# Patient Record
Sex: FEMALE | Race: WHITE | NOT HISPANIC OR LATINO
[De-identification: names, ages, dates, MRNs, and addresses within clinical notes are randomized per-mention and may not be internally consistent; named-entity substitution may affect disease eponyms.]

---

## 2016-12-31 NOTE — ED ADULT NURSE NOTE - OBJECTIVE STATEMENT
pt received in room 1 Alert and active , pt coming from home , pt is a full code , pt is being currently treated for Pneumonia with Z-pack , pt c/o SOB since this afternoon , crackles noted , pt tachypneic , pt on Cm sinus tachycardia noted , pt is currently moaning from respiratory distress unable to verbalize any physical complaints , 2 non blanchable deep tissue wounds noted on sacral area , IV was accessed by EMS will continue to monitor

## 2016-12-31 NOTE — ED PROVIDER NOTE - MEDICAL DECISION MAKING DETAILS
Pt with likely dementia (pt's son reports worsening difficulty swallowing, deterioration over past year,) p/w worsening SOB and wet cough w/diffuse rales. CXR w/right sided infiltrate? but hypoxic (although satting well on NRB) and febrile c/w PNA (w/o resolution despite z-pack recently.) CT head performed for son's c/o worsening dysphagia neg for acute pathology. d/w Dr Villarreal, HD stable, getting more antipyretics, admitting to medicine for further care.

## 2016-12-31 NOTE — H&P ADULT. - ASSESSMENT
89 yo F with PMH IBS, celiac disease, chronic LE edema (on lasix once weekly), bilateral rotator cuff tear p/w cough and SOB.  Found to have severe sepsis.

## 2016-12-31 NOTE — H&P ADULT. - PROBLEM SELECTOR PLAN 1
Severe sepsis 2/2 pneumonia  - Febrile to 102.9, Tachycardic to 120s, RR 30, WBC 11.2, lactate 1.8  - Continue ceftriaxone and doxycycline for CAP  - Tachypnea improved on non-rebreather, O2 sat 100%  - Continue tylenol for fever  - f/u blood cultures  - IVF hydration

## 2016-12-31 NOTE — ED PROVIDER NOTE - RESPIRATORY, MLM
Diffuse rhonchi, ? crackles, difficult to auscultate breath sounds as patient is moaning loudly throughout.

## 2016-12-31 NOTE — ED PROVIDER NOTE - OBJECTIVE STATEMENT
89F with PMH of CHF, Celiac disease, IBS, and DJD BIB EMS with HHA c/o worsening SOB that started about 3 hours ago. Denies chest pain or recent injuries/falls. Pt was seen here on 12/11/2016 for cough, EKG no acute changes, CT head WNL, CXR no significant infiltrates and pt was discharged home with z-pack. Per HHA, pt is baseline able to communicate and walk. Pt is full code. Son's info: Esvin Goff  [phone]. 89F with PMH of Celiac disease, IBS, and DJD BIB EMS with HHA c/o worsening SOB that started about 3 hours ago. Denies chest pain or recent injuries/falls. Pt was seen here on 12/11/2016 for cough, EKG no acute changes, CT head WNL, CXR no significant infiltrates and pt was discharged home with z-pack. Per HHA, pt is baseline able to communicate and walk. Pt is full code. Son's info: Esvin Goff  [phone]. Pt can give no history as she is moaning on arrival and will not answer questions. Per son, no h/o COPD, CAD or CHF, takes prn furosemide for chronic longstanding LLE edema

## 2016-12-31 NOTE — H&P ADULT. - PROBLEM SELECTOR PLAN 2
HSQ Pt is A&Ox3 at baseline according to son.  Now A&Ox0.  - CT negative for acute infarct or hemorrhage.  - altered mental status likely 2/2 sepsis  - continue to monitor

## 2016-12-31 NOTE — ED PROVIDER NOTE - CARDIAC, MLM
+Tachycardia. Regular rhythm, intact distal pulses. Loud holosystolic blowing murmur throughout. +Tachycardia. Regular rhythm, intact distal pulses. Loud holosystolic blowing murmur throughout? (although heart sounds obscured by lung sounds and moaning)

## 2016-12-31 NOTE — ED PROVIDER NOTE - MUSCULOSKELETAL, MLM
Neck supple, normal range of motion, no extremity deformities or edema Neck supple, normal range of motion. LLE circumference > RLE

## 2016-12-31 NOTE — ED PROVIDER NOTE - NEUROLOGICAL, MLM
Alert and oriented but not interacting much although does report "no" when asked if chest or abdominal pain

## 2016-12-31 NOTE — ED ADULT TRIAGE NOTE - CHIEF COMPLAINT QUOTE
BIBA from home with low SaO2 ,SOB increasingly worse since 5pm ,on z pack x 2 days for pneumonia,feels hot

## 2016-12-31 NOTE — ED PROVIDER NOTE - NS ED MD SCRIBE ATTENDING SCRIBE SECTIONS
HIV/HISTORY OF PRESENT ILLNESS/PHYSICAL EXAM/VITAL SIGNS( Pullset)/REVIEW OF SYSTEMS/PAST MEDICAL/SURGICAL/SOCIAL HISTORY HIV/RESULTS/HISTORY OF PRESENT ILLNESS/PAST MEDICAL/SURGICAL/SOCIAL HISTORY/REVIEW OF SYSTEMS/VITAL SIGNS( Pullset)/PHYSICAL EXAM

## 2016-12-31 NOTE — H&P ADULT. - HISTORY OF PRESENT ILLNESS
89 yo F with PMH IBS, celiac disease, chronic LLE edema, bilateral rotator cuff tear p/w cough and SOB. 89 yo F with PMH IBS, celiac disease, chronic LLE edema (on lasix once weekly), bilateral rotator cuff tear p/w cough and SOB that began suddenly this evening.  Patient was at [facility] ED on december 11th for similar symptoms and was given zithromax for CAP which she completed.  History is obtained for HHA at bedside as patient is agitated and not answering questions.  She states she first noticed patient having difficulty breathing and coughing at 5:00 PM this evening as well as AMS.  Denies chills, diaphoresis, CP, N/V/D.  VS In ED T 102.9  /54 RR 32 O2 sat   HCP is patient's son Esvin Goff [phone]. 89 yo F with PMH IBS, celiac disease, chronic LE edema (on lasix once weekly), bilateral rotator cuff tear p/w cough and SOB that began suddenly this evening.  Patient was at [facility] ED on december 11th for similar symptoms and was given zithromax for CAP which she completed.  History is obtained for HHA at bedside as patient is agitated and not answering questions.  She states she first noticed patient having difficulty breathing and coughing at 5:00 PM this evening as well as AMS.  States she's been having difficulty swallowing recently.  Denies chills, diaphoresis, CP, N/V/D.    VS In ED T 102.9  /54 RR 32 O2 sat   HCP is patient's son Esvin Goff [phone].    Addendum: When patient arrived on floor she was becoming increasingly somnolent and SBP was 70s.  She was given 1.5 L of NS but BP did not respond to fluids so she was started on levophed and transferred to MICU. 89 yo F with PMH IBS, celiac disease, chronic LE edema (on lasix once weekly), bilateral rotator cuff tear p/w cough and SOB that began suddenly this evening.  Patient was at [facility] ED on december 11th for similar symptoms and was given zithromax for CAP which she completed.  History is obtained for HHA at bedside as patient is agitated and not answering questions.  She states she first noticed patient having difficulty breathing and coughing at 5:00 PM this evening as well as AMS.  States she's been having difficulty swallowing recently.  Denies chills, diaphoresis, CP, N/V/D.    VS In ED T 102.9  /54 RR 32 O2 sat 90% on RA.  HCP is patient's son Esvin Goff [phone].    Addendum: When patient arrived on floor she was becoming increasingly somnolent and SBP was 70s.  She was given 1.5 L of NS but BP did not respond to fluids so she was started on levophed and transferred to MICU.  ABG revealed respiratory acidosis.  Son was informed and wants her to remain FULL CODE with all interventions. 89 yo F with PMH IBS, celiac disease, chronic LE edema (on lasix once weekly), bilateral rotator cuff tear p/w cough and SOB that began suddenly this evening.  Patient was at [facility] ED on december 11th for similar symptoms and was given zithromax for CAP which she completed.  History is obtained for HHA at bedside as patient is agitated and not answering questions.  She states she first noticed patient having difficulty breathing and coughing at 5:00 PM this evening as well as AMS.  States she's been having difficulty swallowing recently.  Denies chills, diaphoresis, CP, N/V/D.    VS In ED T 102.9  /54 RR 32 O2 sat 90% on RA.  HCP is patient's son Esvin Goff [phone].    Addendum: When patient arrived on floor she was becoming increasingly somnolent and hypotensive with SBP was 70s.  She was given 1.5 L of NS but BP did not respond to fluids so she was started on levophed and transferred to MICU.  ABG revealed respiratory acidosis.  Son was informed and wants her to remain FULL CODE with all interventions.

## 2017-01-01 NOTE — PROCEDURE NOTE - NSPROCDETAILS_GEN_ALL_CORE
connected to ventilator/patient pre-oxygenated, tube inserted, placement confirmed
guidewire recovered/sterile technique, catheter placed/lumen(s) aspirated and flushed/ultrasound guidance/sterile dressing applied

## 2017-01-01 NOTE — PROGRESS NOTE ADULT - ASSESSMENT
89yoF w/ b/l LE DVT  -anticoagulation unless contraindicated  -No surgical intervention at this time  -reconsult if bleed or contraindication  -d/w chief resident on call

## 2017-01-01 NOTE — PROGRESS NOTE ADULT - PROBLEM SELECTOR PLAN 2
Intubated for respiratory distress and decreasing sats. ABG prior to intubation consistent with respiratory acidosis.   - chlorhexidine mouthwash  - Protonix 40mg IVP daily while intubated for GI protection Intubated for respiratory distress and decreasing sat. ABG prior to intubation consistent with respiratory acidosis.   - chlorhexidine mouthwash BID  - Protonix 40mg IVP daily while intubated for GI protection

## 2017-01-01 NOTE — PROGRESS NOTE ADULT - SUBJECTIVE AND OBJECTIVE BOX
·	Subjective / Hospital Course: 89 yo F with PMH IBS, celiac disease, chronic LE edema (on Lasix once weekly), bilateral rotator cuff tear p/w cough and SOB that began suddenly this evening, admitted for severe sepsis 2/2 pneumonia. When patient arrived on floor she was becoming increasingly somnolent and SBP was 70s.  She was given 1.5 L of NS but blood pressure did not respond to fluids so she was started on levophed and transferred to MICU.  She was intubated immediately after arriving to MICU for tachypnea and hypoxia, with an ABG consistent with respiratory acidosis.    89 yo F with PMH IBS, celiac disease, chronic LE edema (on lasix once weekly), bilateral rotator cuff tear p/w cough and SOB that began suddenly this evening.  Patient was at [facility] ED on december 11th for similar symptoms and was given zithromax for CAP which she completed.  History is obtained for HHA at bedside as patient is agitated and not answering questions.  She states she first noticed patient having difficulty breathing and coughing at 5:00 PM this evening as well as AMS.  States she's been having difficulty swallowing recently.  Denies chills, diaphoresis, CP, N/V/D.    VS In ED T 102.9  /54 RR 32 O2 sat   HCP is patient's son Esvin Goff [phone].    Addendum: When patient arrived on floor she was becoming increasingly somnolent and SBP was 70s.  She was given 1.5 L of NS but BP did not respond to fluids so she was started on levophed and transferred to MICU.      ROS:  Unable to obtain as patient intubated     OBJECTIVE:    VITAL SIGNS:  ICU Vital Signs Last 24 Hrs  T(C): 37.4, Max: 39.2 (12-31 @ 20:50)  T(F): 99.4, Max: 102.5 (12-31 @ 20:50)  HR: 109 (109 - 119)  BP: 107/63 (106/54 - 115/76)  BP(mean): --  ABP: --  ABP(mean): --  RR: 26 (24 - 32)  SpO2: 100% (98% - 100%)        CAPILLARY BLOOD GLUCOSE      PHYSICAL EXAM:    General: NAD, comfortable  HEENT: NCAT, PERRL, clear conjunctiva, no scleral icterus  Neck: supple, no JVD  Respiratory: CTA b/l, no wheezing, rhonchi, rales  Cardiovascular: RRR, normal S1S2, no M/R/G  Abdomen: soft, NT/ND, bowel sounds in all four quadrants, no palpable masses  Extremities: WWP, no clubbing, cyanosis, or edema  Neuro:     MEDICATIONS:  MEDICATIONS  (STANDING):  doxycycline IVPB 100milliGRAM(s) IV Intermittent once  cefTRIAXone   IVPB 1Gram(s) IV Intermittent every 24 hours  sodium chloride 0.9%. 1000milliLiter(s) IV Continuous <Continuous>  heparin  Injectable 5000Unit(s) SubCutaneous every 12 hours  norepinephrine Infusion 0.01MICROgram(s)/kG/Min IV Continuous <Continuous>    MEDICATIONS  (PRN):  acetaminophen  Suppository 650milliGRAM(s) Rectal every 6 hours PRN For Temp greater than 38 C (100.4 F)      ALLERGIES:  Allergies    amoxicillin (Unknown)  Cipro (Unknown)  clindamycin (Unknown)  lactose (Unknown)  Wheat (Unknown)    Intolerances        LABS:                        10.8   11.4  )-----------( 309      ( 31 Dec 2016 20:38 )             34.2     31 Dec 2016 20:38    140    |  102    |  38     ----------------------------<  142    4.2     |  30     |  1.07     Ca    8.2        31 Dec 2016 20:38    TPro  7.1    /  Alb  2.2    /  TBili  0.4    /  DBili  x      /  AST  39     /  ALT  22     /  AlkPhos  103    31 Dec 2016 20:38    PT/INR - ( 31 Dec 2016 20:38 )   PT: 14.2 sec;   INR: 1.28          PTT - ( 31 Dec 2016 20:38 )  PTT:24.3 sec  Urinalysis Basic - ( 01 Jan 2017 01:10 )    Color: Yellow / Appearance: Clear / SG: >=1.030 / pH: x  Gluc: x / Ketone: NEGATIVE  / Bili: NEGATIVE / Urobili: 0.2 E.U./dL   Blood: x / Protein: 30 mg/dL / Nitrite: NEGATIVE   Leuk Esterase: NEGATIVE / RBC: < 5 /HPF / WBC 5-10 /HPF   Sq Epi: x / Non Sq Epi: Rare /HPF / Bacteria: Many /HPF        RADIOLOGY & ADDITIONAL TESTS: Reviewed. Subjective / Hospital Course: 89 yo F with PMH IBS, celiac disease, chronic LE edema (on Lasix once weekly), bilateral rotator cuff tear p/w cough and SOB that began suddenly this evening, admitted for severe sepsis 2/2 pneumonia. When patient arrived on floor she was becoming increasingly somnolent and SBP was 70s.  She was given 1.5 L of NS but blood pressure did not respond to fluids so she was started on levophed and transferred to MICU.  She was intubated immediately after arriving to MICU for tachypnea and hypoxia with an ABG consistent with respiratory acidosis.   89 yo F with PMH IBS, celiac disease, chronic LE edema (on lasix once weekly), bilateral rotator cuff tear p/w cough and SOB that began suddenly this evening.  Patient was at [facility] ED on december 11th for similar symptoms and was given zithromax for CAP which she completed.  History is obtained for HHA at bedside as patient is agitated and not answering questions.  She states she first noticed patient having difficulty breathing and coughing at 5:00 PM this evening as well as AMS.  States she's been having difficulty swallowing recently.  Denies chills, diaphoresis, CP, N/V/D.    VS In ED T 102.9  /54 RR 32 O2 sat   HCP is patient's son Esvin Goff [phone].    Addendum: When patient arrived on floor she was becoming increasingly somnolent and SBP was 70s.  She was given 1.5 L of NS but BP did not respond to fluids so she was started on levophed and transferred to MICU.      ROS:  Unable to obtain as patient intubated     OBJECTIVE:    VITAL SIGNS:  ICU Vital Signs Last 24 Hrs  T(C): 37.4, Max: 39.2 (12-31 @ 20:50)  T(F): 99.4, Max: 102.5 (12-31 @ 20:50)  HR: 109 (109 - 119)  BP: 107/63 (106/54 - 115/76)  BP(mean): --  ABP: --  ABP(mean): --  RR: 26 (24 - 32)  SpO2: 100% (98% - 100%)        CAPILLARY BLOOD GLUCOSE      PHYSICAL EXAM:    General: NAD, comfortable  HEENT: NCAT, pupils small but reactive, clear conjunctiva, no scleral icterus, dry mucous membranes  Neck: supple, no JVD  Respiratory: Breath sounds equal bilaterally, rales bilateral bases, no wheezing  Cardiovascular: tachycardic, regular rhythm, grade 3/6 systolic murmur  Abdomen: soft, NT/ND, bowel sounds in all four quadrants, no palpable masses  Extremities: WWP, no clubbing, cyanosis, or edema  Neuro:     MEDICATIONS:  MEDICATIONS  (STANDING):  doxycycline IVPB 100milliGRAM(s) IV Intermittent once  cefTRIAXone   IVPB 1Gram(s) IV Intermittent every 24 hours  sodium chloride 0.9%. 1000milliLiter(s) IV Continuous <Continuous>  heparin  Injectable 5000Unit(s) SubCutaneous every 12 hours  norepinephrine Infusion 0.01MICROgram(s)/kG/Min IV Continuous <Continuous>    MEDICATIONS  (PRN):  acetaminophen  Suppository 650milliGRAM(s) Rectal every 6 hours PRN For Temp greater than 38 C (100.4 F)      ALLERGIES:  Allergies    amoxicillin (Unknown)  Cipro (Unknown)  clindamycin (Unknown)  lactose (Unknown)  Wheat (Unknown)    Intolerances        LABS:                        10.8   11.4  )-----------( 309      ( 31 Dec 2016 20:38 )             34.2     31 Dec 2016 20:38    140    |  102    |  38     ----------------------------<  142    4.2     |  30     |  1.07     Ca    8.2        31 Dec 2016 20:38    TPro  7.1    /  Alb  2.2    /  TBili  0.4    /  DBili  x      /  AST  39     /  ALT  22     /  AlkPhos  103    31 Dec 2016 20:38    PT/INR - ( 31 Dec 2016 20:38 )   PT: 14.2 sec;   INR: 1.28          PTT - ( 31 Dec 2016 20:38 )  PTT:24.3 sec  Urinalysis Basic - ( 01 Jan 2017 01:10 )    Color: Yellow / Appearance: Clear / SG: >=1.030 / pH: x  Gluc: x / Ketone: NEGATIVE  / Bili: NEGATIVE / Urobili: 0.2 E.U./dL   Blood: x / Protein: 30 mg/dL / Nitrite: NEGATIVE   Leuk Esterase: NEGATIVE / RBC: < 5 /HPF / WBC 5-10 /HPF   Sq Epi: x / Non Sq Epi: Rare /HPF / Bacteria: Many /HPF        RADIOLOGY & ADDITIONAL TESTS: Reviewed. Subjective / Hospital Course: 89 yo F with PMH IBS, celiac disease, chronic LE edema (on Lasix once weekly), bilateral rotator cuff tear p/w cough and SOB that began suddenly this evening, admitted for severe sepsis 2/2 pneumonia. When patient arrived on floor she was becoming increasingly somnolent and SBP was 70s.  She was given 1.5 L of NS but blood pressure did not respond to fluids so she was started on levophed and transferred to MICU.  She was intubated immediately after arriving to MICU for tachypnea and hypoxia with an ABG consistent with respiratory acidosis.   89 yo F with PMH IBS, celiac disease, chronic LE edema (on lasix once weekly), bilateral rotator cuff tear p/w cough and SOB that began suddenly this evening.  Patient was at [facility] ED on december 11th for similar symptoms and was given zithromax for CAP which she completed.  History is obtained for HHA at bedside as patient is agitated and not answering questions.  She states she first noticed patient having difficulty breathing and coughing at 5:00 PM this evening as well as AMS.  States she's been having difficulty swallowing recently.  Denies chills, diaphoresis, CP, N/V/D.    VS In ED T 102.9  /54 RR 32 O2 sat   HCP is patient's son Esvin Goff [phone].    Addendum: When patient arrived on floor she was becoming increasingly somnolent and SBP was 70s.  She was given 1.5 L of NS but BP did not respond to fluids so she was started on levophed and transferred to MICU.      ROS:  Unable to obtain as patient intubated     OBJECTIVE:    VITAL SIGNS:  ICU Vital Signs Last 24 Hrs  T(C): 37.4, Max: 39.2 (12-31 @ 20:50)  T(F): 99.4, Max: 102.5 (12-31 @ 20:50)  HR: 109 (109 - 119)  BP: 107/63 (106/54 - 115/76)  BP(mean): --  ABP: --  ABP(mean): --  RR: 26 (24 - 32)  SpO2: 100% (98% - 100%)        CAPILLARY BLOOD GLUCOSE      PHYSICAL EXAM:    General: NAD, comfortable  HEENT: NCAT, pupils small but reactive, clear conjunctiva, no scleral icterus, dry mucous membranes  Neck: supple, no JVD  Respiratory: Breath sounds equal bilaterally, rales bilateral bases, no wheezing  Cardiovascular: tachycardic, regular rhythm, grade 3/6 systolic murmur  Abdomen: soft, NT/ND, bowel sounds in all four quadrants, no palpable masses  Extremities: WWP, no clubbing, cyanosis, or edema  Neuro:     MEDICATIONS:  MEDICATIONS  (STANDING):  doxycycline IVPB 100milliGRAM(s) IV Intermittent once  cefTRIAXone   IVPB 1Gram(s) IV Intermittent every 24 hours  sodium chloride 0.9%. 1000milliLiter(s) IV Continuous <Continuous>  heparin  Injectable 5000Unit(s) SubCutaneous every 12 hours  norepinephrine Infusion 0.01MICROgram(s)/kG/Min IV Continuous <Continuous>    MEDICATIONS  (PRN):  acetaminophen  Suppository 650milliGRAM(s) Rectal every 6 hours PRN For Temp greater than 38 C (100.4 F)      ALLERGIES:  Allergies    amoxicillin (Unknown)  Cipro (Unknown)  clindamycin (Unknown)  lactose (Unknown)  Wheat (Unknown)    Intolerances        LABS:                        10.8   11.4  )-----------( 309      ( 31 Dec 2016 20:38 )             34.2     31 Dec 2016 20:38    140    |  102    |  38     ----------------------------<  142    4.2     |  30     |  1.07     Ca    8.2        31 Dec 2016 20:38    TPro  7.1    /  Alb  2.2    /  TBili  0.4    /  DBili  x      /  AST  39     /  ALT  22     /  AlkPhos  103    31 Dec 2016 20:38    PT/INR - ( 31 Dec 2016 20:38 )   PT: 14.2 sec;   INR: 1.28          PTT - ( 31 Dec 2016 20:38 )  PTT:24.3 sec  Urinalysis Basic - ( 01 Jan 2017 01:10 )    Color: Yellow / Appearance: Clear / SG: >=1.030 / pH: x  Gluc: x / Ketone: NEGATIVE  / Bili: NEGATIVE / Urobili: 0.2 E.U./dL   Blood: x / Protein: 30 mg/dL / Nitrite: NEGATIVE   Leuk Esterase: NEGATIVE / RBC: < 5 /HPF / WBC 5-10 /HPF   Sq Epi: x / Non Sq Epi: Rare /HPF / Bacteria: Many /HPF        RADIOLOGY & ADDITIONAL TESTS: Reviewed. Subjective / Hospital Course: 89 yo F with PMH IBS, celiac disease, chronic LE edema (on Lasix once weekly), bilateral rotator cuff tear p/w cough and SOB that began suddenly this evening, admitted for severe sepsis 2/2 pneumonia. When patient arrived on floor she was becoming increasingly somnolent and SBP was 70s.  She was given 1.5 L of NS but blood pressure did not respond to fluids so she was started on levophed and transferred to MICU.  She was intubated immediately after arriving to MICU for tachypnea and hypoxia with an ABG consistent with respiratory acidosis.   89 yo F with PMH IBS, celiac disease, chronic LE edema (on lasix once weekly), bilateral rotator cuff tear p/w cough and SOB that began suddenly this evening.  Patient was at [facility] ED on december 11th for similar symptoms and was given zithromax for CAP which she completed.  History is obtained for HHA at bedside as patient is agitated and not answering questions.  She states she first noticed patient having difficulty breathing and coughing at 5:00 PM this evening as well as AMS.  States she's been having difficulty swallowing recently.  Denies chills, diaphoresis, CP, N/V/D.    VS In ED T 102.9  /54 RR 32 O2 sat   HCP is patient's son Esvin Goff [phone].    Addendum: When patient arrived on floor she was becoming increasingly somnolent and SBP was 70s.  She was given 1.5 L of NS but BP did not respond to fluids so she was started on levophed and transferred to MICU.      ROS:  Unable to obtain as patient intubated     OBJECTIVE:    VITAL SIGNS:  ICU Vital Signs Last 24 Hrs  T(C): 37.4, Max: 39.2 (12-31 @ 20:50)  T(F): 99.4, Max: 102.5 (12-31 @ 20:50)  HR: 109 (109 - 119)  BP: 107/63 (106/54 - 115/76)  BP(mean): --  ABP: --  ABP(mean): --  RR: 26 (24 - 32)  SpO2: 100% (98% - 100%)        CAPILLARY BLOOD GLUCOSE      PHYSICAL EXAM:    General: NAD, comfortable  HEENT: NCAT, pupils small but reactive, clear conjunctiva, no scleral icterus, dry mucous membranes  Neck: supple, no JVD  Respiratory: Breath sounds equal bilaterally, rales bilateral bases, no wheezing  Cardiovascular: tachycardic, regular rhythm, grade 3/6 systolic murmur  Abdomen: soft, NT/ND, bowel sounds in all four quadrants  Extremities: WWP, LLE 2+ pitting edema to knee, trace RLE edema, skin breakdown on R heel  Skin: sacral ulcer  Neuro: intubated and sedated    MEDICATIONS:  MEDICATIONS  (STANDING):  doxycycline IVPB 100milliGRAM(s) IV Intermittent once  cefTRIAXone   IVPB 1Gram(s) IV Intermittent every 24 hours  sodium chloride 0.9%. 1000milliLiter(s) IV Continuous <Continuous>  heparin  Injectable 5000Unit(s) SubCutaneous every 12 hours  norepinephrine Infusion 0.01MICROgram(s)/kG/Min IV Continuous <Continuous>    MEDICATIONS  (PRN):  acetaminophen  Suppository 650milliGRAM(s) Rectal every 6 hours PRN For Temp greater than 38 C (100.4 F)      ALLERGIES:  Allergies    amoxicillin (Unknown)  Cipro (Unknown)  clindamycin (Unknown)  lactose (Unknown)  Wheat (Unknown)    Intolerances        LABS:                        10.8   11.4  )-----------( 309      ( 31 Dec 2016 20:38 )             34.2     31 Dec 2016 20:38    140    |  102    |  38     ----------------------------<  142    4.2     |  30     |  1.07     Ca    8.2        31 Dec 2016 20:38    TPro  7.1    /  Alb  2.2    /  TBili  0.4    /  DBili  x      /  AST  39     /  ALT  22     /  AlkPhos  103    31 Dec 2016 20:38    PT/INR - ( 31 Dec 2016 20:38 )   PT: 14.2 sec;   INR: 1.28          PTT - ( 31 Dec 2016 20:38 )  PTT:24.3 sec  Urinalysis Basic - ( 01 Jan 2017 01:10 )    Color: Yellow / Appearance: Clear / SG: >=1.030 / pH: x  Gluc: x / Ketone: NEGATIVE  / Bili: NEGATIVE / Urobili: 0.2 E.U./dL   Blood: x / Protein: 30 mg/dL / Nitrite: NEGATIVE   Leuk Esterase: NEGATIVE / RBC: < 5 /HPF / WBC 5-10 /HPF   Sq Epi: x / Non Sq Epi: Rare /HPF / Bacteria: Many /HPF        RADIOLOGY & ADDITIONAL TESTS: Reviewed. Subjective / Hospital Course: 89 yo F with PMH IBS, celiac disease, chronic LE edema (on Lasix once weekly), bilateral rotator cuff tear p/w cough and SOB that began suddenly this evening, admitted for severe sepsis 2/2 pneumonia. When patient arrived on floor she was becoming increasingly somnolent and SBP was 70s.  She was given 1.5 L of NS but blood pressure did not respond to fluids so she was started on levophed and transferred to MICU.  She was intubated immediately after arriving to MICU for hypoxic respiratory failure  89 yo F with PMH IBS, celiac disease, chronic LE edema (on lasix once weekly), bilateral rotator cuff tear p/w cough and SOB that began suddenly this evening.  Patient was at [facility] ED on december 11th for similar symptoms and was given zithromax for CAP which she completed.  History is obtained for HHA at bedside as patient is agitated and not answering questions.  She states she first noticed patient having difficulty breathing and coughing at 5:00 PM this evening as well as AMS.  States she's been having difficulty swallowing recently.  Denies chills, diaphoresis, CP, N/V/D.    VS In ED T 102.9  /54 RR 32 O2 sat   HCP is patient's son Esvin Goff [phone].    Addendum: When patient arrived on floor she was becoming increasingly somnolent and SBP was 70s.  She was given 1.5 L of NS but BP did not respond to fluids so she was started on levophed and transferred to MICU.      ROS:  Unable to obtain as patient intubated     OBJECTIVE:    VITAL SIGNS:  ICU Vital Signs Last 24 Hrs  T(C): 37.4, Max: 39.2 (12-31 @ 20:50)  T(F): 99.4, Max: 102.5 (12-31 @ 20:50)  HR: 109 (109 - 119)  BP: 107/63 (106/54 - 115/76)  BP(mean): --  ABP: --  ABP(mean): --  RR: 26 (24 - 32)  SpO2: 100% (98% - 100%)        CAPILLARY BLOOD GLUCOSE      PHYSICAL EXAM:    General: NAD, comfortable  HEENT: NCAT, pupils small but reactive, clear conjunctiva, no scleral icterus, dry mucous membranes  Neck: supple, no JVD  Respiratory: Breath sounds equal bilaterally, rales bilateral bases, no wheezing  Cardiovascular: tachycardic, regular rhythm, grade 3/6 systolic murmur  Abdomen: soft, NT/ND, bowel sounds in all four quadrants  Extremities: WWP, LLE 2+ pitting edema to knee, trace RLE edema, skin breakdown on R heel  Skin: sacral ulcer  Neuro: intubated and sedated    MEDICATIONS:  MEDICATIONS  (STANDING):  doxycycline IVPB 100milliGRAM(s) IV Intermittent once  cefTRIAXone   IVPB 1Gram(s) IV Intermittent every 24 hours  sodium chloride 0.9%. 1000milliLiter(s) IV Continuous <Continuous>  heparin  Injectable 5000Unit(s) SubCutaneous every 12 hours  norepinephrine Infusion 0.01MICROgram(s)/kG/Min IV Continuous <Continuous>    MEDICATIONS  (PRN):  acetaminophen  Suppository 650milliGRAM(s) Rectal every 6 hours PRN For Temp greater than 38 C (100.4 F)      ALLERGIES:  Allergies    amoxicillin (Unknown)  Cipro (Unknown)  clindamycin (Unknown)  lactose (Unknown)  Wheat (Unknown)    Intolerances        LABS:                        10.8   11.4  )-----------( 309      ( 31 Dec 2016 20:38 )             34.2     31 Dec 2016 20:38    140    |  102    |  38     ----------------------------<  142    4.2     |  30     |  1.07     Ca    8.2        31 Dec 2016 20:38    TPro  7.1    /  Alb  2.2    /  TBili  0.4    /  DBili  x      /  AST  39     /  ALT  22     /  AlkPhos  103    31 Dec 2016 20:38    PT/INR - ( 31 Dec 2016 20:38 )   PT: 14.2 sec;   INR: 1.28          PTT - ( 31 Dec 2016 20:38 )  PTT:24.3 sec  Urinalysis Basic - ( 01 Jan 2017 01:10 )    Color: Yellow / Appearance: Clear / SG: >=1.030 / pH: x  Gluc: x / Ketone: NEGATIVE  / Bili: NEGATIVE / Urobili: 0.2 E.U./dL   Blood: x / Protein: 30 mg/dL / Nitrite: NEGATIVE   Leuk Esterase: NEGATIVE / RBC: < 5 /HPF / WBC 5-10 /HPF   Sq Epi: x / Non Sq Epi: Rare /HPF / Bacteria: Many /HPF        RADIOLOGY & ADDITIONAL TESTS: Reviewed.

## 2017-01-01 NOTE — PROGRESS NOTE ADULT - ASSESSMENT
Pt with temp hypotension and respiratory failure now on vent with IV antibiotics and pressors  To cont. all vigorous support Pts son is fully aware of the situation

## 2017-01-01 NOTE — PROVIDER CONTACT NOTE (CHANGE IN STATUS NOTIFICATION) - ASSESSMENT
non verbal on non rebreather mask no acute distress. No monitoring BP 77/51, retake 81/50, tachy 109-111. Sat 100% on 10L/hr.

## 2017-01-01 NOTE — PROGRESS NOTE ADULT - SUBJECTIVE AND OBJECTIVE BOX
Vascular Attending:  Marcos      HPI:  89 yo F with PMH IBS, celiac disease, chronic LE edema (on lasix once weekly), bilateral rotator cuff tear p/w cough and SOB that began suddenly this evening.  Patient was at [facility] ED on december 11th for similar symptoms and was given zithromax for CAP which she completed.  History is obtained for HHA at bedside as patient is agitated and not answering questions.  She states she first noticed patient having difficulty breathing and coughing at 5:00 PM this evening as well as AMS.  States she's been having difficulty swallowing recently.  Denies chills, diaphoresis, CP, N/V/D.    VS In ED T 102.9  /54 RR 32 O2 sat 90% on RA.  HCP is patient's son Esvin Goff [phone].    Addendum: When patient arrived on floor she was becoming increasingly somnolent and hypotensive with SBP was 70s.  She was given 1.5 L of NS but BP did not respond to fluids so she was started on levophed and transferred to MICU.  ABG revealed respiratory acidosis.  Son was informed and wants her to remain FULL CODE with all interventions. (31 Dec 2016 23:08)    Consulted vascular sugery for IVC filter due to doppler showing Acute DVT involving the right femoral vein and the bilateral popliteal veins.  PAST MEDICAL & SURGICAL HISTORY:  DJD (degenerative joint disease)  Edema  IBS (irritable bowel syndrome)  Celiac disease  H/O inguinal hernia repair  History of total left hip replacement      MEDICATIONS  (STANDING):  sodium chloride 0.9%. 1000milliLiter(s) IV Continuous <Continuous>  norepinephrine Infusion 0.01MICROgram(s)/kG/Min IV Continuous <Continuous>  pantoprazole  Injectable 40milliGRAM(s) IV Push daily  chlorhexidine 0.12% Liquid 15milliLiter(s) Swish and Spit two times a day  vancomycin  IVPB 750milliGRAM(s) IV Intermittent every 24 hours  propofol Infusion 30MICROgram(s)/kG/Min IV Continuous <Continuous>  fentaNYL   Infusion 0.5MICROgram(s)/kG/Hr IV Continuous <Continuous>  heparin  Infusion. Unit(s)/Hr IV Continuous <Continuous>    MEDICATIONS  (PRN):  acetaminophen  Suppository 650milliGRAM(s) Rectal every 6 hours PRN For Temp greater than 38 C (100.4 F)      Allergies    amoxicillin (Unknown)  Cipro (Unknown)  clindamycin (Unknown)  lactose (Unknown)  Wheat (Unknown)    Intolerances        SOCIAL HISTORY:    FAMILY HISTORY:  No pertinent family history in first degree relatives      Vital Signs Last 24 Hrs  T(C): 37.2, Max: 39.2 (12-31 @ 20:50)  T(F): 98.9, Max: 102.5 (12-31 @ 20:50)  HR: 98 (79 - 119)  BP: 91/52 (67/39 - 146/85)  BP(mean): 70 (67 - 85)  RR: 12 (12 - 32)  SpO2: 97% (93% - 100%)    PHYSICAL EXAM:      Constitutional: sedated    Respiratory: intubated    Cardiovascular: s1s2 rrr    Gastrointestinal: soft nt/nd    Extremities: LLE- +edema up to knee RLE- pressure heel ulcer stage 2    Vascular: 2+ pal pulses throughout      LABS:                        10.8   11.4  )-----------( 309      ( 31 Dec 2016 20:38 )             34.2     31 Dec 2016 20:38    140    |  102    |  38     ----------------------------<  142    4.2     |  30     |  1.07     Ca    8.2        31 Dec 2016 20:38    TPro  7.1    /  Alb  2.2    /  TBili  0.4    /  DBili  x      /  AST  39     /  ALT  22     /  AlkPhos  103    31 Dec 2016 20:38    PT/INR - ( 31 Dec 2016 20:38 )   PT: 14.2 sec;   INR: 1.28          PTT - ( 01 Jan 2017 15:29 )  PTT:27.1 sec  Urinalysis Basic - ( 01 Jan 2017 01:10 )    Color: Yellow / Appearance: Clear / SG: >=1.030 / pH: x  Gluc: x / Ketone: NEGATIVE  / Bili: NEGATIVE / Urobili: 0.2 E.U./dL   Blood: x / Protein: 30 mg/dL / Nitrite: NEGATIVE   Leuk Esterase: NEGATIVE / RBC: < 5 /HPF / WBC 5-10 /HPF   Sq Epi: x / Non Sq Epi: Rare /HPF / Bacteria: Many /HPF        RADIOLOGY & ADDITIONAL STUDIES

## 2017-01-01 NOTE — PROCEDURE NOTE - NSTRACHPOSTINTU_RESP_A_CORE
Appropriate capnography/Positive end tidal Co2 noted/Breath sounds equal/Chest excursion noted/Breath sounds bilateral

## 2017-01-01 NOTE — CONSULT NOTE ADULT - SUBJECTIVE AND OBJECTIVE BOX
ICU CONSULT    89 year old Female with PMH of IBS, celiac disease, chronic LE edema (on Lasix), bilateral rotator cuff tear presented to [facility] with complaints of cough/dyspnea. These symptoms were sudden in onset, persistent, progressive following which patient presented to [facility] ED. In the ED, patient met sepsis criteria given tachycardia/tachypnea/febrile 2/2 PNA. She was subsequently admitted to [facility] for management of CAP. On the [facility], patient found to be increasingly lethargic, tachypneic, as well as hypotensive with BP 70/40. Pt received 1.5L NS without adequate response, after which patient was started on peripheral levophed and transferred to the MICU for further management. Patient was subsequently emergently intubated in the MICU for hypoxemic respiratory failure.     PMHx - IBS, Celiac disease, Chronic LE edema, Bilateral rotator cuff tear  PSx - Inguinal hernia repair, Left Total hip replacement  Meds - Azithromycin/Lasix  Allergies - Amoxicillin, Clindamycin, Lactose, Ciprofloxacin, Wheat  FHx - Non-contributory      PHYSICAL EXAM   Vital Signs Last 24 Hrs  T(C): 37.4, Max: 39.2 (12-31 @ 20:50)  T(F): 99.4, Max: 102.5 (12-31 @ 20:50)  HR: 101 (96 - 119)  BP: 101/57 (67/39 - 115/76)  BP(mean): 81 (81 - 81)  RR: 12 (12 - 32)  SpO2: 99% (93% - 100%)      General - Pt currently intubated/sedated  HEENT - MMM, PERRLA, EOMI  CV - RRR, S1/S2 no m/r/g  Resp - Appreciable bilateral bibasillar crepitations  Abdomen - Soft, non-tender, non-distended, BS+ in all 4 regions  Extremities - WWP, 2+ LE edema, L>R  Skin -       LABS                        10.8   11.4  )-----------( 309      ( 31 Dec 2016 20:38 )             34.2     31 Dec 2016 20:38    140    |  102    |  38     ----------------------------<  142    4.2     |  30     |  1.07     Ca    8.2        31 Dec 2016 20:38    TPro  7.1    /  Alb  2.2    /  TBili  0.4    /  DBili  x      /  AST  39     /  ALT  22     /  AlkPhos  103    31 Dec 2016 20:38    PT/INR - ( 31 Dec 2016 20:38 )   PT: 14.2 sec;   INR: 1.28          PTT - ( 31 Dec 2016 20:38 )  PTT:24.3 sec    Urinalysis Basic - ( 01 Jan 2017 01:10 )    Color: Yellow / Appearance: Clear / SG: >=1.030 / pH: x  Gluc: x / Ketone: NEGATIVE  / Bili: NEGATIVE / Urobili: 0.2 E.U./dL   Blood: x / Protein: 30 mg/dL / Nitrite: NEGATIVE   Leuk Esterase: NEGATIVE / RBC: < 5 /HPF / WBC 5-10 /HPF   Sq Epi: x / Non Sq Epi: Rare /HPF / Bacteria: Many /HPF            IMAGING   CXR -   EKG - ICU CONSULT    89 year old Female with PMH of IBS, celiac disease, chronic LE edema (on Lasix), bilateral rotator cuff tear presented to [facility] with complaints of cough/dyspnea. These symptoms were sudden in onset, persistent, progressive following which patient presented to [facility] ED. In the ED, patient met sepsis criteria given tachycardia/tachypnea/febrile 2/2 PNA. She was subsequently admitted to [facility] for management of CAP. On the [facility], patient found to be increasingly lethargic, tachypneic, as well as hypotensive with BP 70/40. Pt received 1.5L NS without adequate response, after which patient was started on peripheral levophed and transferred to the MICU for further management. Patient was subsequently emergently intubated in the MICU for hypoxemic respiratory failure.     PMHx - IBS, Celiac disease, Chronic LE edema, Bilateral rotator cuff tear  PSx - Inguinal hernia repair, Left Total hip replacement  Meds - Azithromycin/Lasix  Allergies - Amoxicillin, Clindamycin, Lactose, Ciprofloxacin, Wheat  FHx - Non-contributory      PHYSICAL EXAM   Vital Signs Last 24 Hrs  T(C): 37.4, Max: 39.2 (12-31 @ 20:50)  T(F): 99.4, Max: 102.5 (12-31 @ 20:50)  HR: 101 (96 - 119)  BP: 101/57 (67/39 - 115/76)  BP(mean): 81 (81 - 81)  RR: 12 (12 - 32)  SpO2: 99% (93% - 100%)      General - Pt currently intubated/sedated  HEENT - MMM, PERRLA, EOMI  CV - RRR, S1/S2 no m/r/g  Resp - Appreciable bilateral bibasillar crepitations  Abdomen - Soft, non-tender, non-distended, BS+ in all 4 regions  Extremities - WWP, 2+ LE edema, L>R  Skin - Stage 2 decubitus ulcer of right heel, Stage 3 sacral decubitus ulcer      LABS                        10.8   11.4  )-----------( 309      ( 31 Dec 2016 20:38 )             34.2     31 Dec 2016 20:38    140    |  102    |  38     ----------------------------<  142    4.2     |  30     |  1.07     Ca    8.2        31 Dec 2016 20:38    TPro  7.1    /  Alb  2.2    /  TBili  0.4    /  DBili  x      /  AST  39     /  ALT  22     /  AlkPhos  103    31 Dec 2016 20:38    PT/INR - ( 31 Dec 2016 20:38 )   PT: 14.2 sec;   INR: 1.28          PTT - ( 31 Dec 2016 20:38 )  PTT:24.3 sec    Urinalysis Basic - ( 01 Jan 2017 01:10 )    Color: Yellow / Appearance: Clear / SG: >=1.030 / pH: x  Gluc: x / Ketone: NEGATIVE  / Bili: NEGATIVE / Urobili: 0.2 E.U./dL   Blood: x / Protein: 30 mg/dL / Nitrite: NEGATIVE   Leuk Esterase: NEGATIVE / RBC: < 5 /HPF / WBC 5-10 /HPF   Sq Epi: x / Non Sq Epi: Rare /HPF / Bacteria: Many /HPF            IMAGING   CXR - Clear lung fields  EKG -

## 2017-01-01 NOTE — PROGRESS NOTE ADULT - ASSESSMENT
89 yo F with PMH IBS, celiac disease, chronic LE edema (L > R, on Lasix once weekly), bilateral rotator cuff tear admitted for severe sepsis 2/2 pneumonia, intubated 2/2 respiratory distress

## 2017-01-01 NOTE — PROGRESS NOTE ADULT - PROBLEM SELECTOR PLAN 3
LLE 2+ pitting edema, trace edema on RLE. Son stated that edema is chronic since prior hip surgery  - check LLE duplex

## 2017-01-01 NOTE — CONSULT NOTE ADULT - ASSESSMENT
89 year old Female with PMH of IBS, celiac disease, chronic LE edema (on Lasix), bilateral rotator cuff tear presented to [facility] with complaints of cough/dyspnea went into hypoxemic respiratory failure

## 2017-01-01 NOTE — PROGRESS NOTE ADULT - SUBJECTIVE AND OBJECTIVE BOX
89 yo admitted last night with lethargy weakness inability to eat and slurred speech noted by her son  Patient was treated recently for cough with zithromax with good clinical response.  Overnight the pts BP dropped with hypoxia requiring intubation and pressors despite receiving IV fluids and antibiotics in the ER  Is currenyly on ventilator with adequate BP and is afebrile  In RR  Clear chest ant.  Abd is soft  LE edema is less than her norm

## 2017-01-01 NOTE — PROGRESS NOTE ADULT - PROBLEM SELECTOR PLAN 1
Patient presented with sepsis, on RMF BP dropped, now requiring Levo. Likely source is PNA  - Ceftriaxone and azithromycin for CAP coverage  - Patient presented with sepsis, on RMF BP dropped, now requiring Levo. Likely source is PNA  - Ceftriaxone and azithromycin for CAP coverage  - central line for CVP monitoring and leeanna access as patient on Levo  - continue IVNS at 80cc/hr, will give additional fluid boluses as needed  - may need arterial line if hypotension persists  - f/u blood cultures, urine legionella, RVP, sputum cultures Patient presented with sepsis, on RMF BP dropped, now requiring Levo. Likely source is PNA. UA negative for UTI  - vancomycin, aztreonam, and azithromycin for pneumonia coverage in setting of severe sepsis  - central line for CVP monitoring and leeanna access as patient on Levo  - continue IVNS at 80cc/hr, will give additional fluid boluses as needed  - may need arterial line if hypotension persists  - f/u blood cultures, urine legionella, RVP, sputum cultures

## 2017-01-02 NOTE — PROGRESS NOTE ADULT - SUBJECTIVE AND OBJECTIVE BOX
INTERVAL HPI/OVERNIGHT EVENTS:    Pt was seen and examined at the bedside. He was observed to be lying down comfortably.   Pt c/o  VITAL SIGNS:  T(F): 99.1  HR: 78  BP: 106/54  RR: 12  SpO2: 96%  Wt(kg): --  I&O's Summary    I & Os for current day (as of 02 Jan 2017 07:44)  =============================================  IN: 3658 ml / OUT: 950 ml / NET: 2708 ml    PHYSICAL EXAM:      Constitutional: NAD, well-groomed, well-developed  HEENT: PERRLA, EOMI, Normal Hearing, MMM  Neck: No LAD, No JVD  Back: Normal spine flexure, No CVA tenderness  Respiratory: CTABCardiovascular: S1 and S2, RRR, no M/G/R  Gastrointestinal: BS+, soft, NT/ND  Extremities: No peripheral edema  Vascular: 2+ peripheral pulses  Neurological: A/O x 3, no focal deficits  Psychiatric: Normal mood, normal affect  Musculoskeletal: 5/5 strength b/l upper and lower extremities  Skin: No rashes        MEDICATIONS  (STANDING):  sodium chloride 0.9%. 1000milliLiter(s) IV Continuous <Continuous>  norepinephrine Infusion 0.01MICROgram(s)/kG/Min IV Continuous <Continuous>  pantoprazole  Injectable 40milliGRAM(s) IV Push daily  chlorhexidine 0.12% Liquid 15milliLiter(s) Swish and Spit two times a day  vancomycin  IVPB 750milliGRAM(s) IV Intermittent every 24 hours  propofol Infusion 30MICROgram(s)/kG/Min IV Continuous <Continuous>  fentaNYL   Infusion 0.5MICROgram(s)/kG/Hr IV Continuous <Continuous>  cefepime  IVPB 2000milliGRAM(s) IV Intermittent every 24 hours  heparin  Infusion 700Unit(s)/Hr IV Continuous <Continuous>    MEDICATIONS  (PRN):  acetaminophen  Suppository 650milliGRAM(s) Rectal every 6 hours PRN For Temp greater than 38 C (100.4 F)      Allergies    amoxicillin (Unknown)  Cipro (Unknown)  clindamycin (Unknown)  lactose (Unknown)  Wheat (Unknown)    Intolerances        LABS:                        9.3    7.6   )-----------( 274      ( 02 Jan 2017 04:05 )             30.0     02 Jan 2017 04:05    145    |  111    |  20     ----------------------------<  109    3.9     |  24     |  1.03     Ca    6.9        02 Jan 2017 04:05  Phos  3.3       02 Jan 2017 04:05  Mg     2.0       02 Jan 2017 04:05    TPro  7.1    /  Alb  2.2    /  TBili  0.4    /  DBili  x      /  AST  39     /  ALT  22     /  AlkPhos  103    31 Dec 2016 20:38    PT/INR - ( 31 Dec 2016 20:38 )   PT: 14.2 sec;   INR: 1.28          PTT - ( 02 Jan 2017 04:05 )  PTT:96.6 sec  Urinalysis Basic - ( 01 Jan 2017 01:10 )    Color: Yellow / Appearance: Clear / SG: >=1.030 / pH: x  Gluc: x / Ketone: NEGATIVE  / Bili: NEGATIVE / Urobili: 0.2 E.U./dL   Blood: x / Protein: 30 mg/dL / Nitrite: NEGATIVE   Leuk Esterase: NEGATIVE / RBC: < 5 /HPF / WBC 5-10 /HPF   Sq Epi: x / Non Sq Epi: Rare /HPF / Bacteria: Many /HPF        RADIOLOGY & ADDITIONAL TESTS: INTERVAL HPI/OVERNIGHT EVENTS:  Spiked temperature ON.  Pt was seen and examined at the bedside. He was observed to be lying down comfortably.   Pt c/o  VITAL SIGNS:  T(F): 99.1  HR: 78  BP: 106/54  RR: 12  SpO2: 96%  Wt(kg): --  I&O's Summary    I & Os for current day (as of 02 Jan 2017 07:44)  =============================================  IN: 3658 ml / OUT: 950 ml / NET: 2708 ml    PHYSICAL EXAM:      Constitutional: NAD, well-groomed, well-developed  HEENT: PERRLA, EOMI, Normal Hearing, MMM  Neck: No LAD, No JVD  Back: Normal spine flexure, No CVA tenderness  Respiratory: CTABCardiovascular: S1 and S2, RRR, no M/G/R  Gastrointestinal: BS+, soft, NT/ND  Extremities: No peripheral edema  Vascular: 2+ peripheral pulses  Neurological: A/O x 3, no focal deficits  Psychiatric: Normal mood, normal affect  Musculoskeletal: 5/5 strength b/l upper and lower extremities  Skin: No rashes        MEDICATIONS  (STANDING):  sodium chloride 0.9%. 1000milliLiter(s) IV Continuous <Continuous>  norepinephrine Infusion 0.01MICROgram(s)/kG/Min IV Continuous <Continuous>  pantoprazole  Injectable 40milliGRAM(s) IV Push daily  chlorhexidine 0.12% Liquid 15milliLiter(s) Swish and Spit two times a day  vancomycin  IVPB 750milliGRAM(s) IV Intermittent every 24 hours  propofol Infusion 30MICROgram(s)/kG/Min IV Continuous <Continuous>  fentaNYL   Infusion 0.5MICROgram(s)/kG/Hr IV Continuous <Continuous>  cefepime  IVPB 2000milliGRAM(s) IV Intermittent every 24 hours  heparin  Infusion 700Unit(s)/Hr IV Continuous <Continuous>    MEDICATIONS  (PRN):  acetaminophen  Suppository 650milliGRAM(s) Rectal every 6 hours PRN For Temp greater than 38 C (100.4 F)      Allergies    amoxicillin (Unknown)  Cipro (Unknown)  clindamycin (Unknown)  lactose (Unknown)  Wheat (Unknown)    Intolerances        LABS:                        9.3    7.6   )-----------( 274      ( 02 Jan 2017 04:05 )             30.0     02 Jan 2017 04:05    145    |  111    |  20     ----------------------------<  109    3.9     |  24     |  1.03     Ca    6.9        02 Jan 2017 04:05  Phos  3.3       02 Jan 2017 04:05  Mg     2.0       02 Jan 2017 04:05    TPro  7.1    /  Alb  2.2    /  TBili  0.4    /  DBili  x      /  AST  39     /  ALT  22     /  AlkPhos  103    31 Dec 2016 20:38    PT/INR - ( 31 Dec 2016 20:38 )   PT: 14.2 sec;   INR: 1.28          PTT - ( 02 Jan 2017 04:05 )  PTT:96.6 sec  Urinalysis Basic - ( 01 Jan 2017 01:10 )    Color: Yellow / Appearance: Clear / SG: >=1.030 / pH: x  Gluc: x / Ketone: NEGATIVE  / Bili: NEGATIVE / Urobili: 0.2 E.U./dL   Blood: x / Protein: 30 mg/dL / Nitrite: NEGATIVE   Leuk Esterase: NEGATIVE / RBC: < 5 /HPF / WBC 5-10 /HPF   Sq Epi: x / Non Sq Epi: Rare /HPF / Bacteria: Many /HPF        RADIOLOGY & ADDITIONAL TESTS: INTERVAL HPI/OVERNIGHT EVENTS:  Spiked temperature ON. ETT was found to be positional with leak noted upon moving to the R.  Pt was seen and examined at the bedside. He was observed to be lying down comfortably.   Pt is intubated and sedated.  VITAL SIGNS:  T(F): 99.1  HR: 78  BP: 106/54  RR: 12  SpO2: 96%  Wt(kg): --  I&O's Summary    I & Os for current day (as of 02 Jan 2017 07:44)  =============================================  IN: 3658 ml / OUT: 950 ml / NET: 2708 ml    PHYSICAL EXAM:    General: NAD, comfortable  HEENT: NCAT, pupils small but reactive, clear conjunctiva, no scleral icterus, dry mucous membranes  Neck: supple, no JVD  Respiratory: Breath sounds equal bilaterally, rales bilateral bases, no wheezing  Cardiovascular: RRR, grade 3/6 systolic murmur over LLSB  Abdomen: soft, NT/ND, bowel sounds in all four quadrants  Extremities: WWP, LLE 2+ pitting edema to knee, trace RLE edema, skin breakdown on R heel with compress  Skin: sacral ulcer  Neuro: intubated and sedated        MEDICATIONS  (STANDING):  sodium chloride 0.9%. 1000milliLiter(s) IV Continuous <Continuous>  norepinephrine Infusion 0.01MICROgram(s)/kG/Min IV Continuous <Continuous>  pantoprazole  Injectable 40milliGRAM(s) IV Push daily  chlorhexidine 0.12% Liquid 15milliLiter(s) Swish and Spit two times a day  vancomycin  IVPB 750milliGRAM(s) IV Intermittent every 24 hours  propofol Infusion 30MICROgram(s)/kG/Min IV Continuous <Continuous>  fentaNYL   Infusion 0.5MICROgram(s)/kG/Hr IV Continuous <Continuous>  cefepime  IVPB 2000milliGRAM(s) IV Intermittent every 24 hours  heparin  Infusion 700Unit(s)/Hr IV Continuous <Continuous>    MEDICATIONS  (PRN):  acetaminophen  Suppository 650milliGRAM(s) Rectal every 6 hours PRN For Temp greater than 38 C (100.4 F)      Allergies    amoxicillin (Unknown)  Cipro (Unknown)  clindamycin (Unknown)  lactose (Unknown)  Wheat (Unknown)    Intolerances        LABS:                        9.3    7.6   )-----------( 274      ( 02 Jan 2017 04:05 )             30.0     02 Jan 2017 04:05    145    |  111    |  20     ----------------------------<  109    3.9     |  24     |  1.03     Ca    6.9        02 Jan 2017 04:05  Phos  3.3       02 Jan 2017 04:05  Mg     2.0       02 Jan 2017 04:05    TPro  7.1    /  Alb  2.2    /  TBili  0.4    /  DBili  x      /  AST  39     /  ALT  22     /  AlkPhos  103    31 Dec 2016 20:38    PT/INR - ( 31 Dec 2016 20:38 )   PT: 14.2 sec;   INR: 1.28          PTT - ( 02 Jan 2017 04:05 )  PTT:96.6 sec  Urinalysis Basic - ( 01 Jan 2017 01:10 )    Color: Yellow / Appearance: Clear / SG: >=1.030 / pH: x  Gluc: x / Ketone: NEGATIVE  / Bili: NEGATIVE / Urobili: 0.2 E.U./dL   Blood: x / Protein: 30 mg/dL / Nitrite: NEGATIVE   Leuk Esterase: NEGATIVE / RBC: < 5 /HPF / WBC 5-10 /HPF   Sq Epi: x / Non Sq Epi: Rare /HPF / Bacteria: Many /HPF      Assessment/Plan:		  89 yo F with PMH IBS, celiac disease, chronic LE edema (L > R, on Lasix once weekly), bilateral rotator cuff tear admitted for severe sepsis 2/2 pneumonia, intubated 2/2 respiratory distress    ID   Septic shock. - Patient presented with sepsis, on RMF BP dropped, now requiring Levo decreased requirements to 8cc/min. Likely source is PNA, however CXR not showing clear inflitrate, however of note poor penetration and severely rotated films due to patient's kyphosis. UA negative for UTI, BCx growing G+cocci in clusters. Pt has significant purulent secretions.  - cont Vanco and cefepime (since 12/31)  - repeat BCx  -fu final BCx   - fu sputum Cx  - chlorhexidine mouthwash BID  - Protonix 40mg IVP daily while intubated for GI protection.  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, new onset as per pt's son had Doppler 2 yrs ago which was negative, bedbound for only an few weeks  - on heparin gtt  -vascular consulted, no IVC filter for now - will reconsult as needed  -f/u PTT goal is 60-80  - monitor for signs of respiratory distress    RENAL  CAMRYN, unknown baseline Cr 1.03, improved from 1.08 yesterday - CrCl 24.7, pt was on maintenance IVF, but now with Cl 111  -dc NS  -start tube feeds, no maintenance fluids required    SKIN  Sacral ulcer - sacral ulcer on exam, as well as skin breakdown on right heal  - wound care following    PPX   On heparin gtt  chlorhexidine and Protonix as above.     FEN   NGT in place, start feeds today  monitor and replete serum electrolytes as needed      Dispo: MICU level of care  Access: Rt IJ and peripherals  Tubbs: Yes  Full code.

## 2017-01-02 NOTE — PROGRESS NOTE ADULT - SUBJECTIVE AND OBJECTIVE BOX
Remains on resp. with pressure support  Is afeb  In RR  Cleatr chest  Abd is soft  LE edema unchanged  On IV heparin for DVT  Antibiotics cont pending cultures  Labs stable

## 2017-01-02 NOTE — PROGRESS NOTE ADULT - ASSESSMENT
No significant change  To Continue all care  Disc. with pts son who is aware of the entire situation and wants to cont with aggressive care

## 2017-01-02 NOTE — DIETITIAN INITIAL EVALUATION ADULT. - ORAL INTAKE PTA
poor/as per HHA took only juice and 2 ensure a day at home.Reportedly has lost a lot of weight but could not state UBW.

## 2017-01-02 NOTE — DIETITIAN INITIAL EVALUATION ADULT. - MD RECOMMEND
initiate Enteral feeds of Jevity 1.2 @30/hr to goal of 37cc/hr wit 1 prostate(881cc/1157kcal//64gmprotein.With propofol total calories:1263kcal/other

## 2017-01-02 NOTE — DIETITIAN INITIAL EVALUATION ADULT. - NS AS NUTRI INTERV ENTERAL NUTRITION
Rate/Composition/Insert enteral feeding tube/Volume/Jevity 1.2 @30cc/hr increase by 10cc to goal of 37cc/hr.As per RN plans for NGT today/Concentration

## 2017-01-03 NOTE — PROGRESS NOTE ADULT - SUBJECTIVE AND OBJECTIVE BOX
Febrile again  Cultures obtained  Remains on resp with pressor support  In RR  Clear chest  Abd is soft with bowel sounds  Edema unchanged  No increased WBC count  Hct and lytes stable  Initial blood cultures pending

## 2017-01-03 NOTE — PROGRESS NOTE ADULT - SUBJECTIVE AND OBJECTIVE BOX
INTERVAL HPI/OVERNIGHT EVENTS:    Pt was seen and examined at the bedside. He was observed to be lying down comfortably.   Pt c/o  VITAL SIGNS:  T(F): 101.5  HR: 80  BP: 89/48  RR: 12  SpO2: 97%  Wt(kg): --  I&O's Summary  I & Os for 24h ending 03 Jan 2017 07:00  =============================================  IN: 2379.8 ml / OUT: 1480 ml / NET: 899.8 ml    I & Os for current day (as of 03 Jan 2017 08:36)  =============================================  IN: 93 ml / OUT: 40 ml / NET: 53 ml    PHYSICAL EXAM:      Constitutional: NAD, well-groomed, well-developed  HEENT: PERRLA, EOMI, Normal Hearing, MMM  Neck: No LAD, No JVD  Back: Normal spine flexure, No CVA tenderness  Respiratory: CTABCardiovascular: S1 and S2, RRR, no M/G/R  Gastrointestinal: BS+, soft, NT/ND  Extremities: No peripheral edema  Vascular: 2+ peripheral pulses  Neurological: A/O x 3, no focal deficits  Psychiatric: Normal mood, normal affect  Musculoskeletal: 5/5 strength b/l upper and lower extremities  Skin: No rashes        MEDICATIONS  (STANDING):  norepinephrine Infusion 0.01MICROgram(s)/kG/Min IV Continuous <Continuous>  pantoprazole  Injectable 40milliGRAM(s) IV Push daily  vancomycin  IVPB 750milliGRAM(s) IV Intermittent every 24 hours  propofol Infusion 30MICROgram(s)/kG/Min IV Continuous <Continuous>  fentaNYL   Infusion 0.5MICROgram(s)/kG/Hr IV Continuous <Continuous>  cefepime  IVPB 2000milliGRAM(s) IV Intermittent every 24 hours  chlorhexidine 0.12% Liquid 15milliLiter(s) Swish and Spit two times a day  heparin  Infusion 600Unit(s)/Hr IV Continuous <Continuous>    MEDICATIONS  (PRN):  acetaminophen  Suppository 650milliGRAM(s) Rectal every 6 hours PRN For Temp greater than 38 C (100.4 F)      Allergies    amoxicillin (Unknown)  Cipro (Unknown)  clindamycin (Unknown)  lactose (Unknown)  Wheat (Unknown)    Intolerances        LABS:                        8.2    4.0   )-----------( 263      ( 03 Jan 2017 06:15 )             26.6     03 Jan 2017 06:15    144    |  110    |  28     ----------------------------<  119    3.8     |  25     |  1.29     Ca    7.4        03 Jan 2017 06:15  Phos  3.0       03 Jan 2017 06:15  Mg     2.3       03 Jan 2017 06:15      PTT - ( 03 Jan 2017 06:15 )  PTT:81.9 sec      RADIOLOGY & ADDITIONAL TESTS: INTERVAL HPI/OVERNIGHT EVENTS:  Spiked temperature ON, given Tylenol, cultured already in the afternoon.  Pt was seen and examined at the bedside. She was observed to be lying down comfortably, intubated, sedated, responsive to verbal stimulai but not follwoing commands.    VITAL SIGNS:  T(F): 101.5  HR: 80  BP: 89/48  RR: 12  SpO2: 97%  Wt(kg): --  I&O's Summary  I & Os for 24h ending 03 Jan 2017 07:00  =============================================  IN: 2379.8 ml / OUT: 1480 ml / NET: 899.8 ml    I & Os for current day (as of 03 Jan 2017 08:36)  =============================================  IN: 93 ml / OUT: 40 ml / NET: 53 ml    PHYSICAL EXAM:  General: NAD, comfortable  HEENT: NCAT, pupils small but reactive, clear conjunctiva, no scleral icterus, dry mucous membranes  Neck: supple, no JVD  Respiratory: Breath sounds equal bilaterally, rales bilateral bases, no wheezing  Cardiovascular: RRR, grade 3/6 systolic murmur over LLSB  Abdomen: soft, NT/ND, bowel sounds in all four quadrants  Extremities: WWP, LLE 2+ pitting edema to knee, trace RLE edema, skin breakdown on R heel with compress  Skin: sacral ulcer  Neuro: intubated and sedated        MEDICATIONS  (STANDING):  norepinephrine Infusion 0.01MICROgram(s)/kG/Min IV Continuous <Continuous>  pantoprazole  Injectable 40milliGRAM(s) IV Push daily  vancomycin  IVPB 750milliGRAM(s) IV Intermittent every 24 hours  propofol Infusion 30MICROgram(s)/kG/Min IV Continuous <Continuous>  fentaNYL   Infusion 0.5MICROgram(s)/kG/Hr IV Continuous <Continuous>  cefepime  IVPB 2000milliGRAM(s) IV Intermittent every 24 hours  chlorhexidine 0.12% Liquid 15milliLiter(s) Swish and Spit two times a day  heparin  Infusion 600Unit(s)/Hr IV Continuous <Continuous>    MEDICATIONS  (PRN):  acetaminophen  Suppository 650milliGRAM(s) Rectal every 6 hours PRN For Temp greater than 38 C (100.4 F)      Allergies    amoxicillin (Unknown)  Cipro (Unknown)  clindamycin (Unknown)  lactose (Unknown)  Wheat (Unknown)    Intolerances        LABS:                        8.2    4.0   )-----------( 263      ( 03 Jan 2017 06:15 )             26.6     03 Jan 2017 06:15    144    |  110    |  28     ----------------------------<  119    3.8     |  25     |  1.29     Ca    7.4        03 Jan 2017 06:15  Phos  3.0       03 Jan 2017 06:15  Mg     2.3       03 Jan 2017 06:15      PTT - ( 03 Jan 2017 06:15 )  PTT:81.9 sec      Assessment/Plan:		  89 yo F with PMH IBS, celiac disease, chronic LE edema (L > R, on Lasix once weekly), bilateral rotator cuff tear admitted for severe sepsis 2/2 pneumonia, intubated 2/2 respiratory distress    ID   Septic shock. - Patient presented with sepsis, on RMF BP dropped, on Levophed with decreasing requirments. Likely source is PNA, however CXR not showing clear infiltrate however of note poor penetration and severely rotated films due to patient's kyphosis. UA negative for UTI, BCx growing G+cocci in clusters, Coag.negative Staph - since 2 bottles positive unlikely to be contaminant, repeat cultures are pending. Sputum cultures negative.  - cont Vanco and cefepime (since 12/31)  -vanc level pending  -fu BCx   - chlorhexidine mouthwash BID  - Protonix 40mg IVP daily while intubated for GI protection.  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, new onset as per pt's son had Doppler 2 yrs ago which was negative, bedbound for only an few weeks  - on heparin gtt  -vascular consulted, no IVC filter for now - will reconsult as needed  -f/u PTT goal is 60-80  - monitor for signs of respiratory distress    RENAL  CAMRYN, unknown baseline Cr 1.03, improved from 1.08 yesterday - CrCl 24.7, pt was on maintenance IVF, but now with Cl 111  -dc NS  -start tube feeds, no maintenance fluids required    SKIN  Sacral ulcer - sacral ulcer on exam, as well as skin breakdown on right heal  - wound care following    PPX   On heparin gtt  chlorhexidine and Protonix as above.     FEN   NGT in place, start feeds today  monitor and replete serum electrolytes as needed      Dispo: MICU level of care  Access: Rt IJ and peripherals  Tubbs: Yes  Full code. INTERVAL HPI/OVERNIGHT EVENTS:  Spiked temperature ON, given Tylenol, cultured already in the afternoon.  Pt was seen and examined at the bedside. She was observed to be lying down comfortably, intubated, sedated, responsive to verbal stimulai but not follwoing commands.    VITAL SIGNS:  T(F): 101.5  HR: 80  BP: 89/48  RR: 12  SpO2: 97%  Wt(kg): --  I&O's Summary  I & Os for 24h ending 03 Jan 2017 07:00  =============================================  IN: 2379.8 ml / OUT: 1480 ml / NET: 899.8 ml    I & Os for current day (as of 03 Jan 2017 08:36)  =============================================  IN: 93 ml / OUT: 40 ml / NET: 53 ml    PHYSICAL EXAM:  General: NAD, comfortable  HEENT: NCAT, pupils small but reactive, clear conjunctiva, no scleral icterus, dry mucous membranes  Neck: supple, no JVD  Respiratory: Breath sounds equal bilaterally, rales bilateral bases, no wheezing  Cardiovascular: RRR, grade 3/6 systolic murmur over LLSB  Abdomen: soft, NT/ND, bowel sounds in all four quadrants  Extremities: WWP, LLE 2+ pitting edema to knee, trace RLE edema, skin breakdown on R heel with compress  Skin: sacral ulcer  Neuro: intubated and sedated        MEDICATIONS  (STANDING):  norepinephrine Infusion 0.01MICROgram(s)/kG/Min IV Continuous <Continuous>  pantoprazole  Injectable 40milliGRAM(s) IV Push daily  vancomycin  IVPB 750milliGRAM(s) IV Intermittent every 24 hours  propofol Infusion 30MICROgram(s)/kG/Min IV Continuous <Continuous>  fentaNYL   Infusion 0.5MICROgram(s)/kG/Hr IV Continuous <Continuous>  cefepime  IVPB 2000milliGRAM(s) IV Intermittent every 24 hours  chlorhexidine 0.12% Liquid 15milliLiter(s) Swish and Spit two times a day  heparin  Infusion 600Unit(s)/Hr IV Continuous <Continuous>    MEDICATIONS  (PRN):  acetaminophen  Suppository 650milliGRAM(s) Rectal every 6 hours PRN For Temp greater than 38 C (100.4 F)      Allergies    amoxicillin (Unknown)  Cipro (Unknown)  clindamycin (Unknown)  lactose (Unknown)  Wheat (Unknown)    Intolerances        LABS:                        8.2    4.0   )-----------( 263      ( 03 Jan 2017 06:15 )             26.6     03 Jan 2017 06:15    144    |  110    |  28     ----------------------------<  119    3.8     |  25     |  1.29     Ca    7.4        03 Jan 2017 06:15  Phos  3.0       03 Jan 2017 06:15  Mg     2.3       03 Jan 2017 06:15      PTT - ( 03 Jan 2017 06:15 )  PTT:81.9 sec      Assessment/Plan:		  89 yo F with PMH IBS, celiac disease, chronic LE edema (L > R, on Lasix once weekly), bilateral rotator cuff tear admitted for severe sepsis 2/2 pneumonia, intubated 2/2 respiratory distress    ID   Septic shock. - Patient presented with sepsis, on RMF BP dropped, on Levophed with decreasing requirments. Likely source is PNA, however CXR not showing clear infiltrate however of note poor penetration and severely rotated films due to patient's kyphosis. UA negative for UTI, BCx growing G+cocci in clusters, Coag.negative Staph - since 2 bottles positive unlikely to be contaminant, repeat cultures are pending. Sputum cultures negative.  - cont Vanco and cefepime (since 12/31)  -vanc level pending  -fu BCx   - chlorhexidine mouthwash BID  - Protonix 40mg IVP daily while intubated for GI protection.  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     FEN   NGT in place, on tube feeds  monitor and replete serum electrolytes as needed      Dispo: MICU level of care  Access: Rt IJ and peripherals  Tubbs: Yes  Full code.

## 2017-01-04 NOTE — PROGRESS NOTE ADULT - SUBJECTIVE AND OBJECTIVE BOX
INTERVAL HPI/OVERNIGHT EVENTS:    Pt was seen and examined at the bedside. He was observed to be lying down comfortably.   Pt c/o  VITAL SIGNS:  T(F): 100  HR: 92  BP: 95/62  RR: 12  SpO2: 97%  Wt(kg): --  I&O's Summary    I & Os for current day (as of 04 Jan 2017 07:46)  =============================================  IN: 1069 ml / OUT: 1185 ml / NET: -116 ml    PHYSICAL EXAM:      Constitutional: NAD, well-groomed, well-developed  HEENT: PERRLA, EOMI, Normal Hearing, MMM  Neck: No LAD, No JVD  Back: Normal spine flexure, No CVA tenderness  Respiratory: CTABCardiovascular: S1 and S2, RRR, no M/G/R  Gastrointestinal: BS+, soft, NT/ND  Extremities: No peripheral edema  Vascular: 2+ peripheral pulses  Neurological: A/O x 3, no focal deficits  Psychiatric: Normal mood, normal affect  Musculoskeletal: 5/5 strength b/l upper and lower extremities  Skin: No rashes        MEDICATIONS  (STANDING):  norepinephrine Infusion 0.01MICROgram(s)/kG/Min IV Continuous <Continuous>  pantoprazole  Injectable 40milliGRAM(s) IV Push daily  propofol Infusion 30MICROgram(s)/kG/Min IV Continuous <Continuous>  fentaNYL   Infusion 0.5MICROgram(s)/kG/Hr IV Continuous <Continuous>  cefepime  IVPB 2000milliGRAM(s) IV Intermittent every 24 hours  chlorhexidine 0.12% Liquid 15milliLiter(s) Swish and Spit two times a day  heparin  Infusion 500Unit(s)/Hr IV Continuous <Continuous>  sodium chloride 0.9% Bolus 500milliLiter(s) IV Bolus once    MEDICATIONS  (PRN):  acetaminophen  Suppository 650milliGRAM(s) Rectal every 6 hours PRN For Temp greater than 38 C (100.4 F)      Allergies    amoxicillin (Unknown)  Cipro (Unknown)  clindamycin (Unknown)  lactose (Unknown)  Wheat (Unknown)    Intolerances        LABS:                        8.8    3.8   )-----------( 270      ( 04 Jan 2017 05:38 )             27.8     04 Jan 2017 05:36    146    |  115    |  31     ----------------------------<  114    4.1     |  22     |  1.58     Ca    7.6        04 Jan 2017 05:36  Phos  2.4       04 Jan 2017 05:36  Mg     2.1       04 Jan 2017 05:36      PT/INR - ( 03 Jan 2017 16:16 )   PT: 13.1 sec;   INR: 1.18          PTT - ( 04 Jan 2017 07:17 )  PTT:72.2 sec      RADIOLOGY & ADDITIONAL TESTS: INTERVAL HPI/OVERNIGHT EVENTS:    Pt was seen and examined at the bedside. He was observed to be lying down comfortably.   Pt c/o  VITAL SIGNS:  T(F): 100  HR: 92  BP: 95/62  RR: 12  SpO2: 97%  Wt(kg): --  I&O's Summary    I & Os for current day (as of 04 Jan 2017 07:46)  =============================================  IN: 1069 ml / OUT: 1185 ml / NET: -116 ml    PHYSICAL EXAM:  General: NAD, comfortable, intubated, sedated, opens eyes when called by name  HEENT: NCAT, RAMONA, clear conjunctiva, no scleral icterus, dry mucous membranes  Neck: supple, no JVD  Respiratory: Breath sounds equal bilaterally, bibasilar crackles  Cardiovascular: RRR, grade 3/6 systolic murmur over LLSB  Abdomen: soft, NT/ND, bowel sounds in all four quadrants  Extremities: WWP, LLE 2+ pitting edema to knee, trace RLE edema, ulcer on R heel with compress  Skin: sacral ulcer  Neuro: intubated and sedated      MEDICATIONS  (STANDING):  norepinephrine Infusion 0.01MICROgram(s)/kG/Min IV Continuous <Continuous>  pantoprazole  Injectable 40milliGRAM(s) IV Push daily  propofol Infusion 30MICROgram(s)/kG/Min IV Continuous <Continuous>  fentaNYL   Infusion 0.5MICROgram(s)/kG/Hr IV Continuous <Continuous>  cefepime  IVPB 2000milliGRAM(s) IV Intermittent every 24 hours  chlorhexidine 0.12% Liquid 15milliLiter(s) Swish and Spit two times a day  heparin  Infusion 500Unit(s)/Hr IV Continuous <Continuous>  sodium chloride 0.9% Bolus 500milliLiter(s) IV Bolus once    MEDICATIONS  (PRN):  acetaminophen  Suppository 650milliGRAM(s) Rectal every 6 hours PRN For Temp greater than 38 C (100.4 F)      Allergies    amoxicillin (Unknown)  Cipro (Unknown)  clindamycin (Unknown)  lactose (Unknown)  Wheat (Unknown)    Intolerances        LABS:                        8.8    3.8   )-----------( 270      ( 04 Jan 2017 05:38 )             27.8     04 Jan 2017 05:36    146    |  115    |  31     ----------------------------<  114    4.1     |  22     |  1.58     Ca    7.6        04 Jan 2017 05:36  Phos  2.4       04 Jan 2017 05:36  Mg     2.1       04 Jan 2017 05:36      PT/INR - ( 03 Jan 2017 16:16 )   PT: 13.1 sec;   INR: 1.18          PTT - ( 04 Jan 2017 07:17 )  PTT:72.2 sec        Assessment/Plan:		  89 yo F with PMH IBS, celiac disease, chronic LE edema (L > R, on Lasix once weekly), bilateral rotator cuff tear admitted for severe sepsis 2/2 pneumonia, intubated 2/2 respiratory distress    ID   Septic shock. - Patient presented with sepsis, on RMF BP dropped, on Levophed with decreasing requirments. Likely source is PNA, however CXR not showing clear infiltrate however of note poor penetration and severely rotated films due to patient's kyphosis. UA negative for UTI, BCx growing G+cocci in clusters, Coag.negative Staph - since 2 bottles positive unlikely to be contaminant, repeat cultures are pending. Sputum cultures negative.  - cont Vanco by level and cefepime (since 12/31)  - vanc level pending  -fu BCx   - chlorhexidine mouthwash BID  - Protonix 40mg IVP daily while intubated for GI protection.  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     FEN   NGT in place, on tube feeds  monitor and replete serum electrolytes as needed      Dispo: MICU level of care  Access: Rt IJ and peripherals  Tubbs: Yes  Full code. INTERVAL HPI/OVERNIGHT EVENTS:  ALYCIA  Pt was seen and examined at the bedside. She was observed to be lying down comfortably.   Pt is intubated and sedated.    VITAL SIGNS:  T(F): 100  HR: 92  BP: 95/62  RR: 12  SpO2: 97%  Wt(kg): --  I&O's Summary    I & Os for current day (as of 04 Jan 2017 07:46)  =============================================  IN: 1069 ml / OUT: 1185 ml / NET: -116 ml    PHYSICAL EXAM:  General: NAD, comfortable, intubated, sedated, opens eyes when called by name  HEENT: NCAT, RAMONA, clear conjunctiva, no scleral icterus, dry mucous membranes  Neck: supple, no JVD  Respiratory: Breath sounds equal bilaterally, bibasilar crackles  Cardiovascular: RRR, grade 3/6 systolic murmur over LLSB  Abdomen: soft, NT/ND, bowel sounds in all four quadrants  Extremities: WWP, LLE 2+ pitting edema to knee, trace RLE edema, ulcer on R heel with compress  Skin: sacral ulcer  Neuro: intubated and sedated      MEDICATIONS  (STANDING):  norepinephrine Infusion 0.01MICROgram(s)/kG/Min IV Continuous <Continuous>  pantoprazole  Injectable 40milliGRAM(s) IV Push daily  propofol Infusion 30MICROgram(s)/kG/Min IV Continuous <Continuous>  fentaNYL   Infusion 0.5MICROgram(s)/kG/Hr IV Continuous <Continuous>  cefepime  IVPB 2000milliGRAM(s) IV Intermittent every 24 hours  chlorhexidine 0.12% Liquid 15milliLiter(s) Swish and Spit two times a day  heparin  Infusion 500Unit(s)/Hr IV Continuous <Continuous>  sodium chloride 0.9% Bolus 500milliLiter(s) IV Bolus once    MEDICATIONS  (PRN):  acetaminophen  Suppository 650milliGRAM(s) Rectal every 6 hours PRN For Temp greater than 38 C (100.4 F)      Allergies    amoxicillin (Unknown)  Cipro (Unknown)  clindamycin (Unknown)  lactose (Unknown)  Wheat (Unknown)    Intolerances        LABS:                        8.8    3.8   )-----------( 270      ( 04 Jan 2017 05:38 )             27.8     04 Jan 2017 05:36    146    |  115    |  31     ----------------------------<  114    4.1     |  22     |  1.58     Ca    7.6        04 Jan 2017 05:36  Phos  2.4       04 Jan 2017 05:36  Mg     2.1       04 Jan 2017 05:36      PT/INR - ( 03 Jan 2017 16:16 )   PT: 13.1 sec;   INR: 1.18          PTT - ( 04 Jan 2017 07:17 )  PTT:72.2 sec        Assessment/Plan:		  89 yo F with PMH IBS, celiac disease, chronic LE edema (L > R, on Lasix once weekly), bilateral rotator cuff tear admitted for severe sepsis 2/2 pneumonia, intubated 2/2 respiratory distress    ID   Septic shock. - Patient presented with sepsis, on RMF BP dropped, on Levophed with decreasing requirments. Likely source is PNA, however CXR not showing clear infiltrate however of note poor penetration and severely rotated films due to patient's kyphosis. UA negative for UTI, BCx growing G+cocci in clusters, Coag.negative Staph - since 2 bottles positive unlikely to be contaminant, repeat cultures are pending. Sputum cultures negative.  - cont Vanco by level and cefepime (since 12/31)  - vanc level pending  -fu BCx   - chlorhexidine mouthwash BID  - Protonix 40mg IVP daily while intubated for GI protection.  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     FEN   NGT in place, on tube feeds  monitor and replete serum electrolytes as needed      Dispo: MICU level of care  Access: Rt IJ and peripherals  Tubbs: Yes  Full code. INTERVAL HPI/OVERNIGHT EVENTS:  ALYCIA  Pt was seen and examined at the bedside. She was observed to be lying down comfortably.   Pt is intubated and sedated.    VITAL SIGNS:  T(F): 100  HR: 92  BP: 95/62  RR: 12  SpO2: 97%  Wt(kg): --  I&O's Summary    I & Os for current day (as of 04 Jan 2017 07:46)  =============================================  IN: 1069 ml / OUT: 1185 ml / NET: -116 ml    PHYSICAL EXAM:  General: NAD, comfortable, intubated, sedated, opens eyes when called by name  HEENT: NCAT, RAMONA, clear conjunctiva, no scleral icterus, dry mucous membranes  Neck: supple, no JVD  Respiratory: Breath sounds equal bilaterally, bibasilar crackles  Cardiovascular: RRR, grade 3/6 systolic murmur over LLSB  Abdomen: soft, NT/ND, bowel sounds in all four quadrants  Extremities: WWP, LLE 2+ pitting edema to knee, trace RLE edema, ulcer on R heel with compress  Skin: sacral ulcer  Neuro: intubated and sedated      MEDICATIONS  (STANDING):  norepinephrine Infusion 0.01MICROgram(s)/kG/Min IV Continuous <Continuous>  pantoprazole  Injectable 40milliGRAM(s) IV Push daily  propofol Infusion 30MICROgram(s)/kG/Min IV Continuous <Continuous>  fentaNYL   Infusion 0.5MICROgram(s)/kG/Hr IV Continuous <Continuous>  cefepime  IVPB 2000milliGRAM(s) IV Intermittent every 24 hours  chlorhexidine 0.12% Liquid 15milliLiter(s) Swish and Spit two times a day  heparin  Infusion 500Unit(s)/Hr IV Continuous <Continuous>  sodium chloride 0.9% Bolus 500milliLiter(s) IV Bolus once    MEDICATIONS  (PRN):  acetaminophen  Suppository 650milliGRAM(s) Rectal every 6 hours PRN For Temp greater than 38 C (100.4 F)      Allergies    amoxicillin (Unknown)  Cipro (Unknown)  clindamycin (Unknown)  lactose (Unknown)  Wheat (Unknown)    Intolerances        LABS:                        8.8    3.8   )-----------( 270      ( 04 Jan 2017 05:38 )             27.8     04 Jan 2017 05:36    146    |  115    |  31     ----------------------------<  114    4.1     |  22     |  1.58     Ca    7.6        04 Jan 2017 05:36  Phos  2.4       04 Jan 2017 05:36  Mg     2.1       04 Jan 2017 05:36      PT/INR - ( 03 Jan 2017 16:16 )   PT: 13.1 sec;   INR: 1.18          PTT - ( 04 Jan 2017 07:17 )  PTT:72.2 sec        Assessment/Plan:		  89 yo F with PMH IBS, celiac disease, chronic LE edema (L > R, on Lasix once weekly), bilateral rotator cuff tear admitted for severe sepsis 2/2 pneumonia, intubated 2/2 respiratory distress    ID   Septic shock. - Patient presented with sepsis, on RMF BP dropped, on Levophed with decreasing requirments. Likely source is PNA, however CXR not showing clear infiltrate however of note poor penetration and severely rotated films due to patient's kyphosis. UA negative for UTI, BCx growing G+cocci in clusters, Coag.negative Staph - since 2 bottles positive unlikely to be contaminant, repeat cultures are pending. Sputum cultures negative.  - cont Vanco by level and cefepime (since 12/31)  - dosed vanco for today  -fu BCx   - chlorhexidine mouthwash BID  - Protonix 40mg IVP daily while intubated for GI protection.  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, new onset as per pt's son had Doppler 2 yrs ago which was negative, bedbound for only an few weeks  - on heparin gtt  -vascular consulted, no IVC filter for now - will reconsult as needed  -f/u PTT goal is 60-80  - monitor for signs of respiratory distress    RENAL  CAMRYN, with worsening renal function - CrCl 24.7, pt was on maintenance IVF, FeNa 1.5% therefore intrinsic renal etiology, could be 2/2 vancomycin  -avoid nephrotoxic agents, howere since pt ID status still not optimized will continue vanco by level    SKIN  Sacral ulcer - sacral ulcer on exam, as well as skin breakdown on right heal  - wound care following  -will consider wound swab as possible source of infection      PPX  On heparin gtt  chlorhexidine and Protonix as above.     FEN   NGT in place, on tube feeds  monitor and replete serum electrolytes as needed      Dispo: MICU level of care  Access: Rt IJ and peripherals  Tubbs: Yes  Full code.

## 2017-01-04 NOTE — ADVANCED PRACTICE NURSE CONSULT - RECOMMEDATIONS
Moisture barrier ointment to sites on bilat buttocks, Cavilon barrier wipes over right heel, Z-annabella boots for offloading.

## 2017-01-04 NOTE — ADVANCED PRACTICE NURSE CONSULT - ASSESSMENT
89 yo F with PMH IBS, celiac disease, chronic LE edema (L > R, on Lasix once weekly), bilateral rotator cuff tear admitted for severe sepsis 2/2 pneumonia, intubated 2/2 respiratory distress. Pt admitted with 2 DTIs on right buttock, 1 DTI to left buttock, and DTI to right heel measuring approx 4x4 cm. All sites are intact at present. Pt has Z-annabella pillow for repositioning. Spoke with pt's c/g about using equipment after discharge.

## 2017-01-04 NOTE — PROGRESS NOTE ADULT - SUBJECTIVE AND OBJECTIVE BOX
Awake on resp  Afeb  BP low but stable  In RR Clear chest Abd is soft  LE edema unchanged  Na and creatinine inc.

## 2017-01-05 NOTE — SWALLOW BEDSIDE ASSESSMENT ADULT - SWALLOW EVAL: DIAGNOSIS
Pt p/w soft signs of oropharyngeal dysphagia characterized by reported decrease in PO intake with difficulty chewing x5 weeks, congested/wet cough at baseline, increase WOB and O2 desat with PO intake, putting patient at increased risk of aspiration. PO intake is therefore premature. This svc will f/u within 24 hours to reassess.

## 2017-01-05 NOTE — SWALLOW BEDSIDE ASSESSMENT ADULT - COMMENTS
Pt received awake but sleepy, on face-mask sats at 97% at baseline, drops to 87% when face mask is removed for PO trials.  Per Pt's son, patient has had decreased PO intake with difficulty chewing for the last 5 weeks PTA. He has also noticed a decrease in speech intelligibility and was concerned for possible small strokes/TIA.

## 2017-01-05 NOTE — SWALLOW BEDSIDE ASSESSMENT ADULT - SLP GENERAL OBSERVATIONS
Wet, congested respiratory sounds at baseline, harsh vocal quality on minimal verbalizations. Speech intelligibility is moderately-severely reduced.

## 2017-01-05 NOTE — SWALLOW BEDSIDE ASSESSMENT ADULT - NS SPL SWALLOW CLINIC TRIAL FT
Although Pt has no overt signs of aspiration and swallow trigger feels brisk & timely to palpation, she appears to have increased WOB after PO trials & has O2 desat after several seconds of removal of face-mask for presentation of bolus.

## 2017-01-05 NOTE — PROGRESS NOTE ADULT - SUBJECTIVE AND OBJECTIVE BOX
INTERVAL HPI/OVERNIGHT EVENTS:    Pt was seen and examined at the bedside. He was observed to be lying down comfortably.   Pt c/o  VITAL SIGNS:  T(F): 197.6  HR: 90  BP: 101/56  RR: 22  SpO2: 96%  Wt(kg): --  I&O's Summary    I & Os for current day (as of 05 Jan 2017 07:10)  =============================================  IN: 828 ml / OUT: 1300 ml / NET: -472 ml    PHYSICAL EXAM:      Constitutional: NAD, well-groomed, well-developed  HEENT: PERRLA, EOMI, Normal Hearing, MMM  Neck: No LAD, No JVD  Back: Normal spine flexure, No CVA tenderness  Respiratory: CTABCardiovascular: S1 and S2, RRR, no M/G/R  Gastrointestinal: BS+, soft, NT/ND  Extremities: No peripheral edema  Vascular: 2+ peripheral pulses  Neurological: A/O x 3, no focal deficits  Psychiatric: Normal mood, normal affect  Musculoskeletal: 5/5 strength b/l upper and lower extremities  Skin: No rashes        MEDICATIONS  (STANDING):  norepinephrine Infusion 0.01MICROgram(s)/kG/Min IV Continuous <Continuous>  pantoprazole  Injectable 40milliGRAM(s) IV Push daily  chlorhexidine 0.12% Liquid 15milliLiter(s) Swish and Spit two times a day  ceFAZolin   IVPB 500milliGRAM(s) IV Intermittent every 12 hours  heparin  Infusion 400Unit(s)/Hr IV Continuous <Continuous>    MEDICATIONS  (PRN):  acetaminophen  Suppository 650milliGRAM(s) Rectal every 6 hours PRN For Temp greater than 38 C (100.4 F)      Allergies    amoxicillin (Unknown)  Cipro (Unknown)  clindamycin (Unknown)  lactose (Unknown)  Wheat (Unknown)    Intolerances        LABS:                        9.3    4.4   )-----------( 300      ( 05 Jan 2017 04:46 )             30.2     05 Jan 2017 04:46    147    |  112    |  35     ----------------------------<  95     4.4     |  29     |  1.85     Ca    8.2        05 Jan 2017 04:46  Phos  3.6       05 Jan 2017 04:46  Mg     2.2       05 Jan 2017 04:46      PT/INR - ( 03 Jan 2017 16:16 )   PT: 13.1 sec;   INR: 1.18          PTT - ( 05 Jan 2017 04:46 )  PTT:99.4 sec      RADIOLOGY & ADDITIONAL TESTS: INTERVAL HPI/OVERNIGHT EVENTS:  ALYCIA  Pt was seen and examined at the bedside. She was observed to be lying down comfortably, awake and alert responding to questions appropriately. Denies any CP, SOB, HA, NVD.  VITAL SIGNS:  T(F): 197.6  HR: 90  BP: 101/56  RR: 22  SpO2: 96%  Wt(kg): --  I&O's Summary    I & Os for current day (as of 05 Jan 2017 07:10)  =============================================  IN: 828 ml / OUT: 1300 ml / NET: -472 ml    PHYSICAL EXAM:  General: NAD, comfortable, AOx3  HEENT: NCAT, RAMONA, clear conjunctiva, no scleral icterus  Neck: supple, no JVD  Respiratory: Breath sounds equal bilaterally, bibasilar crackles  Cardiovascular: RRR, grade 3/6 systolic murmur over LLSB  Abdomen: soft, NT/ND, bowel sounds in all four quadrants  Extremities: WWP, LLE 2+ pitting edema to knee, trace RLE edema, ulcer on R heel with compress  Skin: sacral ulcer            MEDICATIONS  (STANDING):  norepinephrine Infusion 0.01MICROgram(s)/kG/Min IV Continuous <Continuous>  pantoprazole  Injectable 40milliGRAM(s) IV Push daily  chlorhexidine 0.12% Liquid 15milliLiter(s) Swish and Spit two times a day  ceFAZolin   IVPB 500milliGRAM(s) IV Intermittent every 12 hours  heparin  Infusion 400Unit(s)/Hr IV Continuous <Continuous>    MEDICATIONS  (PRN):  acetaminophen  Suppository 650milliGRAM(s) Rectal every 6 hours PRN For Temp greater than 38 C (100.4 F)      Allergies    amoxicillin (Unknown)  Cipro (Unknown)  clindamycin (Unknown)  lactose (Unknown)  Wheat (Unknown)    Intolerances        LABS:                        9.3    4.4   )-----------( 300      ( 05 Jan 2017 04:46 )             30.2     05 Jan 2017 04:46    147    |  112    |  35     ----------------------------<  95     4.4     |  29     |  1.85     Ca    8.2        05 Jan 2017 04:46  Phos  3.6       05 Jan 2017 04:46  Mg     2.2       05 Jan 2017 04:46      PT/INR - ( 03 Jan 2017 16:16 )   PT: 13.1 sec;   INR: 1.18          PTT - ( 05 Jan 2017 04:46 )  PTT:99.4 sec        Assessment/Plan:		  89 yo F with PMH IBS, celiac disease, chronic LE edema (L > R, on Lasix once weekly), bilateral rotator cuff tear admitted for severe sepsis 2/2 pneumonia, intubated 2/2 respiratory distress    ID   Septic shock. - Patient presented with sepsis, on RMF BP dropped, on Levophed with decreasing requirments. Likely source is PNA, however CXR not showing clear infiltrate however of note poor penetration and severely rotated films due to patient's kyphosis. UA negative for UTI, BCx growing G+cocci in clusters, Coag.negative Staph - since 2 bottles positive unlikely to be contaminant, repeat cultures are pending. Sputum cultures negative.  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, new onset as per pt's son had Doppler 2 yrs ago which was negative, bedbound for only an few weeks  - on heparin gtt  -vascular consulted, no IVC filter for now - will reconsult as needed  -f/u PTT goal is 60-80  - monitor for signs of respiratory distress    RENAL  CAMRYN, with worsening renal function - CrCl 24.7, pt was on maintenance IVF, FeNa 1.5% therefore intrinsic renal etiology, could be 2/2 vancomycin  -avoid nephrotoxic agents  -start IVF    SKIN  Sacral ulcer - sacral ulcer on exam, as well as skin breakdown on right heal  - wound care following  -will consider wound swab as possible source of infection if fever recurrs      PPX  On heparin gtt      FEN   NGT in place, on tube feeds  S/S eval  monitor and replete serum electrolytes as needed      Dispo: MICU level of care  Access: Rt IJ and peripherals  Tubbs: Yes INTERVAL HPI/OVERNIGHT EVENTS:  ALYCIA  Pt was seen and examined at the bedside. She was observed to be lying down comfortably, awake and alert responding to questions appropriately. Denies any CP, SOB, HA, NVD.  VITAL SIGNS:  T(F): 197.6  HR: 90  BP: 101/56  RR: 22  SpO2: 96%  Wt(kg): --  I&O's Summary    I & Os for current day (as of 05 Jan 2017 07:10)  =============================================  IN: 828 ml / OUT: 1300 ml / NET: -472 ml    PHYSICAL EXAM:  General: NAD, comfortable, AOx3  HEENT: NCAT, RAMONA, clear conjunctiva, no scleral icterus  Neck: supple, no JVD  Respiratory: Breath sounds equal bilaterally, bibasilar crackles  Cardiovascular: RRR, grade 3/6 systolic murmur over LLSB  Abdomen: soft, NT/ND, bowel sounds in all four quadrants  Extremities: WWP, LLE 2+ pitting edema to knee, trace RLE edema, ulcer on R heel with compress  Skin: sacral ulcer            MEDICATIONS  (STANDING):  norepinephrine Infusion 0.01MICROgram(s)/kG/Min IV Continuous <Continuous>  pantoprazole  Injectable 40milliGRAM(s) IV Push daily  chlorhexidine 0.12% Liquid 15milliLiter(s) Swish and Spit two times a day  ceFAZolin   IVPB 500milliGRAM(s) IV Intermittent every 12 hours  heparin  Infusion 400Unit(s)/Hr IV Continuous <Continuous>    MEDICATIONS  (PRN):  acetaminophen  Suppository 650milliGRAM(s) Rectal every 6 hours PRN For Temp greater than 38 C (100.4 F)      Allergies    amoxicillin (Unknown)  Cipro (Unknown)  clindamycin (Unknown)  lactose (Unknown)  Wheat (Unknown)    Intolerances        LABS:                        9.3    4.4   )-----------( 300      ( 05 Jan 2017 04:46 )             30.2     05 Jan 2017 04:46    147    |  112    |  35     ----------------------------<  95     4.4     |  29     |  1.85     Ca    8.2        05 Jan 2017 04:46  Phos  3.6       05 Jan 2017 04:46  Mg     2.2       05 Jan 2017 04:46      PT/INR - ( 03 Jan 2017 16:16 )   PT: 13.1 sec;   INR: 1.18          PTT - ( 05 Jan 2017 04:46 )  PTT:99.4 sec        Assessment/Plan:		  89 yo F with PMH IBS, celiac disease, chronic LE edema (L > R, on Lasix once weekly), bilateral rotator cuff tear admitted for severe sepsis 2/2 pneumonia, intubated 2/2 respiratory distress    ID   Septic shock. - Patient presented with sepsis, on RMF BP dropped, on Levophed with decreasing requirments. Likely source is PNA, however CXR not showing clear infiltrate however of note poor penetration and severely rotated films due to patient's kyphosis. UA negative for UTI, BCx growing G+cocci in clusters, Coag.negative Staph - since 2 bottles positive unlikely to be contaminant, repeat cultures are pending. Sputum cultures negative.  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, new onset as per pt's son had Doppler 2 yrs ago which was negative, bedbound for only an few weeks  - on heparin gtt  -vascular consulted, no IVC filter for now - will reconsult as needed  -f/u PTT goal is 60-80  - monitor for signs of respiratory distress  -still on levophed, titrate off as tolerated to MAP>65    RENAL  CAMRYN, with worsening renal function - CrCl 24.7, pt was on maintenance IVF, FeNa 1.5% therefore intrinsic renal etiology, could be 2/2 vancomycin  -avoid nephrotoxic agents  -start IVF    SKIN  Sacral ulcer - sacral ulcer on exam, as well as skin breakdown on right heal  - wound care following  -will consider wound swab as possible source of infection if fever recurrs      PPX  On heparin gtt      FEN   NGT in place, on tube feeds  S/S eval  monitor and replete serum electrolytes as needed      Dispo: MICU level of care  Access: Rt IJ and peripherals  Tubbs: Yes

## 2017-01-06 NOTE — PHYSICAL THERAPY INITIAL EVALUATION ADULT - PREDICTED DURATION OF THERAPY (DAYS/WKS), PT EVAL
Pt. will benefit from further PT follow up to improve endurance and functional mobility, prevent further deconditioning.

## 2017-01-06 NOTE — PROVIDER CONTACT NOTE (CHANGE IN STATUS NOTIFICATION) - ACTION/TREATMENT ORDERED:
Blood gases, Lactate, Urine culture/UA, troponin, Bolus 500ML. Tubbs catheter.
Hold tube feeds and place back on Bipap.

## 2017-01-06 NOTE — PROGRESS NOTE ADULT - SUBJECTIVE AND OBJECTIVE BOX
INTERVAL HPI/OVERNIGHT EVENTS:    Pt was seen and examined at the bedside. He was observed to be lying down comfortably.   Pt c/o  VITAL SIGNS:  T(F): 97.2  HR: 72  BP: 79/49  RR: 22  SpO2: 96%  Wt(kg): --  I&O's Summary    I & Os for current day (as of 06 Jan 2017 07:39)  =============================================  IN: 646 ml / OUT: 230 ml / NET: 416 ml    PHYSICAL EXAM:      Constitutional: NAD, well-groomed, well-developed  HEENT: PERRLA, EOMI, Normal Hearing, MMM  Neck: No LAD, No JVD  Back: Normal spine flexure, No CVA tenderness  Respiratory: CTABCardiovascular: S1 and S2, RRR, no M/G/R  Gastrointestinal: BS+, soft, NT/ND  Extremities: No peripheral edema  Vascular: 2+ peripheral pulses  Neurological: A/O x 3, no focal deficits  Psychiatric: Normal mood, normal affect  Musculoskeletal: 5/5 strength b/l upper and lower extremities  Skin: No rashes        MEDICATIONS  (STANDING):  norepinephrine Infusion 0.01MICROgram(s)/kG/Min IV Continuous <Continuous>  pantoprazole  Injectable 40milliGRAM(s) IV Push daily  chlorhexidine 0.12% Liquid 15milliLiter(s) Swish and Spit two times a day  ceFAZolin   IVPB 500milliGRAM(s) IV Intermittent every 12 hours  heparin  Infusion 500Unit(s)/Hr IV Continuous <Continuous>  lactated ringers. 1000milliLiter(s) IV Continuous <Continuous>    MEDICATIONS  (PRN):  acetaminophen  Suppository 650milliGRAM(s) Rectal every 6 hours PRN For Temp greater than 38 C (100.4 F)      Allergies    amoxicillin (Unknown)  Cipro (Unknown)  clindamycin (Unknown)  lactose (Unknown)  Wheat (Unknown)    Intolerances        LABS:                        8.4    3.4   )-----------( 261      ( 06 Jan 2017 05:37 )             26.9     06 Jan 2017 05:37    151    |  114    |  38     ----------------------------<  84     3.9     |  27     |  2.01     Ca    8.1        06 Jan 2017 05:37  Phos  3.4       06 Jan 2017 05:37  Mg     2.2       06 Jan 2017 05:37      PTT - ( 06 Jan 2017 05:37 )  PTT:55.9 sec      RADIOLOGY & ADDITIONAL TESTS: INTERVAL HPI/OVERNIGHT EVENTS:  Started on LR, got 500cc LR bolus for hypotension while on small dose levophed.  Pt was seen and examined at the bedside. She was observed to be lying down comfortably.   Pt offers no complaints. She however does not respond to any of my questions seems confused.  VITAL SIGNS:  T(F): 97.2  HR: 72  BP: 79/49  RR: 22  SpO2: 96%  Wt(kg): --  I&O's Summary    I & Os for current day (as of 06 Jan 2017 07:39)  =============================================  IN: 646 ml / OUT: 230 ml / NET: 416 ml    PHYSICAL EXAM:  General: NAD, comfortable, AOx1, confused  HEENT: NCAT, RAMONA, clear conjunctiva, no scleral icterus  Neck: supple, no JVD  Respiratory: Breath sounds equal bilaterally, bibasilar crackles  Cardiovascular: RRR, grade 3/6 systolic murmur over LLSB  Abdomen: soft, NT/ND, bowel sounds in all four quadrants  Extremities: WWP, LLE 2+ pitting edema to knee, trace RLE edema, ulcer on R heel with compress  Skin: sacral ulcer        MEDICATIONS  (STANDING):  norepinephrine Infusion 0.01MICROgram(s)/kG/Min IV Continuous <Continuous>  pantoprazole  Injectable 40milliGRAM(s) IV Push daily  chlorhexidine 0.12% Liquid 15milliLiter(s) Swish and Spit two times a day  ceFAZolin   IVPB 500milliGRAM(s) IV Intermittent every 12 hours  heparin  Infusion 500Unit(s)/Hr IV Continuous <Continuous>  lactated ringers. 1000milliLiter(s) IV Continuous <Continuous>    MEDICATIONS  (PRN):  acetaminophen  Suppository 650milliGRAM(s) Rectal every 6 hours PRN For Temp greater than 38 C (100.4 F)      Allergies    amoxicillin (Unknown)  Cipro (Unknown)  clindamycin (Unknown)  lactose (Unknown)  Wheat (Unknown)    Intolerances        LABS:                        8.4    3.4   )-----------( 261      ( 06 Jan 2017 05:37 )             26.9     06 Jan 2017 05:37    151    |  114    |  38     ----------------------------<  84     3.9     |  27     |  2.01     Ca    8.1        06 Jan 2017 05:37  Phos  3.4       06 Jan 2017 05:37  Mg     2.2       06 Jan 2017 05:37      PTT - ( 06 Jan 2017 05:37 )  PTT:55.9 sec      Assessment/Plan:		  89 yo F with PMH IBS, celiac disease, chronic LE edema (L > R, on Lasix once weekly), bilateral rotator cuff tear admitted for severe sepsis 2/2 pneumonia, intubated 2/2 respiratory distress    ID   Septic shock. - Patient presented with sepsis, on RMF BP dropped, on Levophed with decreasing requirments. Likely source is PNA, however CXR not showing clear infiltrate however of note poor penetration and severely rotated films due to patient's kyphosis. UA negative for UTI, BCx growing G+cocci in clusters, Coag.negative Staph - since 2 bottles positive unlikely to be contaminant, repeat cultures are pending. Sputum cultures negative.  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, new onset as per pt's son had Doppler 2 yrs ago which was negative, bedbound for only an few weeks  - on heparin gtt  -vascular consulted, no IVC filter for now - will reconsult as needed  -f/u PTT goal is 60-80  - monitor for signs of respiratory distress  -still on levophed, titrate off as tolerated to MAP>65  -now on midodrine 2.5mg Q8hrs po    RENAL  CAMRYN, with worsening renal function - CrCl 24.7, pt was on maintenance IVF, FeNa 1.5% therefore intrinsic renal etiology, could be 2/2 vancomycin  -avoid nephrotoxic agents    Hypernatremia - free water deficit is 1.26L, unclear cause for now, but most likely renal water loss  -get repeat UA and urine lytes with osmolality  -free water 250cc q6hrs  - monitor UOP      PSYHCH  Disoritentation - more disoriented today, could be hospitalizations yndrome vs caused by hyperNa  -correct HyperNa  -reorientation    SKIN  Sacral ulcer - sacral ulcer on exam, as well as skin breakdown on right heal  - wound care following  -will consider wound swab as possible source of infection if fever recurrs      PPX  On heparin gtt      FEN   NGT in place, on tube feeds  S/S eval  monitor and replete serum electrolytes as needed      Dispo: MICU level of care  Access: Rt IJ and peripherals  Tubbs: Yes

## 2017-01-06 NOTE — PHYSICAL THERAPY INITIAL EVALUATION ADULT - RANGE OF MOTION EXAMINATION, REHAB EVAL
limited assessment distal RUE secondary recent distal radial fracture; limited assessment bilat. LEs secondary  OA bilat. R>L with + discomfort./Left UE ROM was WFL (within functional limits)

## 2017-01-07 NOTE — PROGRESS NOTE ADULT - SUBJECTIVE AND OBJECTIVE BOX
INTERVAL HPI/OVERNIGHT EVENTS:    Pt was seen and examined at the bedside. He was observed to be lying down comfortably.   Pt c/o  VITAL SIGNS:  T(F): 97.1  HR: 75  BP: 103/64  RR: 28  SpO2: 97%  Wt(kg): --  I&O's Summary  I & Os for 24h ending 2017 07:00  =============================================  IN: 1571 ml / OUT: 1085 ml / NET: 486 ml    I & Os for current day (as of 2017 09:14)  =============================================  IN: 94 ml / OUT: 70 ml / NET: 24 ml    PHYSICAL EXAM:      Constitutional: NAD, well-groomed, well-developed  HEENT: PERRLA, EOMI, Normal Hearing, MMM  Neck: No LAD, No JVD  Back: Normal spine flexure, No CVA tenderness  Respiratory: CTABCardiovascular: S1 and S2, RRR, no M/G/R  Gastrointestinal: BS+, soft, NT/ND  Extremities: No peripheral edema  Vascular: 2+ peripheral pulses  Neurological: A/O x 3, no focal deficits  Psychiatric: Normal mood, normal affect  Musculoskeletal: 5/5 strength b/l upper and lower extremities  Skin: No rashes        MEDICATIONS  (STANDING):  norepinephrine Infusion 0.01MICROgram(s)/kG/Min IV Continuous <Continuous>  ceFAZolin   IVPB 500milliGRAM(s) IV Intermittent every 12 hours  heparin  Infusion 500Unit(s)/Hr IV Continuous <Continuous>  midodrine 2.5milliGRAM(s) Oral every 8 hours    MEDICATIONS  (PRN):  acetaminophen    Suspension. 650milliGRAM(s) Oral every 6 hours PRN Severe Pain (7 - 10)      Allergies    amoxicillin (Unknown)  Cipro (Unknown)  clindamycin (Unknown)  lactose (Unknown)  Wheat (Unknown)    Intolerances        LABS:                        8.0    2.8   )-----------( 272      ( 2017 05:59 )             25.9     2017 05:58    148    |  112    |  39     ----------------------------<  85     3.6     |  28     |  2.00     Ca    8.1        2017 05:58  Phos  3.2       2017 05:58  Mg     2.3       2017 05:58      PTT - ( 2017 06:00 )  PTT:61.5 sec  Urinalysis Basic - ( 2017 10:27 )    Color: Yellow / Appearance: SL CLOUDY / S.020 / pH: x  Gluc: x / Ketone: Trace mg/dL  / Bili: NEGATIVE / Urobili: 0.2 E.U./dL   Blood: x / Protein: NEGATIVE mg/dL / Nitrite: NEGATIVE   Leuk Esterase: NEGATIVE / RBC: < 5 /HPF / WBC < 5 /HPF   Sq Epi: x / Non Sq Epi: Rare /HPF / Bacteria: Present /HPF    Assessment/Plan:		  89 yo F with PMH IBS, celiac disease, chronic LE edema (L > R, on Lasix once weekly), bilateral rotator cuff tear admitted for severe sepsis 2/2 pneumonia, intubated 2/2 respiratory distress    ID   Septic shock. - Patient presented with sepsis, on RMF BP dropped, on Levophed with decreasing requirments. Likely source is PNA, however CXR not showing clear infiltrate however of note poor penetration and severely rotated films due to patient's kyphosis. UA negative for UTI, BCx growing G+cocci in clusters, Coag.negative Staph - since 2 bottles positive unlikely to be contaminant, repeat cultures are pending. Sputum cultures negative.  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, new onset as per pt's son had Doppler 2 yrs ago which was negative, bedbound for only an few weeks  - on heparin gtt  -vascular consulted, no IVC filter for now - will reconsult as needed  -f/u PTT goal is 60-80  - monitor for signs of respiratory distress  -still on levophed, titrate off as tolerated to MAP>65  -now on midodrine 2.5mg Q8hrs po    RENAL  CAMRYN, with worsening renal function - CrCl 24.7, pt was on maintenance IVF, FeNa 1.5% therefore intrinsic renal etiology, could be 2/2 vancomycin  -avoid nephrotoxic agents    Hypernatremia - free water deficit is 1.26L, unclear cause for now, but most likely renal water loss  -get repeat UA and urine lytes with osmolality  -free water 250cc q6hrs  - monitor UOP      PSYHCH  Disoritentation - more disoriented today, could be hospitalizations yndrome vs caused by hyperNa  -correct HyperNa  -reorientation    SKIN  Sacral ulcer - sacral ulcer on exam, as well as skin breakdown on right heal  - wound care following  -will consider wound swab as possible source of infection if fever recurrs      PPX  On heparin gtt      FEN   NGT in place, on tube feeds  S/S eval  monitor and replete serum electrolytes as needed      Dispo: MICU level of care  Access: Rt IJ and peripherals  Tubbs: Yes INTERVAL HPI/OVERNIGHT EVENTS:  Gotten off levophed. Increased midodrine dose to 5Q8hrs.  Pt was seen and examined at the bedside. She was observed to be lying down comfortably.   She is alert, however does not respond to my questions. States own name when asked, but still seems disoriented.  VITAL SIGNS:  T(F): 97.1  HR: 75  BP: 103/64  RR: 28  SpO2: 97%  Wt(kg): --  I&O's Summary  I & Os for 24h ending 2017 07:00  =============================================  IN: 1571 ml / OUT: 1085 ml / NET: 486 ml    I & Os for current day (as of 2017 09:14)  =============================================  IN: 94 ml / OUT: 70 ml / NET: 24 ml    PHYSICAL EXAM:  General: NAD, comfortable, AOx1, confused  HEENT: NCAT, RAMONA, clear conjunctiva, no scleral icterus  Neck: supple, no JVD  Respiratory: Breath sounds equal bilaterally, bibasilar crackles, some wheezes noted left anteriorly  Cardiovascular: RRR, grade 3/6 systolic murmur over LLSB  Abdomen: soft, NT/ND, bowel sounds in all four quadrants  Extremities: WWP, LLE 2+ pitting edema to knee, trace RLE edema, ulcer on R heel with compress  Skin: sacral ulcer      MEDICATIONS  (STANDING):  norepinephrine Infusion 0.01MICROgram(s)/kG/Min IV Continuous <Continuous>  ceFAZolin   IVPB 500milliGRAM(s) IV Intermittent every 12 hours  heparin  Infusion 500Unit(s)/Hr IV Continuous <Continuous>  midodrine 2.5milliGRAM(s) Oral every 8 hours    MEDICATIONS  (PRN):  acetaminophen    Suspension. 650milliGRAM(s) Oral every 6 hours PRN Severe Pain (7 - 10)      Allergies    amoxicillin (Unknown)  Cipro (Unknown)  clindamycin (Unknown)  lactose (Unknown)  Wheat (Unknown)    Intolerances        LABS:                        8.0    2.8   )-----------( 272      ( 2017 05:59 )             25.9     2017 05:58    148    |  112    |  39     ----------------------------<  85     3.6     |  28     |  2.00     Ca    8.1        2017 05:58  Phos  3.2       2017 05:58  Mg     2.3       2017 05:58      PTT - ( 2017 06:00 )  PTT:61.5 sec  Urinalysis Basic - ( 2017 10:27 )    Color: Yellow / Appearance: SL CLOUDY / S.020 / pH: x  Gluc: x / Ketone: Trace mg/dL  / Bili: NEGATIVE / Urobili: 0.2 E.U./dL   Blood: x / Protein: NEGATIVE mg/dL / Nitrite: NEGATIVE   Leuk Esterase: NEGATIVE / RBC: < 5 /HPF / WBC < 5 /HPF   Sq Epi: x / Non Sq Epi: Rare /HPF / Bacteria: Present /HPF    Assessment/Plan:		  89 yo F with PMH IBS, celiac disease, chronic LE edema (L > R, on Lasix once weekly), bilateral rotator cuff tear admitted for severe sepsis 2/2 pneumonia, intubated 2/2 respiratory distress    ID   Septic shock. - Patient presented with sepsis, on RMF BP dropped, on Levophed with decreasing requirments. Likely source is PNA, however CXR not showing clear infiltrate however of note poor penetration and severely rotated films due to patient's kyphosis. UA negative for UTI, BCx growing G+cocci in clusters, Coag.negative Staph - since 2 bottles positive unlikely to be contaminant, repeat cultures are pending. Sputum cultures negative.  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, new onset as per pt's son had Doppler 2 yrs ago which was negative, bedbound for only an few weeks  - on heparin gtt  -vascular consulted, no IVC filter for now - will reconsult as needed  -f/u PTT goal is 60-80  - monitor for signs of respiratory distress      Hypotension on admission, likely 2/2 septic shock, now off levophed  -now on midodrine 2.5mg Q8hrs po    RENAL  CAMRYN, with worsening renal function - CrCl 24.7, pt was on maintenance IVF, FeNa 1.5% therefore intrinsic renal etiology, with granular cast etiology ATN, could be 2/2 vancomycin  -avoid nephrotoxic agents  -monitor UOP    Hypernatremia - free water deficit is 1.26L, unclear cause for now, but most likely renal water loss  -get repeat UA and urine lytes with osmolality  -free water 250cc q6hrs  -monitor UOP      PSYHCH  Disoritentation - could be hospitalization syndrome vs caused by hyperNa  -correct HyperNa  -reorientation    SKIN  Sacral ulcer - sacral ulcer on exam, as well as skin breakdown on right heal  - wound care following  -will consider wound swab as possible source of infection if fever recurrs      PPX  On heparin gtt      FEN   NGT in place, on tube feeds  S/S eval  monitor and replete serum electrolytes as needed  bowel regimen  PT/OT      Dispo: MICU level of care  Access: Rt IJ and peripherals  Tubbs: Yes INTERVAL HPI/OVERNIGHT EVENTS:  Gotten off levophed. Increased midodrine dose to 5Q8hrs.  Pt was seen and examined at the bedside. She was observed to be lying down comfortably.   She is alert, however does not respond to my questions. States own name when asked, but still seems disoriented.  ROS not obtainable  VITAL SIGNS:  T(F): 97.1  HR: 75  BP: 103/64  RR: 28  SpO2: 97%  Wt(kg): --  I&O's Summary  I & Os for 24h ending 2017 07:00  =============================================  IN: 1571 ml / OUT: 1085 ml / NET: 486 ml    I & Os for current day (as of 2017 09:14)  =============================================  IN: 94 ml / OUT: 70 ml / NET: 24 ml    PHYSICAL EXAM:  General: NAD, comfortable,   HEENT: NCAT, RAMONA, clear conjunctiva, no scleral icterus. Throat clear, MMM  Neck: supple, no JVD  Respiratory: Breath sounds equal bilaterally, bibasilar crackles, some wheezes noted left anteriorly  Cardiovascular: RRR, grade 3/6 systolic murmur over LLSB  Abdomen: soft, NT/ND, bowel sounds in all four quadrants  Extremities: WWP, LLE 2+ pitting edema to knee, trace RLE edema, ulcer on R heel with compress  Skin: sacral ulcer  Psych: A and O X 1      MEDICATIONS  (STANDING):  norepinephrine Infusion 0.01MICROgram(s)/kG/Min IV Continuous <Continuous>  ceFAZolin   IVPB 500milliGRAM(s) IV Intermittent every 12 hours  heparin  Infusion 500Unit(s)/Hr IV Continuous <Continuous>  midodrine 2.5milliGRAM(s) Oral every 8 hours    MEDICATIONS  (PRN):  acetaminophen    Suspension. 650milliGRAM(s) Oral every 6 hours PRN Severe Pain (7 - 10)      Allergies    amoxicillin (Unknown)  Cipro (Unknown)  clindamycin (Unknown)  lactose (Unknown)  Wheat (Unknown)    Intolerances        LABS:                        8.0    2.8   )-----------( 272      ( 2017 05:59 )             25.9     2017 05:58    148    |  112    |  39     ----------------------------<  85     3.6     |  28     |  2.00     Ca    8.1        2017 05:58  Phos  3.2       2017 05:58  Mg     2.3       2017 05:58      PTT - ( 2017 06:00 )  PTT:61.5 sec  Urinalysis Basic - ( 2017 10:27 )    Color: Yellow / Appearance: SL CLOUDY / S.020 / pH: x  Gluc: x / Ketone: Trace mg/dL  / Bili: NEGATIVE / Urobili: 0.2 E.U./dL   Blood: x / Protein: NEGATIVE mg/dL / Nitrite: NEGATIVE   Leuk Esterase: NEGATIVE / RBC: < 5 /HPF / WBC < 5 /HPF   Sq Epi: x / Non Sq Epi: Rare /HPF / Bacteria: Present /HPF    Assessment/Plan:		  89 yo F with PMH IBS, celiac disease, chronic LE edema (L > R, on Lasix once weekly), bilateral rotator cuff tear admitted for septic shock 2/2 pneumonia with acute respiratory failure, ATN, bilatera DVT    ID   Septic shock. - Patient presented with sepsis, on RMF BP dropped, on Levophed with decreasing requirments. Likely source is PNA, however CXR not showing clear infiltrate however of note poor penetration and severely rotated films due to patient's kyphosis. UA negative for UTI, BCx growing G+cocci in clusters, Coag.negative Staph - since 2 bottles positive may not be contaminant though has no risk factors for SE bacteremia, repeat cultures are pending. Sputum cultures negative.  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, new onset as per pt's son had Doppler 2 yrs ago which was negative, bedbound for only an few weeks  - on heparin gtt  -vascular consulted, no IVC filter for now - will reconsult as needed  -f/u PTT goal is 60-80  - monitor for signs of respiratory distress      Hypotension on admission, likely 2/2 septic shock, now off levophed  -now on midodrine 5mg Q8hrs po    RENAL  CAMRYN, with worsening renal function - CrCl 24.7, pt was on maintenance IVF, FeNa 1.5% therefore intrinsic renal etiology, with granular cast etiology ATN, could be 2/2 vancomycin  -avoid nephrotoxic agents  -monitor UOP    Hypernatremia - free water deficit is 1.26L, unclear cause for now, but most likely renal water loss  -get repeat UA and urine lytes with osmolality  -free water 250cc q6hrs  -monitor UOP      PSYCH  Disoritentation - could be hospitalization syndrome vs caused by hyperNa  -correct HyperNa  -reorientation    SKIN  Sacral ulcer - sacral ulcer on exam, as well as skin breakdown on right heal  - wound care following  -will consider wound swab as possible source of infection if fever recurrs      PPX  On heparin gtt      FEN   NGT in place, on tube feeds  S/S eval  monitor and replete serum electrolytes as needed  bowel regimen  PT/OT      Dispo: MICU level of care  Access: Rt IJ and peripherals  Tubbs: Yes

## 2017-01-07 NOTE — PROGRESS NOTE ADULT - ASSESSMENT
Improving slowly  Cont all Rx and vigorous pulm PT  Cont free water and follow creatinine and lytes  Hct slowly decreasing To follow serially

## 2017-01-08 NOTE — PROGRESS NOTE ADULT - ASSESSMENT
89 yo F with PMH IBS, celiac disease, chronic LE edema (L > R, on Lasix once weekly), bilateral rotator cuff tear admitted for septic shock 2/2 pneumonia with acute respiratory failure, ATN, bilateral DVT

## 2017-01-08 NOTE — PROGRESS NOTE ADULT - SUBJECTIVE AND OBJECTIVE BOX
TRANSFER ACCEPT NOTE:    Hospital Course: 89 year old Female with PMH of IBS, celiac disease, chronic LE edema (on Lasix), bilateral rotator cuff tear presented to ED with complaints of cough/dyspnea. In the ED, patient met sepsis criteria (tachycardia/tachypnea/febrile) 2/2 PNA. She was subsequently admitted to [facility] for management of CAP. On the F, patient found to be increasingly lethargic, tachypneic, as well as hypotensive with BP 70/40. Pt received 1.5L NS without adequate response, after which patient was started on peripheral levophed and transferred to the MICU for further management. Patient was subsequently emergently intubated in the MICU for hypoxemic respiratory failure. Patient was started on Vanc/aztreonam/azithromycin (given cefepime + doxy in ER) that was eventually deescalated to Cefazolin. Hospital course also c/b  dopplers, showing bl DVTs with a free floating clot in Rt femoral. Heparin gtt was started and vascular was called however they recommended no intervention (no IVC filter). BCx on january 1st growing coag negative staph that was sensitive to cefazolin which the patient was started on. On 1/4/17, patient was extubated to [facility] and has tolerated it well. Patient at times becomes tachypneic overnight at which she improves after being placed on BIPAP. Patient is currently stable enough to be transferred to [facility] for continued care.         INTERVAL HPI/OVERNIGHT EVENTS:    VITAL SIGNS:  T(F): 97  HR: 83  BP: 110/60  RR: 26  SpO2: 98%  Wt(kg): --    PHYSICAL EXAM:    Constitutional: comfortable, NAD  HEENT: PERRLA, EOMI, MMM, no oral lesions  Respiratory: CTA b/l, no wheezes, rhonchi, or rales  Cardiovascular: RRR, normal S1,S2, no R/M/G  Gastrointestinal: non-distended, nontender, normoactive BS, no hepatosplenomegaly  :   Neurological: AAO x 3, follows all commands  Vascular: pulses 2+ b/l (radial, femoral, PT, DP)  Musculoskeletal: no joint swelling, no erythema  Extremities: WWP, no cyanosis, no clubbing    MEDICATIONS  (STANDING):  ceFAZolin   IVPB 500milliGRAM(s) IV Intermittent every 12 hours  midodrine 2.5milliGRAM(s) Oral every 8 hours  polyethylene glycol 3350 17Gram(s) Oral daily  senna 1Tablet(s) Oral at bedtime  heparin  Infusion 700Unit(s)/Hr IV Continuous <Continuous>    MEDICATIONS  (PRN):  acetaminophen    Suspension. 650milliGRAM(s) Oral every 6 hours PRN Severe Pain (7 - 10)      Allergies    amoxicillin (Unknown)  Cipro (Unknown)  clindamycin (Unknown)  lactose (Unknown)  Wheat (Unknown)    Intolerances        LABS:                        8.8    4.6   )-----------( 263      ( 08 Jan 2017 04:11 )             28.7     08 Jan 2017 04:11    147    |  111    |  39     ----------------------------<  101    4.5     |  29     |  1.93     Ca    8.4        08 Jan 2017 04:11  Phos  3.0       08 Jan 2017 04:11  Mg     2.1       08 Jan 2017 04:11      PTT - ( 08 Jan 2017 18:57 )  PTT:35.3 sec      RADIOLOGY & ADDITIONAL TESTS: TRANSFER ACCEPT NOTE:    Hospital Course: 89 year old Female with PMH of IBS, celiac disease, chronic LE edema (on Lasix), bilateral rotator cuff tear presented to ED with complaints of cough/dyspnea. In the ED, patient met sepsis criteria (tachycardia/tachypnea/febrile) 2/2 PNA. She was subsequently admitted to [facility] for management of CAP. On the F, patient found to be increasingly lethargic, tachypneic, as well as hypotensive with BP 70/40. Pt received 1.5L NS without adequate response, after which patient was started on peripheral levophed and transferred to the MICU for further management. Patient was subsequently emergently intubated in the MICU for hypoxemic respiratory failure. Patient was started on Vanc/aztreonam/azithromycin (given cefepime + doxy in ER) that was eventually deescalated to Cefazolin. Hospital course was complicated by  b/l DVTs with a free floating clot in Rt femoral. Heparin gtt was started and vascular consulted, and recommended no intervention (no IVC filter). BCx on january 1st growing coag negative staph that was sensitive to cefazolin which the patient was started on. On 1/4/17, patient was extubated to [facility] and has tolerated it well. Patient at times becomes tachypneic overnight at which she improves after being placed on BIPAP. Patient is currently stable enough to be transferred to [facility] for continued care.         INTERVAL HPI/OVERNIGHT EVENTS:    VITAL SIGNS:  T(F): 97  HR: 83  BP: 110/60  RR: 26  SpO2: 98%  Wt(kg): --    PHYSICAL EXAM:    Constitutional: comfortable, NAD  HEENT: PERRLA, EOMI, MMM, no oral lesions  Respiratory: CTA b/l, no wheezes, rhonchi, or rales  Cardiovascular: RRR, normal S1,S2, no R/M/G  Gastrointestinal: non-distended, nontender, normoactive BS, no hepatosplenomegaly  :   Neurological: AAO x 3, follows all commands  Vascular: pulses 2+ b/l (radial, femoral, PT, DP)  Musculoskeletal: no joint swelling, no erythema  Extremities: WWP, no cyanosis, no clubbing    MEDICATIONS  (STANDING):  ceFAZolin   IVPB 500milliGRAM(s) IV Intermittent every 12 hours  midodrine 2.5milliGRAM(s) Oral every 8 hours  polyethylene glycol 3350 17Gram(s) Oral daily  senna 1Tablet(s) Oral at bedtime  heparin  Infusion 700Unit(s)/Hr IV Continuous <Continuous>    MEDICATIONS  (PRN):  acetaminophen    Suspension. 650milliGRAM(s) Oral every 6 hours PRN Severe Pain (7 - 10)      Allergies    amoxicillin (Unknown)  Cipro (Unknown)  clindamycin (Unknown)  lactose (Unknown)  Wheat (Unknown)    Intolerances        LABS:                        8.8    4.6   )-----------( 263      ( 08 Jan 2017 04:11 )             28.7     08 Jan 2017 04:11    147    |  111    |  39     ----------------------------<  101    4.5     |  29     |  1.93     Ca    8.4        08 Jan 2017 04:11  Phos  3.0       08 Jan 2017 04:11  Mg     2.1       08 Jan 2017 04:11      PTT - ( 08 Jan 2017 18:57 )  PTT:35.3 sec      RADIOLOGY & ADDITIONAL TESTS: TRANSFER ACCEPT NOTE:    Hospital Course: 89 year old Female with PMH of IBS, celiac disease, chronic LE edema (on Lasix), bilateral rotator cuff tear presented to ED with complaints of cough/dyspnea. In the ED, patient met sepsis criteria (tachycardia/tachypnea/febrile) 2/2 PNA. She was subsequently admitted to [facility] for management of CAP. On the [facility], patient found to be increasingly lethargic, tachypneic, as well as hypotensive with BP 70/40. Pt received 1.5L NS without adequate response, after which patient was started on peripheral levophed and transferred to the MICU for further management. Patient was subsequently emergently intubated in the MICU for hypoxemic respiratory failure. Patient was started on Vanc/aztreonam/azithromycin (given cefepime + doxy in ER) that was eventually deescalated to Cefazolin. Hospital course was complicated by  b/l DVTs with a free floating clot in Rt femoral. Heparin gtt was started and vascular consulted, and recommended no intervention (no IVC filter). BCx on january 1st growing coag negative staph that was sensitive to cefazolin which the patient was started on. On 1/4/17, patient was extubated to [facility] and has tolerated it well. Patient at times becomes tachypneic overnight at which she improves after being placed on BIPAP. Patient is currently stable enough to be transferred to [facility] for continued care.       VITAL SIGNS:  T(F): 97  HR: 83  BP: 110/60  RR: 26  SpO2: 98%  Wt(kg): --    PHYSICAL EXAM:    General: NAD, comfortable on BiPaP, somnolent but arousable and following commands when prompted  HEENT: NCAT, PERRLA  Neck: supple, no JVD  Respiratory: coarse crackles at bases b/l, no wheezes  Cardiovascular: RRR, grade 3/6 systolic murmur over LLSB  Abdomen: soft, NT/ND, normoactive bowel sounds  Extremities: WWP, LLE 2+ pitting edema to knee, trace RLE edema, ulcer on R heel with compress  Skin: sacral ulcer  Psych: A and O X 1 yesterday ; could not assess as patient somnolent       Constitutional: comfortable, NAD  HEENT: PERRLA, EOMI, MMM, no oral lesions  Respiratory: CTA b/l, no wheezes, rhonchi, or rales  Cardiovascular: RRR, normal S1,S2, no R/M/G  Gastrointestinal: non-distended, nontender, normoactive BS, no hepatosplenomegaly  :   Neurological: AAO x 3, follows all commands  Vascular: pulses 2+ b/l (radial, femoral, PT, DP)  Musculoskeletal: no joint swelling, no erythema  Extremities: WWP, no cyanosis, no clubbing    MEDICATIONS  (STANDING):  ceFAZolin   IVPB 500milliGRAM(s) IV Intermittent every 12 hours  midodrine 2.5milliGRAM(s) Oral every 8 hours  polyethylene glycol 3350 17Gram(s) Oral daily  senna 1Tablet(s) Oral at bedtime  heparin  Infusion 700Unit(s)/Hr IV Continuous <Continuous>    MEDICATIONS  (PRN):  acetaminophen    Suspension. 650milliGRAM(s) Oral every 6 hours PRN Severe Pain (7 - 10)      Allergies    amoxicillin (Unknown)  Cipro (Unknown)  clindamycin (Unknown)  lactose (Unknown)  Wheat (Unknown)    Intolerances        LABS:                        8.8    4.6   )-----------( 263      ( 08 Jan 2017 04:11 )             28.7     08 Jan 2017 04:11    147    |  111    |  39     ----------------------------<  101    4.5     |  29     |  1.93     Ca    8.4        08 Jan 2017 04:11  Phos  3.0       08 Jan 2017 04:11  Mg     2.1       08 Jan 2017 04:11      PTT - ( 08 Jan 2017 18:57 )  PTT:35.3 sec      RADIOLOGY & ADDITIONAL TESTS: TRANSFER ACCEPT NOTE:    Hospital Course: 89 year old Female with PMH of IBS, celiac disease, chronic LE edema (on Lasix), bilateral rotator cuff tear presented to ED with complaints of cough/dyspnea. In the ED, patient met sepsis criteria (tachycardia/tachypnea/febrile) 2/2 PNA. She was subsequently admitted to [facility] for management of CAP. On the [facility], patient found to be increasingly lethargic, tachypneic, as well as hypotensive with BP 70/40. Pt received 1.5L NS without adequate response, after which patient was started on peripheral levophed and transferred to the MICU for further management. Patient was subsequently emergently intubated in the MICU for hypoxemic respiratory failure. Patient was started on Vanc/aztreonam/azithromycin (given cefepime + doxy in ER) that was eventually deescalated to Cefazolin. Hospital course was complicated by  b/l DVTs with a free floating clot in Rt femoral. Heparin gtt was started and vascular consulted, and recommended no intervention (no IVC filter). BCx on january 1st growing coag negative staph that was sensitive to cefazolin which the patient was started on. On 1/4/17, patient was extubated to [facility] and has tolerated it well. Patient at times becomes tachypneic overnight at which she improves after being placed on BIPAP. Patient is currently stable enough to be transferred to [facility] for continued care.       VITAL SIGNS:  T(F): 97  HR: 83  BP: 110/60  RR: 26  SpO2: 98%  Wt(kg): --    PHYSICAL EXAM:    General: NAD, comfortable on BiPaP  HEENT: NCAT, PERRLA  Neck: supple, no JVD  Respiratory: coarse crackles at bases b/l, no wheezes  Cardiovascular: RRR, grade 2/6 systolic murmur over LLSB  Abdomen: soft, NT/ND, normoactive bowel sounds  Extremities: WWP, LLE 2+ pitting edema to knee, trace RLE edema, ulcer on R heel with compress  Skin: sacral ulcer  Neuro: AAOx1, pt was somnolent but arousable and follows commands when prompted    MEDICATIONS  (STANDING):  ceFAZolin   IVPB 500milliGRAM(s) IV Intermittent every 12 hours  midodrine 2.5milliGRAM(s) Oral every 8 hours  polyethylene glycol 3350 17Gram(s) Oral daily  senna 1Tablet(s) Oral at bedtime  heparin  Infusion 700Unit(s)/Hr IV Continuous <Continuous>    MEDICATIONS  (PRN):  acetaminophen    Suspension. 650milliGRAM(s) Oral every 6 hours PRN Severe Pain (7 - 10)      Allergies    amoxicillin (Unknown)  Cipro (Unknown)  clindamycin (Unknown)  lactose (Unknown)  Wheat (Unknown)    Intolerances        LABS:                        8.8    4.6   )-----------( 263      ( 08 Jan 2017 04:11 )             28.7     08 Jan 2017 04:11    147    |  111    |  39     ----------------------------<  101    4.5     |  29     |  1.93     Ca    8.4        08 Jan 2017 04:11  Phos  3.0       08 Jan 2017 04:11  Mg     2.1       08 Jan 2017 04:11      PTT - ( 08 Jan 2017 18:57 )  PTT:35.3 sec      RADIOLOGY & ADDITIONAL TESTS:

## 2017-01-08 NOTE — PROGRESS NOTE ADULT - PROBLEM SELECTOR PLAN 1
Patient presented with sepsis, with hypotension that required peripheral Levophed. Likely source is PNA, however CXR not showing clear infiltrate although CXR had poor penetration and were severely rotated due to patient's kyphosis. UA negative for UTI, BCx growing Gram positive cocci in clusters, coagulase negative Staph - since 2 bottles positive may not be contaminant though has no risk factors for SE bacteremia, repeat BCx NGTD, sputum cultures negative.  - c/w cefazolin for coag.negative Staph (since 12/31) for total 14 days (last day is 1/13) and appears to be responding to therapy

## 2017-01-08 NOTE — PROGRESS NOTE ADULT - ASSESSMENT
89 yo F with PMH IBS, celiac disease, chronic LE edema (L > R, on Lasix once weekly), bilateral rotator cuff tear admitted for septic shock 2/2 pneumonia with acute respiratory failure, ATN, bilateral DVT    ID   Septic shock. - Patient presented with septic shock, resolving. Likely source is PNA, however CXR not showing clear infiltrate however of note poor penetration and severely rotated films due to patient's kyphosis. UA negative for UTI, BCx growing G+cocci in clusters, Coag.negative Staph - since 2 bottles positive may not be contaminant though has no risk factors for SE bacteremia, repeat cultures negative. Sputum cultures negative.  - cont cefazolin fro coag.negative Staph (since 12/31) for total 14 days (last day is 1/13) and appears to be responding to therapy  CV  Bilateral DVTs with free floating Rt femoral thrombus, new onset as per pt's son had Doppler 2 yrs ago which was negative, bedbound for only an few weeks  - on heparin gtt  -vascular consulted, no IVC filter for now - will reconsult as needed  -f/u PTT goal is 60-80  - monitor for signs of respiratory distress      Hypotension on admission, likely 2/2 septic shock, now off levophed  -now on midodrine 5mg Q8hrs po    RENAL  AN improving-avoid nephrotoxic agents  -monitor UOP    Hypernatremia - free water deficit is 1.26L, unclear cause for now, but most likely renal water loss  -get repeat UA and urine lytes with osmolality  -free water 250cc q6hrs  -monitor UOP      PSYCH  Disoritentation - could be hospitalization syndrome vs caused by hyperNa  -correct HyperNa  -reorientation    SKIN  Sacral ulcer - sacral ulcer on exam, as well as skin breakdown on right heal  - wound care following      PPX  On heparin gtt      FEN   NGT in place, on tube feeds  S/S eval  monitor and replete serum electrolytes as needed  bowel regimen  PT/OT  Access: Rt IJ and peripherals  Tubbs: Yes, will DC

## 2017-01-08 NOTE — PROGRESS NOTE ADULT - PROBLEM SELECTOR PLAN 3
Bilateral DVTs with free floating Rt femoral thrombus, new onset as per pt's son had Doppler 2 yrs ago which was negative, bedbound for only an few weeks.   - Vascular consulted, no IVC filter for now, will reconsult as needed  - c/w heparin gtt, goal PTT 60-80

## 2017-01-08 NOTE — PROGRESS NOTE ADULT - PROBLEM SELECTOR PLAN 7
Sacral ulcer on exam, as well as skin breakdown on right heel. Wound care following.  - Moisture barrier ointment to sites on bilat buttocks  - Cavilon barrier wipes over right heel, Z-annabella boots for offloading.

## 2017-01-08 NOTE — PROGRESS NOTE ADULT - PROBLEM SELECTOR PLAN 5
Free water deficit is 1L, unclear cause for now, but most likely renal water loss  - free water 250cc q6hrs  - monitor UOP

## 2017-01-08 NOTE — PROGRESS NOTE ADULT - PROBLEM SELECTOR PLAN 4
FeNa 1.5% therefore intrinsic renal etiology, with granular cast etiology ATN, could be 2/2 vancomycin however most likely secondary to septic shock; improving today  - avoid nephrotoxic agents  - monitor UOP

## 2017-01-08 NOTE — PROGRESS NOTE ADULT - PROBLEM SELECTOR PLAN 8
- NGT in place, on tube feeds: Jevity 1.2 latisha   - Per S/S: Pt p/w soft signs of oropharyngeal dysphagia characterized by reported decrease in PO intake with difficulty chewing x5 weeks, congested/wet cough at baseline, increase WOB and O2 desat with PO intake, putting patient at increased risk of aspiration. PO intake is therefore premature. S&S to re-evaluate  - Replete lytes as needed (Mg~2, K~4)  - Bowel regimen

## 2017-01-09 NOTE — PROGRESS NOTE ADULT - PROBLEM SELECTOR PLAN 4
Slightly improved. FeNa 1.5% therefore intrinsic renal etiology, with granular casts suggesting etiology as ATN 2/2 septic shock vs. vancomycin.  - avoid nephrotoxic agents  - monitor UOP

## 2017-01-09 NOTE — PROGRESS NOTE ADULT - PROBLEM SELECTOR PLAN 6
Pt takes Lasix once a week for chronic LLE  - Hold Lasix in setting of hypotension b/l pitting edema and crackles at bases, appears fluid overloaded  - give Lasix 20mg IV once and monitor UOP

## 2017-01-09 NOTE — PROGRESS NOTE ADULT - ASSESSMENT
89 yo F with PMH IBS, celiac disease, chronic LE edema (L > R, on Lasix once weekly), bilateral rotator cuff tear admitted for septic shock 2/2 pneumonia with acute respiratory failure, ATN, bilateral DVT.

## 2017-01-09 NOTE — PROGRESS NOTE ADULT - PROBLEM SELECTOR PLAN 5
Free water deficit is 1L, unclear cause for now, but most likely renal water loss  - f/u repeat labs, not currently on any fluids  - monitor UOP

## 2017-01-09 NOTE — PROGRESS NOTE ADULT - PROBLEM SELECTOR PLAN 3
New b/l DVTs with free floating Rt femoral thrombus. Vascular consulted, no IVC filter for now, will reconsult as needed  - c/w heparin gtt, goal PTT 60-80, consider bridging to coumadin New b/l popliteal, right femoral DVTs.  - c/w heparin gtt, goal PTT 60-80, consider bridging to coumadin New b/l popliteal, right femoral DVTs. Free floating rt femoral clot per video recording (not uploaded).  - c/w heparin gtt, goal PTT 60-80, consider bridging to coumadin

## 2017-01-09 NOTE — PROGRESS NOTE ADULT - PROBLEM SELECTOR PLAN 8
- NGT in place, on tube feeds: Jevity 1.2 latisha- to be re-evaluated for S/S  - Replete lytes as needed (Mg~2, K~4)  - Bowel regimen - NGT in place, on tube feeds: Jevity 1.2 latisha- to be re-evaluated for S/S, no FEES today  - Replete lytes as needed (Mg~2, K~4)  - Bowel regimen - NGT in place, on tube feeds: Jevity 1.2 latisha- to be re-evaluated for S/S, no FEES today  - free water 250cc q6hrs via NG (free water deficit of 1.1L)  - Replete lytes as needed (Mg~2, K~4)  - Bowel regimen

## 2017-01-09 NOTE — PROGRESS NOTE ADULT - SUBJECTIVE AND OBJECTIVE BOX
INTERVAL HPI/OVERNIGHT EVENTS: No acute events overnight. Patient seen and examined at bedside this morning. Minimally verbal, but awake and alert. Denies dizziness/lightheadedness, SOB/CP, cough, abdominal pain.    VITAL SIGNS:  T(F): 97.1  HR: 82  BP: 121/69  RR: 26  SpO2: 96%  Wt(kg): --    PHYSICAL EXAM:    Constitutional: elderly, well-appearing, thin  Eyes: PERRL  ENMT: MMM, no oral lesions  Neck: supple, no LAD  Respiratory: b/l basilar crackles, + rhonchi R>L  Cardiovascular: RRR, normal S1/S2, 2/6 early systolic murmur 4th intercostal space along the left sternal border  Gastrointestinal: soft, NTND, BS+  Extremities: no edema or cyanosis, + L calf pain  Vascular: DP pulses 2+ b/l  Neurological: AOx0 (does not say her own name correctly but responds), responds to come commands  Musculoskeletal: spontaneously moves extremities    MEDICATIONS  (STANDING):  ceFAZolin   IVPB 500milliGRAM(s) IV Intermittent every 12 hours  midodrine 2.5milliGRAM(s) Oral every 8 hours  polyethylene glycol 3350 17Gram(s) Oral daily  senna 1Tablet(s) Oral at bedtime  heparin  Infusion 700Unit(s)/Hr IV Continuous <Continuous>    MEDICATIONS  (PRN):  acetaminophen    Suspension. 650milliGRAM(s) Oral every 6 hours PRN Severe Pain (7 - 10)      Allergies    amoxicillin (Unknown)  Cipro (Unknown)  clindamycin (Unknown)  lactose (Unknown)  Wheat (Unknown)    Intolerances        LABS:                        8.8    4.6   )-----------( 263      ( 08 Jan 2017 04:11 )             28.7     08 Jan 2017 04:11    147    |  111    |  39     ----------------------------<  101    4.5     |  29     |  1.93     Ca    8.4        08 Jan 2017 04:11  Phos  3.0       08 Jan 2017 04:11  Mg     2.1       08 Jan 2017 04:11      PTT - ( 09 Jan 2017 02:48 )  PTT:56.2 sec      RADIOLOGY & ADDITIONAL TESTS: INTERVAL HPI/OVERNIGHT EVENTS: No acute events overnight. Patient seen and examined at bedside this morning. Minimally verbal, but awake and alert. Denies dizziness/lightheadedness, SOB/CP, cough, abdominal pain.    VITAL SIGNS:  T(F): 97.1  HR: 82  BP: 121/69  RR: 26  SpO2: 96%  Wt(kg): --    PHYSICAL EXAM:    Constitutional: elderly, well-appearing, thin  Eyes: PERRL  ENMT: MMM, no oral lesions  Neck: supple, no LAD  Respiratory: b/l basilar crackles, + rhonchi R>L  Cardiovascular: RRR, normal S1/S2, 2/6 early systolic murmur 4th intercostal space along the left sternal border  Gastrointestinal: soft, NTND, BS+  Extremities: dependent 2+ pitting edema to b/l thighs, + L calf pain, no cyanosis  Vascular: DP pulses 2+ b/l  Neurological: AOx0 (does not say her own name correctly but responds), responds to come commands  Musculoskeletal: spontaneously moves extremities    MEDICATIONS  (STANDING):  ceFAZolin   IVPB 500milliGRAM(s) IV Intermittent every 12 hours  midodrine 2.5milliGRAM(s) Oral every 8 hours  polyethylene glycol 3350 17Gram(s) Oral daily  senna 1Tablet(s) Oral at bedtime  heparin  Infusion 700Unit(s)/Hr IV Continuous <Continuous>    MEDICATIONS  (PRN):  acetaminophen    Suspension. 650milliGRAM(s) Oral every 6 hours PRN Severe Pain (7 - 10)      Allergies    amoxicillin (Unknown)  Cipro (Unknown)  clindamycin (Unknown)  lactose (Unknown)  Wheat (Unknown)    Intolerances        LABS:                        8.8    4.6   )-----------( 263      ( 08 Jan 2017 04:11 )             28.7     08 Jan 2017 04:11    147    |  111    |  39     ----------------------------<  101    4.5     |  29     |  1.93     Ca    8.4        08 Jan 2017 04:11  Phos  3.0       08 Jan 2017 04:11  Mg     2.1       08 Jan 2017 04:11      PTT - ( 09 Jan 2017 02:48 )  PTT:56.2 sec      RADIOLOGY & ADDITIONAL TESTS:

## 2017-01-10 NOTE — SWALLOW FEES ASSESSMENT ADULT - ORAL PHASE COMMENTS
Anterior loss with thin liquids by cup sip most likely r/t poor positioning & interference from HFNC vs labial weakness.

## 2017-01-10 NOTE — PROGRESS NOTE ADULT - PROBLEM SELECTOR PLAN 5
Resolved. Not currently on any fluids 2/2 overload  - monitor UOP Further improved today. FeNa 1.5% therefore intrinsic renal etiology, with granular casts suggesting etiology as ATN 2/2 septic shock vs. vancomycin.  - avoid nephrotoxic agents  - monitor UOP

## 2017-01-10 NOTE — PROGRESS NOTE ADULT - PROBLEM SELECTOR PLAN 6
b/l pitting edema and crackles at bases, appears fluid overloaded. s/p lasix 20mg IV once w/ good UOP (incontinent) Resolved. Not currently on any fluids 2/2 overload  - monitor UOP

## 2017-01-10 NOTE — CONSULT NOTE ADULT - SUBJECTIVE AND OBJECTIVE BOX
Stroke Consult Note    HPI: 89 year-old female presented with pneumonia but decompensated and required intubation.  Now extubated but her son feels that she's not at her baseline.    PAST MEDICAL & SURGICAL HISTORY:  DJD (degenerative joint disease)  Edema  IBS (irritable bowel syndrome)  Celiac disease  H/O inguinal hernia repair  History of total left hip replacement      MEDICATIONS  (STANDING):  ceFAZolin   IVPB 500milliGRAM(s) IV Intermittent every 12 hours  midodrine 2.5milliGRAM(s) Oral every 8 hours  polyethylene glycol 3350 17Gram(s) Oral daily  senna 1Tablet(s) Oral at bedtime  heparin  Infusion 700Unit(s)/Hr IV Continuous <Continuous>    MEDICATIONS  (PRN):  acetaminophen    Suspension. 650milliGRAM(s) Oral every 6 hours PRN Severe Pain (7 - 10)      Allergies    amoxicillin (Unknown)  Cipro (Unknown)  clindamycin (Unknown)  lactose (Unknown)  Wheat (Unknown)    FAMILY HISTORY:  No pertinent family history in first degree relatives    Social History:    Review of Systems:  Constitutional: No fever, weight loss or fatigue  Eyes: No eye pain, visual disturbances, or discharge  ENMT:  No difficulty hearing, tinnitus, vertigo; No sinus or throat pain  Neck: No pain or stiffness  Respiratory: No cough, wheezing, chills or hemoptysis  Cardiovascular: No chest pain, palpitations, shortness of breath, dizziness or leg swelling  Gastrointestinal: No abdominal pain. No nausea, vomiting or hematemesis; No diarrhea or constipation. Nohematochezia.  Genitourinary: No dysuria, frequency, hematuria or incontinence  Neurological: As per HPI  Skin: No itching, burning, rashes or lesions   Endocrine: No heat or cold intolerance; No hair loss  Musculoskeletal: No joint pain or swelling; No muscle, back or extremity pain  Psychiatric: No depression, anxiety, mood swings or difficulty sleeping  Heme/Lymph: No easy bruising or bleeding gums    REVIEW OF SYSTEMS:  As per HPI, otherwise negative for Constitutional, Eyes, Ears/Nose/Mouth/Throat, Neck, Cardiovascular, Respiratory, Gastrointestinal, Genitourinary, Skin, Endocrine, Musculoskeletal, Psychiatric, and Hematologic/Lymphatic.    Vital Signs Last 24 Hrs  T(C): 36.4, Max: 36.8 (01-10 @ 08:58)  T(F): 97.6, Max: 98.2 (01-10 @ 08:58)  HR: 80 (80 - 88)  BP: 89/50 (89/50 - 111/58)  BP(mean): 80 (72 - 80)  RR: 16 (14 - 21)  SpO2: 100% (95% - 100%)  CAPILLARY BLOOD GLUCOSE    Physical exam:  Gen: No acute distress, well-nourished  CV: carotid arteries- no bruits, reg rate and rhythm, no murmurs  Extremities:     Neurological examination:  Mental status: Awake, alert and oriented x3.  Recent and remote memory intact.  Naming, repetition and comprehension intact.  Attention/concentration intact.  No dysarthria, no aphasia.  Fund of knowledge appropriate.    Cranial nerves: Fundoscopic exam demonstrated no abnormalities, pupils equally round and reactive to light, visual fields full, no nystagmus, extraocular muscles intact, V1 through V3 intact bilaterally and symmetric, face symmetric, hearing intact to finger rub, palate elevation symmetric, tongue was midline, sternocleidomastoid/shoulder shrug strength bilaterally 5/5.    Motor:  Normal bulk and tone, strength 5/5 in bilateral upper and lower extremities.   strength 5/5.  Rapid alternating movements intact and symmetric.   Sensation: Intact to light touch, proprioception, vibration, temperature, pinprick.  No neglect.   Coordination: No dysmetria on finger-to-nose and heel-to-shin.  No clumsiness.  Reflexes: 2+ in upper and lower extremities, absent Babinski bilaterally  Gait: Narrow and steady. No ataxia.  Romberg negative    NIHSS:    Labs:                        9.5    7.1   )-----------( 389      ( 10 Juan 2017 06:31 )             30.2     10 Juan 2017 06:31    144    |  104    |  36     ----------------------------<  94     4.2     |  34     |  1.66     Ca    8.2        10 Juan 2017 06:31  Phos  3.2       09 Jan 2017 10:56  Mg     2.0       10 Juan 2017 06:31      PTT - ( 10 Juan 2017 12:25 )  PTT:55.6 sec    Radiology and Additional Studies:  CT Head without contrast (12/31/2016): No acute intracranial hemorrhage or recent territorial infarction. No substantial change since December 11, 2016.    Assessment:    Plan: Stroke Consult Note    HPI: 89 year-old female presented with pneumonia but decompensated and required intubation.  Now extubated but still needs high flow oxygen.  She just passed swallow for puree with thin liquids.  Her son feels that she's not at her baseline.  The Aide at the baseline concurs that at baseline, she speaks, walks with walker and would know the President.      Her son is concerned that there's an underlying deterioration.  He reports the following history - She was generally healthy until one year ago.  She played bridge twice a week and attended continuing education classes.  She walked with a walker and only needed part time help.      Her health worsened in the summer when he found her "frozen" and stiff.  She was diagnosed with UTI and dehydration.  She was discharged to [facility] subacute rehab.  She fell twice from her bed and had some confusion and hallucinations so she had full time help when she went home.    She also fractured her wrist and wears a brace on her right wrist.    She was admitted in the beginning of December with pneumonia and hasn't eaten well since then.  She has some trouble swallowing solids and weaker verbalization since one week before present admission.  She walked shorter distances and had trouble sitting on her own.  Her son was concerned on the day of admission since her voice sounded "mumbling."      Note: no CAD, no carotid disease - carotid disease one year ago was reportedly negative, one day of atrial fibrillation 3 years ago but Digoxin was stopped due to concerns regarding daily medications.    PAST MEDICAL HISTORY:  DJD (degenerative joint disease)  Edema  IBS (irritable bowel syndrome)  Celiac disease    PSH:   H/O inguinal hernia repair  History of total left hip replacement  cataract surgery bilaterally (some altered mental status after the surgery)    Home meds: only on Lasix once a week    MEDICATIONS  (STANDING):  ceFAZolin   IVPB 500milliGRAM(s) IV Intermittent every 12 hours  midodrine 2.5milliGRAM(s) Oral every 8 hours  polyethylene glycol 3350 17Gram(s) Oral daily  senna 1Tablet(s) Oral at bedtime  heparin  Infusion 700Unit(s)/Hr IV Continuous <Continuous>    MEDICATIONS  (PRN):  acetaminophen    Suspension. 650milliGRAM(s) Oral every 6 hours PRN Severe Pain (7 - 10)    Allergies    amoxicillin (Unknown)  Cipro (Unknown)  clindamycin (Unknown)  lactose (Unknown)  Wheat (Unknown)    FAMILY HISTORY:  No pertinent family history in first degree relatives    Social History:  Smoking - never    REVIEW OF SYSTEMS:  As per HPI, otherwise negative for Constitutional, Eyes, Ears/Nose/Mouth/Throat, Neck, Cardiovascular, Respiratory, Gastrointestinal, Genitourinary, Skin, Endocrine, Musculoskeletal, Psychiatric, and Hematologic/Lymphatic.    Vital Signs Last 24 Hrs  T(C): 36.4, Max: 36.8 (01-10 @ 08:58)  T(F): 97.6, Max: 98.2 (01-10 @ 08:58)  HR: 80 (80 - 88)  BP: 89/50 (89/50 - 111/58)  BP(mean): 80 (72 - 80)  RR: 16 (14 - 21)  SpO2: 100% (95% - 100%)    Physical exam:  Gen: sitting in chair, NAD  CV: RRR  Chest: on high flow nasal canula  Extremities: splint on right wrist, 2+ radial and dorsalis pedis pulses bilaterally    Neurological examination:  Mental status: awake, alert, only verbal output was very dysarthria/thick "pen" otherwise not naming, no repetition.  only followed one command - stick out tongue otherwise she just looked at me but didn't respond - not to Aide either.    Cranial nerves: EOMI, PERRL+, blink to threat bilaterally, no obvious facial droop, tongue midline  Motor: moves all extremities spontaneously, hand  at least 3+/5  Sensory: responds briskly to pain to extremities on both sides  Cerebellar: uncooperative  Reflexes: hard to elicit probably due to age and poor positioning  Gait: deferred    Labs:                        9.5    7.1   )-----------( 389      ( 10 Juan 2017 06:31 )             30.2     10 Juan 2017 06:31    144    |  104    |  36     ----------------------------<  94     4.2     |  34     |  1.66     Ca    8.2        10 Juan 2017 06:31  Phos  3.2       09 Jan 2017 10:56  Mg     2.0       10 Juan 2017 06:31    PTT - ( 10 Juan 2017 12:25 )  PTT:55.6 sec    Radiology and Additional Studies:  CT Head without contrast (12/31/2016): No acute intracranial hemorrhage or recent territorial infarction. No substantial change since December 11, 2016.    Assessment: 89 year-old female with recent decline in health over the past year including her mental status presents with pneumonia that was complicated with intubated and DVT.  She's now extubated but not responding properly.  Her neurological exam is nonfocal, without signs related to stroke or specific etiology.    This may be evolution of her yearly declining mental status exacerbated by recent toxic metabolic encephalopathy, though she already has the history of altered mental status after procedure in the past.  She may not want to speak due to irritation to her throat and vocal cords secondary to intubation.  Can also consider background of Lewy body dementia given history of altered mental status and hallucinations.    Plan:  1) Repeat CT Head without contrast to rule out anoxic brain injury or focal injury including hemorrhage since on heparin drip.  No CT Head since before she was intubated.  2) Avoid sedating medications.  3) Continue care as per [facility] team.  4) Discussed with patient's son, Esvin, [phone].  5) Discussed with patient's PMH, Dr. Villarreal.    Discussed with [facility] team.

## 2017-01-10 NOTE — PROGRESS NOTE ADULT - PROBLEM SELECTOR PLAN 8
- NGT in place, on tube feeds: Jevity 1.2 latisha- to be re-evaluated for S/S, no FEES today  - free water 250cc q6hrs via NG for maintenance  - Replete lytes as needed (Mg~2, K~4)  - Bowel regimen Sacral ulcer on exam, as well as skin breakdown on right heel. Wound care following.  - Moisture barrier ointment to sites on bilat buttocks  - Cavilon barrier wipes over right heel, Z-annabella boots for offloading.

## 2017-01-10 NOTE — SWALLOW FEES ASSESSMENT ADULT - DIAGNOSTIC IMPRESSIONS
Pt p/w an essentially functional oropharyngeal swallow with no aspiration observed during this study. Suspect mildly reduced strength of pharyngeal squeeze resulting in trace penetration of yogurt on 2/4 trials & in trace residue in the valleculae & along the lateral channels after the swallow. Pt clears residue with secondary swallows & liquid wash. Prognosis for improvement is dependent on overall improvement in medical condition.

## 2017-01-10 NOTE — PROGRESS NOTE ADULT - ASSESSMENT
89 yo F with PMH IBS, celiac disease, chronic LE edema (L > R, on Lasix once weekly), bilateral rotator cuff tear admitted for septic shock 2/2 pneumonia with acute respiratory failure, ATN, bilateral DVT.

## 2017-01-10 NOTE — SWALLOW FEES ASSESSMENT ADULT - SLP PERTINENT HISTORY OF CURRENT PROBLEM
Known to me from ICU. Extub on 1/4/17, stepped down on 1/7 but remains tachypneic on face-mask & HFNC.

## 2017-01-10 NOTE — PROGRESS NOTE ADULT - PROBLEM SELECTOR PLAN 7
Sacral ulcer on exam, as well as skin breakdown on right heel. Wound care following.  - Moisture barrier ointment to sites on bilat buttocks  - Cavilon barrier wipes over right heel, Z-annabella boots for offloading. b/l pitting edema and crackles at bases, appears fluid overloaded. s/p lasix 20mg IV once w/ good UOP (incontinent)

## 2017-01-10 NOTE — PROGRESS NOTE ADULT - PROBLEM SELECTOR PLAN 2
Stable. Likely 2/2 septic shock, now off levophed, current -110  - c/w midodrine 2.5mg Q8hrs Resolving. Requiring pressors in the MICU. Likely 2/2 CAP vs. bacteremia. BCx (1/1) positive for Gram positive cocci in clusters, coagulase negative Staph x2 sets. Repeat BCx (1/2) NGTD.  - c/w cefazolin for coag. negative Staph (since 12/31) for total 14 days (last day is 1/13) and appears to be responding to therapy- DAY 11  - c/w midodrine 2.5mg q8hrs

## 2017-01-10 NOTE — PROGRESS NOTE ADULT - PROBLEM SELECTOR PLAN 1
Resolving. Requiring pressors in the MICU. Likely 2/2 CAP vs. bacteremia. BCx (1/1) positive for Gram positive cocci in clusters, coagulase negative Staph x2 sets. Repeat BCx (1/2) NGTD.  - c/w cefazolin for coag. negative Staph (since 12/31) for total 14 days (last day is 1/13) and appears to be responding to therapy  - c/w midodrine 2.5mg q8hrs 2/2 CAP. Patient still w/ w/ persistent tachypnea on BiPAP/HFNC. CXR appears unchanged from previous. Not due to feeds.  - STAT ABG  - repeat blood cx x2  - c/w HFNC

## 2017-01-10 NOTE — SWALLOW FEES ASSESSMENT ADULT - PHARYNGEAL PHASE COMMENTS
Swallow trigger is brisk & timely. Suspect mildly reduced strength of pharyngeal squeeze as evidenced by trace residue in the valleculae, along the lateral channels after the swallow, and one episode of trace penetration with puree.

## 2017-01-10 NOTE — PROGRESS NOTE ADULT - PROBLEM SELECTOR PLAN 4
Further improved today. FeNa 1.5% therefore intrinsic renal etiology, with granular casts suggesting etiology as ATN 2/2 septic shock vs. vancomycin.  - avoid nephrotoxic agents  - monitor UOP New b/l popliteal, right femoral DVTs.  - c/w heparin gtt, goal PTT 60-80, consider bridging to coumadin or noac

## 2017-01-10 NOTE — PROGRESS NOTE ADULT - PROBLEM SELECTOR PLAN 3
New b/l popliteal, right femoral DVTs. Free floating rt femoral clot per video recording (not uploaded).  - c/w heparin gtt, goal PTT 60-80, consider bridging to coumadin Stable. Likely 2/2 septic shock, now off levophed, current -110  - c/w midodrine 2.5mg Q8hrs

## 2017-01-10 NOTE — PROGRESS NOTE ADULT - SUBJECTIVE AND OBJECTIVE BOX
INTERVAL HPI/OVERNIGHT EVENTS:    VITAL SIGNS:  T(F): 96  HR: 85  BP: 106/66  RR: 16  SpO2: 98%  Wt(kg): --    PHYSICAL EXAM:    Constitutional:  Eyes:  ENMT:  Neck:  Respiratory:  Cardiovascular:  Gastrointestinal:  Extremities:  Vascular:  Neurological:  Musculoskeletal:    MEDICATIONS  (STANDING):  ceFAZolin   IVPB 500milliGRAM(s) IV Intermittent every 12 hours  midodrine 2.5milliGRAM(s) Oral every 8 hours  polyethylene glycol 3350 17Gram(s) Oral daily  senna 1Tablet(s) Oral at bedtime  heparin  Infusion 700Unit(s)/Hr IV Continuous <Continuous>    MEDICATIONS  (PRN):  acetaminophen    Suspension. 650milliGRAM(s) Oral every 6 hours PRN Severe Pain (7 - 10)      Allergies    amoxicillin (Unknown)  Cipro (Unknown)  clindamycin (Unknown)  lactose (Unknown)  Wheat (Unknown)    Intolerances        LABS:                        9.5    7.1   )-----------( 389      ( 10 Juan 2017 06:31 )             30.2     10 Jan 2017 06:31    144    |  104    |  36     ----------------------------<  94     4.2     |  34     |  1.66     Ca    8.2        10 Juan 2017 06:31  Phos  3.2       09 Jan 2017 10:56  Mg     2.0       10 Juan 2017 06:31      PTT - ( 10 Juan 2017 06:31 )  PTT:42.2 sec      RADIOLOGY & ADDITIONAL TESTS: INTERVAL HPI/OVERNIGHT EVENTS: No acute events overnight. Patient was on BiPAP all night. Patient was seen and examined at bedside this morning. She is alert, makes eye contact, but only responds to some questions and inappropriately.    VITAL SIGNS:  T(F): 96  HR: 85  BP: 106/66  RR: 32  SpO2: 98% on BiPAP FiO2 40%  Wt(kg): --    PHYSICAL EXAM:    Constitutional: elderly, well-appearing, NAD  Eyes: PERRL  ENMT: MMM  Neck: supple, no JVD  Respiratory: b/l rhonchi at upper lobes, crackles b/l bases  Cardiovascular: RRR, normal S1/S2, 2/6 early systolic murmur left sternal border at the 4th intercostal space  Gastrointestinal: soft, NTND, BS+  Extremities: 2+ dependent edema to b/l mid-thigh  Vascular: WWP, DP pulses 1+ b/l  Neurological: AOx3, responds to some commands, however inappropriately, makes good eye contact  Musculoskeletal: spontaneously moves all extremities    MEDICATIONS  (STANDING):  ceFAZolin   IVPB 500milliGRAM(s) IV Intermittent every 12 hours  midodrine 2.5milliGRAM(s) Oral every 8 hours  polyethylene glycol 3350 17Gram(s) Oral daily  senna 1Tablet(s) Oral at bedtime  heparin  Infusion 700Unit(s)/Hr IV Continuous <Continuous>    MEDICATIONS  (PRN):  acetaminophen    Suspension. 650milliGRAM(s) Oral every 6 hours PRN Severe Pain (7 - 10)      Allergies    amoxicillin (Unknown)  Cipro (Unknown)  clindamycin (Unknown)  lactose (Unknown)  Wheat (Unknown)    Intolerances        LABS:                        9.5    7.1   )-----------( 389      ( 10 Juan 2017 06:31 )             30.2     10 Juan 2017 06:31    144    |  104    |  36     ----------------------------<  94     4.2     |  34     |  1.66     Ca    8.2        10 Juan 2017 06:31  Phos  3.2       09 Jan 2017 10:56  Mg     2.0       10 Juna 2017 06:31      PTT - ( 10 Juan 2017 06:31 )  PTT:42.2 sec      RADIOLOGY & ADDITIONAL TESTS:

## 2017-01-10 NOTE — SWALLOW FEES ASSESSMENT ADULT - RECOMMENDED FEEDING/EATING TECHNIQUES
maintain upright posture during/after eating for 30 mins/oral hygiene/small sips/bites/no straws/Break from eating with labored breathing/position upright (90 degrees)

## 2017-01-10 NOTE — PROGRESS NOTE ADULT - PROBLEM SELECTOR PLAN 9
Pt on heparin gtt    CODE: FULL    Dispo planning: PT recommends [facility] - NGT in place, on tube feeds: Jevity 1.2 latisha- to be re-evaluated for S/S, no FEES today  - free water 250cc q6hrs via NG for maintenance  - Replete lytes as needed (Mg~2, K~4)  - Bowel regimen

## 2017-01-11 NOTE — PROGRESS NOTE ADULT - PROBLEM SELECTOR PLAN 7
b/l pitting edema and crackles at bases, appears fluid overloaded, resolving. s/p lasix 20mg IV once w/ good UOP (incontinent)  -Will begin metolazone 2.5mg BID, may need to increase dose to compensate for ATN.

## 2017-01-11 NOTE — PROGRESS NOTE ADULT - PROBLEM SELECTOR PLAN 5
Further improved today. FeNa 1.5% therefore intrinsic renal etiology, with granular casts suggesting etiology as ATN 2/2 septic shock.   - avoid nephrotoxic agents  - monitor UOP Resolved. Not currently on any fluids 2/2 overload  - monitor UOP

## 2017-01-11 NOTE — PROGRESS NOTE ADULT - PROBLEM SELECTOR PLAN 6
Resolved. Not currently on any fluids 2/2 overload  - monitor UOP Seen by Orthopedics, recommending Occupational therapy and continue velcro-wrist splint.

## 2017-01-11 NOTE — PROGRESS NOTE ADULT - SUBJECTIVE AND OBJECTIVE BOX
INTERVAL HPI/OVERNIGHT EVENTS:  Patient was seen and examined at bedside.  Patient on BIPAP overnight, feeds were held, had 1 episode of SVT to 140s for ~ 1 second, was afebrile and HD stable. Patient opens eyes to name, and tracks movement though does not answer questions. Occasionally nods head in response to questions.     VITAL SIGNS:  T(F): 100.1  HR: 95  BP: 112/58  RR: 20  SpO2: 98%  Wt(kg): --    PHYSICAL EXAM:    Constitutional: Elderly,   Eyes: PERRL  ENMT: MMM  Neck: supple, no JVD  Respiratory: b/l rhonchi at upper lobes, minimal crackles in bilateral bases.   Cardiovascular: RRR, normal S1/S2, 2/6 early systolic murmur left lower sternal border   Gastrointestinal: soft, NTND, BS+  Extremities: 2+ dependent edema to b/l knee, improving based on previous   Vascular: WWP, DP pulses 1+ b/l  Neurological: AOx0, sometimes verbal, though intermittently responds to commands.   Musculoskeletal: spontaneously moves all extremities    MEDICATIONS  (STANDING):  ceFAZolin   IVPB 500milliGRAM(s) IV Intermittent every 12 hours  midodrine 2.5milliGRAM(s) Oral every 8 hours  polyethylene glycol 3350 17Gram(s) Oral daily  senna 1Tablet(s) Oral at bedtime  heparin  Infusion 700Unit(s)/Hr IV Continuous <Continuous>  sodium chloride 0.45%. 1000milliLiter(s) IV Continuous <Continuous>  metolazone 2.5milliGRAM(s) Oral every 12 hours    MEDICATIONS  (PRN):  acetaminophen    Suspension. 650milliGRAM(s) Oral every 6 hours PRN Severe Pain (7 - 10)      Allergies    amoxicillin (Unknown)  Cipro (Unknown)  clindamycin (Unknown)  lactose (Unknown)  Wheat (Unknown)    Intolerances        LABS:                        9.0    13.5  )-----------( 430      ( 11 Jan 2017 14:12 )             28.8     10 Juan 2017 06:31    144    |  104    |  36     ----------------------------<  94     4.2     |  34     |  1.66     Ca    8.2        10 Juan 2017 06:31  Mg     2.0       10 Juan 2017 06:31      PTT - ( 10 Juan 2017 17:00 )  PTT:55.7 sec      RADIOLOGY & ADDITIONAL TESTS: INTERVAL HPI/OVERNIGHT EVENTS:  Patient was seen and examined at bedside.  Patient on BIPAP overnight, feeds were held, had 1 episode of SVT to 140s for ~ 1 second, was afebrile and HD stable. Patient opens eyes to name, and tracks movement though does not answer questions. Occasionally nods head in response to questions.     Addendum: Patient with acute worsening of mental status with episode of hypotension to 70s-90s systolic. Given 250cc bolus of fluid with BP increase and somewhat improved mental status. Possible intracranial pathology so repeat CT head; negative for hemorrhage.     VITAL SIGNS:  T(F): 100.1  HR: 95  BP: 112/58  RR: 20  SpO2: 98%  Wt(kg): --    PHYSICAL EXAM:    Constitutional: Elderly,   Eyes: PERRL  ENMT: MMM  Neck: supple, no JVD  Respiratory: b/l rhonchi at upper lobes, minimal crackles in bilateral bases.   Cardiovascular: RRR, normal S1/S2, 2/6 early systolic murmur left lower sternal border   Gastrointestinal: soft, NTND, BS+  Extremities: 2+ dependent edema to b/l knee, improving based on previous   Vascular: WWP, DP pulses 1+ b/l  Neurological: AOx0, sometimes verbal, though intermittently responds to commands.   Musculoskeletal: spontaneously moves all extremities    MEDICATIONS  (STANDING):  ceFAZolin   IVPB 500milliGRAM(s) IV Intermittent every 12 hours  midodrine 2.5milliGRAM(s) Oral every 8 hours  polyethylene glycol 3350 17Gram(s) Oral daily  senna 1Tablet(s) Oral at bedtime  heparin  Infusion 700Unit(s)/Hr IV Continuous <Continuous>  sodium chloride 0.45%. 1000milliLiter(s) IV Continuous <Continuous>  metolazone 2.5milliGRAM(s) Oral every 12 hours    MEDICATIONS  (PRN):  acetaminophen    Suspension. 650milliGRAM(s) Oral every 6 hours PRN Severe Pain (7 - 10)      Allergies    amoxicillin (Unknown)  Cipro (Unknown)  clindamycin (Unknown)  lactose (Unknown)  Wheat (Unknown)    Intolerances        LABS:                        9.0    13.5  )-----------( 430      ( 11 Jan 2017 14:12 )             28.8     10 Juan 2017 06:31    144    |  104    |  36     ----------------------------<  94     4.2     |  34     |  1.66     Ca    8.2        10 Juan 2017 06:31  Mg     2.0       10 Juan 2017 06:31      PTT - ( 10 Juan 2017 17:00 )  PTT:55.7 sec      RADIOLOGY & ADDITIONAL TESTS:

## 2017-01-11 NOTE — PROGRESS NOTE ADULT - PROBLEM SELECTOR PLAN 4
New b/l popliteal, right femoral DVTs.  - c/w heparin gtt, goal PTT 60-80, consider bridging to coumadin or noac. Further improved today. FeNa 1.5% therefore intrinsic renal etiology, with granular casts suggesting etiology as ATN 2/2 septic shock.   - avoid nephrotoxic agents  - monitor UOP

## 2017-01-11 NOTE — CONSULT NOTE ADULT - SUBJECTIVE AND OBJECTIVE BOX
Orthopaedic Surgery Consult Note    For Surgeon: Dr. Shearer    HPI:  89 yo F admitted for sepsis secondary to pneumonia with Right distal radius fracture from 11/10/16 which was reduced and splinted in [facility] ED. History limited due to pt condition, denies wrist pain. Has been wearing velcro wrist splint.      Allergies    amoxicillin (Unknown)  Cipro (Unknown)  clindamycin (Unknown)  lactose (Unknown)  Wheat (Unknown)    Intolerances      PAST MEDICAL & SURGICAL HISTORY:  DJD (degenerative joint disease)  Edema  IBS (irritable bowel syndrome)  Celiac disease  H/O inguinal hernia repair  History of total left hip replacement    MEDICATIONS  (STANDING):  ceFAZolin   IVPB 500milliGRAM(s) IV Intermittent every 12 hours  midodrine 2.5milliGRAM(s) Oral every 8 hours  polyethylene glycol 3350 17Gram(s) Oral daily  senna 1Tablet(s) Oral at bedtime  heparin  Infusion 700Unit(s)/Hr IV Continuous <Continuous>  sodium chloride 0.45%. 1000milliLiter(s) IV Continuous <Continuous>  metolazone 2.5milliGRAM(s) Oral every 12 hours    MEDICATIONS  (PRN):  acetaminophen    Suspension. 650milliGRAM(s) Oral every 6 hours PRN Severe Pain (7 - 10)      Vital Signs Last 24 Hrs  T(C): 37.8, Max: 37.8 (01-11 @ 10:31)  T(F): 100.1, Max: 100.1 (01-11 @ 10:31)  HR: 95 (87 - 100)  BP: 112/58 (92/56 - 112/58)  BP(mean): 76 (69 - 82)  RR: 20 (14 - 32)  SpO2: 98% (95% - 100%)    Physical Exam:  Limited due to pt condition  Right wrist: in velcro splint, skin intact, no swelling, no ecchymosis, no warmth  sensation intact  able to move all 5 fingers  brisk cap refill                            9.5    7.1   )-----------( 389      ( 10 Juan 2017 06:31 )             30.2              Xray Right wrist: subacute distal radius fracture, not well aligned    A/P: 89yFemale with subacute Right distal radius fracture  -Velcro splint as needed, can remove to start therapy  -Rec occupational therapy for gentle range of motion & modalities, wbat  -Pain control  -Discussed with Dr. Shearer

## 2017-01-11 NOTE — PROGRESS NOTE ADULT - PROBLEM SELECTOR PLAN 3
Stable. Likely 2/2 septic shock, now off levophed, current -110  - c/w midodrine 2.5mg Q8hrs New b/l popliteal, right femoral DVTs.  - c/w heparin gtt, goal PTT 60-80, consider bridging to coumadin or noac.

## 2017-01-11 NOTE — PROGRESS NOTE ADULT - PROBLEM SELECTOR PLAN 9
- NGT in place, on tube feeds: Jevity 1.2 latisha- to be re-evaluated for S/S, no FEES today  - free water 250cc q6hrs via NG for maintenance  - Replete lytes as needed (Mg~2, K~4)  - Bowel regimen

## 2017-01-11 NOTE — PROGRESS NOTE ADULT - PROBLEM SELECTOR PLAN 1
2/2 CAP. Patient still w/ w/ persistent tachypnea on BiPAP/HFNC. CXR appears unchanged from previous. Not due to feeds.  - repeat blood cx: No growth at 1 day  - c/w HFNC s/p septic shock 2/2 CAP. Septic shock resolved though Patient still w/ w/ persistent tachypnea on BiPAP/HFNC. CXR appears unchanged from previous. Not due to feeds.  - repeat blood cx: No growth at 1 day  - c/w HFNC  Resolved. BCx (1/1) positive for Gram positive cocci in clusters, coagulase negative Staph x2 sets. Repeat BCx (1/2) NGTD. Repeat BCx 1/10 no growth to date.   - c/w cefazolin for coag. negative Staph (since 12/31) for total 14 days (last day is 1/13) and appears to be responding to therapy- DAY 12  - c/w midodrine 2.5mg q8hrs

## 2017-01-11 NOTE — PROGRESS NOTE ADULT - PROBLEM SELECTOR PLAN 2
Resolved. BCx (1/1) positive for Gram positive cocci in clusters, coagulase negative Staph x2 sets. Repeat BCx (1/2) NGTD. Repeat BCx 1/10 no growth to date.   - c/w cefazolin for coag. negative Staph (since 12/31) for total 14 days (last day is 1/13) and appears to be responding to therapy- DAY 12  - c/w midodrine 2.5mg q8hrs Stable. Likely 2/2 septic shock, now off levophed, current -110  - c/w midodrine 2.5mg Q8hrs

## 2017-01-11 NOTE — PROGRESS NOTE ADULT - SUBJECTIVE AND OBJECTIVE BOX
Neurology Follow Up     Interval History: Patient seen and examined.  No major changes.  - spoke a little more to Aide this morning but still not very conversant  - not necessarily moving than previously.  Her Aide doesn't think that she's in pain.  - hypotensive so on Midodrine  - still on high flow oxygen.    - As per team, Dr. Lopez didn't think that she was stable to lie flat for CT right now so it wasn't ordered.    Medications:  MEDICATIONS  (STANDING):  ceFAZolin   IVPB 500milliGRAM(s) IV Intermittent every 12 hours  midodrine 2.5milliGRAM(s) Oral every 8 hours  polyethylene glycol 3350 17Gram(s) Oral daily  senna 1Tablet(s) Oral at bedtime  heparin  Infusion 700Unit(s)/Hr IV Continuous <Continuous>  sodium chloride 0.45%. 1000milliLiter(s) IV Continuous <Continuous>    MEDICATIONS  (PRN):  acetaminophen    Suspension. 650milliGRAM(s) Oral every 6 hours PRN Severe Pain (7 - 10)    Allergies    amoxicillin (Unknown)  Cipro (Unknown)  clindamycin (Unknown)  lactose (Unknown)  Wheat (Unknown)    Exam:  Vital Signs Last 24 Hrs  T(C): 35.9, Max: 37.8 (01-11 @ 10:31)  T(F): 96.7, Max: 100.1 (01-11 @ 10:31)  HR: 84 (84 - 96)  BP: 90/55 (79/51 - 112/58)  BP(mean): 76 (69 - 82)  RR: 19 (16 - 32)  SpO2: 100% (95% - 100%)    Gen: lying in bed, NAD  CV: RRR  Chest: on high flow nasal canula  Extremities: splint on right wrist, 2+ radial pulses bilaterally    Neurological examination:  Mental status: awake, alert, seems more responsive in terms of her facial expression but no verbal output, even after prompting by myself and her Aide.  Didn't follow commands  Cranial nerves: EOMI, PERRL+, no obvious facial droop, tongue midline  Motor: moves all extremities spontaneously, uncooperative with further testing  Sensory: responds briskly to pain to extremities on both sides  Cerebellar: uncooperative  Gait: deferred    Note: witnessed coughing after drinking apple juice from a spoon.    Labs:             9.0    13.5  )-----------( 430      ( 11 Jan 2017 14:12 )             28.8     11 Jan 2017 16:01    142    |  101    |  33     ----------------------------<  106    4.2     |  29     |  1.49     Ca    7.8        11 Jan 2017 16:01  Mg     2.0       11 Jan 2017 16:01    PT/INR - ( 11 Jan 2017 16:01 )   PT: 13.9 sec;   INR: 1.25     PTT - ( 11 Jan 2017 16:01 )  PTT:41.7 sec    Radiology: Received some results from outside imaging including -   CT Head without contrast (Performed at [facility] on 9/16/2015): Normal examination, other than slight microvascular changes, bifrontally.  Carotid sonography (Performed at [facility] on 6/25/2015): Tiny plaques.  No hemodynamically significant lesion.  Flow in the vertebrals in antegrade.  Chest X-ray (Performed 10/7/2013): No active pulmonary disease.    Assessment: 89 year-old female with recent decline in health over the past year including her mental status presents with pneumonia that is complicated with intubated and DVT.  She's now extubated but not responding properly.  Her neurological exam remains relatively similar to yesterday.  It's nonfocal.  Unclear basis for nonverbal, though can it be related to anoxic brain injury or irritation from recent intubation versus general decline secondary to recent medical condition.      Plan:  1) Treat underlying medication condition as per [facility] team.  2) Repeat CT Head without contrast when she's cleared by 7 [facility] team.    3) Reviewed the imaging reports faxed to my office by patient's son, Esvin and discussed them with him.  I'm not sure that they're significant given that there is CT Head and multiple CXRs in synapse from earlier this admission.  It does confirm that her brain is relatively preserved.  Therefore her etiology is more diffuse.

## 2017-01-12 NOTE — PROGRESS NOTE ADULT - PROBLEM SELECTOR PLAN 3
New b/l popliteal, right femoral DVTs.  - c/w heparin gtt, goal PTT 60-80, consider bridging to coumadin upon discharge

## 2017-01-12 NOTE — PROGRESS NOTE ADULT - PROBLEM SELECTOR PLAN 1
s/p septic shock 2/2 CAP. Septic shock resolved though Patient still w/ w/ persistent tachypnea on BiPAP/HFNC. CXR appears unchanged from previous. Not due to feeds.  - repeat blood cx: No growth at 1 day  - c/w HFNC  Resolved. BCx (1/1) positive for Gram positive cocci in clusters, coagulase negative Staph x2 sets. Repeat BCx (1/2) NGTD. Repeat BCx 1/10 no growth to date.   - c/w cefazolin for coag. negative Staph (since 12/31) for total 14 days (last day is 1/13) and appears to be responding to therapy- DAY 13  - c/w midodrine 2.5mg q8hrs

## 2017-01-12 NOTE — PROGRESS NOTE ADULT - PROBLEM SELECTOR PLAN 4
Further improved today. FeNa 1.5% therefore intrinsic renal etiology, with granular casts suggesting etiology as ATN 2/2 septic shock.   - avoid nephrotoxic agents  - monitor UOP

## 2017-01-12 NOTE — PROGRESS NOTE ADULT - SUBJECTIVE AND OBJECTIVE BOX
INTERVAL HPI/OVERNIGHT EVENTS:  Patient was seen and examined at bedside.  ALYCIA overnight. Patient mental status improved. Eyes open to name, follows commands.     VITAL SIGNS:  T(F): 98  HR: 89  BP: 93/62  RR: 17  SpO2: 95%  Wt(kg): --    PHYSICAL EXAM:    Eyes: PERRL  ENMT: MMM  Neck: supple, no JVD  Respiratory: CTAB no wheezes, rales, rhonchi appreciated   Cardiovascular: RRR, normal S1/S2, 2/6 early systolic murmur left lower sternal border   Gastrointestinal: soft, NTND, BS+  Extremities: 1-2+ dependent edema to b/l knee, improving based on previous exam  Vascular: WWP, DP pulses 1+ b/l  Neurological: AOx1, sometimes verbal, spontaneously moves extremities,   Musculoskeletal: spontaneously moves all extremities        MEDICATIONS  (STANDING):  ceFAZolin   IVPB 500milliGRAM(s) IV Intermittent every 12 hours  midodrine 2.5milliGRAM(s) Oral every 8 hours  polyethylene glycol 3350 17Gram(s) Oral daily  senna 1Tablet(s) Oral at bedtime  sodium chloride 0.45%. 1000milliLiter(s) IV Continuous <Continuous>  heparin  Infusion 900Unit(s)/Hr IV Continuous <Continuous>    MEDICATIONS  (PRN):  acetaminophen    Suspension. 650milliGRAM(s) Oral every 6 hours PRN Severe Pain (7 - 10)      Allergies    amoxicillin (Unknown)  Cipro (Unknown)  clindamycin (Unknown)  lactose (Unknown)  Wheat (Unknown)    Intolerances        LABS:                        8.6    9.0   )-----------( 315      ( 12 Jan 2017 08:17 )             27.4     12 Jan 2017 08:17    140    |  104    |  27     ----------------------------<  73     4.1     |  30     |  1.51     Ca    7.9        12 Jan 2017 08:17  Phos  3.4       12 Jan 2017 08:17  Mg     2.1       12 Jan 2017 08:17      PT/INR - ( 11 Jan 2017 16:01 )   PT: 13.9 sec;   INR: 1.25          PTT - ( 12 Jan 2017 16:56 )  PTT:62.8 sec      RADIOLOGY & ADDITIONAL TESTS:

## 2017-01-12 NOTE — PROGRESS NOTE ADULT - ASSESSMENT
89 yo F with PMH IBS, celiac disease, chronic LE edema (L > R, on Lasix once weekly), bilateral rotator cuff tear admitted for septic shock 2/2 pneumonia with acute respiratory failure, ATN, bilateral DVT on heparin gtt.

## 2017-01-12 NOTE — PROGRESS NOTE ADULT - PROBLEM SELECTOR PLAN 7
b/l pitting edema and crackles at bases, appears fluid overloaded, resolving. s/p lasix 20mg IV once w/ good UOP (incontinent)  -metolazone stopped

## 2017-01-13 NOTE — PROGRESS NOTE ADULT - PROBLEM SELECTOR PLAN 1
s/p septic shock 2/2 CAP. Septic shock resolved though Patient still w/ w/ persistent tachypnea on BiPAP/HFNC   - repeat blood cx: No growth at 3 day  - now tolerating NC  Resolved. BCx (1/1) positive for Gram positive cocci in clusters, coagulase negative Staph x2 sets. Repeat BCx (1/2) NGTD. Repeat BCx 1/10 no growth to date.   - c/w cefazolin for coag. negative Staph (since 12/31) for total 14 days (last day is 1/13) and appears to be responding to therapy- DAY 14  - c/w midodrine 2.5mg q8hrs

## 2017-01-13 NOTE — PROGRESS NOTE ADULT - ATTENDING COMMENTS
Pt awake and alert, comfortable on NCO2. Start to bridge to coumadin for DVt with decrease CrCl secondary to CKD and unable to use NOAC. Complete abx and cont po diet. Cont care on regional floor. Dr. Shearer called for f/u and recommendations noted for Occupational therapy.
Pt remains tachypnic and more comfortable on high flow NCO2. Repeat CXR improved. hold on FEES today and continue abx. Cont PT and enteral feeds. Discussed with pts son.
see my progress note cosigned in MICU
elderly lady poor with functional status declining in last year related to knee ortho issues, admitted with CAP, subsequently with worsening dyspnea as well as hypotension and obtundation, requiring iv vasopressor as well as ivf, and intubation mech vent.  1. acute respiratory failure, cont mech vent CMV. repeat ABG after intubation.  2. CAP severe, rx with vanco aztreonam azithro, given allergy to amox. check sputum ETT aspirate cx. legionella ag urine  3. CVP 6 after intubation suggesting hypovolemia. rx with IVF crystalloid. lactate nl UO adequate suggseting adequate [id number]. cont levophed for low resistance as well as hypovvolemia shock. central access obtrained. plan for peter bp monitor.  4. sedation with propofol and fentanyl for comfort analgesia  5. plan to start feed enterically in am

## 2017-01-13 NOTE — PROGRESS NOTE ADULT - ATTENDING PHYSICIAN: I WAS PHYSICALLY PRESENT FOR THE E/M SERVICE PROVIDED. I AGREE WITH ABOVE HISTORY, PHYSICAL, AND PLAN WHICH I HAVE REVIEWED AND EDITED WHERE APPROPRIATE. I WAS PHYSICALLY PRESENT FOR THE KEY PORTIONS OF THE SERVICE PROVIDED
Statement Selected

## 2017-01-13 NOTE — PROGRESS NOTE ADULT - PROBLEM SELECTOR PLAN 7
b/l pitting edema and crackles at bases, appears fluid overloaded, resolving. s/p lasix 20mg IV once w/ good UOP (incontinent)  -metolazone stopped b/l pitting edema and crackles at bases, appears fluid overloaded, resolving. s/p lasix 20mg IV once w/ good UOP (incontinent).

## 2017-01-13 NOTE — OCCUPATIONAL THERAPY INITIAL EVALUATION ADULT - GENERAL OBSERVATIONS, REHAB EVAL
Right hand dominant. Patient cleared for Occupational Therapy by YOLANDA Manuel. Patient received supine in non-acute distress, son present. +EKG, +NC O2 4L/min, +IV heplock, + bilateral Sequential Compression Devices, + right hard wrist splint. Patient denies pain, no sign of discomfort observed.

## 2017-01-13 NOTE — PROGRESS NOTE ADULT - SUBJECTIVE AND OBJECTIVE BOX
Acceptance Note- PGY-1    Hospital Course: 89 year old Female with PMH of IBS, celiac disease, chronic LE edema (on Lasix), bilateral rotator cuff tear presented to ED with complaints of cough/dyspnea. In the ED, patient met sepsis criteria (tachycardia/tachypnea/febrile) 2/2 PNA. She was subsequently admitted to [facility] for management of CAP. On the [facility], patient found to be increasingly lethargic, tachypneic, as well as hypotensive with BP 70/40. Pt received 1.5L NS without adequate response, after which patient was started on peripheral levophed and transferred to the MICU for further management. Patient was subsequently emergently intubated in the MICU for hypoxemic respiratory failure. Patient was started on Vanc/aztreonam/azithromycin (given cefepime + doxy in ER) that was eventually deescalated to Cefazolin. Hospital course was complicated by  b/l DVTs with a free floating clot in Rt femoral. Heparin gtt was started and vascular consulted, and recommended no intervention (no IVC filter). BCx on january 1st growing coag negative staph that was sensitive to cefazolin which the patient was started on. On 1/4/17, patient was extubated to HFNC and has tolerated it well. Patient at times becomes tachypneic overnight at which she improves after being placed on BIPAP. Complicated by acute worsening of mental status with concern for intracranial pathology; CT scan negative for hemorrhage. Currently remaining on heparin for R. femoral DVT and last day of cefazolin (blood cultures negative for 3 days) and being transitioned to coumadin starting tonight 1/13/16. Orthopedics saw patient for distal radius fracture; recommending OT, which she is receiving. Patient was seen and examined at bedside. overnight,removed central line without incident. Restarted heparin gtt. 2am PTT at 50.4. Increased rate to 1000 U/hr. Patient comfortable; more arousable and speaks in sentences.     When transferred to [facility] patient was seen and examined at bedside. patient resting comfortably. No complaints at this time. Patient denies fever, chills, dizziness, weakness, CP, palpitations, SOB, cough, N/V/D/C, dysuria, changes in bowel movements, LE edema.    VITAL SIGNS:  T(F): 97.4  HR: 82  BP: 121/64  RR: 16  SpO2: 99%  Wt(kg): --    PHYSICAL EXAM:    Constitutional: WDWN, NAD,   Eyes: PERRL, EOMI, sclera non-icteric  Neck: supple, no masses, no JVD  Respiratory: CTA b/l, good air entry b/l, with normal respiratory effort and no intercostal retractions, diffuse b/l inspiratory fine crackles  Cardiovascular: RRR, normal S1S2, no M/R/G  Gastrointestinal: soft, NTND, no masses palpable, BS normal in all four quadrants, no HSM  Extremities: WWM, +1 pitting edema b/l lower extremities   Neurological: AAOx1 (to self)  Skin: Normal temperature    MEDICATIONS  (STANDING):  midodrine 2.5milliGRAM(s) Oral every 8 hours  polyethylene glycol 3350 17Gram(s) Oral daily  senna 1Tablet(s) Oral at bedtime  warfarin 2milliGRAM(s) Oral once  heparin  Infusion 700Unit(s)/Hr IV Continuous <Continuous>  ceFAZolin   IVPB 500milliGRAM(s) IV Intermittent once    MEDICATIONS  (PRN):  acetaminophen    Suspension. 650milliGRAM(s) Oral every 6 hours PRN Severe Pain (7 - 10)      Allergies    amoxicillin (Unknown)  Cipro (Unknown)  clindamycin (Unknown)  lactose (Unknown)  Wheat (Unknown)    Intolerances        LABS:                        8.6    8.2   )-----------( 352      ( 13 Jan 2017 06:27 )             27.9     13 Jan 2017 06:27    141    |  104    |  25     ----------------------------<  84     3.7     |  30     |  1.39     Ca    7.6        13 Jan 2017 06:27  Phos  3.6       13 Jan 2017 06:27  Mg     1.8       13 Jan 2017 06:27      PTT - ( 13 Jan 2017 13:13 )  PTT:126.9 sec      RADIOLOGY & ADDITIONAL TESTS: Acceptance Note- PGY-1    Hospital Course: 89 year old Female with PMH of IBS, celiac disease, chronic LE edema (on Lasix), bilateral rotator cuff tear presented to ED with complaints of cough/dyspnea. In the ED, patient met sepsis criteria (tachycardia/tachypnea/febrile) 2/2 PNA. She was subsequently admitted to [facility] for management of CAP. On the [facility], patient found to be increasingly lethargic, tachypneic, as well as hypotensive with BP 70/40. Pt received 1.5L NS without adequate response, after which patient was started on peripheral levophed and transferred to the MICU for further management. Patient was subsequently emergently intubated in the MICU for hypoxemic respiratory failure. Patient was started on Vanc/aztreonam/azithromycin (given cefepime + doxy in ER) that was eventually deescalated to Cefazolin. Hospital course was complicated by  b/l DVTs with a free floating clot in Rt femoral. Heparin gtt was started and vascular consulted, and recommended no intervention (no IVC filter). BCx on january 1st growing coag negative staph that was sensitive to cefazolin which the patient was started on. On 1/4/17, patient was extubated to HFNC and has tolerated it well. Patient at times becomes tachypneic overnight at which she improves after being placed on BIPAP. Complicated by acute worsening of mental status with concern for intracranial pathology; CT scan negative for hemorrhage. Currently remaining on heparin for R. femoral DVT and last day of cefazolin (blood cultures negative for 3 days) and being transitioned to coumadin starting tonight 1/13/16. Orthopedics saw patient for distal radius fracture; recommending OT, which she is receiving. Patient was seen and examined at bedside. overnight,removed central line without incident. Restarted heparin gtt. 2am PTT at 50.4. Increased rate to 700 U/hr. Patient comfortable; more arousable and speaks in sentences.     When transferred to [facility] patient was seen and examined at bedside. patient resting comfortably. No complaints at this time. Patient denies fever, chills, dizziness, weakness, CP, palpitations, SOB, cough, N/V/D/C, dysuria, changes in bowel movements, LE edema.    VITAL SIGNS:  T(F): 97.4  HR: 82  BP: 121/64  RR: 16  SpO2: 99%  Wt(kg): --    PHYSICAL EXAM:    Constitutional: WDWN, NAD,   Eyes: PERRL, EOMI, sclera non-icteric  Neck: supple, no masses, no JVD  Respiratory: CTA b/l, good air entry b/l, with normal respiratory effort and no intercostal retractions, diffuse b/l inspiratory fine crackles  Cardiovascular: RRR, normal S1S2, no M/R/G  Gastrointestinal: soft, NTND, no masses palpable, BS normal in all four quadrants, no HSM  Extremities: WWM, +1 pitting edema b/l lower extremities   Neurological: AAOx1 (to self)  Skin: Normal temperature    MEDICATIONS  (STANDING):  midodrine 2.5milliGRAM(s) Oral every 8 hours  polyethylene glycol 3350 17Gram(s) Oral daily  senna 1Tablet(s) Oral at bedtime  warfarin 2milliGRAM(s) Oral once  heparin  Infusion 700Unit(s)/Hr IV Continuous <Continuous>  ceFAZolin   IVPB 500milliGRAM(s) IV Intermittent once    MEDICATIONS  (PRN):  acetaminophen    Suspension. 650milliGRAM(s) Oral every 6 hours PRN Severe Pain (7 - 10)      Allergies    amoxicillin (Unknown)  Cipro (Unknown)  clindamycin (Unknown)  lactose (Unknown)  Wheat (Unknown)    Intolerances        LABS:                        8.6    8.2   )-----------( 352      ( 13 Jan 2017 06:27 )             27.9     13 Jan 2017 06:27    141    |  104    |  25     ----------------------------<  84     3.7     |  30     |  1.39     Ca    7.6        13 Jan 2017 06:27  Phos  3.6       13 Jan 2017 06:27  Mg     1.8       13 Jan 2017 06:27      PTT - ( 13 Jan 2017 13:13 )  PTT:126.9 sec      RADIOLOGY & ADDITIONAL TESTS:

## 2017-01-13 NOTE — PROGRESS NOTE ADULT - ASSESSMENT
89 yo F with PMH IBS, celiac disease, chronic LE edema (L > R, on Lasix once weekly), bilateral rotator cuff tear admitted for septic shock 2/2 pneumonia with acute respiratory failure, ATN, bilateral DVT on heparin gtt. 89 yo F with PMH IBS, celiac disease, chronic LE edema (L > R, on Lasix once weekly), bilateral rotator cuff tear admitted for septic shock 2/2 pneumonia with acute respiratory failure, ATN, bilateral DVT on heparin gtt currently on last day of cefazolin and currently being transitioned to coumadin starting tonight.

## 2017-01-13 NOTE — OCCUPATIONAL THERAPY INITIAL EVALUATION ADULT - MANUAL MUSCLE TESTING RESULTS, REHAB EVAL
grossly assessed due to/+h/o bilateral shoulder rotator cuff tear; bilateral shoulder flexion 3-/5, elbow flex/ext 3+/5, left hand  4-/5, right hand  3+/5

## 2017-01-13 NOTE — PROGRESS NOTE ADULT - PROBLEM SELECTOR PLAN 3
New b/l popliteal, right femoral DVTs.  - c/w heparin gtt, goal PTT 60-80, consider bridging to coumadin upon discharge New b/l popliteal, right femoral DVTs.  - c/w heparin gtt, goal PTT 60-80, coumadin bridge to start tonight.

## 2017-01-13 NOTE — PROGRESS NOTE ADULT - PROBLEM SELECTOR PLAN 3
New b/l popliteal, right femoral DVTs.  - c/w heparin gtt, goal PTT 60-80, coumadin bridge to start tonight.  - given low crcl, unable to transition pt to noac

## 2017-01-13 NOTE — OCCUPATIONAL THERAPY INITIAL EVALUATION ADULT - PERTINENT HX OF CURRENT PROBLEM, REHAB EVAL
89 yo F with PMH IBS, celiac disease, chronic LE edema (on lasix once weekly), bilateral rotator cuff tear p/w cough and SOB that began suddenly this evening.  Patient was at [facility] ED on december 11th for similar symptoms and was given zithromax for CAP which she completed.  History is obtained for HHA at bedside as patient is agitated and not answering questions.  She states she first noticed patient having difficulty breathing and coughing at 5:00 PM this evening as well as AMS.

## 2017-01-13 NOTE — OCCUPATIONAL THERAPY INITIAL EVALUATION ADULT - RANGE OF MOTION EXAMINATION, UPPER EXTREMITY
+h/o bilateral shoulder rotator cuff tear; bilateral shoulder flexion ~45 degrees PROM 90 degrees, elbow flex/ext AROM WFL, left wrist AROM WFL (R wrist NT), bilateral digits flex/ext AROM WFL

## 2017-01-13 NOTE — PROGRESS NOTE ADULT - PROBLEM SELECTOR PLAN 1
s/p septic shock 2/2 CAP. Septic shock resolved though Patient still w/ w/ persistent tachypnea on BiPAP/HFNC. CXR appears unchanged from previous. Not due to feeds.  - repeat blood cx: No growth at 1 day  - c/w HFNC  Resolved. BCx (1/1) positive for Gram positive cocci in clusters, coagulase negative Staph x2 sets. Repeat BCx (1/2) NGTD. Repeat BCx 1/10 no growth to date.   - c/w cefazolin for coag. negative Staph (since 12/31) for total 14 days (last day is 1/13) and appears to be responding to therapy- DAY 13  - c/w midodrine 2.5mg q8hrs s/p septic shock 2/2 CAP. Septic shock resolved though Patient still w/ w/ persistent tachypnea on BiPAP/HFNC   - repeat blood cx: No growth at 3 day  - c/w HFNC  Resolved. BCx (1/1) positive for Gram positive cocci in clusters, coagulase negative Staph x2 sets. Repeat BCx (1/2) NGTD. Repeat BCx 1/10 no growth to date.   - c/w cefazolin for coag. negative Staph (since 12/31) for total 14 days (last day is 1/13) and appears to be responding to therapy- DAY 13  - c/w midodrine 2.5mg q8hrs

## 2017-01-13 NOTE — PROGRESS NOTE ADULT - PROBLEM SELECTOR PLAN 7
b/l pitting edema and crackles at bases, appears fluid overloaded, resolving. s/p lasix 20mg IV once w/ good UOP (incontinent).

## 2017-01-13 NOTE — PROGRESS NOTE ADULT - ASSESSMENT
89 yo F with PMH IBS, celiac disease, chronic LE edema (L > R, on Lasix once weekly), bilateral rotator cuff tear admitted for septic shock 2/2 pneumonia with acute respiratory failure, ATN, bilateral DVT on heparin gtt currently on last day of cefazolin and currently being transitioned to coumadin starting tonight.

## 2017-01-13 NOTE — PROGRESS NOTE ADULT - SUBJECTIVE AND OBJECTIVE BOX
INTERVAL HPI/OVERNIGHT EVENTS:  Patient was seen and examined at bedside. overnight,removed central line without incident. Restarted heparin gtt. 2am PTT at 50.4. Increased rate to 1000 U/hr. Patient comfortable; more arousable and speaks in sentences.     VITAL SIGNS:  T(F): 98.4  HR: 84  BP: 138/74  RR: 18  SpO2: 100%  Wt(kg): --    PHYSICAL EXAM:    Eyes: PERRL  ENMT: MMM  Neck: supple, no JVD  Respiratory: CTAB no wheezes, rales, rhonchi appreciated   Cardiovascular: RRR, normal S1/S2, 2/6 early systolic murmur left lower sternal border   Gastrointestinal: soft, NTND, BS+  Extremities: 1-2+ dependent edema to b/l knee, improving based on previous exam  Vascular: WWP, DP pulses 1+ b/l  Neurological: AOx1, sometimes verbal, spontaneously moves extremities,   Musculoskeletal: spontaneously moves all extremities      MEDICATIONS  (STANDING):  ceFAZolin   IVPB 500milliGRAM(s) IV Intermittent every 12 hours  midodrine 2.5milliGRAM(s) Oral every 8 hours  polyethylene glycol 3350 17Gram(s) Oral daily  senna 1Tablet(s) Oral at bedtime  sodium chloride 0.45%. 1000milliLiter(s) IV Continuous <Continuous>  heparin  Infusion 900Unit(s)/Hr IV Continuous <Continuous>    MEDICATIONS  (PRN):  acetaminophen    Suspension. 650milliGRAM(s) Oral every 6 hours PRN Severe Pain (7 - 10)      Allergies    amoxicillin (Unknown)  Cipro (Unknown)  clindamycin (Unknown)  lactose (Unknown)  Wheat (Unknown)    Intolerances        LABS:                        8.6    8.2   )-----------( 352      ( 13 Jan 2017 06:27 )             27.9     13 Jan 2017 06:27    141    |  104    |  25     ----------------------------<  84     3.7     |  30     |  1.39     Ca    7.6        13 Jan 2017 06:27  Phos  3.6       13 Jan 2017 06:27  Mg     1.8       13 Jan 2017 06:27      PT/INR - ( 11 Jan 2017 16:01 )   PT: 13.9 sec;   INR: 1.25          PTT - ( 13 Jan 2017 06:27 )  PTT:67.8 sec      RADIOLOGY & ADDITIONAL TESTS: Transfer Note- PGY-1    Hospital Course: 89 year old Female with PMH of IBS, celiac disease, chronic LE edema (on Lasix), bilateral rotator cuff tear presented to ED with complaints of cough/dyspnea. In the ED, patient met sepsis criteria (tachycardia/tachypnea/febrile) 2/2 PNA. She was subsequently admitted to [facility] for management of CAP. On the RMF, patient found to be increasingly lethargic, tachypneic, as well as hypotensive with BP 70/40. Pt received 1.5L NS without adequate response, after which patient was started on peripheral levophed and transferred to the MICU for further management. Patient was subsequently emergently intubated in the MICU for hypoxemic respiratory failure. Patient was started on Vanc/aztreonam/azithromycin (given cefepime + doxy in ER) that was eventually deescalated to Cefazolin. Hospital course was complicated by  b/l DVTs with a free floating clot in Rt femoral. Heparin gtt was started and vascular consulted, and recommended no intervention (no IVC filter). BCx on january 1st growing coag negative staph that was sensitive to cefazolin which the patient was started on. On 1/4/17, patient was extubated to HFNC and has tolerated it well. Patient at times becomes tachypneic overnight at which she improves after being placed on BIPAP. Complicated by acute worsening of mental status with concern for intracranial pathology; CT scan negative for hemorrhage. Currently remaining on heparin for R. femoral DVT and last day of cefazolin (blood cultures negative for 3 days) and being transitioned to coumadin starting tonight 1/13/16.     INTERVAL HPI/OVERNIGHT EVENTS:  Patient was seen and examined at bedside. overnight,removed central line without incident. Restarted heparin gtt. 2am PTT at 50.4. Increased rate to 1000 U/hr. Patient comfortable; more arousable and speaks in sentences.     VITAL SIGNS:  T(F): 98.4  HR: 84  BP: 138/74  RR: 18  SpO2: 100%  Wt(kg): --    PHYSICAL EXAM:    Eyes: PERRL  ENMT: MMM  Neck: supple, no JVD  Respiratory: mild crackles at bases, no wheezes, rhonchi  Cardiovascular: RRR, normal S1/S2, 2/6 early systolic murmur left lower sternal border   Gastrointestinal: soft, NTND, BS+  Extremities: 1+ to trace dependent edema to b/l knee, improving based on previous exam  Vascular: WWP, DP pulses 1+ b/l  Neurological: AOx1, sometimes verbal, spontaneously moves extremities,   Musculoskeletal: spontaneously moves all extremities      MEDICATIONS  (STANDING):  ceFAZolin   IVPB 500milliGRAM(s) IV Intermittent every 12 hours  midodrine 2.5milliGRAM(s) Oral every 8 hours  polyethylene glycol 3350 17Gram(s) Oral daily  senna 1Tablet(s) Oral at bedtime  sodium chloride 0.45%. 1000milliLiter(s) IV Continuous <Continuous>  heparin  Infusion 900Unit(s)/Hr IV Continuous <Continuous>    MEDICATIONS  (PRN):  acetaminophen    Suspension. 650milliGRAM(s) Oral every 6 hours PRN Severe Pain (7 - 10)      Allergies    amoxicillin (Unknown)  Cipro (Unknown)  clindamycin (Unknown)  lactose (Unknown)  Wheat (Unknown)    Intolerances        LABS:                        8.6    8.2   )-----------( 352      ( 13 Jan 2017 06:27 )             27.9     13 Jan 2017 06:27    141    |  104    |  25     ----------------------------<  84     3.7     |  30     |  1.39     Ca    7.6        13 Jan 2017 06:27  Phos  3.6       13 Jan 2017 06:27  Mg     1.8       13 Jan 2017 06:27      PT/INR - ( 11 Jan 2017 16:01 )   PT: 13.9 sec;   INR: 1.25          PTT - ( 13 Jan 2017 06:27 )  PTT:67.8 sec      RADIOLOGY & ADDITIONAL TESTS: Transfer Note- PGY-1    Hospital Course: 89 year old Female with PMH of IBS, celiac disease, chronic LE edema (on Lasix), bilateral rotator cuff tear presented to ED with complaints of cough/dyspnea. In the ED, patient met sepsis criteria (tachycardia/tachypnea/febrile) 2/2 PNA. She was subsequently admitted to [facility] for management of CAP. On the RMF, patient found to be increasingly lethargic, tachypneic, as well as hypotensive with BP 70/40. Pt received 1.5L NS without adequate response, after which patient was started on peripheral levophed and transferred to the MICU for further management. Patient was subsequently emergently intubated in the MICU for hypoxemic respiratory failure. Patient was started on Vanc/aztreonam/azithromycin (given cefepime + doxy in ER) that was eventually deescalated to Cefazolin. Hospital course was complicated by  b/l DVTs with a free floating clot in Rt femoral. Heparin gtt was started and vascular consulted, and recommended no intervention (no IVC filter). BCx on january 1st growing coag negative staph that was sensitive to cefazolin which the patient was started on. On 1/4/17, patient was extubated to HFNC and has tolerated it well. Patient at times becomes tachypneic overnight at which she improves after being placed on BIPAP. Complicated by acute worsening of mental status with concern for intracranial pathology; CT scan negative for hemorrhage. Currently remaining on heparin for R. femoral DVT and last day of cefazolin (blood cultures negative for 3 days) and being transitioned to coumadin starting tonight 1/13/16. Orthopedics saw patient for distal radius fracture; recommending OT, which she is receiving.     INTERVAL HPI/OVERNIGHT EVENTS:  Patient was seen and examined at bedside. overnight,removed central line without incident. Restarted heparin gtt. 2am PTT at 50.4. Increased rate to 1000 U/hr. Patient comfortable; more arousable and speaks in sentences.     VITAL SIGNS:  T(F): 98.4  HR: 84  BP: 138/74  RR: 18  SpO2: 100%  Wt(kg): --    PHYSICAL EXAM:    Eyes: PERRL  ENMT: MMM  Neck: supple, no JVD  Respiratory: mild crackles at bases, no wheezes, rhonchi  Cardiovascular: RRR, normal S1/S2, 2/6 early systolic murmur left lower sternal border   Gastrointestinal: soft, NTND, BS+  Extremities: 1+ to trace dependent edema to b/l knee, improving based on previous exam  Vascular: WWP, DP pulses 1+ b/l  Neurological: AOx1, sometimes verbal, spontaneously moves extremities,   Musculoskeletal: spontaneously moves all extremities      MEDICATIONS  (STANDING):  ceFAZolin   IVPB 500milliGRAM(s) IV Intermittent every 12 hours  midodrine 2.5milliGRAM(s) Oral every 8 hours  polyethylene glycol 3350 17Gram(s) Oral daily  senna 1Tablet(s) Oral at bedtime  sodium chloride 0.45%. 1000milliLiter(s) IV Continuous <Continuous>  heparin  Infusion 900Unit(s)/Hr IV Continuous <Continuous>    MEDICATIONS  (PRN):  acetaminophen    Suspension. 650milliGRAM(s) Oral every 6 hours PRN Severe Pain (7 - 10)      Allergies    amoxicillin (Unknown)  Cipro (Unknown)  clindamycin (Unknown)  lactose (Unknown)  Wheat (Unknown)    Intolerances        LABS:                        8.6    8.2   )-----------( 352      ( 13 Jan 2017 06:27 )             27.9     13 Jan 2017 06:27    141    |  104    |  25     ----------------------------<  84     3.7     |  30     |  1.39     Ca    7.6        13 Jan 2017 06:27  Phos  3.6       13 Jan 2017 06:27  Mg     1.8       13 Jan 2017 06:27      PT/INR - ( 11 Jan 2017 16:01 )   PT: 13.9 sec;   INR: 1.25          PTT - ( 13 Jan 2017 06:27 )  PTT:67.8 sec      RADIOLOGY & ADDITIONAL TESTS:

## 2017-01-13 NOTE — PROGRESS NOTE ADULT - PROBLEM SELECTOR PLAN 4
Further improved today. FeNa 1.5% therefore intrinsic renal etiology, with granular casts suggesting etiology as ATN 2/2 septic shock. Baseline Cre on admission normal.  - avoid nephrotoxic agents  - monitor UOP

## 2017-01-13 NOTE — OCCUPATIONAL THERAPY INITIAL EVALUATION ADULT - PLANNED THERAPY INTERVENTIONS, OT EVAL
fine motor coordination training/ROM/balance training/ADL retraining/bed mobility training/cognitive, visual perceptual/transfer training/strengthening

## 2017-01-14 NOTE — PROGRESS NOTE ADULT - PROBLEM SELECTOR PROBLEM 10
Prophylactic measure

## 2017-01-14 NOTE — PROGRESS NOTE ADULT - PROBLEM SELECTOR PLAN 3
New b/l popliteal, right femoral DVTs.  - c/w heparin gtt, goal PTT 60-80, coumadin bridge to start tonight.  - given low crcl, unable to transition pt to noac Seen by Orthopedics, recommending Occupational therapy and continue velcro-wrist splint. Improved w/ downtrending Cr. FeNa 1.5% therefore intrinsic renal etiology, with granular casts suggesting etiology as ATN 2/2 septic shock. Baseline Cr on admission normal.  - avoid nephrotoxic agents  - monitor UOP  - c/t trend BUN/Cr

## 2017-01-14 NOTE — PROGRESS NOTE ADULT - PROBLEM SELECTOR PLAN 7
b/l pitting edema and crackles at bases, appears fluid overloaded, resolving. s/p lasix 20mg IV once w/ good UOP (incontinent). DVT PPx: Pt on heparin gtt and warfarin for DVT    Dispo planning: PT recommends [facility] - f/u SW for placement    FULL CODE

## 2017-01-14 NOTE — PROGRESS NOTE ADULT - PROBLEM SELECTOR PLAN 10
Pt on heparin gtt    CODE: FULL    Dispo planning: PT recommends [facility]

## 2017-01-14 NOTE — PROGRESS NOTE ADULT - PROBLEM SELECTOR PLAN 5
Resolved. Not currently on any fluids 2/2 overload  - monitor UOP -not currently on IVF  - Replete lytes as needed (Mg~2, K~4)  -Dysphagia diet  - Bowel regimen Sacral ulcer on exam, as well as skin breakdown on right heel. Wound care following.  - Moisture barrier ointment to sites on bilat buttocks  - Cavilon barrier wipes over right heel, Z-annabella boots for offloading.

## 2017-01-14 NOTE — PROGRESS NOTE ADULT - PROBLEM SELECTOR PLAN 2
Stable. Likely 2/2 septic shock, now off levophed, current -110  - c/w midodrine 2.5mg Q8hrs Further improved today. FeNa 1.5% therefore intrinsic renal etiology, with granular casts suggesting etiology as ATN 2/2 septic shock. Baseline Cre on admission normal.  - avoid nephrotoxic agents  - monitor UOP Pt found w/ DVT of right femoral vein and b/l popliteal vein. Plan on d/c on coumadin  - c/t bridge w/ heparin gtt  - c/t dose warfarin daily  - f/u Coags daily

## 2017-01-14 NOTE — PROGRESS NOTE ADULT - ASSESSMENT
Pt is a 89 y/o F w/ a PMHx of IBS, celiac disease, chronic LE edema (L > R, on Lasix once weekly), and b/l rotator cuff tear who was admitted for septic shock 2/2 pneumonia with acute respiratory failure on 12/31 found to have ATN (improving), transferred to the [facility] yesterday now on heparin gtt for DVT bridging to coumadin, awaiting [facility] placement.

## 2017-01-14 NOTE — PROGRESS NOTE ADULT - PROBLEM SELECTOR PLAN 1
s/p septic shock 2/2 CAP. Septic shock resolved though Patient still w/ w/ persistent tachypnea on BiPAP/HFNC   - repeat blood cx: No growth at 3 day  - now tolerating NC  Resolved. BCx (1/1) positive for Gram positive cocci in clusters, coagulase negative Staph x2 sets. Repeat BCx (1/2) NGTD. Repeat BCx 1/10 no growth to date.   - c/w cefazolin for coag. negative Staph (since 12/31) for total 14 days (last day is 1/13) and appears to be responding to therapy- DAY 14  - c/w midodrine 2.5mg q8hrs Now resolved - Pt initially presented w/ sepsis 2/2 PNA in acute hypercapnic respiratory failure requiring intubation, extubated on 1/4, s/p 14 day ABX for GNR bacteremia, transferred to [facility] yesterday. HD stable.   -  Pt's mental status waxing-waning, concerns for hypercapnia - will repeat ABG to r/o CO2 retention  - Repeat blood cx - NGTD  - c/w midodrine 2.5mg q8hrs for BP  - Will continue to monitor

## 2017-01-14 NOTE — PROGRESS NOTE ADULT - PROBLEM SELECTOR PLAN 6
Seen by Orthopedics, recommending Occupational therapy and continue velcro-wrist splint. Pt on heparin gtt    CODE: FULL    Dispo planning: PT recommends [facility] -not currently on IVF  - Replete lytes as needed (Mg~2, K~4)  -Dysphagia diet  - Bowel regimen

## 2017-01-14 NOTE — PROGRESS NOTE ADULT - PROBLEM SELECTOR PLAN 4
Further improved today. FeNa 1.5% therefore intrinsic renal etiology, with granular casts suggesting etiology as ATN 2/2 septic shock. Baseline Cre on admission normal.  - avoid nephrotoxic agents  - monitor UOP Sacral ulcer on exam, as well as skin breakdown on right heel. Wound care following.  - Moisture barrier ointment to sites on bilat buttocks  - Cavilon barrier wipes over right heel, Z-annabella boots for offloading. Seen by Orthopedics, recommending Occupational therapy and continue velcro-wrist splint.

## 2017-01-14 NOTE — PROGRESS NOTE ADULT - SUBJECTIVE AND OBJECTIVE BOX
OVERNIGHT EVENTS:    VITAL SIGNS:   Vital Signs Last 24 Hrs  T(F): 98.6, Max: 98.6 (01-14 @ 05:15)  HR: 100 (81 - 100)  BP: 130/86 (106/60 - 136/76)  RR: 18 (16 - 22)  SpO2: 94% (94% - 100%)  Wt(kg): --    CAPILLARY BLOOD GLUCOSE      I&O's Summary      PHYSICAL EXAM:  Constitutional: well appearing, WDWN, NAD  HEENT: NCAT, PEERL, sclera non-icteric, no conjunctival pallor  Neck: supple, no JVD  Respiratory: CTA b/l, no wheezes, No rales, No rhonchi  Cardiovascular: RRR, normal s1/s2, no MRG  Gastrointestinal: soft, NTND, +BS, no guarding, no organomegaly  Extremities: WWP, DP/radial pulses 2+ b/l, no edema  Neurological: AAOx 3, responds to commands, moves all extremities, CN 2-12 intact  Musculoskeletal: 5/5 strength throughout    MEDICATIONS  (STANDING):  midodrine 2.5milliGRAM(s) Oral every 8 hours  polyethylene glycol 3350 17Gram(s) Oral daily  senna 1Tablet(s) Oral at bedtime  heparin  Infusion 700Unit(s)/Hr IV Continuous <Continuous>    MEDICATIONS  (PRN):  acetaminophen    Suspension. 650milliGRAM(s) Oral every 6 hours PRN Severe Pain (7 - 10)      ALLERGIES: amoxicillin (Unknown)  Cipro (Unknown)  clindamycin (Unknown)  lactose (Unknown)  Wheat (Unknown)      INTOLERANCES:   LABS:                        8.6    8.2   )-----------( 352      ( 13 Jan 2017 06:27 )             27.9     13 Jan 2017 06:27    141    |  104    |  25     ----------------------------<  84     3.7     |  30     |  1.39     Ca    7.6        13 Jan 2017 06:27  Phos  3.6       13 Jan 2017 06:27  Mg     1.8       13 Jan 2017 06:27      PTT - ( 14 Jan 2017 02:42 )  PTT:43.8 sec    RADIOLOGY & ADDITIONAL TESTS: OVERNIGHT EVENTS: ALYCIA    SUBJECTIVE: Pt laying in bed comfortably. Arousable, but lethargic.     VITAL SIGNS: Last 24 Hrs  T(F): 98.6, Max: 98.6 (01-14 @ 05:15)  HR: 100 (81 - 100)  BP: 130/86 (106/60 - 136/76)  RR: 18 (16 - 22)  SpO2: 94% (94% - 100%)    PHYSICAL EXAM:  Constitutional: WDWN, NAD,   Eyes: sclera non-icteric  Neck: supple, no masses, no JVD  Respiratory: CTA b/l, good air entry b/l, with normal respiratory effort and no intercostal retractions, diffuse b/l inspiratory fine crackles  Cardiovascular: RRR, S1, S2  Gastrointestinal: soft, NTND, no masses palpable, BS normal in all four quadrants, no HSM  Extremities: WWM, +1 pitting edema b/l lower extremities   Neurological: AAOx1 (to self)    MEDICATIONS  (STANDING):  midodrine 2.5milliGRAM(s) Oral every 8 hours  polyethylene glycol 3350 17Gram(s) Oral daily  senna 1Tablet(s) Oral at bedtime  heparin  Infusion 700Unit(s)/Hr IV Continuous <Continuous>    MEDICATIONS  (PRN):  acetaminophen    Suspension. 650milliGRAM(s) Oral every 6 hours PRN Severe Pain (7 - 10)    ALLERGIES: amoxicillin (Unknown)  Cipro (Unknown)  clindamycin (Unknown)  lactose (Unknown)  Wheat (Unknown)    LABS: ( 14 Jan 2017 07:56 )               9.7    8.9   )-----------( 475                   31.1     140    |  100    |  22     ----------------------------<  86     4.1     |  33     |  1.33     Ca    9.2     Phos  3.3      Mg     2.4      TPro  6.9    /  Alb  2.0    /  TBili  0.5    /  DBili  x      /  AST  24     /  ALT  <6     /  AlkPhos  125     PT: 14.3 sec;   INR: 1.29        PTT:44.8 sec

## 2017-01-14 NOTE — PROGRESS NOTE ADULT - PROBLEM SELECTOR PROBLEM 3
Deep vein thrombosis (DVT) of other vein of both lower extremities Distal radius fracture, right ATN (acute tubular necrosis)

## 2017-01-14 NOTE — PROGRESS NOTE ADULT - PROBLEM SELECTOR PROBLEM 2
Hypotension ATN (acute tubular necrosis) Deep vein thrombosis (DVT) of other vein of both lower extremities

## 2017-01-16 NOTE — PROGRESS NOTE ADULT - PROBLEM SELECTOR PLAN 3
Improved w/ downtrending Cr. FeNa 1.5% therefore intrinsic renal etiology, with granular casts suggesting etiology as ATN 2/2 septic shock. Baseline Cr on admission normal.  - avoid nephrotoxic agents  - monitor UOP  - c/t trend BUN/Cr

## 2017-01-16 NOTE — PROGRESS NOTE ADULT - PROBLEM SELECTOR PLAN 2
Pt found w/ DVT of right femoral vein and b/l popliteal vein. Plan on d/c on coumadin  - c/t dose warfarin daily  - f/u Coags daily

## 2017-01-16 NOTE — PROGRESS NOTE ADULT - SUBJECTIVE AND OBJECTIVE BOX
INTERVAL HPI/OVERNIGHT EVENTS:  Patient seen and examined at bedside. No o/n events, patient resting comfortably. No complaints at this time. Patient denies fever, chills, dizziness, weakness, CP, palpitations, SOB, cough, N/V/D/C, dysuria, changes in bowel movements, LE edema.    VITAL SIGNS:  T(F): 98.7  HR: 96  BP: 101/59  RR: 18  SpO2: 95%  Wt(kg): --    PHYSICAL EXAM:    Constitutional: WDWN, NAD,   Eyes: PERRL, EOMI, sclera non-icteric  Neck: supple, no masses, no JVD  Respiratory: CTA b/l, good air entry b/l, b/l diffuse fine inspiratory crackles, with normal respiratory effort and no intercostal retractions  Cardiovascular: RRR, normal S1S2, no M/R/G  Gastrointestinal: soft, NTND, no masses palpable, BS normal in all four quadrants, no HSM  Extremities: WWM, no c/c/e  Neurological: AAOx1 (to self)  Skin: Normal temperature    MEDICATIONS  (STANDING):  midodrine 2.5milliGRAM(s) Oral every 8 hours  polyethylene glycol 3350 17Gram(s) Oral daily  senna 1Tablet(s) Oral at bedtime  warfarin 1milliGRAM(s) Oral once    MEDICATIONS  (PRN):  acetaminophen    Suspension. 650milliGRAM(s) Oral every 6 hours PRN Severe Pain (7 - 10)      Allergies    amoxicillin (Unknown)  Cipro (Unknown)  clindamycin (Unknown)  lactose (Unknown)  Wheat (Unknown)    Intolerances        LABS:                        8.5    7.4   )-----------( 426      ( 16 Jan 2017 06:41 )             27.7     16 Jan 2017 06:38    140    |  98     |  21     ----------------------------<  93     4.2     |  36     |  1.36     Ca    8.4        16 Jan 2017 06:38  Mg     1.8       16 Jan 2017 06:38      PT/INR - ( 16 Jan 2017 06:38 )   PT: 22.3 sec;   INR: 2.00          PTT - ( 15 Juan 2017 07:07 )  PTT:79.7 sec      RADIOLOGY & ADDITIONAL TESTS:

## 2017-01-16 NOTE — PROGRESS NOTE ADULT - PROBLEM SELECTOR PLAN 7
DVT PPx: Pt on warfarin for DVT    Dispo planning: PT recommends [facility] - f/u SW for placement    FULL CODE

## 2017-01-16 NOTE — PROGRESS NOTE ADULT - ASSESSMENT
Pt is a 89 y/o F w/ a PMHx of IBS, celiac disease, chronic LE edema (L > R, on Lasix once weekly), and b/l rotator cuff tear who was admitted for septic shock 2/2 pneumonia with acute respiratory failure on 12/31 found to have ATN (improving), transferred to the [facility] yesterday s/p heparin gtt for currently therapeutic on coumadin awaiting [facility] placement.

## 2017-01-16 NOTE — PROGRESS NOTE ADULT - PROBLEM SELECTOR PLAN 1
Now resolved - Pt initially presented w/ sepsis 2/2 PNA in acute hypercapnic respiratory failure requiring intubation, extubated on 1/4, s/p 14 day ABX for GNR bacteremia, transferred to [facility] yesterday. HD stable.   - blood cx - NGTD  - c/w midodrine 2.5mg q8hrs for BP  - Will continue to monitor

## 2017-01-17 NOTE — CONSULT NOTE ADULT - SUBJECTIVE AND OBJECTIVE BOX
patient was unable to converse, and was only able to look up without evidence of awareness to her surroundings.  although her son noted that she was playing bridge two years ago, it appears that she has drifted into significant dementia, with perhaps a metabolic component as well,  admitted with resp failure with perhaps superimposed infection, although the chest films are plagued with poor technique due to her severe kyphoscoliosis.   She is breathing comfortably off oxygen this evening, sitting in a semi recumbant position.  her right lung field is clear, and the left lung has rales at the bases.  chest film shows an enlarged heart and basilar atelectasis.  there does not appear to be any significant secretions or ronchi.  abdomen is soft, extremeties are warm with some edema, no clubbing.  heart is easily heard with no overt murmur.    chart was reviewed completely.  past medical history was related by her son, which contributes little to her present situation.  meds, and lab values were reviewed completely

## 2017-01-17 NOTE — PROGRESS NOTE ADULT - PROBLEM SELECTOR PLAN 2
Now resolved - Pt initially presented w/ sepsis 2/2 PNA in acute hypercapnic respiratory failure requiring intubation, extubated on 1/4, s/p 14 day ABX for GNR bacteremia, transferred to [facility] yesterday. HD stable.   - blood cx - NGTD  - c/w midodrine 2.5mg q8hrs for BP  - Will continue to monitor

## 2017-01-17 NOTE — PROGRESS NOTE ADULT - SUBJECTIVE AND OBJECTIVE BOX
INTERVAL HPI/OVERNIGHT EVENTS: Pt spiked fever to 100.7 tachy to 117, had a full fever workup including CXR, UA, cbc, cmp, bcx2, given tylenol  and 250 cc ns bolus will continue to monitor     Patient seen and examined at bedside. No o/n events, patient resting comfortably. No complaints at this time. Patient denies fever, chills, dizziness, weakness, CP, palpitations, SOB, cough, N/V/D/C, dysuria, changes in bowel movements, LE edema.    VITAL SIGNS:  T(F): 98  HR: 85  BP: 96/60  RR: 20  SpO2: 99%  Wt(kg): --    PHYSICAL EXAM:    Constitutional: WDWN, NAD,   Eyes: PERRL, EOMI, sclera non-icteric  Neck: supple, no masses, no JVD  Respiratory: CTA b/l, good air entry b/l, b/l diffuse fine inspiratory crackles, with normal respiratory effort and no intercostal retractions  Cardiovascular: RRR, normal S1S2, no M/R/G  Gastrointestinal: soft, NTND, no masses palpable, BS normal in all four quadrants, no HSM  Extremities: WWM, no c/c/e  Neurological: AAOx1 (to self)  Skin: Normal temperature    MEDICATIONS  (STANDING):  midodrine 2.5milliGRAM(s) Oral every 8 hours  polyethylene glycol 3350 17Gram(s) Oral daily  senna 1Tablet(s) Oral at bedtime  warfarin 2milliGRAM(s) Oral once    MEDICATIONS  (PRN):  acetaminophen    Suspension. 650milliGRAM(s) Oral every 6 hours PRN Severe Pain (7 - 10)      Allergies    amoxicillin (Unknown)  Cipro (Unknown)  clindamycin (Unknown)  lactose (Unknown)  Wheat (Unknown)    Intolerances        LABS:                        8.2    7.3   )-----------( 390      ( 2017 10:39 )             27.1     2017 07:02    139    |  101    |  21     ----------------------------<  90     4.7     |  30     |  1.49     Ca    8.6        2017 07:02  Phos  3.4       2017 07:02  Mg     1.8       2017 07:02    TPro  6.5    /  Alb  1.8    /  TBili  0.6    /  DBili  x      /  AST  23     /  ALT  8      /  AlkPhos  108    2017 07:02    PT/INR - ( 2017 10:39 )   PT: 19.4 sec;   INR: 1.74          PTT - ( 2017 10:39 )  PTT:31.1 sec  Urinalysis Basic - ( 2017 07:30 )    Color: Yellow / Appearance: Clear / S.015 / pH: x  Gluc: x / Ketone: NEGATIVE  / Bili: NEGATIVE / Urobili: 0.2 E.U./dL   Blood: x / Protein: Trace mg/dL / Nitrite: NEGATIVE   Leuk Esterase: NEGATIVE / RBC: < 5 /HPF / WBC < 5 /HPF   Sq Epi: x / Non Sq Epi: Rare /HPF / Bacteria: Present /HPF        RADIOLOGY & ADDITIONAL TESTS:

## 2017-01-17 NOTE — CONSULT NOTE ADULT - ASSESSMENT
it would be impossible to tell if she has COPD, since she cannot perform spirometry.  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  the patients son seemed surprised that a rehab facility has no pulmonologist on staff.  Aside from [facility], which is a true rehab facility, no rehab/ skillednursing facility has a pulmonologist on staff, nor does she require one.    I do not think that nebulizers or inhaler would add anything to her care, but it's worth trying a nebulizer with albuterol at the facility of she develops any resp distress.  nasal cannula can be applied if her oxygen saturation drops below 90%, and every effort should be made to mobilize her and reduce atelectasis, which adds greatly as well to her hypoxemia.

## 2017-01-17 NOTE — PROGRESS NOTE ADULT - ASSESSMENT
Pt is a 89 y/o F w/ a PMHx of IBS, celiac disease, chronic LE edema (L > R, on Lasix once weekly), and b/l rotator cuff tear who was admitted for septic shock 2/2 pneumonia with acute respiratory failure on 12/31 found to have ATN (improving), transferred to the [facility] yesterday s/p heparin gtt for currently subtherapeutic on coumadin awaiting [facility] placement.

## 2017-01-17 NOTE — CONSULT NOTE ADULT - PROBLEM SELECTOR RECOMMENDATION 9
recommendation as stated above in the assessment section.  I don't' think that any true "rehab" is possible, and placement in a skilled nursing facility seems reasonable.  she is as stable as she is going to be at this time.
Based on patient's clinical presentation, patient met SIRS/Sepsis criteria given tachycardia/tachypnea/febrile with CAP being underlying etiology. While on the [facility], patient went into hypoxemic respiratory failure likely 2/2 underlying PNA and was emergently intubated.  -Will recommend Vancomycin/Aztreonam for broad spectrum Gram positive/Gram negative coverage, with Azithromycin for atypical coverage  -Follow up blood cultures  -Will send Urine for legionella  -Will obtain Sputum cultures  -Pt to remain in MICU for further management

## 2017-01-17 NOTE — PROGRESS NOTE ADULT - PROBLEM SELECTOR PLAN 1
on morning 1/17 pt spiked a fever of 100.7 with tachycardia to 117. Clinically asymptomatic with CXR unchanged. CBC, BMP, UA, negative pt is afebrile.   -completed 14 day abx regimen, currently off abx  -will continue to monitor and if respikes, will restart abx for suspected pulmonary source

## 2017-01-17 NOTE — PROGRESS NOTE ADULT - SUBJECTIVE AND OBJECTIVE BOX
Neurology Follow Up     Interval History: Patient seen and examined.    - She's off high flow oxygen now.  - CT Head performed over the weekend.  - As per her Aide (and her son earlier in the day, by history), she's been more tired today than yesterday and compared to last week. Still not speaking much.  - As per her Aide, she ate 3 puddings and drank some apple juice but hasn't wanted to eat the main dish.  - Spiked fever overnight.  Had full fever workup sent but not suggestive of any etiology.    MEDICATIONS  (STANDING):  midodrine 2.5milliGRAM(s) Oral every 8 hours  polyethylene glycol 3350 17Gram(s) Oral daily  senna 1Tablet(s) Oral at bedtime  warfarin 2milliGRAM(s) Oral once  heparin  Infusion. 950Unit(s)/Hr IV Continuous <Continuous>  sodium chloride 0.9%. 1000milliLiter(s) IV Continuous <Continuous>    MEDICATIONS  (PRN):  acetaminophen    Suspension. 650milliGRAM(s) Oral every 6 hours PRN Severe Pain (7 - 10)    Allergies:  amoxicillin (Unknown)  Cipro (Unknown)  clindamycin (Unknown)  lactose (Unknown)  Wheat (Unknown)    Exam:  Vital Signs Last 24 Hrs  T(C): 36.5, Max: 38.2 (01-17 @ 06:28)  T(F): 97.7, Max: 100.7 (01-17 @ 06:28)  HR: 89 (82 - 117)  BP: 96/58 (96/58 - 114/76)  RR: 18 (16 - 22)  SpO2: 97% (94% - 99%)    Gen: lying in bed, NAD  CV: RRR  Extremities: splint on right wrist, 2+ radial pulses bilaterally    Neurological examination:  Mental status: variable if her eyes are open though seemed to open them to voice, nonverbal, not following commands   Cranial nerves: EOMI, PERRL+, +blink to threat bilaterally, no obvious facial droop, tongue midline  Motor: uncooperative, inconsistent  Sensory: responds briskly to pain to extremities on both sides  Cerebellar: uncooperative  Gait: deferred    Labs:                        8.2    7.3   )-----------( 390      ( 17 Jan 2017 10:39 )             27.1   17 Jan 2017 07:02    139    |  101    |  21     ----------------------------<  90     4.7     |  30     |  1.49     Ca    8.6        17 Jan 2017 07:02  Phos  3.4       17 Jan 2017 07:02  Mg     1.8       17 Jan 2017 07:02    TPro  6.5    /  Alb  1.8    /  TBili  0.6    /  DBili  x      /  AST  23     /  ALT  8      /  AlkPhos  108    17 Jan 2017 07:02    PT/INR - ( 17 Jan 2017 10:39 )   PT: 19.4 sec;   INR: 1.74     PTT - ( 17 Jan 2017 10:39 )  PTT:31.1 sec    Radiology:   CT Head without contrast (1/11/2017):   1. There is no acute intracranial hemorrhage, mass effect or CT evidence of recent transcortical infarction.    2. Advanced small vessel ischemic changes are present in the cerebral white matter. 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 Stable prominence of the ventricular system is likely due to central greater than peripheral atrophy.     Assessment: 89 year-old female with recent decline in health over the past year including her mental status presents with pneumonia that is complicated with intubation and DVT.  She's now extubated and off high flow oxygen but still not responding at her baseline, as per her Aide.  There is no major change in her neurological exam.    - Doubt TIA/CVA given no change on repeat CT even though she has significant mental status changes.  It's more probable toxic metabolic encephalopathy given the fever overnight and the slight worsening of her creatinine.    Plan:  1) Treat underlying medication condition as per 7 lach[facility] team, i.e. fever source and kidney function.  2) Important to monitor her PT/PTT to maintain their therapeutic range for best protection.

## 2017-01-18 NOTE — CONSULT NOTE ADULT - ASSESSMENT
Pt is a 89 y/o F w/ a PMHx of IBS, celiac disease, chronic LE edema was admitted for septic shock 2/2 pneumonia with acute respiratory failure and b/l DVT  -LE duplex on 1/1/17 demonstrated acute R femoral and b/l pop DVTs  -pt has been on hep gtt and coumadin  -son and PCP inquiring about IVC filter placement due to concern for outpt coagulation given pts fall risk  -possibility of IVC filter placement tomorrow if team, pts son and PCP decide that they would like one placed and would like to stop anticoagulation and benefits of IVC filter placement/cessation of anticoag outweigh risks  -discussed with chief resident, will follow Pt is a 89 y/o F w/ a PMHx of IBS, celiac disease, chronic LE edema was admitted for septic shock 2/2 pneumonia with acute respiratory failure and b/l DVT  -LE duplex on 1/1/17 demonstrated acute R femoral and b/l pop DVTs  -pt has been on hep gtt and coumadin  -son and PCP inquiring about IVC filter placement due to concern for outpt coagulation given pts fall risk  -possibility of IVC filter placement tomorrow if team, pts son and PCP decide that they would like one placed and would like to stop anticoagulation and benefits of IVC filter placement/cessation of anticoag outweigh risks  -discussed with chief resident, will follow    addendum: pt added on for IVC filter placement tomorrow, 1/19/2017

## 2017-01-18 NOTE — PROGRESS NOTE ADULT - PROBLEM SELECTOR PLAN 1
Was being bridged on hep to coumadin with the plan to d/c pt on coumadin. This plan was met with resistance from son, who is a physician, with concerns that pt is a fall risk and would bleed if on coumadin. Subsequent vascular consult was placed to evaluate for fadia filter placement. Per IR, if son is consentable, pt can have IVC filter placed on 1/19   -If plan to place IVC filter, d/c anticoagulation efforts and npo after midnight

## 2017-01-18 NOTE — PROGRESS NOTE ADULT - ASSESSMENT
Pt is a 89 y/o F w/ a PMHx of IBS, celiac disease, chronic LE edema (L > R, on Lasix once weekly), and b/l rotator cuff tear who was admitted for septic shock 2/2 pneumonia with acute respiratory failure on 12/31 found to have ATN (improving), transferred to the [facility] yesterday s/p heparin gtt for currently subtherapeutic on coumadin awaiting [facility] placement.

## 2017-01-18 NOTE — PROGRESS NOTE ADULT - PROBLEM SELECTOR PLAN 3
Now resolved - Pt initially presented w/ sepsis 2/2 PNA in acute hypercapnic respiratory failure requiring intubation, extubated on 1/4, s/p 14 day ABX for GNR bacteremia, transferred to [facility] yesterday. HD stable.   - blood cx - NGTD  - c/w midodrine 2.5mg q8hrs for BP  - Will continue to monitor.

## 2017-01-18 NOTE — PROGRESS NOTE ADULT - SUBJECTIVE AND OBJECTIVE BOX
PRE OPERATIVE NOTE    Pre-op Diagnosis: b/l LE DVT  Procedure: IV Filter  Surgeon: Janet    Consent in chart                          8.2    8.0   )-----------( 376      ( 2017 06:59 )             26.9     2017 07:02    139    |  101    |  21     ----------------------------<  90     4.7     |  30     |  1.49     Ca    8.6        2017 07:02  Phos  3.4       2017 07:02  Mg     1.8       2017 07:02    TPro  6.5    /  Alb  1.8    /  TBili  0.6    /  DBili  x      /  AST  23     /  ALT  8      /  AlkPhos  108    2017 07:02    PT/INR - ( 2017 06:59 )   PT: 20.5 sec;   INR: 1.84          PTT - ( 2017 18:35 )  PTT:75.4 sec  Urinalysis Basic - ( 2017 07:30 )    Color: Yellow / Appearance: Clear / S.015 / pH: x  Gluc: x / Ketone: NEGATIVE  / Bili: NEGATIVE / Urobili: 0.2 E.U./dL   Blood: x / Protein: Trace mg/dL / Nitrite: NEGATIVE   Leuk Esterase: NEGATIVE / RBC: < 5 /HPF / WBC < 5 /HPF   Sq Epi: x / Non Sq Epi: Rare /HPF / Bacteria: Present /HPF        Type & Screen: A+  CXR: Worsening bibasilar opacities left greater than right, compatible with   atelectasis and/or pneumonia.  EKG: Normal sinus rhythm        A/P: 89yFemale planned for above procedure  1. NPO past midnight, except medications  2. IVFsodium chloride 0.9%. 1000milliLiter(s) IV Continuous <Continuous>

## 2017-01-18 NOTE — PROGRESS NOTE ADULT - SUBJECTIVE AND OBJECTIVE BOX
INTERVAL HPI/OVERNIGHT EVENTS:  Patient seen and examined at bedside. No o/n events, patient resting comfortably. No complaints at this time. Pt more conversive today. Patient denies fever, chills, dizziness, weakness, CP, palpitations, SOB, cough, N/V/D/C, dysuria, changes in bowel movements, LE edema.    VITAL SIGNS:  T(F): 98.1  HR: 97  BP: 94/56  RR: 18  SpO2: 94%  Wt(kg): --    PHYSICAL EXAM:    Constitutional: WDWN, NAD,   Eyes: PERRL, EOMI, sclera non-icteric  Neck: supple, no masses, no JVD  Respiratory: CTA b/l, good air entry b/l, b/l diffuse fine inspiratory crackles, with normal respiratory effort and no intercostal retractions  Cardiovascular: RRR, normal S1S2, no M/R/G  Gastrointestinal: soft, NTND, no masses palpable, BS normal in all four quadrants, no HSM  Extremities: WWM, no c/c/e  Neurological: AAOx1 (to self)  Skin: Normal temperature    MEDICATIONS  (STANDING):  midodrine 2.5milliGRAM(s) Oral every 8 hours  polyethylene glycol 3350 17Gram(s) Oral daily  senna 1Tablet(s) Oral at bedtime  sodium chloride 0.9%. 1000milliLiter(s) IV Continuous <Continuous>  heparin  Infusion. 850Unit(s)/Hr IV Continuous <Continuous>  warfarin 3milliGRAM(s) Oral once    MEDICATIONS  (PRN):  acetaminophen    Suspension. 650milliGRAM(s) Oral every 6 hours PRN Severe Pain (7 - 10)      Allergies    amoxicillin (Unknown)  Cipro (Unknown)  clindamycin (Unknown)  lactose (Unknown)  Wheat (Unknown)    Intolerances        LABS:                        8.2    8.0   )-----------( 376      ( 2017 06:59 )             26.9     2017 07:02    139    |  101    |  21     ----------------------------<  90     4.7     |  30     |  1.49     Ca    8.6        2017 07:02  Phos  3.4       2017 07:02  Mg     1.8       2017 07:02    TPro  6.5    /  Alb  1.8    /  TBili  0.6    /  DBili  x      /  AST  23     /  ALT  8      /  AlkPhos  108    2017 07:02    PT/INR - ( 2017 06:59 )   PT: 20.5 sec;   INR: 1.84          PTT - ( 2017 12:41 )  PTT:79.1 sec  Urinalysis Basic - ( 2017 07:30 )    Color: Yellow / Appearance: Clear / S.015 / pH: x  Gluc: x / Ketone: NEGATIVE  / Bili: NEGATIVE / Urobili: 0.2 E.U./dL   Blood: x / Protein: Trace mg/dL / Nitrite: NEGATIVE   Leuk Esterase: NEGATIVE / RBC: < 5 /HPF / WBC < 5 /HPF   Sq Epi: x / Non Sq Epi: Rare /HPF / Bacteria: Present /HPF        RADIOLOGY & ADDITIONAL TESTS:

## 2017-01-18 NOTE — CONSULT NOTE ADULT - CONSULT REASON
patient with chronic respiratory insufficiency, admitted for worsening dyspnea. patient son wanted a pulmonary specialist to see patient. he especially stressed that he wanted to know if she had copd, what her oxygen level should be, and how she should be managed
DVT
Respiratory distress
Right wrist fx
altered mental status

## 2017-01-19 NOTE — PATIENT PROFILE ADULT. - NS PRO TALK SOMEONE YN
"Called and spoke to Nicolasa. For the last 3-4 weeks they have noticed that Narinder foreskin is getting tighter, harder to retract, hasn't been able to clean underneath. Mom thought that when he did retract it she noticed a \"red ring\" around the head of the penis but no swelling, bright red, hot to the touch, foul smell, pain or painful urination. It seems to be getting tighter to the point now that Narinder's dad says he probably hasn't been able to retract it at all for the last week. They would like to bring him in to see Arnold Aguiar only, either before 8am or after 3:30pm. Huddled with PCP who cannot see him today but thinks OK to wait until Tuesday 1/24. Appt scheduled.     Delaney Triana RN   January 19, 2017 10:30 AM  [facility] Triage   [phone]   " unable to assess

## 2017-01-19 NOTE — PROGRESS NOTE ADULT - SUBJECTIVE AND OBJECTIVE BOX
INTERVAL HPI/OVERNIGHT EVENTS: ANTHONY  Patient seen and examined at bedside. No o/n events, patient resting comfortably. No complaints at this time. Patient denies fever, chills, dizziness, weakness, CP, palpitations, SOB, cough, N/V/D/C, dysuria, changes in bowel movements, LE edema.    VITAL SIGNS:  T(F): 97.6  HR: 80  BP: 97/64  RR: 17  SpO2: 99%  Wt(kg): --    PHYSICAL EXAM:    Constitutional: WDWN, NAD,   Eyes: PERRL, EOMI, sclera non-icteric  Neck: supple, no masses, no JVD  Respiratory: CTA b/l, good air entry b/l, b/l diffuse fine inspiratory crackles, with normal respiratory effort and no intercostal retractions  Cardiovascular: RRR, normal S1S2, no M/R/G  Gastrointestinal: soft, NTND, no masses palpable, BS normal in all four quadrants, no HSM  Extremities: WWM, no c/c/e  Neurological: AAOx1 (to self)  Skin: Normal temperature    MEDICATIONS  (STANDING):  midodrine 2.5milliGRAM(s) Oral every 8 hours  polyethylene glycol 3350 17Gram(s) Oral daily  senna 1Tablet(s) Oral at bedtime  sodium chloride 0.9%. 1000milliLiter(s) IV Continuous <Continuous>  heparin  Infusion. 850Unit(s)/Hr IV Continuous <Continuous>    MEDICATIONS  (PRN):  acetaminophen    Suspension. 650milliGRAM(s) Oral every 6 hours PRN Severe Pain (7 - 10)      Allergies    amoxicillin (Unknown)  Cipro (Unknown)  clindamycin (Unknown)  lactose (Unknown)  Wheat (Unknown)    Intolerances        LABS:                        8.2    8.0   )-----------( 376      ( 18 Jan 2017 06:59 )             26.9           PT/INR - ( 19 Jan 2017 06:24 )   PT: 20.8 sec;   INR: 1.86          PTT - ( 19 Jan 2017 06:24 )  PTT:95.1 sec      RADIOLOGY & ADDITIONAL TESTS:

## 2017-01-19 NOTE — PROGRESS NOTE ADULT - SUBJECTIVE AND OBJECTIVE BOX
Procedure: IVC filter placement  Surgeon: Janet    S: Pt has no complaints. Denies CP, SOB, DICK, calf tenderness.   O:  T(C): 36.3, Max: 36.3 (01-19 @ 15:59)  T(F): 97.3, Max: 97.3 (01-19 @ 15:59)  HR: 85 (81 - 85)  BP: 112/71 (111/66 - 112/71)  RR: 16 (16 - 16)  SpO2: 100% (100% - 100%)  Wt(kg): --                        8.2    8.0   )-----------( 376      ( 18 Jan 2017 06:59 )             26.9             Gen: NAD, resting comfortably in bed  C/V: NSR  Pulm: Nonlabored breathing, no respiratory distress  Abd: soft, NT/ND   Extrem: WWP, no calf edema, no groin hematoma, dressings CDI      A/P: 89yFemale s/p IVC filter placement  Diet: regular  Pain/nausea control  DVT ppx  Care per primary team

## 2017-01-19 NOTE — PROGRESS NOTE ADULT - PROBLEM SELECTOR PLAN 8
DVT PPx: Pt on hep, will have IVC placement 1/19    Dispo planning: PT recommends [facility] - f/u SW for placement    FULL CODE

## 2017-01-19 NOTE — PROGRESS NOTE ADULT - ASSESSMENT
Pt is a 89 y/o F w/ a PMHx of IBS, celiac disease, chronic LE edema (L > R, on Lasix once weekly), and b/l rotator cuff tear who was admitted for septic shock 2/2 pneumonia with acute respiratory failure on 12/31 found to have ATN (improving), transferred to the [facility] yesterday s/p heparin gtt for currently subtherapeutic on coumadin awaiting [facility] placement.

## 2017-01-19 NOTE — PROGRESS NOTE ADULT - PROBLEM SELECTOR PLAN 3
Now resolved - Pt initially presented w/ sepsis 2/2 PNA in acute hypercapnic respiratory failure requiring intubation, extubated on 1/4, s/p 14 day ABX for GNR bacteremia, transferred to [facility] yesterday. HD stable.   - blood cx - NGTD  - c/w midodrine 2.5mg q8hrs for BP  - Will continue to monitor.

## 2017-01-20 ENCOUNTER — TRANSCRIPTION ENCOUNTER (OUTPATIENT)
Age: 82
End: 2017-01-20

## 2017-01-20 NOTE — DISCHARGE NOTE ADULT - HOSPITAL COURSE
Hospital Course: 89 year old Female with PMH of IBS, celiac disease, chronic LE edema (on Lasix), bilateral rotator cuff tear presented to ED with complaints of cough/dyspnea. In the ED, patient met sepsis criteria (tachycardia/tachypnea/febrile) 2/2 PNA. She was subsequently admitted to [facility] for management of CAP. On the [facility], patient found to be increasingly lethargic, tachypneic, as well as hypotensive with BP 70/40. Pt received 1.5L NS without adequate response, after which patient was started on peripheral levophed and transferred to the MICU for further management. Patient was subsequently emergently intubated in the MICU for hypoxemic respiratory failure. Patient was started on Vanc/aztreonam/azithromycin (given cefepime + doxy in ER) that was eventually deescalated to Cefazolin. Hospital course was complicated by  b/l DVTs with a free floating clot in Rt femoral. Heparin gtt was started and vascular consulted, and recommended no intervention (no IVC filter). BCx on january 1st growing coag negative staph that was sensitive to cefazolin which the patient was started on. On 1/4/17, patient was extubated to HFNC and has tolerated it well. Patient at times becomes tachypneic overnight at which she improves after being placed on BIPAP. Complicated by acute worsening of mental status with concern for intracranial pathology; CT scan negative for hemorrhage. Currently remaining on heparin for R. femoral DVT and last day of cefazolin (blood cultures negative for 3 days) and being transitioned to coumadin starting tonight 1/13/16. Orthopedics saw patient for distal radius fracture; recommending OT, which she is receiving. Patient was seen and examined at bedside. overnight,removed central line without incident. Restarted heparin gtt. 2am PTT at 50.4. Increased rate to 700 U/hr. Patient comfortable; more arousable and speaks in sentences.     When transferred to [facility] patient was seen and examined at bedside. patient resting comfortably. No complaints at this time. Patient denies fever, chills, dizziness, weakness, CP, palpitations, SOB, cough, N/V/D/C, dysuria, changes in bowel movements, LE edema. Hospital Course: 89 year old Female with PMH of IBS, celiac disease, chronic LE edema (on Lasix), bilateral rotator cuff tear presented to ED with complaints of cough/dyspnea. In the ED, patient met sepsis criteria (tachycardia/tachypnea/febrile) 2/2 PNA. She was subsequently admitted to [facility] for management of CAP. On the [facility], patient found to be increasingly lethargic, tachypneic, as well as hypotensive with BP 70/40. Pt received 1.5L NS without adequate response, after which patient was started on peripheral levophed and transferred to the MICU for further management. Patient was subsequently emergently intubated in the MICU for hypoxemic respiratory failure. Patient was started on Vanc/aztreonam/azithromycin (given cefepime + doxy in ER) that was eventually deescalated to Cefazolin. Hospital course was complicated by  b/l DVTs with a free floating clot in Rt femoral. Heparin gtt was started and vascular consulted, and recommended no intervention (no IVC filter). BCx on january 1st growing coag negative staph that was sensitive to cefazolin which the patient was started on. On 1/4/17, patient was extubated to HFNC and has tolerated it well. Patient at times becomes tachypneic overnight at which she improves after being placed on BIPAP. Complicated by acute worsening of mental status with concern for intracranial pathology; CT scan negative for hemorrhage. Currently remaining on heparin for R. femoral DVT and last day of cefazolin (blood cultures negative for 3 days) and being transitioned to coumadin starting tonight 1/13/16. Orthopedics saw patient for distal radius fracture; recommending OT, which she is receiving. Patient was seen and examined at bedside. overnight,removed central line without incident. Restarted heparin gtt. 2am PTT at 50.4. Increased rate to 700 U/hr. Patient comfortable; more arousable and speaks in sentences.     When transferred to [facility] patient was seen and examined at bedside. patient resting comfortably. No complaints at this time. Patient denies fever, chills, dizziness, weakness, CP, palpitations, SOB, cough, N/V/D/C, dysuria, changes in bowel movements, LE edema.    While on the [facility], it was decided by Dr. Villarreal in collaboration with pts son who is also a physician that due to pts fall risk and subsequent bleeding risk on anticoagulation, that pt should have fadia filter placed to manage pts DVTs. Pt had placement of IVC filter on 1/19 without complication. Pt d/c in stable condition to rehab. Hospital Course: 89 year old Female with PMH of IBS, celiac disease, chronic LE edema (on Lasix), bilateral rotator cuff tear presented to ED with complaints of cough/dyspnea. In the ED, patient met sepsis criteria (tachycardia/tachypnea/febrile) 2/2 PNA. She was subsequently admitted to [facility] for management of CAP. On the [facility], patient found to be increasingly lethargic, tachypneic, as well as hypotensive with BP 70/40. Pt received 1.5L NS without adequate response, after which patient was started on peripheral levophed and transferred to the MICU for further management. Patient was subsequently emergently intubated in the MICU for hypoxemic respiratory failure. Patient was started on Vanc/aztreonam/azithromycin (given cefepime + doxy in ER) that was eventually deescalated to Cefazolin. Hospital course was complicated by  b/l DVTs with a free floating clot in Rt femoral. Heparin gtt was started and vascular consulted, and recommended no intervention (no IVC filter). BCx on january 1st growing coag negative staph that was sensitive to cefazolin which the patient was started on. On 1/4/17, patient was extubated to HFNC and has tolerated it well. Patient at times becomes tachypneic overnight at which she improves after being placed on BIPAP. Complicated by acute worsening of mental status with concern for intracranial pathology; CT scan negative for hemorrhage. Currently remaining on heparin for R. femoral DVT and last day of cefazolin (blood cultures negative for 3 days) and being transitioned to coumadin starting tonight 1/13/16. Orthopedics saw patient for distal radius fracture; recommending OT, which she is receiving. Patient was seen and examined at bedside. overnight,removed central line without incident. Restarted heparin gtt. 2am PTT at 50.4. Increased rate to 700 U/hr. Patient comfortable; more arousable and speaks in sentences.     When transferred to [facility] patient was seen and examined at bedside. patient resting comfortably. No complaints at this time. Patient denies fever, chills, dizziness, weakness, CP, palpitations, SOB, cough, N/V/D/C, dysuria, changes in bowel movements, LE edema. pt will require 2L oxygen at home    While on the [facility], it was decided by Dr. Villarreal in collaboration with pts son who is also a physician that due to pts fall risk and subsequent bleeding risk on anticoagulation, that pt should have fadia filter placed to manage pts DVTs. Pt had placement of IVC filter on 1/19 without complication. Pt d/c in stable condition to rehab.

## 2017-01-20 NOTE — DISCHARGE NOTE ADULT - CARE PROVIDER_API CALL
Nate Villarreal), Internal Medicine  [address] [place] [id number]  Phone: [phone]  Fax: [phone]

## 2017-01-20 NOTE — PROGRESS NOTE ADULT - PROVIDER SPECIALTY LIST ADULT
Internal Medicine
MICU
Neurology
Neurology
Vascular Surgery
Internal Medicine
Internal Medicine

## 2017-01-20 NOTE — DISCHARGE NOTE ADULT - PATIENT PORTAL LINK FT
“You can access the [facility] Patient Portal, offered by [facility], by registering with the following website: [email or web address]”

## 2017-01-20 NOTE — DISCHARGE NOTE ADULT - ADDITIONAL INSTRUCTIONS
please complete rehab and f/u with Dr. Villarreal when d/c from rehab please complete rehab and f/u with Dr. Villarreal when d/c from rehab. pt will require 2L oxygen at home

## 2017-01-20 NOTE — DISCHARGE NOTE ADULT - CARE PLAN
Principal Discharge DX:	Septic shock  Goal:	Pt initially presented w/ sepsis 2/2 PNA in acute hypercapnic respiratory failure requiring intubation, extubated on 1/4, s/p 14 day ABX for GNR bacteremia, transferred to [facility] yesterday. HD stable.  Instructions for follow-up, activity and diet:	please complete rehab and f/u with Dr. Villarreal when d/c from rehab  Secondary Diagnosis:	Pneumonia  Goal:	you had pneumonia and finished treatment with antibiotics  Instructions for follow-up, activity and diet:	please complete rehab and f/u with Dr. Villarreal when d/c from rehab  Secondary Diagnosis:	Hypernatremia  Goal:	resolved.  Instructions for follow-up, activity and diet:	please complete rehab and f/u with Dr. Villarreal when d/c from rehab  Secondary Diagnosis:	[place] filter in place  Goal:	you were found to have DVTs, and because of your fall risk and subsequent bleed you had a fadia filter placed by vascular surgery  Instructions for follow-up, activity and diet:	please complete rehab and f/u with Dr. Villarreal when d/c from rehab Principal Discharge DX:	Septic shock  Goal:	Pt initially presented w/ sepsis 2/2 PNA in acute hypercapnic respiratory failure requiring intubation, extubated on 1/4, s/p 14 day ABX for GNR bacteremia, transferred to [facility] yesterday. HD stable.  Instructions for follow-up, activity and diet:	please complete rehab and f/u with Dr. Villarreal when d/c from rehab  Secondary Diagnosis:	Pneumonia  Goal:	you had pneumonia and finished treatment with antibiotics  Instructions for follow-up, activity and diet:	please complete rehab and f/u with Dr. Villarreal when d/c from rehab  Secondary Diagnosis:	Hypernatremia  Goal:	resolved.  Instructions for follow-up, activity and diet:	please complete rehab and f/u with Dr. Villarreal when d/c from rehab  Secondary Diagnosis:	[place] filter in place  Goal:	you were found to have DVTs, and because of your fall risk and subsequent bleed you had a fadia filter placed by vascular surgery  Instructions for follow-up, activity and diet:	please complete rehab and f/u with Dr. Villarreal when d/c from rehab Principal Discharge DX:	Septic shock  Goal:	Pt initially presented w/ sepsis 2/2 PNA in acute hypercapnic respiratory failure requiring intubation, extubated on 1/4, s/p 14 day ABX for GNR bacteremia, transferred to [facility] yesterday. HD stable.  Instructions for follow-up, activity and diet:	please complete rehab and f/u with Dr. Villarreal when d/c from rehab  Secondary Diagnosis:	Pneumonia  Goal:	you had pneumonia and finished treatment with antibiotics  Instructions for follow-up, activity and diet:	please complete rehab and f/u with Dr. Villarreal when d/c from rehab  Secondary Diagnosis:	Hypernatremia  Goal:	resolved.  Instructions for follow-up, activity and diet:	please complete rehab and f/u with Dr. Villarreal when d/c from rehab  Secondary Diagnosis:	[place] filter in place  Goal:	you were found to have DVTs, and because of your fall risk and subsequent bleed you had a fadia filter placed by vascular surgery  Instructions for follow-up, activity and diet:	please complete rehab and f/u with Dr. Villarreal when d/c from rehab Principal Discharge DX:	Septic shock  Goal:	Pt initially presented w/ sepsis 2/2 PNA in acute hypercapnic respiratory failure requiring intubation, extubated on 1/4, s/p 14 day ABX for GNR bacteremia, transferred to [facility] yesterday. HD stable.  Instructions for follow-up, activity and diet:	please complete rehab and f/u with Dr. Villarreal when d/c from rehab  Secondary Diagnosis:	Pneumonia  Goal:	you had pneumonia and finished treatment with antibiotics  Instructions for follow-up, activity and diet:	please complete rehab and f/u with Dr. Villarreal when d/c from rehab  Secondary Diagnosis:	Hypernatremia  Goal:	resolved.  Instructions for follow-up, activity and diet:	please complete rehab and f/u with Dr. Villarreal when d/c from rehab  Secondary Diagnosis:	[place] filter in place  Goal:	you were found to have DVTs, and because of your fall risk and subsequent bleed you had a fadia filter placed by vascular surgery  Instructions for follow-up, activity and diet:	please complete rehab and f/u with Dr. Villarreal when d/c from rehab Principal Discharge DX:	Septic shock  Goal:	Pt initially presented w/ sepsis 2/2 PNA in acute hypercapnic respiratory failure requiring intubation, extubated on 1/4, s/p 14 day ABX for GNR bacteremia, transferred to [facility] yesterday. HD stable.  Instructions for follow-up, activity and diet:	please complete rehab and f/u with Dr. Villarreal when d/c from rehab  Secondary Diagnosis:	Pneumonia  Goal:	you had pneumonia and finished treatment with antibiotics  Instructions for follow-up, activity and diet:	please complete rehab and f/u with Dr. Villarreal when d/c from rehab  Secondary Diagnosis:	Hypernatremia  Goal:	resolved.  Instructions for follow-up, activity and diet:	please complete rehab and f/u with Dr. Villarreal when d/c from rehab  Secondary Diagnosis:	[place] filter in place  Goal:	you were found to have DVTs, and because of your fall risk and subsequent bleed you had a fadia filter placed by vascular surgery  Instructions for follow-up, activity and diet:	please complete rehab and f/u with Dr. Villarreal when d/c from rehab Principal Discharge DX:	Septic shock  Goal:	Pt initially presented w/ sepsis 2/2 PNA in acute hypercapnic respiratory failure requiring intubation, extubated on 1/4, s/p 14 day ABX for GNR bacteremia, transferred to [facility] yesterday. HD stable.  Instructions for follow-up, activity and diet:	please complete rehab and f/u with Dr. Villarreal when d/c from rehab  Secondary Diagnosis:	Pneumonia  Goal:	you had pneumonia and finished treatment with antibiotics  Instructions for follow-up, activity and diet:	please complete rehab and f/u with Dr. Villarreal when d/c from rehab  Secondary Diagnosis:	Hypernatremia  Goal:	resolved.  Instructions for follow-up, activity and diet:	please complete rehab and f/u with Dr. Villarreal when d/c from rehab  Secondary Diagnosis:	[place] filter in place  Goal:	you were found to have DVTs, and because of your fall risk and subsequent bleed you had a fadia filter placed by vascular surgery  Instructions for follow-up, activity and diet:	please complete rehab and f/u with Dr. Villarreal when d/c from rehab

## 2017-01-20 NOTE — DISCHARGE NOTE ADULT - MEDICATION SUMMARY - MEDICATIONS TO STOP TAKING
I will STOP taking the medications listed below when I get home from the hospital:    Lasix 20 mg oral tablet  --  by mouth once a week    Zithromax 500 mg oral tablet  -- 250 milligram(s) by mouth once a day  -- Do not take dairy products, antacids, or iron preparations within one hour of this medication.  Finish all this medication unless otherwise directed by prescriber.    azithromycin 500 mg oral tablet  -- 1 tab(s) by mouth once a day  -- Do not take dairy products, antacids, or iron preparations within one hour of this medication.  Finish all this medication unless otherwise directed by prescriber.

## 2017-01-20 NOTE — DISCHARGE NOTE ADULT - PLAN OF CARE
Pt initially presented w/ sepsis 2/2 PNA in acute hypercapnic respiratory failure requiring intubation, extubated on 1/4, s/p 14 day ABX for GNR bacteremia, transferred to [facility] yesterday. HD stable. please complete rehab and f/u with Dr. Villarreal when d/c from rehab you had pneumonia and finished treatment with antibiotics resolved. you were found to have DVTs, and because of your fall risk and subsequent bleed you had a fadia filter placed by vascular surgery

## 2017-01-20 NOTE — DISCHARGE NOTE ADULT - MEDICATION SUMMARY - MEDICATIONS TO TAKE
I will START or STAY ON the medications listed below when I get home from the hospital:    acetaminophen 325 mg oral tablet  -- 2 tab(s) by mouth every 6 hours, As needed, Moderate Pain (4 - 6)  -- Indication: For Pain    acetaminophen-oxyCODONE 325 mg-5 mg oral tablet  -- 1 tab(s) by mouth every 4 hours, As needed, Severe Pain (7 - 10)  -- Indication: For Pain     heparin  -- 5000 unit(s) subcutaneous every 12 hours  -- Indication: For Anticoagulant

## 2017-04-12 RX ORDER — DIPHENHYDRAMINE HCL 50 MG
50 CAPSULE ORAL ONCE
Qty: 0 | Refills: 0 | Status: DISCONTINUED | OUTPATIENT
Start: 2017-04-13 | End: 2017-04-28

## 2017-04-12 RX ORDER — SODIUM FERRIC GLUCONAT/SUCROSE 62.5MG/5ML
25 AMPUL (ML) INTRAVENOUS ONCE
Qty: 0 | Refills: 0 | Status: DISCONTINUED | OUTPATIENT
Start: 2017-04-13 | End: 2017-04-28

## 2017-04-12 RX ORDER — SODIUM FERRIC GLUCONAT/SUCROSE 62.5MG/5ML
100 AMPUL (ML) INTRAVENOUS ONCE
Qty: 0 | Refills: 0 | Status: DISCONTINUED | OUTPATIENT
Start: 2017-04-13 | End: 2017-04-28

## 2017-04-12 RX ORDER — ACETAMINOPHEN 500 MG
650 TABLET ORAL ONCE
Qty: 0 | Refills: 0 | Status: DISCONTINUED | OUTPATIENT
Start: 2017-04-13 | End: 2017-04-28

## 2017-04-13 ENCOUNTER — OUTPATIENT (OUTPATIENT)
Dept: OUTPATIENT SERVICES | Facility: HOSPITAL | Age: 82
LOS: 1 days | End: 2017-04-13
Payer: COMMERCIAL

## 2017-04-13 DIAGNOSIS — D50.9 IRON DEFICIENCY ANEMIA, UNSPECIFIED: ICD-10-CM

## 2017-04-13 DIAGNOSIS — Z96.642 PRESENCE OF LEFT ARTIFICIAL HIP JOINT: Chronic | ICD-10-CM

## 2017-04-13 DIAGNOSIS — K90.9 INTESTINAL MALABSORPTION, UNSPECIFIED: ICD-10-CM

## 2017-04-13 DIAGNOSIS — Z98.89 OTHER SPECIFIED POSTPROCEDURAL STATES: Chronic | ICD-10-CM

## 2017-04-13 PROCEDURE — 96365 THER/PROPH/DIAG IV INF INIT: CPT

## 2017-04-20 ENCOUNTER — OUTPATIENT (OUTPATIENT)
Dept: OUTPATIENT SERVICES | Facility: HOSPITAL | Age: 82
LOS: 1 days | End: 2017-04-20
Payer: COMMERCIAL

## 2017-04-20 DIAGNOSIS — Z96.642 PRESENCE OF LEFT ARTIFICIAL HIP JOINT: Chronic | ICD-10-CM

## 2017-04-20 DIAGNOSIS — K90.9 INTESTINAL MALABSORPTION, UNSPECIFIED: ICD-10-CM

## 2017-04-20 DIAGNOSIS — D50.9 IRON DEFICIENCY ANEMIA, UNSPECIFIED: ICD-10-CM

## 2017-04-20 DIAGNOSIS — Z98.89 OTHER SPECIFIED POSTPROCEDURAL STATES: Chronic | ICD-10-CM

## 2017-04-20 PROCEDURE — 96365 THER/PROPH/DIAG IV INF INIT: CPT

## 2017-04-20 RX ORDER — ACETAMINOPHEN 500 MG
650 TABLET ORAL ONCE
Qty: 0 | Refills: 0 | Status: DISCONTINUED | OUTPATIENT
Start: 2017-04-20 | End: 2017-05-05

## 2017-04-20 RX ORDER — DIPHENHYDRAMINE HCL 50 MG
50 CAPSULE ORAL ONCE
Qty: 0 | Refills: 0 | Status: DISCONTINUED | OUTPATIENT
Start: 2017-04-20 | End: 2017-05-05

## 2017-04-20 RX ORDER — SODIUM FERRIC GLUCONAT/SUCROSE 62.5MG/5ML
125 AMPUL (ML) INTRAVENOUS ONCE
Qty: 0 | Refills: 0 | Status: DISCONTINUED | OUTPATIENT
Start: 2017-04-20 | End: 2017-05-05

## 2017-04-27 ENCOUNTER — OUTPATIENT (OUTPATIENT)
Dept: OUTPATIENT SERVICES | Facility: HOSPITAL | Age: 82
LOS: 1 days | End: 2017-04-27
Payer: COMMERCIAL

## 2017-04-27 DIAGNOSIS — Z98.89 OTHER SPECIFIED POSTPROCEDURAL STATES: Chronic | ICD-10-CM

## 2017-04-27 DIAGNOSIS — K90.9 INTESTINAL MALABSORPTION, UNSPECIFIED: ICD-10-CM

## 2017-04-27 DIAGNOSIS — Z96.642 PRESENCE OF LEFT ARTIFICIAL HIP JOINT: Chronic | ICD-10-CM

## 2017-04-27 DIAGNOSIS — D50.9 IRON DEFICIENCY ANEMIA, UNSPECIFIED: ICD-10-CM

## 2017-04-27 PROCEDURE — 96365 THER/PROPH/DIAG IV INF INIT: CPT

## 2017-04-27 RX ORDER — SODIUM FERRIC GLUCONAT/SUCROSE 62.5MG/5ML
125 AMPUL (ML) INTRAVENOUS ONCE
Qty: 0 | Refills: 0 | Status: DISCONTINUED | OUTPATIENT
Start: 2017-04-27 | End: 2017-05-12

## 2017-05-03 RX ORDER — SODIUM FERRIC GLUCONAT/SUCROSE 62.5MG/5ML
125 AMPUL (ML) INTRAVENOUS ONCE
Qty: 0 | Refills: 0 | Status: DISCONTINUED | OUTPATIENT
Start: 2017-05-04 | End: 2017-05-19

## 2017-05-04 ENCOUNTER — OUTPATIENT (OUTPATIENT)
Dept: OUTPATIENT SERVICES | Facility: HOSPITAL | Age: 82
LOS: 1 days | End: 2017-05-04
Payer: MEDICARE

## 2017-05-04 DIAGNOSIS — Z96.642 PRESENCE OF LEFT ARTIFICIAL HIP JOINT: Chronic | ICD-10-CM

## 2017-05-04 DIAGNOSIS — Z98.89 OTHER SPECIFIED POSTPROCEDURAL STATES: Chronic | ICD-10-CM

## 2017-05-04 DIAGNOSIS — K90.9 INTESTINAL MALABSORPTION, UNSPECIFIED: ICD-10-CM

## 2017-05-04 DIAGNOSIS — D50.9 IRON DEFICIENCY ANEMIA, UNSPECIFIED: ICD-10-CM

## 2017-05-04 PROCEDURE — 96365 THER/PROPH/DIAG IV INF INIT: CPT

## 2017-05-28 ENCOUNTER — INPATIENT (INPATIENT)
Facility: HOSPITAL | Age: 82
LOS: 3 days | Discharge: NURSING, REHAB OR LONG-TERM CARE | DRG: 689 | End: 2017-06-01
Attending: INTERNAL MEDICINE | Admitting: INTERNAL MEDICINE
Payer: MEDICARE

## 2017-05-28 VITALS
SYSTOLIC BLOOD PRESSURE: 108 MMHG | HEART RATE: 77 BPM | DIASTOLIC BLOOD PRESSURE: 70 MMHG | OXYGEN SATURATION: 100 % | RESPIRATION RATE: 17 BRPM | TEMPERATURE: 98 F

## 2017-05-28 DIAGNOSIS — Z96.642 PRESENCE OF LEFT ARTIFICIAL HIP JOINT: Chronic | ICD-10-CM

## 2017-05-28 DIAGNOSIS — Z98.89 OTHER SPECIFIED POSTPROCEDURAL STATES: Chronic | ICD-10-CM

## 2017-05-28 LAB
ALBUMIN SERPL ELPH-MCNC: 3.3 G/DL — SIGNIFICANT CHANGE UP (ref 3.3–5)
ALP SERPL-CCNC: 88 U/L — SIGNIFICANT CHANGE UP (ref 40–120)
ALT FLD-CCNC: 8 U/L — LOW (ref 10–45)
ANION GAP SERPL CALC-SCNC: 11 MMOL/L — SIGNIFICANT CHANGE UP (ref 5–17)
APPEARANCE UR: SIGNIFICANT CHANGE UP
APTT BLD: 25.1 SEC — LOW (ref 27.5–37.4)
AST SERPL-CCNC: 22 U/L — SIGNIFICANT CHANGE UP (ref 10–40)
BASE EXCESS BLDV CALC-SCNC: 9.4 MMOL/L — SIGNIFICANT CHANGE UP
BASOPHILS NFR BLD AUTO: 0.6 % — SIGNIFICANT CHANGE UP (ref 0–2)
BILIRUB SERPL-MCNC: 0.3 MG/DL — SIGNIFICANT CHANGE UP (ref 0.2–1.2)
BILIRUB UR-MCNC: NEGATIVE — SIGNIFICANT CHANGE UP
BLD GP AB SCN SERPL QL: NEGATIVE — SIGNIFICANT CHANGE UP
BUN SERPL-MCNC: 23 MG/DL — SIGNIFICANT CHANGE UP (ref 7–23)
CALCIUM SERPL-MCNC: 9 MG/DL — SIGNIFICANT CHANGE UP (ref 8.4–10.5)
CHLORIDE SERPL-SCNC: 96 MMOL/L — SIGNIFICANT CHANGE UP (ref 96–108)
CK MB CFR SERPL CALC: 1.3 NG/ML — SIGNIFICANT CHANGE UP (ref 0–6.7)
CO2 SERPL-SCNC: 33 MMOL/L — HIGH (ref 22–31)
COLOR SPEC: YELLOW — SIGNIFICANT CHANGE UP
CREAT SERPL-MCNC: 0.9 MG/DL — SIGNIFICANT CHANGE UP (ref 0.5–1.3)
DIFF PNL FLD: NEGATIVE — SIGNIFICANT CHANGE UP
EOSINOPHIL NFR BLD AUTO: 7.3 % — HIGH (ref 0–6)
GAS PNL BLDV: SIGNIFICANT CHANGE UP
GLUCOSE SERPL-MCNC: 110 MG/DL — HIGH (ref 70–99)
GLUCOSE UR QL: NEGATIVE — SIGNIFICANT CHANGE UP
HCO3 BLDV-SCNC: 37 MMOL/L — HIGH (ref 20–27)
HCT VFR BLD CALC: 29.6 % — LOW (ref 34.5–45)
HGB BLD-MCNC: 8.9 G/DL — LOW (ref 11.5–15.5)
INR BLD: 1.03 — SIGNIFICANT CHANGE UP (ref 0.88–1.16)
KETONES UR-MCNC: NEGATIVE — SIGNIFICANT CHANGE UP
LACTATE SERPL-SCNC: 1.1 MMOL/L — SIGNIFICANT CHANGE UP (ref 0.5–2)
LEUKOCYTE ESTERASE UR-ACNC: (no result)
LYMPHOCYTES # BLD AUTO: 24.2 % — SIGNIFICANT CHANGE UP (ref 13–44)
MCHC RBC-ENTMCNC: 27.7 PG — SIGNIFICANT CHANGE UP (ref 27–34)
MCHC RBC-ENTMCNC: 30.1 G/DL — LOW (ref 32–36)
MCV RBC AUTO: 92.2 FL — SIGNIFICANT CHANGE UP (ref 80–100)
MONOCYTES NFR BLD AUTO: 8.2 % — SIGNIFICANT CHANGE UP (ref 2–14)
NEUTROPHILS NFR BLD AUTO: 59.7 % — SIGNIFICANT CHANGE UP (ref 43–77)
NITRITE UR-MCNC: POSITIVE
PCO2 BLDV: 74 MMHG — HIGH (ref 41–51)
PH BLDV: 7.32 — SIGNIFICANT CHANGE UP (ref 7.32–7.43)
PH UR: 6.5 — SIGNIFICANT CHANGE UP (ref 5–8)
PLATELET # BLD AUTO: 301 K/UL — SIGNIFICANT CHANGE UP (ref 150–400)
PO2 BLDV: 29 MMHG — SIGNIFICANT CHANGE UP
POTASSIUM SERPL-MCNC: 5 MMOL/L — SIGNIFICANT CHANGE UP (ref 3.5–5.3)
POTASSIUM SERPL-SCNC: 5 MMOL/L — SIGNIFICANT CHANGE UP (ref 3.5–5.3)
PROT SERPL-MCNC: 6.9 G/DL — SIGNIFICANT CHANGE UP (ref 6–8.3)
PROT UR-MCNC: NEGATIVE MG/DL — SIGNIFICANT CHANGE UP
PROTHROM AB SERPL-ACNC: 11.4 SEC — SIGNIFICANT CHANGE UP (ref 9.8–12.7)
RBC # BLD: 3.21 M/UL — LOW (ref 3.8–5.2)
RBC # FLD: 15.6 % — SIGNIFICANT CHANGE UP (ref 10.3–16.9)
RH IG SCN BLD-IMP: POSITIVE — SIGNIFICANT CHANGE UP
SAO2 % BLDV: 44 % — SIGNIFICANT CHANGE UP
SODIUM SERPL-SCNC: 140 MMOL/L — SIGNIFICANT CHANGE UP (ref 135–145)
SP GR SPEC: 1.01 — SIGNIFICANT CHANGE UP (ref 1–1.03)
TROPONIN T SERPL-MCNC: <0.01 NG/ML — SIGNIFICANT CHANGE UP (ref 0–0.01)
UROBILINOGEN FLD QL: 0.2 E.U./DL — SIGNIFICANT CHANGE UP
WBC # BLD: 7.2 K/UL — SIGNIFICANT CHANGE UP (ref 3.8–10.5)
WBC # FLD AUTO: 7.2 K/UL — SIGNIFICANT CHANGE UP (ref 3.8–10.5)

## 2017-05-28 PROCEDURE — 70450 CT HEAD/BRAIN W/O DYE: CPT | Mod: 26

## 2017-05-28 PROCEDURE — 71010: CPT | Mod: 26

## 2017-05-28 PROCEDURE — 99285 EMERGENCY DEPT VISIT HI MDM: CPT | Mod: 25

## 2017-05-28 PROCEDURE — 93010 ELECTROCARDIOGRAM REPORT: CPT

## 2017-05-28 RX ORDER — CEFTRIAXONE 500 MG/1
1 INJECTION, POWDER, FOR SOLUTION INTRAMUSCULAR; INTRAVENOUS EVERY 24 HOURS
Qty: 0 | Refills: 0 | Status: DISCONTINUED | OUTPATIENT
Start: 2017-05-29 | End: 2017-05-29

## 2017-05-28 RX ORDER — SODIUM CHLORIDE 9 MG/ML
1000 INJECTION INTRAMUSCULAR; INTRAVENOUS; SUBCUTANEOUS ONCE
Qty: 0 | Refills: 0 | Status: COMPLETED | OUTPATIENT
Start: 2017-05-28 | End: 2017-05-28

## 2017-05-28 RX ORDER — VANCOMYCIN HCL 1 G
1000 VIAL (EA) INTRAVENOUS ONCE
Qty: 0 | Refills: 0 | Status: COMPLETED | OUTPATIENT
Start: 2017-05-28 | End: 2017-05-28

## 2017-05-28 RX ORDER — CEFEPIME 1 G/1
1000 INJECTION, POWDER, FOR SOLUTION INTRAMUSCULAR; INTRAVENOUS ONCE
Qty: 0 | Refills: 0 | Status: COMPLETED | OUTPATIENT
Start: 2017-05-28 | End: 2017-05-28

## 2017-05-28 RX ADMIN — Medication 250 MILLIGRAM(S): at 22:25

## 2017-05-28 RX ADMIN — SODIUM CHLORIDE 2000 MILLILITER(S): 9 INJECTION INTRAMUSCULAR; INTRAVENOUS; SUBCUTANEOUS at 21:20

## 2017-05-28 RX ADMIN — CEFEPIME 100 MILLIGRAM(S): 1 INJECTION, POWDER, FOR SOLUTION INTRAMUSCULAR; INTRAVENOUS at 21:35

## 2017-05-28 NOTE — H&P ADULT - HISTORY OF PRESENT ILLNESS
89 yo F with PMH IBS, celiac disease, chronic LE edema (on lasix once weekly), bilateral rotator cuff tear 89 yo F with PMH IBS, celiac disease, chronic LE edema (on lasix once weekly), bilateral rotator cuff tear, and DVT (s/p ivc fadia filter) presents to [facility] from [facility] w/ AMS. Of note, pt is altered and a poor hx therefore all hx obtained from charting and signout. Per documentation, pt w/ dyspnea, cough, and decreased PO intake x2days as well as increased lethargy. No trauma or fall.     In the ED:  cefepime 1 g IV, Vancomycin 1 g IV, 1 L NS bolus

## 2017-05-28 NOTE — H&P ADULT - NSHPLABSRESULTS_GEN_ALL_CORE
.  LABS:                         8.9    7.2   )-----------( 301      ( 28 May 2017 21:34 )             29.6         140  |  96  |  23  ----------------------------<  110<H>  5.0   |  33<H>  |  0.90    Ca    9.0      28 May 2017 21:34    TPro  6.9  /  Alb  3.3  /  TBili  0.3  /  DBili  x   /  AST  22  /  ALT  8<L>  /  AlkPhos  88      PT/INR - ( 28 May 2017 21:34 )   PT: 11.4 sec;   INR: 1.03          PTT - ( 28 May 2017 21:34 )  PTT:25.1 sec  Urinalysis Basic - ( 28 May 2017 21:50 )    Color: Yellow / Appearance: SL CLOUDY / S.010 / pH: x  Gluc: x / Ketone: NEGATIVE  / Bili: NEGATIVE / Urobili: 0.2 E.U./dL   Blood: x / Protein: NEGATIVE mg/dL / Nitrite: POSITIVE   Leuk Esterase: Small / RBC: < 5 /HPF / WBC > 10 /HPF   Sq Epi: x / Non Sq Epi: Few /HPF / Bacteria: Many /HPF      CARDIAC MARKERS ( 28 May 2017 21:34 )  x     / <0.01 ng/mL / 39 U/L / x     / 1.3 ng/mL        Lactate, Blood: 1.1 mmoL/L ( @ 21:34)      RADIOLOGY, EKG & ADDITIONAL TESTS: Reviewed.   EKG: NSR  CXR: no acute infiltrates  CT Head: no acute findings

## 2017-05-28 NOTE — ED ADULT NURSE NOTE - PMH
Anemia    Celiac disease    DJD (degenerative joint disease)    DVT (deep venous thrombosis)    Dysphagia    Edema    IBS (irritable bowel syndrome)

## 2017-05-28 NOTE — ED PROVIDER NOTE - PROGRESS NOTE DETAILS
Dirty UA noted, likely source of infection and cause of AMS. CT head neg. CXR appears unchanged from previous.

## 2017-05-28 NOTE — ED PROVIDER NOTE - MEDICAL DECISION MAKING DETAILS
89F with above PMHx with lethargy, decreased PO intake and SOb at NH, afebrile but very weak and frail appearing on exam. C/f occult infection as well. Septic/AMS workup - anticipate admit for FTT and r/o infection. HCA abx coverage given. Pt stable for RMF at this time, mental improved greatly after IVFs. CT head negative.

## 2017-05-28 NOTE — ED PROVIDER NOTE - OBJECTIVE STATEMENT
89F PMH of IBS, celiac disease, chronic LE edema (on Lasix), bilateral rotator cuff tear presented to ED from [facility] for dyspnea. 89F PMH of IBS, celiac disease, chronic LE edema (on Lasix), bilateral rotator cuff tear, DVT with [place] filter presents to ED from [facility] for dyspnea. cough, and decreased PO intake x 2 days. Associated with increased sleeping and lethargy x 2 days. NO trauma or fall. Son, who is a physician, is at bedside, and providing hx.

## 2017-05-28 NOTE — ED PROVIDER NOTE - PHYSICAL EXAMINATION
GEN: Elderly, frail, weak. NTAF  ENT: Airway patent, Nasal mucosa clear. Mouth with very dry mucosa.  EYES: Clear bilaterally.  RESPIRATORY: satting 95% on RA. Bilateral crackles. No tachypnea or retractions.  MSK: No deformities.  NEURO: lethargic but arousable. RASHID x 4.  SKIN: Skin normal color for race, warm, dry and intact. No evidence of rash. Right heel decub.

## 2017-05-28 NOTE — ED ADULT NURSE NOTE - OBJECTIVE STATEMENT
pt biba from nursing home, accompanied by son, stating pt did not know who he was and was not taking in any foods/fluid throughout the day. Pt states she is disoriented at baseline but usually knows him name. Per son, RN at nursing home inserted IV and administered 250cc Dextrose 5%. Sepsis protocol initiated. Pt has hx of intubation and is on 2L NC at nursing home. Pt taken off supp o2 and sp02 dropped to 92%. Pt reapplied NC @2L and maintains 100& sp02. Continuous cardiac monitor. Will continue to monitor.

## 2017-05-28 NOTE — H&P ADULT - NSHPPHYSICALEXAM_GEN_ALL_CORE
.  VITAL SIGNS:  T(C): 36.4, Max: 36.7 (05-28 @ 20:40)  T(F): 97.6, Max: 98.1 (05-28 @ 20:40)  HR: 72 (72 - 78)  BP: 100/61 (96/67 - 117/75)  BP(mean): --  RR: 16 (16 - 18)  SpO2: 100% (92% - 100%)  Wt(kg): --    PHYSICAL EXAM:    Constitutional: WDWN resting comfortably in bed; NAD, frail, cachectic   Head: NC/AT  Eyes: PERRL, EOMI, anicteric sclera  ENT: dry MM  Neck: supple; no JVD or thyromegaly  Respiratory: ?crackles at bases. Pt uncooperative w/ exam so difficult to appreciate.   Cardiac: +S1/S2; RRR; no M/R/G appreciated  Gastrointestinal: soft, NT/ND; no rebound or guarding; +BSx4  Back: no CVAT B/L  Extremities: WWP, no clubbing or cyanosis; slight pedal edema pitting  Musculoskeletal: NROM x4; no joint swelling, tenderness or erythema  Vascular: 2+ radial, femoral, DP/PT pulses B/L  Dermatologic: skin warm, dry and intact; no rashes, wounds, or scars  Lymphatic: no submandibular or cervical LAD  Neurologic: AAOx1 (self); unable to fully exam cranial nerves as pt not following commands and uncooperative.

## 2017-05-28 NOTE — ED PROVIDER NOTE - CARE PLAN
Principal Discharge DX:	Altered mental status Principal Discharge DX:	Altered mental status  Secondary Diagnosis:	Dehydration  Secondary Diagnosis:	Failure to thrive in adult

## 2017-05-28 NOTE — H&P ADULT - PROBLEM SELECTOR PLAN 1
Pt w/ AMS on presentation. Currently AAOx1 to self. Minimally cooperative on exam and lethargic. Reported in documentation to have increasing lethargy over past few days. CT head wnl. CXR w/out any acute infiltrates. No leukocytosis. LA negative. VSS and afebrile. UA positive. S/P vanc and cefepime in ED. AMS likely 2/2 to UTI. Pt also dehydrated on exam so will tx dehydration as well. Pt w/ +UA and Ucx for E coli on positive admission that was sensitive to ceftriaxone so will tx w/ this abx.  --ceftriaxone 1g q24h  --gentle IVF @ 65 cc/hr to stop after 8 hours  --f/u BCx, UCx   --f/u Dr. Motta  --obtain collateral from [facility] as pt sent w/out any forms or medication list

## 2017-05-28 NOTE — ED PROVIDER NOTE - NS ED ROS FT
Constitutional: No fever or chills.   Eyes: No pain, blurry vision, or discharge.  ENMT: No hearing changes, pain, discharge or infections. No neck pain or stiffness.  Cardiac: No chest pain,    Respiratory: +cough, +sob. No hemoptysis.   GI: No nausea, vomiting, diarrhea or abdominal pain.  : No dysuria, frequency or burning.  MS:chronic pain  Neuro: No headache or focal weakness. No LOC.  Skin: No skin rash.      Except as documented in the HPI, all other systems are negative.

## 2017-05-29 DIAGNOSIS — I82.409 ACUTE EMBOLISM AND THROMBOSIS OF UNSPECIFIED DEEP VEINS OF UNSPECIFIED LOWER EXTREMITY: ICD-10-CM

## 2017-05-29 DIAGNOSIS — Z29.9 ENCOUNTER FOR PROPHYLACTIC MEASURES, UNSPECIFIED: ICD-10-CM

## 2017-05-29 DIAGNOSIS — R41.82 ALTERED MENTAL STATUS, UNSPECIFIED: ICD-10-CM

## 2017-05-29 DIAGNOSIS — R63.8 OTHER SYMPTOMS AND SIGNS CONCERNING FOOD AND FLUID INTAKE: ICD-10-CM

## 2017-05-29 DIAGNOSIS — D64.9 ANEMIA, UNSPECIFIED: ICD-10-CM

## 2017-05-29 DIAGNOSIS — R62.7 ADULT FAILURE TO THRIVE: ICD-10-CM

## 2017-05-29 DIAGNOSIS — N39.0 URINARY TRACT INFECTION, SITE NOT SPECIFIED: ICD-10-CM

## 2017-05-29 LAB
ALBUMIN SERPL ELPH-MCNC: 2.6 G/DL — LOW (ref 3.3–5)
ALP SERPL-CCNC: 80 U/L — SIGNIFICANT CHANGE UP (ref 40–120)
ALT FLD-CCNC: 7 U/L — LOW (ref 10–45)
ANION GAP SERPL CALC-SCNC: 9 MMOL/L — SIGNIFICANT CHANGE UP (ref 5–17)
AST SERPL-CCNC: 14 U/L — SIGNIFICANT CHANGE UP (ref 10–40)
BASOPHILS NFR BLD AUTO: 0.6 % — SIGNIFICANT CHANGE UP (ref 0–2)
BILIRUB SERPL-MCNC: 0.3 MG/DL — SIGNIFICANT CHANGE UP (ref 0.2–1.2)
BUN SERPL-MCNC: 22 MG/DL — SIGNIFICANT CHANGE UP (ref 7–23)
CALCIUM SERPL-MCNC: 8.6 MG/DL — SIGNIFICANT CHANGE UP (ref 8.4–10.5)
CHLORIDE SERPL-SCNC: 100 MMOL/L — SIGNIFICANT CHANGE UP (ref 96–108)
CO2 SERPL-SCNC: 31 MMOL/L — SIGNIFICANT CHANGE UP (ref 22–31)
CREAT SERPL-MCNC: 0.8 MG/DL — SIGNIFICANT CHANGE UP (ref 0.5–1.3)
EOSINOPHIL NFR BLD AUTO: 7.7 % — HIGH (ref 0–6)
FERRITIN SERPL-MCNC: 99.5 NG/ML — SIGNIFICANT CHANGE UP (ref 15–150)
GLUCOSE SERPL-MCNC: 75 MG/DL — SIGNIFICANT CHANGE UP (ref 70–99)
HCT VFR BLD CALC: 27.5 % — LOW (ref 34.5–45)
HGB BLD-MCNC: 8.3 G/DL — LOW (ref 11.5–15.5)
IRON SATN MFR SERPL: 25 UG/DL — LOW (ref 30–160)
LYMPHOCYTES # BLD AUTO: 20.3 % — SIGNIFICANT CHANGE UP (ref 13–44)
MAGNESIUM SERPL-MCNC: 2 MG/DL — SIGNIFICANT CHANGE UP (ref 1.6–2.6)
MCHC RBC-ENTMCNC: 28.1 PG — SIGNIFICANT CHANGE UP (ref 27–34)
MCHC RBC-ENTMCNC: 30.2 G/DL — LOW (ref 32–36)
MCV RBC AUTO: 93.2 FL — SIGNIFICANT CHANGE UP (ref 80–100)
MONOCYTES NFR BLD AUTO: 9.8 % — SIGNIFICANT CHANGE UP (ref 2–14)
NEUTROPHILS NFR BLD AUTO: 61.6 % — SIGNIFICANT CHANGE UP (ref 43–77)
PHOSPHATE SERPL-MCNC: 4.6 MG/DL — HIGH (ref 2.5–4.5)
PLATELET # BLD AUTO: 309 K/UL — SIGNIFICANT CHANGE UP (ref 150–400)
POTASSIUM SERPL-MCNC: 4.3 MMOL/L — SIGNIFICANT CHANGE UP (ref 3.5–5.3)
POTASSIUM SERPL-SCNC: 4.3 MMOL/L — SIGNIFICANT CHANGE UP (ref 3.5–5.3)
PROT SERPL-MCNC: 6.1 G/DL — SIGNIFICANT CHANGE UP (ref 6–8.3)
RBC # BLD: 2.95 M/UL — LOW (ref 3.8–5.2)
RBC # FLD: 16.1 % — SIGNIFICANT CHANGE UP (ref 10.3–16.9)
SODIUM SERPL-SCNC: 140 MMOL/L — SIGNIFICANT CHANGE UP (ref 135–145)
TRANSFERRIN SERPL-MCNC: 202 MG/DL — SIGNIFICANT CHANGE UP (ref 200–360)
TROPONIN T SERPL-MCNC: <0.01 NG/ML — SIGNIFICANT CHANGE UP (ref 0–0.01)
WBC # BLD: 6.2 K/UL — SIGNIFICANT CHANGE UP (ref 3.8–10.5)
WBC # FLD AUTO: 6.2 K/UL — SIGNIFICANT CHANGE UP (ref 3.8–10.5)

## 2017-05-29 PROCEDURE — 71010: CPT | Mod: 26

## 2017-05-29 RX ORDER — HEPARIN SODIUM 5000 [USP'U]/ML
5000 INJECTION INTRAVENOUS; SUBCUTANEOUS EVERY 12 HOURS
Qty: 0 | Refills: 0 | Status: DISCONTINUED | OUTPATIENT
Start: 2017-05-29 | End: 2017-06-01

## 2017-05-29 RX ORDER — CEFTRIAXONE 500 MG/1
1 INJECTION, POWDER, FOR SOLUTION INTRAMUSCULAR; INTRAVENOUS EVERY 24 HOURS
Qty: 0 | Refills: 0 | Status: DISCONTINUED | OUTPATIENT
Start: 2017-05-29 | End: 2017-05-31

## 2017-05-29 RX ORDER — SODIUM CHLORIDE 9 MG/ML
1000 INJECTION INTRAMUSCULAR; INTRAVENOUS; SUBCUTANEOUS
Qty: 0 | Refills: 0 | Status: DISCONTINUED | OUTPATIENT
Start: 2017-05-29 | End: 2017-05-30

## 2017-05-29 RX ORDER — HEPARIN SODIUM 5000 [USP'U]/ML
5000 INJECTION INTRAVENOUS; SUBCUTANEOUS EVERY 8 HOURS
Qty: 0 | Refills: 0 | Status: DISCONTINUED | OUTPATIENT
Start: 2017-05-29 | End: 2017-05-29

## 2017-05-29 RX ADMIN — SODIUM CHLORIDE 65 MILLILITER(S): 9 INJECTION INTRAMUSCULAR; INTRAVENOUS; SUBCUTANEOUS at 03:48

## 2017-05-29 RX ADMIN — HEPARIN SODIUM 5000 UNIT(S): 5000 INJECTION INTRAVENOUS; SUBCUTANEOUS at 05:57

## 2017-05-29 RX ADMIN — CEFTRIAXONE 100 GRAM(S): 500 INJECTION, POWDER, FOR SOLUTION INTRAMUSCULAR; INTRAVENOUS at 11:46

## 2017-05-29 RX ADMIN — HEPARIN SODIUM 5000 UNIT(S): 5000 INJECTION INTRAVENOUS; SUBCUTANEOUS at 18:57

## 2017-05-29 NOTE — PROGRESS NOTE ADULT - SUBJECTIVE AND OBJECTIVE BOX
INTERVAL HPI/OVERNIGHT EVENTS: Admitted o/n    SUBJECTIVE: Patient seen and examined at bedside. States she doesn't know if she has any complaints; denies any problems with breathing or urination.    ROS:  CV: Denies chest pain  Resp: Denies SOB  GI: Denies abdominal pain, constipation, diarrhea, nausea, vomiting  : Denies dysuria  ID: Denies fevers, chills  MSK: Denies joint pain       VITAL SIGNS:  T(F): 97.9  HR: 77  BP: 109/71  RR: 18  SpO2: 99%  Wt(kg): --  I & Os for 24h ending  @ 07:00  =============================================  IN: 260 ml / OUT: 300 ml / NET: -40 ml    I & Os for current day (as of  @ 09:06)  =============================================  IN: 65 ml / OUT: 0 ml / NET: 65 ml      PHYSICAL EXAM:    Constitutional: Frail, NAD  Eyes: PERRL, EOMI  ENMT: Dry  Neck: Flat  Respiratory: diffuse rhonchi  Cardiovascular: RRR, S1, S2, II/VI systolic murmur  Gastrointestinal: Soft, NTND normoactive BS  Extremities: WWP, no edema  Vascular: 2+ pulses b/l  Neurological: AAOx0, CN II-XII grossly intact, 4/5 strength across all extremities    MEDICATIONS  (STANDING):  sodium chloride 0.9%. 1000milliLiter(s) IV Continuous <Continuous>  heparin  Injectable 5000Unit(s) SubCutaneous every 12 hours  cefTRIAXone   IVPB 1Gram(s) IV Intermittent every 24 hours    MEDICATIONS  (PRN):      Allergies    amoxicillin (Unknown)  Cipro (Unknown)  clindamycin (Unknown)  lactose (Unknown)  Wheat (Unknown)    Intolerances        LABS:                        8.3    6.2   )-----------( 309      ( 29 May 2017 06:41 )             27.5         140  |  100  |  22  ----------------------------<  75  4.3   |  31  |  0.80    Ca    8.6      29 May 2017 06:41  Phos  4.6       Mg     2.0         TPro  6.1  /  Alb  2.6<L>  /  TBili  0.3  /  DBili  x   /  AST  14  /  ALT  7<L>  /  AlkPhos  80      PT/INR - ( 28 May 2017 21:34 )   PT: 11.4 sec;   INR: 1.03          PTT - ( 28 May 2017 21:34 )  PTT:25.1 sec  Urinalysis Basic - ( 28 May 2017 21:50 )    Color: Yellow / Appearance: SL CLOUDY / S.010 / pH: x  Gluc: x / Ketone: NEGATIVE  / Bili: NEGATIVE / Urobili: 0.2 E.U./dL   Blood: x / Protein: NEGATIVE mg/dL / Nitrite: POSITIVE   Leuk Esterase: Small / RBC: < 5 /HPF / WBC > 10 /HPF   Sq Epi: x / Non Sq Epi: Few /HPF / Bacteria: Many /HPF        RADIOLOGY & ADDITIONAL TESTS: INTERVAL HPI/OVERNIGHT EVENTS: Admitted o/n    SUBJECTIVE: Patient seen and examined at bedside. States she doesn't know if she has any complaints; denies any problems with breathing or urination.    ROS:  CV: Denies chest pain  Resp: Denies SOB  GI: Denies abdominal pain, constipation, diarrhea, nausea, vomiting  : Denies dysuria  ID: Denies fevers, chills  MSK: Denies joint pain       VITAL SIGNS:  T(F): 97.9  HR: 77  BP: 109/71  RR: 18  SpO2: 99%  Wt(kg): --  I & Os for 24h ending  @ 07:00  =============================================  IN: 260 ml / OUT: 300 ml / NET: -40 ml    I & Os for current day (as of  @ 09:06)  =============================================  IN: 65 ml / OUT: 0 ml / NET: 65 ml      PHYSICAL EXAM:    Constitutional: Frail, NAD  Eyes: PERRL, EOMI  ENMT: Dry  Neck: Flat  Respiratory: diffuse rhonchi  Cardiovascular: RRR, S1, S2, II/VI systolic murmur  Gastrointestinal: Soft, NTND normoactive BS  Extremities: WWP, no edema  Vascular: 2+ pulses b/l  Neurological: AAOx1, CN II-XII grossly intact, 4/5 strength across all extremities    MEDICATIONS  (STANDING):  sodium chloride 0.9%. 1000milliLiter(s) IV Continuous <Continuous>  heparin  Injectable 5000Unit(s) SubCutaneous every 12 hours  cefTRIAXone   IVPB 1Gram(s) IV Intermittent every 24 hours    MEDICATIONS  (PRN):      Allergies    amoxicillin (Unknown)  Cipro (Unknown)  clindamycin (Unknown)  lactose (Unknown)  Wheat (Unknown)    Intolerances        LABS:                        8.3    6.2   )-----------( 309      ( 29 May 2017 06:41 )             27.5         140  |  100  |  22  ----------------------------<  75  4.3   |  31  |  0.80    Ca    8.6      29 May 2017 06:41  Phos  4.6       Mg     2.0         TPro  6.1  /  Alb  2.6<L>  /  TBili  0.3  /  DBili  x   /  AST  14  /  ALT  7<L>  /  AlkPhos  80      PT/INR - ( 28 May 2017 21:34 )   PT: 11.4 sec;   INR: 1.03          PTT - ( 28 May 2017 21:34 )  PTT:25.1 sec  Urinalysis Basic - ( 28 May 2017 21:50 )    Color: Yellow / Appearance: SL CLOUDY / S.010 / pH: x  Gluc: x / Ketone: NEGATIVE  / Bili: NEGATIVE / Urobili: 0.2 E.U./dL   Blood: x / Protein: NEGATIVE mg/dL / Nitrite: POSITIVE   Leuk Esterase: Small / RBC: < 5 /HPF / WBC > 10 /HPF   Sq Epi: x / Non Sq Epi: Few /HPF / Bacteria: Many /HPF        RADIOLOGY & ADDITIONAL TESTS:

## 2017-05-29 NOTE — PROGRESS NOTE ADULT - PROBLEM SELECTOR PLAN 1
Patient w/ (+) UA and AMS on presentation. Patient's mental status improved today; able to follow commands. AAOx1 (baseline). On ceftriaxone 1g q24h. Denies any complaints of dysuria, increased frequency, increased retention. BCx, UCx show NGTD  - c/w ceftriaxone 1g q24h (day 2 abx)  - will f/u cx

## 2017-05-29 NOTE — DIETITIAN INITIAL EVALUATION ADULT. - OTHER INFO
skin - left heel stage 1 , rt heel unstageable , chronic le edema , no n/v/d/c IBS , No diet hx or weight hx nakul ,no pain , full assistance with meals / set  food allergys - wheat  , lactose ,l dysphagia

## 2017-05-29 NOTE — DIETITIAN INITIAL EVALUATION ADULT. - ENERGY NEEDS
Height 5 5 1/2 , current weight 119# , IBW  127# , BMI  19.5 , 93.7% IBW ,  Current weight used for calculations

## 2017-05-29 NOTE — PROGRESS NOTE ADULT - SUBJECTIVE AND OBJECTIVE BOX
Interventional, Pulmonary, Critical, Chest Special Procedures.    Pt was seen and fully examined by myself.     Time spent with patient in minutes:77    Patient is a 89y old  Female who presents with a chief complaint of AMS (28 May 2017 23:07)Events leading to this admission reviewed in details. Ther patient ill appearing, poorly engaging, answering simple questions    HPI:  89 yo F with PMH IBS, celiac disease, chronic LE edema (on lasix once weekly), bilateral rotator cuff tear, and DVT (s/p ivc fadia filter) presents to [facility] from [facility] w/ AMS. Of note, pt is altered and a poor hx therefore all hx obtained from charting and signout. Per documentation, pt w/ dyspnea, cough, and decreased PO intake x2days as well as increased lethargy. No trauma or fall.     In the ED:  cefepime 1 g IV, Vancomycin 1 g IV, 1 L NS bolus (28 May 2017 23:07)    REVIEW OF SYSTEMS:  Constitutional: No fever, weight loss, chills or fatigue  Eyes: No eye pain, visual disturbances, or discharge  ENMT:  No difficulty hearing, tinnitus, vertigo; No sinus or throat pain. No epistaxis, dysphagia, dysphonia, hoarseness or odynophagia  Neck: No pain, stiffness or neck swelling.  No masses or deformities  Respiratory: + cough, wheezing, chills or hemoptysis  - COPD  - ILD   - PE   - ASTHMA     - PNEUMONIA  Cardiovascular: No chest pain, dysrhythmia, palpitations, dizziness or edema   - COPD     - CAD   - CHF   - HTN  Gastrointestinal: No abdominal or epigastric pain. No nausea, vomiting or hematemesis; No diarrhea or constipation. No melena or hematochezia. No dysphagia or Icterus.          Genitourinary: No dysuria, frequency, hematuria or incontinence   - CKD/CAMRYN      - ESRD  Neurological: No headaches, memory loss, loss of strength, numbness or tremors      -DEMENTIA     - STROKE    - SEIZURE  Skin: No itching, burning, rashes or lesions   Lymph Nodes: No enlarged glands  Endocrine: No heat or cold intolerance; No hair loss       - DM     - THYROID DISORDER  Musculoskeletal: No joint pain or swelling; No muscle, back or extremity pain  Psychiatric: No depression, anxiety, mood swings or difficulty sleeping  Heme/Lymph: No easy bruising or bleeding gums         - ANEMIA      - CANCER   -COAGULOPATHY  Allergy and Immunologic: No hives or eczema    PAST MEDICAL & SURGICAL HISTORY:  DVT (deep venous thrombosis)  Dysphagia  Anemia  DJD (degenerative joint disease)  Edema  IBS (irritable bowel syndrome)  Celiac disease  H/O inguinal hernia repair  History of total left hip replacement    FAMILY HISTORY:  No pertinent family history in first degree relatives    SOCIAL HISTORY:      - Tobacco     - ETOH    Allergies    amoxicillin (Unknown)  Cipro (Unknown)  clindamycin (Unknown)  lactose (Unknown)  Wheat (Unknown)    Intolerances      Vital Signs Last 24 Hrs  T(C): 36.3, Max: 36.7 (05-28 @ 20:40)  T(F): 97.4, Max: 98.1 (05-28 @ 20:40)  HR: 71 (68 - 78)  BP: 103/64 (96/67 - 117/75)  BP(mean): --  RR: 18 (16 - 18)  SpO2: 97% (92% - 100%)  I & Os for 24h ending 05-29 @ 07:00  =============================================  IN: 260 ml / OUT: 300 ml / NET: -40 ml    I & Os for current day (as of 05-29 @ 14:54)  =============================================  IN: 65 ml / OUT: 0 ml / NET: 65 ml      PHYSICAL EXAM:  uN Comfortable, MILD distress  Eyes: PERRL, EOM intact; conjunctiva and sclera clear  Head: Normocephalic;  No Trauma  ENMT: No nasal discharge,+ hoarseness, +cough    Neck: Supple; non tender; no masses or deformities.    No JVD  Respiratory:  - WHEEZING   - RHONCHI  - RALES  + CRACKLES.  Diminished breath sounds  BILATERAL  RIGHT  LEFT BASES  Cardiovascular: Regular rate and rhythm. S1 and S2 Normal; No murmurs, gallops or rubs     - PPM/AICD  Gastrointestinal: Soft non-tender, non-distended; Normal bowel sounds; No hepatosplenomegaly.     -PEG    -  GT   - SWENSON  Genitourinary: No costovertebral angle tenderness. No dysuria  Extremities: AROM, No clubbing, cyanosis or edema    Vascular: Peripheral pulses palpable 2+ bilaterally  Neurological: Alert and responisve to stimuli   Skin: Warm and dry. No obvious rash  Lymph Nodes: No acute cervical or supraclavicular adenopathy  Psychiatric: Cooperative and appropriate mood    DEVICES:  - DENTURES   +IV R / L     - ETUBE   -TRACH   -CTUBE  R / L      LABS:                          8.3    6.2   )-----------( 309      ( 29 May 2017 06:41 )             27.5     05-29    140  |  100  |  22  ----------------------------<  75  4.3   |  31  |  0.80    Ca    8.6      29 May 2017 06:41  Phos  4.6     05-29  Mg     2.0     05-29    TPro  6.1  /  Alb  2.6<L>  /  TBili  0.3  /  DBili  x   /  AST  14  /  ALT  7<L>  /  AlkPhos  80  05-29    PT/INR - ( 28 May 2017 21:34 )   PT: 11.4 sec;   INR: 1.03          PTT - ( 28 May 2017 21:34 )  PTT:25.1 sec    INTERPRETATION:    PROCEDURE: CT head without intravenous contrast    INDICATION: Altered mental status. Lethargic.    TECHNIQUE: Multiple axial images were obtained at 5 mm intervals from the   skull base to the vertex. The images were reviewed in brain and bone   windows. Sagittal and coronal reformatted images were obtained.    COMPARISON: CT brain 1/11/2017    FINDINGS: The CT examination again demonstrates the ventricles, and   cisternal spaces to be enlarged due to age-related volume loss. There is   no midline shift or extra axial collections. Stable patchy and confluent   hypodensities are seen in the periventricular white matter, likely   sequelae of small vessel ischemic disease. Punctate hyperdensities are   noted in the bilateral basal ganglia, unchanged. 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 There is no   intracranial hemorrhage or acute transcortical infarct. The bony windows   demonstrates no fractures. The visualized paranasal sinuses are within   normal limits. The mastoid air cells are well aerated.    IMPRESSION: No acute intracranial abnormality. No substantial change   since 1/11/2017.RADIOLOGY & ADDITIONAL STUDIES (The following images were personally reviewed):

## 2017-05-30 LAB
ANION GAP SERPL CALC-SCNC: 8 MMOL/L — SIGNIFICANT CHANGE UP (ref 5–17)
BUN SERPL-MCNC: 17 MG/DL — SIGNIFICANT CHANGE UP (ref 7–23)
CALCIUM SERPL-MCNC: 9.3 MG/DL — SIGNIFICANT CHANGE UP (ref 8.4–10.5)
CHLORIDE SERPL-SCNC: 99 MMOL/L — SIGNIFICANT CHANGE UP (ref 96–108)
CO2 SERPL-SCNC: 32 MMOL/L — HIGH (ref 22–31)
CREAT SERPL-MCNC: 0.9 MG/DL — SIGNIFICANT CHANGE UP (ref 0.5–1.3)
GLUCOSE SERPL-MCNC: 92 MG/DL — SIGNIFICANT CHANGE UP (ref 70–99)
HCT VFR BLD CALC: 29.9 % — LOW (ref 34.5–45)
HGB BLD-MCNC: 9.3 G/DL — LOW (ref 11.5–15.5)
MAGNESIUM SERPL-MCNC: 2 MG/DL — SIGNIFICANT CHANGE UP (ref 1.6–2.6)
MCHC RBC-ENTMCNC: 27.9 PG — SIGNIFICANT CHANGE UP (ref 27–34)
MCHC RBC-ENTMCNC: 31.1 G/DL — LOW (ref 32–36)
MCV RBC AUTO: 89.8 FL — SIGNIFICANT CHANGE UP (ref 80–100)
PLATELET # BLD AUTO: 343 K/UL — SIGNIFICANT CHANGE UP (ref 150–400)
POTASSIUM SERPL-MCNC: 4.2 MMOL/L — SIGNIFICANT CHANGE UP (ref 3.5–5.3)
POTASSIUM SERPL-SCNC: 4.2 MMOL/L — SIGNIFICANT CHANGE UP (ref 3.5–5.3)
RBC # BLD: 3.33 M/UL — LOW (ref 3.8–5.2)
RBC # FLD: 15.9 % — SIGNIFICANT CHANGE UP (ref 10.3–16.9)
SODIUM SERPL-SCNC: 139 MMOL/L — SIGNIFICANT CHANGE UP (ref 135–145)
WBC # BLD: 7.6 K/UL — SIGNIFICANT CHANGE UP (ref 3.8–10.5)
WBC # FLD AUTO: 7.6 K/UL — SIGNIFICANT CHANGE UP (ref 3.8–10.5)

## 2017-05-30 RX ORDER — IRON SUCROSE 20 MG/ML
100 INJECTION, SOLUTION INTRAVENOUS DAILY
Qty: 0 | Refills: 0 | Status: COMPLETED | OUTPATIENT
Start: 2017-05-30 | End: 2017-06-01

## 2017-05-30 RX ORDER — IRON SUCROSE 20 MG/ML
200 INJECTION, SOLUTION INTRAVENOUS ONCE
Qty: 0 | Refills: 0 | Status: COMPLETED | OUTPATIENT
Start: 2017-05-30 | End: 2017-05-30

## 2017-05-30 RX ADMIN — HEPARIN SODIUM 5000 UNIT(S): 5000 INJECTION INTRAVENOUS; SUBCUTANEOUS at 18:00

## 2017-05-30 RX ADMIN — IRON SUCROSE 110 MILLIGRAM(S): 20 INJECTION, SOLUTION INTRAVENOUS at 18:00

## 2017-05-30 RX ADMIN — CEFTRIAXONE 100 GRAM(S): 500 INJECTION, POWDER, FOR SOLUTION INTRAMUSCULAR; INTRAVENOUS at 11:00

## 2017-05-30 RX ADMIN — HEPARIN SODIUM 5000 UNIT(S): 5000 INJECTION INTRAVENOUS; SUBCUTANEOUS at 06:29

## 2017-05-30 NOTE — PROGRESS NOTE ADULT - SUBJECTIVE AND OBJECTIVE BOX
INTERVAL HPI/OVERNIGHT EVENTS:    SUBJECTIVE: Patient seen and examined at bedside    ROS:  CV: Denies chest pain  Resp: Denies SOB  GI: Denies abdominal pain, constipation, diarrhea, nausea, vomiting  : Denies dysuria  ID: Denies fevers, chills  MSK: Denies joint pain       VITAL SIGNS:  T(F): 98.1  HR: 87  BP: 134/80  RR: 20  SpO2: 94%  Wt(kg): --    I & Os for current day (as of  @ 07:40)  =============================================  IN: 65 ml / OUT: 0 ml / NET: 65 ml      PHYSICAL EXAM:    Constitutional:  Eyes:  ENMT:  Neck:  Respiratory:  Cardiovascular:  Gastrointestinal:  Extremities:  Vascular:  Neurological:  Musculoskeletal:    MEDICATIONS  (STANDING):  sodium chloride 0.9%. 1000milliLiter(s) IV Continuous <Continuous>  heparin  Injectable 5000Unit(s) SubCutaneous every 12 hours  cefTRIAXone   IVPB 1Gram(s) IV Intermittent every 24 hours    MEDICATIONS  (PRN):      Allergies    amoxicillin (Unknown)  Cipro (Unknown)  clindamycin (Unknown)  lactose (Unknown)  Wheat (Unknown)    Intolerances        LABS:                        9.3    7.6   )-----------( 343      ( 30 May 2017 06:05 )             29.9         139  |  99  |  17  ----------------------------<  92  4.2   |  32<H>  |  0.90    Ca    9.3      30 May 2017 06:05  Phos  4.6       Mg     2.0         TPro  6.1  /  Alb  2.6<L>  /  TBili  0.3  /  DBili  x   /  AST  14  /  ALT  7<L>  /  AlkPhos  80      PT/INR - ( 28 May 2017 21:34 )   PT: 11.4 sec;   INR: 1.03          PTT - ( 28 May 2017 21:34 )  PTT:25.1 sec  Urinalysis Basic - ( 28 May 2017 21:50 )    Color: Yellow / Appearance: SL CLOUDY / S.010 / pH: x  Gluc: x / Ketone: NEGATIVE  / Bili: NEGATIVE / Urobili: 0.2 E.U./dL   Blood: x / Protein: NEGATIVE mg/dL / Nitrite: POSITIVE   Leuk Esterase: Small / RBC: < 5 /HPF / WBC > 10 /HPF   Sq Epi: x / Non Sq Epi: Few /HPF / Bacteria: Many /HPF        RADIOLOGY & ADDITIONAL TESTS: INTERVAL HPI/OVERNIGHT EVENTS:     SUBJECTIVE: Patient seen and examined at bedside    ROS:  CV: Denies chest pain  Resp: Denies SOB  GI: Denies abdominal pain, constipation, diarrhea, nausea, vomiting  : Denies dysuria  ID: Denies fevers, chills  MSK: Denies joint pain       VITAL SIGNS:  T(F): 98.1  HR: 87  BP: 134/80  RR: 20  SpO2: 94%  Wt(kg): --    I & Os for current day (as of  @ 07:40)  =============================================  IN: 65 ml / OUT: 0 ml / NET: 65 ml      PHYSICAL EXAM:    Constitutional:  Eyes:  ENMT:  Neck:  Respiratory:  Cardiovascular:  Gastrointestinal:  Extremities:  Vascular:  Neurological:  Musculoskeletal:    MEDICATIONS  (STANDING):  sodium chloride 0.9%. 1000milliLiter(s) IV Continuous <Continuous>  heparin  Injectable 5000Unit(s) SubCutaneous every 12 hours  cefTRIAXone   IVPB 1Gram(s) IV Intermittent every 24 hours    MEDICATIONS  (PRN):      Allergies    amoxicillin (Unknown)  Cipro (Unknown)  clindamycin (Unknown)  lactose (Unknown)  Wheat (Unknown)    Intolerances        LABS:                        9.3    7.6   )-----------( 343      ( 30 May 2017 06:05 )             29.9         139  |  99  |  17  ----------------------------<  92  4.2   |  32<H>  |  0.90    Ca    9.3      30 May 2017 06:05  Phos  4.6       Mg     2.0         TPro  6.1  /  Alb  2.6<L>  /  TBili  0.3  /  DBili  x   /  AST  14  /  ALT  7<L>  /  AlkPhos  80      PT/INR - ( 28 May 2017 21:34 )   PT: 11.4 sec;   INR: 1.03          PTT - ( 28 May 2017 21:34 )  PTT:25.1 sec  Urinalysis Basic - ( 28 May 2017 21:50 )    Color: Yellow / Appearance: SL CLOUDY / S.010 / pH: x  Gluc: x / Ketone: NEGATIVE  / Bili: NEGATIVE / Urobili: 0.2 E.U./dL   Blood: x / Protein: NEGATIVE mg/dL / Nitrite: POSITIVE   Leuk Esterase: Small / RBC: < 5 /HPF / WBC > 10 /HPF   Sq Epi: x / Non Sq Epi: Few /HPF / Bacteria: Many /HPF        RADIOLOGY & ADDITIONAL TESTS: INTERVAL HPI/OVERNIGHT EVENTS: ALYCIA o/n.    SUBJECTIVE: Patient seen and examined at bedside. No complaints.    ROS:  CV: Denies chest pain  Resp: Denies SOB  GI: Denies abdominal pain, constipation, diarrhea, nausea, vomiting  : Denies dysuria  ID: Denies fevers, chills  MSK: Denies joint pain       VITAL SIGNS:  T(F): 98.1  HR: 87  BP: 134/80  RR: 20  SpO2: 94%  Wt(kg): --    I & Os for current day (as of  @ 07:40)  =============================================  IN: 65 ml / OUT: 0 ml / NET: 65 ml      PHYSICAL EXAM:    Constitutional: Frail, NAD  Eyes: PERRL, EOMI  ENMT: Dry  Neck: Flat  Respiratory: diffuse rhonchi  Cardiovascular: RRR, S1, S2, II/VI systolic murmur  Gastrointestinal: Soft, NTND normoactive BS  Extremities: WWP, no edema  Vascular: 2+ pulses b/l  Neurological: AAOx1, CN II-XII grossly intact, 4/5 strength across all extremities    MEDICATIONS  (STANDING):  sodium chloride 0.9%. 1000milliLiter(s) IV Continuous <Continuous>  heparin  Injectable 5000Unit(s) SubCutaneous every 12 hours  cefTRIAXone   IVPB 1Gram(s) IV Intermittent every 24 hours    MEDICATIONS  (PRN):      Allergies    amoxicillin (Unknown)  Cipro (Unknown)  clindamycin (Unknown)  lactose (Unknown)  Wheat (Unknown)    Intolerances        LABS:                        9.3    7.6   )-----------( 343      ( 30 May 2017 06:05 )             29.9     05-30    139  |  99  |  17  ----------------------------<  92  4.2   |  32<H>  |  0.90    Ca    9.3      30 May 2017 06:05  Phos  4.6     05-29  Mg     2.0     05-30    TPro  6.1  /  Alb  2.6<L>  /  TBili  0.3  /  DBili  x   /  AST  14  /  ALT  7<L>  /  AlkPhos  80  05-29    PT/INR - ( 28 May 2017 21:34 )   PT: 11.4 sec;   INR: 1.03          PTT - ( 28 May 2017 21:34 )  PTT:25.1 sec  Urinalysis Basic - ( 28 May 2017 21:50 )    Color: Yellow / Appearance: SL CLOUDY / S.010 / pH: x  Gluc: x / Ketone: NEGATIVE  / Bili: NEGATIVE / Urobili: 0.2 E.U./dL   Blood: x / Protein: NEGATIVE mg/dL / Nitrite: POSITIVE   Leuk Esterase: Small / RBC: < 5 /HPF / WBC > 10 /HPF   Sq Epi: x / Non Sq Epi: Few /HPF / Bacteria: Many /HPF        RADIOLOGY & ADDITIONAL TESTS:

## 2017-05-30 NOTE — ADVANCED PRACTICE NURSE CONSULT - RECOMMEDATIONS
Right heel eschar - do not cleanse or wet, apply Iodosorb, 4x4 gauze and [place] every other day.  Use z annabella pillow to reposition while in bed.   Discussed assessment and recommendations with Nellie and house staff.

## 2017-05-30 NOTE — ADVANCED PRACTICE NURSE CONSULT - ASSESSMENT
Patient presented on admission with right heel resolving DTI measuring 10 cm x 9 cm, with fluctuant eschar on proximal end measuring 2 cm x 2 cm with small amount of serosanguinous exudate.   Applied heel protectors to offload heels.   Nellie GUERRERO present during assessment. Patient presented on admission with right heel dry, resolving DTI measuring 10 cm x 9 cm, with fluctuant eschar on proximal end measuring 2 cm x 2 cm with small amount of serosanguinous exudate.   Applied heel protectors to offload heels.   Nellie GUERRERO present during assessment.

## 2017-05-30 NOTE — PHYSICAL THERAPY INITIAL EVALUATION ADULT - IMPAIRMENTS FOUND, PT EVAL
aerobic capacity/endurance/gait, locomotion, and balance/arousal, attention, and cognition/poor safety awareness

## 2017-05-30 NOTE — PROGRESS NOTE ADULT - SUBJECTIVE AND OBJECTIVE BOX
Vascular Attending:  Janet      HPI:  89 yo F with PMH IBS, celiac disease, chronic LE edema (on lasix once weekly), bilateral rotator cuff tear, and DVT (s/p ivc fadia filter) [facility]. Of note, pt is altered and a poor hx therefore all hx obtained from charting and signout. Per documentation, pt w/ dyspnea, cough, and decreased PO intake x2days as well as increased lethargy. No trauma or fall.     Vascular consulted for R heel ulcer. Pts. aid at bedside reports pt. has had R heel ulcer/blister for last several weeks at nursing home. Denies f/c/new trauma/numbness/weakness      PAST MEDICAL & SURGICAL HISTORY:  DVT (deep venous thrombosis)  Dysphagia  Anemia  DJD (degenerative joint disease)  Edema  IBS (irritable bowel syndrome)  Celiac disease  H/O inguinal hernia repair  History of total left hip replacement      REVIEW OF SYSTEMS: see HPI    	        MEDICATIONS  (STANDING):  heparin  Injectable 5000Unit(s) SubCutaneous every 12 hours  cefTRIAXone   IVPB 1Gram(s) IV Intermittent every 24 hours  iron sucrose IVPB 200milliGRAM(s) IV Intermittent once    MEDICATIONS  (PRN):      Allergies    amoxicillin (Unknown)  Cipro (Unknown)  clindamycin (Unknown)  lactose (Unknown)  Wheat (Unknown)    Intolerances        SOCIAL HISTORY:    FAMILY HISTORY:  No pertinent family history in first degree relatives      Vital Signs Last 24 Hrs  T(C): 36.6, Max: 36.7 (05-30 @ 05:23)  T(F): 97.9, Max: 98.1 (05-30 @ 05:23)  HR: 85 (84 - 87)  BP: 124/80 (115/65 - 134/80)  BP(mean): --  RR: 20 (16 - 20)  SpO2: 95% (94% - 100%)    PHYSICAL EXAM:      Constitutional: NAD, resting comfortably in bed, reading [employer] Times  Extremities: RLE: wwp, no edema, eschar overlying heel, no drainage/purulence  Vascular: RLE: 2+ DP/PT            LABS:                        9.3    7.6   )-----------( 343      ( 30 May 2017 06:05 )             29.9     05-30    139  |  99  |  17  ----------------------------<  92  4.2   |  32<H>  |  0.90    Ca    9.3      30 May 2017 06:05  Phos  4.6     -  Mg     2.0     -    TPro  6.1  /  Alb  2.6<L>  /  TBili  0.3  /  DBili  x   /  AST  14  /  ALT  7<L>  /  AlkPhos  80  05-    PT/INR - ( 28 May 2017 21:34 )   PT: 11.4 sec;   INR: 1.03          PTT - ( 28 May 2017 21:34 )  PTT:25.1 sec  Urinalysis Basic - ( 28 May 2017 21:50 )    Color: Yellow / Appearance: SL CLOUDY / S.010 / pH: x  Gluc: x / Ketone: NEGATIVE  / Bili: NEGATIVE / Urobili: 0.2 E.U./dL   Blood: x / Protein: NEGATIVE mg/dL / Nitrite: POSITIVE   Leuk Esterase: Small / RBC: < 5 /HPF / WBC > 10 /HPF   Sq Epi: x / Non Sq Epi: Few /HPF / Bacteria: Many /HPF        RADIOLOGY & ADDITIONAL STUDIES    A/P: 89 yo F with PMH IBS, celiac disease, chronic LE edema (on lasix once weekly), vascular consulted for R heel ulcer    -No vascular surgical intervention indicated  -Wet to dry dressing applied  -Wound care consult for local wound care  -Plan per primary team team  -Discussed with chief resident Vascular Attending:  Janet      HPI:  89 yo F with PMH IBS, celiac disease, chronic LE edema (on lasix once weekly), bilateral rotator cuff tear, and DVT (s/p ivc fadia filter) presents to [facility] from [facility] w/ AMS. Of note, pt is altered and a poor hx therefore all hx obtained from charting and signout. Per documentation, pt w/ dyspnea, cough, and decreased PO intake x2days as well as increased lethargy. No trauma or fall.     Vascular consulted for R heel ulcer. Pts. aid at bedside reports pt. has had R heel ulcer/blister for last several weeks at nursing home. Denies f/c/new trauma/numbness/weakness      PAST MEDICAL & SURGICAL HISTORY:  DVT (deep venous thrombosis)  Dysphagia  Anemia  DJD (degenerative joint disease)  Edema  IBS (irritable bowel syndrome)  Celiac disease  H/O inguinal hernia repair  History of total left hip replacement      REVIEW OF SYSTEMS: see HPI    	        MEDICATIONS  (STANDING):  heparin  Injectable 5000Unit(s) SubCutaneous every 12 hours  cefTRIAXone   IVPB 1Gram(s) IV Intermittent every 24 hours  iron sucrose IVPB 200milliGRAM(s) IV Intermittent once    MEDICATIONS  (PRN):      Allergies    amoxicillin (Unknown)  Cipro (Unknown)  clindamycin (Unknown)  lactose (Unknown)  Wheat (Unknown)    Intolerances        SOCIAL HISTORY:    FAMILY HISTORY:  No pertinent family history in first degree relatives      Vital Signs Last 24 Hrs  T(C): 36.6, Max: 36.7 (05-30 @ 05:23)  T(F): 97.9, Max: 98.1 (05-30 @ 05:23)  HR: 85 (84 - 87)  BP: 124/80 (115/65 - 134/80)  BP(mean): --  RR: 20 (16 - 20)  SpO2: 95% (94% - 100%)    PHYSICAL EXAM:      Constitutional: NAD, resting comfortably in bed, reading [employer] [place] Times  Extremities: RLE: wwp, no edema, eschar overlying heel, no drainage/purulence  Vascular: RLE: 2+ DP/PT            LABS:                        9.3    7.6   )-----------( 343      ( 30 May 2017 06:05 )             29.9     05-30    139  |  99  |  17  ----------------------------<  92  4.2   |  32<H>  |  0.90    Ca    9.3      30 May 2017 06:05  Phos  4.6     -  Mg     2.0     -    TPro  6.1  /  Alb  2.6<L>  /  TBili  0.3  /  DBili  x   /  AST  14  /  ALT  7<L>  /  AlkPhos  80  05-    PT/INR - ( 28 May 2017 21:34 )   PT: 11.4 sec;   INR: 1.03          PTT - ( 28 May 2017 21:34 )  PTT:25.1 sec  Urinalysis Basic - ( 28 May 2017 21:50 )    Color: Yellow / Appearance: SL CLOUDY / S.010 / pH: x  Gluc: x / Ketone: NEGATIVE  / Bili: NEGATIVE / Urobili: 0.2 E.U./dL   Blood: x / Protein: NEGATIVE mg/dL / Nitrite: POSITIVE   Leuk Esterase: Small / RBC: < 5 /HPF / WBC > 10 /HPF   Sq Epi: x / Non Sq Epi: Few /HPF / Bacteria: Many /HPF        RADIOLOGY & ADDITIONAL STUDIES    A/P: 89 yo F with PMH IBS, celiac disease, chronic LE edema (on lasix once weekly), vascular consulted for R heel ulcer/skin breakdown    -No vascular surgical intervention indicated  -Wet to dry dressing applied  -Wound care consult for local wound care  -Plan per primary team team  -Vascular signing off  -Discussed with chief resident

## 2017-05-30 NOTE — PHYSICAL THERAPY INITIAL EVALUATION ADULT - GAIT DEVIATIONS NOTED, PT EVAL
decreased grady/retropulsion, COM posterior, highly unsteady gait, decreased standing balance/decreased weight-shifting ability/decreased step length

## 2017-05-30 NOTE — PROGRESS NOTE ADULT - PROBLEM SELECTOR PLAN 1
On ceftriaxone 1g q24h. Denies any complaints of dysuria, increased frequency, increased retention. BCx - NGTD, UCx show gram (-) rods. Awaiting speciation  - c/w ceftriaxone 1g q24h (day 3 abx)  - will f/u cx On ceftriaxone 1g q24h. Denies any complaints of dysuria, increased frequency, increased retention. BCx - NGTD, UCx show gram (-) rods. Awaiting speciation  - c/w ceftriaxone 1g q24h (day 3 abx). Will D/C after dose on 5/31.  - will f/u cx

## 2017-05-30 NOTE — PROGRESS NOTE ADULT - SUBJECTIVE AND OBJECTIVE BOX
Patient is a 89y old  Female who presents with a chief complaint of AMS (28 May 2017 23:07)      HPI:  89 yo F with PMH IBS, celiac disease, chronic LE edema (on lasix once weekly), bilateral rotator cuff tear, and DVT (s/p ivc fadia filter) presents to [facility]. Of note, pt is altered and a poor hx therefore all hx obtained from charting and signout. Per documentation, pt w/ dyspnea, cough, and decreased PO intake x2days as well as increased lethargy. No trauma or fall.     In the ED:  cefepime 1 g IV, Vancomycin 1 g IV, 1 L NS bolus (28 May 2017 23:07)    INTERVAL HPI/OVERNIGHT EVENTS:::weak, frail, on IV ab. LE-- will have vasc evaluation    HEALTH ISSUES - PROBLEM Dx:  Urinary tract infection without hematuria, site unspecified: Urinary tract infection without hematuria, site unspecified  Nutrition, metabolism, and development symptoms: Nutrition, metabolism, and development symptoms  Need for prophylactic measure: Need for prophylactic measure  Anemia: Anemia  Failure to thrive in adult: Failure to thrive in adult  DVT (deep venous thrombosis): DVT (deep venous thrombosis)  Altered mental status: Altered mental status          PAST MEDICAL & SURGICAL HISTORY:  DVT (deep venous thrombosis)  Dysphagia  Anemia  DJD (degenerative joint disease)  Edema  IBS (irritable bowel syndrome)  Celiac disease  H/O inguinal hernia repair  History of total left hip replacement          Consultant NOTE  REVIEWED  (   )    REVIEW OF SYSTEMS:  [x] As per HPI  CONSTITUTIONAL: No fever, weight loss, or fatigue  weakness  RESPIRATORY: No cough, wheezing, chills or hemoptysis; No Shortness of Breath  CARDIOVASCULAR: No chest pain, palpitations, dizziness, or leg swelling  GASTROINTESTINAL: No abdominal or epigastric pain. No nausea, vomiting, or hematemesis; No diarrhea or constipation. No melena or hematochezia.  MUSCULOSKELETAL: No joint pain or swelling; No muscle, back, or extremity pain  weakness  PSYCH    awake, alert     , ST loss  [x] All others negative	  [ ] Unable to obtain          Vital Signs Last 24 Hrs  T(C): 36.6, Max: 36.7 (05-30 @ 05:23)  T(F): 97.9, Max: 98.1 (05-30 @ 05:23)  HR: 85 (84 - 87)  BP: 124/80 (115/65 - 134/80)  BP(mean): --  RR: 20 (16 - 20)  SpO2: 95% (94% - 100%)        I & Os for current day (as of  @ 13:39)  =============================================  IN: 65 ml / OUT: 0 ml / NET: 65 ml    PHYSICAL EXAMINATION:                                    (    )  NO CHANGE  Appearance: Normal	  HEENT:   Normal oral mucosa, PERRL, EOMI	  Neck: Supple, + JVD/ - JVD; Carotid Bruit   Cardiovascular: Normal S1 S2, No JVD, No murmurs,   Respiratory: Lungs clear to auscultation/Decreased Breath Sounds/No Rales, Rhonchi, Wheezing	  Gastrointestinal:  Soft, Non-tender, + BS	  Skin: No rashes, No ecchymoses, No cyanosis  Extremities: L heel  Vascular: decr pulses   Neurologic: Non-focal  Psychiatry: Awake    heparin  Injectable 5000Unit(s) SubCutaneous every 12 hours  cefTRIAXone   IVPB 1Gram(s) IV Intermittent every 24 hours        PT/INR - ( 28 May 2017 21:34 )   PT: 11.4 sec;   INR: 1.03          PTT - ( 28 May 2017 21:34 )  PTT:25.1 sec    CARDIAC MARKERS ( 29 May 2017 06:41 )  x     / <0.01 ng/mL / x     / x     / x      CARDIAC MARKERS ( 28 May 2017 21:34 )  x     / <0.01 ng/mL / 39 U/L / x     / 1.3 ng/mL                            9.3    7.6   )-----------( 343      ( 30 May 2017 06:05 )             29.9     30    139  |  99  |  17  ----------------------------<  92  4.2   |  32<H>  |  0.90    Ca    9.3      30 May 2017 06:05  Phos  4.6       Mg     2.0         TPro  6.1  /  Alb  2.6<L>  /  TBili  0.3  /  DBili  x   /  AST  14  /  ALT  7<L>  /  AlkPhos  80        CAPILLARY BLOOD GLUCOSE    Urinalysis Basic - ( 28 May 2017 21:50 )    Color: Yellow / Appearance: SL CLOUDY / S.010 / pH: x  Gluc: x / Ketone: NEGATIVE  / Bili: NEGATIVE / Urobili: 0.2 E.U./dL   Blood: x / Protein: NEGATIVE mg/dL / Nitrite: POSITIVE   Leuk Esterase: Small / RBC: < 5 /HPF / WBC > 10 /HPF   Sq Epi: x / Non Sq Epi: Few /HPF / Bacteria: Many /HPF Patient is a 89y old  Female who presents with a chief complaint of AMS (28 May 2017 23:07)      HPI:  89 yo F with PMH IBS, celiac disease, chronic LE edema (on lasix once weekly), bilateral rotator cuff tear, and DVT (s/p ivc fadia filter) [facility]. Of note, pt is altered and a poor hx therefore all hx obtained from charting and signout. Per documentation, pt w/ dyspnea, cough, and decreased PO intake x2days as well as increased lethargy. No trauma or fall.     In the ED:  cefepime 1 g IV, Vancomycin 1 g IV, 1 L NS bolus (28 May 2017 23:07)    INTERVAL HPI/OVERNIGHT EVENTS:::weak, frail, on IV ab. LE-- will have vasc evaluation    HEALTH ISSUES - PROBLEM Dx:  Urinary tract infection without hematuria, site unspecified: Urinary tract infection without hematuria, site unspecified  Nutrition, metabolism, and development symptoms: Nutrition, metabolism, and development symptoms  Need for prophylactic measure: Need for prophylactic measure  Anemia: Anemia  Failure to thrive in adult: Failure to thrive in adult  DVT (deep venous thrombosis): DVT (deep venous thrombosis)  Altered mental status: Altered mental status          PAST MEDICAL & SURGICAL HISTORY:  DVT (deep venous thrombosis)  Dysphagia  Anemia  DJD (degenerative joint disease)  Edema  IBS (irritable bowel syndrome)  Celiac disease  H/O inguinal hernia repair  History of total left hip replacement          Consultant NOTE  REVIEWED  (   )    REVIEW OF SYSTEMS:  [x] As per HPI  CONSTITUTIONAL: No fever, weight loss, or fatigue  weakness  RESPIRATORY: No cough, wheezing, chills or hemoptysis; No Shortness of Breath  CARDIOVASCULAR: No chest pain, palpitations, dizziness, or leg swelling  GASTROINTESTINAL: No abdominal or epigastric pain. No nausea, vomiting, or hematemesis; No diarrhea or constipation. No melena or hematochezia.  MUSCULOSKELETAL: No joint pain or swelling; No muscle, back, or extremity pain  weakness  PSYCH    awake, alert     , ST loss  [x] All others negative	  [ ] Unable to obtain          Vital Signs Last 24 Hrs  T(C): 36.6, Max: 36.7 (05-30 @ 05:23)  T(F): 97.9, Max: 98.1 (05-30 @ 05:23)  HR: 85 (84 - 87)  BP: 124/80 (115/65 - 134/80)  BP(mean): --  RR: 20 (16 - 20)  SpO2: 95% (94% - 100%)        I & Os for current day (as of  @ 13:39)  =============================================  IN: 65 ml / OUT: 0 ml / NET: 65 ml    PHYSICAL EXAMINATION:                                    (    )  NO CHANGE  Appearance: Normal	  HEENT:   Normal oral mucosa, PERRL, EOMI	  Neck: Supple, + JVD/ - JVD; Carotid Bruit   Cardiovascular: Normal S1 S2, No JVD, No murmurs,   Respiratory: Lungs clear to auscultation/Decreased Breath Sounds/No Rales, Rhonchi, Wheezing	  Gastrointestinal:  Soft, Non-tender, + BS	  Skin: r heel  Extremities: r heel  Vascular: decr pulses   Neurologic: Non-focal  Psychiatry: Awake    heparin  Injectable 5000Unit(s) SubCutaneous every 12 hours  cefTRIAXone   IVPB 1Gram(s) IV Intermittent every 24 hours        PT/INR - ( 28 May 2017 21:34 )   PT: 11.4 sec;   INR: 1.03          PTT - ( 28 May 2017 21:34 )  PTT:25.1 sec    CARDIAC MARKERS ( 29 May 2017 06:41 )  x     / <0.01 ng/mL / x     / x     / x      CARDIAC MARKERS ( 28 May 2017 21:34 )  x     / <0.01 ng/mL / 39 U/L / x     / 1.3 ng/mL                            9.3    7.6   )-----------( 343      ( 30 May 2017 06:05 )             29.9     0530    139  |  99  |  17  ----------------------------<  92  4.2   |  32<H>  |  0.90    Ca    9.3      30 May 2017 06:05  Phos  4.6     05  Mg     2.0         TPro  6.1  /  Alb  2.6<L>  /  TBili  0.3  /  DBili  x   /  AST  14  /  ALT  7<L>  /  AlkPhos  80  0529      CAPILLARY BLOOD GLUCOSE    Urinalysis Basic - ( 28 May 2017 21:50 )    Color: Yellow / Appearance: SL CLOUDY / S.010 / pH: x  Gluc: x / Ketone: NEGATIVE  / Bili: NEGATIVE / Urobili: 0.2 E.U./dL   Blood: x / Protein: NEGATIVE mg/dL / Nitrite: POSITIVE   Leuk Esterase: Small / RBC: < 5 /HPF / WBC > 10 /HPF   Sq Epi: x / Non Sq Epi: Few /HPF / Bacteria: Many /HPF

## 2017-05-30 NOTE — PROGRESS NOTE ADULT - SUBJECTIVE AND OBJECTIVE BOX
Interventional, Pulmonary, Critical, Chest Special Procedures.    Pt was seen and fully examined by myself.     Time spent with patient in minutes:37    Patient is a 89y old  Female who presents with a chief complaint of AMS (28 May 2017 23:07)The patient more engaging today, appears comfortable. denies any pain, eupneic on RA    HPI:  89 yo F with PMH IBS, celiac disease, chronic LE edema (on lasix once weekly), bilateral rotator cuff tear, and DVT (s/p ivc fadia filter) presents to [facility] from [facility] w/ AMS. Of note, pt is altered and a poor hx therefore all hx obtained from charting and signout. Per documentation, pt w/ dyspnea, cough, and decreased PO intake x2days as well as increased lethargy. No trauma or fall.     In the ED:  cefepime 1 g IV, Vancomycin 1 g IV, 1 L NS bolus (28 May 2017 23:07)    REVIEW OF SYSTEMS:updated  Constitutional: No fever, weight loss, chills or fatigue  Eyes: No eye pain, visual disturbances, or discharge  ENMT:  No difficulty hearing, tinnitus, vertigo; No sinus or throat pain. No epistaxis, dysphagia, dysphonia, hoarseness or odynophagia  Neck: No pain, stiffness or neck swelling.  No masses or deformities  Respiratory: No cough, wheezing, chills or hemoptysis  - COPD  - ILD   - PE   - ASTHMA     - PNEUMONIA  Cardiovascular: No chest pain, dysrhythmia, palpitations, dizziness or edema   - COPD     - CAD   - CHF   - HTN  Gastrointestinal: No abdominal or epigastric pain. No nausea, vomiting or hematemesis; No diarrhea or constipation. No melena or hematochezia. No dysphagia or Icterus.          Genitourinary: No dysuria, frequency, hematuria or incontinence   - CKD/CAMRYN      - ESRD  Neurological: No headaches, memory loss, loss of strength, numbness or tremors      -DEMENTIA     - STROKE    - SEIZURE  Skin: No itching, burning, rashes or lesions   Lymph Nodes: No enlarged glands  Endocrine: No heat or cold intolerance; No hair loss       - DM     - THYROID DISORDER  Musculoskeletal: No joint pain or swelling; No muscle, back or extremity pain  Psychiatric: No depression, anxiety, mood swings or difficulty sleeping  Heme/Lymph: No easy bruising or bleeding gums         - ANEMIA      - CANCER   -COAGULOPATHY  Allergy and Immunologic: No hives or eczema    PAST MEDICAL & SURGICAL HISTORY:  DVT (deep venous thrombosis)  Dysphagia  Anemia  DJD (degenerative joint disease)  Edema  IBS (irritable bowel syndrome)  Celiac disease  H/O inguinal hernia repair  History of total left hip replacement    FAMILY HISTORY:  No pertinent family history in first degree relatives    SOCIAL HISTORY:      - Tobacco     - ETOH    Allergies    amoxicillin (Unknown)  Cipro (Unknown)  clindamycin (Unknown)  lactose (Unknown)  Wheat (Unknown)    Intolerances      Vital Signs Last 24 Hrs  T(C): 36.4, Max: 36.7 (05-30 @ 05:23)  T(F): 97.6, Max: 98.1 (05-30 @ 05:23)  HR: 80 (80 - 87)  BP: 105/72 (105/72 - 134/80)  BP(mean): --  RR: 20 (16 - 20)  SpO2: 94% (94% - 100%)    I & Os for current day (as of 05-30 @ 15:53)  =============================================  IN: 65 ml / OUT: 0 ml / NET: 65 ml      PHYSICAL EXAM:  More Comfortable, no distress  Eyes: PERRL, EOM intact; conjunctiva and sclera clear  Head: Normocephalic;  No Trauma  ENMT: No nasal discharge,+ hoarseness, +cough    Neck: Supple; non tender; no masses or deformities.    No JVD  Respiratory:  - WHEEZING   - RHONCHI  - RALES  + CRACKLES.  Diminished breath sounds  BILATERAL  RIGHT  LEFT BASES  Cardiovascular: Regular rate and rhythm. S1 and S2 Normal; No murmurs, gallops or rubs     - PPM/AICD  Gastrointestinal: Soft non-tender, non-distended; Normal bowel sounds; No hepatosplenomegaly.     -PEG    -  GT   - SWENSON  Genitourinary: No costovertebral angle tenderness. No dysuria  Extremities: AROM, No clubbing, cyanosis or edema    Vascular: Peripheral pulses palpable 2+ bilaterally  Neurological: Alert and responisve to stimuli   Skin: Warm and dry. No obvious rash  Lymph Nodes: No acute cervical or supraclavicular adenopathy  Psychiatric: Cooperative and appropriate mood    DEVICES:  - DENTURES   +IV R / L     - ETUBE   -TRACH   -CTUBE  R / L      LABS:                          9.3    7.6   )-----------( 343      ( 30 May 2017 06:05 )             29.9     05-30    139  |  99  |  17  ----------------------------<  92  4.2   |  32<H>  |  0.90    Ca    9.3      30 May 2017 06:05  Phos  4.6     05-29  Mg     2.0     05-30    TPro  6.1  /  Alb  2.6<L>  /  TBili  0.3  /  DBili  x   /  AST  14  /  ALT  7<L>  /  AlkPhos  80  05-29    PT/INR - ( 28 May 2017 21:34 )   PT: 11.4 sec;   INR: 1.03          PTT - ( 28 May 2017 21:34 )  PTT:25.1 sec  EXAM:  XR CHEST 1 VIEW PORT URGENT                          PROCEDURE DATE:  05/29/2017                     INTERPRETATION:  XR CHEST 1 VIEW PORT URGENT dated 5/29/2017 9:44 AM    INDICATION: 89 years-old Female with Abnormal Chest Sounds.    PRIORSTUDIES: Chest x-ray 5/28/2017 9:49 PM    FINDINGS: Poor inspiratory view due to obscuration of the lung apex is   submitted. No new consolidation. Minimal atelectasis in the left base. No   pneumothorax. Possible minimal right pleural effusion.. Mild pulmonary   congestion. No change in cardiomediastinal silhouette. No significant   change in the appearance of the bones as previously reported. Diffuse   osteopenia with cystic changes in the right humeral head.     IMPRESSION:  No interval change. Possible minimal right pleural effusion. Minimal   atelectasis in the left base.RADIOLOGY & ADDITIONAL STUDIES (The following images were personally reviewed):

## 2017-05-30 NOTE — PHYSICAL THERAPY INITIAL EVALUATION ADULT - LIVES WITH, PROFILE
from H&P, patient from [facility], unable to obtain PLOF at this time secondary to patient poor historian

## 2017-05-30 NOTE — PROGRESS NOTE ADULT - ATTENDING COMMENTS
Anemia---  IV IRON  HEEL ulcer--[facility]  Pulm-- natanal Anemia---  IV IRON  HEEL ulcer--[facility]  Pulm-- eval    PT

## 2017-05-30 NOTE — ADVANCED PRACTICE NURSE CONSULT - REASON FOR CONSULT
WOC nurse consult to assess right heel unstageable pressure ulcer (injury). Patient is 89 yo F with PMH IBS, celiac disease, chronic LE edema (on lasix once weekly), bilateral rotator cuff tear, and DVT (s/p ivc fadia filter) presents to [facility] from [facility] w/ AMS. Of note, pt is altered and a poor hx therefore all hx obtained from charting and signout. Per documentation, pt w/ dyspnea, cough, and decreased PO intake x 2days as well as increased lethargy. No trauma or fall.

## 2017-05-30 NOTE — CHART NOTE - NSCHARTNOTEFT_GEN_A_CORE
PGY-1?FF?R??? PGY-1 PGY-1OFF-SERVICE NOTE PGY-1 OFF-SERVICE NOTE    Pt is a 89 y/o F w/IBS, celiac disease, chronic LE edema (on lasix once weekly), bilateral rotator cuff tear, and DVT (s/p ivc fadia filter) who was sent from [facility] on 5/28 w/ acute toxic encephalopathy likely 2/2 UTI. Started on ceftriaxone for UTI treatment with improvement in mental status. Wound care and vascular evaluated patient for right heel eschar. Pulmonary to see patient on 5/31. Patient's condition stable and ready for discharge planning back to [facility].

## 2017-05-31 ENCOUNTER — TRANSCRIPTION ENCOUNTER (OUTPATIENT)
Age: 82
End: 2017-05-31

## 2017-05-31 LAB
ANION GAP SERPL CALC-SCNC: 11 MMOL/L — SIGNIFICANT CHANGE UP (ref 5–17)
BUN SERPL-MCNC: 23 MG/DL — SIGNIFICANT CHANGE UP (ref 7–23)
CALCIUM SERPL-MCNC: 9.3 MG/DL — SIGNIFICANT CHANGE UP (ref 8.4–10.5)
CHLORIDE SERPL-SCNC: 100 MMOL/L — SIGNIFICANT CHANGE UP (ref 96–108)
CO2 SERPL-SCNC: 30 MMOL/L — SIGNIFICANT CHANGE UP (ref 22–31)
CREAT SERPL-MCNC: 0.8 MG/DL — SIGNIFICANT CHANGE UP (ref 0.5–1.3)
GLUCOSE SERPL-MCNC: 89 MG/DL — SIGNIFICANT CHANGE UP (ref 70–99)
HCT VFR BLD CALC: 29.1 % — LOW (ref 34.5–45)
HGB BLD-MCNC: 9.1 G/DL — LOW (ref 11.5–15.5)
MAGNESIUM SERPL-MCNC: 2.1 MG/DL — SIGNIFICANT CHANGE UP (ref 1.6–2.6)
MCHC RBC-ENTMCNC: 28.1 PG — SIGNIFICANT CHANGE UP (ref 27–34)
MCHC RBC-ENTMCNC: 31.3 G/DL — LOW (ref 32–36)
MCV RBC AUTO: 89.8 FL — SIGNIFICANT CHANGE UP (ref 80–100)
PLATELET # BLD AUTO: 336 K/UL — SIGNIFICANT CHANGE UP (ref 150–400)
POTASSIUM SERPL-MCNC: 4.6 MMOL/L — SIGNIFICANT CHANGE UP (ref 3.5–5.3)
POTASSIUM SERPL-SCNC: 4.6 MMOL/L — SIGNIFICANT CHANGE UP (ref 3.5–5.3)
RBC # BLD: 3.24 M/UL — LOW (ref 3.8–5.2)
RBC # FLD: 15.8 % — SIGNIFICANT CHANGE UP (ref 10.3–16.9)
SODIUM SERPL-SCNC: 141 MMOL/L — SIGNIFICANT CHANGE UP (ref 135–145)
WBC # BLD: 6 K/UL — SIGNIFICANT CHANGE UP (ref 3.8–10.5)
WBC # FLD AUTO: 6 K/UL — SIGNIFICANT CHANGE UP (ref 3.8–10.5)

## 2017-05-31 RX ORDER — FUROSEMIDE 40 MG
0 TABLET ORAL
Qty: 0 | Refills: 0 | COMMUNITY

## 2017-05-31 RX ORDER — METOPROLOL TARTRATE 50 MG
0 TABLET ORAL
Qty: 0 | Refills: 0 | COMMUNITY

## 2017-05-31 RX ADMIN — HEPARIN SODIUM 5000 UNIT(S): 5000 INJECTION INTRAVENOUS; SUBCUTANEOUS at 18:00

## 2017-05-31 RX ADMIN — IRON SUCROSE 210 MILLIGRAM(S): 20 INJECTION, SOLUTION INTRAVENOUS at 12:01

## 2017-05-31 RX ADMIN — CEFTRIAXONE 100 GRAM(S): 500 INJECTION, POWDER, FOR SOLUTION INTRAMUSCULAR; INTRAVENOUS at 11:00

## 2017-05-31 RX ADMIN — HEPARIN SODIUM 5000 UNIT(S): 5000 INJECTION INTRAVENOUS; SUBCUTANEOUS at 06:14

## 2017-05-31 NOTE — CONSULT NOTE ADULT - SUBJECTIVE AND OBJECTIVE BOX
Patient is a 89y old  Female who presents with a chief complaint of AMS (28 May 2017 23:07)      HPI:  89 yo F with PMH IBS, celiac disease, chronic LE edema (on lasix once weekly), bilateral rotator cuff tear, and DVT (s/p ivc fadia filter) presents to [facility] from [facility] w/ AMS. Of note, pt is altered and a poor hx therefore all hx obtained from charting and signout. Per documentation, pt w/ dyspnea, cough, and decreased PO intake x2days as well as increased lethargy. No trauma or fall.     In the ED:  cefepime 1 g IV, Vancomycin 1 g IV, 1 L NS bolus (28 May 2017 23:07)      PAST MEDICAL & SURGICAL HISTORY:  DVT (deep venous thrombosis)  Dysphagia  Anemia  DJD (degenerative joint disease)  Edema  IBS (irritable bowel syndrome)  Celiac disease  H/O inguinal hernia repair  History of total left hip replacement      MEDICATIONS  (STANDING):  heparin  Injectable 5000Unit(s) SubCutaneous every 12 hours  iron sucrose IVPB 100milliGRAM(s) IV Intermittent daily    MEDICATIONS  (PRN):      Social History: transferred from [facility]    Functional Level Prior to Admission: unknown patient A&O x 1    FAMILY HISTORY:  No pertinent family history in first degree relatives      CBC Full  -  ( 31 May 2017 07:05 )  WBC Count : 6.0 K/uL  Hemoglobin : 9.1 g/dL  Hematocrit : 29.1 %  Platelet Count - Automated : 336 K/uL  Mean Cell Volume : 89.8 fL  Mean Cell Hemoglobin : 28.1 pg  Mean Cell Hemoglobin Concentration : 31.3 g/dL  Auto Neutrophil # : x  Auto Lymphocyte # : x  Auto Monocyte # : x  Auto Eosinophil # : x  Auto Basophil # : x  Auto Neutrophil % : x  Auto Lymphocyte % : x  Auto Monocyte % : x  Auto Eosinophil % : x  Auto Basophil % : x      05-31    141  |  100  |  23  ----------------------------<  89  4.6   |  30  |  0.80    Ca    9.3      31 May 2017 07:05  Mg     2.1     05-31              Radiology:      INTERPRETATION:  XR CHEST 1 VIEW PORT URGENT dated 5/29/2017 9:44 AM    INDICATION: 89 years-old Female with Abnormal Chest Sounds.    PRIORSTUDIES: Chest x-ray 5/28/2017 9:49 PM    FINDINGS: Poor inspiratory view due to obscuration of the lung apex is   submitted. No new consolidation. Minimal atelectasis in the left base. No   pneumothorax. Possible minimal right pleural effusion.. Mild pulmonary   congestion. No change in cardiomediastinal silhouette. No significant   change in the appearance of the bones as previously reported. Diffuse   osteopenia with cystic changes in the right humeral head.     IMPRESSION:  No interval change. Possible minimal right pleural effusion. Minimal   atelectasis in the left base.    INTERPRETATION:    PROCEDURE: CT head without intravenous contrast    INDICATION: Altered mental status. Lethargic.    TECHNIQUE: Multiple axial images were obtained at 5 mm intervals from the   skull base to the vertex. The images were reviewed in brain and bone   windows. Sagittal and coronal reformatted images were obtained.    COMPARISON: CT brain 1/11/2017    FINDINGS: The CT examination again demonstrates the ventricles, and   cisternal spaces to be enlarged due to age-related volume loss. There is   no midline shift or extra axial collections. Stable patchy and confluent   hypodensities are seen in the periventricular white matter, likely   sequelae of small vessel ischemic disease. Punctate hyperdensities are   noted in the bilateral basal ganglia, unchanged. 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 There is no   intracranial hemorrhage or acute transcortical infarct. The bony windows   demonstrates no fractures. The visualized paranasal sinuses are within   normal limits. The mastoid air cells are well aerated.    IMPRESSION: No acute intracranial abnormality. No substantial change   since 1/11/2017.              Vital Signs Last 24 Hrs  T(C): 36.5, Max: 36.7 (05-30 @ 21:23)  T(F): 97.7, Max: 98.1 (05-30 @ 21:23)  HR: 80 (80 - 85)  BP: 112/71 (105/72 - 119/79)  BP(mean): --  RR: 18 (18 - 20)  SpO2: 98% (94% - 98%)    REVIEW OF SYSTEMS: patient A&O x 1 to self    CONSTITUTIONAL: No fever, weight loss, or fatigue  EYES: No eye pain, visual disturbances, or discharge  ENMT:  No difficulty hearing, tinnitus, vertigo; No sinus or throat pain  NECK: No pain or stiffness  BREASTS: No pain, masses, or nipple discharge  RESPIRATORY: No cough, wheezing, chills or hemoptysis; No shortness of breath  CARDIOVASCULAR: No chest pain, palpitations, dizziness, or leg swelling  GASTROINTESTINAL: No abdominal or epigastric pain. No nausea, vomiting, or hematemesis; No diarrhea or constipation. No melena or hematochezia.  GENITOURINARY: No dysuria, frequency, hematuria, or incontinence  NEUROLOGICAL: No headaches, memory loss, loss of strength, numbness, or tremors  SKIN: No itching, burning, rashes, or lesions   LYMPH NODES: No enlarged glands  ENDOCRINE: No heat or cold intolerance; No hair loss  MUSCULOSKELETAL: shoulder pain  PSYCHIATRIC: No depression, anxiety, mood swings, or difficulty sleeping  HEME/LYMPH: No easy bruising, or bleeding gums  ALLERGY AND IMMUNOLOGIC: No hives or eczema      Physical Exam: frail appearing  woman lying in bed, c/o shoulder pain    HEENT: normocephalic/ atraumatic, anicteric    Neck: supple, negative JVD, negative carotid bruits,    Chest: bibasilar crackles    Cardiovascular: regular rate and rhythm, III/VI systolic murmur    Abdomen: soft, non tender, negative rebound/guarding, non distended, normal bowel sounds, neg hepatosplenomegaly    Extremities: WWP, neg cyanosis/clubbing/edema, negative calf tenderness to palpation, negative Saray's sign    Bilateral shoulders: anterior tilt, pos AC joint tenderness, + impingement, + apprehension sign, supraspinatus 3+/5    Neurologic Exam:    Alert and oriented x 1 to person, speech fluent w/o dysarthria, follows commands    Cranial Nerves:     II:                       pupils equal, round and reactive to light, visual fields intact   III/ IV/VI:             extraocular movements intact, neg nystagmus, ptosis  V:                      facial sensation intact, V1-3 normal  VII:                     face symmetric, no droop, normal eye closure and smile  VIII:                    hearing intact to finger rub bilaterally  IX/ X:                  soft palate rise symmetrical  XI:                      head turning, shoulder shrug normal  XII:                     tongue midline    Motor Exam:    Bilateral UE:        4/5 /intrinsics                            4+/5 biceps/triceps/wrist extensors-flexors/                            3+/5 supraspinatus                            negative pronator drift      Bilateral LE:        4/5 hip flexors/adductors/abductors                            4/5 quadriceps/hamstrings                            5/5 dorsiflexors/plantar flexors/invertors-evertors    Sensory: intact to LT/PP in all UE/LE dermatomes    DTR:      =   biceps/     triceps/     brachioradialis               =   patella/   medial hamstring/    ankle                 neg clonus                 neg Babinski                 neg Hoffmans    Finger to Nose: did not cooperate    Heel to Vasquez:     did not cooperate    Rapid Alternating movements:  did not cooperate    Joint Position Sense:  did not cooperate    Romberg: NT    Tandem Walking: NT    Gait:  NT        PM&R Impression:    1) deconditioned  2) bilateral rotator cuff tears  3) gait dysfunction      Recommendations:    1) Physical therapy focusing on therapeutic exercises, bed mobility/transfer out of bed evaluation, progressive ambulation with assistive devices.    2) Disposition Plan: return to [facility]

## 2017-05-31 NOTE — PROGRESS NOTE ADULT - PROVIDER SPECIALTY LIST ADULT
Internal Medicine
Pulmonology
Pulmonology
Vascular Surgery
Pulmonology

## 2017-05-31 NOTE — DISCHARGE NOTE ADULT - HOSPITAL COURSE
Pt is a 89 y/o F w/IBS, celiac disease, chronic LE edema (on lasix once weekly), bilateral rotator cuff tear, and DVT (s/p ivc fadia filter) who was sent from [facility] on 5/28 w/ acute toxic encephalopathy likely 2/2 UTI. Started on ceftriaxone for UTI treatment with improvement in mental status. Wound care and vascular evaluated patient for right heel eschar. Pulmonary saw patient and respirotary status improved. Patient's condition stable and ready for discharge planning back to [facility].

## 2017-05-31 NOTE — DISCHARGE NOTE ADULT - MEDICATION SUMMARY - MEDICATIONS TO STOP TAKING
I will STOP taking the medications listed below when I get home from the hospital:    acetaminophen 325 mg oral tablet  -- 2 tab(s) by mouth every 6 hours, As needed, Moderate Pain (4 - 6)    acetaminophen-oxyCODONE 325 mg-5 mg oral tablet  -- 1 tab(s) by mouth every 4 hours, As needed, Severe Pain (7 - 10)    Lasix  --  by mouth    metoprolol  --  by mouth

## 2017-05-31 NOTE — DISCHARGE NOTE ADULT - PLAN OF CARE
no recurrence of UTI You were admitted with altered mental status which was believed to be due to a urinary tract infection given a positive urinalysis, and were started on antibiotics and completed the course; you had resolution of your altered status and have returned to your baseline. Please follow up with Dr. an once discharged. You have a history of iron deficiency for which you received IV infusion of iron during your hospital stay.

## 2017-05-31 NOTE — PROGRESS NOTE ADULT - ASSESSMENT
ASSESSMENT/PLAN 89Y/O FEMALE PT C ams, uti, DEHYDRATION, HIGH ASPIRATION RISK, lung atelectases    1. O2 attempt off  2. Bronchodilators:  Atrovent/ albuterol q 4 – 6 hours as needed  3. Corticosteroids OFF  4. ID/Antibiotics: Ceftriaxone pending G- identification  5. Cardiac/HTN: optimize BP  6. GI: Rx/ prophylaxis c PPI/H2B  7. Heme: Rx/VT prophylaxis c SQH/SCD/AC  8. Aspiration precautions at all times  Discussed with managing team
ASSESSMENT/PLAN 89Y/O FEMAL PT C ams, uti, DEHYDRATION, HIGH ASPIRATION RISK    1. O2 2lnc HUMIDIFY  2. Bronchodilators:  Atrovent/ albuterol q 4 – 6 hours as needed  3. Corticosteroids OFF  4. ID/Antibiotics: Ceftriaxone  5. Cardiac/HTN: optimize BP  6. GI: Rx/ prophylaxis c PPI/H2B  7. Heme: Rx/VT prophylaxis c SQH/SCD/AC  8. Aspiration precautions at all times  Discussed with managing team
ASSESSMENT/PLAN 89Y/O FEMALE PT C ams, uti, DEHYDRATION, HIGH ASPIRATION RISK, lung atelectases    1. O2 attempt off  2. Bronchodilators:  Atrovent/ albuterol q 4 – 6 hours as needed  3. Corticosteroids OFF  4. ID/Antibiotics: Ceftriaxone pending G- identification  5. Cardiac/HTN: optimize BP  6. GI: Rx/ prophylaxis c PPI/H2B  7. Heme: Rx/VT prophylaxis c SQH/SCD/AC  8. Aspiration precautions at all times  9.Repeat CXR  Discussed with managing team
Pt is a 89 y/o F w/IBS, celiac disease, chronic LE edema (on lasix once weekly), bilateral rotator cuff tear, and DVT (s/p ivc fadia filter) who was sent from [facility] on 5/28 w/ AMS. UA (+), started on abx for UTI.
Pt is a 89 y/o F w/IBS, celiac disease, chronic LE edema (on lasix once weekly), bilateral rotator cuff tear, and DVT (s/p ivc fadia filter) who was sent from [facility] on 5/28 w/ AMS. UA (+), started on abx for UTI.
Pt is a 89 y/o F w/IBS, celiac disease, chronic LE edema (on lasix once weekly), bilateral rotator cuff tear, and DVT (s/p ivc fadia filter) who was sent from [facility] on 5/28 w/ AMS. UA (+), started on abx for UTI.

## 2017-05-31 NOTE — PROGRESS NOTE ADULT - PROBLEM SELECTOR PLAN 7
- pureed diet gluten free (on previous admissions)  - Replete lytes daily PRN - pureed diet gluten free (on previous admissions)  - Replete lytes daily PRN  Dispo: Will discuss about discharge today to [facility]

## 2017-05-31 NOTE — DISCHARGE NOTE ADULT - CARE PROVIDER_API CALL
Dl Motta (MD), Internal Medicine  [address] [address]  Phone: [phone]  Fax: [phone]

## 2017-05-31 NOTE — PROGRESS NOTE ADULT - PROBLEM SELECTOR PLAN 1
On ceftriaxone 1g q24h. Denies any complaints of dysuria, increased frequency, increased retention. BCx - NGTD, UCx show gram (-) rods. Awaiting speciation  - c/w ceftriaxone 1g q24h (day 3 abx). Will D/C after dose on 5/31.  - will f/u cx On ceftriaxone 1g q24h. Denies any complaints of dysuria, increased frequency, increased retention. BCx - NGTD, UCx show gram (-) rods. Awaiting speciation  - c/w ceftriaxone 1g q24h (day 3 abx). Last Day Today 5/31  - will f/u cx

## 2017-05-31 NOTE — PROGRESS NOTE ADULT - PROBLEM SELECTOR PLAN 3
BMI <20, cachetic on exam. As per son, patient has poor PO intake and requires help from aides to eat. Hx of slow emptying on video FEES as per son.  - c/w pureed diet + Ensure Enlive tid
BMI <20, cachetic on exam. As per son, patient has poor PO intake and requires help from aides to eat. Hx of slow emptying on video FEES as per son.  - will f/u nutrition consult  - c/w pureed diet
BMI <20, cachetic on exam. As per son, patient has poor PO intake and requires help from aides to eat. Hx of slow emptying on video FEES as per son.  - c/w pureed diet + Ensure Enlive tid

## 2017-05-31 NOTE — PROGRESS NOTE ADULT - SUBJECTIVE AND OBJECTIVE BOX
Interventional, Pulmonary, Critical, Chest Special Procedures.    Pt was seen and fully examined by myself.     Time spent with patient in [id number]    Patient is a 89y old  Female who presents with a chief complaint of AMS (28 May 2017 23:07)Today the patient ill appearing, some verbal response, eupneic on RA when seen.    HPI:  89 yo F with PMH IBS, celiac disease, chronic LE edema (on lasix once weekly), bilateral rotator cuff tear, and DVT (s/p ivc fadia filter) presents to [facility] from [facility] w/ AMS. Of note, pt is altered and a poor hx therefore all hx obtained from charting and signout. Per documentation, pt w/ dyspnea, cough, and decreased PO intake x2days as well as increased lethargy. No trauma or fall.     In the ED:  cefepime 1 g IV, Vancomycin 1 g IV, 1 L NS bolus (28 May 2017 23:07)    REVIEW OF SYSTEMS:updated  Constitutional: No fever, weight loss, chills or fatigue  Eyes: No eye pain, visual disturbances, or discharge  ENMT:  No difficulty hearing, tinnitus, vertigo; No sinus or throat pain. No epistaxis, dysphagia, dysphonia, hoarseness or odynophagia  Neck: No pain, stiffness or neck swelling.  No masses or deformities  Respiratory: No cough, wheezing, chills or hemoptysis  - COPD  - ILD   - PE   - ASTHMA     - PNEUMONIA  Cardiovascular: No chest pain, dysrhythmia, palpitations, dizziness or edema   - COPD     - CAD   - CHF   - HTN  Gastrointestinal: No abdominal or epigastric pain. No nausea, vomiting or hematemesis; No diarrhea or constipation. No melena or hematochezia. No dysphagia or Icterus.          Genitourinary: No dysuria, frequency, hematuria or incontinence   - CKD/CAMRYN      - ESRD  Neurological: No headaches, memory loss, loss of strength, numbness or tremors      -DEMENTIA     - STROKE    - SEIZURE  Skin: No itching, burning, rashes or lesions   Lymph Nodes: No enlarged glands  Endocrine: No heat or cold intolerance; No hair loss       - DM     - THYROID DISORDER  Musculoskeletal: No joint pain or swelling; No muscle, back or extremity pain  Psychiatric: No depression, anxiety, mood swings or difficulty sleeping  Heme/Lymph: No easy bruising or bleeding gums         - ANEMIA      - CANCER   -COAGULOPATHY  Allergy and Immunologic: No hives or eczema    PAST MEDICAL & SURGICAL HISTORY:  DVT (deep venous thrombosis)  Dysphagia  Anemia  DJD (degenerative joint disease)  Edema  IBS (irritable bowel syndrome)  Celiac disease  H/O inguinal hernia repair  History of total left hip replacement    FAMILY HISTORY:  No pertinent family history in first degree relatives    SOCIAL HISTORY:      - Tobacco     - ETOH    Allergies    amoxicillin (Unknown)  Cipro (Unknown)  clindamycin (Unknown)  lactose (Unknown)  Wheat (Unknown)    Intolerances      Vital Signs Last 24 Hrs  T(C): 36.5, Max: 36.7 (05-30 @ 21:23)  T(F): 97.7, Max: 98.1 (05-30 @ 21:23)  HR: 80 (80 - 85)  BP: 112/71 (105/72 - 119/79)  BP(mean): --  RR: 18 (18 - 20)  SpO2: 98% (94% - 98%)      PHYSICAL EXAM:  More Comfortable, no distress  Eyes: PERRL, EOM intact; conjunctiva and sclera clear  Head: Normocephalic;  No Trauma  ENMT: No nasal discharge,+ hoarseness, +cough    Neck: Supple; non tender; no masses or deformities.    No JVD  Respiratory:  - WHEEZING   - RHONCHI  - RALES  + CRACKLES.  Diminished breath sounds  BILATERAL  RIGHT  LEFT BASES  Cardiovascular: Regular rate and rhythm. S1 and S2 Normal; No murmurs, gallops or rubs     - PPM/AICD  Gastrointestinal: Soft non-tender, non-distended; Normal bowel sounds; No hepatosplenomegaly.     -PEG    -  GT   - SWENSON  Genitourinary: No costovertebral angle tenderness. No dysuria  Extremities: AROM, No clubbing, cyanosis or edema    Vascular: Peripheral pulses palpable 2+ bilaterally  Neurological: Alert and responisve to stimuli   Skin: Warm and dry. No obvious rash  Lymph Nodes: No acute cervical or supraclavicular adenopathy  Psychiatric: Cooperative and appropriate mood    DEVICES:  - DENTURES   +IV R / L     - ETUBE   -TRACH   -CTUBE  R / L      LABS:                          9.1    6.0   )-----------( 336      ( 31 May 2017 07:05 )             29.1     05-31    141  |  100  |  23  ----------------------------<  89  4.6   |  30  |  0.80    Ca    9.3      31 May 2017 07:05  Mg     2.1     05-31        EXAM:  XR CHEST 1 VIEW PORT URGENT                          PROCEDURE DATE:  05/29/2017                     INTERPRETATION:  XR CHEST 1 VIEW PORT URGENT dated 5/29/2017 9:44 AM    INDICATION: 89 years-old Female with Abnormal Chest Sounds.    PRIORSTUDIES: Chest x-ray 5/28/2017 9:49 PM    FINDINGS: Poor inspiratory view due to obscuration of the lung apex is   submitted. No new consolidation. Minimal atelectasis in the left base. No   pneumothorax. Possible minimal right pleural effusion.. Mild pulmonary   congestion. No change in cardiomediastinal silhouette. No significant   change in the appearance of the bones as previously reported. Diffuse   osteopenia with cystic changes in the right humeral head.     IMPRESSION:  No interval change. Possible minimal right pleural effusion. Minimal   atelectasis in the left base.  RADIOLOGY & ADDITIONAL STUDIES (The following images were personally reviewed):

## 2017-05-31 NOTE — DISCHARGE NOTE ADULT - PATIENT PORTAL LINK FT
“You can access the [facility] Patient Portal, offered by [facility], by registering with the following website: [email or web address]”

## 2017-05-31 NOTE — PROGRESS NOTE ADULT - SUBJECTIVE AND OBJECTIVE BOX
INTERVAL HPI/OVERNIGHT EVENTS:  Patient was seen and examined at bedside. As per nurse and patient, no o/n events, patient resting comfortably. No complaints at this time. Patient denies fever, chills, dizziness, weakness, HA, Changes in vision, CP, palpitations, SOB, cough, N/V/D/C, dysuria, changes in bowel movements, LE edema.    VITAL SIGNS:  T(F): 97.4  HR: 82  BP: 119/79  RR: 19  SpO2: 94%  Wt(kg): --    PHYSICAL EXAM:    Constitutional: WDWN, NAD  Eyes: PERRL, EOMI, sclera non-icteric  Neck: supple, trachea midline, no masses, no JVD  Respiratory: CTA b/l, good air entry b/l, no wheezing, rhonchi, rales, without accessory muscle use and no intercostal retractions  Cardiovascular: RRR, normal S1S2, no M/R/G  Gastrointestinal: soft, NTND, no masses palpable, BS normal  Extremities: Warm, well perfused, pulses equal bilateral upper and lower extremities, no edema, no clubbing  Neurological: AAOx3, CN Grossly intact  Skin: Normal temperature, warm, dry    MEDICATIONS  (STANDING):  heparin  Injectable 5000Unit(s) SubCutaneous every 12 hours  cefTRIAXone   IVPB 1Gram(s) IV Intermittent every 24 hours  iron sucrose IVPB 100milliGRAM(s) IV Intermittent daily    MEDICATIONS  (PRN):      Allergies    amoxicillin (Unknown)  Cipro (Unknown)  clindamycin (Unknown)  lactose (Unknown)  Wheat (Unknown)    Intolerances        LABS:                        9.3    7.6   )-----------( 343      ( 30 May 2017 06:05 )             29.9     05-30    139  |  99  |  17  ----------------------------<  92  4.2   |  32<H>  |  0.90    Ca    9.3      30 May 2017 06:05  Phos  4.6     05-29  Mg     2.0     05-30    TPro  6.1  /  Alb  2.6<L>  /  TBili  0.3  /  DBili  x   /  AST  14  /  ALT  7<L>  /  AlkPhos  80  05-29          RADIOLOGY & ADDITIONAL TESTS: INTERVAL HPI/OVERNIGHT EVENTS:  Patient was seen and examined at bedside. As per nurse and patient, no o/n events, patient resting comfortably. No complaints at this time. Patient denies fever, chills, dizziness, weakness, HA, Changes in vision, CP, palpitations, SOB, cough, N/V/D/C, dysuria, changes in bowel movements, LE edema.    VITAL SIGNS:  T(F): 97.4  HR: 82  BP: 119/79  RR: 19  SpO2: 94%  Wt(kg): --    PHYSICAL EXAM:    Constitutional: Frail, NAD, tired appearing  Eyes: PERRL, EOMI  ENMT: Dry  Neck: no jvd appreciated   Respiratory: mild rhonchi  Cardiovascular: RRR, S1, S2, II/VI systolic murmur  Gastrointestinal: Soft, NTND normoactive BS  Extremities: WWP, no edema  Vascular: 2+ pulses b/l  Neurological: AAOx1-2, CN II-XII grossly intact, 4/5 strength across all extremities; follows commands.     MEDICATIONS  (STANDING):  heparin  Injectable 5000Unit(s) SubCutaneous every 12 hours  cefTRIAXone   IVPB 1Gram(s) IV Intermittent every 24 hours  iron sucrose IVPB 100milliGRAM(s) IV Intermittent daily    MEDICATIONS  (PRN):      Allergies    amoxicillin (Unknown)  Cipro (Unknown)  clindamycin (Unknown)  lactose (Unknown)  Wheat (Unknown)    Intolerances        LABS:                        9.3    7.6   )-----------( 343      ( 30 May 2017 06:05 )             29.9     05-30    139  |  99  |  17  ----------------------------<  92  4.2   |  32<H>  |  0.90    Ca    9.3      30 May 2017 06:05  Phos  4.6     05-29  Mg     2.0     05-30    TPro  6.1  /  Alb  2.6<L>  /  TBili  0.3  /  DBili  x   /  AST  14  /  ALT  7<L>  /  AlkPhos  80  05-29          RADIOLOGY & ADDITIONAL TESTS: INTERVAL HPI/OVERNIGHT EVENTS:  Patient was seen and examined at bedside. As per nurse and patient, no o/n events, patient resting comfortably. No complaints at this time. Patient denies fever, chills, dizziness, weakness, HA, Changes in vision, CP, palpitations, SOB, cough, N/V/D/C, dysuria, changes in bowel movements, LE edema.    VITAL SIGNS:  T(F): 97.4  HR: 82  BP: 119/79  RR: 19  SpO2: 94%  Wt(kg): --    PHYSICAL EXAM:    Constitutional: Frail, NAD, tired appearing  Eyes: PERRL, EOMI  ENMT: Dry  Neck: no jvd appreciated   Respiratory: mild rhonchi  Cardiovascular: RRR, S1, S2, 3/6 systolic murmur appreciated in all cardiac fields, loudest in   Gastrointestinal: Soft, NTND normoactive BS  Extremities: WWP, no edema  Vascular: 2+ pulses b/l  Neurological: AAOx1-2, CN II-XII grossly intact, 4/5 strength across all extremities; follows commands.     MEDICATIONS  (STANDING):  heparin  Injectable 5000Unit(s) SubCutaneous every 12 hours  cefTRIAXone   IVPB 1Gram(s) IV Intermittent every 24 hours  iron sucrose IVPB 100milliGRAM(s) IV Intermittent daily    MEDICATIONS  (PRN):      Allergies    amoxicillin (Unknown)  Cipro (Unknown)  clindamycin (Unknown)  lactose (Unknown)  Wheat (Unknown)    Intolerances        LABS:                        9.3    7.6   )-----------( 343      ( 30 May 2017 06:05 )             29.9     05-30    139  |  99  |  17  ----------------------------<  92  4.2   |  32<H>  |  0.90    Ca    9.3      30 May 2017 06:05  Phos  4.6     05-29  Mg     2.0     05-30    TPro  6.1  /  Alb  2.6<L>  /  TBili  0.3  /  DBili  x   /  AST  14  /  ALT  7<L>  /  AlkPhos  80  05-29          RADIOLOGY & ADDITIONAL TESTS:

## 2017-05-31 NOTE — DISCHARGE NOTE ADULT - MEDICATION SUMMARY - MEDICATIONS TO TAKE
I will START or STAY ON the medications listed below when I get home from the hospital:    heparin  -- 5000 unit(s) subcutaneous every 12 hours  -- Indication: For Clot prevention I will START or STAY ON the medications listed below when I get home from the hospital:    heparin  -- 5000 unit(s) subcutaneous every 12 hours  -- Indication: For Clot prevention    nitrofurantoin macrocrystals-monohydrate 100 mg oral capsule  -- 1 cap(s) by mouth 2 times a day (with meals)  -- Indication: For UTI cystitis  for 3 days.

## 2017-05-31 NOTE — PROGRESS NOTE ADULT - PROBLEM SELECTOR PROBLEM 1
Urinary tract infection without hematuria, site unspecified

## 2017-05-31 NOTE — PROGRESS NOTE ADULT - SUBJECTIVE AND OBJECTIVE BOX
Patient is a 89y old  Female who presents with a chief complaint of AMS (31 May 2017 14:22)      HPI:  89 yo F with PMH IBS, celiac disease, chronic LE edema (on lasix once weekly), bilateral rotator cuff tear, and DVT (s/p ivc fadia filter) presents [facility] w/ AMS. Of note, pt is altered and a poor hx therefore all hx obtained from charting and signout. Per documentation, pt w/ dyspnea, cough, and decreased PO intake x2days as well as increased lethargy. No trauma or fall.     In the ED:  cefepime 1 g IV, Vancomycin 1 g IV, 1 L NS bolus (28 May 2017 23:07)    INTERVAL HPI/OVERNIGHT EVENTS:::    HEALTH ISSUES - PROBLEM Dx:  Urinary tract infection without hematuria, site unspecified: Urinary tract infection without hematuria, site unspecified  Nutrition, metabolism, and development symptoms: Nutrition, metabolism, and development symptoms  Need for prophylactic measure: Need for prophylactic measure  Anemia: Anemia  Failure to thrive in adult: Failure to thrive in adult  DVT (deep venous thrombosis): DVT (deep venous thrombosis)  Altered mental status: Altered mental status          PAST MEDICAL & SURGICAL HISTORY:  DVT (deep venous thrombosis)  Dysphagia  Anemia  DJD (degenerative joint disease)  Edema  IBS (irritable bowel syndrome)  Celiac disease  H/O inguinal hernia repair  History of total left hip replacement          Consultant NOTE  REVIEWED  ( ++  )    REVIEW OF SYSTEMS:  [x] As per HPI  CONSTITUTIONAL: No fever, weight loss, frail  RESPIRATORY: No cough, wheezing, chills or hemoptysis; No Shortness of Breath  CARDIOVASCULAR: No chest pain, palpitations, dizziness, or leg swelling  GASTROINTESTINAL: No abdominal or epigastric pain. No nausea, vomiting, or hematemesis; No diarrhea or constipation. No melena or hematochezia.  MUSCULOSKELETAL: No joint pain or swelling; No muscle, back, or extremity pain  PSYCH    awake       [x] All others negative	  [ ] Unable to obtain          Vital Signs Last 24 Hrs  T(C): 36.5, Max: 36.7 (05-30 @ 21:23)  T(F): 97.7, Max: 98.1 (05-30 @ 21:23)  HR: 80 (80 - 85)  BP: 112/71 (105/72 - 119/79)  BP(mean): --  RR: 18 (18 - 20)  SpO2: 98% (94% - 98%)        PHYSICAL EXAMINATION:                                    (    )  NO CHANGE  Appearance: Normal	  HEENT:   Normal oral mucosa, PERRL, EOMI	  Neck: Supple, + JVD/ - JVD; Carotid Bruit   Cardiovascular: Normal S1 S2, No JVD, No murmurs,   Respiratory: Lungs clear to auscultation/Decreased Breath Sounds/No Rales, Rhonchi, Wheezing	  Gastrointestinal:  Soft, Non-tender, + BS	  Skin: No rashes, No ecchymoses, No cyanosis  Extremities: Normal range of motion, No clubbing, cyanosis or edema  Vascular: Peripheral pulses palpable 2+ bilaterally  Neurologic: Non-focal  Psychiatry: A & O    heparin  Injectable 5000Unit(s) SubCutaneous every 12 hours  iron sucrose IVPB 100milliGRAM(s) IV Intermittent daily                                      9.1    6.0   )-----------( 336      ( 31 May 2017 07:05 )             29.1     05-31    141  |  100  |  23  ----------------------------<  89  4.6   |  30  |  0.80    Ca    9.3      31 May 2017 07:05  Mg     2.1     05-31        CAPILLARY BLOOD GLUCOSE

## 2017-05-31 NOTE — DISCHARGE NOTE ADULT - CARE PLAN
Principal Discharge DX:	Urinary tract infection without hematuria, site unspecified  Goal:	no recurrence of UTI  Instructions for follow-up, activity and diet:	You were admitted with altered mental status which was believed to be due to a urinary tract infection given a positive urinalysis, and were started on antibiotics and completed the course; you had resolution of your altered status and have returned to your baseline. Please follow up with Dr. an once discharged.  Secondary Diagnosis:	Other iron deficiency anemia  Instructions for follow-up, activity and diet:	You have a history of iron deficiency for which you received IV infusion of iron during your hospital stay.

## 2017-05-31 NOTE — PROGRESS NOTE ADULT - PROBLEM SELECTOR PLAN 5
hx of chronic anemia as outpatient  - Transfuse for Hb < 7

## 2017-05-31 NOTE — CONSULT NOTE ADULT - ASSESSMENT
Pt is a 89 y/o F w/IBS, celiac disease, chronic LE edema (on lasix once weekly), bilateral rotator cuff tear, and DVT (s/p ivc fadia filter) who was sent from [facility] on 5/28 w/ AMS. UA (+), started on abx for UTI.    Problem/Plan - 1:  ·  Problem: Urinary tract infection without hematuria, site unspecified.  Plan: On ceftriaxone 1g q24h. Denies any complaints of dysuria, increased frequency, increased retention. BCx - NGTD, UCx show gram (-) rods. Awaiting speciation  - c/w ceftriaxone 1g q24h (day 3 abx). Last Day Today 5/31  - will f/u cx.     Problem/Plan - 2:  ·  Problem: Altered mental status.  Plan: likely acute toxic encephalopathy 2/2 UTI. Resolved.     Problem/Plan - 3:  ·  Problem: Failure to thrive in adult.  Plan: BMI <20, cachetic on exam. As per son, patient has poor PO intake and requires help from aides to eat. Hx of slow emptying on video FEES as per son.  - c/w pureed diet + Ensure Enlive tid.

## 2017-05-31 NOTE — PROGRESS NOTE ADULT - PROBLEM SELECTOR PLAN 2
likely 2/2 UTI.  - will monitor continued response to treatment
likely acute toxic encephalopathy 2/2 UTI. Resolved
likely acute toxic encephalopathy 2/2 UTI. Resolved

## 2017-06-01 VITALS
HEART RATE: 88 BPM | TEMPERATURE: 97 F | SYSTOLIC BLOOD PRESSURE: 94 MMHG | DIASTOLIC BLOOD PRESSURE: 59 MMHG | OXYGEN SATURATION: 97 % | RESPIRATION RATE: 16 BRPM

## 2017-06-01 LAB
-  AMIKACIN: SIGNIFICANT CHANGE UP
-  AMPICILLIN/SULBACTAM: SIGNIFICANT CHANGE UP
-  AMPICILLIN: SIGNIFICANT CHANGE UP
-  CEFAZOLIN: SIGNIFICANT CHANGE UP
-  CEFTRIAXONE: SIGNIFICANT CHANGE UP
-  CIPROFLOXACIN: SIGNIFICANT CHANGE UP
-  GENTAMICIN: SIGNIFICANT CHANGE UP
-  IMIPENEM: SIGNIFICANT CHANGE UP
-  NITROFURANTOIN: SIGNIFICANT CHANGE UP
-  PIPERACILLIN/TAZOBACTAM: SIGNIFICANT CHANGE UP
-  TOBRAMYCIN: SIGNIFICANT CHANGE UP
-  TRIMETHOPRIM/SULFAMETHOXAZOLE: SIGNIFICANT CHANGE UP
APPEARANCE UR: CLEAR — SIGNIFICANT CHANGE UP
BILIRUB UR-MCNC: NEGATIVE — SIGNIFICANT CHANGE UP
COLOR SPEC: YELLOW — SIGNIFICANT CHANGE UP
CULTURE RESULTS: SIGNIFICANT CHANGE UP
DIFF PNL FLD: NEGATIVE — SIGNIFICANT CHANGE UP
GLUCOSE UR QL: NEGATIVE — SIGNIFICANT CHANGE UP
KETONES UR-MCNC: NEGATIVE — SIGNIFICANT CHANGE UP
LEUKOCYTE ESTERASE UR-ACNC: NEGATIVE — SIGNIFICANT CHANGE UP
METHOD TYPE: SIGNIFICANT CHANGE UP
NITRITE UR-MCNC: NEGATIVE — SIGNIFICANT CHANGE UP
ORGANISM # SPEC MICROSCOPIC CNT: SIGNIFICANT CHANGE UP
ORGANISM # SPEC MICROSCOPIC CNT: SIGNIFICANT CHANGE UP
PH UR: 6.5 — SIGNIFICANT CHANGE UP (ref 5–8)
PROT UR-MCNC: NEGATIVE MG/DL — SIGNIFICANT CHANGE UP
SP GR SPEC: <=1.005 — SIGNIFICANT CHANGE UP (ref 1–1.03)
SPECIMEN SOURCE: SIGNIFICANT CHANGE UP
UROBILINOGEN FLD QL: 0.2 E.U./DL — SIGNIFICANT CHANGE UP

## 2017-06-01 PROCEDURE — 83605 ASSAY OF LACTIC ACID: CPT

## 2017-06-01 PROCEDURE — 84484 ASSAY OF TROPONIN QUANT: CPT

## 2017-06-01 PROCEDURE — 85027 COMPLETE CBC AUTOMATED: CPT

## 2017-06-01 PROCEDURE — 81001 URINALYSIS AUTO W/SCOPE: CPT

## 2017-06-01 PROCEDURE — 83735 ASSAY OF MAGNESIUM: CPT

## 2017-06-01 PROCEDURE — 86900 BLOOD TYPING SEROLOGIC ABO: CPT

## 2017-06-01 PROCEDURE — 80048 BASIC METABOLIC PNL TOTAL CA: CPT

## 2017-06-01 PROCEDURE — 70450 CT HEAD/BRAIN W/O DYE: CPT

## 2017-06-01 PROCEDURE — 36415 COLL VENOUS BLD VENIPUNCTURE: CPT

## 2017-06-01 PROCEDURE — 86901 BLOOD TYPING SEROLOGIC RH(D): CPT

## 2017-06-01 PROCEDURE — 85730 THROMBOPLASTIN TIME PARTIAL: CPT

## 2017-06-01 PROCEDURE — 82728 ASSAY OF FERRITIN: CPT

## 2017-06-01 PROCEDURE — 87186 SC STD MICRODIL/AGAR DIL: CPT

## 2017-06-01 PROCEDURE — 82553 CREATINE MB FRACTION: CPT

## 2017-06-01 PROCEDURE — 93005 ELECTROCARDIOGRAM TRACING: CPT

## 2017-06-01 PROCEDURE — 96374 THER/PROPH/DIAG INJ IV PUSH: CPT

## 2017-06-01 PROCEDURE — 85025 COMPLETE CBC W/AUTO DIFF WBC: CPT

## 2017-06-01 PROCEDURE — 81003 URINALYSIS AUTO W/O SCOPE: CPT

## 2017-06-01 PROCEDURE — 87086 URINE CULTURE/COLONY COUNT: CPT

## 2017-06-01 PROCEDURE — 82803 BLOOD GASES ANY COMBINATION: CPT

## 2017-06-01 PROCEDURE — 99285 EMERGENCY DEPT VISIT HI MDM: CPT | Mod: 25

## 2017-06-01 PROCEDURE — 87040 BLOOD CULTURE FOR BACTERIA: CPT

## 2017-06-01 PROCEDURE — 85610 PROTHROMBIN TIME: CPT

## 2017-06-01 PROCEDURE — 82550 ASSAY OF CK (CPK): CPT

## 2017-06-01 PROCEDURE — 84466 ASSAY OF TRANSFERRIN: CPT

## 2017-06-01 PROCEDURE — 97161 PT EVAL LOW COMPLEX 20 MIN: CPT

## 2017-06-01 PROCEDURE — 96375 TX/PRO/DX INJ NEW DRUG ADDON: CPT

## 2017-06-01 PROCEDURE — 84100 ASSAY OF PHOSPHORUS: CPT

## 2017-06-01 PROCEDURE — 86850 RBC ANTIBODY SCREEN: CPT

## 2017-06-01 PROCEDURE — 71045 X-RAY EXAM CHEST 1 VIEW: CPT

## 2017-06-01 PROCEDURE — 97110 THERAPEUTIC EXERCISES: CPT

## 2017-06-01 PROCEDURE — 80053 COMPREHEN METABOLIC PANEL: CPT

## 2017-06-01 PROCEDURE — 83540 ASSAY OF IRON: CPT

## 2017-06-01 RX ORDER — NITROFURANTOIN MACROCRYSTAL 50 MG
1 CAPSULE ORAL
Qty: 0 | Refills: 0 | COMMUNITY
Start: 2017-06-01

## 2017-06-01 RX ORDER — NITROFURANTOIN MACROCRYSTAL 50 MG
100 CAPSULE ORAL
Qty: 0 | Refills: 0 | Status: DISCONTINUED | OUTPATIENT
Start: 2017-06-01 | End: 2017-06-01

## 2017-06-01 RX ADMIN — HEPARIN SODIUM 5000 UNIT(S): 5000 INJECTION INTRAVENOUS; SUBCUTANEOUS at 07:00

## 2017-06-01 RX ADMIN — Medication 100 MILLIGRAM(S): at 12:32

## 2017-06-02 LAB
CULTURE RESULTS: SIGNIFICANT CHANGE UP
CULTURE RESULTS: SIGNIFICANT CHANGE UP
SPECIMEN SOURCE: SIGNIFICANT CHANGE UP
SPECIMEN SOURCE: SIGNIFICANT CHANGE UP

## 2017-06-05 DIAGNOSIS — K58.9 IRRITABLE BOWEL SYNDROME WITHOUT DIARRHEA: ICD-10-CM

## 2017-06-05 DIAGNOSIS — R62.7 ADULT FAILURE TO THRIVE: ICD-10-CM

## 2017-06-05 DIAGNOSIS — G92 TOXIC ENCEPHALOPATHY: ICD-10-CM

## 2017-06-05 DIAGNOSIS — R13.10 DYSPHAGIA, UNSPECIFIED: ICD-10-CM

## 2017-06-05 DIAGNOSIS — Z91.011 ALLERGY TO MILK PRODUCTS: ICD-10-CM

## 2017-06-05 DIAGNOSIS — E86.0 DEHYDRATION: ICD-10-CM

## 2017-06-05 DIAGNOSIS — Z96.642 PRESENCE OF LEFT ARTIFICIAL HIP JOINT: ICD-10-CM

## 2017-06-05 DIAGNOSIS — R64 CACHEXIA: ICD-10-CM

## 2017-06-05 DIAGNOSIS — R41.82 ALTERED MENTAL STATUS, UNSPECIFIED: ICD-10-CM

## 2017-06-05 DIAGNOSIS — Z79.01 LONG TERM (CURRENT) USE OF ANTICOAGULANTS: ICD-10-CM

## 2017-06-05 DIAGNOSIS — D50.8 OTHER IRON DEFICIENCY ANEMIAS: ICD-10-CM

## 2017-06-05 DIAGNOSIS — Z88.1 ALLERGY STATUS TO OTHER ANTIBIOTIC AGENTS STATUS: ICD-10-CM

## 2017-06-05 DIAGNOSIS — N39.0 URINARY TRACT INFECTION, SITE NOT SPECIFIED: ICD-10-CM

## 2017-06-05 DIAGNOSIS — Z86.718 PERSONAL HISTORY OF OTHER VENOUS THROMBOSIS AND EMBOLISM: ICD-10-CM

## 2017-06-05 DIAGNOSIS — B96.20 UNSPECIFIED ESCHERICHIA COLI [E. COLI] AS THE CAUSE OF DISEASES CLASSIFIED ELSEWHERE: ICD-10-CM

## 2017-06-05 DIAGNOSIS — Z91.018 ALLERGY TO OTHER FOODS: ICD-10-CM

## 2017-06-05 DIAGNOSIS — M19.90 UNSPECIFIED OSTEOARTHRITIS, UNSPECIFIED SITE: ICD-10-CM

## 2017-06-05 DIAGNOSIS — Z79.891 LONG TERM (CURRENT) USE OF OPIATE ANALGESIC: ICD-10-CM

## 2017-06-05 DIAGNOSIS — K90.0 CELIAC DISEASE: ICD-10-CM

## 2017-06-05 DIAGNOSIS — L89.619 PRESSURE ULCER OF RIGHT HEEL, UNSPECIFIED STAGE: ICD-10-CM

## 2017-07-29 NOTE — ED ADULT NURSE NOTE - RESPIRATORY ASSESSMENT
Impaired Activities of Daily Living    • Achieve highest/safest level of independence in self care Progressing        Impaired Functional Mobility    • Achieve highest/safest level of mobility/gait Progressing        PAIN - ADULT    • Verbalizes/displays a - - -

## 2018-07-15 ENCOUNTER — INPATIENT (INPATIENT)
Facility: HOSPITAL | Age: 83
LOS: 5 days | Discharge: NURSING, REHAB OR LONG-TERM CARE | DRG: 689 | End: 2018-07-21
Attending: INTERNAL MEDICINE | Admitting: INTERNAL MEDICINE
Payer: MEDICARE

## 2018-07-15 VITALS
SYSTOLIC BLOOD PRESSURE: 111 MMHG | OXYGEN SATURATION: 95 % | DIASTOLIC BLOOD PRESSURE: 75 MMHG | HEART RATE: 67 BPM | RESPIRATION RATE: 25 BRPM

## 2018-07-15 DIAGNOSIS — Z96.642 PRESENCE OF LEFT ARTIFICIAL HIP JOINT: Chronic | ICD-10-CM

## 2018-07-15 DIAGNOSIS — R63.8 OTHER SYMPTOMS AND SIGNS CONCERNING FOOD AND FLUID INTAKE: ICD-10-CM

## 2018-07-15 DIAGNOSIS — F03.90 UNSPECIFIED DEMENTIA WITHOUT BEHAVIORAL DISTURBANCE: ICD-10-CM

## 2018-07-15 DIAGNOSIS — Z91.89 OTHER SPECIFIED PERSONAL RISK FACTORS, NOT ELSEWHERE CLASSIFIED: ICD-10-CM

## 2018-07-15 DIAGNOSIS — Z98.89 OTHER SPECIFIED POSTPROCEDURAL STATES: Chronic | ICD-10-CM

## 2018-07-15 DIAGNOSIS — R41.82 ALTERED MENTAL STATUS, UNSPECIFIED: ICD-10-CM

## 2018-07-15 DIAGNOSIS — N39.0 URINARY TRACT INFECTION, SITE NOT SPECIFIED: ICD-10-CM

## 2018-07-15 LAB
ALBUMIN SERPL ELPH-MCNC: 2.8 G/DL — LOW (ref 3.3–5)
ALP SERPL-CCNC: 137 U/L — HIGH (ref 40–120)
ALT FLD-CCNC: 8 U/L — LOW (ref 10–45)
ANION GAP SERPL CALC-SCNC: 10 MMOL/L — SIGNIFICANT CHANGE UP (ref 5–17)
APPEARANCE UR: CLEAR — SIGNIFICANT CHANGE UP
AST SERPL-CCNC: 22 U/L — SIGNIFICANT CHANGE UP (ref 10–40)
BACTERIA # UR AUTO: ABNORMAL /HPF
BASOPHILS NFR BLD AUTO: 0.3 % — SIGNIFICANT CHANGE UP (ref 0–2)
BILIRUB SERPL-MCNC: 0.4 MG/DL — SIGNIFICANT CHANGE UP (ref 0.2–1.2)
BILIRUB UR-MCNC: NEGATIVE — SIGNIFICANT CHANGE UP
BUN SERPL-MCNC: 21 MG/DL — SIGNIFICANT CHANGE UP (ref 7–23)
CALCIUM SERPL-MCNC: 8.8 MG/DL — SIGNIFICANT CHANGE UP (ref 8.4–10.5)
CHLORIDE SERPL-SCNC: 98 MMOL/L — SIGNIFICANT CHANGE UP (ref 96–108)
CO2 SERPL-SCNC: 30 MMOL/L — SIGNIFICANT CHANGE UP (ref 22–31)
COLOR SPEC: YELLOW — SIGNIFICANT CHANGE UP
COMMENT - URINE: SIGNIFICANT CHANGE UP
CREAT SERPL-MCNC: 0.8 MG/DL — SIGNIFICANT CHANGE UP (ref 0.5–1.3)
DIFF PNL FLD: ABNORMAL
EOSINOPHIL NFR BLD AUTO: 3.2 % — SIGNIFICANT CHANGE UP (ref 0–6)
EPI CELLS # UR: ABNORMAL /HPF (ref 0–5)
GLUCOSE SERPL-MCNC: 96 MG/DL — SIGNIFICANT CHANGE UP (ref 70–99)
GLUCOSE UR QL: NEGATIVE — SIGNIFICANT CHANGE UP
HCT VFR BLD CALC: 30.4 % — LOW (ref 34.5–45)
HGB BLD-MCNC: 9.1 G/DL — LOW (ref 11.5–15.5)
KETONES UR-MCNC: NEGATIVE — SIGNIFICANT CHANGE UP
LACTATE SERPL-SCNC: 0.9 MMOL/L — SIGNIFICANT CHANGE UP (ref 0.5–2)
LEUKOCYTE ESTERASE UR-ACNC: ABNORMAL
LYMPHOCYTES # BLD AUTO: 16.1 % — SIGNIFICANT CHANGE UP (ref 13–44)
MAGNESIUM SERPL-MCNC: 2.2 MG/DL — SIGNIFICANT CHANGE UP (ref 1.6–2.6)
MCHC RBC-ENTMCNC: 28 PG — SIGNIFICANT CHANGE UP (ref 27–34)
MCHC RBC-ENTMCNC: 29.9 G/DL — LOW (ref 32–36)
MCV RBC AUTO: 93.5 FL — SIGNIFICANT CHANGE UP (ref 80–100)
MONOCYTES NFR BLD AUTO: 7.5 % — SIGNIFICANT CHANGE UP (ref 2–14)
NEUTROPHILS NFR BLD AUTO: 72.9 % — SIGNIFICANT CHANGE UP (ref 43–77)
NITRITE UR-MCNC: NEGATIVE — SIGNIFICANT CHANGE UP
PH UR: 7 — SIGNIFICANT CHANGE UP (ref 5–8)
PHOSPHATE SERPL-MCNC: 3.9 MG/DL — SIGNIFICANT CHANGE UP (ref 2.5–4.5)
PLATELET # BLD AUTO: 368 K/UL — SIGNIFICANT CHANGE UP (ref 150–400)
POTASSIUM SERPL-MCNC: 4.5 MMOL/L — SIGNIFICANT CHANGE UP (ref 3.5–5.3)
POTASSIUM SERPL-SCNC: 4.5 MMOL/L — SIGNIFICANT CHANGE UP (ref 3.5–5.3)
PROT SERPL-MCNC: 7 G/DL — SIGNIFICANT CHANGE UP (ref 6–8.3)
PROT UR-MCNC: ABNORMAL MG/DL
RBC # BLD: 3.25 M/UL — LOW (ref 3.8–5.2)
RBC # FLD: 14.5 % — SIGNIFICANT CHANGE UP (ref 10.3–16.9)
RBC CASTS # UR COMP ASSIST: ABNORMAL /HPF
SODIUM SERPL-SCNC: 138 MMOL/L — SIGNIFICANT CHANGE UP (ref 135–145)
SP GR SPEC: 1.01 — SIGNIFICANT CHANGE UP (ref 1–1.03)
TROPONIN T SERPL-MCNC: <0.01 NG/ML — SIGNIFICANT CHANGE UP (ref 0–0.01)
UROBILINOGEN FLD QL: 0.2 E.U./DL — SIGNIFICANT CHANGE UP
WBC # BLD: 8.8 K/UL — SIGNIFICANT CHANGE UP (ref 3.8–10.5)
WBC # FLD AUTO: 8.8 K/UL — SIGNIFICANT CHANGE UP (ref 3.8–10.5)
WBC UR QL: ABNORMAL /HPF

## 2018-07-15 PROCEDURE — 70450 CT HEAD/BRAIN W/O DYE: CPT | Mod: 26

## 2018-07-15 PROCEDURE — 71045 X-RAY EXAM CHEST 1 VIEW: CPT | Mod: 26

## 2018-07-15 PROCEDURE — 93010 ELECTROCARDIOGRAM REPORT: CPT

## 2018-07-15 PROCEDURE — 99285 EMERGENCY DEPT VISIT HI MDM: CPT | Mod: 25

## 2018-07-15 RX ORDER — MEROPENEM 1 G/30ML
1000 INJECTION INTRAVENOUS EVERY 8 HOURS
Qty: 0 | Refills: 0 | Status: COMPLETED | OUTPATIENT
Start: 2018-07-16 | End: 2018-07-16

## 2018-07-15 RX ORDER — MIRTAZAPINE 45 MG/1
7.5 TABLET, ORALLY DISINTEGRATING ORAL AT BEDTIME
Qty: 0 | Refills: 0 | Status: DISCONTINUED | OUTPATIENT
Start: 2018-07-15 | End: 2018-07-16

## 2018-07-15 RX ORDER — PANTOPRAZOLE SODIUM 20 MG/1
40 TABLET, DELAYED RELEASE ORAL
Qty: 0 | Refills: 0 | Status: DISCONTINUED | OUTPATIENT
Start: 2018-07-15 | End: 2018-07-16

## 2018-07-15 RX ORDER — MEROPENEM 1 G/30ML
1000 INJECTION INTRAVENOUS ONCE
Qty: 0 | Refills: 0 | Status: COMPLETED | OUTPATIENT
Start: 2018-07-15 | End: 2018-07-15

## 2018-07-15 RX ORDER — DOCUSATE SODIUM 100 MG
1 CAPSULE ORAL
Qty: 0 | Refills: 0 | COMMUNITY

## 2018-07-15 RX ORDER — SODIUM CHLORIDE 9 MG/ML
500 INJECTION INTRAMUSCULAR; INTRAVENOUS; SUBCUTANEOUS ONCE
Qty: 0 | Refills: 0 | Status: COMPLETED | OUTPATIENT
Start: 2018-07-15 | End: 2018-07-15

## 2018-07-15 RX ORDER — DOCUSATE SODIUM 100 MG
100 CAPSULE ORAL
Qty: 0 | Refills: 0 | Status: DISCONTINUED | OUTPATIENT
Start: 2018-07-15 | End: 2018-07-16

## 2018-07-15 RX ORDER — FERROUS SULFATE 325(65) MG
1 TABLET ORAL
Qty: 0 | Refills: 0 | COMMUNITY

## 2018-07-15 RX ORDER — HEPARIN SODIUM 5000 [USP'U]/ML
5000 INJECTION INTRAVENOUS; SUBCUTANEOUS EVERY 8 HOURS
Qty: 0 | Refills: 0 | Status: DISCONTINUED | OUTPATIENT
Start: 2018-07-15 | End: 2018-07-21

## 2018-07-15 RX ORDER — SODIUM CHLORIDE 9 MG/ML
1000 INJECTION INTRAMUSCULAR; INTRAVENOUS; SUBCUTANEOUS
Qty: 0 | Refills: 0 | Status: DISCONTINUED | OUTPATIENT
Start: 2018-07-15 | End: 2018-07-16

## 2018-07-15 RX ORDER — MEROPENEM 1 G/30ML
1000 INJECTION INTRAVENOUS EVERY 12 HOURS
Qty: 0 | Refills: 0 | Status: DISCONTINUED | OUTPATIENT
Start: 2018-07-16 | End: 2018-07-21

## 2018-07-15 RX ORDER — CEFTRIAXONE 500 MG/1
1 INJECTION, POWDER, FOR SOLUTION INTRAMUSCULAR; INTRAVENOUS ONCE
Qty: 0 | Refills: 0 | Status: DISCONTINUED | OUTPATIENT
Start: 2018-07-15 | End: 2018-07-15

## 2018-07-15 RX ORDER — MEROPENEM 1 G/30ML
1000 INJECTION INTRAVENOUS ONCE
Qty: 0 | Refills: 0 | Status: DISCONTINUED | OUTPATIENT
Start: 2018-07-15 | End: 2018-07-15

## 2018-07-15 RX ADMIN — SODIUM CHLORIDE 75 MILLILITER(S): 9 INJECTION INTRAMUSCULAR; INTRAVENOUS; SUBCUTANEOUS at 22:22

## 2018-07-15 RX ADMIN — HEPARIN SODIUM 5000 UNIT(S): 5000 INJECTION INTRAVENOUS; SUBCUTANEOUS at 22:22

## 2018-07-15 RX ADMIN — SODIUM CHLORIDE 500 MILLILITER(S): 9 INJECTION INTRAMUSCULAR; INTRAVENOUS; SUBCUTANEOUS at 15:25

## 2018-07-15 RX ADMIN — SODIUM CHLORIDE 75 MILLILITER(S): 9 INJECTION INTRAMUSCULAR; INTRAVENOUS; SUBCUTANEOUS at 19:23

## 2018-07-15 RX ADMIN — MEROPENEM 100 MILLIGRAM(S): 1 INJECTION INTRAVENOUS at 17:08

## 2018-07-15 NOTE — ED PROVIDER NOTE - OBJECTIVE STATEMENT
brought in by son from NH with decreased po intake for the past few weeks associated with intermittent change in mental status.  Has been on soft/ liquid diet after problem with dentures a few months ago but in past month, not really taking much by mouth at all.  Per caregiver, drinking 1/2-1 can of ensure/ day.  NH has been supplementing with d5/0.45 ns IV.  Patient denies current pain, sob, nausea but history is limited by dementia.  Per son, normally alert to self but currently unsure of own name.  Full code.  He thinks she would benefit from feeding tube

## 2018-07-15 NOTE — ED ADULT NURSE NOTE - OTHER COMPLAINTS
Pt BIBA from a nursing home for AMS and generalized weakness. Pt is awake and oriented to self.  by EMS. Pt refused oral tepm to be obtained at triage.

## 2018-07-15 NOTE — ED PROVIDER NOTE - MEDICAL DECISION MAKING DETAILS
ams/ poor po intake.  labs with uti.  history of esbl e coli uti with mdr.  will give mereopenem based on prior sensitivities.  admit for further treatment.  possible peg tube placement for poor po intake

## 2018-07-15 NOTE — ED ADULT NURSE NOTE - CHIEF COMPLAINT QUOTE
Pt's son states "she hasn't been eating well for the past a few weeks. She has dementia, but she has been more confused for the past 2 weeks."

## 2018-07-15 NOTE — H&P ADULT - PROBLEM SELECTOR PLAN 4
Pt still inpatient.   1) PCP Contacted on Admission: (Y/N) --> Name & Phone #: Dr. Motta  2) Date of Contact with PCP: 7/15/18  3) PCP Contacted at Discharge: (Y/N)  4) Summary of Handoff Given to PCP: Y  5) Post-Discharge Appointment Date and Location: NA    DVT prophylaxis: heparin subQ  Code status: full code Pt still inpatient.   1) PCP Contacted on Admission: (Y/N) --> Name & Phone #: Dr. Motta   2) Date of Contact with PCP: 7/15/18   *** Was unable to reach Dr. Motta. Case discussed with KENNEDY***  3) PCP Contacted at Discharge: (Y/N)  4) Summary of Handoff Given to PCP: Y  5) Post-Discharge Appointment Date and Location: NA    DVT prophylaxis: heparin subQ  Code status: full code

## 2018-07-15 NOTE — ED PROVIDER NOTE - DIAGNOSTIC INTERPRETATION
CXR: no focal consolidation, linear atelectasis left lung base, normal bones, position limits eval of cardiac contour but appears wnl.  read by Dr. Lake

## 2018-07-15 NOTE — ED PROVIDER NOTE - PMH
Anemia    Celiac disease    Dementia    DJD (degenerative joint disease)    DVT (deep venous thrombosis)    Dysphagia    Edema    IBS (irritable bowel syndrome)

## 2018-07-15 NOTE — H&P ADULT - ATTENDING COMMENTS
CV stable  POOR PO intake  FTT  ?PEG  son--wishes all heroic measures  Requires 2 person assistance for ADL.  UTI  r/o PN

## 2018-07-15 NOTE — H&P ADULT - ASSESSMENT
Ms. Goff is a 89 year old lady with a PMH of ESBL UTI, aspiration pneumonia, ischemic bowel who presents from a nursing home with increasing lethargy and altered mental status.

## 2018-07-15 NOTE — ED ADULT NURSE NOTE - OBJECTIVE STATEMENT
89 y/o female BIBEMS accompanied by Son from [facility] with a c/o increased in AMS. 89 y/o female BIBEMS accompanied by Son from [facility] with a c/o increased in AMS.  as per son "pt also has difficulty swallowing and has become more disoriented for the past 3-4 weeks," pt alert to self only with intermittent periods of confusion as per baseline. right heel pressure ulcer, right sacral pressure ulcer noted.  son at bedside. Will continue to monitor.

## 2018-07-15 NOTE — ED PROVIDER NOTE - CONSTITUTIONAL, MLM
normal... frail / elderly appearing, awake, alert, in no apparent distress.  unable to state own name

## 2018-07-15 NOTE — H&P ADULT - HISTORY OF PRESENT ILLNESS
Ms. Goff is a 89 year old lady with a PMH of ESBL UTI, aspiration pneumonia, ischemic bowel who presents from a nursing home with increasing lethargy and altered mental status. She has been lethargic for a few weeks, but had a more acute change in mental status with increased lethargy over the past few days. Her PO intake has also decreased over the past 2 weeks. Current PO intake includes part of an Ensure and a small amount of juice each day. She is oriented only to self at baseline but is currently disoriented. She is somnolent but arousable and was unable to provide a history. She has an extensive allergy list which limited the antibiotics she was able to be treated with during previous UTI. She was accompanied by a weekend home health aide who provided some history. We spoke to her son (Dr. Esvin Goff) who provided a more extensive history. He would like her to remain full code and would like a PEG tube placed. She has no complaints and denies CP, HA, fever, abd pain, n/v.     She was given fluid bolus and meropenem in the ED and admitted to [facility] for further care and observation. Ms. Goff is a 89 year old lady with a PMH of ESBL UTI, aspiration pneumonia, chronic anemia, ischemic bowel who presents from a nursing home with increasing lethargy and altered mental status. She has been lethargic for a few weeks, but had a more acute change in mental status with increased lethargy over the past few days. Her PO intake has also decreased over the past 2 weeks. Current PO intake includes part of an Ensure and a small amount of juice each day. She is oriented only to self at baseline but is currently disoriented. She is somnolent but arousable and was unable to provide a history. She has an extensive allergy (amox, cipro, clinda, PCN) list which limited the antibiotics she was able to be treated with during previous UTI. She was accompanied by a weekend home health aide who provided some history. We spoke to her son (Dr. Esvin Goff) who provided a more extensive history. He would like her to remain full code and would like a PEG tube placed. She has no complaints and denies CP, HA, fever, abd pain, n/v.     She was given fluid bolus and meropenem in the ED and admitted to [facility] for further care and observation.

## 2018-07-15 NOTE — H&P ADULT - PROBLEM SELECTOR PLAN 1
Pt presents with increasing lethargy and confusion. Pt has known hx of confusion but is oriented to self at baseline. Currently intermittently oriented with increased somnolence. She remains afebrile, with a normal lactate and hemodynamically stable, so sepsis is not a cause of AMS. In setting of poor PO intake and positive UA, AMS likely secondary to UTI. Received IV fluids and meropenem in ED. Will continue IV fluids and meropenem  Q8h.   -follow up urine cx  - f/u blood cx  -monitor for signs of fever increased somnolence, change in mental status

## 2018-07-15 NOTE — H&P ADULT - NSHPPHYSICALEXAM_GEN_ALL_CORE
VITALS  Vital Signs Last 24 Hrs  T(C): 37 (15 Jul 2018 19:30), Max: 37.3 (15 Jul 2018 14:30)  T(F): 98.6 (15 Jul 2018 19:30), Max: 99.2 (15 Jul 2018 14:30)  HR: 90 (15 Jul 2018 19:30) (67 - 90)  BP: 112/82 (15 Jul 2018 19:30) (103/71 - 112/82)  BP(mean): --  RR: 18 (15 Jul 2018 19:30) (18 - 25)  SpO2: 99% (15 Jul 2018 19:30) (95% - 99%)    I&O's Summary    15 Jul 2018 07:01  -  15 Jul 2018 19:48  --------------------------------------------------------  IN: 550 mL / OUT: 0 mL / NET: 550 mL      CAPILLARY BLOOD GLUCOSE    PHYSICAL EXAM  General: NAD; comfortable, somnolent, but arousable  HEENT: NC/AT, supple neck, MMM, with poor dentition  Respiratory: CTA B/L; no wheezes/crackles w/ good air movement  Cardiovascular: Regular rhythm/rate; S1/S2  Gastrointestinal: Soft; NTND; bowel sounds normal  Extremities: R LE WWP, L LE cooler than right with pulses found with doppler; mild edema; no clubbing;   Neurological:  Awake, oriented to person; PERRL; EOMI; CNII-XII grossly intact; 5/5 strength; sensation intact; reflexes 2+; no obvious focal deficits  Skin: No rashes or ulcers, normal tugor VITALS  Vital Signs Last 24 Hrs  T(C): 37 (15 Jul 2018 19:30), Max: 37.3 (15 Jul 2018 14:30)  T(F): 98.6 (15 Jul 2018 19:30), Max: 99.2 (15 Jul 2018 14:30)  HR: 90 (15 Jul 2018 19:30) (67 - 90)  BP: 112/82 (15 Jul 2018 19:30) (103/71 - 112/82)  BP(mean): --  RR: 18 (15 Jul 2018 19:30) (18 - 25)  SpO2: 99% (15 Jul 2018 19:30) (95% - 99%)    I&O's Summary    15 Jul 2018 07:01  -  15 Jul 2018 19:48  --------------------------------------------------------  IN: 550 mL / OUT: 0 mL / NET: 550 mL      CAPILLARY BLOOD GLUCOSE    PHYSICAL EXAM  General: NAD; comfortable, somnolent, but arousable  HEENT: NC/AT, supple neck, MMM, with poor dentition  Respiratory: CTA B/L; no wheezes/crackles w/ good air movement  Cardiovascular: Regular rhythm/rate; S1/S2  Gastrointestinal: Soft; NTND; bowel sounds normal  Extremities: R LE WWP, L LE cooler than right with pulses found with doppler; mild edema; no clubbing;   Neurological:  Awake, oriented to person; PERRL; EOMI; CNII-XII grossly intact; 5/5 strength; sensation intact; reflexes 2+; no obvious focal deficits  Skin: 2 cm ulcer on thoracic spine with erythematous edges, no oozing, bleeding or weeping

## 2018-07-15 NOTE — H&P ADULT - NSHPLABSRESULTS_GEN_ALL_CORE
.  LABS:                         9.1    8.8   )-----------( 368      ( 15 Jul 2018 15:16 )             30.4     -15    138  |  98  |  21  ----------------------------<  96  4.5   |  30  |  0.80    Ca    8.8      15 Jul 2018 15:16  Phos  3.9     07-15  Mg     2.2     07-15    TPro  7.0  /  Alb  2.8<L>  /  TBili  0.4  /  DBili  x   /  AST  22  /  ALT  8<L>  /  AlkPhos  137<H>  07-15      Urinalysis Basic - ( 15 Jul 2018 15:16 )    Color: Yellow / Appearance: Clear / S.010 / pH: x  Gluc: x / Ketone: NEGATIVE  / Bili: Negative / Urobili: 0.2 E.U./dL   Blood: x / Protein: Trace mg/dL / Nitrite: NEGATIVE   Leuk Esterase: Large / RBC: Many /HPF / WBC Many /HPF   Sq Epi: x / Non Sq Epi: Many /HPF / Bacteria: Many /HPF      CARDIAC MARKERS ( 15 Jul 2018 15:16 )  x     / <0.01 ng/mL / x     / x     / x            Lactate, Blood: 0.9 mmoL/L (07-15 @ 15:16)      RADIOLOGY, EKG & ADDITIONAL TESTS: Reviewed.

## 2018-07-16 DIAGNOSIS — D64.9 ANEMIA, UNSPECIFIED: ICD-10-CM

## 2018-07-16 DIAGNOSIS — R13.10 DYSPHAGIA, UNSPECIFIED: ICD-10-CM

## 2018-07-16 DIAGNOSIS — R79.81 ABNORMAL BLOOD-GAS LEVEL: ICD-10-CM

## 2018-07-16 DIAGNOSIS — M62.3 IMMOBILITY SYNDROME (PARAPLEGIC): ICD-10-CM

## 2018-07-16 DIAGNOSIS — I82.411 ACUTE EMBOLISM AND THROMBOSIS OF RIGHT FEMORAL VEIN: ICD-10-CM

## 2018-07-16 DIAGNOSIS — J69.0 PNEUMONITIS DUE TO INHALATION OF FOOD AND VOMIT: ICD-10-CM

## 2018-07-16 DIAGNOSIS — G92 TOXIC ENCEPHALOPATHY: ICD-10-CM

## 2018-07-16 DIAGNOSIS — N39.0 URINARY TRACT INFECTION, SITE NOT SPECIFIED: ICD-10-CM

## 2018-07-16 LAB
ALBUMIN SERPL ELPH-MCNC: 2.5 G/DL — LOW (ref 3.3–5)
ALP SERPL-CCNC: 137 U/L — HIGH (ref 40–120)
ALT FLD-CCNC: 12 U/L — SIGNIFICANT CHANGE UP (ref 10–45)
ANION GAP SERPL CALC-SCNC: 12 MMOL/L — SIGNIFICANT CHANGE UP (ref 5–17)
AST SERPL-CCNC: 20 U/L — SIGNIFICANT CHANGE UP (ref 10–40)
BILIRUB DIRECT SERPL-MCNC: <0.2 MG/DL — SIGNIFICANT CHANGE UP (ref 0–0.2)
BILIRUB INDIRECT FLD-MCNC: >0.2 MG/DL — SIGNIFICANT CHANGE UP (ref 0.2–1)
BILIRUB SERPL-MCNC: 0.4 MG/DL — SIGNIFICANT CHANGE UP (ref 0.2–1.2)
BUN SERPL-MCNC: 17 MG/DL — SIGNIFICANT CHANGE UP (ref 7–23)
CALCIUM SERPL-MCNC: 8.6 MG/DL — SIGNIFICANT CHANGE UP (ref 8.4–10.5)
CHLORIDE SERPL-SCNC: 100 MMOL/L — SIGNIFICANT CHANGE UP (ref 96–108)
CK MB CFR SERPL CALC: 1.4 NG/ML — SIGNIFICANT CHANGE UP (ref 0–6.7)
CK SERPL-CCNC: 39 U/L — SIGNIFICANT CHANGE UP (ref 25–170)
CO2 SERPL-SCNC: 28 MMOL/L — SIGNIFICANT CHANGE UP (ref 22–31)
CREAT SERPL-MCNC: 0.7 MG/DL — SIGNIFICANT CHANGE UP (ref 0.5–1.3)
FOLATE SERPL-MCNC: >20 NG/ML — SIGNIFICANT CHANGE UP
GLUCOSE SERPL-MCNC: 79 MG/DL — SIGNIFICANT CHANGE UP (ref 70–99)
NT-PROBNP SERPL-SCNC: 858 PG/ML — HIGH (ref 0–300)
POTASSIUM SERPL-MCNC: 4.8 MMOL/L — SIGNIFICANT CHANGE UP (ref 3.5–5.3)
POTASSIUM SERPL-SCNC: 4.8 MMOL/L — SIGNIFICANT CHANGE UP (ref 3.5–5.3)
PROT SERPL-MCNC: 6.6 G/DL — SIGNIFICANT CHANGE UP (ref 6–8.3)
SODIUM SERPL-SCNC: 140 MMOL/L — SIGNIFICANT CHANGE UP (ref 135–145)
TROPONIN T SERPL-MCNC: <0.01 NG/ML — SIGNIFICANT CHANGE UP (ref 0–0.01)
TSH SERPL-MCNC: 1.01 UIU/ML — SIGNIFICANT CHANGE UP (ref 0.35–4.94)
VIT B12 SERPL-MCNC: >2000 PG/ML — HIGH (ref 232–1245)

## 2018-07-16 PROCEDURE — 71045 X-RAY EXAM CHEST 1 VIEW: CPT | Mod: 26

## 2018-07-16 PROCEDURE — 99222 1ST HOSP IP/OBS MODERATE 55: CPT

## 2018-07-16 PROCEDURE — 93970 EXTREMITY STUDY: CPT | Mod: 26

## 2018-07-16 PROCEDURE — 93010 ELECTROCARDIOGRAM REPORT: CPT | Mod: 76

## 2018-07-16 PROCEDURE — 93306 TTE W/DOPPLER COMPLETE: CPT | Mod: 26

## 2018-07-16 RX ORDER — SODIUM CHLORIDE 9 MG/ML
1000 INJECTION, SOLUTION INTRAVENOUS
Qty: 0 | Refills: 0 | Status: DISCONTINUED | OUTPATIENT
Start: 2018-07-16 | End: 2018-07-20

## 2018-07-16 RX ORDER — IPRATROPIUM/ALBUTEROL SULFATE 18-103MCG
3 AEROSOL WITH ADAPTER (GRAM) INHALATION EVERY 6 HOURS
Qty: 0 | Refills: 0 | Status: DISCONTINUED | OUTPATIENT
Start: 2018-07-16 | End: 2018-07-21

## 2018-07-16 RX ADMIN — Medication 3 MILLILITER(S): at 12:31

## 2018-07-16 RX ADMIN — Medication 3 MILLILITER(S): at 23:23

## 2018-07-16 RX ADMIN — SODIUM CHLORIDE 75 MILLILITER(S): 9 INJECTION, SOLUTION INTRAVENOUS at 11:43

## 2018-07-16 RX ADMIN — SODIUM CHLORIDE 75 MILLILITER(S): 9 INJECTION, SOLUTION INTRAVENOUS at 23:23

## 2018-07-16 RX ADMIN — HEPARIN SODIUM 5000 UNIT(S): 5000 INJECTION INTRAVENOUS; SUBCUTANEOUS at 14:43

## 2018-07-16 RX ADMIN — HEPARIN SODIUM 5000 UNIT(S): 5000 INJECTION INTRAVENOUS; SUBCUTANEOUS at 21:18

## 2018-07-16 RX ADMIN — MEROPENEM 100 MILLIGRAM(S): 1 INJECTION INTRAVENOUS at 07:36

## 2018-07-16 RX ADMIN — MEROPENEM 100 MILLIGRAM(S): 1 INJECTION INTRAVENOUS at 01:25

## 2018-07-16 RX ADMIN — MEROPENEM 100 MILLIGRAM(S): 1 INJECTION INTRAVENOUS at 18:06

## 2018-07-16 RX ADMIN — PANTOPRAZOLE SODIUM 40 MILLIGRAM(S): 20 TABLET, DELAYED RELEASE ORAL at 06:17

## 2018-07-16 RX ADMIN — HEPARIN SODIUM 5000 UNIT(S): 5000 INJECTION INTRAVENOUS; SUBCUTANEOUS at 06:17

## 2018-07-16 NOTE — PROGRESS NOTE ADULT - PROBLEM SELECTOR PROBLEM 9
Nutrition, metabolism, and development symptoms Transition of care performed with sharing of clinical summary

## 2018-07-16 NOTE — MEDICAL STUDENT PROGRESS NOTE(EDUCATION) - SUBJECTIVE AND OBJECTIVE BOX
Overnight O2 saturations dropped to 80% while transferring, was placed on 6L NC with improvement of sats, now stable >95%   Subjective: denies any pain, cough, difficulty breathing, hx of asthma     Objective:   Vital Signs Last 12 Hrs  T(F): 96 (07-16-18 @ 05:51), Max: 98.7 (07-15-18 @ 20:00)  HR: 102 (07-16-18 @ 05:51) (95 - 102)  BP: 112/68 (07-16-18 @ 05:51) (101/58 - 112/68)  BP(mean): --  RR: 19 (07-16-18 @ 05:51) (19 - 20)  SpO2: 95% (07-16-18 @ 05:51) (95% - 96%)      Exam:     Frail elderly female in NAD, laying comfortably in bed  Alert and Oriented to 0 (will not state own name, but responds in the affirmative when asked her name)   EOMI, anicteric  moist mucous membranes, no JVD  Rhonchi through out  S1S2, tachycardic rate  BS present, abdomen non-distended, non-tender   no peripheral edema,   decubitus ulcers b/l heels in quadrilobe dressing

## 2018-07-16 NOTE — PROGRESS NOTE ADULT - SUBJECTIVE AND OBJECTIVE BOX
Late Entry Note:  CC/ HPI Ms. Goff is a 89 year old lady with dementia, history of ESBL UTI, aspiration pneumonia, Celiac's disease, IBS, bilateral DVT s/p IVC filter 2017, who was admitted from the nursing home with increasing lethargy poor PO intake and AMS, seen in the morning without acute respiratory complaint.    PAST MEDICAL & SURGICAL HISTORY:  Dementia  DVT (deep venous thrombosis)  Dysphagia  Anemia  DJD (degenerative joint disease)  Edema  IBS (irritable bowel syndrome)  Celiac disease  H/O inguinal hernia repair  History of total left hip replacement    SOCHX:  -  alcohol    FMHX: FA/MO  - contributory     ROS reviewed below with positive findings marked (+) :  GEN:  fever, chills ENT: tracheostomy,   epistaxis,  sinusitis COR: CAD, CHF,  HTN, dysrhythmia PUL: COPD, ILD, asthma, pneumonia GI: PEG, dysphagia, hemorrhage, other LUDWIN: kidney disease, electrolyte disorder HEM:  anemia, +thrombus, coagulopathy, cancer ENDO:  thyroid disease, diabetes mellitus CNS:  +dementia, stroke, seizure, PSY:  depression, anxiety, other      MEDICATIONS  (STANDING):  ALBUTerol/ipratropium for Nebulization 3 milliLiter(s) Nebulizer every 6 hours  dextrose 5% + sodium chloride 0.45%. 1000 milliLiter(s) (75 mL/Hr) IV Continuous <Continuous>  heparin  Injectable 5000 Unit(s) SubCutaneous every 8 hours  meropenem  IVPB 1000 milliGRAM(s) IV Intermittent every 12 hours  sodium phosphate IVPB 15 milliMole(s) IV Intermittent once    MEDICATIONS  (PRN):      Vital Signs Last 24 Hrs  T(C): 37.1 (17 Jul 2018 05:39), Max: 37.2 (16 Jul 2018 17:02)  T(F): 98.8 (17 Jul 2018 05:39), Max: 98.9 (16 Jul 2018 17:02)  HR: 80 (17 Jul 2018 06:35) (80 - 96)  BP: 107/72 (17 Jul 2018 05:39) (90/60 - 107/72)  BP(mean): --  RR: 16 (17 Jul 2018 06:35) (16 - 20)  SpO2: 99% (17 Jul 2018 06:35) (96% - 100%)    GENERAL:         comfortable,  - distress.  HEENT:            - trauma,  - icterus,  - injection,  - nasal discharge.  NECK:              - jugular venous distention.  LYMPH:           - lymphadenopathy, - masses.  RESP:               + rhonchi,   - rales,   + wheezes.   COR:                S1S2   - gallops,  - rubs.  ABD:                bowel sounds,   soft, - tender, - distended.  EXT/MSC:         - cyanosis,  - clubbing,  - edema.    NEURO:             alert,   responds to stimuli.                        8.4    6.3   )-----------( 315      ( 17 Jul 2018 06:09 )             28.6     07-17    137  |  98  |  16  ----------------------------<  98  4.2   |  29  |  0.76      Xray Chest  (07.16.18) There are newly developed streaky bilateral perihilar and bibasilar opacities, suggesting mild pulmonary congestion.  Focal discoid atelectasis in the region of the lingula is again noted.  Costophrenic angles grossly sharp bilaterally, without sizable pleural effusions.  No pneumothorax is seen.  Cardiomediastinal silhouette is unchanged in appearance.  Partial visualization of the IVC filter.        ASSESSMENT/PLAN    1) Altered mental status  2) Urinary tract infection  3) Atelectasis; rule out pneumonia  4) Cardiomyopathy  5) Dementia    Satisfactory SpO2, oxygen as needed  Aspiration precautions, swallowing evaluation  Bronchodilators:  Atrovent/ albuterol q 4 – 6 hours as needed  ID/Antibiotics: meropenem  Cardiac/HTN: echocardiogram  GI: Rx/ prophylaxis c PPI/H2B  Heme: Rx/VT prophylaxis  Discussed with medical team, Dr. Motta

## 2018-07-16 NOTE — PROGRESS NOTE ADULT - PROBLEM SELECTOR PLAN 4
Pt still inpatient.   1) PCP Contacted on Admission: (Y/N) --> Name & Phone #: Dr. Motta   2) Date of Contact with PCP: 7/15/18   *** Was unable to reach Dr. Motta. Case discussed with KENNEDY***  3) PCP Contacted at Discharge: (Y/N)  4) Summary of Handoff Given to PCP: Y  5) Post-Discharge Appointment Date and Location: NA    DVT prophylaxis: heparin subQ  Code status: full code Pt has known hx of dementia. Will continue to monitor for change in mental status  CTH shows possible NPH Pt has known hx of dementia. Will continue to monitor for change in mental status  CTH shows possible NPH  AAOx0 for me today. Responds to orientation questions only when prompted shaking her head yes or no. Pt has known hx of dementia. Will continue to monitor for change in mental status  CTH shows possible NPH  AAOx0 for me today. Responds to orientation questions only when prompted shaking her head yes or no  As per speech/swallow recs: aspiration risk c/w NPO & speak with son about research doesn't support peg with dementia patient Pt has known hx of dementia. Will continue to monitor for change in mental status  CTH shows possible NPH  AAOx0 for me today. Responds to orientation questions only when prompted shaking her head yes or no  As per speech/swallow recs: aspiration risk c/w NPO & speak with son about research doesn't support peg with dementia patient  -f/u with palliative care - Patient failed swallow eval and is at high risk aspiration  - Strict NPO  - Aspiration precautions  - Only medications IV   - F/u GI Dr. Hale for Peg consult as per son's wishes (POA) - Patient failed swallow eval and is at high risk aspiration  - Pharyngeal state dysphagia with weak swallow & suspected delay in swallow initiation   - Strict NPO  - Aspiration precautions  - Only medications through IV   - F/u GI Dr. Hale for Peg consult as per son's wishes (POA)

## 2018-07-16 NOTE — SWALLOW BEDSIDE ASSESSMENT ADULT - ASR SWALLOW ASPIRATION MONITOR
oral hygiene/position upright (90Y)/throat clearing/upper respiratory infection/gurgly voice/pneumonia/change of breathing pattern/cough/fever

## 2018-07-16 NOTE — MEDICAL STUDENT PROGRESS NOTE(EDUCATION) - NS MD HP STUD ASPLAN ASSES FT
89 y/o F with baseline dementia, urinary incontinence at baseline sent from NH for decrease in mental status from baseline, with + UA, pending cultures. Exam notable for afebrile, normotensive, tachycardic and rhonchi b/l. Labs notable for anemia (baseline HGbs in the 9s), normal WBCs, normal plts. CT without evidence of acute intracranial process.

## 2018-07-16 NOTE — SWALLOW BEDSIDE ASSESSMENT ADULT - SLP GENERAL OBSERVATIONS
Pt with upper airway congestion. Pt with increased leno-pharyngeal secretions, which she is not able to clear on command or spontaneously

## 2018-07-16 NOTE — PROGRESS NOTE ADULT - PROBLEM SELECTOR PLAN 9
F: ns at 75 cc/hr  E: replete K > 4, Mg >2  N: npo; high risk aspiration F: ns at 75 cc/hr  E: replete K > 4, Mg >2  N: npo; high risk aspiration; everything through IV Pt still inpatient.   1) PCP Contacted on Admission: (Y/N) --> Name & Phone #: Dr. Motta   2) Date of Contact with PCP: 7/16/18 - Spoke with Dr Motta and recommends getting ID consult for UTI and possible aspiration PNA and GI consult for Peg   3) PCP Contacted at Discharge: (Y/N)  4) Summary of Handoff Given to PCP: Y  5) Post-Discharge Appointment Date and Location: NA    DVT prophylaxis: heparin subQ  Code status: full code

## 2018-07-16 NOTE — DIETITIAN INITIAL EVALUATION ADULT. - ENERGY NEEDS
Height: 62" Weight: 127lbs, IBW 110lbs+/-10%, %%, BMI - 23.4  ABW used to calculate energy needs due to pt's current body weight within % IBW   Nutrient needs based on [facility] standards of care for maintenance in older adults; protein increased 2/2 pressure ulcer

## 2018-07-16 NOTE — SWALLOW BEDSIDE ASSESSMENT ADULT - NS SPL SWALLOW CLINIC TRIAL FT
Pt presents with suspected pharyngeal stage dysphagia characterized by overall weak swallow with suspected delay in swallow initiation and overt signs of airway protection deficits. Baseline congestion and inability to clear secretions on command further increase aspiration risk. Pt would benefit from palliative care consult. Research does not support use of PEG in patients with advanced dementia.

## 2018-07-16 NOTE — PROGRESS NOTE ADULT - PROBLEM SELECTOR PLAN 6
F: ns at 75 cc/hr  E: replete K > 4, Mg >2  N: regular diet as tolerated Pt still inpatient.   1) PCP Contacted on Admission: (Y/N) --> Name & Phone #: Dr. Motta   2) Date of Contact with PCP: 7/15/18   *** Was unable to reach Dr. Motta. Case discussed with KENNEDY***  3) PCP Contacted at Discharge: (Y/N)  4) Summary of Handoff Given to PCP: Y  5) Post-Discharge Appointment Date and Location: NA    DVT prophylaxis: heparin subQ  Code status: full code Prior R femoral DVT s/p IVC filter  Pt on physical exam today with b/l pain on palpation of lower extremities with some swelling but no pitting edema  Will hold off on giving lasix today as patient is low-normotensive 100-110s systolic  f/u b/l lower extremity dopplers -Prior b/l DVT with free floating clot in the R femoral s/p IVC filter  - Pt on physical exam today with b/l pain on palpation of lower extremities with some swelling but no pitting edema  Will hold off on giving lasix today as patient is low-normotensive 100-110s systolic  -Patient prior Echo normal EF of 55 (Jan 2017) so edema most likely not 2/2 systolic dysfunction but will f/u echo and compare it to prior   - f/u b/l lower extremity dopplers - When auscultating her posterior chest this morning she is very stiff when trying to sit her up  - Patient on Remeron for sleep which was started in the nursing home a couple weeks ago as per son; today concern for EPS and stopped medication

## 2018-07-16 NOTE — PROGRESS NOTE ADULT - ASSESSMENT
Ms. Goff is a 89 year old lady with a PMH of ESBL UTI, aspiration pneumonia, ischemic bowel who presents from a nursing home with increasing lethargy poor PO intake and AMS. Ms. Goff is a 89 year old lady with a PMH of ESBL UTI, aspiration pneumonia, ischemic bowel, R DVT s/p IVC filter, anemia, and dementia who presents from a nursing home with increasing lethargy poor PO intake and AMS. Ms. Goff is a 89 year old lady with a PMH of ESBL UTI, aspiration pneumonia, Celiac's disease, IBS, b/l DVT with free floating clot in R DVT s/p IVC filter (01/2017), anemia, and dementia who presents from a nursing home with increasing lethargy poor PO intake and AMS.

## 2018-07-16 NOTE — PROGRESS NOTE ADULT - PROBLEM SELECTOR PLAN 2
UA positive. Pt denies fever or change in urinary habits.   -plan as above UA positive. Pt denies fever or change in urinary habits.   f/u UC   f/u ID recs UA positive. Pt denies fever or change in urinary habits but also with dementia at baseline and incontinent   UC >100,000 Gram neg rods   Hx of ESBL so unsure if colonization vs acute infection   f/u ID recs UA positive. Pt denies fever or change in urinary habits but also with dementia at baseline and incontinent   UC >100,000 Gram neg rods   Hx of ESBL so c/w Meropenem for 5 days (currently day 2)   f/u ID recs - History of hypoxic respiratory failure in 2016 previously intubated and on pressors in the ICU 2/2 septic shock   - Episode of desaturation yesterday (7/15) went down to the mid 80s on RA and started on 6L NC   - Patient weaned down 5L O2 NC this AM now saturating at 96%   - Gurgling sounds coming from throat in AM but appears comfortable without use of accessory muscles for breathing   - Rhonchi and coarse breath sounds heard on the right; suspicious for aspiration PNA; NPO  - Repeat CXR looks like a worsening infiltrate of the RLL compared to yesterday    - As per ID recs meropenem would cover this if indeed aspiration PNA   - f/u CXR radiologist reading tomorrow - History of hypoxic respiratory failure in 2016 previously intubated and on pressors in the ICU 2/2 septic shock   - Episode of desaturation yesterday (7/15) went down to the mid 80s on RA and started on 6L NC but patient remained asymptomatic and VSS throughout   - Patient weaned down 5L O2 NC this AM now saturating at 96%   - Gurgling sounds coming from throat in AM but appears comfortable without use of accessory muscles for breathing   - Rhonchi and coarse breath sounds heard on the right; suspicious for aspiration PNA; pt started on pureed thin liquids yesterday but changed diet to NPO today   - Repeat CXR looks like a worsening infiltrate of the RLL compared to yesterday    - As per ID recs meropenem would cover this if indeed aspiration PNA   - f/u CXR radiologist reading tomorrow

## 2018-07-16 NOTE — PROGRESS NOTE ADULT - PROBLEM SELECTOR PROBLEM 5
Nutrition, metabolism, and development symptoms Transition of care performed with sharing of clinical summary Stiffness due to immobility Dementia

## 2018-07-16 NOTE — PROGRESS NOTE ADULT - PROBLEM SELECTOR PLAN 1
Pt presents with increasing lethargy and confusion. Pt has known hx of confusion but is oriented to self at baseline. Currently intermittently oriented with increased somnolence. She remains afebrile, with a normal lactate and hemodynamically stable, so sepsis is not a cause of AMS. In setting of poor PO intake and positive UA, AMS likely secondary to UTI. Received IV fluids and meropenem in ED. Will continue IV fluids and meropenem  Q8h.   -follow up urine cx  - Blood cultures no growth @ 12 hours  - monitor for signs of fever increased somnolence, change in mental status Pt presents with increasing lethargy and confusion. Pt has known hx of confusion but is oriented to self at baseline. Currently intermittently oriented with increased somnolence. She remains afebrile, with a normal lactate and hemodynamically stable, so sepsis is not a cause of AMS. In setting of poor PO intake and positive UA, AMS likely secondary to UTI. Received IV fluids and meropenem in ED. Will continue IV fluids and meropenem Q8h.   -follow up urine cx  - follow up ID recs  - Blood cultures no growth @ 12 hours  - monitor for signs of fever increased somnolence, change in mental status Pt presents with increasing lethargy and confusion. Pt has known hx of confusion but is oriented to self at baseline. Currently intermittently oriented with increased somnolence. She remains afebrile, with a normal lactate and hemodynamically stable, so sepsis is not a cause of AMS. In setting of poor PO intake and positive UA, AMS likely secondary to UTI. Received IV fluids and meropenem in ED. Will continue IV fluids and meropenem Q8h.   - follow up ID recs  - Blood cultures no growth @ 12 hours  - UC >100,000 gram neg rods   - monitor for signs of fever increased somnolence, change in mental status Pt presents with increasing lethargy and confusion. Pt has known hx of confusion but is oriented to self at baseline. Currently intermittently oriented with increased somnolence. She remains afebrile, with a normal lactate and hemodynamically stable, so sepsis is not a cause of AMS. In setting of poor PO intake and positive UA, AMS likely secondary to UTI. Received IV fluids and meropenem in ED. Will continue IV fluids and meropenem Q8h.   - ID recs ton continue meropenem for 5 days   - Blood cultures no growth @ 12 hours  - UC >100,000 gram neg rods   - monitor for signs of fever increased somnolence, change in mental status - Presents with increasing lethargy and confusion  - Known hx of confusion (previously diagnosed with dementia) but is usually oriented to self at baseline according to son   - She remains afebrile, with a normal lactate and hemodynamically stable, so sepsis is not a cause of AMS. In setting of poor PO intake and positive UA, AMS likely secondary to UTI. Received IV fluids and meropenem in ED. Will continue IV fluids and meropenem Q8h.   - ID recs to continue meropenem for 5 days   - Blood cultures no growth @ 12 hours  - UC >100,000 gram neg rods   - monitor for signs of fever increased somnolence, change in mental status  - Remeron was stopped as it is a sedating medication and can affect mental status - Presents with increasing lethargy and confusion  - Known hx of confusion (previously diagnosed with dementia) but is usually oriented to self at baseline according to son   - She remains afebrile, with a normal lactate and hemodynamically stable, so sepsis is not a cause of AMS. In setting of poor PO intake and positive UA, AMS likely secondary to UTI. Received IV fluids and meropenem in ED. Will continue IV fluids and meropenem Q12.   - ID recs to continue meropenem q12 for 5 days    - Blood cultures no growth @ 12 hours  - UC >100,000 gram neg rods   - will monitor for signs of fever increased somnolence, change in mental status  - Remeron was stopped as it is a sedating medication and can affect mental status - Presents with increasing lethargy and confusion  - Known hx of confusion (previously diagnosed with dementia) but is usually oriented to self at baseline according to son   - She remains afebrile, with a normal lactate and hemodynamically stable, so sepsis is not a cause of AMS. In setting of poor PO intake and positive UA, AMS likely secondary to UTI. Received IV fluids and meropenem in ED. Will continue IV fluids and meropenem Q12.   - ID recs to continue meropenem q12 for 5 days    - Blood cultures no growth @ 12 hours  - UC >100,000 gram neg rods   - Will monitor for signs of fever increased somnolence, change in mental status  - Remeron was stopped as it is a sedating medication and can affect mental status

## 2018-07-16 NOTE — PROGRESS NOTE ADULT - PROBLEM SELECTOR PLAN 5
F: ns at 75 cc/hr  E: replete K > 4, Mg >2  N: regular diet as tolerated Pt still inpatient.   1) PCP Contacted on Admission: (Y/N) --> Name & Phone #: Dr. Motta   2) Date of Contact with PCP: 7/15/18   *** Was unable to reach Dr. Motta. Case discussed with KENNEDY***  3) PCP Contacted at Discharge: (Y/N)  4) Summary of Handoff Given to PCP: Y  5) Post-Discharge Appointment Date and Location: NA    DVT prophylaxis: heparin subQ  Code status: full code When auscultating her posterior chest she is very stiff when trying to sit her up  Patient on Remeron for sleep and d/c medication today When auscultating her posterior chest she is very stiff when trying to sit her up  Patient on Remeron for sleep with concern for EMS and d/c medication today When auscultating her posterior chest she is very stiff when trying to sit her up  Patient on Remeron for sleep which was started in the nursing home a couple weeks ago as per son; today concern for EPS and stopped medication - Pt has known hx of dementia. Will continue to monitor for change in mental status  - CTH shows possible NPH  - AAOx0 for me today. Responds to orientation questions only when prompted shaking her head yes or no  - As per speech/swallow recs: aspiration risk c/w NPO & spoke to son and he wants a PEG placed despite recommendations from speech/swallow to discuss palliative care - Pt has known hx of dementia. Will continue to monitor for change in mental status  - CTH shows possible NPH  - AAOx1 for me today. Responds to orientation questions only when prompted shaking her head yes or no  - Is speaking non-sensical sentences but this is an improvement as per son because she was somnolent and not able to speak a few days prior.   - As per speech/swallow recs: aspiration risk c/w NPO & spoke to son and he wants a PEG placed despite recommendations from speech/swallow to discuss palliative care

## 2018-07-16 NOTE — PROGRESS NOTE ADULT - SUBJECTIVE AND OBJECTIVE BOX
OVERNIGHT EVENTS: Desaturations in the 80s yesterday afternoon when transferring her upstairs, started on 6LNC and has been saturating better. Overnight VSS.     SUBJECTIVE:     Vital Signs Last 12 Hrs  T(F): 96 (18 @ 05:51), Max: 98.7 (07-15-18 @ 20:00)  HR: 102 (18 @ 05:51) (95 - 102)  BP: 112/68 (18 @ 05:51) (101/58 - 112/68)  BP(mean): --  RR: 19 (18 @ 05:51) (19 - 20)  SpO2: 95% (18 @ 05:51) (95% - 96%)  I&O's Summary    15 Jul 2018 07:01  -  2018 07:00  --------------------------------------------------------  IN: 1550 mL / OUT: 0 mL / NET: 1550 mL        PHYSICAL EXAM:  Constitutional: NAD, comfortable in bed.  HEENT: NC/AT, PERRLA, EOMI, no conjunctival pallor or scleral icterus, MMM  Neck: Supple, no JVD  Respiratory: Normal rate, rhythm, depth, effort. CTAB. No w/r/r.   Cardiovascular: RRR, normal S1 and S2, no m/r/g.   Gastrointestinal: +BS, soft NTND, no guarding or rebound tenderness, no palpable masses   Extremities: wwp; no cyanosis, clubbing or edema.   Vascular: Pulses equal and strong throughout.   Neurological: AAOx3, no CN deficits, strength and sensation intact throughout.   Skin: No gross skin abnormalities or rashes        LABS:                        9.1    8.8   )-----------( 368      ( 15 Jul 2018 15:16 )             30.4     07-16    140  |  100  |  17  ----------------------------<  79  4.8   |  28  |  0.70    Ca    8.6      2018 05:50  Phos  3.9     07-15  Mg     2.2     07-15    TPro  7.0  /  Alb  2.8<L>  /  TBili  0.4  /  DBili  x   /  AST  22  /  ALT  8<L>  /  AlkPhos  137<H>  07-15      Urinalysis Basic - ( 15 Jul 2018 15:16 )    Color: Yellow / Appearance: Clear / S.010 / pH: x  Gluc: x / Ketone: NEGATIVE  / Bili: Negative / Urobili: 0.2 E.U./dL   Blood: x / Protein: Trace mg/dL / Nitrite: NEGATIVE   Leuk Esterase: Large / RBC: Many /HPF / WBC Many /HPF   Sq Epi: x / Non Sq Epi: Many /HPF / Bacteria: Many /HPF        RADIOLOGY & ADDITIONAL TESTS:      < from: CT Head No Cont (07.15.18 @ 16:40) >    INTERPRETATION:  PROCEDURE: CT head without intravenous contrast    INDICATION: Altered mental status.    TECHNIQUE: Multiple axial images were obtained at 5 mm intervalsfrom the   skull base to the vertex. The images were reviewed in brain and bone   windows.    COMPARISON: Prior study dated 2017.    FINDINGS: There is prominence of the cortical sulci consistent with   age-appropriate volume loss. Mild superimposed prominence is noted of the   lateral and third ventricles which may represent normal pressure   hydrocephalus. 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 There are patchy periventricular white matter hypodensities   consistent with microvascular ischemic white matter disease. There is no   midline shift or extra axial collections. The gray white differentiation   appears within normal limits. There is no intracranial hemorrhage or   acute transcortical infarct. The bony windows demonstrates no fractures.   The visualized paranasal sinuses are within normal limits. The mastoid   air cells are well aerated. There is evidence of prior bilateral cataract   surgery.    IMPRESSION: No acute intracranial findings. Findings consistent with   age-appropriate volume loss and microvascular ischemic white matter   disease. 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    < end of copied text >    MEDICATIONS  (STANDING):  docusate sodium 100 milliGRAM(s) Oral two times a day  heparin  Injectable 5000 Unit(s) SubCutaneous every 8 hours  meropenem  IVPB 1000 milliGRAM(s) IV Intermittent every 8 hours  mirtazapine 7.5 milliGRAM(s) Oral at bedtime  pantoprazole    Tablet 40 milliGRAM(s) Oral before breakfast  sodium chloride 0.9%. 1000 milliLiter(s) (75 mL/Hr) IV Continuous <Continuous>    MEDICATIONS  (PRN): OVERNIGHT EVENTS: Desaturations in the 80s yesterday afternoon when transferring her upstairs, started on 6LNC and has been saturating better. Overnight VSS.     SUBJECTIVE: Patient reports she is not in any pain and has no trouble breathing but has borderline dementia and AAOx0. She does respond to some commands.     Vital Signs Last 12 Hrs  T(F): 96 (18 @ 05:51), Max: 98.7 (07-15-18 @ 20:00)  HR: 102 (18 @ 05:51) (95 - 102)  BP: 112/68 (18 @ 05:51) (101/58 - 112/68)  BP(mean): --  RR: 19 (18 @ 05:51) (19 - 20)  SpO2: 95% (18 @ 05:51) (95% - 96%)  I&O's Summary    15 Jul 2018 07:01  -  2018 07:00  --------------------------------------------------------  IN: 1550 mL / OUT: 0 mL / NET: 1550 mL        PHYSICAL EXAM:  Constitutional: NAD, comfortable in bed.  HEENT: NC/AT, PERRLA, EOMI, no conjunctival pallor or scleral icterus, MMM  Neck: Supple, no JVD  Respiratory: Normal rate, rhythm, depth, effort. CTAB. No w/r/r.   Cardiovascular: RRR, normal S1 and S2, no m/r/g.   Gastrointestinal: +BS, soft NTND, no guarding or rebound tenderness, no palpable masses   Extremities: wwp; no cyanosis, clubbing or edema.   Vascular: Pulses equal and strong throughout.   Neurological: AAOx3, no CN deficits, strength and sensation intact throughout.   Skin: No gross skin abnormalities or rashes        LABS:                        9.1    8.8   )-----------( 368      ( 15 Jul 2018 15:16 )             30.4     07-16    140  |  100  |  17  ----------------------------<  79  4.8   |  28  |  0.70    Ca    8.6      2018 05:50  Phos  3.9     -15  Mg     2.2     -15    TPro  7.0  /  Alb  2.8<L>  /  TBili  0.4  /  DBili  x   /  AST  22  /  ALT  8<L>  /  AlkPhos  137<H>  -15      Urinalysis Basic - ( 15 Jul 2018 15:16 )    Color: Yellow / Appearance: Clear / S.010 / pH: x  Gluc: x / Ketone: NEGATIVE  / Bili: Negative / Urobili: 0.2 E.U./dL   Blood: x / Protein: Trace mg/dL / Nitrite: NEGATIVE   Leuk Esterase: Large / RBC: Many /HPF / WBC Many /HPF   Sq Epi: x / Non Sq Epi: Many /HPF / Bacteria: Many /HPF        RADIOLOGY & ADDITIONAL TESTS:      < from: CT Head No Cont (07.15.18 @ 16:40) >    INTERPRETATION:  PROCEDURE: CT head without intravenous contrast    INDICATION: Altered mental status.    TECHNIQUE: Multiple axial images were obtained at 5 mm intervalsfrom the   skull base to the vertex. The images were reviewed in brain and bone   windows.    COMPARISON: Prior study dated 2017.    FINDINGS: There is prominence of the cortical sulci consistent with   age-appropriate volume loss. Mild superimposed prominence is noted of the   lateral and third ventricles which may represent normal pressure   hydrocephalus. 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 There are patchy periventricular white matter hypodensities   consistent with microvascular ischemic white matter disease. There is no   midline shift or extra axial collections. The gray white differentiation   appears within normal limits. There is no intracranial hemorrhage or   acute transcortical infarct. The bony windows demonstrates no fractures.   The visualized paranasal sinuses are within normal limits. The mastoid   air cells are well aerated. There is evidence of prior bilateral cataract   surgery.    IMPRESSION: No acute intracranial findings. Findings consistent with   age-appropriate volume loss and microvascular ischemic white matter   disease. 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    < end of copied text >    MEDICATIONS  (STANDING):  docusate sodium 100 milliGRAM(s) Oral two times a day  heparin  Injectable 5000 Unit(s) SubCutaneous every 8 hours  meropenem  IVPB 1000 milliGRAM(s) IV Intermittent every 8 hours  mirtazapine 7.5 milliGRAM(s) Oral at bedtime  pantoprazole    Tablet 40 milliGRAM(s) Oral before breakfast  sodium chloride 0.9%. 1000 milliLiter(s) (75 mL/Hr) IV Continuous <Continuous>    MEDICATIONS  (PRN): OVERNIGHT EVENTS: Desaturations in the 80s yesterday afternoon when transferring her upstairs, started on 6LNC and has been saturating better. Overnight VSS.     SUBJECTIVE: Patient reports she is not in any pain and has no trouble breathing but has borderline dementia and AAOx0. She does respond to some commands.     Vital Signs Last 12 Hrs  T(F): 96 (18 @ 05:51), Max: 98.7 (07-15-18 @ 20:00)  HR: 102 (18 @ 05:51) (95 - 102)  BP: 112/68 (18 @ 05:51) (101/58 - 112/68)  BP(mean): --  RR: 19 (18 @ 05:51) (19 - 20)  SpO2: 95% (18 @ 05:51) (95% - 96%)  I&O's Summary    15 Jul 2018 07:01  -  2018 07:00  --------------------------------------------------------  IN: 1550 mL / OUT: 0 mL / NET: 1550 mL        PHYSICAL EXAM:  Constitutional: NAD, comfortable in bed with some gurgling sounds   HEENT: NC/AT, PERRLA, EOMI, no conjunctival pallor or scleral icterus, MMM  Neck: Supple, no JVD  Respiratory: rhonchi and coarse sounds along the right middle and lower lobes,   Cardiovascular: +tachycardia no m/r/g   Gastrointestinal: +BS, soft NTND, no guarding or rebound tenderness, no palpable masses   Extremities: wwp; no cyanosis, clubbing or edema.   Vascular: Pulses equal and strong throughout.   Neurological: AAOx0, responds to some commands   Skin: b/l decubital ulcers with dressing clean dry and intact.        LABS:                        9.1    8.8   )-----------( 368      ( 15 Jul 2018 15:16 )             30.4     07-16    140  |  100  |  17  ----------------------------<  79  4.8   |  28  |  0.70    Ca    8.6      2018 05:50  Phos  3.9     -15  Mg     2.2     -15    TPro  7.0  /  Alb  2.8<L>  /  TBili  0.4  /  DBili  x   /  AST  22  /  ALT  8<L>  /  AlkPhos  137<H>  07-15      Urinalysis Basic - ( 15 Jul 2018 15:16 )    Color: Yellow / Appearance: Clear / S.010 / pH: x  Gluc: x / Ketone: NEGATIVE  / Bili: Negative / Urobili: 0.2 E.U./dL   Blood: x / Protein: Trace mg/dL / Nitrite: NEGATIVE   Leuk Esterase: Large / RBC: Many /HPF / WBC Many /HPF   Sq Epi: x / Non Sq Epi: Many /HPF / Bacteria: Many /HPF        RADIOLOGY & ADDITIONAL TESTS:      < from: CT Head No Cont (07.15.18 @ 16:40) >    INTERPRETATION:  PROCEDURE: CT head without intravenous contrast    INDICATION: Altered mental status.    TECHNIQUE: Multiple axial images were obtained at 5 mm intervalsfrom the   skull base to the vertex. The images were reviewed in brain and bone   windows.    COMPARISON: Prior study dated 2017.    FINDINGS: There is prominence of the cortical sulci consistent with   age-appropriate volume loss. Mild superimposed prominence is noted of the   lateral and third ventricles which may represent normal pressure   hydrocephalus. 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 There are patchy periventricular white matter hypodensities   consistent with microvascular ischemic white matter disease. There is no   midline shift or extra axial collections. The gray white differentiation   appears within normal limits. There is no intracranial hemorrhage or   acute transcortical infarct. The bony windows demonstrates no fractures.   The visualized paranasal sinuses are within normal limits. The mastoid   air cells are well aerated. There is evidence of prior bilateral cataract   surgery.    IMPRESSION: No acute intracranial findings. Findings consistent with   age-appropriate volume loss and microvascular ischemic white matter   disease. 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    < end of copied text >    MEDICATIONS  (STANDING):  docusate sodium 100 milliGRAM(s) Oral two times a day  heparin  Injectable 5000 Unit(s) SubCutaneous every 8 hours  meropenem  IVPB 1000 milliGRAM(s) IV Intermittent every 8 hours  mirtazapine 7.5 milliGRAM(s) Oral at bedtime  pantoprazole    Tablet 40 milliGRAM(s) Oral before breakfast  sodium chloride 0.9%. 1000 milliLiter(s) (75 mL/Hr) IV Continuous <Continuous>    MEDICATIONS  (PRN): OVERNIGHT EVENTS: Desaturations in the 80s yesterday afternoon when transferring her upstairs, started on 6LNC and has been saturating better 96-99%. Overnight VSS.     SUBJECTIVE: Patient reports she is not in any pain and has no trouble breathing but has borderline dementia and AAOx0. She does respond to some commands.     Vital Signs Last 12 Hrs  T(F): 96 (18 @ 05:51), Max: 98.7 (07-15-18 @ 20:00)  HR: 102 (18 @ 05:51) (95 - 102)  BP: 112/68 (18 @ 05:51) (101/58 - 112/68)  BP(mean): --  RR: 19 (18 @ 05:51) (19 - 20)  SpO2: 95% (18 @ 05:51) (95% - 96%)  I&O's Summary    15 Jul 2018 07:01  -  2018 07:00  --------------------------------------------------------  IN: 1550 mL / OUT: 0 mL / NET: 1550 mL        PHYSICAL EXAM:  Constitutional: NAD, comfortable in bed with some gurgling sounds   HEENT: NC/AT, PERRLA, EOMI, no conjunctival pallor or scleral icterus, MMM  Neck: Supple, no JVD  Respiratory: rhonchi and coarse sounds along the right middle and lower lobes,   Cardiovascular: +tachycardia no m/r/g   Gastrointestinal: +BS, soft NTND, no guarding or rebound tenderness, no palpable masses   Extremities: wwp; no cyanosis, clubbing or edema.   Vascular: Pulses equal and strong throughout.   Neurological: AAOx0, responds to some commands   Skin: b/l decubital ulcers with dressing clean dry and intact.        LABS:                        9.1    8.8   )-----------( 368      ( 15 Jul 2018 15:16 )             30.4     07-16    140  |  100  |  17  ----------------------------<  79  4.8   |  28  |  0.70    Ca    8.6      2018 05:50  Phos  3.9     -15  Mg     2.2     -15    TPro  7.0  /  Alb  2.8<L>  /  TBili  0.4  /  DBili  x   /  AST  22  /  ALT  8<L>  /  AlkPhos  137<H>  07-15      Urinalysis Basic - ( 15 Jul 2018 15:16 )    Color: Yellow / Appearance: Clear / S.010 / pH: x  Gluc: x / Ketone: NEGATIVE  / Bili: Negative / Urobili: 0.2 E.U./dL   Blood: x / Protein: Trace mg/dL / Nitrite: NEGATIVE   Leuk Esterase: Large / RBC: Many /HPF / WBC Many /HPF   Sq Epi: x / Non Sq Epi: Many /HPF / Bacteria: Many /HPF        RADIOLOGY & ADDITIONAL TESTS:      < from: CT Head No Cont (07.15.18 @ 16:40) >    INTERPRETATION:  PROCEDURE: CT head without intravenous contrast    INDICATION: Altered mental status.    TECHNIQUE: Multiple axial images were obtained at 5 mm intervalsfrom the   skull base to the vertex. The images were reviewed in brain and bone   windows.    COMPARISON: Prior study dated 2017.    FINDINGS: There is prominence of the cortical sulci consistent with   age-appropriate volume loss. Mild superimposed prominence is noted of the   lateral and third ventricles which may represent normal pressure   hydrocephalus. 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 There are patchy periventricular white matter hypodensities   consistent with microvascular ischemic white matter disease. There is no   midline shift or extra axial collections. The gray white differentiation   appears within normal limits. There is no intracranial hemorrhage or   acute transcortical infarct. The bony windows demonstrates no fractures.   The visualized paranasal sinuses are within normal limits. The mastoid   air cells are well aerated. There is evidence of prior bilateral cataract   surgery.    IMPRESSION: No acute intracranial findings. Findings consistent with   age-appropriate volume loss and microvascular ischemic white matter   disease. 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    < end of copied text >    ECHO:< from: Echocardiogram (17 @ 17:37) >  Overall preserved left ventricular thickness with a septal bulge   measuring up   to 1.7 cm without evidence of LVOT obstruction.  The left ventricular   wall   motion is normal.The left ventricular ejection fraction is normal.The   left   ventricular ejection fraction is 55%.The right ventricle is normal in   size and   function.The left atrial size is normal.Right atrial size is   normal.Calcified   aortic valve.No aortic regurgitation noted.No hemodynamically significant   valvular aortic stenosis.Structurally normal mitral valve.There is trace   mitral regurgitation.Structurally normal tricuspid valve.There is mild   tricuspid regurgitation.There is mild pulmonary hypertension.The   pulmonary   artery systolic pressure is estimated to be 42 mmHg.The pulmonic valve   is not   well visualized.No pulmonic regurgitation noted.There is no pericardial   effusion.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    < end of copied text >      MEDICATIONS  (STANDING):  docusate sodium 100 milliGRAM(s) Oral two times a day  heparin  Injectable 5000 Unit(s) SubCutaneous every 8 hours  meropenem  IVPB 1000 milliGRAM(s) IV Intermittent every 8 hours  mirtazapine 7.5 milliGRAM(s) Oral at bedtime  pantoprazole    Tablet 40 milliGRAM(s) Oral before breakfast  sodium chloride 0.9%. 1000 milliLiter(s) (75 mL/Hr) IV Continuous <Continuous>    MEDICATIONS  (PRN): OVERNIGHT EVENTS: Desaturations in the 80s yesterday afternoon when transferring her upstairs, started on 6LNC and has been saturating better 96-99%. Overnight VSS.     SUBJECTIVE: Patient reports she is not in any pain and has no trouble breathing but has borderline dementia and AAOx0. She does respond to some commands.     Vital Signs Last 12 Hrs  T(F): 96 (18 @ 05:51), Max: 98.7 (07-15-18 @ 20:00)  HR: 102 (18 @ 05:51) (95 - 102)  BP: 112/68 (18 @ 05:51) (101/58 - 112/68)  BP(mean): --  RR: 19 (18 @ 05:51) (19 - 20)  SpO2: 95% (18 @ 05:51) (95% - 96%)  I&O's Summary    15 Jul 2018 07:01  -  2018 07:00  --------------------------------------------------------  IN: 1550 mL / OUT: 0 mL / NET: 1550 mL        PHYSICAL EXAM:  Constitutional: NAD, comfortable in bed with some gurgling sounds   HEENT: NC/AT, PERRLA, EOMI, no conjunctival pallor or scleral icterus, MMM  Neck: Supple, no JVD  Respiratory: rhonchi and coarse sounds along the right middle and lower lobes,   Cardiovascular: +tachycardia no m/r/g   Gastrointestinal: +BS, soft NTND, no guarding or rebound tenderness, no palpable masses   Extremities: wwp; b/l leg swelling with pain with palpation, no pitting edema, +stiffness in upper extremities and trunk  Vascular: Pulses equal and strong throughout.   Neurological: AAOx0, responds to some commands   Skin: b/l decubital ulcers with dressing clean dry and intact.        LABS:                        9.1    8.8   )-----------( 368      ( 15 Jul 2018 15:16 )             30.4     07-16    140  |  100  |  17  ----------------------------<  79  4.8   |  28  |  0.70    Ca    8.6      2018 05:50  Phos  3.9     07-15  Mg     2.2     07-15    TPro  7.0  /  Alb  2.8<L>  /  TBili  0.4  /  DBili  x   /  AST  22  /  ALT  8<L>  /  AlkPhos  137<H>  07-15      Urinalysis Basic - ( 15 Jul 2018 15:16 )    Color: Yellow / Appearance: Clear / S.010 / pH: x  Gluc: x / Ketone: NEGATIVE  / Bili: Negative / Urobili: 0.2 E.U./dL   Blood: x / Protein: Trace mg/dL / Nitrite: NEGATIVE   Leuk Esterase: Large / RBC: Many /HPF / WBC Many /HPF   Sq Epi: x / Non Sq Epi: Many /HPF / Bacteria: Many /HPF        RADIOLOGY & ADDITIONAL TESTS:      < from: CT Head No Cont (07.15.18 @ 16:40) >    INTERPRETATION:  PROCEDURE: CT head without intravenous contrast    INDICATION: Altered mental status.    TECHNIQUE: Multiple axial images were obtained at 5 mm intervalsfrom the   skull base to the vertex. The images were reviewed in brain and bone   windows.    COMPARISON: Prior study dated 2017.    FINDINGS: There is prominence of the cortical sulci consistent with   age-appropriate volume loss. Mild superimposed prominence is noted of the   lateral and third ventricles which may represent normal pressure   hydrocephalus. 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 There are patchy periventricular white matter hypodensities   consistent with microvascular ischemic white matter disease. There is no   midline shift or extra axial collections. The gray white differentiation   appears within normal limits. There is no intracranial hemorrhage or   acute transcortical infarct. The bony windows demonstrates no fractures.   The visualized paranasal sinuses are within normal limits. The mastoid   air cells are well aerated. There is evidence of prior bilateral cataract   surgery.    IMPRESSION: No acute intracranial findings. Findings consistent with   age-appropriate volume loss and microvascular ischemic white matter   disease. 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    < end of copied text >    ECHO:< from: Echocardiogram (17 @ 17:37) >  Overall preserved left ventricular thickness with a septal bulge   measuring up   to 1.7 cm without evidence of LVOT obstruction.  The left ventricular   wall   motion is normal.The left ventricular ejection fraction is normal.The   left   ventricular ejection fraction is 55%.The right ventricle is normal in   size and   function.The left atrial size is normal.Right atrial size is   normal.Calcified   aortic valve.No aortic regurgitation noted.No hemodynamically significant   valvular aortic stenosis.Structurally normal mitral valve.There is trace   mitral regurgitation.Structurally normal tricuspid valve.There is mild   tricuspid regurgitation.There is mild pulmonary hypertension.The   pulmonary   artery systolic pressure is estimated to be 42 mmHg.The pulmonic valve   is not   well visualized.No pulmonic regurgitation noted.There is no pericardial   effusion.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    < end of copied text >      MEDICATIONS  (STANDING):  docusate sodium 100 milliGRAM(s) Oral two times a day  heparin  Injectable 5000 Unit(s) SubCutaneous every 8 hours  meropenem  IVPB 1000 milliGRAM(s) IV Intermittent every 8 hours  mirtazapine 7.5 milliGRAM(s) Oral at bedtime  pantoprazole    Tablet 40 milliGRAM(s) Oral before breakfast  sodium chloride 0.9%. 1000 milliLiter(s) (75 mL/Hr) IV Continuous <Continuous>    MEDICATIONS  (PRN): OVERNIGHT EVENTS: Desaturations in the 80s yesterday afternoon when transferring her upstairs, started on 6LNC and has been saturating better 96-99%. Overnight VSS.     SUBJECTIVE: Patient reports she is not in any pain and has no trouble breathing but has borderline dementia and AAOx0. She does respond to some commands.     Vital Signs Last 12 Hrs  T(F): 96 (18 @ 05:51), Max: 98.7 (07-15-18 @ 20:00)  HR: 102 (18 @ 05:51) (95 - 102)  BP: 112/68 (18 @ 05:51) (101/58 - 112/68)  BP(mean): --  RR: 19 (18 @ 05:51) (19 - 20)  SpO2: 95% (18 @ 05:51) (95% - 96%)  I&O's Summary    15 Jul 2018 07:01  -  2018 07:00  --------------------------------------------------------  IN: 1550 mL / OUT: 0 mL / NET: 1550 mL        PHYSICAL EXAM:  Constitutional: NAD, comfortable in bed with some gurgling sounds   HEENT: NC/AT, PERRLA, EOMI, no conjunctival pallor or scleral icterus, MMM  Neck: Supple, no JVD  Respiratory: rhonchi and coarse sounds along the right middle and lower lobes,   Cardiovascular: +tachycardia no m/r/g   Gastrointestinal: +BS, soft NTND, no guarding or rebound tenderness, no palpable masses   Extremities: wwp; b/l leg swelling with pain with palpation, no pitting edema, +stiffness in upper extremities and trunk  Vascular: Pulses equal and strong throughout.   Neurological: AAOx0, responds to some commands   Skin: b/l decubital ulcers with dressing clean dry and intact.        LABS:                        9.1    8.8   )-----------( 368      ( 15 Jul 2018 15:16 )             30.4     07-16    140  |  100  |  17  ----------------------------<  79  4.8   |  28  |  0.70    Ca    8.6      2018 05:50  Phos  3.9     07-15  Mg     2.2     07-15    TPro  7.0  /  Alb  2.8<L>  /  TBili  0.4  /  DBili  x   /  AST  22  /  ALT  8<L>  /  AlkPhos  137<H>  07-15      Urinalysis Basic - ( 15 Jul 2018 15:16 )    Color: Yellow / Appearance: Clear / S.010 / pH: x  Gluc: x / Ketone: NEGATIVE  / Bili: Negative / Urobili: 0.2 E.U./dL   Blood: x / Protein: Trace mg/dL / Nitrite: NEGATIVE   Leuk Esterase: Large / RBC: Many /HPF / WBC Many /HPF   Sq Epi: x / Non Sq Epi: Many /HPF / Bacteria: Many /HPF        RADIOLOGY & ADDITIONAL TESTS:        < from: CT Head No Cont (07.15.18 @ 16:40) >    INTERPRETATION:  PROCEDURE: CT head without intravenous contrast    INDICATION: Altered mental status.    TECHNIQUE: Multiple axial images were obtained at 5 mm intervalsfrom the   skull base to the vertex. The images were reviewed in brain and bone   windows.    COMPARISON: Prior study dated 2017.    FINDINGS: There is prominence of the cortical sulci consistent with   age-appropriate volume loss. Mild superimposed prominence is noted of the   lateral and third ventricles which may represent normal pressure   hydrocephalus. 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 There are patchy periventricular white matter hypodensities   consistent with microvascular ischemic white matter disease. There is no   midline shift or extra axial collections. The gray white differentiation   appears within normal limits. There is no intracranial hemorrhage or   acute transcortical infarct. The bony windows demonstrates no fractures.   The visualized paranasal sinuses are within normal limits. The mastoid   air cells are well aerated. There is evidence of prior bilateral cataract   surgery.    IMPRESSION: No acute intracranial findings. Findings consistent with   age-appropriate volume loss and microvascular ischemic white matter   disease. 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    < end of copied text >    ECHO:< from: Echocardiogram (17 @ 17:37) >  Overall preserved left ventricular thickness with a septal bulge   measuring up   to 1.7 cm without evidence of LVOT obstruction.  The left ventricular   wall   motion is normal.The left ventricular ejection fraction is normal.The   left   ventricular ejection fraction is 55%.The right ventricle is normal in   size and   function.The left atrial size is normal.Right atrial size is   normal.Calcified   aortic valve.No aortic regurgitation noted.No hemodynamically significant   valvular aortic stenosis.Structurally normal mitral valve.There is trace   mitral regurgitation.Structurally normal tricuspid valve.There is mild   tricuspid regurgitation.There is mild pulmonary hypertension.The   pulmonary   artery systolic pressure is estimated to be 42 mmHg.The pulmonic valve   is not   well visualized.No pulmonic regurgitation noted.There is no pericardial   effusion.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    < end of copied text >      MEDICATIONS  (STANDING):  docusate sodium 100 milliGRAM(s) Oral two times a day  heparin  Injectable 5000 Unit(s) SubCutaneous every 8 hours  meropenem  IVPB 1000 milliGRAM(s) IV Intermittent every 8 hours  mirtazapine 7.5 milliGRAM(s) Oral at bedtime  pantoprazole    Tablet 40 milliGRAM(s) Oral before breakfast  sodium chloride 0.9%. 1000 milliLiter(s) (75 mL/Hr) IV Continuous <Continuous>    MEDICATIONS  (PRN): OVERNIGHT EVENTS: Desaturations in the 80s yesterday afternoon when transferring her upstairs, started on 6LNC and has been saturating better 96-99%. Overnight VSS.     SUBJECTIVE: Patient reports she is not in any pain and has no trouble breathing but has borderline dementia and AAOx0. She does respond to some commands.     Vital Signs Last 12 Hrs  T(F): 96 (18 @ 05:51), Max: 98.7 (07-15-18 @ 20:00)  HR: 102 (18 @ 05:51) (95 - 102)  BP: 112/68 (18 @ 05:51) (101/58 - 112/68)  BP(mean): --  RR: 19 (18 @ 05:51) (19 - 20)  SpO2: 95% (18 @ 05:51) (95% - 96%)  I&O's Summary    15 Jul 2018 07:01  -  2018 07:00  --------------------------------------------------------  IN: 1550 mL / OUT: 0 mL / NET: 1550 mL        PHYSICAL EXAM:  Constitutional: NAD, comfortable in bed with some gurgling sounds   HEENT: NC/AT, PERRLA, EOMI, no conjunctival pallor or scleral icterus, MMM  Neck: Supple, no JVD  Respiratory: rhonchi and coarse sounds along the right middle and lower lobes,   Cardiovascular: +tachycardia no m/r/g   Gastrointestinal: +BS, soft NTND, no guarding or rebound tenderness, no palpable masses   Extremities: wwp; b/l leg swelling with pain with palpation, no pitting edema, +stiffness in upper extremities and trunk  Vascular: Pulses equal and strong throughout.   Neurological: AAOx0, responds to some commands   Skin: b/l decubital ulcers with dressing clean dry and intact.        LABS:                        9.1    8.8   )-----------( 368      ( 15 Jul 2018 15:16 )             30.4     07-16    140  |  100  |  17  ----------------------------<  79  4.8   |  28  |  0.70    Ca    8.6      2018 05:50  Phos  3.9     07-15  Mg     2.2     07-15    TPro  7.0  /  Alb  2.8<L>  /  TBili  0.4  /  DBili  x   /  AST  22  /  ALT  8<L>  /  AlkPhos  137<H>  07-15      Urinalysis Basic - ( 15 Jul 2018 15:16 )    Color: Yellow / Appearance: Clear / S.010 / pH: x  Gluc: x / Ketone: NEGATIVE  / Bili: Negative / Urobili: 0.2 E.U./dL   Blood: x / Protein: Trace mg/dL / Nitrite: NEGATIVE   Leuk Esterase: Large / RBC: Many /HPF / WBC Many /HPF   Sq Epi: x / Non Sq Epi: Many /HPF / Bacteria: Many /HPF        RADIOLOGY & ADDITIONAL TESTS:        < from: CT Head No Cont (07.15.18 @ 16:40) >    INTERPRETATION:  PROCEDURE: CT head without intravenous contrast    INDICATION: Altered mental status.    TECHNIQUE: Multiple axial images were obtained at 5 mm intervalsfrom the   skull base to the vertex. The images were reviewed in brain and bone   windows.    COMPARISON: Prior study dated 2017.    FINDINGS: There is prominence of the cortical sulci consistent with   age-appropriate volume loss. Mild superimposed prominence is noted of the   lateral and third ventricles which may represent normal pressure   hydrocephalus. 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 There are patchy periventricular white matter hypodensities   consistent with microvascular ischemic white matter disease. There is no   midline shift or extra axial collections. The gray white differentiation   appears within normal limits. There is no intracranial hemorrhage or   acute transcortical infarct. The bony windows demonstrates no fractures.   The visualized paranasal sinuses are within normal limits. The mastoid   air cells are well aerated. There is evidence of prior bilateral cataract   surgery.    IMPRESSION: No acute intracranial findings. Findings consistent with   age-appropriate volume loss and microvascular ischemic white matter   disease. 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    < end of copied text >    < from: Echocardiogram (18 @ 11:32) >  Interpretation Summary  The left atrial size is normal. Right atrium not well visualized.Calcified   aortic valve. No aortic regurgitation noted. There is Mild aortic   stenosis.   The calculated aortic valve area using the continuity equation is1.7   cm2. The   calculated aortic valve area indexed to body surface area is 1.1 cm2/m2.   The   peak pressure gradient is 35 mmHg. The mean pressure gradient is 19 mmHg.   There is mild mitral valve thickening. There is trace mitral   regurgitation.Structurally normal tricuspid valve. There is mild   tricuspid   regurgitation.The pulmonary artery systolic pressure is estimated to be   61   mmHg.The pulmonic valve is not well visualized. No pulmonic regurgitation   noted.The right ventricle is normal in size and function.There is a   septal   "knuckle" with no left ventricular outflow obstruction There is mild   concentric left ventricular hypertrophy. The left ventricular wall   motion is   normal. The left ventricular ejection fraction is estimated to be 65-70%    The   aortic root measures 3.9 cm at sinuses (normal less than 4 cm for men,   less   than 3.6 cm for women).  There is no pericardial effusion.When compared   to   prior study PASP has increased. Mild aortic stenosis is new.    < end of copied text >    ECHO:< from: Echocardiogram (17 @ 17:37) >  Overall preserved left ventricular thickness with a septal bulge   measuring up   to 1.7 cm without evidence of LVOT obstruction.  The left ventricular   wall   motion is normal.The left ventricular ejection fraction is normal.The   left   ventricular ejection fraction is 55%.The right ventricle is normal in   size and   function.The left atrial size is normal.Right atrial size is   normal.Calcified   aortic valve.No aortic regurgitation noted.No hemodynamically significant   valvular aortic stenosis.Structurally normal mitral valve.There is trace   mitral regurgitation.Structurally normal tricuspid valve.There is mild   tricuspid regurgitation.There is mild pulmonary hypertension.The   pulmonary   artery systolic pressure is estimated to be 42 mmHg.The pulmonic valve   is not   well visualized.No pulmonic regurgitation noted.There is no pericardial   effusion.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    < end of copied text >      MEDICATIONS  (STANDING):  docusate sodium 100 milliGRAM(s) Oral two times a day  heparin  Injectable 5000 Unit(s) SubCutaneous every 8 hours  meropenem  IVPB 1000 milliGRAM(s) IV Intermittent every 8 hours  mirtazapine 7.5 milliGRAM(s) Oral at bedtime  pantoprazole    Tablet 40 milliGRAM(s) Oral before breakfast  sodium chloride 0.9%. 1000 milliLiter(s) (75 mL/Hr) IV Continuous <Continuous>    MEDICATIONS  (PRN): OVERNIGHT EVENTS: Desaturations in the 80s yesterday afternoon when transferring her upstairs, started on 6LNC and has been saturating better 96-99%. Overnight VSS.     SUBJECTIVE: Patient reports she is not in any pain and has no trouble breathing but has borderline dementia and AAOx0. She does respond to some commands.     Vital Signs Last 12 Hrs  T(F): 96 (18 @ 05:51), Max: 98.7 (07-15-18 @ 20:00)  HR: 102 (18 @ 05:51) (95 - 102)  BP: 112/68 (18 @ 05:51) (101/58 - 112/68)  BP(mean): --  RR: 19 (18 @ 05:51) (19 - 20)  SpO2: 95% (18 @ 05:51) (95% - 96%)  I&O's Summary    15 Jul 2018 07:01  -  2018 07:00  --------------------------------------------------------  IN: 1550 mL / OUT: 0 mL / NET: 1550 mL        PHYSICAL EXAM:  Constitutional: NAD, comfortable in bed with some gurgling sounds   HEENT: NC/AT, PERRLA, EOMI, no conjunctival pallor or scleral icterus, MMM  Neck: Supple, no JVD  Respiratory: rhonchi and coarse sounds along the right middle and lower lobes  Cardiovascular: +tachycardia no m/r/g   Gastrointestinal: +BS, soft NTND, no guarding or rebound tenderness, no palpable masses   Extremities: wwp; b/l leg swelling with pain with palpation, no pitting edema, +stiffness in upper extremities and trunk  Vascular: Pulses equal and strong throughout.   Neurological: AAOx0, responds to some commands, unable to assess motor and sensation 2/2 cognitive impairment   Skin: b/l decubital ulcers with dressing clean dry and intact.        LABS:                        9.1    8.8   )-----------( 368      ( 15 Jul 2018 15:16 )             30.4     -16    140  |  100  |  17  ----------------------------<  79  4.8   |  28  |  0.70    Ca    8.6      2018 05:50  Phos  3.9     07-15  Mg     2.2     07-15    TPro  7.0  /  Alb  2.8<L>  /  TBili  0.4  /  DBili  x   /  AST  22  /  ALT  8<L>  /  AlkPhos  137<H>  07-15      Urinalysis Basic - ( 15 Jul 2018 15:16 )    Color: Yellow / Appearance: Clear / S.010 / pH: x  Gluc: x / Ketone: NEGATIVE  / Bili: Negative / Urobili: 0.2 E.U./dL   Blood: x / Protein: Trace mg/dL / Nitrite: NEGATIVE   Leuk Esterase: Large / RBC: Many /HPF / WBC Many /HPF   Sq Epi: x / Non Sq Epi: Many /HPF / Bacteria: Many /HPF        RADIOLOGY & ADDITIONAL TESTS:        < from: CT Head No Cont (07.15.18 @ 16:40) >    INTERPRETATION:  PROCEDURE: CT head without intravenous contrast    INDICATION: Altered mental status.    TECHNIQUE: Multiple axial images were obtained at 5 mm intervalsfrom the   skull base to the vertex. The images were reviewed in brain and bone   windows.    COMPARISON: Prior study dated 2017.    FINDINGS: There is prominence of the cortical sulci consistent with   age-appropriate volume loss. Mild superimposed prominence is noted of the   lateral and third ventricles which may represent normal pressure   hydrocephalus. 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 There are patchy periventricular white matter hypodensities   consistent with microvascular ischemic white matter disease. There is no   midline shift or extra axial collections. The gray white differentiation   appears within normal limits. There is no intracranial hemorrhage or   acute transcortical infarct. The bony windows demonstrates no fractures.   The visualized paranasal sinuses are within normal limits. The mastoid   air cells are well aerated. There is evidence of prior bilateral cataract   surgery.    IMPRESSION: No acute intracranial findings. Findings consistent with   age-appropriate volume loss and microvascular ischemic white matter   disease. 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    < end of copied text >    < from: Echocardiogram (18 @ 11:32) >  Interpretation Summary  The left atrial size is normal. Right atrium not well visualized.Calcified   aortic valve. No aortic regurgitation noted. There is Mild aortic   stenosis.   The calculated aortic valve area using the continuity equation is1.7   cm2. The   calculated aortic valve area indexed to body surface area is 1.1 cm2/m2.   The   peak pressure gradient is 35 mmHg. The mean pressure gradient is 19 mmHg.   There is mild mitral valve thickening. There is trace mitral   regurgitation.Structurally normal tricuspid valve. There is mild   tricuspid   regurgitation.The pulmonary artery systolic pressure is estimated to be   61   mmHg.The pulmonic valve is not well visualized. No pulmonic regurgitation   noted.The right ventricle is normal in size and function.There is a   septal   "knuckle" with no left ventricular outflow obstruction There is mild   concentric left ventricular hypertrophy. The left ventricular wall   motion is   normal. The left ventricular ejection fraction is estimated to be 65-70%    The   aortic root measures 3.9 cm at sinuses (normal less than 4 cm for men,   less   than 3.6 cm for women).  There is no pericardial effusion.When compared   to   prior study PASP has increased. Mild aortic stenosis is new.    < end of copied text >    ECHO:< from: Echocardiogram (17 @ 17:37) >  Overall preserved left ventricular thickness with a septal bulge   measuring up   to 1.7 cm without evidence of LVOT obstruction.  The left ventricular   wall   motion is normal.The left ventricular ejection fraction is normal.The   left   ventricular ejection fraction is 55%.The right ventricle is normal in   size and   function.The left atrial size is normal.Right atrial size is   normal.Calcified   aortic valve.No aortic regurgitation noted.No hemodynamically significant   valvular aortic stenosis.Structurally normal mitral valve.There is trace   mitral regurgitation.Structurally normal tricuspid valve.There is mild   tricuspid regurgitation.There is mild pulmonary hypertension.The   pulmonary   artery systolic pressure is estimated to be 42 mmHg.The pulmonic valve   is not   well visualized.No pulmonic regurgitation noted.There is no pericardial   effusion.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    < end of copied text >      MEDICATIONS  (STANDING):  docusate sodium 100 milliGRAM(s) Oral two times a day  heparin  Injectable 5000 Unit(s) SubCutaneous every 8 hours  meropenem  IVPB 1000 milliGRAM(s) IV Intermittent every 8 hours  mirtazapine 7.5 milliGRAM(s) Oral at bedtime  pantoprazole    Tablet 40 milliGRAM(s) Oral before breakfast  sodium chloride 0.9%. 1000 milliLiter(s) (75 mL/Hr) IV Continuous <Continuous>    MEDICATIONS  (PRN): OVERNIGHT EVENTS: Desaturations in the 80s yesterday afternoon when transferring her upstairs, started on 6LNC and has been saturating better 96-99%. Overnight VSS.     SUBJECTIVE: Patient reports she is not in any pain and has no trouble breathing but has borderline dementia and AAOx0. She does respond to some commands.     Vital Signs Last 12 Hrs  T(F): 96 (18 @ 05:51), Max: 98.7 (07-15-18 @ 20:00)  HR: 102 (18 @ 05:51) (95 - 102)  BP: 112/68 (18 @ 05:51) (101/58 - 112/68)  BP(mean): --  RR: 19 (18 @ 05:51) (19 - 20)  SpO2: 95% (18 @ 05:51) (95% - 96%)  I&O's Summary    15 Jul 2018 07:01  -  2018 07:00  --------------------------------------------------------  IN: 1550 mL / OUT: 0 mL / NET: 1550 mL        PHYSICAL EXAM:  Constitutional: Feeble cachectic appearing female in NAD, comfortable in bed with some mild gurgling sounds when talks  HEENT: NC/AT, PERRLA, EOMI, no conjunctival pallor or scleral icterus, MMM  Neck: Supple, no JVD  Respiratory: rhonchi and coarse sounds along the right middle and lower lobes  Cardiovascular: +tachycardia no m/r/g   Gastrointestinal: +BS, soft NTND, no guarding or rebound tenderness, no palpable masses   Extremities: wwp; b/l leg swelling with pain with palpation, no pitting edema, +stiffness in upper extremities and trunk  Vascular: Pulses equal and strong throughout.   Neurological: AAOx0, responds to some commands, unable to assess motor and sensation 2/2 cognitive impairment   Skin: b/l decubital ulcers with dressing clean dry and intact.        LABS:                        9.1    8.8   )-----------( 368      ( 15 Jul 2018 15:16 )             30.4     07-16    140  |  100  |  17  ----------------------------<  79  4.8   |  28  |  0.70    Ca    8.6      2018 05:50  Phos  3.9     07-15  Mg     2.2     07-15    TPro  7.0  /  Alb  2.8<L>  /  TBili  0.4  /  DBili  x   /  AST  22  /  ALT  8<L>  /  AlkPhos  137<H>  07-15      Urinalysis Basic - ( 15 Jul 2018 15:16 )    Color: Yellow / Appearance: Clear / S.010 / pH: x  Gluc: x / Ketone: NEGATIVE  / Bili: Negative / Urobili: 0.2 E.U./dL   Blood: x / Protein: Trace mg/dL / Nitrite: NEGATIVE   Leuk Esterase: Large / RBC: Many /HPF / WBC Many /HPF   Sq Epi: x / Non Sq Epi: Many /HPF / Bacteria: Many /HPF        RADIOLOGY & ADDITIONAL TESTS:        < from: CT Head No Cont (07.15.18 @ 16:40) >    INTERPRETATION:  PROCEDURE: CT head without intravenous contrast    INDICATION: Altered mental status.    TECHNIQUE: Multiple axial images were obtained at 5 mm intervalsfrom the   skull base to the vertex. The images were reviewed in brain and bone   windows.    COMPARISON: Prior study dated 2017.    FINDINGS: There is prominence of the cortical sulci consistent with   age-appropriate volume loss. Mild superimposed prominence is noted of the   lateral and third ventricles which may represent normal pressure   hydrocephalus. 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 There are patchy periventricular white matter hypodensities   consistent with microvascular ischemic white matter disease. There is no   midline shift or extra axial collections. The gray white differentiation   appears within normal limits. There is no intracranial hemorrhage or   acute transcortical infarct. The bony windows demonstrates no fractures.   The visualized paranasal sinuses are within normal limits. The mastoid   air cells are well aerated. There is evidence of prior bilateral cataract   surgery.    IMPRESSION: No acute intracranial findings. Findings consistent with   age-appropriate volume loss and microvascular ischemic white matter   disease. 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    < end of copied text >    < from: Echocardiogram (18 @ 11:32) >  Interpretation Summary  The left atrial size is normal. Right atrium not well visualized.Calcified   aortic valve. No aortic regurgitation noted. There is Mild aortic   stenosis.   The calculated aortic valve area using the continuity equation is1.7   cm2. The   calculated aortic valve area indexed to body surface area is 1.1 cm2/m2.   The   peak pressure gradient is 35 mmHg. The mean pressure gradient is 19 mmHg.   There is mild mitral valve thickening. There is trace mitral   regurgitation.Structurally normal tricuspid valve. There is mild   tricuspid   regurgitation.The pulmonary artery systolic pressure is estimated to be   61   mmHg.The pulmonic valve is not well visualized. No pulmonic regurgitation   noted.The right ventricle is normal in size and function.There is a   septal   "knuckle" with no left ventricular outflow obstruction There is mild   concentric left ventricular hypertrophy. The left ventricular wall   motion is   normal. The left ventricular ejection fraction is estimated to be 65-70%    The   aortic root measures 3.9 cm at sinuses (normal less than 4 cm for men,   less   than 3.6 cm for women).  There is no pericardial effusion.When compared   to   prior study PASP has increased. Mild aortic stenosis is new.    < end of copied text >    ECHO:< from: Echocardiogram (17 @ 17:37) >  Overall preserved left ventricular thickness with a septal bulge   measuring up   to 1.7 cm without evidence of LVOT obstruction.  The left ventricular   wall   motion is normal.The left ventricular ejection fraction is normal.The   left   ventricular ejection fraction is 55%.The right ventricle is normal in   size and   function.The left atrial size is normal.Right atrial size is   normal.Calcified   aortic valve.No aortic regurgitation noted.No hemodynamically significant   valvular aortic stenosis.Structurally normal mitral valve.There is trace   mitral regurgitation.Structurally normal tricuspid valve.There is mild   tricuspid regurgitation.There is mild pulmonary hypertension.The   pulmonary   artery systolic pressure is estimated to be 42 mmHg.The pulmonic valve   is not   well visualized.No pulmonic regurgitation noted.There is no pericardial   effusion.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    < end of copied text >      MEDICATIONS  (STANDING):  docusate sodium 100 milliGRAM(s) Oral two times a day  heparin  Injectable 5000 Unit(s) SubCutaneous every 8 hours  meropenem  IVPB 1000 milliGRAM(s) IV Intermittent every 8 hours  mirtazapine 7.5 milliGRAM(s) Oral at bedtime  pantoprazole    Tablet 40 milliGRAM(s) Oral before breakfast  sodium chloride 0.9%. 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 In the hospital, she was found to have UTI and is being treated with meropenem due to prior history to ESBL UTI and multiple antibiotic allergies. Patient's VSS throughout and was afebrile and non-toxic appearing. Yesterday afternoon patient desat to the 80s and started on 6LNC and her saturation remained 96-99%. Patient asymptomatic throughout and not complaining of any chest pain or SOB. Repeat CXR today shows possible RLL infiltrate suspicious of aspiration pna, as patient was started on diet yesterday and subsequently had an episode of desaturation. ID on board for UTI. Patient is now NPO 2/2 possible aspiration and GI consulted for peg placement. Of note, patient's baseline mental status is AAOx1 (self). She can become somewhat confused and agitated at times and needs persistent redirection.     OVERNIGHT EVENTS: Desaturations in the 80s yesterday afternoon when transferring her upstairs, started on 6LNC and has been saturating better 96-99%. Overnight VSS.     SUBJECTIVE: Patient reports she is not in any pain and has no trouble breathing but has dementia and AAOx1. She does respond to some commands.     Vital Signs Last 12 Hrs  T(F): 96 (18 @ 05:51), Max: 98.7 (07-15-18 @ 20:00)  HR: 102 (18 @ 05:51) (95 - 102)  BP: 112/68 (18 @ 05:51) (101/58 - 112/68)  BP(mean): --  RR: 19 (18 @ 05:51) (19 - 20)  SpO2: 95% (18 @ 05:51) (95% - 96%)  I&O's Summary    15 Jul 2018 07:01  -  2018 07:00  --------------------------------------------------------  IN: 1550 mL / OUT: 0 mL / NET: 1550 mL        PHYSICAL EXAM:  Constitutional: Feeble cachectic appearing female in NAD, comfortable in bed with some mild gurgling sounds when talks  HEENT: NC/AT, PERRLA, EOMI, no conjunctival pallor or scleral icterus, MMM  Neck: Supple, no JVD  Respiratory: rhonchi and coarse sounds along the right middle and lower lobes  Cardiovascular: +tachycardia no m/r/g   Gastrointestinal: +BS, soft NTND, no guarding or rebound tenderness, no palpable masses   Extremities: wwp; b/l leg swelling with pain with palpation, no pitting edema, +stiffness in upper extremities and trunk  Vascular: Pulses equal and strong throughout.   Neurological: AAOx0, responds to some commands, unable to assess motor and sensation 2/2 cognitive impairment   Skin: b/l decubital ulcers with dressing clean dry and intact.        LABS:                        9.1    8.8   )-----------( 368      ( 15 Jul 2018 15:16 )             30.4     07-16    140  |  100  |  17  ----------------------------<  79  4.8   |  28  |  0.70    Ca    8.6      2018 05:50  Phos  3.9     07-15  Mg     2.2     -15    TPro  7.0  /  Alb  2.8<L>  /  TBili  0.4  /  DBili  x   /  AST  22  /  ALT  8<L>  /  AlkPhos  137<H>  07-15      Urinalysis Basic - ( 15 Jul 2018 15:16 )    Color: Yellow / Appearance: Clear / S.010 / pH: x  Gluc: x / Ketone: NEGATIVE  / Bili: Negative / Urobili: 0.2 E.U./dL   Blood: x / Protein: Trace mg/dL / Nitrite: NEGATIVE   Leuk Esterase: Large / RBC: Many /HPF / WBC Many /HPF   Sq Epi: x / Non Sq Epi: Many /HPF / Bacteria: Many /HPF        RADIOLOGY & ADDITIONAL TESTS:        < from: CT Head No Cont (07.15.18 @ 16:40) >    INTERPRETATION:  PROCEDURE: CT head without intravenous contrast    INDICATION: Altered mental status.    TECHNIQUE: Multiple axial images were obtained at 5 mm intervalsfrom the   skull base to the vertex. The images were reviewed in brain and bone   windows.    COMPARISON: Prior study dated 2017.    FINDINGS: There is prominence of the cortical sulci consistent with   age-appropriate volume loss. Mild superimposed prominence is noted of the   lateral and third ventricles which may represent normal pressure   hydrocephalus. 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 There are patchy periventricular white matter hypodensities   consistent with microvascular ischemic white matter disease. There is no   midline shift or extra axial collections. The gray white differentiation   appears within normal limits. There is no intracranial hemorrhage or   acute transcortical infarct. The bony windows demonstrates no fractures.   The visualized paranasal sinuses are within normal limits. The mastoid   air cells are well aerated. There is evidence of prior bilateral cataract   surgery.    IMPRESSION: No acute intracranial findings. Findings consistent with   age-appropriate volume loss and microvascular ischemic white matter   disease. 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    < end of copied text >    < from: Echocardiogram (18 @ 11:32) >  Interpretation Summary  The left atrial size is normal. Right atrium not well visualized.Calcified   aortic valve. No aortic regurgitation noted. There is Mild aortic   stenosis.   The calculated aortic valve area using the continuity equation is1.7   cm2. The   calculated aortic valve area indexed to body surface area is 1.1 cm2/m2.   The   peak pressure gradient is 35 mmHg. The mean pressure gradient is 19 mmHg.   There is mild mitral valve thickening. There is trace mitral   regurgitation.Structurally normal tricuspid valve. There is mild   tricuspid   regurgitation.The pulmonary artery systolic pressure is estimated to be   61   mmHg.The pulmonic valve is not well visualized. No pulmonic regurgitation   noted.The right ventricle is normal in size and function.There is a   septal   "knuckle" with no left ventricular outflow obstruction There is mild   concentric left ventricular hypertrophy. The left ventricular wall   motion is   normal. The left ventricular ejection fraction is estimated to be 65-70%    The   aortic root measures 3.9 cm at sinuses (normal less than 4 cm for men,   less   than 3.6 cm for women).  There is no pericardial effusion.When compared   to   prior study PASP has increased. Mild aortic stenosis is new.    < end of copied text >    ECHO:< from: Echocardiogram (17 @ 17:37) >  Overall preserved left ventricular thickness with a septal bulge   measuring up   to 1.7 cm without evidence of LVOT obstruction.  The left ventricular   wall   motion is normal.The left ventricular ejection fraction is normal.The   left   ventricular ejection fraction is 55%.The right ventricle is normal in   size and   function.The left atrial size is normal.Right atrial size is   normal.Calcified   aortic valve.No aortic regurgitation noted.No hemodynamically significant   valvular aortic stenosis.Structurally normal mitral valve.There is trace   mitral regurgitation.Structurally normal tricuspid valve.There is mild   tricuspid regurgitation.There is mild pulmonary hypertension.The   pulmonary   artery systolic pressure is estimated to be 42 mmHg.The pulmonic valve   is not   well visualized.No pulmonic regurgitation noted.There is no pericardial   effusion.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    < end of copied text >      MEDICATIONS  (STANDING):  docusate sodium 100 milliGRAM(s) Oral two times a day  heparin  Injectable 5000 Unit(s) SubCutaneous every 8 hours  meropenem  IVPB 1000 milliGRAM(s) IV Intermittent every 8 hours  mirtazapine 7.5 milliGRAM(s) Oral at bedtime  pantoprazole    Tablet 40 milliGRAM(s) Oral before breakfast  sodium chloride 0.9%. 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 In the hospital, she was found to have UTI and is being treated with meropenem due to prior history to ESBL UTI and multiple antibiotic allergies. Patient's VSS throughout and was afebrile and non-toxic appearing. Yesterday afternoon patient desat to the 80s and started on 6LNC and her saturation remained 96-99%. Patient asymptomatic throughout and not complaining of any chest pain or SOB. Repeat CXR today shows possible RLL infiltrate suspicious of aspiration pna, as patient was started on diet yesterday and subsequently had an episode of desaturation. ID on board for UTI. Patient is now NPO 2/2 possible aspiration and GI consulted for peg placement. Of note, patient's baseline mental status is AAOx1 (self). She can become somewhat confused and agitated at times and needs persistent redirection when communicating with her.     OVERNIGHT EVENTS: Desaturations in the 80s yesterday afternoon when transferring her upstairs, started on 6LNC and has been saturating better 96-99%. Overnight VSS.     SUBJECTIVE: Patient reports she is not in any pain and has no trouble breathing but has dementia and AAOx1. She does respond to some commands.     Vital Signs Last 12 Hrs  T(F): 96 (18 @ 05:51), Max: 98.7 (07-15-18 @ 20:00)  HR: 102 (18 @ 05:51) (95 - 102)  BP: 112/68 (18 @ 05:51) (101/58 - 112/68)  BP(mean): --  RR: 19 (18 @ 05:51) (19 - 20)  SpO2: 95% (18 @ 05:51) (95% - 96%)  I&O's Summary    15 Jul 2018 07:01  -  2018 07:00  --------------------------------------------------------  IN: 1550 mL / OUT: 0 mL / NET: 1550 mL        PHYSICAL EXAM:  Constitutional: Feeble cachectic appearing female in NAD, comfortable in bed with some mild gurgling sounds when talks  HEENT: NC/AT, PERRLA, EOMI, no conjunctival pallor or scleral icterus, MMM  Neck: Supple, no JVD  Respiratory: rhonchi and coarse sounds along the right middle and lower lobes  Cardiovascular: +tachycardia no m/r/g   Gastrointestinal: +BS, soft NTND, no guarding or rebound tenderness, no palpable masses   Extremities: wwp; b/l leg swelling with pain with palpation, no pitting edema, +stiffness in upper extremities and trunk  Vascular: Pulses equal and strong throughout.   Neurological: AAOx1, responds with prompts to her name and her son's name, responds to some commands, unable to assess motor and sensation 2/2 cognitive impairment   Skin: b/l decubital ulcers with dressing clean dry and intact.        LABS:                        9.1    8.8   )-----------( 368      ( 15 Jul 2018 15:16 )             30.4     07-16    140  |  100  |  17  ----------------------------<  79  4.8   |  28  |  0.70    Ca    8.6      2018 05:50  Phos  3.9     07-15  Mg     2.2     07-15    TPro  7.0  /  Alb  2.8<L>  /  TBili  0.4  /  DBili  x   /  AST  22  /  ALT  8<L>  /  AlkPhos  137<H>  07-15      Urinalysis Basic - ( 15 Jul 2018 15:16 )    Color: Yellow / Appearance: Clear / S.010 / pH: x  Gluc: x / Ketone: NEGATIVE  / Bili: Negative / Urobili: 0.2 E.U./dL   Blood: x / Protein: Trace mg/dL / Nitrite: NEGATIVE   Leuk Esterase: Large / RBC: Many /HPF / WBC Many /HPF   Sq Epi: x / Non Sq Epi: Many /HPF / Bacteria: Many /HPF        RADIOLOGY & ADDITIONAL TESTS:        < from: CT Head No Cont (07.15.18 @ 16:40) >    INTERPRETATION:  PROCEDURE: CT head without intravenous contrast    INDICATION: Altered mental status.    TECHNIQUE: Multiple axial images were obtained at 5 mm intervalsfrom the   skull base to the vertex. The images were reviewed in brain and bone   windows.    COMPARISON: Prior study dated 2017.    FINDINGS: There is prominence of the cortical sulci consistent with   age-appropriate volume loss. Mild superimposed prominence is noted of the   lateral and third ventricles which may represent normal pressure   hydrocephalus. 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 There are patchy periventricular white matter hypodensities   consistent with microvascular ischemic white matter disease. There is no   midline shift or extra axial collections. The gray white differentiation   appears within normal limits. There is no intracranial hemorrhage or   acute transcortical infarct. The bony windows demonstrates no fractures.   The visualized paranasal sinuses are within normal limits. The mastoid   air cells are well aerated. There is evidence of prior bilateral cataract   surgery.    IMPRESSION: No acute intracranial findings. Findings consistent with   age-appropriate volume loss and microvascular ischemic white matter   disease. 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    < end of copied text >    < from: Echocardiogram (18 @ 11:32) >  Interpretation Summary  The left atrial size is normal. Right atrium not well visualized.Calcified   aortic valve. No aortic regurgitation noted. There is Mild aortic   stenosis.   The calculated aortic valve area using the continuity equation is1.7   cm2. The   calculated aortic valve area indexed to body surface area is 1.1 cm2/m2.   The   peak pressure gradient is 35 mmHg. The mean pressure gradient is 19 mmHg.   There is mild mitral valve thickening. There is trace mitral   regurgitation.Structurally normal tricuspid valve. There is mild   tricuspid   regurgitation.The pulmonary artery systolic pressure is estimated to be   61   mmHg.The pulmonic valve is not well visualized. No pulmonic regurgitation   noted.The right ventricle is normal in size and function.There is a   septal   "knuckle" with no left ventricular outflow obstruction There is mild   concentric left ventricular hypertrophy. The left ventricular wall   motion is   normal. The left ventricular ejection fraction is estimated to be 65-70%    The   aortic root measures 3.9 cm at sinuses (normal less than 4 cm for men,   less   than 3.6 cm for women).  There is no pericardial effusion.When compared   to   prior study PASP has increased. Mild aortic stenosis is new.    < end of copied text >    ECHO:< from: Echocardiogram (17 @ 17:37) >  Overall preserved left ventricular thickness with a septal bulge   measuring up   to 1.7 cm without evidence of LVOT obstruction.  The left ventricular   wall   motion is normal.The left ventricular ejection fraction is normal.The   left   ventricular ejection fraction is 55%.The right ventricle is normal in   size and   function.The left atrial size is normal.Right atrial size is   normal.Calcified   aortic valve.No aortic regurgitation noted.No hemodynamically significant   valvular aortic stenosis.Structurally normal mitral valve.There is trace   mitral regurgitation.Structurally normal tricuspid valve.There is mild   tricuspid regurgitation.There is mild pulmonary hypertension.The   pulmonary   artery systolic pressure is estimated to be 42 mmHg.The pulmonic valve   is not   well visualized.No pulmonic regurgitation noted.There is no pericardial   effusion.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    < end of copied text >      MEDICATIONS  (STANDING):  docusate sodium 100 milliGRAM(s) Oral two times a day  heparin  Injectable 5000 Unit(s) SubCutaneous every 8 hours  meropenem  IVPB 1000 milliGRAM(s) IV Intermittent every 8 hours  mirtazapine 7.5 milliGRAM(s) Oral at bedtime  pantoprazole    Tablet 40 milliGRAM(s) Oral before breakfast  sodium chloride 0.9%. 1000 milliLiter(s) (75 mL/Hr) IV Continuous <Continuous>    MEDICATIONS  (PRN):

## 2018-07-16 NOTE — PROGRESS NOTE ADULT - PROBLEM SELECTOR PLAN 10
F: D51/2NS at 75 cc/hr  E: replete K > 4, Mg >2  N: npo; high risk aspiration; everything through IV

## 2018-07-16 NOTE — PROGRESS NOTE ADULT - PROBLEM SELECTOR PLAN 8
Pt still inpatient.   1) PCP Contacted on Admission: (Y/N) --> Name & Phone #: Dr. Motta   2) Date of Contact with PCP: 7/15/18   *** Was unable to reach Dr. Motta. Case discussed with KENNEDY***  3) PCP Contacted at Discharge: (Y/N)  4) Summary of Handoff Given to PCP: Y  5) Post-Discharge Appointment Date and Location: NA    DVT prophylaxis: heparin subQ  Code status: full code Pt still inpatient.   1) PCP Contacted on Admission: (Y/N) --> Name & Phone #: Dr. Motta   2) Date of Contact with PCP: 7/16/18 - Spoke with Dr Motta and recommends getting ID consult for UTI and possible aspiration PNA and GI consult for Peg   3) PCP Contacted at Discharge: (Y/N)  4) Summary of Handoff Given to PCP: Y  5) Post-Discharge Appointment Date and Location: NA    DVT prophylaxis: heparin subQ  Code status: full code - Pt with history of Celiac' s Disease and anemia possibly 2/2 poor absorption and iron deficiency  - f/u Iron studies and replete as needed, patient had low iron in May 2017 when presenting with similar episode of UTI   - Hgb today 9.1 and stable   - Hct today 30.4 and stable   - Chronic Anemia with baseline Hgb 8-9  - 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  - Received IV iron 1 year prior  - Avoid aspirin as possible GI bleed related to this as per son's medical history sheet - Pt with history of Celiac' s Disease and anemia possibly 2/2 poor absorption and iron deficiency  - f/u Iron studies and replete as needed, patient had low iron in May 2017 when presenting with similar episode of UTI   - Hgb today 9.1 and stable   - Hct today 30.4 and stable   - Chronic Anemia with baseline Hgb 8-9  - 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  - Received IV iron 1 year prior  - Avoid aspirin as possible GI bleed related to this as per son's medical history record sheet 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 and benign colon polyp and multiple FOBTs have been positive since   - Received IV iron 1 year prior (Jan 2017) 2/2 iron deficiency   - Avoid aspirin as possible GI bleed related to this as per son's medical history record sheet

## 2018-07-16 NOTE — PROGRESS NOTE ADULT - PROBLEM SELECTOR PLAN 3
Pt has known hx of dementia. Will continue to monitor for change in mental status  CTH shows possible NPH Episode of desaturation yesterday went down to the mid 80s on RA and started on 6L NC   Patient weaned down 5L O2 NC now saturating at 96%   Has gurgling sounds coming from throat but appears comfortable without use of accessory muscles for breathing   When auscultating her posterior chest she is very stiff when trying to sit her up  Rhonchi and coarse breath sounds heard on the right; suspicious for aspiration PNA Episode of desaturation yesterday went down to the mid 80s on RA and started on 6L NC   Patient weaned down 5L O2 NC now saturating at 96%   Has gurgling sounds coming from throat but appears comfortable without use of accessory muscles for breathing   Rhonchi and coarse breath sounds heard on the right; suspicious for aspiration PNA; NPO and awaiting speech/swallow eval Episode of desaturation yesterday went down to the mid 80s on RA and started on 6L NC   Patient weaned down 5L O2 NC now saturating at 96%   Has gurgling sounds coming from throat but appears comfortable without use of accessory muscles for breathing   Rhonchi and coarse breath sounds heard on the right; suspicious for aspiration PNA; NPO  f/u pulmonary & ID recs Episode of desaturation yesterday went down to the mid 80s on RA and started on 6L NC   Patient weaned down 5L O2 NC this AM now saturating at 96%   Has gurgling sounds coming from throat but appears comfortable without use of accessory muscles for breathing   Rhonchi and coarse breath sounds heard on the right; suspicious for aspiration PNA; NPO  repeat CXR looks like a worsening infiltrate of the RLL compared to yesterday    f/u ID recs Episode of desaturation yesterday went down to the mid 80s on RA and started on 6L NC   Patient weaned down 5L O2 NC this AM now saturating at 96%   Has gurgling sounds coming from throat but appears comfortable without use of accessory muscles for breathing   Rhonchi and coarse breath sounds heard on the right; suspicious for aspiration PNA; NPO  repeat CXR looks like a worsening infiltrate of the RLL compared to yesterday    As per ID recs meropenem would cover this if is aspiration PNA   f/u CXR radiologist read tomorrow UA positive. Pt denies fever or change in urinary habits but also with dementia at baseline and incontinent   UC >100,000 Gram neg rods   Hx of ESBL so c/w Meropenem for 5 days (currently day 2)   f/u ID recs - UA positive with UC >100,000 Gram neg rods   - Pt denies fever or change in urinary habits but also with dementia at baseline and incontinent   - Hx of ESBL so c/w Meropenem for 5 days (currently day 2- to be completed July 19th)   - f/u culture susceptibilities   - f/u ID recs - Patient is incontinent at baseline and bladder scan done today normal   - UA positive with UC >100,000 Gram neg rods   - Pt denies fever or change in urinary habits but also with dementia at baseline and incontinent   - Hx of ESBL so c/w Meropenem for 5 days (currently day 2- to be completed July 19th)   - f/u culture susceptibilities   - f/u ID recs - Patient is incontinent at baseline and bladder scan done today normal   - UA positive; large blood, many WBCS and leuk esterase positive with UC >100,000 Gram neg rods   - Pt denies fever or change in urinary habits but also with dementia at baseline and incontinent   - Hx of ESBL so c/w Meropenem for 5 days (currently day 2- to be completed July 19th)   - f/u culture susceptibilities   - f/u ID recs

## 2018-07-16 NOTE — MEDICAL STUDENT PROGRESS NOTE(EDUCATION) - NS MD HP STUD ASPLAN PLAN FT
Problem 1: Decreased mental status   Infection (UTI vs PNA) vs metabolic, less likely acute vascular given normal CT head  Start meropenem for broad spectrum coverage of suspected UTI   Follow UAcx for sensitivities    Problem 2: Roncherous breath sounds  CXR to evaluate for possible pna   Swallow studies to assess risk for aspiration  Already on meropenum for UTI (see above)     Problem 3: Tachycardia  May be 2/2 to dehydration vs infection  Replete fluids  EKG to assess for arrythmia   reassess with repeat vitals     Problem 4: Decubitus ulcers b/l heels, chronic  Will continue with dressing changes     Problem 5: Low O2 sat while transferring  Improved with NC, may be 2/2 pna vs deconditioning  Will attempt to ween off NC    Problem 6: Anemia, chronic  HGB stable since prior admission   No acute intervention at this time    Problem 7: Code Status  Pt is full code  Will discuss goals of care with family Problem 1: Decreased mental status   Infection (UTI vs PNA) vs metabolic, less likely acute vascular given normal CT head  Start meropenem for broad spectrum coverage of suspected UTI   Follow UAcx for sensitivities    Problem 2: Roncherous breath sounds  CXR to evaluate for possible aspiration pna   Swallow studies to assess risk for aspiration, diet planning pending results   Already on meropenum for UTI (see above)     Problem 3: Tachycardia  May be 2/2 to dehydration vs infection  Replete fluids  EKG to assess for arrythmia   reassess with repeat vitals     Problem 4: Decubitus ulcers b/l heels, chronic  Will continue with dressing changes     Problem 5: Low O2 sat while transferring  Improved with NC, may be 2/2 pna vs deconditioning  Will attempt to ween off NC    Problem 6: Anemia, chronic  ?from GI losses (hx of heme + stools, GI AVMs) vs Fe-deficiency   HGB stable since prior admission  No acute intervention at this time    Problem 7: Code Status  Pt is full code  Will discuss goals of care with family

## 2018-07-16 NOTE — PROGRESS NOTE ADULT - PROBLEM SELECTOR PLAN 7
F: ns at 75 cc/hr  E: replete K > 4, Mg >2  N: regular diet as tolerated F: ns at 75 cc/hr  E: replete K > 4, Mg >2  N: npo until speech/swallow eval Hgb today 9.1   Hct today 30.4  Baseline Hgb 8-9 Pt with history of Celiac's Disease and anemia possibly 2/2 iron deficiency  f/u Iron studies  Hgb today 9.1   Hct today 30.4  Baseline Hgb 8-9 Pt with history of Celiac' s Disease and anemia possibly 2/2 poor absorption and iron deficiency  f/u Iron studies and replete as needed, patient had low iron in May 2017 when presenting with similar episode of UTI   Hgb today 9.1   Hct today 30.4  Chronic Anemia with baseline Hgb 8-9 - Prior b/l DVT with free floating clot in the R femoral s/p IVC filter in January 2017  - Patient was not put on AC 2/2 high fall risk   - Pt on physical exam today with b/l pain on palpation of lower extremities with some swelling but no pitting edema  - Will hold off on giving lasix today as patient is low-normotensive 100-110s systolic  - Patient prior Echo normal EF of 55 (Jan 2017) so edema most likely not 2/2 systolic dysfunction   - Comparison echo (7/16/2018) LVEF of 65-70% and new mild aortic stenosis and higher PASP than prior   - f/u b/l lower extremity dopplers - Prior b/l DVT with free floating clot in the R femoral s/p IVC filter in January 2017  - Patient was not put on AC 2/2 high fall risk   - Pt on physical exam today with b/l pain on palpation of lower extremities with some swelling but no pitting edema  - Will hold off on giving lasix today as patient is low-normotensive 100-110s systolic  - Patient prior Echo normal EF of 55 (Jan 2017) so edema most likely not 2/2 systolic dysfunction   - Comparison echo today (7/16/2018) LVEF of 65-70% and new mild aortic stenosis and higher PASP than prior   - f/u b/l lower extremity dopplers - Prior b/l DVT with free floating clot in the R femoral s/p IVC filter in January 2017  - Patient was not put on AC 2/2 high fall risk   - Pt on physical exam today with b/l pain on palpation of lower extremities with some swelling but no pitting edema  - Will hold off on giving lasix today as patient is low-normotensive 100-110s systolic  - Patient prior Echo normal EF of 55 (Jan 2017) so edema most likely not 2/2 systolic dysfunction   - Comparison echo today (7/16/2018) LVEF of 65-70% and new mild aortic stenosis and higher PASP than prior   -   - f/u b/l lower extremity dopplers

## 2018-07-16 NOTE — DIETITIAN INITIAL EVALUATION ADULT. - OTHER INFO
7 year old lady with a PMH of ESBL UTI, aspiration pneumonia, Celiac's disease, IBS, b/l DVT with free floating clot in R DVT s/p IVC filter (01/2017), anemia, and dementia who presents from a nursing home with increasing lethargy poor PO intake and AMS. 7 year old lady with a PMH of ESBL UTI, aspiration pneumonia, Celiac's disease, IBS, b/l DVT with free floating clot in R DVT s/p IVC filter (01/2017), anemia, and dementia who presents from a nursing home with increasing lethargy poor PO intake and AMS. Was assessed by SLP today and noted to be at significant risk for aspiration; and further recommendations should be based on GOC.  Remains NPO at this time. Unable obtain wt or diet history as pt off unit and with AMS. Allergies/intolerance to wheat/lactose noted. Stage II pressure ulcer noted on back.

## 2018-07-16 NOTE — PROGRESS NOTE ADULT - PROBLEM SELECTOR PROBLEM 6
Nutrition, metabolism, and development symptoms Transition of care performed with sharing of clinical summary DVT of deep femoral vein, right Stiffness due to immobility

## 2018-07-16 NOTE — DIETITIAN INITIAL EVALUATION ADULT. - PROBLEM SELECTOR PLAN 4
Pt still inpatient.   1) PCP Contacted on Admission: (Y/N) --> Name & Phone #: Dr. Motta   2) Date of Contact with PCP: 7/15/18   *** Was unable to reach Dr. Motta. Case discussed with KENNEDY***  3) PCP Contacted at Discharge: (Y/N)  4) Summary of Handoff Given to PCP: Y  5) Post-Discharge Appointment Date and Location: NA    DVT prophylaxis: heparin subQ  Code status: full code

## 2018-07-17 LAB
-  AMPICILLIN/SULBACTAM: SIGNIFICANT CHANGE UP
-  AMPICILLIN: SIGNIFICANT CHANGE UP
-  CEFAZOLIN: SIGNIFICANT CHANGE UP
-  CEFTRIAXONE: SIGNIFICANT CHANGE UP
-  CIPROFLOXACIN: SIGNIFICANT CHANGE UP
-  GENTAMICIN: SIGNIFICANT CHANGE UP
-  NITROFURANTOIN: SIGNIFICANT CHANGE UP
-  PIPERACILLIN/TAZOBACTAM: SIGNIFICANT CHANGE UP
-  TOBRAMYCIN: SIGNIFICANT CHANGE UP
-  TRIMETHOPRIM/SULFAMETHOXAZOLE: SIGNIFICANT CHANGE UP
ALBUMIN SERPL ELPH-MCNC: 2.6 G/DL — LOW (ref 3.3–5)
ALP SERPL-CCNC: 135 U/L — HIGH (ref 40–120)
ALT FLD-CCNC: 7 U/L — LOW (ref 10–45)
ANION GAP SERPL CALC-SCNC: 10 MMOL/L — SIGNIFICANT CHANGE UP (ref 5–17)
AST SERPL-CCNC: 14 U/L — SIGNIFICANT CHANGE UP (ref 10–40)
BILIRUB SERPL-MCNC: 0.4 MG/DL — SIGNIFICANT CHANGE UP (ref 0.2–1.2)
BUN SERPL-MCNC: 16 MG/DL — SIGNIFICANT CHANGE UP (ref 7–23)
CALCIUM SERPL-MCNC: 8.5 MG/DL — SIGNIFICANT CHANGE UP (ref 8.4–10.5)
CHLORIDE SERPL-SCNC: 98 MMOL/L — SIGNIFICANT CHANGE UP (ref 96–108)
CO2 SERPL-SCNC: 29 MMOL/L — SIGNIFICANT CHANGE UP (ref 22–31)
CREAT SERPL-MCNC: 0.76 MG/DL — SIGNIFICANT CHANGE UP (ref 0.5–1.3)
FERRITIN SERPL-MCNC: 91 NG/ML — SIGNIFICANT CHANGE UP (ref 15–150)
GGT SERPL-CCNC: 15 U/L — SIGNIFICANT CHANGE UP (ref 8–40)
GLUCOSE BLDC GLUCOMTR-MCNC: 112 MG/DL — HIGH (ref 70–99)
GLUCOSE SERPL-MCNC: 98 MG/DL — SIGNIFICANT CHANGE UP (ref 70–99)
HCT VFR BLD CALC: 28.6 % — LOW (ref 34.5–45)
HGB BLD-MCNC: 8.4 G/DL — LOW (ref 11.5–15.5)
IRON SATN MFR SERPL: 11 % — LOW (ref 14–50)
IRON SATN MFR SERPL: 25 UG/DL — LOW (ref 30–160)
MAGNESIUM SERPL-MCNC: 2.3 MG/DL — SIGNIFICANT CHANGE UP (ref 1.6–2.6)
MCHC RBC-ENTMCNC: 27.5 PG — SIGNIFICANT CHANGE UP (ref 27–34)
MCHC RBC-ENTMCNC: 29.4 G/DL — LOW (ref 32–36)
MCV RBC AUTO: 93.5 FL — SIGNIFICANT CHANGE UP (ref 80–100)
METHOD TYPE: SIGNIFICANT CHANGE UP
PHOSPHATE SERPL-MCNC: 2.4 MG/DL — LOW (ref 2.5–4.5)
PLATELET # BLD AUTO: 315 K/UL — SIGNIFICANT CHANGE UP (ref 150–400)
POTASSIUM SERPL-MCNC: 4.2 MMOL/L — SIGNIFICANT CHANGE UP (ref 3.5–5.3)
POTASSIUM SERPL-SCNC: 4.2 MMOL/L — SIGNIFICANT CHANGE UP (ref 3.5–5.3)
PROT SERPL-MCNC: 6.7 G/DL — SIGNIFICANT CHANGE UP (ref 6–8.3)
RBC # BLD: 3.06 M/UL — LOW (ref 3.8–5.2)
RBC # FLD: 14.2 % — SIGNIFICANT CHANGE UP (ref 10.3–16.9)
SODIUM SERPL-SCNC: 137 MMOL/L — SIGNIFICANT CHANGE UP (ref 135–145)
TIBC SERPL-MCNC: 230 UG/DL — SIGNIFICANT CHANGE UP (ref 220–430)
TRANSFERRIN SERPL-MCNC: 187 MG/DL — LOW (ref 200–360)
UIBC SERPL-MCNC: 205 UG/DL — SIGNIFICANT CHANGE UP (ref 110–370)
WBC # BLD: 6.3 K/UL — SIGNIFICANT CHANGE UP (ref 3.8–10.5)
WBC # FLD AUTO: 6.3 K/UL — SIGNIFICANT CHANGE UP (ref 3.8–10.5)

## 2018-07-17 RX ORDER — ACETYLCYSTEINE 200 MG/ML
10 VIAL (ML) MISCELLANEOUS EVERY 6 HOURS
Qty: 0 | Refills: 0 | Status: DISCONTINUED | OUTPATIENT
Start: 2018-07-17 | End: 2018-07-17

## 2018-07-17 RX ORDER — ACETYLCYSTEINE 200 MG/ML
4 VIAL (ML) MISCELLANEOUS EVERY 6 HOURS
Qty: 0 | Refills: 0 | Status: DISCONTINUED | OUTPATIENT
Start: 2018-07-17 | End: 2018-07-21

## 2018-07-17 RX ADMIN — Medication 62.5 MILLIMOLE(S): at 09:58

## 2018-07-17 RX ADMIN — MEROPENEM 100 MILLIGRAM(S): 1 INJECTION INTRAVENOUS at 18:20

## 2018-07-17 RX ADMIN — Medication 3 MILLILITER(S): at 06:06

## 2018-07-17 RX ADMIN — Medication 4 MILLILITER(S): at 18:20

## 2018-07-17 RX ADMIN — MEROPENEM 100 MILLIGRAM(S): 1 INJECTION INTRAVENOUS at 06:06

## 2018-07-17 RX ADMIN — HEPARIN SODIUM 5000 UNIT(S): 5000 INJECTION INTRAVENOUS; SUBCUTANEOUS at 21:50

## 2018-07-17 RX ADMIN — Medication 3 MILLILITER(S): at 18:20

## 2018-07-17 RX ADMIN — Medication 3 MILLILITER(S): at 11:10

## 2018-07-17 RX ADMIN — HEPARIN SODIUM 5000 UNIT(S): 5000 INJECTION INTRAVENOUS; SUBCUTANEOUS at 14:59

## 2018-07-17 RX ADMIN — Medication 3 MILLILITER(S): at 23:52

## 2018-07-17 RX ADMIN — HEPARIN SODIUM 5000 UNIT(S): 5000 INJECTION INTRAVENOUS; SUBCUTANEOUS at 06:06

## 2018-07-17 NOTE — PROGRESS NOTE ADULT - PROBLEM SELECTOR PLAN 8
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

## 2018-07-17 NOTE — PROGRESS NOTE ADULT - PROBLEM SELECTOR PLAN 4
- Patient failed swallow eval and is at high risk aspiration  - Pharyngeal state dysphagia with weak swallow & suspected delay in swallow initiation   - Strict NPO  - Aspiration precautions  - Only medications through IV   - F/u GI Dr. Hale for Peg consult as per son's wishes (POA) - Patient failed swallow eval and is at high risk aspiration  - Pharyngeal state dysphagia with weak swallow & suspected delay in swallow initiation   - Strict NPO  - Aspiration precautions  - FEEST to be done today as per speech/swallow  - Only medications through IV   - F/u GI Dr. Hale for Peg consult as per son's wishes (POA)

## 2018-07-17 NOTE — CONSULT NOTE ADULT - SUBJECTIVE AND OBJECTIVE BOX
HPI:  Ms. Goff is a 89 year old lady with a PMH of ESBL UTI, aspiration pneumonia, chronic anemia, ischemic bowel who presents from a nursing home with increasing lethargy and altered mental status. She has been lethargic for a few weeks, but had a more acute change in mental status with increased lethargy over the past few days. Her PO intake has also decreased over the past 2 weeks. Current PO intake includes part of an Ensure and a small amount of juice each day. She is oriented only to self at baseline but is currently disoriented. She is somnolent but arousable and was unable to provide a history. She has an extensive allergy (amox, cipro, clinda, PCN) list which limited the antibiotics she was able to be treated with during previous UTI. She was accompanied by a weekend home health aide who provided some history. We spoke to her son (Dr. Esvin Goff) who provided a more extensive history. He would like her to remain full code and would like a PEG tube placed. She has no complaints and denies CP, HA, fever, abd pain, n/v.     She was given fluid bolus and meropenem in the ED and admitted to [facility] for further care and observation. (15 Jul 2018 18:53)      PAST MEDICAL & SURGICAL HISTORY:  Dementia  DVT (deep venous thrombosis)  Dysphagia  Anemia  DJD (degenerative joint disease)  Edema  IBS (irritable bowel syndrome)  Celiac disease  H/O inguinal hernia repair  History of total left hip replacement      REVIEW OF SYSTEMS:    patient is a poor historian  she has no complaints    MEDICATIONS  (STANDING):  docusate sodium 100 milliGRAM(s) Oral two times a day  heparin  Injectable 5000 Unit(s) SubCutaneous every 8 hours  meropenem  IVPB 1000 milliGRAM(s) IV Intermittent every 8 hours  pantoprazole    Tablet 40 milliGRAM(s) Oral before breakfast  sodium chloride 0.9%. 1000 milliLiter(s) (75 mL/Hr) IV Continuous <Continuous>    MEDICATIONS  (PRN):      meropenem  IVPB 1000 milliGRAM(s) IV Intermittent every 8 hours      Allergies    amoxicillin (Unknown)  Cipro (Unknown)  clindamycin (Unknown)  lactose (Unknown)  penicillin (Unknown)  Wheat (Unknown)    Intolerances        SOCIAL HISTORY:    FAMILY HISTORY:  No pertinent family history in first degree relatives      Vital Signs Last 24 Hrs  T(C): 37.5 (2018 08:15), Max: 37.5 (2018 08:15)  T(F): 99.5 (2018 08:15), Max: 99.5 (2018 08:15)  HR: 96 (2018 08:15) (67 - 102)  BP: 101/62 (2018 08:15) (101/58 - 112/82)  BP(mean): --  RR: 19 (2018 08:15) (18 - 25)  SpO2: 97% (2018 08:15) (95% - 99%)    PHYSICAL EXAM:    General:  in no acute distress  Eyes: PERRL, EOM intact; conjunctiva and sclera clear  Head: Normocephalic; atraumatic  ENMT: No nasal discharge; airway clear  Neck: Supple; non tender; no masses  Respiratory: No wheezes, rales or rhonchi  Cardiovascular: systolic and diastolic murmur  Gastrointestinal: Soft non-tender non-distended; Normal bowel sounds; No hepatosplenomegaly  Genitourinary: No costovertebral angle tenderness  Extremities: Normal range of motion, No clubbing, cyanosis or edema  Vascular: Peripheral pulses palpable 2+ bilaterally  Neurological: Alert and oriented x 1  Skin: Warm and dry. No acute rash  Lymph Nodes: No acute cervical adenopathy  Musculoskeletal: Normal gait, tone, without deformities    LABS:                        9.1    8.8   )-----------( 368      ( 15 Jul 2018 15:16 )             30.4     07-16    140  |  100  |  17  ----------------------------<  79  4.8   |  28  |  0.70    Ca    8.6      2018 05:50  Phos  3.9     07-15  Mg     2.2     07-15    TPro  7.0  /  Alb  2.8<L>  /  TBili  0.4  /  DBili  x   /  AST  22  /  ALT  8<L>  /  AlkPhos  137<H>  07-15      Urinalysis Basic - ( 15 Jul 2018 15:16 )    Color: Yellow / Appearance: Clear / S.010 / pH: x  Gluc: x / Ketone: NEGATIVE  / Bili: Negative / Urobili: 0.2 E.U./dL   Blood: x / Protein: Trace mg/dL / Nitrite: NEGATIVE   Leuk Esterase: Large / RBC: Many /HPF / WBC Many /HPF   Sq Epi: x / Non Sq Epi: Many /HPF / Bacteria: Many /HPF      Culture Results:   >100,000 CFU/ml Gram Negative Rods  Identification and susceptibility to follow. (07-15 @ 17:49)  Culture Results:   No growth at 12 hours (07-15 @ 17:36)  Culture Results:   No growth at 12 hours (07-15 @ 17:36)    RADIOLOGY & ADDITIONAL STUDIES:
REASON FOR CONSULT:    HISTORY OF PRESENT ILLNESS:  89 year old lady with a PMH of ESBL UTI, aspiration pneumonia, chronic anemia, ischemic bowel who presents from a nursing home with increasing lethargy and altered mental status. She has been lethargic for a few weeks, but had a more acute change in mental status with increased lethargy over the past few days. Her PO intake has also decreased over the past 2 weeks. Current PO intake includes part of an Ensure and a small amount of juice each day. She is oriented only to self at baseline but is currently disoriented. She is somnolent but arousable and was unable to provide a history. She has an extensive allergy (amox, cipro, clinda, PCN) list which limited the antibiotics she was able to be treated with during previous UTI. She was accompanied by a weekend home health aide who provided some history. We spoke to her son ( Esvin Augustein) who provided a more extensive history. He would like her to remain full code and would like a PEG tube placed. She has no complaints and denies CP, HA, fever, abd pain, n/v.     She was given fluid bolus and meropenem in the ED and admitted to [facility] for further care and observation.       PAST MEDICAL & SURGICAL HISTORY:  Dementia  DVT (deep venous thrombosis)  Dysphagia  Anemia  DJD (degenerative joint disease)  Edema  IBS (irritable bowel syndrome)  Celiac disease  H/O inguinal hernia repair  History of total left hip replacement      [ ] Diabetes   [ ] Hypertension  [ ] Hyperlipidemia  [ ] CAD  [ ] PCI  [ ] CABG    PREVIOUS DIAGNOSTIC TESTING:    [ ] Echocardiogram:  [ ]  Catheterization:  [ ] Stress Test:  	    MEDICATIONS:    meropenem  IVPB 1000 milliGRAM(s) IV Intermittent every 8 hours        docusate sodium 100 milliGRAM(s) Oral two times a day  pantoprazole    Tablet 40 milliGRAM(s) Oral before breakfast      heparin  Injectable 5000 Unit(s) SubCutaneous every 8 hours  sodium chloride 0.9%. 1000 milliLiter(s) IV Continuous <Continuous>      FAMILY HISTORY:  No pertinent family history in first degree relatives      SOCIAL HISTORY:    [ ] Non-smoker  [ ] Smoker  [ ] Alcohol    Allergies    amoxicillin (Unknown)  Cipro (Unknown)  clindamycin (Unknown)  lactose (Unknown)  penicillin (Unknown)  Wheat (Unknown)    Intolerances    	    REVIEW OF SYSTEMS:    [x] as per HPI  CONSTITUTIONAL: No fever, weight loss, or fatigue  ENT:  No difficulty hearing, tinnitus, vertigo; No sinus or throat pain  RESPIRATORY: No cough, wheezing, chills or hemoptysis; No Shortness of Breath  CARDIOVASCULAR: No chest pain, palpitations, dizziness, or leg swelling  GASTROINTESTINAL: No abdominal or epigastric pain. No nausea, vomiting, or hematemesis; No diarrhea or constipation. No melena or hematochezia.  GENITOURINARY: No dysuria, frequency, hematuria, or incontinence  NEUROLOGICAL: No headaches, memory loss, loss of strength, numbness, or tremors  MUSCULOSKELETAL: No joint pain or swelling; No muscle, back, or extremity pain  [x] All others negative	  [ ] Unable to obtain    PHYSICAL EXAM:  T(C): 37.5 (07-16-18 @ 08:15), Max: 37.5 (07-16-18 @ 08:15)  HR: 96 (07-16-18 @ 08:15) (67 - 102)  BP: 101/62 (07-16-18 @ 08:15) (101/58 - 112/82)  RR: 19 (07-16-18 @ 08:15) (18 - 25)  SpO2: 97% (07-16-18 @ 08:15) (95% - 99%)  Wt(kg): --  I&O's Summary    15 Jul 2018 07:01  -  16 Jul 2018 07:00  --------------------------------------------------------  IN: 1550 mL / OUT: 0 mL / NET: 1550 mL        Appearance: Normal	  HEENT:   Normal oral mucosa, PERRL, EOMI	  Lymphatic: No lymphadenopathy  Cardiovascular: Normal S1 S2, No JVD, No murmurs, No edema  Respiratory: Lungs clear to auscultation	  Psychiatry: A & O x 3, Mood & affect appropriate  Gastrointestinal:  Soft, Non-tender, + BS	  Skin: No rashes, No ecchymoses, No cyanosis	  Neurologic: Non-focal  Extremities: Normal range of motion, No clubbing, cyanosis or edema  Vascular: Peripheral pulses palpable 2+ bilaterally    TELEMETRY: 	    ECG:  < from: 12 Lead ECG (07.16.18 @ 08:18) >  Diagnosis Line Normal sinus rhythm  Cannot rule out Anterior infarct , age undetermined    < end of copied text >    ECHO:< from: Echocardiogram (01.03.17 @ 17:37) >  Overall preserved left ventricular thickness with a septal bulge   measuring up   to 1.7 cm without evidence of LVOT obstruction.  The left ventricular   wall   motion is normal.The left ventricular ejection fraction is normal.The   left   ventricularejection fraction is 55%.The right ventricle is normal in   size and   function.The left atrial size is normal.Right atrial size is   normal.Calcified   aortic valve.No aortic regurgitation noted.No hemodynamically significant   valvular aortic stenosis.Structurally normal mitral valve.There is trace   mitral regurgitation.Structurally normal tricuspid valve.There is mild   tricuspid regurgitation.There is mild pulmonary hypertension.The   pulmonary   artery systolic pressure is estimated to be 42 mmHg.The pulmonic valve   is not   well visualized.No pulmonic regurgitation noted.There is no pericardial   effusion.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    < end of copied text >    STRESS:  CATH:  	  RADIOLOGY:  CXR:   CT:  US:   	  	  LABS:	 	    CARDIAC MARKERS:                                  9.1    8.8   )-----------( 368      ( 15 Jul 2018 15:16 )             30.4     07-16    140  |  100  |  17  ----------------------------<  79  4.8   |  28  |  0.70    Ca    8.6      16 Jul 2018 05:50  Phos  3.9     07-15  Mg     2.2     07-15    TPro  7.0  /  Alb  2.8<L>  /  TBili  0.4  /  DBili  x   /  AST  22  /  ALT  8<L>  /  AlkPhos  137<H>  07-15    proBNP:   Lipid Profile:   HgA1c:   TSH:     ASSESSMENT/PLAN: 	    # LVH - thickened LV s obstruction on 2017 echo, recommend repeat with Valsalvo - if gradient present or LB obliteration seen on echo - will need more aggressive IV fluid resucsitation    #SOB - check CXR  consider NC chest CT to eval PND/pneumonitits and r/o congestion  check trop   check BNP  check TSH
Ms. Goff is a 89 year old lady with a PMH of ESBL UTI, celiac disease, lactose intolerance, aspiration pneumonia, chronic anemia, ischemic bowel who presents from a nursing home with increasing lethargy and altered mental status. She has been lethargic for a few weeks, but had a more acute change in mental status with increased lethargy over the past few days. Her PO intake has also decreased over the past 2 weeks. Current PO intake includes part of an Ensure and a small amount of juice each day.  Asked to evaluate appropriateness of gastrostomy tube.     REVIEW OF SYSTEMS:  alert and responds but with dementi    PAST MEDICAL & SURGICAL HISTORY:  Dementia  DVT (deep venous thrombosis)  Dysphagia  Anemia  DJD (degenerative joint disease)  Edema  IBS (irritable bowel syndrome)  Celiac disease  H/O inguinal hernia repair  History of total left hip replacement      FAMILY HISTORY:  No pertinent family history in first degree relatives      SOCIAL HISTORY:  Smoking Status: [ ] Current, [ ] Former, [ ] Never  Pack Years:    MEDICATIONS:  MEDICATIONS  (STANDING):  ALBUTerol/ipratropium for Nebulization 3 milliLiter(s) Nebulizer every 6 hours  dextrose 5% + sodium chloride 0.45%. 1000 milliLiter(s) (75 mL/Hr) IV Continuous <Continuous>  heparin  Injectable 5000 Unit(s) SubCutaneous every 8 hours  meropenem  IVPB 1000 milliGRAM(s) IV Intermittent every 12 hours  sodium phosphate IVPB 15 milliMole(s) IV Intermittent once    MEDICATIONS  (PRN):      Allergies    amoxicillin (Unknown)  Cipro (Unknown)  clindamycin (Unknown)  lactose (Unknown)  penicillin (Unknown)  Wheat (Unknown)    Intolerances        Vital Signs Last 24 Hrs  T(C): 37.1 (17 Jul 2018 05:39), Max: 37.2 (16 Jul 2018 17:02)  T(F): 98.8 (17 Jul 2018 05:39), Max: 98.9 (16 Jul 2018 17:02)  HR: 80 (17 Jul 2018 06:35) (80 - 96)  BP: 107/72 (17 Jul 2018 05:39) (90/60 - 107/72)  BP(mean): --  RR: 16 (17 Jul 2018 06:35) (16 - 20)  SpO2: 99% (17 Jul 2018 06:35) (96% - 100%)    07-16 @ 07:01  -  07-17 @ 07:00  --------------------------------------------------------  IN: 1900 mL / OUT: 0 mL / NET: 1900 mL          PHYSICAL EXAM:    General: Well developed; well nourished; in no acute distress  HEENT: MMM, conjunctiva and sclera clear  Gastrointestinal: Soft, non-tender non-distended; Normal bowel sounds; No rebound or guarding  Extremities: Normal range of motion, No clubbing, cyanosis or edema  Neurological: Alert and oriented x2  Skin: Warm and dry. No obvious rash      LABS:                        8.4    6.3   )-----------( 315      ( 17 Jul 2018 06:09 )             28.6     07-17    137  |  98  |  16  ----------------------------<  98  4.2   |  29  |  0.76    Ca    8.5      17 Jul 2018 06:09  Phos  2.4     07-17  Mg     2.3     07-17    TPro  6.7  /  Alb  2.6<L>  /  TBili  0.4  /  DBili  x   /  AST  14  /  ALT  7<L>  /  AlkPhos  135<H>  07-17    Culture - Urine (07.15.18 @ 17:49)    Specimen Source: .Urine Catheterized    Culture Results:   >100,000 CFU/ml Proteus mirabilis  Susceptibility to follow.          RADIOLOGY & ADDITIONAL STUDIES:

## 2018-07-17 NOTE — CONSULT NOTE ADULT - ASSESSMENT
? feeding issue related to her altered mental status ? UTI proteus,  since mental status improved her po intake may become sufficient and peg may not be needed  observe clinical course for now Rx per ID
Bacteria in urine may represent colonization    Patient with h/o ESBL in urine

## 2018-07-17 NOTE — PROGRESS NOTE ADULT - SUBJECTIVE AND OBJECTIVE BOX
OVERNIGHT EVENTS: ALYCIA    SUBJECTIVE: Patient breathing comfortable on hiflow. She is not reporting any shortness of breath or pain anywhere. Patient still not completely oriented AAOx1.     Vital Signs Last 12 Hrs  T(F): 98.8 (18 @ 05:39), Max: 98.8 (18 @ 05:39)  HR: 80 (18 @ 06:35) (80 - 88)  BP: 107/72 (18 @ 05:39) (90/60 - 107/72)  BP(mean): --  RR: 16 (18 @ 06:35) (16 - 18)  SpO2: 99% (18 @ 06:35) (97% - 99%)  I&O's Summary    15 Jul 2018 07:01  -  2018 07:00  --------------------------------------------------------  IN: 1550 mL / OUT: 0 mL / NET: 1550 mL    2018 07:01  -  2018 06:49  --------------------------------------------------------  IN: 1900 mL / OUT: 0 mL / NET: 1900 mL        PHYSICAL EXAM:  Constitutional: NAD, lethargic but arousable and appearing comfortable in bed with some mild gurgling sounds   HEENT: NC/AT, PERRLA, EOMI, no conjunctival pallor or scleral icterus, MMM  Neck: Supple, no JVD  Respiratory: Normal rate, rhythm, depth, effort. b/l coarse breath sounds along anterior chest, no crackles appreciated   Cardiovascular: RRR, normal S1 and S2, no m/r/g.   Gastrointestinal: +BS, soft NTND, no guarding or rebound tenderness, no palpable masses   Extremities: wwp; no cyanosis, b/l swelling but no pitting edema, no erythema and non tender to palpation.    Vascular: Pulses equal and strong throughout.   Neurological: AAOx1, slow but able to follow commands, unable to assess motor and sensation 2/2 cognitive deficits   Skin: b/l decubitus with dressing clean dry and intact.         LABS:                        9.1    8.8   )-----------( 368      ( 15 Jul 2018 15:16 )             30.4         140  |  100  |  17  ----------------------------<  79  4.8   |  28  |  0.70    Ca    8.6      2018 05:50  Phos  3.9     07-15  Mg     2.2     07-15    TPro  6.6  /  Alb  2.5<L>  /  TBili  0.4  /  DBili  <0.2  /  AST  20  /  ALT  12  /  AlkPhos  137<H>        Urinalysis Basic - ( 15 Jul 2018 15:16 )    Color: Yellow / Appearance: Clear / S.010 / pH: x  Gluc: x / Ketone: NEGATIVE  / Bili: Negative / Urobili: 0.2 E.U./dL   Blood: x / Protein: Trace mg/dL / Nitrite: NEGATIVE   Leuk Esterase: Large / RBC: Many /HPF / WBC Many /HPF   Sq Epi: x / Non Sq Epi: Many /HPF / Bacteria: Many /HPF        RADIOLOGY & ADDITIONAL TESTS:  < from: Xray Chest 1 View- PORTABLE-Urgent (18 @ 08:53) >  INTERPRETATION:    INDICATION: aspiration pna    TECHNIQUE: Chest, single portable AP view on 2018 8:53 AM     COMPARISON: Chest radiograph 7/15/2018    FINDINGS:   There are newly developed streaky bilateral perihilar and bibasilar   opacities, suggesting mild pulmonary congestion.  Focal discoid atelectasis in the region of the lingula is again noted.  Costophrenic angles grossly sharp bilaterally, without sizable pleural   effusions.   No pneumothorax is seen.  Cardiomediastinal silhouette is unchanged in appearance.   Partial visualization of the IVC filter.    IMPRESSION:   Newly developed mild bilateral pulmonary congestion.    < from: Xray Chest 1 View- PORTABLE-Urgent (07.15.18 @ 15:20) >  FINDINGS:   The patient's chin overlies the lung apices.  There is a small bandlike opacity in the region of the lingula, most   likely discoid atelectasis.  There is no focal airspace consolidation in either lung.    Costophrenic angles are sharp bilaterally, without sizable pleural   effusions.   No pneumothorax is evident.  Cardiomediastinal silhouette is grossly unchanged in appearance.   Hilar contours also appear unchanged.   Visualized bony thorax demonstrates no significant interval change    IMPRESSION:   No evidence of focal airspace disease or sizable pleural effusions.   Discoid atelectasis in the region of the lingula.    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 Rule out   DVT.    TECHNIQUE: Duplex Doppler evaluation including gray-scale ultrasound   imaging, color flow Doppler imaging, and Doppler spectral analysis of the   veins of both lower extremities was performed.     COMPARISON: Venous duplex bilateral lower extremity dated 2017    FINDINGS: Limited study secondary to patient pain and inability to move   right leg.    Right thigh veins: The common femoral, femoral, proximal greater   saphenous, and proximal deep femoral veins are patent and free of   thrombus bilaterally. The right popliteal vein was not visualized due to   patient'sability to left leg/pain. The veins are normally compressible   and have normal phasic flow and augmentation response.    Left thigh veins: The common femoral, femoral, popliteal, proximal   greater saphenous, and proximal deep femoral veins are patent and free of   thrombus bilaterally. The veins are normally compressible and have normal   phasic flow and augmentation response.    Right calf veins: Not visualized due to patient's inability to lift   leg/pain.    Left calf veins: The paired peroneal and posterior tibial calf veins are   patent bilaterally. Left calf veins were visualized.    IMPRESSION:  Limited study due to patient pain and inability to move right leg. Within   that limitation, no acute deep vein thrombosis is identified.    < end of copied text >          MEDICATIONS  (STANDING):  ALBUTerol/ipratropium for Nebulization 3 milliLiter(s) Nebulizer every 6 hours  dextrose 5% + sodium chloride 0.45%. 1000 milliLiter(s) (75 mL/Hr) IV Continuous <Continuous>  heparin  Injectable 5000 Unit(s) SubCutaneous every 8 hours  meropenem  IVPB 1000 milliGRAM(s) IV Intermittent every 12 hours    MEDICATIONS  (PRN):

## 2018-07-17 NOTE — PROGRESS NOTE ADULT - PROBLEM SELECTOR PLAN 6
- When auscultating her posterior chest this morning she is very stiff when trying to sit her up  - Patient on Remeron for sleep which was started in the nursing home a couple weeks ago as per son; today concern for EPS and stopped medication

## 2018-07-17 NOTE — PROGRESS NOTE ADULT - SUBJECTIVE AND OBJECTIVE BOX
OVERNIGHT EVENTS:    SUBJECTIVE:    Vital Signs Last 12 Hrs  T(F): 98.8 (18 @ 05:39), Max: 98.8 (18 @ 05:39)  HR: 80 (18 @ 06:35) (80 - 88)  BP: 107/72 (18 @ 05:39) (90/60 - 107/72)  BP(mean): --  RR: 16 (18 @ 06:35) (16 - 18)  SpO2: 99% (18 @ 06:35) (97% - 99%)  I&O's Summary    15 Jul 2018 07:  -  2018 07:00  --------------------------------------------------------  IN: 1550 mL / OUT: 0 mL / NET: 1550 mL    2018 07:  -  2018 06:49  --------------------------------------------------------  IN: 1900 mL / OUT: 0 mL / NET: 1900 mL        PHYSICAL EXAM:  Constitutional: NAD, comfortable in bed.  HEENT: NC/AT, PERRLA, EOMI, no conjunctival pallor or scleral icterus, MMM  Neck: Supple, no JVD  Respiratory: Normal rate, rhythm, depth, effort. CTAB. No w/r/r.   Cardiovascular: RRR, normal S1 and S2, no m/r/g.   Gastrointestinal: +BS, soft NTND, no guarding or rebound tenderness, no palpable masses   Extremities: wwp; no cyanosis, clubbing or edema.   Vascular: Pulses equal and strong throughout.   Neurological: AAOx3, no CN deficits, strength and sensation intact throughout.   Skin: No gross skin abnormalities or rashes        LABS:                        9.1    8.8   )-----------( 368      ( 15 Jul 2018 15:16 )             30.4     07-16    140  |  100  |  17  ----------------------------<  79  4.8   |  28  |  0.70    Ca    8.6      2018 05:50  Phos  3.9     07-15  Mg     2.2     -15    TPro  6.6  /  Alb  2.5<L>  /  TBili  0.4  /  DBili  <0.2  /  AST  20  /  ALT  12  /  AlkPhos  137<H>        Urinalysis Basic - ( 15 Jul 2018 15:16 )    Color: Yellow / Appearance: Clear / S.010 / pH: x  Gluc: x / Ketone: NEGATIVE  / Bili: Negative / Urobili: 0.2 E.U./dL   Blood: x / Protein: Trace mg/dL / Nitrite: NEGATIVE   Leuk Esterase: Large / RBC: Many /HPF / WBC Many /HPF   Sq Epi: x / Non Sq Epi: Many /HPF / Bacteria: Many /HPF        RADIOLOGY & ADDITIONAL TESTS:  < from: US Duplex Venous Lower Ext Complete, Bilateral (18 @ 19:47) >  INTERPRETATION:    VENOUS DUPLEX DOPPLER OF BOTH LOWER EXTREMITIES dated 2018 7:47 PM    INDICATION: History of right femoral DVT status post IVC filter. Rule out   DVT.    TECHNIQUE: Duplex Doppler evaluation including gray-scale ultrasound   imaging, color flow Doppler imaging, and Doppler spectral analysis of the   veins of both lower extremities was performed.     COMPARISON: Venous duplex bilateral lower extremity dated 2017    FINDINGS: Limited study secondary to patient pain and inability to move   right leg.    Right thigh veins: The common femoral, femoral, proximal greater   saphenous, and proximal deep femoral veins are patent and free of   thrombus bilaterally. The right popliteal vein was not visualized due to   patient'sability to left leg/pain. The veins are normally compressible   and have normal phasic flow and augmentation response.    Left thigh veins: The common femoral, femoral, popliteal, proximal   greater saphenous, and proximal deep femoral veins are patent and free of   thrombus bilaterally. The veins are normally compressible and have normal   phasic flow and augmentation response.    Right calf veins: Not visualized due to patient's inability to lift   leg/pain.    Left calf veins: The paired peroneal and posterior tibial calf veins are   patent bilaterally. Left calf veins were visualized.    IMPRESSION:  Limited study due to patient pain and inability to move right leg. Within   that limitation, no acute deep vein thrombosis is identified.    < end of copied text >          MEDICATIONS  (STANDING):  ALBUTerol/ipratropium for Nebulization 3 milliLiter(s) Nebulizer every 6 hours  dextrose 5% + sodium chloride 0.45%. 1000 milliLiter(s) (75 mL/Hr) IV Continuous <Continuous>  heparin  Injectable 5000 Unit(s) SubCutaneous every 8 hours  meropenem  IVPB 1000 milliGRAM(s) IV Intermittent every 12 hours    MEDICATIONS  (PRN): OVERNIGHT EVENTS: ALYCIA    SUBJECTIVE: Patient breathing comfortable on hiflow. She is not reporting any shortness of breath or pain anywhere. Patient still not completely oriented AAOx1. She knows her name when prompted.     Vital Signs Last 12 Hrs  T(F): 98.8 (18 @ 05:39), Max: 98.8 (18 @ 05:39)  HR: 80 (18 @ 06:35) (80 - 88)  BP: 107/72 (18 @ 05:39) (90/60 - 107/72)  BP(mean): --  RR: 16 (18 @ 06:35) (16 - 18)  SpO2: 99% (18 @ 06:35) (97% - 99%)  I&O's Summary    15 Jul 2018 07:  -  2018 07:00  --------------------------------------------------------  IN: 1550 mL / OUT: 0 mL / NET: 1550 mL    2018 07:01  -  2018 06:49  --------------------------------------------------------  IN: 1900 mL / OUT: 0 mL / NET: 1900 mL        PHYSICAL EXAM:  Constitutional: NAD, comfortable in bed.  HEENT: NC/AT, PERRLA, EOMI, no conjunctival pallor or scleral icterus, MMM  Neck: Supple, no JVD  Respiratory: Normal rate, rhythm, depth, effort. CTAB. No w/r/r.   Cardiovascular: RRR, normal S1 and S2, no m/r/g.   Gastrointestinal: +BS, soft NTND, no guarding or rebound tenderness, no palpable masses   Extremities: wwp; no cyanosis, clubbing or edema.   Vascular: Pulses equal and strong throughout.   Neurological: AAOx3, no CN deficits, strength and sensation intact throughout.   Skin: No gross skin abnormalities or rashes        LABS:                        9.1    8.8   )-----------( 368      ( 15 Jul 2018 15:16 )             30.4     -16    140  |  100  |  17  ----------------------------<  79  4.8   |  28  |  0.70    Ca    8.6      2018 05:50  Phos  3.9     07-15  Mg     2.2     07-15    TPro  6.6  /  Alb  2.5<L>  /  TBili  0.4  /  DBili  <0.2  /  AST  20  /  ALT  12  /  AlkPhos  137<H>        Urinalysis Basic - ( 15 Jul 2018 15:16 )    Color: Yellow / Appearance: Clear / S.010 / pH: x  Gluc: x / Ketone: NEGATIVE  / Bili: Negative / Urobili: 0.2 E.U./dL   Blood: x / Protein: Trace mg/dL / Nitrite: NEGATIVE   Leuk Esterase: Large / RBC: Many /HPF / WBC Many /HPF   Sq Epi: x / Non Sq Epi: Many /HPF / Bacteria: Many /HPF        RADIOLOGY & ADDITIONAL TESTS:  < from: US Duplex Venous Lower Ext Complete, Bilateral (18 @ 19:47) >  INTERPRETATION:    VENOUS DUPLEX DOPPLER OF BOTH LOWER EXTREMITIES dated 2018 7:47 PM    INDICATION: History of right femoral DVT status post IVC filter. Rule out   DVT.    TECHNIQUE: Duplex Doppler evaluation including gray-scale ultrasound   imaging, color flow Doppler imaging, and Doppler spectral analysis of the   veins of both lower extremities was performed.     COMPARISON: Venous duplex bilateral lower extremity dated 2017    FINDINGS: Limited study secondary to patient pain and inability to move   right leg.    Right thigh veins: The common femoral, femoral, proximal greater   saphenous, and proximal deep femoral veins are patent and free of   thrombus bilaterally. The right popliteal vein was not visualized due to   patient'sability to left leg/pain. The veins are normally compressible   and have normal phasic flow and augmentation response.    Left thigh veins: The common femoral, femoral, popliteal, proximal   greater saphenous, and proximal deep femoral veins are patent and free of   thrombus bilaterally. The veins are normally compressible and have normal   phasic flow and augmentation response.    Right calf veins: Not visualized due to patient's inability to lift   leg/pain.    Left calf veins: The paired peroneal and posterior tibial calf veins are   patent bilaterally. Left calf veins were visualized.    IMPRESSION:  Limited study due to patient pain and inability to move right leg. Within   that limitation, no acute deep vein thrombosis is identified.    < end of copied text >          MEDICATIONS  (STANDING):  ALBUTerol/ipratropium for Nebulization 3 milliLiter(s) Nebulizer every 6 hours  dextrose 5% + sodium chloride 0.45%. 1000 milliLiter(s) (75 mL/Hr) IV Continuous <Continuous>  heparin  Injectable 5000 Unit(s) SubCutaneous every 8 hours  meropenem  IVPB 1000 milliGRAM(s) IV Intermittent every 12 hours    MEDICATIONS  (PRN): OVERNIGHT EVENTS: ALYCIA    SUBJECTIVE: Patient breathing comfortable on hiflow. She is not reporting any shortness of breath or pain anywhere. Patient still not completely oriented AAOx1. She knows her name when prompted.     Vital Signs Last 12 Hrs  T(F): 98.8 (18 @ 05:39), Max: 98.8 (18 @ 05:39)  HR: 80 (18 @ 06:35) (80 - 88)  BP: 107/72 (18 @ 05:39) (90/60 - 107/72)  BP(mean): --  RR: 16 (18 @ 06:35) (16 - 18)  SpO2: 99% (18 @ 06:35) (97% - 99%)  I&O's Summary    15 Jul 2018 07:  -  2018 07:00  --------------------------------------------------------  IN: 1550 mL / OUT: 0 mL / NET: 1550 mL    2018 07:01  -  2018 06:49  --------------------------------------------------------  IN: 1900 mL / OUT: 0 mL / NET: 1900 mL        PHYSICAL EXAM:  Constitutional: NAD, comfortable in bed.  HEENT: NC/AT, PERRLA, EOMI, no conjunctival pallor or scleral icterus, MMM  Neck: Supple, no JVD  Respiratory: Normal rate, rhythm, depth, effort. CTAB. No w/r/r.   Cardiovascular: RRR, normal S1 and S2, no m/r/g.   Gastrointestinal: +BS, soft NTND, no guarding or rebound tenderness, no palpable masses   Extremities: wwp; no cyanosis, clubbing or edema.   Vascular: Pulses equal and strong throughout.   Neurological: AAOx3, no CN deficits, strength and sensation intact throughout.   Skin: No gross skin abnormalities or rashes        LABS:                        9.1    8.8   )-----------( 368      ( 15 Jul 2018 15:16 )             30.4     -    140  |  100  |  17  ----------------------------<  79  4.8   |  28  |  0.70    Ca    8.6      2018 05:50  Phos  3.9     -15  Mg     2.2     -15    TPro  6.6  /  Alb  2.5<L>  /  TBili  0.4  /  DBili  <0.2  /  AST  20  /  ALT  12  /  AlkPhos  137<H>        Urinalysis Basic - ( 15 Jul 2018 15:16 )    Color: Yellow / Appearance: Clear / S.010 / pH: x  Gluc: x / Ketone: NEGATIVE  / Bili: Negative / Urobili: 0.2 E.U./dL   Blood: x / Protein: Trace mg/dL / Nitrite: NEGATIVE   Leuk Esterase: Large / RBC: Many /HPF / WBC Many /HPF   Sq Epi: x / Non Sq Epi: Many /HPF / Bacteria: Many /HPF        RADIOLOGY & ADDITIONAL TESTS:  < from: Xray Chest 1 View- PORTABLE-Urgent (18 @ 08:53) >  INTERPRETATION:    INDICATION: aspiration pna    TECHNIQUE: Chest, single portable AP view on 2018 8:53 AM     COMPARISON: Chest radiograph 7/15/2018    FINDINGS:   There are newly developed streaky bilateral perihilar and bibasilar   opacities, suggesting mild pulmonary congestion.  Focal discoid atelectasis in the region of the lingula is again noted.  Costophrenic angles grossly sharp bilaterally, without sizable pleural   effusions.   No pneumothorax is seen.  Cardiomediastinal silhouette is unchanged in appearance.   Partial visualization of the IVC filter.    IMPRESSION:   Newly developed mild bilateral pulmonary congestion.      < end of copied text >      < from: US Duplex Venous Lower Ext Complete, Bilateral (18 @ 19:47) >  INTERPRETATION:    VENOUS DUPLEX DOPPLER OF BOTH LOWER EXTREMITIES dated 2018 7:47 PM    INDICATION: History of right femoral DVT status post IVC filter. Rule out   DVT.    TECHNIQUE: Duplex Doppler evaluation including gray-scale ultrasound   imaging, color flow Doppler imaging, and Doppler spectral analysis of the   veins of both lower extremities was performed.     COMPARISON: Venous duplex bilateral lower extremity dated 2017    FINDINGS: Limited study secondary to patient pain and inability to move   right leg.    Right thigh veins: The common femoral, femoral, proximal greater   saphenous, and proximal deep femoral veins are patent and free of   thrombus bilaterally. The right popliteal vein was not visualized due to   patient'sability to left leg/pain. The veins are normally compressible   and have normal phasic flow and augmentation response.    Left thigh veins: The common femoral, femoral, popliteal, proximal   greater saphenous, and proximal deep femoral veins are patent and free of   thrombus bilaterally. The veins are normally compressible and have normal   phasic flow and augmentation response.    Right calf veins: Not visualized due to patient's inability to lift   leg/pain.    Left calf veins: The paired peroneal and posterior tibial calf veins are   patent bilaterally. Left calf veins were visualized.    IMPRESSION:  Limited study due to patient pain and inability to move right leg. Within   that limitation, no acute deep vein thrombosis is identified.    < end of copied text >          MEDICATIONS  (STANDING):  ALBUTerol/ipratropium for Nebulization 3 milliLiter(s) Nebulizer every 6 hours  dextrose 5% + sodium chloride 0.45%. 1000 milliLiter(s) (75 mL/Hr) IV Continuous <Continuous>  heparin  Injectable 5000 Unit(s) SubCutaneous every 8 hours  meropenem  IVPB 1000 milliGRAM(s) IV Intermittent every 12 hours    MEDICATIONS  (PRN): OVERNIGHT EVENTS: ALYCIA    SUBJECTIVE: Patient breathing comfortable on hiflow. She is not reporting any shortness of breath or pain anywhere. Patient still not completely oriented AAOx1. She knows her name when prompted.     Vital Signs Last 12 Hrs  T(F): 98.8 (18 @ 05:39), Max: 98.8 (18 @ 05:39)  HR: 80 (18 @ 06:35) (80 - 88)  BP: 107/72 (18 @ 05:39) (90/60 - 107/72)  BP(mean): --  RR: 16 (18 @ 06:35) (16 - 18)  SpO2: 99% (18 @ 06:35) (97% - 99%)  I&O's Summary    15 Jul 2018 07:  -  2018 07:00  --------------------------------------------------------  IN: 1550 mL / OUT: 0 mL / NET: 1550 mL    2018 07:01  -  2018 06:49  --------------------------------------------------------  IN: 1900 mL / OUT: 0 mL / NET: 1900 mL        PHYSICAL EXAM:  Constitutional: NAD, lethargic but arousable and appearing comfortable in bed with some mild gurgling sounds   HEENT: NC/AT, PERRLA, EOMI, no conjunctival pallor or scleral icterus, MMM  Neck: Supple, no JVD  Respiratory: Normal rate, rhythm, depth, effort. b/l coarse breath sounds along anterior chest, no crackles appreciated   Cardiovascular: RRR, normal S1 and S2, no m/r/g.   Gastrointestinal: +BS, soft NTND, no guarding or rebound tenderness, no palpable masses   Extremities: wwp; no cyanosis, b/l swelling but no pitting edema, no erythema and non tender to palpation.    Vascular: Pulses equal and strong throughout.   Neurological: AAOx1, slow but able to follow commands, unable to assess motor and sensation 2/2 cognitive deficits   Skin: b/l decubitus with dressing clean dry and intact.         LABS:                        9.1    8.8   )-----------( 368      ( 15 Jul 2018 15:16 )             30.4     -    140  |  100  |  17  ----------------------------<  79  4.8   |  28  |  0.70    Ca    8.6      2018 05:50  Phos  3.9     07-15  Mg     2.2     07-15    TPro  6.6  /  Alb  2.5<L>  /  TBili  0.4  /  DBili  <0.2  /  AST  20  /  ALT  12  /  AlkPhos  137<H>        Urinalysis Basic - ( 15 Jul 2018 15:16 )    Color: Yellow / Appearance: Clear / S.010 / pH: x  Gluc: x / Ketone: NEGATIVE  / Bili: Negative / Urobili: 0.2 E.U./dL   Blood: x / Protein: Trace mg/dL / Nitrite: NEGATIVE   Leuk Esterase: Large / RBC: Many /HPF / WBC Many /HPF   Sq Epi: x / Non Sq Epi: Many /HPF / Bacteria: Many /HPF        RADIOLOGY & ADDITIONAL TESTS:  < from: Xray Chest 1 View- PORTABLE-Urgent (18 @ 08:53) >  INTERPRETATION:    INDICATION: aspiration pna    TECHNIQUE: Chest, single portable AP view on 2018 8:53 AM     COMPARISON: Chest radiograph 7/15/2018    FINDINGS:   There are newly developed streaky bilateral perihilar and bibasilar   opacities, suggesting mild pulmonary congestion.  Focal discoid atelectasis in the region of the lingula is again noted.  Costophrenic angles grossly sharp bilaterally, without sizable pleural   effusions.   No pneumothorax is seen.  Cardiomediastinal silhouette is unchanged in appearance.   Partial visualization of the IVC filter.    IMPRESSION:   Newly developed mild bilateral pulmonary congestion.    < from: Xray Chest 1 View- PORTABLE-Urgent (07.15.18 @ 15:20) >  FINDINGS:   The patient's chin overlies the lung apices.  There is a small bandlike opacity in the region of the lingula, most   likely discoid atelectasis.  There is no focal airspace consolidation in either lung.    Costophrenic angles are sharp bilaterally, without sizable pleural   effusions.   No pneumothorax is evident.  Cardiomediastinal silhouette is grossly unchanged in appearance.   Hilar contours also appear unchanged.   Visualized bony thorax demonstrates no significant interval change    IMPRESSION:   No evidence of focal airspace disease or sizable pleural effusions.   Discoid atelectasis in the region of the lingula.    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 Rule out   DVT.    TECHNIQUE: Duplex Doppler evaluation including gray-scale ultrasound   imaging, color flow Doppler imaging, and Doppler spectral analysis of the   veins of both lower extremities was performed.     COMPARISON: Venous duplex bilateral lower extremity dated 2017    FINDINGS: Limited study secondary to patient pain and inability to move   right leg.    Right thigh veins: The common femoral, femoral, proximal greater   saphenous, and proximal deep femoral veins are patent and free of   thrombus bilaterally. The right popliteal vein was not visualized due to   patient'sability to left leg/pain. The veins are normally compressible   and have normal phasic flow and augmentation response.    Left thigh veins: The common femoral, femoral, popliteal, proximal   greater saphenous, and proximal deep femoral veins are patent and free of   thrombus bilaterally. The veins are normally compressible and have normal   phasic flow and augmentation response.    Right calf veins: Not visualized due to patient's inability to lift   leg/pain.    Left calf veins: The paired peroneal and posterior tibial calf veins are   patent bilaterally. Left calf veins were visualized.    IMPRESSION:  Limited study due to patient pain and inability to move right leg. Within   that limitation, no acute deep vein thrombosis is identified.    < end of copied text >          MEDICATIONS  (STANDING):  ALBUTerol/ipratropium for Nebulization 3 milliLiter(s) Nebulizer every 6 hours  dextrose 5% + sodium chloride 0.45%. 1000 milliLiter(s) (75 mL/Hr) IV Continuous <Continuous>  heparin  Injectable 5000 Unit(s) SubCutaneous every 8 hours  meropenem  IVPB 1000 milliGRAM(s) IV Intermittent every 12 hours    MEDICATIONS  (PRN):

## 2018-07-17 NOTE — PROGRESS NOTE ADULT - PROBLEM SELECTOR PLAN 1
- Presents with increasing lethargy and confusion  - Known hx of confusion (previously diagnosed with dementia) but is usually oriented to self at baseline according to son   - She remains afebrile, with a normal lactate and hemodynamically stable, so sepsis is not a cause of AMS. In setting of poor PO intake and positive UA, AMS likely secondary to UTI. Received IV fluids and meropenem in ED. Will continue IV fluids and meropenem Q12.   - ID recs to continue meropenem q12 for 5 days    - Blood cultures no growth @ 1 day  - UC >100,000 gram neg rods; Proteus mirabilis   - Will monitor for signs of fever increased somnolence, change in mental status  - Remeron was stopped as it is a sedating medication and can affect mental status

## 2018-07-17 NOTE — PROGRESS NOTE ADULT - SUBJECTIVE AND OBJECTIVE BOX
PUD FOR DR GROSS    CC/ HPI Ms. Goff is a 89 year old lady with dementia, history of ESBL UTI, aspiration pneumonia, Celiac's disease, IBS, bilateral DVT s/p IVC filter 2017, who was admitted from the nursing home with increasing lethargy poor PO intake and AMS.  Her SO2 is 100% and her NC will be decreased from 5 L to 4 L.  She is in isolation and she has a private aid.    PAST MEDICAL & SURGICAL HISTORY:  Dementia  DVT (deep venous thrombosis)  Dysphagia  Anemia  DJD (degenerative joint disease)  Edema  IBS (irritable bowel syndrome)  Celiac disease  H/O inguinal hernia repair  History of total left hip replacement    SOCHX:  -  alcohol    FMHX: FA/MO  - contributory     ROS reviewed below with positive findings marked (+) :  GEN:  fever, chills ENT: tracheostomy,   epistaxis,  sinusitis COR: CAD, CHF,  HTN, dysrhythmia PUL: COPD, ILD, asthma, pneumonia GI: PEG, dysphagia, hemorrhage, other LUDWIN: kidney disease, electrolyte disorder HEM:  anemia, +thrombus, coagulopathy, cancer ENDO:  thyroid disease, diabetes mellitus CNS:  +dementia, stroke, seizure, PSY:  depression, anxiety, other      MEDICATIONS  (STANDING):  ALBUTerol/ipratropium for Nebulization 3 milliLiter(s) Nebulizer every 6 hours  dextrose 5% + sodium chloride 0.45%. 1000 milliLiter(s) (75 mL/Hr) IV Continuous <Continuous>  heparin  Injectable 5000 Unit(s) SubCutaneous every 8 hours  meropenem  IVPB 1000 milliGRAM(s) IV Intermittent every 12 hours  sodium phosphate IVPB 15 milliMole(s) IV Intermittent once    MEDICATIONS  (PRN):      Vital Signs Last 24 Hrs  T(C): 37.1 (17 Jul 2018 05:39), Max: 37.2 (16 Jul 2018 17:02)  T(F): 98.8 (17 Jul 2018 05:39), Max: 98.9 (16 Jul 2018 17:02)  HR: 80 (17 Jul 2018 06:35) (80 - 96)  BP: 107/72 (17 Jul 2018 05:39) (90/60 - 107/72)  BP(mean): --  RR: 16 (17 Jul 2018 06:35) (16 - 20)  SpO2: 99% (17 Jul 2018 06:35) (96% - 100%)    she is not able to walk, poorly cooperative  GENERAL:         + intermittent agitation, comfortable,  - distress.  HEENT:            - trauma,  - icterus,  - injection,  - nasal discharge.  NECK:              - jugular venous distention.  LYMPH:           - lymphadenopathy, - masses.  RESP:               + rhonchi,   - rales,   - wheezes, decreased breath sounds.   COR:                S1S2   - gallops,  - rubs.  ABD:                bowel sounds,   soft, - tender, - distended.  EXT/MSC:         - cyanosis,  - clubbing,  - edema.    NEURO:             alert,   responds to stimuli.                        8.4    6.3   )-----------( 315      ( 17 Jul 2018 06:09 )             28.6     07-17    137  |  98  |  16  ----------------------------<  98  4.2   |  29  |  0.76      Xray Chest  (07.16.18) There are newly developed streaky bilateral perihilar and bibasilar opacities, suggesting mild pulmonary congestion.  Focal discoid atelectasis in the region of the lingula is again noted.  Costophrenic angles grossly sharp bilaterally, without sizable pleural effusions.  No pneumothorax is seen.  Cardiomediastinal silhouette is unchanged in appearance.  Partial visualization of the IVC filter.  ASSESSMENT/PLAN    1) Altered mental status  2) Urinary tract infection  3) Atelectasis; rule out pneumonia  4) Cardiomyopathy  5) Dementia  6) Agitation  Satisfactory SpO2, oxygen as needed: taper to SO2 95%  Aspiration precautions, swallowing evaluation  Bronchodilators:  Atrovent/ albuterol q 4 – 6 hours as needed  ID/Antibiotics: meropenem  Cardiac/HTN: echocardiogram  GI: Rx/ prophylaxis c PPI/H2B  Heme: Rx/VT prophylaxis  Discussed with medical team, Dr. Motta

## 2018-07-17 NOTE — PROGRESS NOTE ADULT - PROBLEM SELECTOR PLAN 8
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 and benign colon polyp and multiple FOBTs have been positive since   - Received IV iron 1 year prior (Jan 2017) 2/2 iron deficiency   - Avoid aspirin as possible GI bleed related to this as per son's medical history record sheet 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

## 2018-07-17 NOTE — PROGRESS NOTE ADULT - PROBLEM SELECTOR PLAN 2
- History of hypoxic respiratory failure in 2016 previously intubated and on pressors in the ICU 2/2 septic shock   - Episode of desaturation yesterday (7/15) went down to the mid 80s on RA and started on 6L NC but patient remained asymptomatic and VSS throughout   - Patient weaned down 5L O2 NC this AM now saturating at 96%   - Gurgling sounds coming from throat in AM but appears comfortable without use of accessory muscles for breathing   - Rhonchi and coarse breath sounds heard on the right; suspicious for aspiration PNA; pt started on pureed thin liquids yesterday but changed diet to NPO today   - Repeat CXR looks like a worsening infiltrate of the RLL compared to yesterday    - As per ID recs meropenem would cover this if indeed aspiration PNA   - f/u CXR radiologist reading tomorrow - History of hypoxic respiratory failure in 2016 previously intubated and on pressors in the ICU 2/2 septic shock   - Episode of desaturation yesterday (7/15) went down to the mid 80s on RA and started on 6L NC but patient remained asymptomatic and VSS throughout   - Patient weaned down 5L O2 NC this AM now saturating at 96%   - Gurgling sounds coming from throat in AM but appears comfortable without use of accessory muscles for breathing   - Rhonchi and coarse breath sounds heard on the right; suspicious for aspiration PNA; pt started on pureed thin liquids yesterday but changed diet to NPO today   - Repeat CXR looks like a worsening infiltrate of the RLL compared to yesterday    - As per ID recs meropenem would cover this if indeed aspiration PNA   - Hiflow O/N but transitioned back to 6L NC this AM because saturation fine and no acute respiratory distress   - CXR shows b/l pulmonary congestion   - Pulmonary following

## 2018-07-17 NOTE — PROGRESS NOTE ADULT - ASSESSMENT
Ms. Goff is a 89 year old lady with a PMH of ESBL UTI, aspiration pneumonia, Celiac's disease, IBS, b/l DVT with free floating clot in R DVT s/p IVC filter (01/2017), anemia, and dementia who presents from a nursing home with increasing lethargy poor PO intake and AMS.

## 2018-07-17 NOTE — PROGRESS NOTE ADULT - PROBLEM SELECTOR PLAN 2
- History of hypoxic respiratory failure in 2016 previously intubated and on pressors in the ICU 2/2 septic shock   - Episode of desaturation yesterday (7/15) went down to the mid 80s on RA and started on 6L NC but patient remained asymptomatic and VSS throughout   - Patient weaned down 5L O2 NC this AM now saturating at 96%   - Gurgling sounds coming from throat in AM but appears comfortable without use of accessory muscles for breathing   - Rhonchi and coarse breath sounds heard on the right; suspicious for aspiration PNA; pt started on pureed thin liquids yesterday but changed diet to NPO today   - Repeat CXR looks like a worsening infiltrate of the RLL compared to yesterday    - As per ID recs meropenem would cover this if indeed aspiration PNA   - Hiflow O/N but transitioned back to 6L NC this AM because saturation fine and no acute respiratory distress   - CXR shows b/l pulmonary congestion   - Pulmonary following

## 2018-07-17 NOTE — PROGRESS NOTE ADULT - PROBLEM SELECTOR PLAN 4
- Patient failed swallow eval and is at high risk aspiration  - Pharyngeal state dysphagia with weak swallow & suspected delay in swallow initiation   - Strict NPO  - Aspiration precautions  - FEEST to be done today as per speech/swallow  - Only medications through IV   - F/u GI Dr. Hale for Peg consult as per son's wishes (POA)

## 2018-07-17 NOTE — PROGRESS NOTE ADULT - SUBJECTIVE AND OBJECTIVE BOX
INTERVAL HPI/OVERNIGHT EVENTS:    ANTIBIOTICS    MEDICATIONS  (STANDING):  ALBUTerol/ipratropium for Nebulization 3 milliLiter(s) Nebulizer every 6 hours  dextrose 5% + sodium chloride 0.45%. 1000 milliLiter(s) (75 mL/Hr) IV Continuous <Continuous>  heparin  Injectable 5000 Unit(s) SubCutaneous every 8 hours  meropenem  IVPB 1000 milliGRAM(s) IV Intermittent every 12 hours    MEDICATIONS  (PRN):      Allergies    amoxicillin (Unknown)  Cipro (Unknown)  clindamycin (Unknown)  lactose (Unknown)  penicillin (Unknown)  Wheat (Unknown)    Intolerances        REVIEW OF SYSTEMS:    patient is a poor historian    Constitutional: No fever, weight loss or fatigue  Eyes: No eye pain, visual disturbances, or discharge  ENMT:  No difficulty hearing, tinnitus, vertigo; No sinus or throat pain  Neck: No pain or stiffness  Respiratory: No cough, wheezing, chills or hemoptysis    .  Vital Signs Last 24 Hrs  T(C): 37.1 (2018 05:39), Max: 37.2 (2018 17:02)  T(F): 98.8 (2018 05:39), Max: 98.9 (2018 17:02)  HR: 80 (2018 06:35) (80 - 91)  BP: 107/72 (2018 05:39) (90/60 - 107/72)  BP(mean): --  RR: 16 (2018 06:35) (16 - 18)  SpO2: 99% (2018 06:35) (97% - 100%)    PHYSICAL EXAM:    General: in no acute distress  Eyes: PERRL, EOM intact; conjunctiva and sclera clear  Head: Normocephalic; atraumatic  ENMT: No nasal discharge; airway clear  Neck: Supple; non tender; no masses  Respiratory: No wheezes, rales or rhonchi  Cardiovascular: Regular rate and rhythm. S1 and S2 Normal; No murmurs, gallops or rubs  Gastrointestinal: Soft non-tender non-distended; Normal bowel sounds; No hepatosplenomegaly  Genitourinary: No costovertebral angle tenderness  Extremities: Normal range of motion, No clubbing, cyanosis or edema  Vascular: Peripheral pulses palpable 2+ bilaterally  Neurological: Alert and oriented x3  Skin: Warm and dry. No acute rash  Lymph Nodes: No acute cervical adenopathy  Musculoskeletal: Normal gait, tone, without deformities    LABS:                        8.4    6.3   )-----------( 315      ( 2018 06:09 )             28.6         137  |  98  |  16  ----------------------------<  98  4.2   |  29  |  0.76    Ca    8.5      2018 06:09  Phos  2.4       Mg     2.3         TPro  6.7  /  Alb  2.6<L>  /  TBili  0.4  /  DBili  x   /  AST  14  /  ALT  7<L>  /  AlkPhos  135<H>        Urinalysis Basic - ( 15 Jul 2018 15:16 )    Color: Yellow / Appearance: Clear / S.010 / pH: x  Gluc: x / Ketone: NEGATIVE  / Bili: Negative / Urobili: 0.2 E.U./dL   Blood: x / Protein: Trace mg/dL / Nitrite: NEGATIVE   Leuk Esterase: Large / RBC: Many /HPF / WBC Many /HPF   Sq Epi: x / Non Sq Epi: Many /HPF / Bacteria: Many /HPF          MICROBIOLOGY:  Culture Results:   >100,000 CFU/ml Proteus mirabilis  Susceptibility to follow. (07-15 @ 17:49)  Culture Results:   No growth at 1 day. (07-15 @ 17:36)  Culture Results:   No growth at 1 day. (07-15 @ 17:36)      RADIOLOGY & ADDITIONAL STUDIES:

## 2018-07-17 NOTE — PROGRESS NOTE ADULT - PROBLEM SELECTOR PLAN 7
- Prior b/l DVT with free floating clot in the R femoral s/p IVC filter in January 2017  - Patient was not put on AC 2/2 high fall risk   - Pt on physical exam today with b/l pain on palpation of lower extremities with some swelling but no pitting edema  - Will hold off on giving lasix today as patient is low-normotensive 100-110s systolic  - Patient prior Echo normal EF of 55 (Jan 2017) so edema most likely not 2/2 systolic dysfunction   - Comparison echo today (7/16/2018) LVEF of 65-70% and new mild aortic stenosis and higher PASP than prior   -   - Lower extremities dopplers negative for DVT but limited study as failure to move right leg 2/2 pain

## 2018-07-17 NOTE — PROGRESS NOTE ADULT - PROBLEM SELECTOR PLAN 5
- Pt has known hx of dementia. Will continue to monitor for change in mental status  - CTH shows possible NPH  - AAOx1 for me today. Responds to orientation questions only when prompted shaking her head yes or no  - Is speaking non-sensical sentences but this is an improvement as per son because she was somnolent and not able to speak a few days prior.   - As per speech/swallow recs: aspiration risk c/w NPO & spoke to son and he wants a PEG placed despite recommendations from speech/swallow to discuss palliative care  -TSH normal, B12 normal, folate normal

## 2018-07-17 NOTE — PROGRESS NOTE ADULT - PROBLEM SELECTOR PLAN 3
- Patient is incontinent at baseline and bladder scan done today normal   - UA positive; large blood, many WBCS and leuk esterase positive with UC >100,000 Gram neg rods   - Pt denies fever or change in urinary habits but also with dementia at baseline and incontinent   - Hx of ESBL so c/w Meropenem for 5 days (currently day 2- to be completed July 19th)   - f/u culture susceptibilities   - f/u ID recs - Patient is incontinent at baseline and bladder scan done today normal   - UA positive; large blood, many WBCS and leuk esterase positive with UC >100,000 Gram neg rods - Proteus mirabilis   - Pt denies fever or change in urinary habits but also with dementia at baseline and incontinent   - Hx of ESBL so c/w Meropenem for 5 days (currently day 2- to be completed July 19th)   - f/u culture susceptibilities   - f/u ID recs - Patient is incontinent at baseline and bladder scan done today normal   - UA positive; large blood, many WBCS and leuk esterase positive with UC >100,000 Gram neg rods - Proteus mirabilis   - Pt denies fever or change in urinary habits but also with dementia at baseline and incontinent   - Hx of ESBL so c/w Meropenem for 5 days (currently day 3- to be completed July 19th)   - f/u culture susceptibilities   - f/u ID recs

## 2018-07-17 NOTE — PROGRESS NOTE ADULT - PROBLEM SELECTOR PLAN 1
- Presents with increasing lethargy and confusion  - Known hx of confusion (previously diagnosed with dementia) but is usually oriented to self at baseline according to son   - She remains afebrile, with a normal lactate and hemodynamically stable, so sepsis is not a cause of AMS. In setting of poor PO intake and positive UA, AMS likely secondary to UTI. Received IV fluids and meropenem in ED. Will continue IV fluids and meropenem Q12.   - ID recs to continue meropenem q12 for 5 days    - Blood cultures no growth @ 12 hours  - UC >100,000 gram neg rods   - Will monitor for signs of fever increased somnolence, change in mental status  - Remeron was stopped as it is a sedating medication and can affect mental status - Presents with increasing lethargy and confusion  - Known hx of confusion (previously diagnosed with dementia) but is usually oriented to self at baseline according to son   - She remains afebrile, with a normal lactate and hemodynamically stable, so sepsis is not a cause of AMS. In setting of poor PO intake and positive UA, AMS likely secondary to UTI. Received IV fluids and meropenem in ED. Will continue IV fluids and meropenem Q12.   - ID recs to continue meropenem q12 for 5 days    - Blood cultures no growth @ 1 day  - UC >100,000 gram neg rods; Proteus mirabilis   - Will monitor for signs of fever increased somnolence, change in mental status  - Remeron was stopped as it is a sedating medication and can affect mental status

## 2018-07-17 NOTE — PROGRESS NOTE ADULT - PROBLEM SELECTOR PLAN 3
- Patient is incontinent at baseline and bladder scan done today normal   - UA positive; large blood, many WBCS and leuk esterase positive with UC >100,000 Gram neg rods - Proteus mirabilis   - Pt denies fever or change in urinary habits but also with dementia at baseline and incontinent   - Hx of ESBL so c/w Meropenem for 5 days (currently day 3- to be completed July 19th)   - f/u culture susceptibilities   - f/u ID recs

## 2018-07-17 NOTE — PROGRESS NOTE ADULT - PROBLEM SELECTOR PLAN 9
Pt still inpatient.   1) PCP Contacted on Admission: (Y/N) --> Name & Phone #: Dr. Motta   2) Date of Contact with PCP: 7/16/18 - Spoke with Dr Motta and recommends getting ID consult for UTI and possible aspiration PNA and GI consult for Peg   3) PCP Contacted at Discharge: (Y/N)  4) Summary of Handoff Given to PCP: Y  5) Post-Discharge Appointment Date and Location: NA    DVT prophylaxis: heparin subQ  Code status: full code

## 2018-07-18 DIAGNOSIS — J69.0 PNEUMONITIS DUE TO INHALATION OF FOOD AND VOMIT: ICD-10-CM

## 2018-07-18 LAB
ANION GAP SERPL CALC-SCNC: 11 MMOL/L — SIGNIFICANT CHANGE UP (ref 5–17)
BASOPHILS NFR BLD AUTO: 0.3 % — SIGNIFICANT CHANGE UP (ref 0–2)
BUN SERPL-MCNC: 11 MG/DL — SIGNIFICANT CHANGE UP (ref 7–23)
CALCIUM SERPL-MCNC: 8.8 MG/DL — SIGNIFICANT CHANGE UP (ref 8.4–10.5)
CHLORIDE SERPL-SCNC: 96 MMOL/L — SIGNIFICANT CHANGE UP (ref 96–108)
CO2 SERPL-SCNC: 31 MMOL/L — SIGNIFICANT CHANGE UP (ref 22–31)
CREAT SERPL-MCNC: 0.69 MG/DL — SIGNIFICANT CHANGE UP (ref 0.5–1.3)
EOSINOPHIL NFR BLD AUTO: 1.5 % — SIGNIFICANT CHANGE UP (ref 0–6)
GLUCOSE BLDC GLUCOMTR-MCNC: 101 MG/DL — HIGH (ref 70–99)
GLUCOSE BLDC GLUCOMTR-MCNC: 124 MG/DL — HIGH (ref 70–99)
GLUCOSE BLDC GLUCOMTR-MCNC: 95 MG/DL — SIGNIFICANT CHANGE UP (ref 70–99)
GLUCOSE SERPL-MCNC: 103 MG/DL — HIGH (ref 70–99)
HCT VFR BLD CALC: 29.8 % — LOW (ref 34.5–45)
HGB BLD-MCNC: 8.7 G/DL — LOW (ref 11.5–15.5)
LYMPHOCYTES # BLD AUTO: 18.7 % — SIGNIFICANT CHANGE UP (ref 13–44)
MAGNESIUM SERPL-MCNC: 2 MG/DL — SIGNIFICANT CHANGE UP (ref 1.6–2.6)
MCHC RBC-ENTMCNC: 27.4 PG — SIGNIFICANT CHANGE UP (ref 27–34)
MCHC RBC-ENTMCNC: 29.2 G/DL — LOW (ref 32–36)
MCV RBC AUTO: 93.7 FL — SIGNIFICANT CHANGE UP (ref 80–100)
MONOCYTES NFR BLD AUTO: 9 % — SIGNIFICANT CHANGE UP (ref 2–14)
NEUTROPHILS NFR BLD AUTO: 70.5 % — SIGNIFICANT CHANGE UP (ref 43–77)
PHOSPHATE SERPL-MCNC: 2.1 MG/DL — LOW (ref 2.5–4.5)
PLATELET # BLD AUTO: 338 K/UL — SIGNIFICANT CHANGE UP (ref 150–400)
POTASSIUM SERPL-MCNC: 4.1 MMOL/L — SIGNIFICANT CHANGE UP (ref 3.5–5.3)
POTASSIUM SERPL-SCNC: 4.1 MMOL/L — SIGNIFICANT CHANGE UP (ref 3.5–5.3)
RBC # BLD: 3.18 M/UL — LOW (ref 3.8–5.2)
RBC # FLD: 14.3 % — SIGNIFICANT CHANGE UP (ref 10.3–16.9)
SODIUM SERPL-SCNC: 138 MMOL/L — SIGNIFICANT CHANGE UP (ref 135–145)
WBC # BLD: 6.5 K/UL — SIGNIFICANT CHANGE UP (ref 3.8–10.5)
WBC # FLD AUTO: 6.5 K/UL — SIGNIFICANT CHANGE UP (ref 3.8–10.5)

## 2018-07-18 PROCEDURE — 99232 SBSQ HOSP IP/OBS MODERATE 35: CPT

## 2018-07-18 PROCEDURE — 71045 X-RAY EXAM CHEST 1 VIEW: CPT | Mod: 26

## 2018-07-18 RX ORDER — ACETAMINOPHEN 500 MG
650 TABLET ORAL EVERY 6 HOURS
Qty: 0 | Refills: 0 | Status: DISCONTINUED | OUTPATIENT
Start: 2018-07-18 | End: 2018-07-21

## 2018-07-18 RX ADMIN — Medication 4 MILLILITER(S): at 00:09

## 2018-07-18 RX ADMIN — HEPARIN SODIUM 5000 UNIT(S): 5000 INJECTION INTRAVENOUS; SUBCUTANEOUS at 14:08

## 2018-07-18 RX ADMIN — SODIUM CHLORIDE 75 MILLILITER(S): 9 INJECTION, SOLUTION INTRAVENOUS at 18:24

## 2018-07-18 RX ADMIN — Medication 4 MILLILITER(S): at 05:39

## 2018-07-18 RX ADMIN — MEROPENEM 100 MILLIGRAM(S): 1 INJECTION INTRAVENOUS at 18:24

## 2018-07-18 RX ADMIN — Medication 4 MILLILITER(S): at 12:58

## 2018-07-18 RX ADMIN — Medication 3 MILLILITER(S): at 12:58

## 2018-07-18 RX ADMIN — Medication 3 MILLILITER(S): at 05:39

## 2018-07-18 RX ADMIN — Medication 3 MILLILITER(S): at 18:24

## 2018-07-18 RX ADMIN — HEPARIN SODIUM 5000 UNIT(S): 5000 INJECTION INTRAVENOUS; SUBCUTANEOUS at 05:38

## 2018-07-18 RX ADMIN — MEROPENEM 100 MILLIGRAM(S): 1 INJECTION INTRAVENOUS at 05:38

## 2018-07-18 RX ADMIN — Medication 62.5 MILLIMOLE(S): at 12:58

## 2018-07-18 RX ADMIN — Medication 4 MILLILITER(S): at 18:23

## 2018-07-18 NOTE — PHYSICAL THERAPY INITIAL EVALUATION ADULT - ADDITIONAL COMMENTS
As per aide alonso bedside pt lives in a nursing home. Pt was 1 person assist stand pivot transfer bed <-> w/c. Pt has crepitus right knee and hip and pain baseline.

## 2018-07-18 NOTE — PROGRESS NOTE ADULT - PROBLEM SELECTOR PLAN 3
- Patient is incontinent at baseline and bladder scan done today normal   - UA positive; large blood, many WBCS and leuk esterase positive with UC >100,000 Gram neg rods - Proteus mirabilis   - Pt denies fever or change in urinary habits but also with dementia at baseline and incontinent   - Hx of ESBL so c/w Meropenem for 5 days (currently day 3- to be completed July 19th)   - f/u culture susceptibilities   - f/u ID recs - Patient is incontinent at baseline and bladder scan done today normal   - UA positive; large blood, many WBCS and leuk esterase positive with UC >100,000 Gram neg rods - Proteus mirabilis   - Pt denies fever or change in urinary habits but also with dementia at baseline and incontinent   - Hx of ESBL so c/w Meropenem for 5 days (currently day 4- to be completed July 19th)   - Proteus susceptible to meropenem and f/u additional susceptibilities as grew gram positive cocci   - f/u ID recs

## 2018-07-18 NOTE — PROGRESS NOTE ADULT - PROBLEM SELECTOR PLAN 3
- Patient is incontinent at baseline and bladder scan done today normal   - UA positive; large blood, many WBCS and leuk esterase positive with UC >100,000 Gram neg rods - Proteus mirabilis   - Pt denies fever or change in urinary habits but also with dementia at baseline and incontinent   - Hx of ESBL so c/w Meropenem for 5 days (currently day 4- to be completed July 19th)   - Proteus susceptible to meropenem and f/u additional susceptibilities as grew gram positive cocci   - f/u ID recs

## 2018-07-18 NOTE — PHYSICAL THERAPY INITIAL EVALUATION ADULT - GENERAL OBSERVATIONS, REHAB EVAL
Rec'd pt supine in bed, +facemask for nebulizer, +bilat z-floats, +IV, cleared for PT and OOB activity by Rene Alexander and private aidallison gupta present.

## 2018-07-18 NOTE — PROGRESS NOTE ADULT - SUBJECTIVE AND OBJECTIVE BOX
Chief Complaint/Reason for Consult: sob  INTERVAL HPI: sob improving, tolerating NC this aAM  	  MEDICATIONS:    meropenem  IVPB 1000 milliGRAM(s) IV Intermittent every 12 hours    acetylcysteine 20% Inhalation 4 milliLiter(s) Inhalation every 6 hours  ALBUTerol/ipratropium for Nebulization 3 milliLiter(s) Nebulizer every 6 hours    acetaminophen  Suppository. 650 milliGRAM(s) Rectal every 6 hours PRN        dextrose 5% + sodium chloride 0.45%. 1000 milliLiter(s) IV Continuous <Continuous>  heparin  Injectable 5000 Unit(s) SubCutaneous every 8 hours  sodium phosphate IVPB 15 milliMole(s) IV Intermittent once      REVIEW OF SYSTEMS:  [x] As per HPI  CONSTITUTIONAL: No fever, weight loss, or fatigue  RESPIRATORY: No cough, wheezing, chills or hemoptysis; No Shortness of Breath  CARDIOVASCULAR: No chest pain, palpitations, dizziness, or leg swelling  GASTROINTESTINAL: No abdominal or epigastric pain. No nausea, vomiting, or hematemesis; No diarrhea or constipation. No melena or hematochezia.  MUSCULOSKELETAL: No joint pain or swelling; No muscle, back, or extremity pain  [x] All others negative	  [ ] Unable to obtain    PHYSICAL EXAM:  T(C): 37.1 (07-18-18 @ 10:00), Max: 37.6 (07-18-18 @ 05:42)  HR: 90 (07-18-18 @ 10:00) (87 - 98)  BP: 109/64 (07-18-18 @ 10:00) (93/58 - 116/76)  RR: 16 (07-18-18 @ 10:00) (16 - 17)  SpO2: 99% (07-18-18 @ 10:00) (97% - 100%)  Wt(kg): --  I&O's Summary    17 Jul 2018 07:01  -  18 Jul 2018 07:00  --------------------------------------------------------  IN: 1675 mL / OUT: 0 mL / NET: 1675 mL          Appearance: Normal	  HEENT:   Normal oral mucosa  Cardiovascular: Normal S1 S2, No JVD, No murmurs, No edema  Respiratory: Lungs clear to auscultation	  Gastrointestinal:  Soft, Non-tender, + BS	  Extremities: Normal range of motion, No clubbing, cyanosis or edema  Vasc  ular: Peripheral pulses palpable 2+ bilaterally    TELEMETRY: 	    ECG: < from: 12 Lead ECG (07.16.18 @ 08:18) >  Diagnosis Line Normal sinus rhythm  Cannot rule out Anterior infarct , age undetermined    < end of copied text >  	  RADIOLOGY:   CXR:  CT:   US      CARDIAC TESTING:  Echocardiogram: :< from: Echocardiogram (07.16.18 @ 11:32) >  The left atrial size is normal. Right atrium not well visualized.Calcified   aortic valve. No aortic regurgitation noted. There is Mild aortic   stenosis.   The calculated aortic valve area using the continuity equation is1.7   cm2. The   calculated aortic valve area indexed to body surface area is 1.1 cm2/m2.   The   peak pressure gradient is 35 mmHg. The mean pressure gradient is 19 mmHg.   There is mild mitral valve thickening. There is trace mitral   regurgitation.Structurally normal tricuspid valve. There is mild   tricuspid   regurgitation.The pulmonary artery systolic pressure is estimated to be   61   mmHg.The pulmonic valve is not well visualized. No pulmonic regurgitation   noted.The right ventricle is normal in size and function.There is a   septal   "knuckle" with no left ventricular outflow obstruction There is mild   concentric left ventricular hypertrophy. The left ventricular wall   motion is   normal. The left ventricular ejection fraction is estimated to be 65-70%    The   aortic root measures 3.9 cm at sinuses (normal less than 4 cm for men,   less   than 3.6 cm for women).  There is no pericardial effusion.When compared   to   prior study PASP has increased. Mild aortic stenosis is new.    < end of copied text >  Catheterization:  Stress Test:      LABS:	 	    CARDIAC MARKERS:                                  8.7    6.5   )-----------( 338      ( 18 Jul 2018 06:36 )             29.8     07-18    138  |  96  |  11  ----------------------------<  103<H>  4.1   |  31  |  0.69    Ca    8.8      18 Jul 2018 06:36  Phos  2.1     07-18  Mg     2.0     07-18    TPro  6.7  /  Alb  2.6<L>  /  TBili  0.4  /  DBili  x   /  AST  14  /  ALT  7<L>  /  AlkPhos  135<H>  07-17    proBNP:   Lipid Profile:   HgA1c:   TSH:     ASSESSMENT/PLAN: 	    # LVH - thickened LV s obstruction on 2017 echo, repeat with no LVOT obstruction, mild AS preserved EF    #SOB - check CXR  consider NC chest CT to eval PND/pneumonitits and r/o congestion  check trop

## 2018-07-18 NOTE — PROGRESS NOTE ADULT - PROBLEM SELECTOR PLAN 8
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

## 2018-07-18 NOTE — PHYSICAL THERAPY INITIAL EVALUATION ADULT - PERTINENT HX OF CURRENT PROBLEM, REHAB EVAL
presents from a nursing home with increasing lethargy and altered mental status. She has been lethargic for a few weeks, but had a more acute change in mental status with increased lethargy over the past few days. Her PO intake has also decreased over the past 2 weeks.

## 2018-07-18 NOTE — PROGRESS NOTE ADULT - PROBLEM SELECTOR PLAN 2
- History of hypoxic respiratory failure in 2016 previously intubated and on pressors in the ICU 2/2 septic shock   - Episode of desaturation yesterday (7/15) went down to the mid 80s on RA and started on 6L NC but patient remained asymptomatic and VSS throughout   - Patient weaned down 5L O2 NC this AM now saturating at 96%   - Gurgling sounds coming from throat in AM but appears comfortable without use of accessory muscles for breathing   - Rhonchi and coarse breath sounds heard on the right; suspicious for aspiration PNA; pt started on pureed thin liquids yesterday but changed diet to NPO today   - Repeat CXR looks like a worsening infiltrate of the RLL compared to yesterday    - As per ID recs meropenem would cover this if indeed aspiration PNA   - Hiflow O/N but transitioned back to 6L NC this AM because saturation fine and no acute respiratory distress   - CXR shows b/l pulmonary congestion   - Pulmonary following - History of hypoxic respiratory failure in 2016 previously intubated and on pressors in the ICU 2/2 septic shock   - Episode of desaturation yesterday (7/15) went down to the mid 80s on RA and started on 6L NC but patient remained asymptomatic and VSS throughout   - Patient weaned down 5L O2 NC this AM now saturating at 96%   - Gurgling sounds coming from throat in AM but appears comfortable without use of accessory muscles for breathing   - Rhonchi and coarse breath sounds heard on the right; suspicious for aspiration PNA; pt started on pureed thin liquids yesterday but changed diet to NPO today   - Repeat CXR looks like a worsening infiltrate of the RLL compared to yesterday    - As per ID recs meropenem would cover this if indeed aspiration PNA   - Hiflow O/N but transitioned back to 6L NC this AM because saturation fine and no acute respiratory distress   - CXR shows b/l pulmonary congestion   - Pulmonary following and recs to wean down the oxygen as long as saturation >95

## 2018-07-18 NOTE — PROGRESS NOTE ADULT - PROBLEM SELECTOR PLAN 8
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

## 2018-07-18 NOTE — PROGRESS NOTE ADULT - SUBJECTIVE AND OBJECTIVE BOX
INTERVAL HPI/OVERNIGHT EVENTS:    ANTIBIOTICS    MEDICATIONS  (STANDING):  acetylcysteine 20% Inhalation 4 milliLiter(s) Inhalation every 6 hours  ALBUTerol/ipratropium for Nebulization 3 milliLiter(s) Nebulizer every 6 hours  dextrose 5% + sodium chloride 0.45%. 1000 milliLiter(s) (75 mL/Hr) IV Continuous <Continuous>  heparin  Injectable 5000 Unit(s) SubCutaneous every 8 hours  meropenem  IVPB 1000 milliGRAM(s) IV Intermittent every 12 hours    MEDICATIONS  (PRN):      Allergies    amoxicillin (Unknown)  Cipro (Unknown)  clindamycin (Unknown)  lactose (Unknown)  penicillin (Unknown)  Wheat (Unknown)    Intolerances        REVIEW OF SYSTEMS:    patient is a poor historian    Constitutional: No fever, weight loss or fatigue  Eyes: No eye pain, visual disturbances, or discharge  ENMT:  No difficulty hearing, tinnitus, vertigo; No sinus or throat pain  Neck: No pain or stiffness  Respiratory: No cough, wheezing, chills or hemoptysis  Cardiovascular: No chest pain, palpitations, shortness of breath, dizziness or leg swelling  Gastrointestinal: No abdominal or epigastric pain. No nausea, vomiting or hematemesis; No diarrhea or constipation. No melena or hematochezia.      Vital Signs Last 24 Hrs  T(C): 37.2 (18 Jul 2018 08:30), Max: 37.6 (18 Jul 2018 05:42)  T(F): 98.9 (18 Jul 2018 08:30), Max: 99.6 (18 Jul 2018 05:42)  HR: 89 (18 Jul 2018 08:30) (87 - 98)  BP: 93/58 (18 Jul 2018 08:30) (93/58 - 116/76)  BP(mean): --  RR: 16 (18 Jul 2018 08:30) (16 - 17)  SpO2: 98% (18 Jul 2018 08:30) (97% - 100%)    PHYSICAL EXAM:    General:  in no acute distress  Eyes: PERRL, EOM intact; conjunctiva and sclera clear  Head: Normocephalic; atraumatic  ENMT: No nasal discharge; airway clear  Neck: Supple; non tender; no masses  Respiratory: No wheezes, rales or rhonchi  Cardiovascular: Regular rate and rhythm. S1 and S2 Normal; No murmurs, gallops or rubs  Gastrointestinal: Soft non-tender non-distended; Normal bowel sounds; No hepatosplenomegaly  Genitourinary: No costovertebral angle tenderness  Extremities: Normal range of motion, No clubbing, cyanosis or edema  Vascular: Peripheral pulses palpable 2+ bilaterally  Neurological: Alert and oriented x3  Skin: Warm and dry. No acute rash  Lymph Nodes: No acute cervical adenopathy  Musculoskeletal: Normal gait, tone, without deformities    LABS:                        8.7    6.5   )-----------( 338      ( 18 Jul 2018 06:36 )             29.8     07-18    138  |  96  |  11  ----------------------------<  103<H>  4.1   |  31  |  0.69    Ca    8.8      18 Jul 2018 06:36  Phos  2.1     07-18  Mg     2.0     07-18    TPro  6.7  /  Alb  2.6<L>  /  TBili  0.4  /  DBili  x   /  AST  14  /  ALT  7<L>  /  AlkPhos  135<H>  07-17            MICROBIOLOGY:  Culture Results:   >100,000 CFU/ml Proteus mirabilis  Culture in progress (07-15 @ 17:49)  Culture Results:   No growth at 2 days. (07-15 @ 17:36)  Culture Results:   No growth at 2 days. (07-15 @ 17:36)      RADIOLOGY & ADDITIONAL STUDIES:

## 2018-07-18 NOTE — PROGRESS NOTE ADULT - SUBJECTIVE AND OBJECTIVE BOX
CC/ HPI Ms. Goff is a 89 year old lady with dementia, history of ESBL UTI, bilateral DVT s/p IVC filter 2017, who was admitted from the nursing home with increasing lethargy, pneumonia, seen in the morning without acute respiratory complaint.    PAST MEDICAL & SURGICAL HISTORY:  Dementia  DVT (deep venous thrombosis)  Dysphagia  Anemia  DJD (degenerative joint disease)  Edema  IBS (irritable bowel syndrome)  Celiac disease  H/O inguinal hernia repair  History of total left hip replacement    SOCHX:  -  alcohol    FMHX: FA/MO  - contributory     ROS reviewed below with positive findings marked (+) :  GEN:  fever, chills ENT: tracheostomy,   epistaxis,  sinusitis COR: CAD, CHF,  HTN, dysrhythmia PUL: COPD, ILD, asthma, pneumonia GI: PEG, dysphagia, hemorrhage, other LUDWIN: kidney disease, electrolyte disorder HEM:  anemia, +thrombus, coagulopathy, cancer ENDO:  thyroid disease, diabetes mellitus CNS:  +dementia, stroke, seizure, PSY:  depression, anxiety, other    MEDICATIONS  (STANDING):  acetylcysteine 20% Inhalation 4 milliLiter(s) Inhalation every 6 hours  ALBUTerol/ipratropium for Nebulization 3 milliLiter(s) Nebulizer every 6 hours  dextrose 5% + sodium chloride 0.45%. 1000 milliLiter(s) (75 mL/Hr) IV Continuous <Continuous>  heparin  Injectable 5000 Unit(s) SubCutaneous every 8 hours  meropenem  IVPB 1000 milliGRAM(s) IV Intermittent every 12 hours    MEDICATIONS  (PRN):  acetaminophen  Suppository. 650 milliGRAM(s) Rectal every 6 hours PRN Moderate Pain (4 - 6)      Vital Signs Last 24 Hrs  T(C): 37.2 (18 Jul 2018 17:04), Max: 37.6 (18 Jul 2018 05:42)  T(F): 99 (18 Jul 2018 17:04), Max: 99.6 (18 Jul 2018 05:42)  HR: 95 (18 Jul 2018 17:04) (78 - 98)  BP: 112/68 (18 Jul 2018 17:04) (93/58 - 122/72)  RR: 17 (18 Jul 2018 17:04) (16 - 17)  SpO2: 97% (18 Jul 2018 17:04) (97% - 100%)    GENERAL:         comfortable,  - distress.  HEENT:            - trauma,  - icterus,  - injection,  - nasal discharge.  NECK:              - jugular venous distention.  LYMPH:           - lymphadenopathy, - masses.  RESP:               + rhonchi,   - rales,   - rhonchi.   COR:                S1S2   - gallops,  - rubs.  ABD:                bowel sounds,   soft, - tender, - distended.  EXT/MSC:         - cyanosis,  - clubbing,  - edema.    NEURO:             alert,   responds to stimuli.                         8.7    6.5   )-----------( 338      ( 18 Jul 2018 06:36 )             29.8     07-18    138  |  96  |  11  ----------------------------<  103<H>  4.1   |  31  |  0.69        Xray Chest  (07.16.18) There are newly developed streaky bilateral perihilar and bibasilar opacities, suggesting mild pulmonary congestion.  Focal discoid atelectasis in the region of the lingula is again noted.  Costophrenic angles grossly sharp bilaterally, without sizable pleural effusions.  No pneumothorax is seen.  Cardiomediastinal silhouette is unchanged in appearance.   Partial visualization of the IVC filter.        ASSESSMENT/PLAN    1) Altered mental status  2) Urinary tract infection  3) Pneumonia  4) Cardiomyopathy  5) Dementia    Satisfactory SpO2, oxygen as needed  Aspiration precautions  Bronchodilators:  Atrovent/ albuterol q 4 – 6 hours as needed  ID/Antibiotics: meropenem  Cardiac/HTN: echocardiogram reveals PHTN  GI: Rx/ prophylaxis c PPI/H2B  Heme: Rx/VT prophylaxis  Discussed with medical team, Dr. Motta

## 2018-07-18 NOTE — PHYSICAL THERAPY INITIAL EVALUATION ADULT - GROSSLY INTACT, SENSORY
grossly intact (though not formally assess due to command follow with multistep commands/Grossly Intact

## 2018-07-18 NOTE — PHYSICAL THERAPY INITIAL EVALUATION ADULT - PASSIVE RANGE OF MOTION EXAMINATION, REHAB EVAL
bilateral upper extremity Passive ROM was WFL (within functional limits)/except bialt DF unable to attain neutral, right knee and hip guarding from pain +crepitus noted/Right LE Passive ROM was WFL (within functional limits)

## 2018-07-18 NOTE — PHYSICAL THERAPY INITIAL EVALUATION ADULT - MANUAL MUSCLE TESTING RESULTS, REHAB EVAL
difficult to formally assess as pt guarding and difficult following, and pain. Bilat UE >3/5 except right shoulder flex ~2-/5

## 2018-07-18 NOTE — ADVANCED PRACTICE NURSE CONSULT - ASSESSMENT
Ms. Goff is a 89 year old lady with a PMH of ESBL UTI, aspiration pneumonia, Celiac's disease, IBS, b/l DVT with free floating clot in R DVT s/p IVC filter (01/2017), anemia, and dementia who presents from a nursing home with increasing lethargy poor PO intake and AMS. Admitted with a pressure injury to thoracic spine, now unstageable with scattered yellow adherent slough over wound bed. Site measures 3x2 cm. Right foot with dry, flaking skin, doesn't appear to be pressure injury.

## 2018-07-18 NOTE — PROGRESS NOTE ADULT - SUBJECTIVE AND OBJECTIVE BOX
OVERNIGHT EVENTS: ALYCIA tolerating 4L of NC well     SUBJECTIVE: More awake and conversive today but still non-sensical sentences. Patient is AAOx1 to self when prompted about her name. She does not complain of any pain or trouble breathing. Appears comfortable in bed.     Vital Signs Last 12 Hrs  T(F): 99.6 (07-18-18 @ 05:42), Max: 99.6 (07-18-18 @ 05:42)  HR: 98 (07-18-18 @ 05:42) (90 - 98)  BP: 114/- (07-18-18 @ 05:42) (106/76 - 116/76)  BP(mean): --  RR: 17 (07-18-18 @ 05:42) (17 - 17)  SpO2: 98% (07-18-18 @ 05:42) (98% - 98%)  I&O's Summary    17 Jul 2018 07:01  -  18 Jul 2018 07:00  --------------------------------------------------------  IN: 1675 mL / OUT: 0 mL / NET: 1675 mL        PHYSICAL EXAM:  Constitutional: NAD, comfortable in bed.  HEENT: NC/AT, PERRLA, EOMI, no conjunctival pallor or scleral icterus, MMM  Neck: Supple, no JVD  Respiratory: Normal rate, rhythm, depth, effort. b/l coarse breath sounds with some mild rhonchi   Cardiovascular: RRR, normal S1 and S2, no m/r/g.   Gastrointestinal: +BS, soft NTND, no guarding or rebound tenderness, no palpable masses   Extremities: wwp; no cyanosis, clubbing or edema.   Vascular: Pulses equal and strong throughout.   Neurological: AAOx1, awake and responsive to some commands and able to squeeze hands, unable to fully assess motor and sensation 2/2 cognitive decline  Skin: B/l decubitus ulcers and ulcer on her right thoracic area, dressing are clean dry and intact         LABS:                        8.7    6.5   )-----------( 338      ( 18 Jul 2018 06:36 )             29.8     07-17    137  |  98  |  16  ----------------------------<  98  4.2   |  29  |  0.76    Ca    8.5      17 Jul 2018 06:09  Phos  2.4     07-17  Mg     2.3     07-17    TPro  6.7  /  Alb  2.6<L>  /  TBili  0.4  /  DBili  x   /  AST  14  /  ALT  7<L>  /  AlkPhos  135<H>  07-17          RADIOLOGY & ADDITIONAL TESTS:    MEDICATIONS  (STANDING):  acetylcysteine 20% Inhalation 4 milliLiter(s) Inhalation every 6 hours  ALBUTerol/ipratropium for Nebulization 3 milliLiter(s) Nebulizer every 6 hours  dextrose 5% + sodium chloride 0.45%. 1000 milliLiter(s) (75 mL/Hr) IV Continuous <Continuous>  heparin  Injectable 5000 Unit(s) SubCutaneous every 8 hours  meropenem  IVPB 1000 milliGRAM(s) IV Intermittent every 12 hours    MEDICATIONS  (PRN):

## 2018-07-18 NOTE — PROGRESS NOTE ADULT - PROBLEM SELECTOR PLAN 4
- Patient failed swallow eval and is at high risk aspiration  - Pharyngeal state dysphagia with weak swallow & suspected delay in swallow initiation   - Strict NPO  - Aspiration precautions  - FEEST to be done today as per speech/swallow  - Only medications through IV   - F/u GI Dr. Hale for Peg consult as per son's wishes (POA) - Patient failed swallow eval and is at high risk aspiration  - Pharyngeal state dysphagia with weak swallow & suspected delay in swallow initiation   - Strict NPO  - Aspiration precautions  - FEEST to be done today as per speech/swallow  - If FEEST fails today f/u GI Dr. Hale for Peg consult as per son's wishes (POA)  - Only medications through IV

## 2018-07-18 NOTE — PROGRESS NOTE ADULT - PROBLEM SELECTOR PLAN 2
- History of hypoxic respiratory failure in 2016 previously intubated and on pressors in the ICU 2/2 septic shock   - Episode of desaturation yesterday (7/15) went down to the mid 80s on RA and started on 6L NC but patient remained asymptomatic and VSS throughout   - Patient weaned down 5L O2 NC this AM now saturating at 96%   - Gurgling sounds coming from throat in AM but appears comfortable without use of accessory muscles for breathing   - Rhonchi and coarse breath sounds heard on the right; suspicious for aspiration PNA; pt started on pureed thin liquids yesterday but changed diet to NPO today   - Repeat CXR looks like a worsening infiltrate of the RLL compared to yesterday    - As per ID recs meropenem would cover this if indeed aspiration PNA   - Hiflow O/N but transitioned back to 6L NC this AM because saturation fine and no acute respiratory distress   - CXR shows b/l pulmonary congestion   - Pulmonary following and recs to wean down the oxygen as long as saturation >95

## 2018-07-18 NOTE — PROGRESS NOTE ADULT - SUBJECTIVE AND OBJECTIVE BOX
OVERNIGHT EVENTS:    SUBJECTIVE:    Vital Signs Last 12 Hrs  T(F): 99.6 (07-18-18 @ 05:42), Max: 99.6 (07-18-18 @ 05:42)  HR: 98 (07-18-18 @ 05:42) (90 - 98)  BP: 114/- (07-18-18 @ 05:42) (106/76 - 116/76)  BP(mean): --  RR: 17 (07-18-18 @ 05:42) (17 - 17)  SpO2: 98% (07-18-18 @ 05:42) (98% - 98%)  I&O's Summary    17 Jul 2018 07:01  -  18 Jul 2018 07:00  --------------------------------------------------------  IN: 1675 mL / OUT: 0 mL / NET: 1675 mL        PHYSICAL EXAM:  Constitutional: NAD, comfortable in bed.  HEENT: NC/AT, PERRLA, EOMI, no conjunctival pallor or scleral icterus, MMM  Neck: Supple, no JVD  Respiratory: Normal rate, rhythm, depth, effort. CTAB. No w/r/r.   Cardiovascular: RRR, normal S1 and S2, no m/r/g.   Gastrointestinal: +BS, soft NTND, no guarding or rebound tenderness, no palpable masses   Extremities: wwp; no cyanosis, clubbing or edema.   Vascular: Pulses equal and strong throughout.   Neurological: AAOx3, no CN deficits, strength and sensation intact throughout.   Skin: No gross skin abnormalities or rashes        LABS:                        8.7    6.5   )-----------( 338      ( 18 Jul 2018 06:36 )             29.8     07-17    137  |  98  |  16  ----------------------------<  98  4.2   |  29  |  0.76    Ca    8.5      17 Jul 2018 06:09  Phos  2.4     07-17  Mg     2.3     07-17    TPro  6.7  /  Alb  2.6<L>  /  TBili  0.4  /  DBili  x   /  AST  14  /  ALT  7<L>  /  AlkPhos  135<H>  07-17          RADIOLOGY & ADDITIONAL TESTS:    MEDICATIONS  (STANDING):  acetylcysteine 20% Inhalation 4 milliLiter(s) Inhalation every 6 hours  ALBUTerol/ipratropium for Nebulization 3 milliLiter(s) Nebulizer every 6 hours  dextrose 5% + sodium chloride 0.45%. 1000 milliLiter(s) (75 mL/Hr) IV Continuous <Continuous>  heparin  Injectable 5000 Unit(s) SubCutaneous every 8 hours  meropenem  IVPB 1000 milliGRAM(s) IV Intermittent every 12 hours    MEDICATIONS  (PRN): OVERNIGHT EVENTS: ALYCIA tolerating 4L of NC well     SUBJECTIVE: More awake and conversive today but still non-sensical sentences. Patient is AAOx1 to self when prompted about her name. She does not complain of any pain or trouble breathing. Appears comfortable in bed. Patient's IV is leaking and spoke with nurse about changing it.     Vital Signs Last 12 Hrs  T(F): 99.6 (07-18-18 @ 05:42), Max: 99.6 (07-18-18 @ 05:42)  HR: 98 (07-18-18 @ 05:42) (90 - 98)  BP: 114/- (07-18-18 @ 05:42) (106/76 - 116/76)  BP(mean): --  RR: 17 (07-18-18 @ 05:42) (17 - 17)  SpO2: 98% (07-18-18 @ 05:42) (98% - 98%)  I&O's Summary    17 Jul 2018 07:01  -  18 Jul 2018 07:00  --------------------------------------------------------  IN: 1675 mL / OUT: 0 mL / NET: 1675 mL        PHYSICAL EXAM:  Constitutional: NAD, comfortable in bed.  HEENT: NC/AT, PERRLA, EOMI, no conjunctival pallor or scleral icterus, MMM  Neck: Supple, no JVD  Respiratory: Normal rate, rhythm, depth, effort. b/l coarse breath sounds with some mild rhonchi   Cardiovascular: RRR, normal S1 and S2, no m/r/g.   Gastrointestinal: +BS, soft NTND, no guarding or rebound tenderness, no palpable masses   Extremities: wwp; no cyanosis, clubbing or edema.   Vascular: Pulses equal and strong throughout.   Neurological: AAOx1, awake and responsive to some commands and able to squeeze hands, unable to fully assess motor and sensation 2/2 cognitive decline  Skin: B/l decubitus ulcers and ulcer on her right thoracic area, dressing are clean dry and intact         LABS:                        8.7    6.5   )-----------( 338      ( 18 Jul 2018 06:36 )             29.8     07-17    137  |  98  |  16  ----------------------------<  98  4.2   |  29  |  0.76    Ca    8.5      17 Jul 2018 06:09  Phos  2.4     07-17  Mg     2.3     07-17    TPro  6.7  /  Alb  2.6<L>  /  TBili  0.4  /  DBili  x   /  AST  14  /  ALT  7<L>  /  AlkPhos  135<H>  07-17          RADIOLOGY & ADDITIONAL TESTS:    MEDICATIONS  (STANDING):  acetylcysteine 20% Inhalation 4 milliLiter(s) Inhalation every 6 hours  ALBUTerol/ipratropium for Nebulization 3 milliLiter(s) Nebulizer every 6 hours  dextrose 5% + sodium chloride 0.45%. 1000 milliLiter(s) (75 mL/Hr) IV Continuous <Continuous>  heparin  Injectable 5000 Unit(s) SubCutaneous every 8 hours  meropenem  IVPB 1000 milliGRAM(s) IV Intermittent every 12 hours    MEDICATIONS  (PRN):

## 2018-07-18 NOTE — PROGRESS NOTE ADULT - PROBLEM SELECTOR PLAN 4
- Patient failed swallow eval and is at high risk aspiration  - Pharyngeal state dysphagia with weak swallow & suspected delay in swallow initiation   - Strict NPO  - Aspiration precautions  - FEEST to be done today as per speech/swallow  - If FEEST fails today f/u GI Dr. Hale for Peg consult as per son's wishes (POA)  - Only medications through IV

## 2018-07-18 NOTE — ADVANCED PRACTICE NURSE CONSULT - RECOMMEDATIONS
Recommend Medihoney to pressure injury to back, cover with foam dressing daily, Z-annabella pillow for repositioning and Z-annabella boots for offloading. Spoke with YOLANDA Alexander, pt's son, and house staff.

## 2018-07-18 NOTE — CHART NOTE - NSCHARTNOTEFT_GEN_A_CORE
Admitting Diagnosis:   Patient is a 89y old  Female who presents with a chief complaint of altered mental status (15 Jul 2018 18:53)      PAST MEDICAL & SURGICAL HISTORY:  Dementia  DVT (deep venous thrombosis)  Dysphagia  Anemia  DJD (degenerative joint disease)  Edema  IBS (irritable bowel syndrome)  Celiac disease  H/O inguinal hernia repair  History of total left hip replacement      Current Nutrition Order: NPO  Strict NPO 2/2 dysphagia, failed swallow eval, AMS and high aspiration risk    PO Intake: Good (%) [   ]  Fair (50-75%) [   ] Poor (<25%) [   ] N/A    GI Issues: fecal incontinence    Pain: Unable to obtain from pt    Skin Integrity: rafael score 13, unstageable pressure ulcer    Labs:   07-18    138  |  96  |  11  ----------------------------<  103<H>  4.1   |  31  |  0.69    Ca    8.8      18 Jul 2018 06:36  Phos  2.1     07-18  Mg     2.0     07-18    TPro  6.7  /  Alb  2.6<L>  /  TBili  0.4  /  DBili  x   /  AST  14  /  ALT  7<L>  /  AlkPhos  135<H>  07-17    CAPILLARY BLOOD GLUCOSE      POCT Blood Glucose.: 95 mg/dL (18 Jul 2018 10:37)  POCT Blood Glucose.: 101 mg/dL (18 Jul 2018 06:20)  POCT Blood Glucose.: 124 mg/dL (18 Jul 2018 00:00)  POCT Blood Glucose.: 112 mg/dL (17 Jul 2018 17:58)      Medications:  MEDICATIONS  (STANDING):  acetylcysteine 20% Inhalation 4 milliLiter(s) Inhalation every 6 hours  ALBUTerol/ipratropium for Nebulization 3 milliLiter(s) Nebulizer every 6 hours  dextrose 5% + sodium chloride 0.45%. 1000 milliLiter(s) (75 mL/Hr) IV Continuous <Continuous>  heparin  Injectable 5000 Unit(s) SubCutaneous every 8 hours  meropenem  IVPB 1000 milliGRAM(s) IV Intermittent every 12 hours    MEDICATIONS  (PRN):  acetaminophen  Suppository. 650 milliGRAM(s) Rectal every 6 hours PRN Moderate Pain (4 - 6)      Weight: 127lbs  Height: 62", IBW 110lbs+/-10%, %%, BMI - 23.4  Daily     Weight Change:   unable to obtain wt history- no family at bedside.     Estimated energy needs:   ABW used to calculate energy needs due to pt's current body weight within % IBW   Nutrient needs based on [facility] standards of care for maintenance in older adults; protein increased 2/2 pressure ulcer  1152-1440kcal/day (20-25kcal/kg)  69-81g pro/day (1.2-1.4g/kg)  1728-2016ml fluid/day (30-35ml/kg)    Subjective:   89yo F w/ PMH of ESBL UTI, aspiration pneumonia, celiac' s disease, IBS, b/l DVT w/ free floating clot in R DVT s/p IVC filter (1/2017), anemia and dementia presents from NH w/ increasing lethargy, poor PO intake and AMS. Pt is AAOx1 and oriented to self at baseline. Currently speaking nonsensical sentences which is an improvement from admission where she was somnolent and not able to speak. Pt failed initial swallow eval and is at high risk for aspiration. Currently on strict NPO diet order until safest route for nutrition support is determined. FEEST was attempted today w/ SLP however pt was exceedingly more lethargic and did not tolerate passing of the scope after multiple attempts. Now is planned for a MDS study. Pending these results she is a possible candidate for PEG placement per MD discretion and GOC. No family at bedside to obtain diet/wt history. Allergic to wheat and is lactose intolerant. No other known dietary restrictions. Skin: rafael score of 13, unstageable pressure ulcer; GI: fecal incontinence. See EN recs below if deemed most appropriate route for nutrition support. Would recommend palliative consult to formally discuss goals of care.    Previous Nutrition Diagnosis:  Inadequate oral intake RT decreased ability to consume sufficient energy 2/2 diet order, dysphagia and AMS  Active [ x  ]  Resolved [   ]    If resolved, new PES:     Goal: Pt to meet >75% estimated needs via most appropriate route w/ adequate tolerance    Recommendations:  1) nutrition intervention to align with GOC at all times   2) **recommend palliative consult to formally discuss goals of care**  3) If PO feasible: diet consistency per SLP recs w/ lactose and gluten restrictions  4) If EN deemeds most appropriate route recommend:   Glucerna 1.2 @42ml/hr x24hrs +Prostat x1/day via PEG (1008ml TV, 1309kcal, 75g pro, 811ml FW). Begin at 20ml/hr and increase by 10 Q4H. Monotor for s/s intolerance. Monitor and replete lytes PRN as pt has been NPO x4 days w/ poor PO intake PTA.    Recs for establishing GOC and EN composition/rate discussed w/ team.     Education: N/A 2/2 AMS    Risk Level: High [  x ] Moderate [   ] Low [   ] Admitting Diagnosis:   Patient is a 89y old  Female who presents with a chief complaint of altered mental status (15 Jul 2018 18:53)      PAST MEDICAL & SURGICAL HISTORY:  Dementia  DVT (deep venous thrombosis)  Dysphagia  Anemia  DJD (degenerative joint disease)  Edema  IBS (irritable bowel syndrome)  Celiac disease  H/O inguinal hernia repair  History of total left hip replacement      Current Nutrition Order: NPO  Strict NPO 2/2 dysphagia, failed swallow eval, AMS and high aspiration risk    PO Intake: Good (%) [   ]  Fair (50-75%) [   ] Poor (<25%) [   ] N/A    GI Issues: fecal incontinence    Pain: Unable to obtain from pt    Skin Integrity: rafael score 13, unstageable pressure ulcer    Labs:   07-18    138  |  96  |  11  ----------------------------<  103<H>  4.1   |  31  |  0.69    Ca    8.8      18 Jul 2018 06:36  Phos  2.1     07-18  Mg     2.0     07-18    TPro  6.7  /  Alb  2.6<L>  /  TBili  0.4  /  DBili  x   /  AST  14  /  ALT  7<L>  /  AlkPhos  135<H>  07-17    CAPILLARY BLOOD GLUCOSE      POCT Blood Glucose.: 95 mg/dL (18 Jul 2018 10:37)  POCT Blood Glucose.: 101 mg/dL (18 Jul 2018 06:20)  POCT Blood Glucose.: 124 mg/dL (18 Jul 2018 00:00)  POCT Blood Glucose.: 112 mg/dL (17 Jul 2018 17:58)      Medications:  MEDICATIONS  (STANDING):  acetylcysteine 20% Inhalation 4 milliLiter(s) Inhalation every 6 hours  ALBUTerol/ipratropium for Nebulization 3 milliLiter(s) Nebulizer every 6 hours  dextrose 5% + sodium chloride 0.45%. 1000 milliLiter(s) (75 mL/Hr) IV Continuous <Continuous>  heparin  Injectable 5000 Unit(s) SubCutaneous every 8 hours  meropenem  IVPB 1000 milliGRAM(s) IV Intermittent every 12 hours    MEDICATIONS  (PRN):  acetaminophen  Suppository. 650 milliGRAM(s) Rectal every 6 hours PRN Moderate Pain (4 - 6)      Weight: 127lbs  Height: 62", IBW 110lbs+/-10%, %%, BMI - 23.4  Daily     Weight Change:   unable to obtain wt history- no family at bedside.     Estimated energy needs:   ABW used to calculate energy needs due to pt's current body weight within % IBW   Nutrient needs based on [facility] standards of care for maintenance in older adults; protein increased 2/2 pressure ulcer  1152-1440kcal/day (20-25kcal/kg)  69-81g pro/day (1.2-1.4g/kg)  1728-2016ml fluid/day (30-35ml/kg)    Subjective:   89yo F w/ PMH of ESBL UTI, aspiration pneumonia, celiac' s disease, IBS, b/l DVT w/ free floating clot in R DVT s/p IVC filter (1/2017), anemia and dementia presents from NH w/ increasing lethargy, poor PO intake and AMS. Pt is AAOx1 and oriented to self at baseline. Currently speaking nonsensical sentences which is an improvement from admission where she was somnolent and not able to speak. Pt failed initial swallow eval and is at high risk for aspiration. Currently on strict NPO diet order until safest route for nutrition support is determined. FEEST was attempted today w/ SLP however pt was exceedingly more lethargic and did not tolerate passing of the scope after multiple attempts. Now is planned for a MDS study. Pending these results she is a possible candidate for PEG placement per MD discretion and GOC. No family at bedside to obtain diet/wt history. Allergic to wheat and is lactose intolerant. No other known dietary restrictions. Skin: rafael score of 13, unstageable pressure ulcer; GI: fecal incontinence. See EN recs below if deemed most appropriate route for nutrition support. Would recommend palliative consult to formally discuss goals of care.    Previous Nutrition Diagnosis:  Inadequate oral intake RT decreased ability to consume sufficient energy 2/2 diet order, dysphagia and AMS  Active [ x  ]  Resolved [   ]    If resolved, new PES:     Goal: Pt to meet >75% estimated needs via most appropriate route w/ adequate tolerance    Recommendations:  1) nutrition intervention to align with GOC at all times   2) **recommend palliative consult to formally discuss goals of care**  3) If PO feasible: diet consistency per SLP recs w/ lactose and gluten restrictions  4) If EN deemeds most appropriate route recommend:   Glucerna 1.2 @42ml/hr x24hrs +Prostat x1/day via PEG (1008ml TV, 1309kcal, 75g pro, 811ml FW). Begin at 20ml/hr and increase by 10 Q4H. Monotor for s/s intolerance. Monitor and replete lytes PRN as pt has been NPO x4 days w/ poor PO intake PTA.    team paged 3x to discuss recs for establishing GOC and EN composition/rate  Education: N/A 2/2 AMS    Risk Level: High [  x ] Moderate [   ] Low [   ]

## 2018-07-18 NOTE — PROGRESS NOTE ADULT - SUBJECTIVE AND OBJECTIVE BOX
Pt seen and examined   somnolent but arouseable    REVIEW OF SYSTEMS:  dementia      MEDICATIONS:  MEDICATIONS  (STANDING):  acetylcysteine 20% Inhalation 4 milliLiter(s) Inhalation every 6 hours  ALBUTerol/ipratropium for Nebulization 3 milliLiter(s) Nebulizer every 6 hours  dextrose 5% + sodium chloride 0.45%. 1000 milliLiter(s) (75 mL/Hr) IV Continuous <Continuous>  heparin  Injectable 5000 Unit(s) SubCutaneous every 8 hours  meropenem  IVPB 1000 milliGRAM(s) IV Intermittent every 12 hours    MEDICATIONS  (PRN):      Allergies    amoxicillin (Unknown)  Cipro (Unknown)  clindamycin (Unknown)  lactose (Unknown)  penicillin (Unknown)  Wheat (Unknown)    Intolerances        Vital Signs Last 24 Hrs  T(C): 37.2 (18 Jul 2018 08:30), Max: 37.6 (18 Jul 2018 05:42)  T(F): 98.9 (18 Jul 2018 08:30), Max: 99.6 (18 Jul 2018 05:42)  HR: 89 (18 Jul 2018 08:30) (87 - 98)  BP: 93/58 (18 Jul 2018 08:30) (93/58 - 116/76)  BP(mean): --  RR: 16 (18 Jul 2018 08:30) (16 - 17)  SpO2: 98% (18 Jul 2018 08:30) (97% - 100%)    07-17 @ 07:01  -  07-18 @ 07:00  --------------------------------------------------------  IN: 1675 mL / OUT: 0 mL / NET: 1675 mL        PHYSICAL EXAM:    General: ; in no acute distress  HEENT: MMM, conjunctiva and sclera clear  Gastrointestinal: Soft non-tender non-distended; Normal bowel sounds; No hepatosplenomegaly  Skin: Warm and dry. No obvious rash    LABS:      CBC Full  -  ( 18 Jul 2018 06:36 )  WBC Count : 6.5 K/uL  Hemoglobin : 8.7 g/dL  Hematocrit : 29.8 %  Platelet Count - Automated : 338 K/uL  Mean Cell Volume : 93.7 fL  Mean Cell Hemoglobin : 27.4 pg  Mean Cell Hemoglobin Concentration : 29.2 g/dL  Auto Neutrophil # : x  Auto Lymphocyte # : x  Auto Monocyte # : x  Auto Eosinophil # : x  Auto Basophil # : x  Auto Neutrophil % : 70.5 %  Auto Lymphocyte % : 18.7 %  Auto Monocyte % : 9.0 %  Auto Eosinophil % : 1.5 %  Auto Basophil % : 0.3 %    07-18    138  |  96  |  11  ----------------------------<  103<H>  4.1   |  31  |  0.69    Ca    8.8      18 Jul 2018 06:36  Phos  2.1     07-18  Mg     2.0     07-18    TPro  6.7  /  Alb  2.6<L>  /  TBili  0.4  /  DBili  x   /  AST  14  /  ALT  7<L>  /  AlkPhos  135<H>  07-17                      RADIOLOGY & ADDITIONAL STUDIES (The following images were personally reviewed):

## 2018-07-19 LAB
ANION GAP SERPL CALC-SCNC: 10 MMOL/L — SIGNIFICANT CHANGE UP (ref 5–17)
BASOPHILS NFR BLD AUTO: 0.9 % — SIGNIFICANT CHANGE UP (ref 0–2)
BUN SERPL-MCNC: 10 MG/DL — SIGNIFICANT CHANGE UP (ref 7–23)
CALCIUM SERPL-MCNC: 9.3 MG/DL — SIGNIFICANT CHANGE UP (ref 8.4–10.5)
CHLORIDE SERPL-SCNC: 97 MMOL/L — SIGNIFICANT CHANGE UP (ref 96–108)
CO2 SERPL-SCNC: 31 MMOL/L — SIGNIFICANT CHANGE UP (ref 22–31)
CREAT SERPL-MCNC: 0.67 MG/DL — SIGNIFICANT CHANGE UP (ref 0.5–1.3)
EOSINOPHIL NFR BLD AUTO: 2 % — SIGNIFICANT CHANGE UP (ref 0–6)
GLUCOSE BLDC GLUCOMTR-MCNC: 106 MG/DL — HIGH (ref 70–99)
GLUCOSE BLDC GLUCOMTR-MCNC: 186 MG/DL — HIGH (ref 70–99)
GLUCOSE BLDC GLUCOMTR-MCNC: 88 MG/DL — SIGNIFICANT CHANGE UP (ref 70–99)
GLUCOSE SERPL-MCNC: 84 MG/DL — SIGNIFICANT CHANGE UP (ref 70–99)
HCT VFR BLD CALC: 29.6 % — LOW (ref 34.5–45)
HGB BLD-MCNC: 8.4 G/DL — LOW (ref 11.5–15.5)
LYMPHOCYTES # BLD AUTO: 23.6 % — SIGNIFICANT CHANGE UP (ref 13–44)
MAGNESIUM SERPL-MCNC: 2 MG/DL — SIGNIFICANT CHANGE UP (ref 1.6–2.6)
MCHC RBC-ENTMCNC: 26.7 PG — LOW (ref 27–34)
MCHC RBC-ENTMCNC: 28.4 G/DL — LOW (ref 32–36)
MCV RBC AUTO: 94 FL — SIGNIFICANT CHANGE UP (ref 80–100)
MONOCYTES NFR BLD AUTO: 10.4 % — SIGNIFICANT CHANGE UP (ref 2–14)
NEUTROPHILS NFR BLD AUTO: 63.1 % — SIGNIFICANT CHANGE UP (ref 43–77)
PHOSPHATE SERPL-MCNC: 2.4 MG/DL — LOW (ref 2.5–4.5)
PLATELET # BLD AUTO: 376 K/UL — SIGNIFICANT CHANGE UP (ref 150–400)
POTASSIUM SERPL-MCNC: 4.2 MMOL/L — SIGNIFICANT CHANGE UP (ref 3.5–5.3)
POTASSIUM SERPL-SCNC: 4.2 MMOL/L — SIGNIFICANT CHANGE UP (ref 3.5–5.3)
RBC # BLD: 3.15 M/UL — LOW (ref 3.8–5.2)
RBC # FLD: 14.3 % — SIGNIFICANT CHANGE UP (ref 10.3–16.9)
SODIUM SERPL-SCNC: 138 MMOL/L — SIGNIFICANT CHANGE UP (ref 135–145)
WBC # BLD: 5.4 K/UL — SIGNIFICANT CHANGE UP (ref 3.8–10.5)
WBC # FLD AUTO: 5.4 K/UL — SIGNIFICANT CHANGE UP (ref 3.8–10.5)

## 2018-07-19 PROCEDURE — 74230 X-RAY XM SWLNG FUNCJ C+: CPT | Mod: 26

## 2018-07-19 PROCEDURE — 99232 SBSQ HOSP IP/OBS MODERATE 35: CPT

## 2018-07-19 RX ORDER — MEGESTROL ACETATE 40 MG/ML
400 SUSPENSION ORAL DAILY
Qty: 0 | Refills: 0 | Status: DISCONTINUED | OUTPATIENT
Start: 2018-07-19 | End: 2018-07-21

## 2018-07-19 RX ADMIN — Medication 4 MILLILITER(S): at 17:30

## 2018-07-19 RX ADMIN — Medication 3 MILLILITER(S): at 11:11

## 2018-07-19 RX ADMIN — MEROPENEM 100 MILLIGRAM(S): 1 INJECTION INTRAVENOUS at 18:20

## 2018-07-19 RX ADMIN — MEROPENEM 100 MILLIGRAM(S): 1 INJECTION INTRAVENOUS at 06:54

## 2018-07-19 RX ADMIN — HEPARIN SODIUM 5000 UNIT(S): 5000 INJECTION INTRAVENOUS; SUBCUTANEOUS at 13:20

## 2018-07-19 RX ADMIN — Medication 62.5 MILLIMOLE(S): at 12:13

## 2018-07-19 RX ADMIN — Medication 3 MILLILITER(S): at 17:30

## 2018-07-19 RX ADMIN — HEPARIN SODIUM 5000 UNIT(S): 5000 INJECTION INTRAVENOUS; SUBCUTANEOUS at 06:54

## 2018-07-19 RX ADMIN — Medication 4 MILLILITER(S): at 07:54

## 2018-07-19 RX ADMIN — Medication 3 MILLILITER(S): at 07:54

## 2018-07-19 RX ADMIN — HEPARIN SODIUM 5000 UNIT(S): 5000 INJECTION INTRAVENOUS; SUBCUTANEOUS at 21:31

## 2018-07-19 RX ADMIN — SODIUM CHLORIDE 75 MILLILITER(S): 9 INJECTION, SOLUTION INTRAVENOUS at 12:13

## 2018-07-19 RX ADMIN — Medication 4 MILLILITER(S): at 11:11

## 2018-07-19 NOTE — PROGRESS NOTE ADULT - PROBLEM SELECTOR PLAN 1
- Presents with increasing lethargy and confusion  - Known hx of confusion (previously diagnosed with dementia) but is usually oriented to self at baseline according to son   - She remains afebrile, with a normal lactate and hemodynamically stable, so sepsis is not a cause of AMS. In setting of poor PO intake and positive UA, AMS likely secondary to UTI. Received IV fluids and meropenem in ED. Will continue IV fluids and meropenem Q12.   - ID recs to continue meropenem q12 for 5 days    - Blood cultures no growth @ 1 day  - UC >100,000 gram neg rods; Proteus mirabilis   - Will monitor for signs of fever increased somnolence, change in mental status  - Remeron was stopped as it is a sedating medication and can affect mental status - Presents with increasing lethargy and confusion which is slowly resolving; more awake alert and slow but appropriate responses to commands  - O/N episode of agitation but resolved with redirection; she appears to sundown  - Known hx of confusion (previously diagnosed with dementia) but is usually oriented to self at baseline according to son   - She remains afebrile, with a normal lactate and hemodynamically stable, so sepsis is not a cause of AMS. In setting of poor PO intake and positive UA, AMS likely secondary to UTI. Received IV fluids and meropenem in ED. Will continue IV fluids and meropenem Q12.   - ID recs to continue meropenem q12 for 5 days    - Blood cultures no growth @ 1 day  - UC >100,000 gram neg rods; Proteus mirabilis & Aerococcus > 100,000  - Will monitor for signs of fever increased somnolence, change in mental status  - Remeron was stopped as it is a sedating medication and can affect mental status

## 2018-07-19 NOTE — PROGRESS NOTE ADULT - SUBJECTIVE AND OBJECTIVE BOX
Chief Complaint/Reason for Consult: cv mgmt  INTERVAL HPI: euvolemic today nad - had long and extensive discussion with son (MD and HCP)  	  MEDICATIONS:    meropenem  IVPB 1000 milliGRAM(s) IV Intermittent every 12 hours    acetylcysteine 20% Inhalation 4 milliLiter(s) Inhalation every 6 hours  ALBUTerol/ipratropium for Nebulization 3 milliLiter(s) Nebulizer every 6 hours    acetaminophen  Suppository. 650 milliGRAM(s) Rectal every 6 hours PRN        dextrose 5% + sodium chloride 0.45%. 1000 milliLiter(s) IV Continuous <Continuous>  heparin  Injectable 5000 Unit(s) SubCutaneous every 8 hours      REVIEW OF SYSTEMS:  [x] As per HPI  CONSTITUTIONAL: No fever, weight loss, or fatigue  RESPIRATORY: No cough, wheezing, chills or hemoptysis; No Shortness of Breath  CARDIOVASCULAR: No chest pain, palpitations, dizziness, or leg swelling  GASTROINTESTINAL: No abdominal or epigastric pain. No nausea, vomiting, or hematemesis; No diarrhea or constipation. No melena or hematochezia.  MUSCULOSKELETAL: No joint pain or swelling; No muscle, back, or extremity pain  [x] All others negative	  [ ] Unable to obtain    PHYSICAL EXAM:  T(C): 36.4 (07-19-18 @ 08:22), Max: 37.7 (07-19-18 @ 00:14)  HR: 92 (07-19-18 @ 08:22) (78 - 114)  BP: 114/74 (07-19-18 @ 08:22) (105/64 - 136/69)  RR: 17 (07-19-18 @ 08:22) (16 - 18)  SpO2: 95% (07-19-18 @ 08:22) (86% - 100%)  Wt(kg): --  I&O's Summary    18 Jul 2018 07:01  -  19 Jul 2018 07:00  --------------------------------------------------------  IN: 375 mL / OUT: 0 mL / NET: 375 mL    19 Jul 2018 07:01  -  19 Jul 2018 13:02  --------------------------------------------------------  IN: 425 mL / OUT: 0 mL / NET: 425 mL          Appearance: Normal	  HEENT:   Normal oral mucosa  Cardiovascular: Normal S1 S2, No JVD, No murmurs, No edema  Respiratory: Lungs clear to auscultation	  Gastrointestinal:  Soft, Non-tender, + BS	  Extremities: Normal range of motion, No clubbing, cyanosis or edema  Vascular: Peripheral pulses palpable 2+ bilaterally    TELEMETRY: 	    ECG:   	  RADIOLOGY:   CXR:  CT:  US:    CARDIAC TESTING:  Echocardiogram:  Catheterization:  Stress Test:      LABS:	 	    CARDIAC MARKERS:                                  8.4    5.4   )-----------( 376      ( 19 Jul 2018 07:21 )             29.6     07-19    138  |  97  |  10  ----------------------------<  84  4.2   |  31  |  0.67    Ca    9.3      19 Jul 2018 07:21  Phos  2.4     07-19  Mg     2.0     07-19      proBNP:   Lipid Profile:   HgA1c:   TSH:     ASSESSMENT/PLAN: 	    # euvolemic today nad - had long and extensive discussion with son (MD and HCP)  complete pmh faxed and revieved to my office    ## LVH - thickened LV s obstruction on 2017 echo, repeat with no LVOT obstruction, mild AS preserved EF    no cardiac contradiction to PEG isf so required

## 2018-07-19 NOTE — PROGRESS NOTE ADULT - PROBLEM SELECTOR PLAN 8
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

## 2018-07-19 NOTE — PROGRESS NOTE ADULT - SUBJECTIVE AND OBJECTIVE BOX
CC/ HPI Ms. Goff is a 89 year old lady with dementia, history of ESBL UTI, bilateral DVT s/p IVC filter 2017, who was admitted from the nursing home with increasing lethargy, pneumonia, seen in the morning without acute respiratory complaint.    PAST MEDICAL & SURGICAL HISTORY:  Dementia  DVT (deep venous thrombosis)  Dysphagia  Anemia  DJD (degenerative joint disease)  Edema  IBS (irritable bowel syndrome)  Celiac disease  H/O inguinal hernia repair  History of total left hip replacement    SOCHX:  -  alcohol    FMHX: FA/MO  - contributory     ROS reviewed below with positive findings marked (+) :  GEN:  fever, chills ENT: tracheostomy,   epistaxis,  sinusitis COR: CAD, CHF,  HTN, dysrhythmia PUL: COPD, ILD, asthma, pneumonia GI: PEG, dysphagia, hemorrhage, other LUDWIN: kidney disease, electrolyte disorder HEM:  anemia, +thrombus, coagulopathy, cancer ENDO:  thyroid disease, diabetes mellitus CNS:  +dementia, stroke, seizure, PSY:  depression, anxiety, other             MEDICATIONS  (STANDING):  acetylcysteine 20% Inhalation 4 milliLiter(s) Inhalation every 6 hours  ALBUTerol/ipratropium for Nebulization 3 milliLiter(s) Nebulizer every 6 hours  dextrose 5% + sodium chloride 0.45%. 1000 milliLiter(s) (75 mL/Hr) IV Continuous <Continuous>  heparin  Injectable 5000 Unit(s) SubCutaneous every 8 hours  meropenem  IVPB 1000 milliGRAM(s) IV Intermittent every 12 hours    MEDICATIONS  (PRN):  acetaminophen  Suppository. 650 milliGRAM(s) Rectal every 6 hours PRN Moderate Pain (4 - 6)      Vital Signs Last 24 Hrs  T(C): 36.6 (19 Jul 2018 14:43), Max: 37.7 (19 Jul 2018 00:14)  T(F): 97.9 (19 Jul 2018 14:43), Max: 99.9 (19 Jul 2018 00:14)  HR: 84 (19 Jul 2018 14:43) (84 - 114)  BP: 116/67 (19 Jul 2018 14:43) (105/64 - 136/69)  RR: 17 (19 Jul 2018 14:43) (17 - 18)  SpO2: 93% (19 Jul 2018 14:43) (86% - 97%)    GENERAL:         comfortable,  - distress.  HEENT:            - trauma,  - icterus,  - injection,  - nasal discharge.  NECK:              - jugular venous distention, - thyromegaly.  LYMPH:           - lymphadenopathy, - masses.  RESP:               + clear,   - rales,   - rhonchi,   - wheezes.   COR:                S1S2   - gallops,  - rubs.  ABD:                bowel sounds,   soft, - tender, - distended, - organomegaly.  EXT/MSC:         - cyanosis,  - clubbing,  + edema.    NEURO:             alert,   responds to stimuli.                         8.4    5.4   )-----------( 376      ( 19 Jul 2018 07:21 )             29.6     07-19    138  |  97  |  10  ----------------------------<  84  4.2   |  31  |  0.67          Xray Chest  (07.16.18) There are newly developed streaky bilateral perihilar and bibasilar opacities, suggesting mild pulmonary congestion.  Focal discoid atelectasis in the region of the lingula is again noted.  Costophrenic angles grossly sharp bilaterally, without sizable pleural effusions.  No pneumothorax is seen.  Cardiomediastinal silhouette is unchanged in appearance.   Partial visualization of the IVC filter.        ASSESSMENT/PLAN    1) Altered mental status  2) Urinary tract infection  3) Pneumonia  4) Cardiomyopathy  5) Dementia    Satisfactory SpO2, oxygen as needed  Aspiration precautions  Bronchodilators:  Atrovent/ albuterol q 4 – 6 hours as needed  ID/Antibiotics: meropenem  Cardiac/HTN: echocardiogram reveals PHTN  GI: Rx/ prophylaxis c PPI/H2B  Heme: Rx/VT prophylaxis  Discussed with medical team, Dr. Motta

## 2018-07-19 NOTE — PROGRESS NOTE ADULT - PROBLEM SELECTOR PLAN 4
- Patient failed swallow eval and is at high risk aspiration  - Pharyngeal state dysphagia with weak swallow & suspected delay in swallow initiation   - Strict NPO  - Aspiration precautions  - FEEST to be done today as per speech/swallow  - If FEEST fails today f/u GI Dr. Hale for Peg consult as per son's wishes (POA)  - Only medications through IV - Patient failed swallow eval and is at high risk aspiration  - Pharyngeal state dysphagia with weak swallow & suspected delay in swallow initiation   - Strict NPO  - Aspiration precautions  - FEEST was unsuccessful yesterday 2/2 following commands.   - MBS to be done today and if fail f/u Dr. Hale for PEG as per son's requests (POA)  - Only medications through IV; access lost yesterday and able to regain access overnight

## 2018-07-19 NOTE — PROGRESS NOTE ADULT - PROBLEM SELECTOR PLAN 7
- Prior b/l DVT with free floating clot in the R femoral s/p IVC filter in January 2017  - Patient was not put on AC 2/2 high fall risk   - Pt on physical exam today with b/l pain on palpation of lower extremities with some swelling but no pitting edema  - Will hold off on giving lasix today as patient is low-normotensive 100-110s systolic  - Patient prior Echo normal EF of 55 (Jan 2017) so edema most likely not 2/2 systolic dysfunction   - Comparison echo today (7/16/2018) LVEF of 65-70% and new mild aortic stenosis and higher PASP than prior   -   - Lower extremities dopplers negative for DVT but limited study as failure to move right leg 2/2 pain - Prior b/l DVT with free floating clot in the R femoral s/p IVC filter in January 2017  - Patient was not put on AC 2/2 high fall risk   - Pt on physical exam prior with b/l pain on palpation of lower extremities with some swelling and pitting edema of the left foot which is chronic for her   - Will hold off on giving lasix today as patient is low-normotensive 100-110s systolic  - Patient prior Echo normal EF of 55 (Jan 2017) so edema most likely not 2/2 systolic dysfunction   - Comparison echo today (7/16/2018) LVEF of 65-70% and new mild aortic stenosis and higher PASP than prior   -   - Lower extremities dopplers negative for DVT but limited study as failure to move right leg 2/2 pain

## 2018-07-19 NOTE — PROGRESS NOTE ADULT - PROBLEM SELECTOR PLAN 6
- When auscultating her posterior chest this morning she is very stiff when trying to sit her up  - Patient on Remeron for sleep which was started in the nursing home a couple weeks ago as per son; today concern for EPS and stopped medication - When auscultating her posterior chest this morning she is very stiff when trying to sit her up  - Patient on Remeron for sleep which was started in the nursing home a couple weeks ago as per son; today concern for EPS and stopped medication  - f/u PT maricruz trinh

## 2018-07-19 NOTE — PROGRESS NOTE ADULT - PROBLEM SELECTOR PLAN 8
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

## 2018-07-19 NOTE — PROGRESS NOTE ADULT - PROBLEM SELECTOR PLAN 7
- Prior b/l DVT with free floating clot in the R femoral s/p IVC filter in January 2017  - Patient was not put on AC 2/2 high fall risk   - Pt on physical exam prior with b/l pain on palpation of lower extremities with some swelling and pitting edema of the left foot which is chronic for her   - Will hold off on giving lasix today as patient is low-normotensive 100-110s systolic  - Patient prior Echo normal EF of 55 (Jan 2017) so edema most likely not 2/2 systolic dysfunction   - Comparison echo today (7/16/2018) LVEF of 65-70% and new mild aortic stenosis and higher PASP than prior   -   - Lower extremities dopplers negative for DVT but limited study as failure to move right leg 2/2 pain

## 2018-07-19 NOTE — PROGRESS NOTE ADULT - PROBLEM SELECTOR PLAN 3
- Patient is incontinent at baseline and bladder scan done today normal   - UA positive; large blood, many WBCS and leuk esterase positive with UC >100,000 Gram neg rods - Proteus mirabilis   - Pt denies fever or change in urinary habits but also with dementia at baseline and incontinent   - Hx of ESBL so c/w Meropenem for 5 days (currently day 4- to be completed July 19th)   - Proteus susceptible to meropenem and f/u additional susceptibilities as grew gram positive cocci   - f/u ID recs - Patient is incontinent at baseline and bladder scan done today normal   - UA positive; large blood, many WBCS and leuk esterase positive with UC >100,000 Gram neg rods - Proteus mirabilis & Aerococcus gram positive cocci  - Pt denies fever or change in urinary habits but also with dementia at baseline and incontinent   - Hx of ESBL so c/w Meropenem for 7 days (currently day 5- to be completed night of July 21)   - Proteus susceptible to meropenem and f/u additional susceptibilities as grew Aerococcus  - ID recs to continue meropenem and extend for full 7 days now

## 2018-07-19 NOTE — SWALLOW VFSS/MBS ASSESSMENT ADULT - COMMENTS
Attempted FEES on 7/18 but Pt was agitated and did not tolerate passing of scope. Today's exam was suboptimal d/t kyphosis that limited visualization of the trachea during MBS.

## 2018-07-19 NOTE — PROGRESS NOTE ADULT - SUBJECTIVE AND OBJECTIVE BOX
INTERVAL HPI/OVERNIGHT EVENTS:    ANTIBIOTICS    MEDICATIONS  (STANDING):  acetylcysteine 20% Inhalation 4 milliLiter(s) Inhalation every 6 hours  ALBUTerol/ipratropium for Nebulization 3 milliLiter(s) Nebulizer every 6 hours  dextrose 5% + sodium chloride 0.45%. 1000 milliLiter(s) (75 mL/Hr) IV Continuous <Continuous>  heparin  Injectable 5000 Unit(s) SubCutaneous every 8 hours  meropenem  IVPB 1000 milliGRAM(s) IV Intermittent every 12 hours  sodium phosphate IVPB 15 milliMole(s) IV Intermittent once    MEDICATIONS  (PRN):  acetaminophen  Suppository. 650 milliGRAM(s) Rectal every 6 hours PRN Moderate Pain (4 - 6)      Allergies    amoxicillin (Unknown)  Cipro (Unknown)  clindamycin (Unknown)  lactose (Unknown)  penicillin (Unknown)  Wheat (Unknown)    Intolerances        REVIEW OF SYSTEMS:    no specific complaints    Vital Signs Last 24 Hrs  T(C): 36.4 (19 Jul 2018 08:22), Max: 37.7 (19 Jul 2018 00:14)  T(F): 97.6 (19 Jul 2018 08:22), Max: 99.9 (19 Jul 2018 00:14)  HR: 92 (19 Jul 2018 08:22) (78 - 114)  BP: 114/74 (19 Jul 2018 08:22) (105/64 - 136/69)  BP(mean): --  RR: 17 (19 Jul 2018 08:22) (16 - 18)  SpO2: 95% (19 Jul 2018 08:22) (86% - 100%)    PHYSICAL EXAM:    General: ; in no acute distress  Eyes: PERRL, EOM intact; conjunctiva and sclera clear  Head: Normocephalic; atraumatic  ENMT: No nasal discharge; airway clear  Neck: Supple; non tender; no masses  Respiratory: No wheezes, rales or rhonchi  Cardiovascular: Regular rate and rhythm. S1 and S2 Normal; No murmurs, gallops or rubs  Gastrointestinal: Soft non-tender non-distended; Normal bowel sounds; No hepatosplenomegaly  Genitourinary: No costovertebral angle tenderness  Extremities: Normal range of motion, No clubbing, cyanosis or edema  Vascular: Peripheral pulses palpable 2+ bilaterally  Neurological: Alert and oriented x3  Skin: Warm and dry. No acute rash  Lymph Nodes: No acute cervical adenopathy  Musculoskeletal: Normal gait, tone, without deformities    LABS:                        8.4    5.4   )-----------( 376      ( 19 Jul 2018 07:21 )             29.6     07-19    138  |  97  |  10  ----------------------------<  84  4.2   |  31  |  0.67    Ca    9.3      19 Jul 2018 07:21  Phos  2.4     07-19  Mg     2.0     07-19              MICROBIOLOGY:  Culture Results:   >100,000 CFU/ml Proteus mirabilis  >100,000 CFU/ml Aerococcus urinae Susceptibility to follow. (07-15 @ 17:49)  Culture Results:   No growth at 3 days. (07-15 @ 17:36)  Culture Results:   No growth at 3 days. (07-15 @ 17:36)      RADIOLOGY & ADDITIONAL STUDIES:

## 2018-07-19 NOTE — PROGRESS NOTE ADULT - PROBLEM SELECTOR PLAN 4
- Patient failed swallow eval and is at high risk aspiration  - Pharyngeal state dysphagia with weak swallow & suspected delay in swallow initiation   - Strict NPO  - Aspiration precautions  - FEEST was unsuccessful yesterday 2/2 following commands.   - MBS to be done today and if fail f/u Dr. Hale for PEG as per son's requests (POA)  - Only medications through IV; access lost yesterday and able to regain access overnight

## 2018-07-19 NOTE — PROGRESS NOTE ADULT - PROBLEM SELECTOR PLAN 5
- Pt has known hx of dementia. Will continue to monitor for change in mental status  - CTH shows possible NPH  - AAOx1 for me today. Responds to orientation questions only when prompted shaking her head yes or no  - Is speaking non-sensical sentences but this is an improvement as per son because she was somnolent and not able to speak a few days prior.   - As per speech/swallow recs: aspiration risk c/w NPO & spoke to son and he wants a PEG placed despite recommendations from speech/swallow to discuss palliative care  -TSH normal, B12 normal, folate normal - Pt has known hx of dementia. Will continue to monitor for change in mental status  - Appears to be sundowning and gets more agitated at night and needs to be redirected and oriented. Avoid benzos. Only give haldol as last resort.   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 More appropriately responding to questions. She is able to say her full last name which is an improvement from the past few days. She is more communicative and making more sense but still slow with responses. This is improvement from when she first came in as per son   - As per speech/swallow recs: aspiration risk c/w NPO & spoke to son and he wants a PEG placed despite recommendations from speech/swallow to discuss palliative care; patient awaiting MBS to be done today   -TSH normal, B12 normal, folate normal

## 2018-07-19 NOTE — PROGRESS NOTE ADULT - PROBLEM SELECTOR PLAN 1
- Presents with increasing lethargy and confusion which is slowly resolving; more awake alert and slow but appropriate responses to commands  - O/N episode of agitation but resolved with redirection; she appears to sundown  - Known hx of confusion (previously diagnosed with dementia) but is usually oriented to self at baseline according to son   - She remains afebrile, with a normal lactate and hemodynamically stable, so sepsis is not a cause of AMS. In setting of poor PO intake and positive UA, AMS likely secondary to UTI. Received IV fluids and meropenem in ED. Will continue IV fluids and meropenem Q12.   - ID recs to continue meropenem q12 for 5 days    - Blood cultures no growth @ 1 day  - UC >100,000 gram neg rods; Proteus mirabilis & Aerococcus > 100,000  - Will monitor for signs of fever increased somnolence, change in mental status  - Remeron was stopped as it is a sedating medication and can affect mental status

## 2018-07-19 NOTE — PROGRESS NOTE ADULT - PROBLEM SELECTOR PLAN 5
- Pt has known hx of dementia. Will continue to monitor for change in mental status  - Appears to be sundowning and gets more agitated at night and needs to be redirected and oriented. Avoid benzos. Only give haldol as last resort.   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 More appropriately responding to questions. She is able to say her full last name which is an improvement from the past few days. She is more communicative and making more sense but still slow with responses. This is improvement from when she first came in as per son   - As per speech/swallow recs: aspiration risk c/w NPO & spoke to son and he wants a PEG placed despite recommendations from speech/swallow to discuss palliative care; patient awaiting MBS to be done today   -TSH normal, B12 normal, folate normal

## 2018-07-19 NOTE — PROGRESS NOTE ADULT - SUBJECTIVE AND OBJECTIVE BOX
OVERNIGHT EVENTS: Episode of agitation overnight. Pulled out IV line and oxygen and desat to the low 80s.    SUBJECTIVE:Dementia at baseline    Vital Signs Last 12 Hrs  T(F): 99.4 (07-19-18 @ 05:26), Max: 99.9 (07-19-18 @ 00:14)  HR: 93 (07-19-18 @ 05:26) (93 - 114)  BP: 105/64 (07-19-18 @ 05:26) (105/64 - 136/69)  BP(mean): --  RR: 17 (07-19-18 @ 05:26) (17 - 18)  SpO2: 97% (07-19-18 @ 05:26) (86% - 97%)  I&O's Summary    18 Jul 2018 07:01  -  19 Jul 2018 07:00  --------------------------------------------------------  IN: 375 mL / OUT: 0 mL / NET: 375 mL        PHYSICAL EXAM:  Constitutional: NAD, comfortable in bed.  HEENT: NC/AT, PERRLA, EOMI, no conjunctival pallor or scleral icterus, MMM  Neck: Supple, no JVD  Respiratory: Normal rate, rhythm, depth, effort. Coarse breath sounds b/l no crackles   Cardiovascular: RRR, normal S1 and S2, no m/r/g.   Gastrointestinal: +BS, soft NTND, no guarding or rebound tenderness, no palpable masses   Extremities: wwp; no cyanosis, clubbing, 1+ pitting edema in the left foot up to the ankle, nonerythematous and non-tender to palpation   Vascular: Pulses equal and strong throughout.   Neurological: AAOx1 (self), able to move all extremities and strength intact b/l upper extremities, cannot move b/l LE 2/2 pain, unable to do full neuro assessment 2/2 cognitive delay   Skin: Thoracic wound with dressing clean dry and intact. Flaking b/l decubitus dry ulcer with dressing clean dry intact.         LABS:                        8.7    6.5   )-----------( 338      ( 18 Jul 2018 06:36 )             29.8     07-18    138  |  96  |  11  ----------------------------<  103<H>  4.1   |  31  |  0.69    Ca    8.8      18 Jul 2018 06:36  Phos  2.1     07-18  Mg     2.0     07-18            RADIOLOGY & ADDITIONAL TESTS:    MEDICATIONS  (STANDING):  acetylcysteine 20% Inhalation 4 milliLiter(s) Inhalation every 6 hours  ALBUTerol/ipratropium for Nebulization 3 milliLiter(s) Nebulizer every 6 hours  dextrose 5% + sodium chloride 0.45%. 1000 milliLiter(s) (75 mL/Hr) IV Continuous <Continuous>  heparin  Injectable 5000 Unit(s) SubCutaneous every 8 hours  meropenem  IVPB 1000 milliGRAM(s) IV Intermittent every 12 hours    MEDICATIONS  (PRN):  acetaminophen  Suppository. 650 milliGRAM(s) Rectal every 6 hours PRN Moderate Pain (4 - 6)                         Culture - Urine (07.15.18 @ 17:49)    -  Gentamicin: S 4    -  Nitrofurantoin: R >64 Should not be used to treat pyelonephritis    -  Piperacillin/Tazobactam: S <=8    -  Tobramycin: S 4    -  Trimethoprim/Sulfamethoxazole: S <=0.5/9.5    -  Ampicillin: S <=2 These ampicillin results predict results for amoxicillin    -  Ampicillin/Sulbactam: S <=4/2    -  Cefazolin: S <=2 This predicts results for oral agents cefaclor, cefdinir, cefpodoxime, cefprozil, cefuroxime axetil, cephalexin and locarbef for uncomplicated UTI. Note that some isolates may be susceptible to these agents while testing resistant to cefazolin.    -  Ceftriaxone: S <=1 Enterobacter, Citrobacter, and Serratia may develop resistance during prolonged therapy    -  Ciprofloxacin: S <=0.5    Specimen Source: .Urine Catheterized    Culture Results:   >100,000 CFU/ml Proteus mirabilis  >100,000 CFU/ml Aerococcus urinae Susceptibility to follow.    Organism Identification: Proteus mirabilis    Organism: Proteus mirabilis    Method Type: JACKIE

## 2018-07-19 NOTE — PROGRESS NOTE ADULT - SUBJECTIVE AND OBJECTIVE BOX
OVERNIGHT EVENTS: Episode of agitation overnight. Pulled out IV line and oxygen and desat to the low 80s. Redirected patient and able to get line access.     SUBJECTIVE: She is sleeping without any apparent distress     Vital Signs Last 12 Hrs  T(F): 99.4 (07-19-18 @ 05:26), Max: 99.9 (07-19-18 @ 00:14)  HR: 93 (07-19-18 @ 05:26) (93 - 114)  BP: 105/64 (07-19-18 @ 05:26) (105/64 - 136/69)  BP(mean): --  RR: 17 (07-19-18 @ 05:26) (17 - 18)  SpO2: 97% (07-19-18 @ 05:26) (86% - 97%)  I&O's Summary    18 Jul 2018 07:01  -  19 Jul 2018 07:00  --------------------------------------------------------  IN: 375 mL / OUT: 0 mL / NET: 375 mL        PHYSICAL EXAM:  Constitutional: NAD, comfortable in bed.  HEENT: NC/AT, PERRLA, EOMI, no conjunctival pallor or scleral icterus, MMM  Neck: Supple, no JVD  Respiratory: Normal rate, rhythm, depth, effort. CTAB. No w/r/r.   Cardiovascular: RRR, normal S1 and S2, no m/r/g.   Gastrointestinal: +BS, soft NTND, no guarding or rebound tenderness, no palpable masses   Extremities: wwp; no cyanosis, clubbing or edema.   Vascular: Pulses equal and strong throughout.   Neurological: AAOx3, no CN deficits, strength and sensation intact throughout.   Skin: No gross skin abnormalities or rashes        LABS:                        8.7    6.5   )-----------( 338      ( 18 Jul 2018 06:36 )             29.8     07-18    138  |  96  |  11  ----------------------------<  103<H>  4.1   |  31  |  0.69    Ca    8.8      18 Jul 2018 06:36  Phos  2.1     07-18  Mg     2.0     07-18            RADIOLOGY & ADDITIONAL TESTS:    MEDICATIONS  (STANDING):  acetylcysteine 20% Inhalation 4 milliLiter(s) Inhalation every 6 hours  ALBUTerol/ipratropium for Nebulization 3 milliLiter(s) Nebulizer every 6 hours  dextrose 5% + sodium chloride 0.45%. 1000 milliLiter(s) (75 mL/Hr) IV Continuous <Continuous>  heparin  Injectable 5000 Unit(s) SubCutaneous every 8 hours  meropenem  IVPB 1000 milliGRAM(s) IV Intermittent every 12 hours    MEDICATIONS  (PRN):  acetaminophen  Suppository. 650 milliGRAM(s) Rectal every 6 hours PRN Moderate Pain (4 - 6) OVERNIGHT EVENTS: Episode of agitation overnight. Pulled out IV line and oxygen and desat to the low 80s. Redirected patient and able to get line access.     SUBJECTIVE: She is sleeping without any distress. Easily arousable and is able to speak in full sentences and answer questions more appropriately. She reports she is not in any pain anywhere.     Vital Signs Last 12 Hrs  T(F): 99.4 (07-19-18 @ 05:26), Max: 99.9 (07-19-18 @ 00:14)  HR: 93 (07-19-18 @ 05:26) (93 - 114)  BP: 105/64 (07-19-18 @ 05:26) (105/64 - 136/69)  BP(mean): --  RR: 17 (07-19-18 @ 05:26) (17 - 18)  SpO2: 97% (07-19-18 @ 05:26) (86% - 97%)  I&O's Summary    18 Jul 2018 07:01  -  19 Jul 2018 07:00  --------------------------------------------------------  IN: 375 mL / OUT: 0 mL / NET: 375 mL        PHYSICAL EXAM:  Constitutional: NAD, comfortable in bed.  HEENT: NC/AT, PERRLA, EOMI, no conjunctival pallor or scleral icterus, MMM  Neck: Supple, no JVD  Respiratory: Normal rate, rhythm, depth, effort. Coarse breath sounds b/l no crackles   Cardiovascular: RRR, normal S1 and S2, no m/r/g.   Gastrointestinal: +BS, soft NTND, no guarding or rebound tenderness, no palpable masses   Extremities: wwp; no cyanosis, clubbing, 1+ pitting edema in the left foot up to the ankle, nonerythematous and non-tender to palpation   Vascular: Pulses equal and strong throughout.   Neurological: AAOx1 (self), able to move all extremities and strength intact b/l upper extremities, cannot move b/l LE 2/2 pain, unable to do full neuro assessment 2/2 cognitive delay   Skin: Thoracic wound with dressing clean dry and intact. Flaking b/l decubitus dry ulcer with dressing clean dry intact.         LABS:                        8.7    6.5   )-----------( 338      ( 18 Jul 2018 06:36 )             29.8     07-18    138  |  96  |  11  ----------------------------<  103<H>  4.1   |  31  |  0.69    Ca    8.8      18 Jul 2018 06:36  Phos  2.1     07-18  Mg     2.0     07-18            RADIOLOGY & ADDITIONAL TESTS:    MEDICATIONS  (STANDING):  acetylcysteine 20% Inhalation 4 milliLiter(s) Inhalation every 6 hours  ALBUTerol/ipratropium for Nebulization 3 milliLiter(s) Nebulizer every 6 hours  dextrose 5% + sodium chloride 0.45%. 1000 milliLiter(s) (75 mL/Hr) IV Continuous <Continuous>  heparin  Injectable 5000 Unit(s) SubCutaneous every 8 hours  meropenem  IVPB 1000 milliGRAM(s) IV Intermittent every 12 hours    MEDICATIONS  (PRN):  acetaminophen  Suppository. 650 milliGRAM(s) Rectal every 6 hours PRN Moderate Pain (4 - 6)

## 2018-07-19 NOTE — SWALLOW VFSS/MBS ASSESSMENT ADULT - PHARYNGEAL PHASE COMMENTS
Inconsistent & trace penetration with thin liquid, which is WNL for patients of this age. Miminal residue along the base of tongue across trials that patient spontaneously cleared with secondary swallow. No aspiration was observed on this exam

## 2018-07-19 NOTE — PROGRESS NOTE ADULT - PROBLEM SELECTOR PLAN 3
- Patient is incontinent at baseline and bladder scan done today normal   - UA positive; large blood, many WBCS and leuk esterase positive with UC >100,000 Gram neg rods - Proteus mirabilis & Aerococcus gram positive cocci  - Pt denies fever or change in urinary habits but also with dementia at baseline and incontinent   - Hx of ESBL so c/w Meropenem for 7 days (currently day 5- to be completed night of July 21)   - Proteus susceptible to meropenem and f/u additional susceptibilities as grew Aerococcus  - ID recs to continue meropenem and extend for full 7 days now

## 2018-07-20 ENCOUNTER — TRANSCRIPTION ENCOUNTER (OUTPATIENT)
Age: 83
End: 2018-07-20

## 2018-07-20 LAB
-  AMPICILLIN: SIGNIFICANT CHANGE UP
-  CEFTRIAXONE: SIGNIFICANT CHANGE UP
-  CIPROFLOXACIN: SIGNIFICANT CHANGE UP
-  MEROPENEM: SIGNIFICANT CHANGE UP
-  TRIMETHOPRIM/SULFAMETHOXAZOLE: SIGNIFICANT CHANGE UP
-  VANCOMYCIN: SIGNIFICANT CHANGE UP
ANION GAP SERPL CALC-SCNC: 10 MMOL/L — SIGNIFICANT CHANGE UP (ref 5–17)
BASOPHILS NFR BLD AUTO: 0.6 % — SIGNIFICANT CHANGE UP (ref 0–2)
BUN SERPL-MCNC: 7 MG/DL — SIGNIFICANT CHANGE UP (ref 7–23)
CALCIUM SERPL-MCNC: 8.8 MG/DL — SIGNIFICANT CHANGE UP (ref 8.4–10.5)
CHLORIDE SERPL-SCNC: 96 MMOL/L — SIGNIFICANT CHANGE UP (ref 96–108)
CO2 SERPL-SCNC: 35 MMOL/L — HIGH (ref 22–31)
CREAT SERPL-MCNC: 0.61 MG/DL — SIGNIFICANT CHANGE UP (ref 0.5–1.3)
CULTURE RESULTS: SIGNIFICANT CHANGE UP
EOSINOPHIL NFR BLD AUTO: 6 % — SIGNIFICANT CHANGE UP (ref 0–6)
GLUCOSE BLDC GLUCOMTR-MCNC: 110 MG/DL — HIGH (ref 70–99)
GLUCOSE SERPL-MCNC: 110 MG/DL — HIGH (ref 70–99)
HCT VFR BLD CALC: 30.5 % — LOW (ref 34.5–45)
HGB BLD-MCNC: 9.1 G/DL — LOW (ref 11.5–15.5)
LYMPHOCYTES # BLD AUTO: 25 % — SIGNIFICANT CHANGE UP (ref 13–44)
MAGNESIUM SERPL-MCNC: 1.9 MG/DL — SIGNIFICANT CHANGE UP (ref 1.6–2.6)
MCHC RBC-ENTMCNC: 27.4 PG — SIGNIFICANT CHANGE UP (ref 27–34)
MCHC RBC-ENTMCNC: 29.8 G/DL — LOW (ref 32–36)
MCV RBC AUTO: 91.9 FL — SIGNIFICANT CHANGE UP (ref 80–100)
METHOD TYPE: SIGNIFICANT CHANGE UP
METHOD TYPE: SIGNIFICANT CHANGE UP
MONOCYTES NFR BLD AUTO: 10.6 % — SIGNIFICANT CHANGE UP (ref 2–14)
NEUTROPHILS NFR BLD AUTO: 57.8 % — SIGNIFICANT CHANGE UP (ref 43–77)
ORGANISM # SPEC MICROSCOPIC CNT: SIGNIFICANT CHANGE UP
PHOSPHATE SERPL-MCNC: 2.6 MG/DL — SIGNIFICANT CHANGE UP (ref 2.5–4.5)
PLATELET # BLD AUTO: 364 K/UL — SIGNIFICANT CHANGE UP (ref 150–400)
POTASSIUM SERPL-MCNC: 3.7 MMOL/L — SIGNIFICANT CHANGE UP (ref 3.5–5.3)
POTASSIUM SERPL-SCNC: 3.7 MMOL/L — SIGNIFICANT CHANGE UP (ref 3.5–5.3)
RBC # BLD: 3.32 M/UL — LOW (ref 3.8–5.2)
RBC # FLD: 14.1 % — SIGNIFICANT CHANGE UP (ref 10.3–16.9)
SODIUM SERPL-SCNC: 141 MMOL/L — SIGNIFICANT CHANGE UP (ref 135–145)
SPECIMEN SOURCE: SIGNIFICANT CHANGE UP
WBC # BLD: 5.2 K/UL — SIGNIFICANT CHANGE UP (ref 3.8–10.5)
WBC # FLD AUTO: 5.2 K/UL — SIGNIFICANT CHANGE UP (ref 3.8–10.5)

## 2018-07-20 PROCEDURE — 99232 SBSQ HOSP IP/OBS MODERATE 35: CPT

## 2018-07-20 PROCEDURE — 71045 X-RAY EXAM CHEST 1 VIEW: CPT | Mod: 26

## 2018-07-20 RX ORDER — OMEPRAZOLE 10 MG/1
1 CAPSULE, DELAYED RELEASE ORAL
Qty: 0 | Refills: 0 | COMMUNITY

## 2018-07-20 RX ORDER — MIRTAZAPINE 45 MG/1
2 TABLET, ORALLY DISINTEGRATING ORAL
Qty: 0 | Refills: 0 | COMMUNITY

## 2018-07-20 RX ORDER — MEGESTROL ACETATE 40 MG/ML
10 SUSPENSION ORAL
Qty: 0 | Refills: 0 | COMMUNITY
Start: 2018-07-20

## 2018-07-20 RX ORDER — ACETAMINOPHEN 500 MG
1 TABLET ORAL
Qty: 0 | Refills: 0 | COMMUNITY

## 2018-07-20 RX ADMIN — MEROPENEM 100 MILLIGRAM(S): 1 INJECTION INTRAVENOUS at 19:02

## 2018-07-20 RX ADMIN — Medication 3 MILLILITER(S): at 00:21

## 2018-07-20 RX ADMIN — MEGESTROL ACETATE 400 MILLIGRAM(S): 40 SUSPENSION ORAL at 15:28

## 2018-07-20 RX ADMIN — HEPARIN SODIUM 5000 UNIT(S): 5000 INJECTION INTRAVENOUS; SUBCUTANEOUS at 22:26

## 2018-07-20 RX ADMIN — Medication 4 MILLILITER(S): at 00:50

## 2018-07-20 RX ADMIN — HEPARIN SODIUM 5000 UNIT(S): 5000 INJECTION INTRAVENOUS; SUBCUTANEOUS at 15:28

## 2018-07-20 RX ADMIN — Medication 4 MILLILITER(S): at 06:55

## 2018-07-20 RX ADMIN — Medication 4 MILLILITER(S): at 19:03

## 2018-07-20 RX ADMIN — Medication 3 MILLILITER(S): at 12:02

## 2018-07-20 RX ADMIN — MEROPENEM 100 MILLIGRAM(S): 1 INJECTION INTRAVENOUS at 05:32

## 2018-07-20 RX ADMIN — HEPARIN SODIUM 5000 UNIT(S): 5000 INJECTION INTRAVENOUS; SUBCUTANEOUS at 05:32

## 2018-07-20 RX ADMIN — SODIUM CHLORIDE 75 MILLILITER(S): 9 INJECTION, SOLUTION INTRAVENOUS at 05:31

## 2018-07-20 RX ADMIN — Medication 3 MILLILITER(S): at 05:32

## 2018-07-20 RX ADMIN — Medication 3 MILLILITER(S): at 19:03

## 2018-07-20 RX ADMIN — Medication 4 MILLILITER(S): at 12:02

## 2018-07-20 NOTE — PROGRESS NOTE ADULT - PROBLEM SELECTOR PLAN 3
- Patient is incontinent at baseline and bladder scan done today normal   - UA positive; large blood, many WBCS and leuk esterase positive with UC >100,000 Gram neg rods - Proteus mirabilis & Aerococcus gram positive cocci  - Pt denies fever or change in urinary habits but also with dementia at baseline and incontinent   - Hx of ESBL so c/w Meropenem for 7 days (currently day 5- to be completed night of July 21)   - Proteus susceptible to meropenem and f/u additional susceptibilities as grew Aerococcus  - ID recs to continue meropenem and extend for full 7 days now - Patient is incontinent at baseline and bladder scan done today normal   - UA positive; large blood, many WBCS and leuk esterase positive with UC >100,000 Gram neg rods - Proteus mirabilis & Aerococcus gram positive cocci  - Pt denies fever or change in urinary habits but also with dementia at baseline and incontinent   - Hx of ESBL so c/w Meropenem for 7 days (currently day 5- to be completed night of July 21)   - Proteus susceptible to meropenem and f/u additional susceptibilities as grew Aerococcus  - ID recs to continue meropenem for 5 days (to be completed tonight)

## 2018-07-20 NOTE — DISCHARGE NOTE ADULT - INSTRUCTIONS
Please continue to give her small sips of fluids, keeping her at an upright 90 degrees. She needs to be offered something to drink or eat throughout the day as she has dementia and will not actively eat on her own. Continue megace as this is an appetite stimulant and will help to encourage her to eat.

## 2018-07-20 NOTE — PROGRESS NOTE ADULT - PROBLEM SELECTOR PLAN 2
- History of hypoxic respiratory failure in 2016 previously intubated and on pressors in the ICU 2/2 septic shock   - Episode of desaturation yesterday (7/15) went down to the mid 80s on RA and started on 6L NC but patient remained asymptomatic and VSS throughout   - Patient weaned down 5L O2 NC this AM now saturating at 96%   - Gurgling sounds coming from throat in AM but appears comfortable without use of accessory muscles for breathing   - Rhonchi and coarse breath sounds heard on the right; suspicious for aspiration PNA; pt started on pureed thin liquids yesterday but changed diet to NPO today   - Repeat CXR looks like a worsening infiltrate of the RLL compared to yesterday    - As per ID recs meropenem would cover this if indeed aspiration PNA   - Hiflow O/N but transitioned back to 6L NC this AM because saturation fine and no acute respiratory distress   - CXR shows b/l pulmonary congestion   - Pulmonary following and recs to wean down the oxygen as long as saturation >95 - History of hypoxic respiratory failure in 2016 previously intubated and on pressors in the ICU 2/2 septic shock   - Episode of desaturation on 7/15 went down to the mid 80s on RA and started on 6L NC but patient remained asymptomatic and VSS throughout   - Patient weaned down 3L O2 NC this AM now saturating at 96%; no episodes of desaturation   - Duonebs q6  - Gurgling sounds coming from throat in AM but appears comfortable without use of accessory muscles for breathing   - Rhonchi and coarse breath sounds heard on the right; suspicious for aspiration PNA; pt started on pureed thin liquids yesterday but changed diet to NPO today   - Repeat CXR looks like a worsening infiltrate of the RLL compared to yesterday    - As per ID recs meropenem would cover this if indeed aspiration PNA   - CXR shows b/l pulmonary congestion   - Pulmonary exam today is better; the patient still has coarse breath sounds but sounds more clear and no crackles  - Pulmonary following and recs to wean down the oxygen as long as saturation >95

## 2018-07-20 NOTE — PROGRESS NOTE ADULT - PROBLEM SELECTOR PLAN 6
- When auscultating her posterior chest this morning she is very stiff when trying to sit her up  - Patient on Remeron for sleep which was started in the nursing home a couple weeks ago as per son; today concern for EPS and stopped medication  - f/u PT maricruz trinh

## 2018-07-20 NOTE — DISCHARGE NOTE ADULT - ADDITIONAL INSTRUCTIONS
Dr. Motta  If patient has poor appetite, not eating appropriately, and looks volume depleted then will re-assess for Peg.  Because of her dementia she may not actively say she wants food. Please give her multiple small feedings throughout the day with assistance, ensuring the bed is upright at 90 degrees before feeding her. Please follow up with Dr. Motta. In addition follow up with an orthopedic doctor regarding her right knee crepitus and right hip mobility as per physical therapy recs. Patient is able to transfer to the chair with a two person assist and sits upright in chair appropriately.   If patient has poor appetite, not eating appropriately, and looks volume depleted then will re-assess for peg.  Because of her dementia she may not actively say she wants food. Please give her multiple small feedings throughout the day with assistance, ensuring the bed is upright at 90 degrees before feeding her.

## 2018-07-20 NOTE — DISCHARGE NOTE ADULT - CARE PLAN
Principal Discharge DX:	Toxic metabolic encephalopathy  Goal:	To ensure patient's mental status is back to baseline after being treated for her UTI  Assessment and plan of treatment:	Patient came in altered and very lethargic for the past week. She was not her normal self as per her son. She was found to have a UTI and treated appropriately, transitioned to mechanical soft and thin liquids and stable to be sent back to the nursing home. Please follow up with your PMD Dr. Motta for management and further surveillance.  Secondary Diagnosis:	Urinary tract infection  Goal:	To treat with appropriate antibiotics  Assessment and plan of treatment:	Patient found to have Proteus in urine and treated appropriately with meropenem. She was known to have ESBL so chose meropenem for appropriate coverage. She was treated and is mentating better. Please follow up with your PMD Dr. Motta for management and further surveillance.  Secondary Diagnosis:	Low oxygen saturation  Goal:	To ensure patient is getting adequate oxygen supplementation  Assessment and plan of treatment:	Patient had episodes of low oxygen saturations and was started on 6L NC and eventually decreased down to 3L. If the oxygen is off of her she will desaturate down to the 80s. Continue with 3L NC in the nursing home. Please follow up with your PMD Dr. Motta for management and further surveillance.  Secondary Diagnosis:	Edema  Goal:	To ensure patient is appropriately hydrated without volume overloading  Assessment and plan of treatment:	She has a history of chronic left leg edema which she was on lasix 20mg once a week for. Her pressures low-normal range around 100-110s and did not feel that she needed lasix at this time to help with the left foot swelling. Please follow up with your PMD Dr. Motta for management and further surveillance.  Secondary Diagnosis:	History of total left hip replacement  Goal:	To help with mobility  Assessment and plan of treatment:	You had previous diagnosed with L total hip replacement. Be sure to take care when transferring patient from bed to chair. Okay to give Tylenol for pain if needed before she is going to be transferred to help with pain. Please follow up with your PMD Dr. Motta for management and further surveillance.  Secondary Diagnosis:	Dysphagia  Goal:	To make sure is able to swallow appropriately  Assessment and plan of treatment:	Initially the medical team had concerns about aspiration but she passed the modified barium swallow. She can continue on the mechanical soft and thin liquids offering frequent sips of juice and food throughout the day. Please follow up with your PMD Dr. Motta for management and further surveillance.  Secondary Diagnosis:	Dementia  Goal:	To improve with mentation and re-orientation  Assessment and plan of treatment:	Patient needs to re-orientation daily. Megace started for appetite stimulation. Ensure patient eats appropriately and is offered small snacks throughout the day as she has dementia and cannot actively ask for food. If she is not tolerating feeds then will reconsider peg. Please follow up with your PMD Dr. Motta for management and further surveillance. Principal Discharge DX:	Toxic metabolic encephalopathy  Goal:	To ensure patient's mental status is back to baseline after being treated for her UTI and for aspiration pneumonia  Assessment and plan of treatment:	Patient came in altered and very lethargic for the past week. She was not her normal self as per her son. She was found to have a UTI and aspiration pneumonia and treated appropriately, transitioned to mechanical soft and thin liquids and stable to be sent back to the nursing home. Please follow up with your PMD Dr. Motta for management and further surveillance.  Secondary Diagnosis:	Urinary tract infection  Goal:	To treat with appropriate antibiotics  Assessment and plan of treatment:	Patient found to have Proteus in urine and treated appropriately with meropenem. She was known to have ESBL so chose meropenem for appropriate coverage. She was treated and is mentating better. Please follow up with your PMD Dr. Motta for management and further surveillance.  Secondary Diagnosis:	Low oxygen saturation  Goal:	To ensure patient is getting adequate oxygen supplementation  Assessment and plan of treatment:	Patient had episodes of low oxygen saturations and was started on 6L NC and eventually decreased down to 3L. If the oxygen is off of her she will desaturate down to the 80s. Continue with 3L NC in the nursing home. Please follow up with your PMD Dr. Motta for management and further surveillance.  Secondary Diagnosis:	Edema  Goal:	To ensure patient is appropriately hydrated without volume overloading  Assessment and plan of treatment:	She has a history of chronic left leg edema which she was on lasix 20mg once a week for. Her pressures low-normal range around 100-110s and did not feel that she needed lasix at this time to help with the left foot swelling. Please follow up with your PMD Dr. Motta for management and further surveillance.  Secondary Diagnosis:	History of total left hip replacement  Goal:	To help with mobility  Assessment and plan of treatment:	You had previous diagnosed with L total hip replacement. Be sure to take care when transferring patient from bed to chair. Okay to give Tylenol for pain if needed before she is going to be transferred to help with pain. Please follow up with your PMD Dr. Motta for management and further surveillance.  Secondary Diagnosis:	Dysphagia  Goal:	To make sure is able to swallow appropriately  Assessment and plan of treatment:	Initially the medical team had concerns about aspiration but she passed the modified barium swallow. She can continue on the mechanical soft and thin liquids offering frequent sips of juice and food throughout the day. Please follow up with your PMD Dr. Motta for management and further surveillance.  Secondary Diagnosis:	Dementia  Goal:	To improve with mentation and re-orientation  Assessment and plan of treatment:	Patient needs to re-orientation daily. Megace started for appetite stimulation. Ensure patient eats appropriately and is offered small snacks throughout the day as she has dementia and cannot actively ask for food. If she is not tolerating feeds then will reconsider peg. Please follow up with your PMD Dr. Motta for management and further surveillance.

## 2018-07-20 NOTE — DISCHARGE NOTE ADULT - PATIENT PORTAL LINK FT
You can access the [facility] Patient Portal, offered by [facility], by registering with the following website: [email or web address]

## 2018-07-20 NOTE — PROGRESS NOTE ADULT - SUBJECTIVE AND OBJECTIVE BOX
Chief Complaint/Reason for Consult: cv mgmt  INTERVAL HPI: euvolemic today nad   	  MEDICATIONS:    meropenem  IVPB 1000 milliGRAM(s) IV Intermittent every 12 hours    acetylcysteine 20% Inhalation 4 milliLiter(s) Inhalation every 6 hours  ALBUTerol/ipratropium for Nebulization 3 milliLiter(s) Nebulizer every 6 hours    acetaminophen  Suppository. 650 milliGRAM(s) Rectal every 6 hours PRN      megestrol Suspension 400 milliGRAM(s) Oral daily    heparin  Injectable 5000 Unit(s) SubCutaneous every 8 hours      REVIEW OF SYSTEMS:  [x] As per HPI  CONSTITUTIONAL: No fever, weight loss, or fatigue  RESPIRATORY: No cough, wheezing, chills or hemoptysis; No Shortness of Breath  CARDIOVASCULAR: No chest pain, palpitations, dizziness, or leg swelling  GASTROINTESTINAL: No abdominal or epigastric pain. No nausea, vomiting, or hematemesis; No diarrhea or constipation. No melena or hematochezia.  MUSCULOSKELETAL: No joint pain or swelling; No muscle, back, or extremity pain  [x] All others negative	  [ ] Unable to obtain    PHYSICAL EXAM:  T(C): 36.8 (07-20-18 @ 14:20), Max: 36.8 (07-20-18 @ 14:20)  HR: 107 (07-20-18 @ 14:20) (90 - 107)  BP: 137/75 (07-20-18 @ 14:20) (112/72 - 137/75)  RR: 16 (07-20-18 @ 14:20) (16 - 17)  SpO2: 92% (07-20-18 @ 14:20) (92% - 98%)  Wt(kg): --  I&O's Summary    19 Jul 2018 07:01  -  20 Jul 2018 07:00  --------------------------------------------------------  IN: 1612 mL / OUT: 0 mL / NET: 1612 mL          Appearance: Normal	  HEENT:   Normal oral mucosa  Cardiovascular: Normal S1 S2, No JVD, No murmurs, No edema  Respiratory: Lungs clear to auscultation	  Gastrointestinal:  Soft, Non-tender, + BS	  Extremities: Normal range of motion, No clubbing, cyanosis or edema  Vascular: Peripheral pulses palpable 2+ bilaterally    TELEMETRY: 	    ECG:   	  RADIOLOGY:   CXR:  CT:  US:    CARDIAC TESTING:  Echocardiogram:  Catheterization:  Stress Test:      LABS:	 	    CARDIAC MARKERS:                                  9.1    5.2   )-----------( 364      ( 20 Jul 2018 06:37 )             30.5     07-20    141  |  96  |  7   ----------------------------<  110<H>  3.7   |  35<H>  |  0.61    Ca    8.8      20 Jul 2018 06:37  Phos  2.6     07-20  Mg     1.9     07-20      proBNP:   Lipid Profile:   HgA1c:   TSH:     ASSESSMENT/PLAN: 	    # euvolemic today nad - had long and extensive discussion with son (MD and HCP)  complete pmh faxed and revieved to my office    ## LVH - thickened LV s obstruction on 2017 echo, repeat with no LVOT obstruction, mild AS preserved EF

## 2018-07-20 NOTE — PROGRESS NOTE ADULT - SUBJECTIVE AND OBJECTIVE BOX
OVERNIGHT EVENTS: ALYCIA     SUBJECTIVE: She is not having any complaints. She is able to tell me her first name is Harshad.     Vital Signs Last 12 Hrs  T(F): 97.6 (07-20-18 @ 05:44), Max: 97.6 (07-20-18 @ 05:44)  HR: 90 (07-20-18 @ 05:44) (90 - 91)  BP: 115/75 (07-20-18 @ 05:44) (115/75 - 127/82)  BP(mean): --  RR: 16 (07-20-18 @ 05:44) (16 - 17)  SpO2: 98% (07-20-18 @ 05:44) (96% - 98%)  I&O's Summary    19 Jul 2018 07:01  -  20 Jul 2018 07:00  --------------------------------------------------------  IN: 1462 mL / OUT: 0 mL / NET: 1462 mL        PHYSICAL EXAM:  Constitutional: NAD, comfortable in bed.  HEENT: NC/AT, PERRLA, EOMI, no conjunctival pallor or scleral icterus, MMM  Neck: Supple, no JVD  Respiratory: Normal rate, rhythm, depth, effort. CTAB. No w/r/r.   Cardiovascular: RRR, normal S1 and S2, no m/r/g.   Gastrointestinal: +BS, soft NTND, no guarding or rebound tenderness, no palpable masses   Extremities: wwp; no cyanosis, clubbing or edema.   Vascular: Pulses equal and strong throughout.   Neurological: AAOx3, no CN deficits, strength and sensation intact throughout.   Skin: No gross skin abnormalities or rashes        LABS:                        9.1    5.2   )-----------( 364      ( 20 Jul 2018 06:37 )             30.5     07-20    141  |  96  |  7   ----------------------------<  110<H>  3.7   |  35<H>  |  0.61    Ca    8.8      20 Jul 2018 06:37  Phos  2.6     07-20  Mg     1.9     07-20            RADIOLOGY & ADDITIONAL TESTS:    MEDICATIONS  (STANDING):  acetylcysteine 20% Inhalation 4 milliLiter(s) Inhalation every 6 hours  ALBUTerol/ipratropium for Nebulization 3 milliLiter(s) Nebulizer every 6 hours  dextrose 5% + sodium chloride 0.45%. 1000 milliLiter(s) (75 mL/Hr) IV Continuous <Continuous>  heparin  Injectable 5000 Unit(s) SubCutaneous every 8 hours  megestrol Suspension 400 milliGRAM(s) Oral daily  meropenem  IVPB 1000 milliGRAM(s) IV Intermittent every 12 hours    MEDICATIONS  (PRN):  acetaminophen  Suppository. 650 milliGRAM(s) Rectal every 6 hours PRN Moderate Pain (4 - 6) OVERNIGHT EVENTS: ALYCIA     SUBJECTIVE: She is not having any complaints. She is able to tell me her first name is Harshad.     Vital Signs Last 12 Hrs  T(F): 97.6 (07-20-18 @ 05:44), Max: 97.6 (07-20-18 @ 05:44)  HR: 90 (07-20-18 @ 05:44) (90 - 91)  BP: 115/75 (07-20-18 @ 05:44) (115/75 - 127/82)  BP(mean): --  RR: 16 (07-20-18 @ 05:44) (16 - 17)  SpO2: 98% (07-20-18 @ 05:44) (96% - 98%)  I&O's Summary    19 Jul 2018 07:01  -  20 Jul 2018 07:00  --------------------------------------------------------  IN: 1462 mL / OUT: 0 mL / NET: 1462 mL        PHYSICAL EXAM:  Constitutional: NAD, comfortable in bed.  HEENT: NC/AT, PERRLA, EOMI, no conjunctival pallor or scleral icterus, MMM  Neck: Supple, no JVD  Respiratory: Normal rate, rhythm, depth, effort. Diffuse coarse breath sounds, no crackles appreciated   Cardiovascular: RRR, normal S1 and S2, no m/r/g.   Gastrointestinal: +BS, soft NTND, no guarding or rebound tenderness, no palpable masses   Extremities: wwp; no cyanosis, clubbing or edema.   Vascular: Pulses equal and strong throughout.   Neurological: AAOx3, no CN deficits, strength and sensation intact throughout.   Skin: Thoracic ulcer clean dry intact       LABS:                        9.1    5.2   )-----------( 364      ( 20 Jul 2018 06:37 )             30.5     07-20    141  |  96  |  7   ----------------------------<  110<H>  3.7   |  35<H>  |  0.61    Ca    8.8      20 Jul 2018 06:37  Phos  2.6     07-20  Mg     1.9     07-20            RADIOLOGY & ADDITIONAL TESTS:    MEDICATIONS  (STANDING):  acetylcysteine 20% Inhalation 4 milliLiter(s) Inhalation every 6 hours  ALBUTerol/ipratropium for Nebulization 3 milliLiter(s) Nebulizer every 6 hours  dextrose 5% + sodium chloride 0.45%. 1000 milliLiter(s) (75 mL/Hr) IV Continuous <Continuous>  heparin  Injectable 5000 Unit(s) SubCutaneous every 8 hours  megestrol Suspension 400 milliGRAM(s) Oral daily  meropenem  IVPB 1000 milliGRAM(s) IV Intermittent every 12 hours    MEDICATIONS  (PRN):  acetaminophen  Suppository. 650 milliGRAM(s) Rectal every 6 hours PRN Moderate Pain (4 - 6) OVERNIGHT EVENTS: ALYCIA     SUBJECTIVE: She is not having any complaints. Says she is not in any pain and no issues breathing. She is able to tell me her first name is Harshad.     Vital Signs Last 12 Hrs  T(F): 97.6 (07-20-18 @ 05:44), Max: 97.6 (07-20-18 @ 05:44)  HR: 90 (07-20-18 @ 05:44) (90 - 91)  BP: 115/75 (07-20-18 @ 05:44) (115/75 - 127/82)  BP(mean): --  RR: 16 (07-20-18 @ 05:44) (16 - 17)  SpO2: 98% (07-20-18 @ 05:44) (96% - 98%)  I&O's Summary    19 Jul 2018 07:01  -  20 Jul 2018 07:00  --------------------------------------------------------  IN: 1462 mL / OUT: 0 mL / NET: 1462 mL        PHYSICAL EXAM:  Constitutional: NAD, comfortable in bed.  HEENT: NC/AT, PERRLA, EOMI, no conjunctival pallor or scleral icterus, MMM  Neck: Supple, no JVD  Respiratory: Normal rate, rhythm, depth, effort. Diffuse coarse breath sounds, no crackles   Cardiovascular: RRR, normal S1 and S2, no m/r/g.   Gastrointestinal: +BS, soft NTND, no guarding or rebound tenderness, no palpable masses   Extremities: wwp; no cyanosis, clubbing. +stiffness but less than prior exams. some mild L foot edema 1+   Vascular: Pulses equal and strong throughout.   Neurological: AAOx1, +dementia; able to respond appropriately with questions and moving all extremities   Skin: Thoracic ulcer and sacral ulcer with dressing that is clean dry intact       LABS:                        9.1    5.2   )-----------( 364      ( 20 Jul 2018 06:37 )             30.5     07-20    141  |  96  |  7   ----------------------------<  110<H>  3.7   |  35<H>  |  0.61    Ca    8.8      20 Jul 2018 06:37  Phos  2.6     07-20  Mg     1.9     07-20            RADIOLOGY & ADDITIONAL TESTS:    MEDICATIONS  (STANDING):  acetylcysteine 20% Inhalation 4 milliLiter(s) Inhalation every 6 hours  ALBUTerol/ipratropium for Nebulization 3 milliLiter(s) Nebulizer every 6 hours  dextrose 5% + sodium chloride 0.45%. 1000 milliLiter(s) (75 mL/Hr) IV Continuous <Continuous>  heparin  Injectable 5000 Unit(s) SubCutaneous every 8 hours  megestrol Suspension 400 milliGRAM(s) Oral daily  meropenem  IVPB 1000 milliGRAM(s) IV Intermittent every 12 hours    MEDICATIONS  (PRN):  acetaminophen  Suppository. 650 milliGRAM(s) Rectal every 6 hours PRN Moderate Pain (4 - 6)

## 2018-07-20 NOTE — PROGRESS NOTE ADULT - PROBLEM SELECTOR PLAN 5
- Pt has known hx of dementia. Will continue to monitor for change in mental status  - Appears to be sundowning and gets more agitated at night and needs to be redirected and oriented. Avoid benzos. Only give haldol as last resort.   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 More appropriately responding to questions. She is able to say her full last name which is an improvement from the past few days. She is more communicative and making more sense but still slow with responses. This is improvement from when she first came in as per son   - As per speech/swallow recs: aspiration risk c/w NPO & spoke to son and he wants a PEG placed despite recommendations from speech/swallow to discuss palliative care; patient awaiting MBS to be done today   -TSH normal, B12 normal, folate normal - Pt has known hx of dementia. Will continue to monitor for change in mental status  - Appears to be sundowning and gets more agitated at night and needs to be redirected and oriented. Avoid benzos. Only give haldol as last resort.   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 More appropriately responding to questions. She is able to say her first name which is an improvement from the past few days. She is more communicative and making more sense but still slow with responses. This is improvement from when she first came in as per son   -Transitioned to mechanical soft and thin liquids last night. Tolerating it well. No episodes of desaturation or aspiration o/n  -TSH normal, B12 normal, folate normal

## 2018-07-20 NOTE — PROGRESS NOTE ADULT - PROBLEM SELECTOR PLAN 8
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 - Pt with history of Celiac' s Disease and anemia possibly 2/2 poor absorption and iron deficiency  - Iron studies: Total iron 25, ferritin 91 no need to replete as patient with same lab values 1 year prior  - Hgb today 9.1 and stable   - Hct today 30.5 and stable   - Chronic Anemia with baseline Hgb 8-9  - Pt wih prior history endoscopy/colonoscopy which showed 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

## 2018-07-20 NOTE — DISCHARGE NOTE ADULT - SECONDARY DIAGNOSIS.
Urinary tract infection Low oxygen saturation Edema History of total left hip replacement Dysphagia Dementia

## 2018-07-20 NOTE — PROGRESS NOTE ADULT - PROBLEM SELECTOR PLAN 4
- Patient failed swallow eval and is at high risk aspiration  - Pharyngeal state dysphagia with weak swallow & suspected delay in swallow initiation   - Strict NPO  - Aspiration precautions  - FEEST was unsuccessful yesterday 2/2 following commands.   - MBS to be done today and if fail f/u Dr. Hale for PEG as per son's requests (POA)  - Only medications through IV; access lost yesterday and able to regain access overnight - Aspiration precautions  - FEEST unsuccessful 2/2 following commands.   - MBS passed and started on mechanical softs and thin liquids  - Dr. Hale agrees there is no more need for a Peg  - DC IVF today; will continue giving meropenem IV though until remaining dose

## 2018-07-20 NOTE — DISCHARGE NOTE ADULT - MEDICATION SUMMARY - MEDICATIONS TO TAKE
I will START or STAY ON the medications listed below when I get home from the hospital:    Megace 40 mg/mL oral suspension  -- 10 milliliter(s) by mouth once a day  -- Indication: For Nutrition, metabolism, and development symptoms    ipratropium  -- Indication: For Low oxygen saturation    ferrous sulfate 325 mg (65 mg elemental iron) oral tablet  -- 1 tab(s) by mouth 3 times a day  -- Indication: For Anemia    Dulcolax Stool Softener 100 mg oral capsule  -- 1 cap(s) by mouth 2 times a day  -- Indication: For Nutrition, metabolism, and development symptoms I will START or STAY ON the medications listed below when I get home from the hospital:    acetaminophen 650 mg rectal suppository  -- 1 suppository(ies) rectally every 6 hours, As needed, Moderate Pain (4 - 6)  -- Indication: For Pain     Megace 40 mg/mL oral suspension  -- 10 milliliter(s) by mouth once a day  -- Indication: For Nutrition, metabolism, and development symptoms    ipratropium-albuterol 0.5 mg-2.5 mg/3 mLinhalation solution  -- 3 milliliter(s) inhaled every 6 hours  -- Indication: For Aspiration pneumonia    ferrous sulfate 325 mg (65 mg elemental iron) oral tablet  -- 1 tab(s) by mouth 3 times a day  -- Indication: For Anemia    Dulcolax Stool Softener 100 mg oral capsule  -- 1 cap(s) by mouth 2 times a day  -- Indication: For Nutrition, metabolism, and development symptoms    omeprazole 20 mg oral delayed release tablet  -- 1 tab(s) by mouth once a day before breakfast  -- Obtain medical advice before taking any non-prescription drugs as some may affect the action of this medication.  Swallow whole.  Do not crush.    -- Indication: For GERD

## 2018-07-20 NOTE — DISCHARGE NOTE ADULT - PLAN OF CARE
To ensure patient's mental status is back to baseline after being treated for her UTI Patient came in altered and very lethargic for the past week. She was not her normal self as per her son. She was found to have a UTI and treated appropriately, transitioned to mechanical soft and thin liquids and stable to be sent back to the nursing home. Please follow up with your PMD Dr. Motta for management and further surveillance. To treat with appropriate antibiotics Patient found to have Proteus in urine and treated appropriately with meropenem. She was known to have ESBL so chose meropenem for appropriate coverage. She was treated and is mentating better. Please follow up with your PMD Dr. Motta for management and further surveillance. To ensure patient is getting adequate oxygen supplementation Patient had episodes of low oxygen saturations and was started on 6L NC and eventually decreased down to 3L. If the oxygen is off of her she will desaturate down to the 80s. Continue with 3L NC in the nursing home. Please follow up with your PMD Dr. Motta for management and further surveillance. To ensure patient is appropriately hydrated without volume overloading She has a history of chronic left leg edema which she was on lasix 20mg once a week for. Her pressures low-normal range around 100-110s and did not feel that she needed lasix at this time to help with the left foot swelling. Please follow up with your PMD Dr. Motta for management and further surveillance. To help with mobility You had previous diagnosed with L total hip replacement. Be sure to take care when transferring patient from bed to chair. Okay to give Tylenol for pain if needed before she is going to be transferred to help with pain. Please follow up with your PMD Dr. Motta for management and further surveillance. To make sure is able to swallow appropriately Initially the medical team had concerns about aspiration but she passed the modified barium swallow. She can continue on the mechanical soft and thin liquids offering frequent sips of juice and food throughout the day. Please follow up with your PMD Dr. Motta for management and further surveillance. To improve with mentation and re-orientation Patient needs to re-orientation daily. Megace started for appetite stimulation. Ensure patient eats appropriately and is offered small snacks throughout the day as she has dementia and cannot actively ask for food. If she is not tolerating feeds then will reconsider peg. Please follow up with your PMD Dr. Motta for management and further surveillance. To ensure patient's mental status is back to baseline after being treated for her UTI and for aspiration pneumonia Patient came in altered and very lethargic for the past week. She was not her normal self as per her son. She was found to have a UTI and aspiration pneumonia and treated appropriately, transitioned to mechanical soft and thin liquids and stable to be sent back to the nursing home. Please follow up with your PMD Dr. Motat for management and further surveillance.

## 2018-07-20 NOTE — DISCHARGE NOTE ADULT - CARE PROVIDER_API CALL
Dl Motta (MD), Internal Medicine  [address] [address]  Phone: [phone]  Fax: [phone] Dl Motta), Internal Medicine  [address] [address]  Phone: [phone]  Fax: [phone]    Eddie Hale), Medicine  [address] [address]  Phone: [phone]  Fax: [phone]    Sana Anguiano), Internal Medicine  [address] [id number]  Phone: [phone]  Fax: [phone]

## 2018-07-20 NOTE — DISCHARGE NOTE ADULT - CARE PROVIDERS DIRECT ADDRESSES
,DirectAddress_Unknown ,DirectAddress_Unknown,[email or web address]

## 2018-07-20 NOTE — DISCHARGE NOTE ADULT - MEDICATION SUMMARY - MEDICATIONS TO STOP TAKING
I will STOP taking the medications listed below when I get home from the hospital:    heparin  -- 5000 unit(s) subcutaneous every 12 hours    nitrofurantoin macrocrystals-monohydrate 100 mg oral capsule  -- 1 cap(s) by mouth 2 times a day (with meals)    Fleet Enema 7 g-19 g rectal enema  -- 133 milliliter(s) rectally once    acetaminophen 325 mg oral capsule  -- 1 cap(s) by mouth 3 times a day    mirtazapine 7.5 mg oral tablet  -- 2 tab(s) by mouth once a day (at bedtime)    omeprazole 20 mg oral delayed release capsule  -- 1 cap(s) by mouth once a day I will STOP taking the medications listed below when I get home from the hospital:    heparin  -- 5000 unit(s) subcutaneous every 12 hours    nitrofurantoin macrocrystals-monohydrate 100 mg oral capsule  -- 1 cap(s) by mouth 2 times a day (with meals)    Fleet Enema 7 g-19 g rectal enema  -- 133 milliliter(s) rectally once    acetaminophen 325 mg oral capsule  -- 1 cap(s) by mouth 3 times a day    mirtazapine 7.5 mg oral tablet  -- 2 tab(s) by mouth once a day (at bedtime)

## 2018-07-20 NOTE — DISCHARGE NOTE ADULT - HOSPITAL COURSE
89 y/o female with PMH dementia, ESBL UTI, Celiac's disease, lactose intolerant, IBS, b/l DVT with R femoral thrombus s/p IVC filter (01/2017), and chronic anemia p/w poor po take, lethargy and AMS for the past few days at her nursing home. In the hospital, she was found to have UTI and is completed course of meropenem which was given due to prior history to ESBL UTI and multiple antibiotic allergies. Patient's VSS throughout and was afebrile and non-toxic appearing. Hospital course complicated by desaturations in the 80s and started on 6LNC which improved her saturation to the 96-99%. The patient was then weaned to 3LNC saturating at 98%. Patient asymptomatic throughout without extra-accessory muscle use and not complaining of any chest pain or SOB. Repeat CXR done in the hospital showed b/l pulmonary congestion and the medical team was suspicious the patient was aspirating so she was made NPO. Hospital course also complicated by one episode of agitation that occurred after trying to get IV access after she pulled it out. Her mental status waxes and wanes and she is mostly pleasant, but when she does become agitated she just needs persistent redirection when communicating with her. Her mentation improved gradually and she passed the MBS yesterday (7/19/18) and was started on mechanical soft and thin liquids in which she is tolerating that well without coughing. Spoke with the aids and nurses regarding ensuring patient is upright in a 90 degrees before feeding here because she is still an aspiration risk. Patient is stable to go back to the nursing home on megace for appetite stimulation and will leave on 3LNC. 89 y/o female with PMH dementia, ESBL UTI, Celiac's disease, lactose intolerant, IBS, b/l DVT with R femoral thrombus s/p IVC filter (01/2017), and chronic anemia p/w poor po take, lethargy and AMS for the past few days at her nursing home. In the hospital, she was found to have UTI and aspiration PNA and is completed course of meropenem which was given due to prior history to ESBL UTI and multiple antibiotic allergies. Patient's VSS throughout and was afebrile and non-toxic appearing. Hospital course complicated by desaturations in the 80s and started on 6LNC which improved her saturation to the 96-99%. The patient was then weaned to 3LNC saturating at 98%. Patient asymptomatic throughout without extra-accessory muscle use and not complaining of any chest pain or SOB. Repeat CXR done in the hospital showed b/l pulmonary congestion and the medical team was suspicious the patient was aspirating so she was made NPO. Hospital course also complicated by one episode of agitation that occurred after trying to get IV access after she pulled it out. Her mental status waxes and wanes and she is mostly pleasant, but when she does become agitated she just needs persistent redirection when communicating with her. Her mentation improved gradually and she passed the modified barium swallow (7/19/18) and was started on mechanical soft and thin liquids in which she is tolerating that well without coughing. Spoke with the aids and nurses regarding ensuring patient is upright in a 90 degrees before feeding here because she is still an aspiration risk. Patient is stable to go back to the nursing home on megace for appetite stimulation and will leave on 3LNC.

## 2018-07-20 NOTE — PROGRESS NOTE ADULT - SUBJECTIVE AND OBJECTIVE BOX
CC/ HPI Ms. Goff is a 89 year old lady with dementia, history of ESBL UTI, bilateral DVT s/p IVC filter 2017, who was admitted from the nursing home with increasing lethargy, pneumonia, seen this morning without  respiratory complaint.    PAST MEDICAL & SURGICAL HISTORY:  Dementia  DVT (deep venous thrombosis)  Dysphagia  Anemia  DJD (degenerative joint disease)  Edema  IBS (irritable bowel syndrome)  Celiac disease  H/O inguinal hernia repair  History of total left hip replacement    SOCHX:  -  alcohol    FMHX: FA/MO  - contributory     ROS reviewed below with positive findings marked (+) :  GEN:  fever, chills ENT: tracheostomy,   epistaxis,  sinusitis COR: CAD, CHF,  HTN, dysrhythmia PUL: COPD, ILD, asthma, pneumonia GI: PEG, dysphagia, hemorrhage, other LUDWIN: kidney disease, electrolyte disorder HEM:  anemia, +thrombus, coagulopathy, cancer ENDO:  thyroid disease, diabetes mellitus CNS:  +dementia, stroke, seizure, PSY:  depression, anxiety, other      MEDICATIONS  (STANDING):  acetylcysteine 20% Inhalation 4 milliLiter(s) Inhalation every 6 hours  ALBUTerol/ipratropium for Nebulization 3 milliLiter(s) Nebulizer every 6 hours  heparin  Injectable 5000 Unit(s) SubCutaneous every 8 hours  megestrol Suspension 400 milliGRAM(s) Oral daily  meropenem  IVPB 1000 milliGRAM(s) IV Intermittent every 12 hours    MEDICATIONS  (PRN):  acetaminophen  Suppository. 650 milliGRAM(s) Rectal every 6 hours PRN Moderate Pain (4 - 6)      Vital Signs Last 24 Hrs  T(C): 36.8 (20 Jul 2018 14:20), Max: 36.8 (20 Jul 2018 14:20)  T(F): 98.3 (20 Jul 2018 14:20), Max: 98.3 (20 Jul 2018 14:20)  HR: 107 (20 Jul 2018 14:20) (90 - 107)  BP: 137/75 (20 Jul 2018 14:20) (112/72 - 137/75)  BP(mean): --  RR: 16 (20 Jul 2018 14:20) (16 - 17)  SpO2: 92% (20 Jul 2018 14:20) (92% - 98%)    GENERAL:         comfortable,  - distress.  HEENT:            - trauma,  - icterus,  - injection,  - nasal discharge.  NECK:              - jugular venous distention, - thyromegaly.  LYMPH:           - lymphadenopathy, - masses.  RESP:               + rhonchi,  - rales,   - wheezes.   COR:                S1S2   - gallops,  - rubs.  ABD:                bowel sounds,   soft, - tender, - distended.  EXT/MSC:         - cyanosis,  - clubbing,  + edema.    NEURO:             alert,   responds to stimuli.                          9.1    5.2   )-----------( 364      ( 20 Jul 2018 06:37 )             30.5     07-20    141  |  96  |  7   ----------------------------<  110<H>  3.7   |  35<H>  |  0.61        Xray Chest  (07.16.18) There are newly developed streaky bilateral perihilar and bibasilar opacities, suggesting mild pulmonary congestion.  Focal discoid atelectasis in the region of the lingula is again noted.  Costophrenic angles grossly sharp bilaterally, without sizable pleural effusions.  No pneumothorax is seen.  Cardiomediastinal silhouette is unchanged in appearance.   Partial visualization of the IVC filter.        ASSESSMENT/PLAN    1) Altered mental status  2) Urinary tract infection  3) Pneumonia  4) Cardiomyopathy  5) Dementia    Satisfactory SpO2, oxygen as needed  Aspiration precautions  Bronchodilators:  Atrovent/ albuterol q 4 – 6 hours as needed  ID/Antibiotics: meropenem  Cardiac/HTN: echocardiogram reveals PHTN  GI: Rx/ prophylaxis c PPI/H2B  Heme: Rx/VT prophylaxis  Discussed with medical team, Dr. Motta

## 2018-07-20 NOTE — PROGRESS NOTE ADULT - PROBLEM SELECTOR PLAN 10
F: D51/2NS at 75 cc/hr  E: replete K > 4, Mg >2  N: npo; high risk aspiration; everything through IV F: Stopped IVF today   E: replete K > 4, Mg >2  N: mechanical soft and thin liquids

## 2018-07-20 NOTE — PROGRESS NOTE ADULT - PROBLEM SELECTOR PLAN 7
- Prior b/l DVT with free floating clot in the R femoral s/p IVC filter in January 2017  - Patient was not put on AC 2/2 high fall risk   - Pt on physical exam prior with b/l pain on palpation of lower extremities with some swelling and pitting edema of the left foot which is chronic for her   - Will hold off on giving lasix today as patient is low-normotensive 100-110s systolic  - Patient prior Echo normal EF of 55 (Jan 2017) so edema most likely not 2/2 systolic dysfunction   - Comparison echo today (7/16/2018) LVEF of 65-70% and new mild aortic stenosis and higher PASP than prior   -   - Lower extremities dopplers negative for DVT but limited study as failure to move right leg 2/2 pain - Prior b/l DVT with free floating clot in the R femoral s/p IVC filter in January 2017  - Patient was not put on AC 2/2 high fall risk   - Pt on physical exam prior with b/l pain on palpation of lower extremities with some swelling and pitting edema of the left foot which is chronic for her   - Will hold off on giving lasix today as patient is low-normotensive 100-110s systolic  - Patient prior Echo normal EF of 55 (Jan 2017) so edema most likely not 2/2 systolic dysfunction   - Comparison echo today (7/16/2018) LVEF of 65-70% and new mild aortic stenosis and higher PASP than prior   -   - Lower extremities dopplers negative for DVT but limited study as failure to move right leg 2/2 pain; still mild tenderness with palpation to the right leg but no swelling

## 2018-07-21 ENCOUNTER — INBOUND DOCUMENT (OUTPATIENT)
Age: 83
End: 2018-07-21

## 2018-07-21 VITALS
TEMPERATURE: 99 F | SYSTOLIC BLOOD PRESSURE: 103 MMHG | HEART RATE: 97 BPM | RESPIRATION RATE: 17 BRPM | DIASTOLIC BLOOD PRESSURE: 67 MMHG | OXYGEN SATURATION: 97 %

## 2018-07-21 DIAGNOSIS — J15.9 UNSPECIFIED BACTERIAL PNEUMONIA: ICD-10-CM

## 2018-07-21 PROCEDURE — 82746 ASSAY OF FOLIC ACID SERUM: CPT

## 2018-07-21 PROCEDURE — 83880 ASSAY OF NATRIURETIC PEPTIDE: CPT

## 2018-07-21 PROCEDURE — 85025 COMPLETE CBC W/AUTO DIFF WBC: CPT

## 2018-07-21 PROCEDURE — 80076 HEPATIC FUNCTION PANEL: CPT

## 2018-07-21 PROCEDURE — 82728 ASSAY OF FERRITIN: CPT

## 2018-07-21 PROCEDURE — 99285 EMERGENCY DEPT VISIT HI MDM: CPT | Mod: 25

## 2018-07-21 PROCEDURE — 80048 BASIC METABOLIC PNL TOTAL CA: CPT

## 2018-07-21 PROCEDURE — 82977 ASSAY OF GGT: CPT

## 2018-07-21 PROCEDURE — 74230 X-RAY XM SWLNG FUNCJ C+: CPT

## 2018-07-21 PROCEDURE — 85027 COMPLETE CBC AUTOMATED: CPT

## 2018-07-21 PROCEDURE — 82962 GLUCOSE BLOOD TEST: CPT

## 2018-07-21 PROCEDURE — 93005 ELECTROCARDIOGRAM TRACING: CPT

## 2018-07-21 PROCEDURE — 87040 BLOOD CULTURE FOR BACTERIA: CPT

## 2018-07-21 PROCEDURE — 82607 VITAMIN B-12: CPT

## 2018-07-21 PROCEDURE — 92610 EVALUATE SWALLOWING FUNCTION: CPT | Mod: GN

## 2018-07-21 PROCEDURE — 84443 ASSAY THYROID STIM HORMONE: CPT

## 2018-07-21 PROCEDURE — 84484 ASSAY OF TROPONIN QUANT: CPT

## 2018-07-21 PROCEDURE — 87186 SC STD MICRODIL/AGAR DIL: CPT

## 2018-07-21 PROCEDURE — 82553 CREATINE MB FRACTION: CPT

## 2018-07-21 PROCEDURE — 81001 URINALYSIS AUTO W/SCOPE: CPT

## 2018-07-21 PROCEDURE — 71045 X-RAY EXAM CHEST 1 VIEW: CPT

## 2018-07-21 PROCEDURE — 93970 EXTREMITY STUDY: CPT

## 2018-07-21 PROCEDURE — 96374 THER/PROPH/DIAG INJ IV PUSH: CPT

## 2018-07-21 PROCEDURE — 84100 ASSAY OF PHOSPHORUS: CPT

## 2018-07-21 PROCEDURE — 36415 COLL VENOUS BLD VENIPUNCTURE: CPT

## 2018-07-21 PROCEDURE — 82550 ASSAY OF CK (CPK): CPT

## 2018-07-21 PROCEDURE — 70450 CT HEAD/BRAIN W/O DYE: CPT

## 2018-07-21 PROCEDURE — 83605 ASSAY OF LACTIC ACID: CPT

## 2018-07-21 PROCEDURE — 92526 ORAL FUNCTION THERAPY: CPT | Mod: GN

## 2018-07-21 PROCEDURE — 84466 ASSAY OF TRANSFERRIN: CPT

## 2018-07-21 PROCEDURE — 93306 TTE W/DOPPLER COMPLETE: CPT

## 2018-07-21 PROCEDURE — 87086 URINE CULTURE/COLONY COUNT: CPT

## 2018-07-21 PROCEDURE — 80053 COMPREHEN METABOLIC PANEL: CPT

## 2018-07-21 PROCEDURE — 94640 AIRWAY INHALATION TREATMENT: CPT

## 2018-07-21 PROCEDURE — 83550 IRON BINDING TEST: CPT

## 2018-07-21 PROCEDURE — 83735 ASSAY OF MAGNESIUM: CPT

## 2018-07-21 RX ORDER — OMEPRAZOLE 10 MG/1
1 CAPSULE, DELAYED RELEASE ORAL
Qty: 30 | Refills: 0 | OUTPATIENT
Start: 2018-07-21 | End: 2018-08-19

## 2018-07-21 RX ORDER — IPRATROPIUM/ALBUTEROL SULFATE 18-103MCG
3 AEROSOL WITH ADAPTER (GRAM) INHALATION
Qty: 0 | Refills: 0 | COMMUNITY
Start: 2018-07-21

## 2018-07-21 RX ORDER — IPRATROPIUM BROMIDE 0.2 MG/ML
0 SOLUTION, NON-ORAL INHALATION
Qty: 0 | Refills: 0 | COMMUNITY

## 2018-07-21 RX ORDER — ACETAMINOPHEN 500 MG
1 TABLET ORAL
Qty: 0 | Refills: 0 | COMMUNITY
Start: 2018-07-21

## 2018-07-21 RX ADMIN — HEPARIN SODIUM 5000 UNIT(S): 5000 INJECTION INTRAVENOUS; SUBCUTANEOUS at 05:20

## 2018-07-21 RX ADMIN — Medication 3 MILLILITER(S): at 05:21

## 2018-07-21 RX ADMIN — Medication 4 MILLILITER(S): at 00:56

## 2018-07-21 RX ADMIN — Medication 3 MILLILITER(S): at 00:33

## 2018-07-21 RX ADMIN — MEROPENEM 100 MILLIGRAM(S): 1 INJECTION INTRAVENOUS at 06:35

## 2018-07-21 RX ADMIN — Medication 4 MILLILITER(S): at 06:34

## 2018-07-21 NOTE — PROGRESS NOTE ADULT - PROVIDER SPECIALTY LIST ADULT
Cardiology
Gastroenterology
Gastroenterology
Infectious Disease
Internal Medicine
Pulmonology
Internal Medicine

## 2018-07-21 NOTE — PROGRESS NOTE ADULT - PROBLEM SELECTOR PLAN 8
- Pt with history of Celiac' s Disease and anemia possibly 2/2 poor absorption and iron deficiency  - Iron studies: Total iron 25, ferritin 91 no need to replete as patient with same lab values 1 year prior  - Hgb today 9.1 and stable   - Hct today 30.5 and stable   - Chronic Anemia with baseline Hgb 8-9  - Pt wih prior history endoscopy/colonoscopy which showed 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

## 2018-07-21 NOTE — PROGRESS NOTE ADULT - PROBLEM SELECTOR PLAN 2
- History of hypoxic respiratory failure in 2016 previously intubated and on pressors in the ICU 2/2 septic shock   - Episode of desaturation on 7/15 went down to the mid 80s on RA and started on 6L NC but patient remained asymptomatic and VSS throughout   - Patient weaned down 3L O2 NC this AM now saturating at 96%; no episodes of desaturation   - Duonebs q6  - Gurgling sounds coming from throat in AM but appears comfortable without use of accessory muscles for breathing   - Rhonchi and coarse breath sounds heard on the right; suspicious for aspiration PNA; pt started on pureed thin liquids yesterday but changed diet to NPO today   - Repeat CXR looks like a worsening infiltrate of the RLL compared to yesterday    - As per ID recs meropenem would cover this if indeed aspiration PNA   - CXR shows b/l pulmonary congestion   - Pulmonary exam today is better; the patient still has coarse breath sounds but sounds more clear and no crackles  - Pulmonary following and recs to wean down the oxygen as long as saturation >95

## 2018-07-21 NOTE — PROGRESS NOTE ADULT - PROBLEM SELECTOR PLAN 4
- Aspiration precautions  - FEEST unsuccessful 2/2 following commands.   - MBS passed and started on mechanical softs and thin liquids  - Dr. Hale agrees there is no more need for a Peg  - DC IVF today; will continue giving meropenem IV though until remaining dose

## 2018-07-21 NOTE — PROGRESS NOTE ADULT - SUBJECTIVE AND OBJECTIVE BOX
OVERNIGHT EVENTS: ALYCIA     SUBJECTIVE: She is not having any complaints. Says she is not in any pain and no issues breathing. She is able to tell me her first name is Harshad.     Vital Signs Last 12 Hrs  T(F): 97.6 (07-20-18 @ 05:44), Max: 97.6 (07-20-18 @ 05:44)  HR: 90 (07-20-18 @ 05:44) (90 - 91)  BP: 115/75 (07-20-18 @ 05:44) (115/75 - 127/82)  BP(mean): --  RR: 16 (07-20-18 @ 05:44) (16 - 17)  SpO2: 98% (07-20-18 @ 05:44) (96% - 98%)  I&O's Summary    19 Jul 2018 07:01  -  20 Jul 2018 07:00  --------------------------------------------------------  IN: 1462 mL / OUT: 0 mL / NET: 1462 mL        PHYSICAL EXAM:  Constitutional: NAD, comfortable in bed.  HEENT: NC/AT, PERRLA, EOMI, no conjunctival pallor or scleral icterus, MMM  Neck: Supple, no JVD  Respiratory: Normal rate, rhythm, depth, effort. Diffuse coarse breath sounds, no crackles   Cardiovascular: RRR, normal S1 and S2, no m/r/g.   Gastrointestinal: +BS, soft NTND, no guarding or rebound tenderness, no palpable masses   Extremities: wwp; no cyanosis, clubbing. +stiffness but less than prior exams. some mild L foot edema 1+   Vascular: Pulses equal and strong throughout.   Neurological: AAOx1, +dementia; able to respond appropriately with questions and moving all extremities   Skin: Thoracic ulcer and sacral ulcer with dressing that is clean dry intact       LABS:                        9.1    5.2   )-----------( 364      ( 20 Jul 2018 06:37 )             30.5     07-20    141  |  96  |  7   ----------------------------<  110<H>  3.7   |  35<H>  |  0.61    Ca    8.8      20 Jul 2018 06:37  Phos  2.6     07-20  Mg     1.9     07-20            RADIOLOGY & ADDITIONAL TESTS:    MEDICATIONS  (STANDING):  acetylcysteine 20% Inhalation 4 milliLiter(s) Inhalation every 6 hours  ALBUTerol/ipratropium for Nebulization 3 milliLiter(s) Nebulizer every 6 hours  dextrose 5% + sodium chloride 0.45%. 1000 milliLiter(s) (75 mL/Hr) IV Continuous <Continuous>  heparin  Injectable 5000 Unit(s) SubCutaneous every 8 hours  megestrol Suspension 400 milliGRAM(s) Oral daily  meropenem  IVPB 1000 milliGRAM(s) IV Intermittent every 12 hours    MEDICATIONS  (PRN):  acetaminophen  Suppository. 650 milliGRAM(s) Rectal every 6 hours PRN Moderate Pain (4 - 6)

## 2018-07-21 NOTE — PROGRESS NOTE ADULT - SUBJECTIVE AND OBJECTIVE BOX
INTERVAL HPI/OVERNIGHT EVENTS:    ANTIBIOTICS    MEDICATIONS  (STANDING):  acetylcysteine 20% Inhalation 4 milliLiter(s) Inhalation every 6 hours  ALBUTerol/ipratropium for Nebulization 3 milliLiter(s) Nebulizer every 6 hours  heparin  Injectable 5000 Unit(s) SubCutaneous every 8 hours  megestrol Suspension 400 milliGRAM(s) Oral daily  meropenem  IVPB 1000 milliGRAM(s) IV Intermittent every 12 hours    MEDICATIONS  (PRN):  acetaminophen  Suppository. 650 milliGRAM(s) Rectal every 6 hours PRN Moderate Pain (4 - 6)      Allergies    amoxicillin (Unknown)  Cipro (Unknown)  clindamycin (Unknown)  lactose (Unknown)  penicillin (Unknown)  Wheat (Unknown)    Intolerances        REVIEW OF SYSTEMS:    Constitutional: No fever, weight loss or fatigue  Eyes: No eye pain, visual disturbances, or discharge  ENMT:  No difficulty hearing, tinnitus, vertigo; No sinus or throat pain  Neck: No pain or stiffness  Respiratory: No cough, wheezing, chills or hemoptysis  Cardiovascular: No chest pain, palpitations, shortness of breath, dizziness or leg swelling  Gastrointestinal: No abdominal or epigastric pain. No nausea, vomiting or hematemesis; No diarrhea or constipation. No melena or hematochezia.  Genitourinary: No dysuria, frequency, hematuria or incontinence    Vital Signs Last 24 Hrs  T(C): 37.1 (21 Jul 2018 08:54), Max: 37.1 (21 Jul 2018 08:54)  T(F): 98.7 (21 Jul 2018 08:54), Max: 98.7 (21 Jul 2018 08:54)  HR: 97 (21 Jul 2018 08:54) (91 - 107)  BP: 103/67 (21 Jul 2018 08:54) (103/67 - 137/75)  BP(mean): --  RR: 17 (21 Jul 2018 08:54) (16 - 17)  SpO2: 97% (21 Jul 2018 08:54) (92% - 99%)    PHYSICAL EXAM:    General: in no acute distress  Eyes: PERRL, EOM intact; conjunctiva and sclera clear  Head: Normocephalic; atraumatic  ENMT: No nasal discharge; airway clear  Neck: Supple; non tender; no masses  Respiratory: No wheezes, rales or rhonchi  Cardiovascular: Regular rate and rhythm. S1 and S2 Normal; No murmurs, gallops or rubs  Gastrointestinal: Soft non-tender non-distended; Normal bowel sounds; No hepatosplenomegaly  Genitourinary: No costovertebral angle tenderness    LABS:                        9.1    5.2   )-----------( 364      ( 20 Jul 2018 06:37 )             30.5     07-20    141  |  96  |  7   ----------------------------<  110<H>  3.7   |  35<H>  |  0.61    Ca    8.8      20 Jul 2018 06:37  Phos  2.6     07-20  Mg     1.9     07-20              MICROBIOLOGY:  Culture Results:   >100,000 CFU/ml Proteus mirabilis  >100,000 CFU/ml Aerococcus urinae (07-15 @ 17:49)  Culture Results:   No growth at 5 days. (07-15 @ 17:36)  Culture Results:   No growth at 5 days. (07-15 @ 17:36)      RADIOLOGY & ADDITIONAL STUDIES:

## 2018-07-21 NOTE — PROGRESS NOTE ADULT - PROBLEM SELECTOR PLAN 1
- Presents with increasing lethargy and confusion which is slowly resolving; more awake alert and slow but appropriate responses to commands  - No episodes of agitation overnight  - Known hx of confusion (previously diagnosed with dementia) but is usually oriented to self at baseline according to son   - She remains afebrile, with a normal lactate and hemodynamically stable, so sepsis is not a cause of AMS. In setting of poor PO intake and positive UA, AMS likely secondary to UTI. Received IV fluids and meropenem in ED. Will continue IV fluids and meropenem Q12.   - ID recs to continue meropenem q12 to be completed today as per Dr Christiansen she has no WBC count afebrile.   - Blood cultures no growth to date  - UC >100,000 gram neg rods; Proteus mirabilis & Aerococcus > 100,000  - Will monitor for signs of fever increased somnolence, change in mental status  - Remeron was stopped as it is a sedating medication and can affect mental status

## 2018-07-21 NOTE — PROGRESS NOTE ADULT - PROBLEM SELECTOR PLAN 7
- Prior b/l DVT with free floating clot in the R femoral s/p IVC filter in January 2017  - Patient was not put on AC 2/2 high fall risk   - Pt on physical exam prior with b/l pain on palpation of lower extremities with some swelling and pitting edema of the left foot which is chronic for her   - Will hold off on giving lasix today as patient is low-normotensive 100-110s systolic  - Patient prior Echo normal EF of 55 (Jan 2017) so edema most likely not 2/2 systolic dysfunction   - Comparison echo today (7/16/2018) LVEF of 65-70% and new mild aortic stenosis and higher PASP than prior   -   - Lower extremities dopplers negative for DVT but limited study as failure to move right leg 2/2 pain; still mild tenderness with palpation to the right leg but no swelling

## 2018-07-21 NOTE — PROGRESS NOTE ADULT - NSHPATTENDINGPLANDISCUSS_GEN_ALL_CORE
patient and medical staff
patient and medical staff
medical staff
medical staff
Resident
residentpassed swallow eval

## 2018-07-21 NOTE — PROGRESS NOTE ADULT - PROBLEM SELECTOR PROBLEM 2
Patient's daughter, Minerva, updated.  Med list updated with Metoprolol 25mg, take 1.5 tabs BID    She stated that patient has been complaining of bladder pain when the bladder is full  However, Minerva, stated that patient does not drink much water and is unlikely that patient's bladder would get too full  Patient reported to Minerva that this has been ongoing for a while  Minerva requested that patient be scheduled with Dr. Morgan on next available.  She will talk to patient to see if she would agree to be seen sooner by another provider.  Helped her schedule an appointment with Dr. Morgan on 9/22/17 for bladder pain     She stated that patient has been taking more warfarin than advised d/t not being able to find the dosing instruction sheet that was printed off and patient thought it would be more appropriate to take more warfarin if INR is high.  Explained that patient needs to take per instruction and warfarin would be decreased if INR was high  Transferred Minerva to INR nurse to advised on coumadin dosing    Tania Hall RN           Pneumonia, bacterial

## 2018-07-21 NOTE — PROGRESS NOTE ADULT - PROBLEM SELECTOR PLAN 3
- Patient is incontinent at baseline and bladder scan done today normal   - UA positive; large blood, many WBCS and leuk esterase positive with UC >100,000 Gram neg rods - Proteus mirabilis & Aerococcus gram positive cocci  - Pt denies fever or change in urinary habits but also with dementia at baseline and incontinent   - Hx of ESBL so c/w Meropenem for 7 days (currently day 5- to be completed night of July 21)   - Proteus susceptible to meropenem and f/u additional susceptibilities as grew Aerococcus  - ID recs to continue meropenem for 5 days (to be completed tonight)

## 2018-07-21 NOTE — PROGRESS NOTE ADULT - PROBLEM SELECTOR PLAN 5
- Pt has known hx of dementia. Will continue to monitor for change in mental status  - Appears to be sundowning and gets more agitated at night and needs to be redirected and oriented. Avoid benzos. Only give haldol as last resort.   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 More appropriately responding to questions. She is able to say her first name which is an improvement from the past few days. She is more communicative and making more sense but still slow with responses. This is improvement from when she first came in as per son   -Transitioned to mechanical soft and thin liquids last night. Tolerating it well. No episodes of desaturation or aspiration o/n  -TSH normal, B12 normal, folate normal

## 2018-07-21 NOTE — PROGRESS NOTE ADULT - ATTENDING COMMENTS
Vol depletion  FTT  cont AB  discussed with son--no interest in palliative care, stated he know's mothers wishes and would want all heroic measures, including PEG.
PN  FTT  cont IVF  Son --wishes PEG to be place
diet--rishi adequately  OFF IVF  off ab  Not a candidate for PEG--medically not indicate.
poor po intake  CV stable  complete course of AB for UTI as per ID  will start Megace re appitite

## 2018-07-31 DIAGNOSIS — N39.0 URINARY TRACT INFECTION, SITE NOT SPECIFIED: ICD-10-CM

## 2018-07-31 DIAGNOSIS — D64.9 ANEMIA, UNSPECIFIED: ICD-10-CM

## 2018-07-31 DIAGNOSIS — K58.9 IRRITABLE BOWEL SYNDROME WITHOUT DIARRHEA: ICD-10-CM

## 2018-07-31 DIAGNOSIS — J69.0 PNEUMONITIS DUE TO INHALATION OF FOOD AND VOMIT: ICD-10-CM

## 2018-07-31 DIAGNOSIS — L89.102 PRESSURE ULCER OF UNSPECIFIED PART OF BACK, STAGE 2: ICD-10-CM

## 2018-07-31 DIAGNOSIS — E73.9 LACTOSE INTOLERANCE, UNSPECIFIED: ICD-10-CM

## 2018-07-31 DIAGNOSIS — I42.9 CARDIOMYOPATHY, UNSPECIFIED: ICD-10-CM

## 2018-07-31 DIAGNOSIS — J98.11 ATELECTASIS: ICD-10-CM

## 2018-07-31 DIAGNOSIS — F03.90 UNSPECIFIED DEMENTIA WITHOUT BEHAVIORAL DISTURBANCE: ICD-10-CM

## 2018-07-31 DIAGNOSIS — J18.9 PNEUMONIA, UNSPECIFIED ORGANISM: ICD-10-CM

## 2018-07-31 DIAGNOSIS — K90.0 CELIAC DISEASE: ICD-10-CM

## 2018-07-31 DIAGNOSIS — Z95.828 PRESENCE OF OTHER VASCULAR IMPLANTS AND GRAFTS: ICD-10-CM

## 2018-07-31 DIAGNOSIS — Z86.718 PERSONAL HISTORY OF OTHER VENOUS THROMBOSIS AND EMBOLISM: ICD-10-CM

## 2018-07-31 DIAGNOSIS — G92 TOXIC ENCEPHALOPATHY: ICD-10-CM

## 2018-07-31 DIAGNOSIS — R13.10 DYSPHAGIA, UNSPECIFIED: ICD-10-CM

## 2018-08-02 ENCOUNTER — INPATIENT (INPATIENT)
Facility: HOSPITAL | Age: 83
LOS: 11 days | Discharge: NURSING, REHAB OR LONG-TERM CARE | DRG: 870 | End: 2018-08-14
Attending: INTERNAL MEDICINE | Admitting: INTERNAL MEDICINE
Payer: MEDICARE

## 2018-08-02 VITALS
SYSTOLIC BLOOD PRESSURE: 102 MMHG | RESPIRATION RATE: 26 BRPM | DIASTOLIC BLOOD PRESSURE: 78 MMHG | HEART RATE: 120 BPM | OXYGEN SATURATION: 88 %

## 2018-08-02 DIAGNOSIS — Z95.828 PRESENCE OF OTHER VASCULAR IMPLANTS AND GRAFTS: Chronic | ICD-10-CM

## 2018-08-02 DIAGNOSIS — D64.9 ANEMIA, UNSPECIFIED: ICD-10-CM

## 2018-08-02 DIAGNOSIS — J90 PLEURAL EFFUSION, NOT ELSEWHERE CLASSIFIED: ICD-10-CM

## 2018-08-02 DIAGNOSIS — J96.02 ACUTE RESPIRATORY FAILURE WITH HYPERCAPNIA: ICD-10-CM

## 2018-08-02 DIAGNOSIS — F03.90 UNSPECIFIED DEMENTIA WITHOUT BEHAVIORAL DISTURBANCE: ICD-10-CM

## 2018-08-02 DIAGNOSIS — I82.409 ACUTE EMBOLISM AND THROMBOSIS OF UNSPECIFIED DEEP VEINS OF UNSPECIFIED LOWER EXTREMITY: ICD-10-CM

## 2018-08-02 DIAGNOSIS — Z98.89 OTHER SPECIFIED POSTPROCEDURAL STATES: Chronic | ICD-10-CM

## 2018-08-02 DIAGNOSIS — I51.7 CARDIOMEGALY: ICD-10-CM

## 2018-08-02 DIAGNOSIS — A41.9 SEPSIS, UNSPECIFIED ORGANISM: ICD-10-CM

## 2018-08-02 DIAGNOSIS — K90.0 CELIAC DISEASE: ICD-10-CM

## 2018-08-02 DIAGNOSIS — R13.10 DYSPHAGIA, UNSPECIFIED: ICD-10-CM

## 2018-08-02 DIAGNOSIS — Z96.642 PRESENCE OF LEFT ARTIFICIAL HIP JOINT: Chronic | ICD-10-CM

## 2018-08-02 DIAGNOSIS — J18.9 PNEUMONIA, UNSPECIFIED ORGANISM: ICD-10-CM

## 2018-08-02 LAB
ALBUMIN SERPL ELPH-MCNC: 3 G/DL — LOW (ref 3.3–5)
ALP SERPL-CCNC: 159 U/L — HIGH (ref 40–120)
ALT FLD-CCNC: 12 U/L — SIGNIFICANT CHANGE UP (ref 10–45)
ANION GAP SERPL CALC-SCNC: 11 MMOL/L — SIGNIFICANT CHANGE UP (ref 5–17)
APPEARANCE UR: CLEAR — SIGNIFICANT CHANGE UP
APTT BLD: 25.6 SEC — LOW (ref 27.5–37.4)
AST SERPL-CCNC: 17 U/L — SIGNIFICANT CHANGE UP (ref 10–40)
BASE EXCESS BLDA CALC-SCNC: 3.2 MMOL/L — HIGH (ref -2–3)
BASE EXCESS BLDA CALC-SCNC: 5.7 MMOL/L — HIGH (ref -2–3)
BASE EXCESS BLDV CALC-SCNC: 8.2 MMOL/L — SIGNIFICANT CHANGE UP
BASOPHILS NFR BLD AUTO: 0.4 % — SIGNIFICANT CHANGE UP (ref 0–2)
BILIRUB SERPL-MCNC: 0.3 MG/DL — SIGNIFICANT CHANGE UP (ref 0.2–1.2)
BILIRUB UR-MCNC: NEGATIVE — SIGNIFICANT CHANGE UP
BUN SERPL-MCNC: 52 MG/DL — HIGH (ref 7–23)
CALCIUM SERPL-MCNC: 9.8 MG/DL — SIGNIFICANT CHANGE UP (ref 8.4–10.5)
CHLORIDE SERPL-SCNC: 101 MMOL/L — SIGNIFICANT CHANGE UP (ref 96–108)
CK MB CFR SERPL CALC: 2 NG/ML — SIGNIFICANT CHANGE UP (ref 0–6.7)
CK SERPL-CCNC: 31 U/L — SIGNIFICANT CHANGE UP (ref 25–170)
CO2 SERPL-SCNC: 33 MMOL/L — HIGH (ref 22–31)
COLOR SPEC: YELLOW — SIGNIFICANT CHANGE UP
CREAT SERPL-MCNC: 1.14 MG/DL — SIGNIFICANT CHANGE UP (ref 0.5–1.3)
DIFF PNL FLD: ABNORMAL
EOSINOPHIL NFR BLD AUTO: 1.1 % — SIGNIFICANT CHANGE UP (ref 0–6)
GAS PNL BLDA: SIGNIFICANT CHANGE UP
GAS PNL BLDA: SIGNIFICANT CHANGE UP
GAS PNL BLDV: SIGNIFICANT CHANGE UP
GLUCOSE BLDC GLUCOMTR-MCNC: 103 MG/DL — HIGH (ref 70–99)
GLUCOSE SERPL-MCNC: 125 MG/DL — HIGH (ref 70–99)
GLUCOSE UR QL: NEGATIVE — SIGNIFICANT CHANGE UP
GRAM STN FLD: SIGNIFICANT CHANGE UP
HCO3 BLDA-SCNC: 32 MMOL/L — HIGH (ref 21–28)
HCO3 BLDA-SCNC: 32 MMOL/L — HIGH (ref 21–28)
HCO3 BLDV-SCNC: 38 MMOL/L — HIGH (ref 20–27)
HCT VFR BLD CALC: 30.8 % — LOW (ref 34.5–45)
HGB BLD-MCNC: 8.8 G/DL — LOW (ref 11.5–15.5)
INR BLD: 1.05 — SIGNIFICANT CHANGE UP (ref 0.88–1.16)
KETONES UR-MCNC: NEGATIVE — SIGNIFICANT CHANGE UP
LACTATE SERPL-SCNC: 1 MMOL/L — SIGNIFICANT CHANGE UP (ref 0.5–2)
LEUKOCYTE ESTERASE UR-ACNC: NEGATIVE — SIGNIFICANT CHANGE UP
LIDOCAIN IGE QN: 8 U/L — SIGNIFICANT CHANGE UP (ref 7–60)
LYMPHOCYTES # BLD AUTO: 10.4 % — LOW (ref 13–44)
MAGNESIUM SERPL-MCNC: 2.5 MG/DL — SIGNIFICANT CHANGE UP (ref 1.6–2.6)
MCHC RBC-ENTMCNC: 27.5 PG — SIGNIFICANT CHANGE UP (ref 27–34)
MCHC RBC-ENTMCNC: 28.6 G/DL — LOW (ref 32–36)
MCV RBC AUTO: 96.3 FL — SIGNIFICANT CHANGE UP (ref 80–100)
MONOCYTES NFR BLD AUTO: 9.8 % — SIGNIFICANT CHANGE UP (ref 2–14)
NEUTROPHILS NFR BLD AUTO: 78.3 % — HIGH (ref 43–77)
NITRITE UR-MCNC: NEGATIVE — SIGNIFICANT CHANGE UP
NT-PROBNP SERPL-SCNC: HIGH PG/ML (ref 0–300)
PCO2 BLDA: 55 MMHG — HIGH (ref 32–45)
PCO2 BLDA: 70 MMHG — CRITICAL HIGH (ref 32–45)
PCO2 BLDV: 98 MMHG — HIGH (ref 41–51)
PH BLDA: 7.28 — LOW (ref 7.35–7.45)
PH BLDA: 7.38 — SIGNIFICANT CHANGE UP (ref 7.35–7.45)
PH BLDV: 7.21 — CRITICAL LOW (ref 7.32–7.43)
PH UR: 5.5 — SIGNIFICANT CHANGE UP (ref 5–8)
PLATELET # BLD AUTO: 469 K/UL — HIGH (ref 150–400)
PO2 BLDA: 302 MMHG — HIGH (ref 83–108)
PO2 BLDA: 66 MMHG — LOW (ref 83–108)
PO2 BLDV: 79 MMHG — SIGNIFICANT CHANGE UP
POTASSIUM SERPL-MCNC: 5.2 MMOL/L — SIGNIFICANT CHANGE UP (ref 3.5–5.3)
POTASSIUM SERPL-SCNC: 5.2 MMOL/L — SIGNIFICANT CHANGE UP (ref 3.5–5.3)
PROCALCITONIN SERPL-MCNC: 1.46 NG/ML — HIGH (ref 0.02–0.1)
PROT SERPL-MCNC: 7.6 G/DL — SIGNIFICANT CHANGE UP (ref 6–8.3)
PROT UR-MCNC: 100 MG/DL
PROTHROM AB SERPL-ACNC: 11.7 SEC — SIGNIFICANT CHANGE UP (ref 9.8–12.7)
RBC # BLD: 3.2 M/UL — LOW (ref 3.8–5.2)
RBC # FLD: 15.7 % — SIGNIFICANT CHANGE UP (ref 10.3–16.9)
SAO2 % BLDA: 100 % — SIGNIFICANT CHANGE UP (ref 95–100)
SAO2 % BLDA: 90 % — LOW (ref 95–100)
SAO2 % BLDV: 95 % — SIGNIFICANT CHANGE UP
SODIUM SERPL-SCNC: 145 MMOL/L — SIGNIFICANT CHANGE UP (ref 135–145)
SP GR SPEC: 1.02 — SIGNIFICANT CHANGE UP (ref 1–1.03)
SPECIMEN SOURCE: SIGNIFICANT CHANGE UP
TROPONIN T SERPL-MCNC: 0.05 NG/ML — CRITICAL HIGH (ref 0–0.01)
UROBILINOGEN FLD QL: 1 E.U./DL — SIGNIFICANT CHANGE UP
WBC # BLD: 18.2 K/UL — HIGH (ref 3.8–10.5)
WBC # FLD AUTO: 18.2 K/UL — HIGH (ref 3.8–10.5)

## 2018-08-02 PROCEDURE — 99291 CRITICAL CARE FIRST HOUR: CPT

## 2018-08-02 PROCEDURE — 99291 CRITICAL CARE FIRST HOUR: CPT | Mod: 25

## 2018-08-02 PROCEDURE — 31500 INSERT EMERGENCY AIRWAY: CPT

## 2018-08-02 PROCEDURE — 71045 X-RAY EXAM CHEST 1 VIEW: CPT | Mod: 26

## 2018-08-02 PROCEDURE — 93010 ELECTROCARDIOGRAM REPORT: CPT | Mod: 59

## 2018-08-02 RX ORDER — FENTANYL CITRATE 50 UG/ML
1 INJECTION INTRAVENOUS
Qty: 2500 | Refills: 0 | Status: DISCONTINUED | OUTPATIENT
Start: 2018-08-02 | End: 2018-08-02

## 2018-08-02 RX ORDER — MEROPENEM 1 G/30ML
500 INJECTION INTRAVENOUS EVERY 12 HOURS
Qty: 0 | Refills: 0 | Status: DISCONTINUED | OUTPATIENT
Start: 2018-08-02 | End: 2018-08-04

## 2018-08-02 RX ORDER — PANTOPRAZOLE SODIUM 20 MG/1
40 TABLET, DELAYED RELEASE ORAL DAILY
Qty: 0 | Refills: 0 | Status: DISCONTINUED | OUTPATIENT
Start: 2018-08-02 | End: 2018-08-07

## 2018-08-02 RX ORDER — IPRATROPIUM/ALBUTEROL SULFATE 18-103MCG
3 AEROSOL WITH ADAPTER (GRAM) INHALATION EVERY 6 HOURS
Qty: 0 | Refills: 0 | Status: DISCONTINUED | OUTPATIENT
Start: 2018-08-02 | End: 2018-08-11

## 2018-08-02 RX ORDER — ETOMIDATE 2 MG/ML
20 INJECTION INTRAVENOUS ONCE
Qty: 0 | Refills: 0 | Status: COMPLETED | OUTPATIENT
Start: 2018-08-02 | End: 2018-08-02

## 2018-08-02 RX ORDER — CHLORHEXIDINE GLUCONATE 213 G/1000ML
1 SOLUTION TOPICAL DAILY
Qty: 0 | Refills: 0 | Status: DISCONTINUED | OUTPATIENT
Start: 2018-08-02 | End: 2018-08-08

## 2018-08-02 RX ORDER — ACETAMINOPHEN 500 MG
650 TABLET ORAL ONCE
Qty: 0 | Refills: 0 | Status: COMPLETED | OUTPATIENT
Start: 2018-08-02 | End: 2018-08-02

## 2018-08-02 RX ORDER — NOREPINEPHRINE BITARTRATE/D5W 8 MG/250ML
0.1 PLASTIC BAG, INJECTION (ML) INTRAVENOUS
Qty: 8 | Refills: 0 | Status: DISCONTINUED | OUTPATIENT
Start: 2018-08-02 | End: 2018-08-08

## 2018-08-02 RX ORDER — HEPARIN SODIUM 5000 [USP'U]/ML
5000 INJECTION INTRAVENOUS; SUBCUTANEOUS EVERY 12 HOURS
Qty: 0 | Refills: 0 | Status: DISCONTINUED | OUTPATIENT
Start: 2018-08-02 | End: 2018-08-14

## 2018-08-02 RX ORDER — PROPOFOL 10 MG/ML
5 INJECTION, EMULSION INTRAVENOUS
Qty: 1000 | Refills: 0 | Status: DISCONTINUED | OUTPATIENT
Start: 2018-08-02 | End: 2018-08-02

## 2018-08-02 RX ORDER — PROPOFOL 10 MG/ML
5 INJECTION, EMULSION INTRAVENOUS
Qty: 1000 | Refills: 0 | Status: DISCONTINUED | OUTPATIENT
Start: 2018-08-02 | End: 2018-08-06

## 2018-08-02 RX ORDER — VANCOMYCIN HCL 1 G
750 VIAL (EA) INTRAVENOUS ONCE
Qty: 0 | Refills: 0 | Status: COMPLETED | OUTPATIENT
Start: 2018-08-02 | End: 2018-08-02

## 2018-08-02 RX ORDER — CHLORHEXIDINE GLUCONATE 213 G/1000ML
15 SOLUTION TOPICAL
Qty: 0 | Refills: 0 | Status: DISCONTINUED | OUTPATIENT
Start: 2018-08-02 | End: 2018-08-08

## 2018-08-02 RX ORDER — MEROPENEM 1 G/30ML
500 INJECTION INTRAVENOUS ONCE
Qty: 0 | Refills: 0 | Status: COMPLETED | OUTPATIENT
Start: 2018-08-02 | End: 2018-08-02

## 2018-08-02 RX ORDER — ROCURONIUM BROMIDE 10 MG/ML
70 VIAL (ML) INTRAVENOUS ONCE
Qty: 0 | Refills: 0 | Status: COMPLETED | OUTPATIENT
Start: 2018-08-02 | End: 2018-08-02

## 2018-08-02 RX ORDER — SODIUM CHLORIDE 9 MG/ML
1000 INJECTION INTRAMUSCULAR; INTRAVENOUS; SUBCUTANEOUS ONCE
Qty: 0 | Refills: 0 | Status: COMPLETED | OUTPATIENT
Start: 2018-08-02 | End: 2018-08-02

## 2018-08-02 RX ADMIN — SODIUM CHLORIDE 1000 MILLILITER(S): 9 INJECTION INTRAMUSCULAR; INTRAVENOUS; SUBCUTANEOUS at 06:20

## 2018-08-02 RX ADMIN — ETOMIDATE 20 MILLIGRAM(S): 2 INJECTION INTRAVENOUS at 08:14

## 2018-08-02 RX ADMIN — CHLORHEXIDINE GLUCONATE 1 APPLICATION(S): 213 SOLUTION TOPICAL at 17:29

## 2018-08-02 RX ADMIN — MEROPENEM 100 MILLIGRAM(S): 1 INJECTION INTRAVENOUS at 06:48

## 2018-08-02 RX ADMIN — Medication 70 MILLIGRAM(S): at 08:15

## 2018-08-02 RX ADMIN — Medication 3 MILLILITER(S): at 16:38

## 2018-08-02 RX ADMIN — Medication 250 MILLIGRAM(S): at 06:48

## 2018-08-02 RX ADMIN — Medication 650 MILLIGRAM(S): at 19:44

## 2018-08-02 RX ADMIN — CHLORHEXIDINE GLUCONATE 15 MILLILITER(S): 213 SOLUTION TOPICAL at 17:29

## 2018-08-02 RX ADMIN — Medication 650 MILLIGRAM(S): at 06:20

## 2018-08-02 RX ADMIN — Medication 9.77 MICROGRAM(S)/KG/MIN: at 08:33

## 2018-08-02 RX ADMIN — FENTANYL CITRATE 5.21 MICROGRAM(S)/KG/HR: 50 INJECTION INTRAVENOUS at 08:37

## 2018-08-02 RX ADMIN — PANTOPRAZOLE SODIUM 40 MILLIGRAM(S): 20 TABLET, DELAYED RELEASE ORAL at 13:43

## 2018-08-02 RX ADMIN — HEPARIN SODIUM 5000 UNIT(S): 5000 INJECTION INTRAVENOUS; SUBCUTANEOUS at 17:29

## 2018-08-02 RX ADMIN — PROPOFOL 1.56 MICROGRAM(S)/KG/MIN: 10 INJECTION, EMULSION INTRAVENOUS at 12:02

## 2018-08-02 RX ADMIN — MEROPENEM 100 MILLIGRAM(S): 1 INJECTION INTRAVENOUS at 17:28

## 2018-08-02 RX ADMIN — Medication 3 MILLILITER(S): at 23:26

## 2018-08-02 NOTE — ED ADULT NURSE NOTE - NSIMPLEMENTINTERV_GEN_ALL_ED
Implemented All Fall with Harm Risk Interventions:  [place] to call system. Call bell, personal items and telephone within reach. Instruct patient to call for assistance. Room bathroom lighting operational. Non-slip footwear when patient is off stretcher. Physically safe environment: no spills, clutter or unnecessary equipment. Stretcher in lowest position, wheels locked, appropriate side rails in place. Provide visual cue, wrist band, yellow gown, etc. Monitor gait and stability. Monitor for mental status changes and reorient to person, place, and time. Review medications for side effects contributing to fall risk. Reinforce activity limits and safety measures with patient and family. Provide visual clues: red socks.

## 2018-08-02 NOTE — H&P ADULT - ASSESSMENT
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       Cardiovascular   septic shock  currently on pressor titrate to a map >65      #Cardiac  - Troponin 0.05, EKG unchanged from prior admission.  BNP 57008. Last echocardiogram in July had intact LV function with EF of 65%, likely has a component of diastolic CHF in setting of newly elevated BNP   - Would obtain echocardiogram   - Trend troponins        #GI   - Bowel regimen for constipation   - GI ppx: PPI IV qd     #Renal  - CAMRYN: BUN 52 and Cr 1.16 increased from baseline in July - BUN 7 Cr .61  -etiology likely prerenal in the setting of sepsis   - Urine lytes             perfusion lacatte negative         Pulmonology     Hypercapnic resp failure  etiology likely secondary to aspiration pneumonia   CXR showing b/l pleural infiltrates, unchanged from pevious xray  In ED VBG significant for pH of 7.21, pCO2 98, pO2 79.Pt placed on BiPAP. Repeat ABG after 90 minutes on BiPAP- pH 7.28, pCO2 70, pO2 66.  The decision was made to intubate in the ED  Pt currently on assist ventilation  continue with propofol drip   FiO2 50, RR 16, Tidal volume 360cc PEEP 5          Infectious disease  Septic shock   Upon arrival patient had a fever , was tachycardic, elevated wbc count 18.2, lactate negative   etiology related to aspiration pna vs uti  In setting of recent admit to hospital are treating for hospital acquired pna ,s/p vanc and meropenum in ED  c/w vanomycin-dose by level in setting of kidney disease  f/u am vanc  c.w meropenum     # UTI :   last admission UCx positive for proteus mirabilis and aerococcus , was treated with meropenum  UTI: UA  showing WBC-5-10, BACTERIA-MANY, NEGATIVE NITRITE, Negative leuk esterase    continue with contact precautions for ESBL -esbl Ecoli in 2017          #Prophylaxis   - GI: PPI IV qd   - DVT: Heparin sq q12  - Full code: son is HCP and verbally communicated that he would like intubation, CPR, lines and pressors should she require them. - Consider palliative consult. 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       Cardiovascular   septic shock  currently on pressor titrate to a map >65  Perfusion: lactate is negative, patient mucous membranes dry, no edema noted, extremities not cold- clinically hypovolemic      #Cardiac  - Troponin 0.05, EKG unchanged from prior admission.  BNP 61738. Last echocardiogram in July had intact LV function with EF of 65%, likely has a component of diastolic CHF in setting of newly elevated BNP   - Would obtain echocardiogram   - Trend troponins    #GI   - Bowel regimen for constipation   - GI ppx: PPI IV qd     #Renal  - CAMRYN: BUN 52 and Cr 1.16 increased from baseline in July - BUN 7 Cr .61  -etiology likely prerenal in the setting of sepsis   - Urine lytes             perfusion lacatte negative         Pulmonology   Hypercapnic resp failure  etiology likely secondary to aspiration pneumonia   CXR showing b/l pleural infiltrates, unchanged from pevious xray  In ED VBG significant for pH of 7.21, pCO2 98, pO2 79.Pt placed on BiPAP. Repeat ABG after 90 minutes on BiPAP- pH 7.28, pCO2 70, pO2 66.  The decision was made to intubate in the ED  Pt currently on assist ventilation  continue with propofol drip   FiO2 50, RR 16, Tidal volume 360cc PEEP 5    Infectious disease  Septic shock   Upon arrival patient had a fever , was tachycardic, elevated wbc count 18.2, lactate negative   etiology related to aspiration pna vs uti  In setting of recent admit to hospital are treating for hospital acquired pna ,s/p vanc and meropenum in ED  c/w vanomycin-dose by level in setting of kidney disease  f/u am vanc  c.w meropenum     # UTI :   last admission UCx positive for proteus mirabilis and aerococcus , was treated with meropenum  UTI: UA  showing WBC-5-10, BACTERIA-MANY, NEGATIVE NITRITE, Negative leuk esterase    continue with contact precautions for ESBL -esbl Ecoli in 2017      F no iv fluids for now  E replete as needed  N NPO  D MICU   GI ppx: iv protonix   Heparin sq q12  - Full code: son is HCP and verbally communicated that he would like intubation, CPR, lines and pressors should she require them.(AS PER CRIT CARE NOTE/WILL CLARIFY WITH SON GOALS OF CARE ) 89 y/o F with PMH dementia, recurrent UTI, Celiac' s disease, lactose intolerance, 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       Pulmonology   Hypercapnic resp failure  etiology likely secondary to aspiration pneumonia   CXR showing b/l pleural infiltrates, unchanged from pevious xray  In ED VBG significant for pH of 7.21, pCO2 98, pO2 79.Pt placed on BiPAP. Repeat ABG after 90 minutes on BiPAP- pH 7.28, pCO2 70, pO2 66.  The decision was made to intubate in the ED  Pt currently on assist ventilation  continue with propofol drip   FiO2 50, RR 16, Tidal volume 360cc PEEP 5    Infectious disease  Septic shock   Upon arrival patient had a fever , was tachycardic, elevated wbc count 18.2, lactate negative   etiology related to aspiration pna vs uti  In setting of recent admit to hospital are treating for hospital acquired pna ,s/p vanc and meropenum in ED  c/w vanomycin-dose by level in setting of kidney disease  f/u am vanc  c.w meropenum     # UTI :   last admission UCx positive for proteus mirabilis and aerococcus , was treated with meropenum  UTI: UA  showing WBC-5-10, BACTERIA-MANY, NEGATIVE NITRITE, Negative leuk esterase    continue with contact precautions for ESBL -esbl Ecoli in 2017    Cardiovascular   septic shock  currently on pressor titrate to a map >65  Perfusion: lactate is negative, patient mucous membranes dry, no edema noted, extremities not cold- clinically hypovolemic, s/p 1 L n.s bolus     GI : constipation  continue with bowel regimen    #Renal  - CAMRYN: BUN 52 and Cr 1.16 increased from baseline in July - BUN 7 Cr .61  -etiology likely prerenal in the setting of sepsis   - Urine lytes    F no iv fluids for now  E replete as needed  N NPO  D MICU   GI ppx: iv protonix   Heparin sq q12  - Full code: son is HCP and verbally communicated that he would like intubation, CPR, lines and pressors should she require them.(AS PER CRIT CARE NOTE/WILL CLARIFY WITH SON GOALS OF CARE ) 89 y/o F with PMH dementia, recurrent UTI, Celiac' s disease, lactose intolerance, 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       Pulmonology   Hypercapnic resp failure  etiology likely secondary to aspiration pneumonia   CXR showing b/l pleural infiltrates, unchanged from pevious xray  In ED VBG significant for pH of 7.21, pCO2 98, pO2 79.Pt placed on BiPAP. Repeat ABG after 90 minutes on BiPAP- pH 7.28, pCO2 70, pO2 66.  The decision was made to intubate in the ED  Pt currently on assist ventilation  continue with propofol drip   FiO2 50, RR 16, Tidal volume 360cc PEEP 5  Oxygenation: FIO2 50, PEEP 5   Ventilation: ON assist mode, patient not using accessory muscles, breathing in sync with ventilator, no nasal flaring noted.     Infectious disease  Septic shock   Upon arrival patient had a fever , was tachycardic, elevated wbc count 18.2, lactate negative   etiology related to aspiration pna vs uti  In setting of recent admit to hospital are treating for hospital acquired pna ,s/p vanc and meropenum in ED  c/w vanomycin-dose by level in setting of kidney disease  f/u am vanc  c.w meropenum     # UTI :  UTI: UA  showing WBC-5-10, BACTERIA-MANY, NEGATIVE NITRITE, Negative leuk esterase     last admission UCx positive for proteus mirabilis and aerococcus , was treated with meropenum  continue with contact precautions for ESBL -esbl Ecoli in 2017    Cardiovascular   septic shock  currently on pressor titrate to a map >65  Perfusion: lactate is negative, patient mucous membranes dry, no edema noted, extremities not cold- clinically hypovolemic, s/p 1 L n.s bolus     GI : constipation  continue with bowel regimen    #Renal  - CAMRYN: BUN 52 and Cr 1.16 increased from baseline in July - BUN 7 Cr .61  -etiology likely prerenal in the setting of sepsis   - Urine lytes    F no iv fluids for now  E replete as needed  N NPO  D MICU   GI ppx: iv protonix   Heparin sq q12  - Full code: son is HCP and verbally communicated that he would like intubation, CPR, lines and pressors should she require them.(AS PER CRIT CARE NOTE/WILL CLARIFY WITH SON GOALS OF CARE ) 89 y/o F with PMH dementia, recurrent UTI, Celiac' s disease, lactose intolerance, 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 for UTI and aspiration PNA presents from [facility] for respiratory distress found to be septic likely from aspiration PNA and UTI.       Pulmonology   Hypercapnic resp failure  etiology likely secondary to aspiration pneumonia   CXR showing b/l pleural infiltrates, unchanged from pevious xray  In ED VBG significant for pH of 7.21, pCO2 98, pO2 79.Pt placed on BiPAP. Repeat ABG after 90 minutes on BiPAP- pH 7.28, pCO2 70, pO2 66.  The decision was made to intubate in the ED  Pt currently on assist ventilation  continue with propofol drip   FiO2 50, RR 16, Tidal volume 360cc PEEP 5  Oxygenation: FIO2 50, PEEP 5   Ventilation: ON assist mode, patient not using accessory muscles, breathing in sync with ventilator, no nasal flaring noted.     Infectious disease  Septic shock   Upon arrival patient had a fever , was tachycardic, elevated wbc count 18.2, lactate negative   Received 1 L N.S , no additional fluids given because perfusion good- lactate is negative, , no edema noted, extremities not cold- clinically appeared euvolemic etiology related to aspiration pna vs uti  In setting of recent admit to hospital are treating for hospital acquired pna ,s/p vanc and meropenum in ED  c/w vanomycin-dose by level in setting of kidney disease  f/u am vanc  c.w meropenum     # UTI :  UTI: UA  showing WBC-5-10, BACTERIA-MANY, NEGATIVE NITRITE, Negative leuk esterase     last admission UCx positive for proteus mirabilis and aerococcus , was treated with meropenum  continue with contact precautions for ESBL -esbl Ecoli in 2017    Cardiovascular   septic shock  currently on pressor titrate to a map >65      GI : constipation  continue with bowel regimen    #Renal  - CAMRYN: BUN 52 and Cr 1.16 increased from baseline in July - BUN 7 Cr .61  -etiology likely prerenal in the setting of sepsis   - Urine lytes    F no iv fluids for now  E replete as needed  N NPO  D MICU   GI ppx: iv protonix   Heparin sq q12  - Full code: son is HCP and verbally communicated that he would like intubation, CPR, lines and pressors should she require them.(AS PER CRIT CARE NOTE/WILL CLARIFY WITH SON GOALS OF CARE ) 89 y/o F with PMH dementia, recurrent UTI, Celiac' s disease, lactose intolerance, 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       Pulmonology   Hypercapnic resp failure  etiology likely secondary to aspiration pneumonia vs HAP  CXR showing b/l pleural infiltrates, unchanged from pevious xray  In ED VBG significant for pH of 7.21, pCO2 98, pO2 79.Pt placed on BiPAP. Repeat ABG after 90 minutes on BiPAP- pH 7.28, pCO2 70, pO2 66.  The decision was made to intubate in the ED  Pt currently on assist ventilation  continue with propofol drip   FiO2 50, RR 16, Tidal volume 360cc PEEP 5  Oxygenation: FIO2 50, PEEP 5   Ventilation: ON assist mode, patient not using accessory muscles, breathing in sync with ventilator, no nasal flaring noted.     Infectious disease  Septic shock   Upon arrival patient had a fever , was tachycardic, elevated wbc count 18.2, lactate negative   Received 1 L N.S , no additional fluids given because perfusion good- lactate is negative, , no edema noted, extremities not cold- clinically appeared euvolemic etiology related to aspiration pna vs hospital acquired pna   of note usg showing right sided lower lobe consolidation   In setting of recent admit to hospital are treating for hospital acquired pna ,s/p vanc and meropenum in ED  c/w vanomycin-dose by level in setting of kidney disease  f/u am vanc  c.w meropenum     #hx of recurrent uti  UTI: UA  showing WBC-5-10, BACTERIA-MANY, NEGATIVE NITRITE, Negative leuk esterase     last admission UCx positive for proteus mirabilis and aerococcus , was treated with meropenum  continue with contact precautions for ESBL -esbl Ecoli in 2017    Cardiovascular   septic shock  currently on pressor titrate to a map >65  Troponin 0.05, EKG unchanged from prior admission.  BNP 57661. Last echocardiogram in July had intact LV function with EF of 65%  Little concern for heart failure exacerbation at this point. No need for iv lasik for now  USG showing right sided lower lobe consolidation and no pleural effusions     #hx of paroxysmal a fib however not anticoagulated given hx of gastric avm on egd     GI : constipation  continue with bowel regimen    #Renal  - CAMRYN: BUN 52 and Cr 1.16 increased from baseline in July - BUN 7 Cr .61  -etiology likely prerenal in the setting of sepsis   - Urine lytes    F no iv fluids for now  E replete as needed  N NPO  D MICU   GI ppx: iv protonix   Heparin sq q12  - Full code: son is HCP and verbally communicated that he would like intubation, CPR, lines and pressors should she require them.(AS PER CRIT CARE NOTE/WILL CLARIFY WITH SON GOALS OF CARE ) 89 y/o F with PMH dementia, recurrent UTI, Celiac' s disease, lactose intolerance, 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 for UTI and aspiration PNA presents from [facility] for respiratory distress with acute respiratory failure found to be in septic shock likely from aspiration PNA and UTI.       Pulmonology   Hypercapnic resp failure  etiology likely secondary to aspiration pneumonia vs HAP  CXR showing b/l pleural infiltrates, unchanged from pevious xray  In ED VBG significant for pH of 7.21, pCO2 98, pO2 79.Pt placed on BiPAP. Repeat ABG after 90 minutes on BiPAP- pH 7.28, pCO2 70, pO2 66.  The decision was made to intubate in the ED  Pt currently on assist ventilation  continue with propofol drip   FiO2 50, RR 16, Tidal volume 360cc PEEP 5  Oxygenation: FIO2 50, PEEP 5   Ventilation: ON assist mode, patient not using accessory muscles, breathing in sync with ventilator, no nasal flaring noted.     Infectious disease  Septic shock   Upon arrival patient had a fever , was tachycardic, elevated wbc count 18.2, lactate negative   Received 1 L N.S , no additional fluids given because perfusion good- lactate is negative, , no edema noted, extremities not cold- clinically appeared euvolemic etiology related to aspiration pna vs hospital acquired pna   of note usg showing right sided lower lobe consolidation   In setting of recent admit to hospital are treating for hospital acquired pna ,s/p vanc and meropenum in ED  c/w vanomycin-dose by level in setting of kidney disease  f/u am vanc  c.w meropenum     #hx of recurrent uti  UTI: UA  showing WBC-5-10, BACTERIA-MANY, NEGATIVE NITRITE, Negative leuk esterase     last admission UCx positive for proteus mirabilis and aerococcus , was treated with meropenum  continue with contact precautions for ESBL -esbl Ecoli in 2017    Cardiovascular   septic shock  currently on pressor titrate to a map >65  Troponin 0.05, EKG unchanged from prior admission.  BNP 48787. Last echocardiogram in July had intact LV function with EF of 65%  Little concern for heart failure exacerbation at this point. No need for iv lasik for now  USG showing right sided lower lobe consolidation and no pleural effusions     #hx of paroxysmal a fib however not anticoagulated given hx of gastric avm on egd     GI : constipation  continue with bowel regimen    #Renal  - CAMRYN: BUN 52 and Cr 1.16 increased from baseline in July - BUN 7 Cr .61  -etiology likely prerenal in the setting of sepsis   - Urine lytes    F no iv fluids for now  E replete as needed  N NPO  D MICU   GI ppx: iv protonix   Heparin sq q12  - Full code: son is HCP and verbally communicated that he would like intubation, CPR, lines and pressors should she require them.(AS PER CRIT CARE NOTE/WILL CLARIFY WITH SON GOALS OF CARE )    Critical care time rendered 50 minutes

## 2018-08-02 NOTE — ED PROVIDER NOTE - CRITICAL CARE PROVIDED
additional history taking/consult w/ pt's family directly relating to pts condition/consultation with other physicians/conducted a detailed discussion of DNR status/direct patient care (not related to procedure)/interpretation of diagnostic studies/documentation

## 2018-08-02 NOTE — ED ADULT NURSE NOTE - OBJECTIVE STATEMENT
pt. presented with c/o respiratory distress, as per report, pt. started having difficulty breathing around 5am, "desats at 88%", suctioning, nebs tx and o2 administered at NH; on arrival pt. is awake, breathing labored, O2 via non-rebreather, pt. is tachycardic, tachypneic, skin hot dry, breathing labored, rales b/l, respiratory at bedside, bi-pap initiated, blood was sent,

## 2018-08-02 NOTE — ED ADULT NURSE REASSESSMENT NOTE - NS ED NURSE REASSESS COMMENT FT1
Patient intubated 7.5 tube 22" at the lip. Vent settings: 100% O2, .350 VT, 1 Ti, 18 RR, 5 PEEP. Patient on .07 mcg/kg/min norepinephrine and 1.3 mcg/kg/hr of fentanyl. Soft wrist restraints applied.

## 2018-08-02 NOTE — CONSULT NOTE ADULT - SUBJECTIVE AND OBJECTIVE BOX
[facility] SERVICE CONSULTATION NOTE    CC:    HPI:    ROS:  Otherwise negative, except as specified in HPI.    PMH:    PSH:    FH:    SH:    ALLERGIES:    MEDICATIONS:    VITAL SIGNS:  ICU Vital Signs Last 24 Hrs  T(C): 38.1 (02 Aug 2018 06:18), Max: 38.1 (02 Aug 2018 06:18)  T(F): 100.5 (02 Aug 2018 06:18), Max: 100.5 (02 Aug 2018 06:18)  HR: 94 (02 Aug 2018 08:58) (92 - 123)  BP: 100/61 (02 Aug 2018 08:58) (78/53 - 181/105)  BP(mean): --  ABP: --  ABP(mean): --  RR: 18 (02 Aug 2018 08:58) (18 - 38)  SpO2: 100% (02 Aug 2018 08:58) (88% - 100%)    89y  Female  CAPILLARY BLOOD GLUCOSE          PHYSICAL EXAM:  Constitutional: resting comfortably in bed, NAD  HEENT: NC/AT; PERRL, anicteric sclera; no oropharyngeal erythema or exudates; MMM  Neck: supple, no appreciable JVD  Respiratory: CTA B/L, no W/R/R; respirations appear non-labored, conversive in full sentences  Cardiovascular: +S1/S2, RRR  Gastrointestinal: abdomen soft, NT/ND  Extremities: WWP; no clubbing, cyanosis or edema  Vascular: 2+ radial, femoral, and DP/PT B/L  Dermatologic: skin normal color and turgor; no visible rashes  Neurological:     LABS:                        8.8    18.2  )-----------( 469      ( 02 Aug 2018 06:16 )             30.8     08-02    145  |  101  |  52<H>  ----------------------------<  125<H>  5.2   |  33<H>  |  1.14    Ca    9.8      02 Aug 2018 06:16  Mg     2.5     08-02    TPro  7.6  /  Alb  3.0<L>  /  TBili  0.3  /  DBili  x   /  AST  17  /  ALT  12  /  AlkPhos  159<H>  08-02    PT/INR - ( 02 Aug 2018 06:16 )   PT: 11.7 sec;   INR: 1.05          PTT - ( 02 Aug 2018 06:16 )  PTT:25.6 sec  Lactate, Blood: 1.0 mmoL/L (18 @ 06:16)    CARDIAC MARKERS ( 02 Aug 2018 06:16 )  x     / 0.05 ng/mL / 31 U/L / x     / 2.0 ng/mL      Urinalysis Basic - ( 02 Aug 2018 06:32 )    Color: Yellow / Appearance: Clear / S.020 / pH: x  Gluc: x / Ketone: NEGATIVE  / Bili: Negative / Urobili: 1.0 E.U./dL   Blood: x / Protein: 100 mg/dL / Nitrite: NEGATIVE   Leuk Esterase: NEGATIVE / RBC: < 5 /HPF / WBC 5-10 /HPF   Sq Epi: x / Non Sq Epi: 0-5 /HPF / Bacteria: Many /HPF          EKG: Reviewed.    RADIOLOGY & ADDITIONAL TESTS: Reviewed. [facility] SERVICE CONSULTATION NOTE    CC:  Respiratory distress     HPI:  89 y/o F with PMH dementa, ESBL E.coli UTI, celiac's disease, lactose intolerance, R. femoral thrombus s/p IVC filter 2017, chronic anemia, with recent discharge from [facility] for UTI and aspiration PNA presents from [facility] for respiratory distress. Her son is at bedside who provides the history. He reports that she was recently discharged () after being treated with meropenem for UTI and additionally was treated for aspiration PNA. Per prior documentation, she had episodes of desaturation to the 80s with improved with 6L NC. She was weaned to 3L for discharge. There was discussions of PEJ placement for feeding in the setting of dysphagia and recurrent aspiration however she passed the modified swallow study and was to to trial a liquid/puree diet. Over the past few days, her son noted that she was more fatigued and required more stimulation to be aroused. He does not report that she was coughing. He denies fevers/chills. At baseline, she is A&Ox1 and is interactive with her environment at the nursing home.     In the ED, VS T 100.5, , BP 94/58, RR 24, SpO2 95% on RA. VBG significant for pH of 7.21, pCO2 98, pO2 79. She was immediately placed on BiPAP. Repeat ABG after 90 minutes on BiPAP, pH 7.28, pCO2 70, pO2 66. The son endorsed verbally he wants aggressive treatment with intubation, central lines, and pressors should she require it.     Other labs significant for leukocytosis 18.2, Hgb 8.8. Bicarb 33, BUN 52, Cr 1.14 (baseline at discharge - BUN 7 Cr 0.61). UA (+) for UTI. Trop 0.05. BNP 76871. EKG with q waves in lead III unchanged from prior EKG. Chest xray with bilateral patchy infiltrates.     She was given Vanc and meropenem and 1L NS. The decision was made to intubate in the ED.       ROS:  Otherwise negative, except as specified in HPI.    PMH:     PSH:    FH:    SH: Lives in nursing home, has an aide     ALLERGIES: Ciprofloxacin and penicillin     MEDICATIONS:    VITAL SIGNS:  ICU Vital Signs Last 24 Hrs  T(C): 38.1 (02 Aug 2018 06:18), Max: 38.1 (02 Aug 2018 06:18)  T(F): 100.5 (02 Aug 2018 06:18), Max: 100.5 (02 Aug 2018 06:18)  HR: 94 (02 Aug 2018 08:58) (92 - 123)  BP: 100/61 (02 Aug 2018 08:58) (78/53 - 181/105)  BP(mean): --  ABP: --  ABP(mean): --  RR: 18 (02 Aug 2018 08:58) (18 - 38)  SpO2: 100% (02 Aug 2018 08:58) (88% - 100%)    89y  Female  CAPILLARY BLOOD GLUCOSE          PHYSICAL EXAM:  Constitutional: resting comfortably in bed, NAD  HEENT: NC/AT; PERRL, anicteric sclera; no oropharyngeal erythema or exudates; MMM  Neck: supple, no appreciable JVD  Respiratory: CTA B/L, no W/R/R; respirations appear non-labored, conversive in full sentences  Cardiovascular: +S1/S2, RRR  Gastrointestinal: abdomen soft, NT/ND  Extremities: WWP; no clubbing, cyanosis or edema  Vascular: 2+ radial, femoral, and DP/PT B/L  Dermatologic: skin normal color and turgor; no visible rashes  Neurological:     LABS:                        8.8    18.2  )-----------( 469      ( 02 Aug 2018 06:16 )             30.8     08-02    145  |  101  |  52<H>  ----------------------------<  125<H>  5.2   |  33<H>  |  1.14    Ca    9.8      02 Aug 2018 06:16  Mg     2.5         TPro  7.6  /  Alb  3.0<L>  /  TBili  0.3  /  DBili  x   /  AST  17  /  ALT  12  /  AlkPhos  159<H>  08-02    PT/INR - ( 02 Aug 2018 06:16 )   PT: 11.7 sec;   INR: 1.05          PTT - ( 02 Aug 2018 06:16 )  PTT:25.6 sec  Lactate, Blood: 1.0 mmoL/L (18 @ 06:16)    CARDIAC MARKERS ( 02 Aug 2018 06:16 )  x     / 0.05 ng/mL / 31 U/L / x     / 2.0 ng/mL      Urinalysis Basic - ( 02 Aug 2018 06:32 )    Color: Yellow / Appearance: Clear / S.020 / pH: x  Gluc: x / Ketone: NEGATIVE  / Bili: Negative / Urobili: 1.0 E.U./dL   Blood: x / Protein: 100 mg/dL / Nitrite: NEGATIVE   Leuk Esterase: NEGATIVE / RBC: < 5 /HPF / WBC 5-10 /HPF   Sq Epi: x / Non Sq Epi: 0-5 /HPF / Bacteria: Many /HPF          EKG: Reviewed.    RADIOLOGY & ADDITIONAL TESTS: Reviewed. [facility] SERVICE CONSULTATION NOTE    CC:  Respiratory distress     HPI:  89 y/o F with PMH dementa, ESBL E.coli UTI, celiac's disease, lactose intolerance, R. femoral thrombus s/p IVC filter 2017, chronic anemia, with recent discharge from [facility] for UTI and aspiration PNA presents from [facility] for respiratory distress. Her son is at bedside who provides the history. He reports that she was recently discharged () after being treated with meropenem for UTI and additionally was treated for aspiration PNA. Per prior documentation, she had episodes of desaturation to the 80s with improvement on 6L NC. She was weaned to 3L for discharge. There was discussions of PEJ placement for feeding in the setting of dysphagia and recurrent aspiration however she passed the modified barium swallow study and was to trial a liquid/puree diet. Over the past few days, her son noted that she was more fatigued and required more stimulation to be aroused. He does not report that she was coughing. He denies fevers/chills. At baseline, she is A&Ox1 and is interactive with her environment at the nursing home.     In the ED, VS T 100.5, , BP 94/58, RR 24, SpO2 95% on RA. VBG significant for pH of 7.21, pCO2 98, pO2 79. She was immediately placed on BiPAP. Repeat ABG after 90 minutes on BiPAP, pH 7.28, pCO2 70, pO2 66. The son endorsed verbally he wants aggressive treatment with intubation, central lines, and pressors should she require it.     Other labs significant for leukocytosis 18.2, Hgb 8.8. Bicarb 33, BUN 52, Cr 1.14 (baseline at discharge - BUN 7 Cr 0.61). UA (+) for UTI. Trop 0.05. BNP 88990. EKG with q waves in lead III unchanged from prior EKG. Chest xray with bilateral patchy infiltrates. Bedside ultrasound with good LV function - prior echocardiogram with EF of 65%.    She was given Vanc and meropenem and 1L NS. Tylenol 625mg. The decision was made to intubate in the ED. Central line was placed.       ROS:  Otherwise negative, except as specified in HPI.    PMH:     PSH:    FH:    SH: Lives in nursing home, has an aide     ALLERGIES: Ciprofloxacin and penicillin     MEDICATIONS:    VITAL SIGNS:  ICU Vital Signs Last 24 Hrs  T(C): 38.1 (02 Aug 2018 06:18), Max: 38.1 (02 Aug 2018 06:18)  T(F): 100.5 (02 Aug 2018 06:18), Max: 100.5 (02 Aug 2018 06:18)  HR: 94 (02 Aug 2018 08:58) (92 - 123)  BP: 100/61 (02 Aug 2018 08:58) (78/53 - 181/105)  BP(mean): --  ABP: --  ABP(mean): --  RR: 18 (02 Aug 2018 08:58) (18 - 38)  SpO2: 100% (02 Aug 2018 08:58) (88% - 100%)    89y  Female  CAPILLARY BLOOD GLUCOSE          PHYSICAL EXAM:  Constitutional: Elderly female on bipap - resting comfortably in bed, NAD  HEENT: NC/AT; PERRL, anicteric sclera; no oropharyngeal erythema or exudates; dry mucous membranes   Neck: supple, no appreciable JVD  Respiratory: Diffuse rhonchi and bronchial breath sounds. Respirations appear non-labored  Cardiovascular: +S1/S2, tachycardic regular rhythm   Gastrointestinal: abdomen soft, NT/ND  Extremities: WWP; no clubbing, cyanosis or edema  Vascular: 2+ radial, femoral, and DP/PT B/L. 2+ pitting edema in bilateral lower extremities to the knee.   Dermatologic: skin normal color and turgor; no visible rashes  Neurological: Unable to assess - bipap in place     LABS:                        8.8    18.2  )-----------( 469      ( 02 Aug 2018 06:16 )             30.8     08-02    145  |  101  |  52<H>  ----------------------------<  125<H>  5.2   |  33<H>  |  1.14    Ca    9.8      02 Aug 2018 06:16  Mg     2.5     08-02    TPro  7.6  /  Alb  3.0<L>  /  TBili  0.3  /  DBili  x   /  AST  17  /  ALT  12  /  AlkPhos  159<H>  08-02    PT/INR - ( 02 Aug 2018 06:16 )   PT: 11.7 sec;   INR: 1.05          PTT - ( 02 Aug 2018 06:16 )  PTT:25.6 sec  Lactate, Blood: 1.0 mmoL/L (18 @ 06:16)    CARDIAC MARKERS ( 02 Aug 2018 06:16 )  x     / 0.05 ng/mL / 31 U/L / x     / 2.0 ng/mL      Urinalysis Basic - ( 02 Aug 2018 06:32 )    Color: Yellow / Appearance: Clear / S.020 / pH: x  Gluc: x / Ketone: NEGATIVE  / Bili: Negative / Urobili: 1.0 E.U./dL   Blood: x / Protein: 100 mg/dL / Nitrite: NEGATIVE   Leuk Esterase: NEGATIVE / RBC: < 5 /HPF / WBC 5-10 /HPF   Sq Epi: x / Non Sq Epi: 0-5 /HPF / Bacteria: Many /HPF          EKG: Reviewed.    RADIOLOGY & ADDITIONAL TESTS: Reviewed. [facility] SERVICE CONSULTATION NOTE    CC:  Respiratory distress     HPI:  89 y/o F with PMH dementa, ESBL proteus mirabilis UTI, celiac's disease, lactose intolerance, R. femoral thrombus s/p IVC filter 2017, chronic anemia, with recent discharge from [facility] for UTI and aspiration PNA presents from [facility] for respiratory distress. Her son is at bedside who provides the history. He reports that she was recently discharged () after being treated with meropenem for proteus mirabilis and aerococcus urinae UTI and additionally was treated for aspiration PNA. Per prior documentation, she had episodes of desaturation to the 80s with improvement on 6L NC. She was weaned to 3L for discharge. There was discussions of PEJ placement for feeding in the setting of dysphagia and recurrent aspiration however she passed the modified barium swallow study and was to trial a liquid/puree diet. Over the past few days, her son noted that she was more fatigued and required more stimulation to be aroused. He does not report that she was coughing. He denies fevers/chills. At baseline, she is A&Ox1 and is interactive with her environment at the nursing home.     In the ED, VS T 100.5, , BP 94/58, RR 24, SpO2 95% on RA. VBG significant for pH of 7.21, pCO2 98, pO2 79. She was immediately placed on BiPAP. Repeat ABG after 90 minutes on BiPAP, pH 7.28, pCO2 70, pO2 66. The son endorsed verbally he wants aggressive treatment with intubation, central lines, and pressors should she require it.     Other labs significant for leukocytosis 18.2, Hgb 8.8. Bicarb 33, BUN 52, Cr 1.14 (baseline at discharge - BUN 7 Cr 0.61). UA (+) for UTI. Trop 0.05. BNP 96066. EKG with q waves in lead III unchanged from prior EKG. Chest xray with bilateral patchy infiltrates. Bedside ultrasound with good LV function - prior echocardiogram with EF of 65%.    She was given Vanc and meropenem and 1L NS. Tylenol 625mg. The decision was made to intubate in the ED. Central line was placed.       ROS:  Otherwise negative, except as specified in HPI.    PMH:     PSH:    FH:    SH: Lives in nursing home, has an aide     ALLERGIES: Ciprofloxacin and penicillin     MEDICATIONS:    VITAL SIGNS:  ICU Vital Signs Last 24 Hrs  T(C): 38.1 (02 Aug 2018 06:18), Max: 38.1 (02 Aug 2018 06:18)  T(F): 100.5 (02 Aug 2018 06:18), Max: 100.5 (02 Aug 2018 06:18)  HR: 94 (02 Aug 2018 08:58) (92 - 123)  BP: 100/61 (02 Aug 2018 08:58) (78/53 - 181/105)  BP(mean): --  ABP: --  ABP(mean): --  RR: 18 (02 Aug 2018 08:58) (18 - 38)  SpO2: 100% (02 Aug 2018 08:58) (88% - 100%)    89y  Female  CAPILLARY BLOOD GLUCOSE          PHYSICAL EXAM:  Constitutional: Elderly female on bipap - resting comfortably in bed, NAD  HEENT: NC/AT; PERRL, anicteric sclera; no oropharyngeal erythema or exudates; dry mucous membranes   Neck: supple, no appreciable JVD  Respiratory: Diffuse rhonchi and bronchial breath sounds. Respirations appear non-labored  Cardiovascular: +S1/S2, tachycardic regular rhythm   Gastrointestinal: abdomen soft, NT/ND  Extremities: WWP; no clubbing, cyanosis or edema  Vascular: 2+ radial, femoral, and DP/PT B/L. 2+ pitting edema in bilateral lower extremities to the knee.   Dermatologic: skin normal color and turgor; no visible rashes  Neurological: Unable to assess - bipap in place     LABS:                        8.8    18.2  )-----------( 469      ( 02 Aug 2018 06:16 )             30.8     08-02    145  |  101  |  52<H>  ----------------------------<  125<H>  5.2   |  33<H>  |  1.14    Ca    9.8      02 Aug 2018 06:16  Mg     2.5     08-02    TPro  7.6  /  Alb  3.0<L>  /  TBili  0.3  /  DBili  x   /  AST  17  /  ALT  12  /  AlkPhos  159<H>  08-02    PT/INR - ( 02 Aug 2018 06:16 )   PT: 11.7 sec;   INR: 1.05          PTT - ( 02 Aug 2018 06:16 )  PTT:25.6 sec  Lactate, Blood: 1.0 mmoL/L (18 @ 06:16)    CARDIAC MARKERS ( 02 Aug 2018 06:16 )  x     / 0.05 ng/mL / 31 U/L / x     / 2.0 ng/mL      Urinalysis Basic - ( 02 Aug 2018 06:32 )    Color: Yellow / Appearance: Clear / S.020 / pH: x  Gluc: x / Ketone: NEGATIVE  / Bili: Negative / Urobili: 1.0 E.U./dL   Blood: x / Protein: 100 mg/dL / Nitrite: NEGATIVE   Leuk Esterase: NEGATIVE / RBC: < 5 /HPF / WBC 5-10 /HPF   Sq Epi: x / Non Sq Epi: 0-5 /HPF / Bacteria: Many /HPF          EKG: Reviewed.    RADIOLOGY & ADDITIONAL TESTS: Reviewed. [facility] SERVICE CONSULTATION NOTE    CC:  Respiratory distress     HPI:  89 y/o F with PMH dementa, recurrent UTI, celiac's disease, lactose intolerance, R. femoral thrombus s/p IVC filter 2017, chronic anemia, with recent discharge from [facility] for UTI and aspiration PNA presents from [facility] for respiratory distress. Her son is at bedside who provides the history. He reports that she was recently discharged () after being treated with meropenem for proteus mirabilis and aerococcus urinae UTI and additionally was treated for aspiration PNA. Per prior documentation, she had episodes of desaturation to the 80s with improvement on 6L NC. She was weaned to 3L for discharge. There was discussions of PEJ placement for feeding in the setting of dysphagia and recurrent aspiration however she passed the modified barium swallow study and was to trial a liquid/puree diet. Over the past few days, her son noted that she was more fatigued and required more stimulation to be aroused. He does not report that she was coughing. He denies fevers/chills. At baseline, she is A&Ox1 and is interactive with her environment at the nursing home.     In the ED, VS T 100.5, , BP 94/58, RR 24, SpO2 95% on RA. VBG significant for pH of 7.21, pCO2 98, pO2 79. She was immediately placed on BiPAP. Repeat ABG after 90 minutes on BiPAP, pH 7.28, pCO2 70, pO2 66. The son endorsed verbally he wants aggressive treatment with intubation, central lines, and pressors should she require it.     Other labs significant for leukocytosis 18.2, Hgb 8.8. Bicarb 33, BUN 52, Cr 1.14 (baseline at discharge - BUN 7 Cr 0.61). UA (+) for UTI. Trop 0.05. BNP 15531. EKG with q waves in lead III unchanged from prior EKG. Chest xray with bilateral patchy infiltrates. Bedside ultrasound with good LV function - prior echocardiogram with EF of 65%.    She was given Vanc and meropenem and 1L NS. Tylenol 625mg. The decision was made to intubate in the ED. Central line was placed.       ROS:  Otherwise negative, except as specified in HPI.    PMH:     PSH:    FH:    SH: Lives in nursing home, has an aide     ALLERGIES: Ciprofloxacin and penicillin     MEDICATIONS:    VITAL SIGNS:  ICU Vital Signs Last 24 Hrs  T(C): 38.1 (02 Aug 2018 06:18), Max: 38.1 (02 Aug 2018 06:18)  T(F): 100.5 (02 Aug 2018 06:18), Max: 100.5 (02 Aug 2018 06:18)  HR: 94 (02 Aug 2018 08:58) (92 - 123)  BP: 100/61 (02 Aug 2018 08:58) (78/53 - 181/105)  BP(mean): --  ABP: --  ABP(mean): --  RR: 18 (02 Aug 2018 08:58) (18 - 38)  SpO2: 100% (02 Aug 2018 08:58) (88% - 100%)    89y  Female  CAPILLARY BLOOD GLUCOSE          PHYSICAL EXAM:  Constitutional: Elderly female on bipap - resting comfortably in bed, NAD  HEENT: NC/AT; PERRL, anicteric sclera; no oropharyngeal erythema or exudates; dry mucous membranes   Neck: supple, no appreciable JVD  Respiratory: Diffuse rhonchi and bronchial breath sounds. Respirations appear non-labored  Cardiovascular: +S1/S2, tachycardic regular rhythm   Gastrointestinal: abdomen soft, NT/ND  Extremities: WWP; no clubbing, cyanosis or edema  Vascular: 2+ radial, femoral, and DP/PT B/L. 2+ pitting edema in bilateral lower extremities to the knee.   Dermatologic: skin normal color and turgor; no visible rashes  Neurological: Unable to assess - bipap in place     LABS:                        8.8    18.2  )-----------( 469      ( 02 Aug 2018 06:16 )             30.8     08-02    145  |  101  |  52<H>  ----------------------------<  125<H>  5.2   |  33<H>  |  1.14    Ca    9.8      02 Aug 2018 06:16  Mg     2.5     08-02    TPro  7.6  /  Alb  3.0<L>  /  TBili  0.3  /  DBili  x   /  AST  17  /  ALT  12  /  AlkPhos  159<H>  08-02    PT/INR - ( 02 Aug 2018 06:16 )   PT: 11.7 sec;   INR: 1.05          PTT - ( 02 Aug 2018 06:16 )  PTT:25.6 sec  Lactate, Blood: 1.0 mmoL/L (18 @ 06:16)    CARDIAC MARKERS ( 02 Aug 2018 06:16 )  x     / 0.05 ng/mL / 31 U/L / x     / 2.0 ng/mL      Urinalysis Basic - ( 02 Aug 2018 06:32 )    Color: Yellow / Appearance: Clear / S.020 / pH: x  Gluc: x / Ketone: NEGATIVE  / Bili: Negative / Urobili: 1.0 E.U./dL   Blood: x / Protein: 100 mg/dL / Nitrite: NEGATIVE   Leuk Esterase: NEGATIVE / RBC: < 5 /HPF / WBC 5-10 /HPF   Sq Epi: x / Non Sq Epi: 0-5 /HPF / Bacteria: Many /HPF          EKG: Reviewed.    RADIOLOGY & ADDITIONAL TESTS: Reviewed. [facility] SERVICE CONSULTATION NOTE    CC:  Respiratory distress     HPI:  89 y/o F with PMH dementa, recurrent UTI, celiac's disease, lactose intolerance, R. femoral thrombus s/p IVC filter 2017, chronic anemia, with recent discharge from [facility] for UTI and aspiration PNA presents from [facility] for respiratory distress. Her son is at bedside who provides the history. He reports that she was recently discharged () after being treated with meropenem for proteus mirabilis and aerococcus urinae UTI and additionally was treated for aspiration PNA. Per prior documentation, she had episodes of desaturation to the 80s with improvement on 6L NC. She was weaned to 3L for discharge. There was discussions of PEJ placement for feeding in the setting of dysphagia and recurrent aspiration however she passed the modified barium swallow study and was to trial a liquid/puree diet. Over the past few days, her son noted that she was more fatigued and required more stimulation to be aroused. He does not report that she was coughing. He denies fevers/chills. At baseline, she is A&Ox1 and is interactive with her environment at the nursing home.     In the ED, VS T 100.5, , BP 94/58, RR 24, SpO2 95% on RA. VBG significant for pH of 7.21, pCO2 98, pO2 79. She was immediately placed on BiPAP. Repeat ABG after 90 minutes on BiPAP, pH 7.28, pCO2 70, pO2 66. The son endorsed verbally he wants aggressive treatment with intubation, central lines, and pressors should she require it.     Other labs significant for leukocytosis 18.2, Hgb 8.8. Bicarb 33, BUN 52, Cr 1.14 (baseline at discharge - BUN 7 Cr 0.61). UA (+) for UTI. Trop 0.05. BNP 95274. EKG with q waves in lead III unchanged from prior EKG. Chest xray with bilateral patchy infiltrates. Bedside ultrasound with good LV function - prior echocardiogram with EF of 65%.    She was given Vanc and meropenem and 1L NS. Tylenol 625mg. The decision was made to intubate in the ED. Central line was placed.       ROS:  Otherwise negative, except as specified in HPI.    PMH:     PSH:    FH:    SH: Lives in nursing home, has an aide     ALLERGIES: Ciprofloxacin and penicillin     MEDICATIONS:    VITAL SIGNS:  ICU Vital Signs Last 24 Hrs  T(C): 38.1 (02 Aug 2018 06:18), Max: 38.1 (02 Aug 2018 06:18)  T(F): 100.5 (02 Aug 2018 06:18), Max: 100.5 (02 Aug 2018 06:18)  HR: 94 (02 Aug 2018 08:58) (92 - 123)  BP: 100/61 (02 Aug 2018 08:58) (78/53 - 181/105)  BP(mean): --  ABP: --  ABP(mean): --  RR: 18 (02 Aug 2018 08:58) (18 - 38)  SpO2: 100% (02 Aug 2018 08:58) (88% - 100%)    89y  Female  CAPILLARY BLOOD GLUCOSE          PHYSICAL EXAM:  Constitutional: Elderly female on bipap - resting comfortably in bed, NAD  HEENT: NC/AT; PERRL, anicteric sclera; no oropharyngeal erythema or exudates; dry mucous membranes   Neck: supple, no appreciable JVD  Respiratory: Diffuse rhonchi and bronchial breath sounds. Respirations appear non-labored  Cardiovascular: +S1/S2, tachycardic regular rhythm   Gastrointestinal: abdomen soft, NT/ND  Extremities: WWP; no clubbing, cyanosis or edema  Vascular: 2+ radial, femoral, and DP/PT B/L. 2+ pitting edema in bilateral lower extremities to the knee.   Dermatologic: skin normal color and turgor; no visible rashes  Neurological: Unable to assess mental status. 2+ reflexes.     LABS:                        8.8    18.2  )-----------( 469      ( 02 Aug 2018 06:16 )             30.8     08-02    145  |  101  |  52<H>  ----------------------------<  125<H>  5.2   |  33<H>  |  1.14    Ca    9.8      02 Aug 2018 06:16  Mg     2.5     08-02    TPro  7.6  /  Alb  3.0<L>  /  TBili  0.3  /  DBili  x   /  AST  17  /  ALT  12  /  AlkPhos  159<H>  08-02    PT/INR - ( 02 Aug 2018 06:16 )   PT: 11.7 sec;   INR: 1.05          PTT - ( 02 Aug 2018 06:16 )  PTT:25.6 sec  Lactate, Blood: 1.0 mmoL/L (18 @ 06:16)    CARDIAC MARKERS ( 02 Aug 2018 06:16 )  x     / 0.05 ng/mL / 31 U/L / x     / 2.0 ng/mL      Urinalysis Basic - ( 02 Aug 2018 06:32 )    Color: Yellow / Appearance: Clear / S.020 / pH: x  Gluc: x / Ketone: NEGATIVE  / Bili: Negative / Urobili: 1.0 E.U./dL   Blood: x / Protein: 100 mg/dL / Nitrite: NEGATIVE   Leuk Esterase: NEGATIVE / RBC: < 5 /HPF / WBC 5-10 /HPF   Sq Epi: x / Non Sq Epi: 0-5 /HPF / Bacteria: Many /HPF          EKG: Reviewed.    RADIOLOGY & ADDITIONAL TESTS: Reviewed. [facility] SERVICE CONSULTATION NOTE    CC:  Respiratory distress     HPI:  89 y/o F with PMH dementia, recurrent UTI, celiac's disease, lactose intolerance, right femoral thrombus s/p IVC filter 2017, chronic anemia, with recent discharge from [facility] for UTI and aspiration PNA presents from [facility] for respiratory distress. Her son is at bedside who provides the history. He reports that she was recently discharged () after being treated with meropenem for proteus mirabilis and aerococcus urinae UTI and additionally was treated for aspiration PNA. Per prior documentation, she had episodes of desaturation to the 80s with improvement on 6L NC. She was weaned to 3L for discharge. There was discussions of PEJ placement for feeding in the setting of dysphagia and recurrent aspiration however she passed the modified barium swallow study and was to trial a liquid/puree diet. Over the past few days, her son noted that she was more fatigued and required more stimulation to be aroused. He does not report that she was coughing. He denies fevers/chills. At baseline, she is A&Ox1 and is interactive with her environment at the nursing home.     In the ED, VS T 100.5, , BP 94/58, RR 24, SpO2 95% on RA. VBG significant for pH of 7.21, pCO2 98, pO2 79. She was immediately placed on BiPAP. Repeat ABG after 90 minutes on BiPAP, pH 7.28, pCO2 70, pO2 66. The son endorsed verbally he wants aggressive treatment with intubation, central lines, and pressors should she require it.     Other labs significant for leukocytosis 18.2, Hgb 8.8. Bicarb 33, BUN 52, Cr 1.14 (baseline at discharge - BUN 7 Cr 0.61). UA (+) for UTI. Trop 0.05. BNP 47258. EKG with q waves in lead III unchanged from prior EKG. Chest xray with bilateral patchy infiltrates. Bedside ultrasound with good LV function - prior echocardiogram with EF of 65%.    She was given Vanc and meropenem and 1L NS. Tylenol 625mg. The decision was made to intubate in the ED. Central line was placed.       ROS:  Otherwise negative, except as specified in HPI.    PMH: Dementia, UTI, celiac's disease, lactose intolerant, right femoral thrombus s/p IVC filter, chronic anemia, dysphagia     PSH: None    SH: Lives in nursing home, has an aide     ALLERGIES: Ciprofloxacin and penicillin     MEDICATIONS:    VITAL SIGNS:  ICU Vital Signs Last 24 Hrs  T(C): 38.1 (02 Aug 2018 06:18), Max: 38.1 (02 Aug 2018 06:18)  T(F): 100.5 (02 Aug 2018 06:18), Max: 100.5 (02 Aug 2018 06:18)  HR: 94 (02 Aug 2018 08:58) (92 - 123)  BP: 100/61 (02 Aug 2018 08:58) (78/53 - 181/105)  BP(mean): --  ABP: --  ABP(mean): --  RR: 18 (02 Aug 2018 08:58) (18 - 38)  SpO2: 100% (02 Aug 2018 08:58) (88% - 100%)    89y  Female  CAPILLARY BLOOD GLUCOSE          PHYSICAL EXAM:  Constitutional: Elderly female on bipap - resting comfortably in bed, NAD  HEENT: NC/AT; PERRL, anicteric sclera; no oropharyngeal erythema or exudates; dry mucous membranes   Neck: supple, no appreciable JVD  Respiratory: Diffuse rhonchi and bronchial breath sounds. Respirations appear non-labored  Cardiovascular: +S1/S2, tachycardic regular rhythm   Gastrointestinal: abdomen soft, NT/ND  Extremities: WWP; no clubbing, cyanosis or edema  Vascular: 2+ radial, femoral, and DP/PT B/L. 2+ pitting edema in bilateral lower extremities to the knee.   Dermatologic: skin normal color and turgor; no visible rashes  Neurological: Unable to assess mental status. 2+ reflexes.     LABS:                        8.8    18.2  )-----------( 469      ( 02 Aug 2018 06:16 )             30.8     08-02    145  |  101  |  52<H>  ----------------------------<  125<H>  5.2   |  33<H>  |  1.14    Ca    9.8      02 Aug 2018 06:16  Mg     2.5     08-02    TPro  7.6  /  Alb  3.0<L>  /  TBili  0.3  /  DBili  x   /  AST  17  /  ALT  12  /  AlkPhos  159<H>  08-02    PT/INR - ( 02 Aug 2018 06:16 )   PT: 11.7 sec;   INR: 1.05          PTT - ( 02 Aug 2018 06:16 )  PTT:25.6 sec  Lactate, Blood: 1.0 mmoL/L (18 @ 06:16)    CARDIAC MARKERS ( 02 Aug 2018 06:16 )  x     / 0.05 ng/mL / 31 U/L / x     / 2.0 ng/mL      Urinalysis Basic - ( 02 Aug 2018 06:32 )    Color: Yellow / Appearance: Clear / S.020 / pH: x  Gluc: x / Ketone: NEGATIVE  / Bili: Negative / Urobili: 1.0 E.U./dL   Blood: x / Protein: 100 mg/dL / Nitrite: NEGATIVE   Leuk Esterase: NEGATIVE / RBC: < 5 /HPF / WBC 5-10 /HPF   Sq Epi: x / Non Sq Epi: 0-5 /HPF / Bacteria: Many /HPF          EKG: Reviewed.    RADIOLOGY & ADDITIONAL TESTS: Reviewed.

## 2018-08-02 NOTE — H&P ADULT - HISTORY OF PRESENT ILLNESS
ICOMPLETE:  HX mainly obtained from critical care team   HX provided by son : 89 y/o F with PMH dementia, baseline mental status AOX1,  recurrent UTI, celiac's disease, lactose intolerance, right femoral thrombus s/p IVC filter 1/2017, chronic anemia, with recent discharge from [facility] for UTI and aspiration PNA presents from [facility] for respiratory distress.   Patient was recently discharged (7/20) after being treated with meropenem for proteus mirabilis and aerococcus urine UTI and additionally was treated for aspiration PNA.  Per documentation on previous admission discussion for PEG placement, son wanted it in setting of dysphagia she passed the modified barium swallow study and was to trial a liquid/puree diet. No hx of coughing after oral intake or choking , no hx of fever chills    In the ED, VS T 100.5, , BP 94/58, RR 24, SpO2 95% on RA. VBG significant for pH of 7.21, pCO2 98, pO2 79.  Pt placed on BiPAP. Repeat ABG after 90 minutes on BiPAP- pH 7.28, pCO2 70, pO2 66.  The decision was made to intubate in the ED. Central line was placed.   Vanc and meropenem and 1L NS. Tylenol 625mg.  The son endorsed verbally he wants aggressive treatment with intubation, central lines, and pressors should she require it.     LABS showing leukocytosis 18.2, Hgb 8.8. Bicarb 33, BUN 52, Cr 1.14 (baseline at discharge - BUN 7 Cr 0.61). UA (+) for UTI. Trop 0.05. BNP 03498.   EKG with q waves in lead III unchanged from prior EKG.   Chest xray with b/l pleural infiltrates-unchanged from previous imaging ICOMPLETE:  HX obtained from critical care team   HX provided by son : 89 y/o F with PMH dementia, baseline mental status AOX1,  recurrent UTI, celiac's disease, lactose intolerance, right femoral thrombus s/p IVC filter 1/2017, chronic anemia, with recent discharge from [facility] for UTI and aspiration PNA presents from [facility] for respiratory distress.   Patient was recently discharged (7/20) after being treated with meropenem for proteus mirabilis and aerococcus urine UTI and additionally was treated for aspiration PNA.  Per documentation on previous admission discussion for PEG placement, son wanted it in setting of dysphagia she passed the modified barium swallow study and was to trial a liquid/puree diet. No hx of coughing after oral intake or choking , no hx of fever chills    In the ED, VS T 100.5, , BP 94/58, RR 24, SpO2 95% on RA. VBG significant for pH of 7.21, pCO2 98, pO2 79.  Pt placed on BiPAP. Repeat ABG after 90 minutes on BiPAP- pH 7.28, pCO2 70, pO2 66.  The decision was made to intubate in the ED. Central line was placed.   Vanc and meropenem and 1L NS. Tylenol 625mg.  LABS showing leukocytosis 18.2, Hgb 8.8. Bicarb 33, BUN 52, Cr 1.14 (baseline at discharge - BUN 7 Cr 0.61). UA (+) for UTI. Trop 0.05. BNP 15622.   EKG with q waves in lead III unchanged from prior EKG.   Chest xray with b/l pleural infiltrates-unchanged from previous imaging

## 2018-08-02 NOTE — PROGRESS NOTE ADULT - SUBJECTIVE AND OBJECTIVE BOX
PUD FOR DR GROSS    89 year old well known patient developed respiratory failure.  She is intubated, on norepinephrine drip.  Vancomycin and meropenem have been administered for suspected bacterial infection.  She lives at a nursing home.  Her CXR reveals cardiomegaly    PAST MEDICAL & SURGICAL HISTORY:  Dementia  DVT (deep venous thrombosis)  Dysphagia  Anemia  DJD (degenerative joint disease)  Edema  IBS (irritable bowel syndrome)  Celiac disease  S/P IVC filter  H/O inguinal hernia repair  History of total left hip replacement    FAMILY HISTORY:  No pertinent family history in first degree relatives    SOCIAL HISTORY:    REVIEW OF SYSTEMS: intubated  Constitutional: () weight change, () fever,  () chills, () fatigue, () night sweats  Eyes: () discharge, () eye pain, () visual change  ENT:  () hearing difficulty, () vertigo, () sinus pain,  () throat pain, () epistaxis, () dysphagia, () hoarseness  Neck: () pain, () stiffness, () swelling  Respiratory: () cough, () wheezing, () hemoptysis      Cardiovascular: () chest pain, ()palpitations, () dizziness   Gastrointestinal: () abdominal pain, () nausea, () vomiting, () hematemesis, () diarrhea,  () constipation, () melena  Genitourinary:  () dysuria, () frequency, () hematuria, () incontinence      Neurologic: () headache, () memory loss, () loss of strength, () numbness, () tremor     Skin: () itching, () burning, () rash, () lesions   Lymphatic: () enlarged lymph nodes  Endocrine: () hair loss, () temperature intolerance         Musculoskeletal: () back pain, () joint pain,  () extremity pain  Psychiatric: () visual change, () auditory change, () depression, () anxiety, () suicidal  Sleep: () disorder, () insomnia, () sleep deprivation  Heme/Lymph: () easy bruising, () bleeding gums            Allergy and Immunologic: () hives, () eczema    Vital Signs Last 24 Hrs  T(C): 38.1 (02 Aug 2018 06:18), Max: 38.1 (02 Aug 2018 06:18)  T(F): 100.5 (02 Aug 2018 06:18), Max: 100.5 (02 Aug 2018 06:18)  HR: 89 (02 Aug 2018 10:08) (89 - 123)  BP: 93/58 (02 Aug 2018 10:08) (78/53 - 181/105)  BP(mean): --  RR: 18 (02 Aug 2018 10:08) (18 - 38)  SpO2: 98% (02 Aug 2018 10:08) (88% - 100%)    ABG - ( 02 Aug 2018 09:21 )  pH, Arterial: 7.38  pH, Blood: x     /  pCO2: 55    /  pO2: 302   / HCO3: 32    / Base Excess: 5.7   /  SaO2: 100       I&O's Detail    VC/AC 50%  Ti 1 RR 18 PEEP 5  PIP 31-37  BP 90/58    PHYSICAL EXAM:  intubated, KIMO at 21 cm, ET CO2 36  On fentanyl drip comfortable, - acute distress; vital signs are monitored continuously  Eyes: HERMINIO, -conjunctivitis, -scleritis   Head: no focal deficit, normocephalic,  no trauma  ENMT: dry tongue, no thrush, -nasal discharge, -hoarseness, normal hearing, -cough, -hemoptysis, trachea midline  Neck: supple, - lymphadenopathy,  -masses, -JVD  Respiratory: bilateral diminished breath sounds, -wheezing, + rhonchi, -rales, -crackles  Chest: -accessory muscle use, -paradoxical breathing  Cardiovascular: regular rate and sinus rhythm, S1 S2 normal, -S3, -S4, -murmur, -gallop, -rub  Gastrointestinal: soft, nontender, nondistended, normal bowel sounds, no hepatosplenomegaly  Genitourinary: -flank pain, -dysuria  Extremities: -clubbing, -cyanosis, -edema    Vascular: peripheral pulses palpable 2+ bilaterally  Neurological: alert, oriented x 3, no focal deficit, -tremor   Skin: warm, dry, -erythema, iv sites intact  Lymph nodes; no cervical, supraclavicular or axillary adenopathy  Psychiatric: cooperative, appropriate mood      MEDICATIONS  (STANDING):  fentaNYL   Infusion. 1 MICROgram(s)/kG/Hr (5.21 mL/Hr) IV Continuous <Continuous>  norepinephrine Infusion 0.1 MICROgram(s)/kG/Min (9.769 mL/Hr) IV Continuous <Continuous>    MEDICATIONS  (PRN):      Allergies    amoxicillin (Unknown)  Cipro (Unknown)  clindamycin (Unknown)  lactose (Unknown)  penicillin (Unknown)  Wheat (Unknown)    Intolerances        LABS:                        8.8    18.2  )-----------( 469      ( 02 Aug 2018 06:16 )             30.8     08-    145  |  101  |  52<H>  ----------------------------<  125<H>  5.2   |  33<H>  |  1.14    Ca    9.8      02 Aug 2018 06:16  Mg     2.5     08-    TPro  7.6  /  Alb  3.0<L>  /  TBili  0.3  /  DBili  x   /  AST  17  /  ALT  12  /  AlkPhos  159<H>  08-02    PT/INR - ( 02 Aug 2018 06:16 )   PT: 11.7 sec;   INR: 1.05          PTT - ( 02 Aug 2018 06:16 )  PTT:25.6 sec  Urinalysis Basic - ( 02 Aug 2018 06:32 )    Color: Yellow / Appearance: Clear / S.020 / pH: x  Gluc: x / Ketone: NEGATIVE  / Bili: Negative / Urobili: 1.0 E.U./dL   Blood: x / Protein: 100 mg/dL / Nitrite: NEGATIVE   Leuk Esterase: NEGATIVE / RBC: < 5 /HPF / WBC 5-10 /HPF   Sq Epi: x / Non Sq Epi: 0-5 /HPF / Bacteria: Many /HPF      +DVT prophylaxis  +Sleep  +Nutrition goals  -Pain  -Decubital ulcer  +GI prophylaxis (PPV, coagulopathy, Hx)  +Aspiration prophylaxis (45 degrees)    Ventilator synchronized  Tracheal cuff pressure    +Sedation/analgesia stopped once  +ID (phos, CH, mupi, SB)  -Delirium  +Cardiac Beta/ACEI-ARB/ASA/statin  +Prevention  +Education  +Medication reviewed (drug-drug interactions, PDA)  Medical devices    Discussed with ICU, PGY, CCRN, family    RADIOLOGY & ADDITIONAL STUDIES: PUD FOR DR GINNY    89 year old well known patient developed respiratory failure.  She is intubated, on norepinephrine drip.  Vancomycin and meropenem have been administered for suspected bacterial infection.  She lives at a nursing home.  Her CXR reveals cardiomegaly.    PAST MEDICAL & SURGICAL HISTORY:  Dementia  DVT (deep venous thrombosis)  Dysphagia  Anemia  DJD (degenerative joint disease)  Edema  IBS (irritable bowel syndrome)  Celiac disease  S/P IVC filter  H/O inguinal hernia repair  History of total left hip replacement    FAMILY HISTORY:  No pertinent family history in first degree relatives    SOCIAL HISTORY:    REVIEW OF SYSTEMS: intubated  Constitutional: () weight change, () fever,  () chills, () fatigue, () night sweats  Eyes: () discharge, () eye pain, () visual change  ENT:  () hearing difficulty, () vertigo, () sinus pain,  () throat pain, () epistaxis, () dysphagia, () hoarseness  Neck: () pain, () stiffness, () swelling  Respiratory: () cough, () wheezing, () hemoptysis      Cardiovascular: () chest pain, ()palpitations, () dizziness   Gastrointestinal: () abdominal pain, () nausea, () vomiting, () hematemesis, () diarrhea,  () constipation, () melena  Genitourinary:  () dysuria, () frequency, () hematuria, () incontinence      Neurologic: () headache, () memory loss, () loss of strength, () numbness, () tremor     Skin: () itching, () burning, () rash, () lesions   Lymphatic: () enlarged lymph nodes  Endocrine: () hair loss, () temperature intolerance         Musculoskeletal: () back pain, () joint pain,  () extremity pain  Psychiatric: () visual change, () auditory change, () depression, () anxiety, () suicidal  Sleep: () disorder, () insomnia, () sleep deprivation  Heme/Lymph: () easy bruising, () bleeding gums            Allergy and Immunologic: () hives, () eczema    Vital Signs Last 24 Hrs  T(C): 38.1 (02 Aug 2018 06:18), Max: 38.1 (02 Aug 2018 06:18)  T(F): 100.5 (02 Aug 2018 06:18), Max: 100.5 (02 Aug 2018 06:18)  HR: 89 (02 Aug 2018 10:08) (89 - 123)  BP: 93/58 (02 Aug 2018 10:08) (78/53 - 181/105)  BP(mean): --  RR: 18 (02 Aug 2018 10:08) (18 - 38)  SpO2: 98% (02 Aug 2018 10:08) (88% - 100%)    ABG - ( 02 Aug 2018 09:21 )  pH, Arterial: 7.38  pH, Blood: x     /  pCO2: 55    /  pO2: 302   / HCO3: 32    / Base Excess: 5.7   /  SaO2: 100       I&O's Detail    VC/AC 50%  Ti 1 RR 18 PEEP 5  PIP 31-37  BP 90/58    PHYSICAL EXAM:  intubated, KIMO at 21 cm, ET CO2 36; graphics show synchronization, ETCO2 plateau is horizontal  On fentanyl drip comfortable, - acute distress; vital signs are monitored continuously  Eyes: HERMINIO, -conjunctivitis, -scleritis   Head: no focal deficit, normocephalic,  no trauma  ENMT: dry tongue, no thrush, -nasal discharge, -hoarseness, normal hearing, -cough, -hemoptysis, trachea midline  Neck: supple, - lymphadenopathy,  -masses, -JVD; LIJ TLC  Respiratory: bilateral diminished breath sounds, -wheezing, + rhonchi, -rales, -crackles  Chest: -accessory muscle use, -paradoxical breathing  Cardiovascular: regular rate, S1 S2 normal, -S3, -S4, -murmur, -gallop, -rub  Gastrointestinal: soft, nontender, nondistended, normal bowel sounds, no hepatosplenomegaly  Genitourinary: -flank pain, -dysuria  Extremities: -clubbing, -cyanosis, -edema    Vascular: peripheral pulses palpable 2+ bilaterally  Neurological: sedated, paralyzed, no focal deficit, -tremor   Skin: warm, dry, -erythema, iv sites intact  Lymph nodes; no cervical, supraclavicular or axillary adenopathy  Psychiatric: sedated/paralyzed    MEDICATIONS  (STANDING):  fentaNYL   Infusion. 1 MICROgram(s)/kG/Hr (5.21 mL/Hr) IV Continuous <Continuous>  norepinephrine Infusion 0.1 MICROgram(s)/kG/Min (9.769 mL/Hr) IV Continuous <Continuous>    MEDICATIONS  (PRN):    Allergies    amoxicillin (Unknown)  Cipro (Unknown)  clindamycin (Unknown)  lactose (Unknown)  penicillin (Unknown)  Wheat (Unknown)    Intolerances      LABS:                        8.8    18.2  )-----------( 469      ( 02 Aug 2018 06:16 )             30.8     08-    145  |  101  |  52<H>  ----------------------------<  125<H>  5.2   |  33<H>  |  1.14    Ca    9.8      02 Aug 2018 06:16  Mg     2.5     -    TPro  7.6  /  Alb  3.0<L>  /  TBili  0.3  /  DBili  x   /  AST  17  /  ALT  12  /  AlkPhos  159<H>  08    PT/INR - ( 02 Aug 2018 06:16 )   PT: 11.7 sec;   INR: 1.05     PTT - ( 02 Aug 2018 06:16 )  PTT:25.6 sec    Urinalysis Basic - ( 02 Aug 2018 06:32 )    Color: Yellow / Appearance: Clear / S.020 / pH: x  Gluc: x / Ketone: NEGATIVE  / Bili: Negative / Urobili: 1.0 E.U./dL   Blood: x / Protein: 100 mg/dL / Nitrite: NEGATIVE   Leuk Esterase: NEGATIVE / RBC: < 5 /HPF / WBC 5-10 /HPF   Sq Epi: x / Non Sq Epi: 0-5 /HPF / Bacteria: Many /HPF    +DVT prophylaxis SC HEPARIN  +Sleep  +Nutrition goals NPO FOR NOW  -Pain ON FENTANYL  -Decubital ulcer  +GI prophylaxis (PPV, coagulopathy, Hx)  +Aspiration prophylaxis (45 degrees)  Ventilator synchronized about 8 mL/IBW (148 cm)  Tracheal cuff pressure LOW  +Sedation/analgesia stopped once  +ID (phos, CH, mupi, SB)  -Delirium  +Cardiac  +Prevention  +Education  +Medication reviewed (drug-drug interactions, PDA)  Medical devices TLC KIMO    Discussed with ICU, PGY, ER RN, Dr James    RADIOLOGY & ADDITIONAL STUDIES:      EXAM:  XR CHEST PORTABLE ROUTINE 1V                          PROCEDURE DATE:  2018      INTERPRETATION:  Portable Chest X-Ray dated 2018 7:17 AM    Indication: Interval Progression    Prior studies: 2018    An AP portable view of the chest reveals mild pulmonary venous   congestion. Moderate to large right and small left pleural effusions,   increased. Limited study as the patient is markedly rotated to the right   side and the apices are obscured by the patient's jaw.    IMPRESSION:  Mild pulmonary venous congestion. Bilateral pleural effusions right   greater than left. Limited study.

## 2018-08-02 NOTE — H&P ADULT - NSHPPHYSICALEXAM_GEN_ALL_CORE
.  VITAL SIGNS:  T(C): 37.3 (08-02-18 @ 12:15), Max: 38.1 (08-02-18 @ 06:18)  T(F): 99.2 (08-02-18 @ 12:15), Max: 100.5 (08-02-18 @ 06:18)  HR: 105 (08-02-18 @ 14:42) (85 - 123)  BP: 116/64 (08-02-18 @ 14:00) (78/53 - 181/105)  BP(mean): --  RR: 16 (08-02-18 @ 14:42) (16 - 38)  SpO2: 95% (08-02-18 @ 14:42) (88% - 100%)  Wt(kg): --    PHYSICAL EXAM:  INCOMPLETE  Constitutional: patient intubated   Head: NC/AT  ENT: no nasal discharge; uvula midline, no oropharyngeal erythema or exudates; MMM  Neck: supple; no JVD or thyromegaly  Respiratory:   Cardiac: +S1/S2; RRR; no M/R/G; PMI non-displaced  Gastrointestinal: abdomen soft, NT/ND; no rebound or guarding; +BSx4  Back: spine midline, no bony tenderness or step-offs; no CVAT B/L  Extremities: WWP, no clubbing or cyanosis; no peripheral edema  Musculoskeletal: NROM x4; no joint swelling, tenderness or erythema  Vascular: 2+ radial, femoral, DP/PT pulses B/L  Dermatologic: skin warm, dry and intact; no rashes, wounds, or scars  Lymphatic: no submandibular or cervical LAD  Neurologic: unable to assess motor or sensory exam, clonus positive   Psychiatric: affect and characteristics of appearance, verbalizations, behaviors are appropriate .  VITAL SIGNS:  T(C): 37.3 (08-02-18 @ 12:15), Max: 38.1 (08-02-18 @ 06:18)  T(F): 99.2 (08-02-18 @ 12:15), Max: 100.5 (08-02-18 @ 06:18)  HR: 105 (08-02-18 @ 14:42) (85 - 123)  BP: 116/64 (08-02-18 @ 14:00) (78/53 - 181/105)  BP(mean): --  RR: 16 (08-02-18 @ 14:42) (16 - 38)  SpO2: 95% (08-02-18 @ 14:42) (88% - 100%)  Wt(kg): --    PHYSICAL EXAM:    Constitutional: patient intubated   Head: NC/AT  ENT: no nasal discharge; uvula midline, no oropharyngeal erythema or exudates; MMM  Neck: supple; no JVD or thyromegaly  Respiratory: b/l crackles   Cardiac: +S1/S2; RRR; no M/R/G; PMI non-displaced  Gastrointestinal: abdomen soft, NT/ND; no rebound or guarding; +BSx4  Back: spine midline, no bony tenderness or step-offs; no CVAT B/L  Extremities: WWP, no clubbing or cyanosis; no peripheral edema  Musculoskeletal: NROM x4; no joint swelling, tenderness or erythema  Vascular: 2+ radial, femoral, DP/PT pulses B/L  Dermatologic: skin warm, dry and intact; no rashes, wounds, or scars  Lymphatic: no submandibular or cervical LAD  Neurologic: unable to assess motor or sensory exam, clonus positive   Psychiatric: affect and characteristics of appearance, verbalizations, behaviors are appropriate .  VITAL SIGNS:  T(C): 37.3 (08-02-18 @ 12:15), Max: 38.1 (08-02-18 @ 06:18)  T(F): 99.2 (08-02-18 @ 12:15), Max: 100.5 (08-02-18 @ 06:18)  HR: 105 (08-02-18 @ 14:42) (85 - 123)  BP: 116/64 (08-02-18 @ 14:00) (78/53 - 181/105)  BP(mean): --  RR: 16 (08-02-18 @ 14:42) (16 - 38)  SpO2: 95% (08-02-18 @ 14:42) (88% - 100%)  Wt(kg): --    PHYSICAL EXAM:    Constitutional: patient intubated   Head: NC/AT  ENT: no nasal discharge; uvula midline, no oropharyngeal erythema or exudates; MMM  Neck: supple; no JVD or thyromegaly  Respiratory: b/l crackles   Cardiac: +S1/S2; RRR; no M/R/G; PMI non-displaced  Gastrointestinal: abdomen soft, NT/ND; no rebound or guarding; +BSx4  Back: spine midline, no bony tenderness or step-offs; no CVAT B/L  Extremities: WWP, no clubbing or cyanosis; no peripheral edema  Musculoskeletal: NROM x4; no joint swelling, tenderness or erythema  Vascular: 2+ radial, femoral, DP/PT pulses B/L  Dermatologic: skin warm, dry and intact; no rashes, wounds, or scars  Lymphatic: no submandibular or cervical LAD  Neurologic: unable to assess motor or sensory exam, clonus positive UE/LE  : riley in place

## 2018-08-02 NOTE — DIETITIAN INITIAL EVALUATION ADULT. - OTHER INFO
89 yo/female with PMHx dementia, UTI, celiac disease, lactose intolerance, R. femoral thrombus, recent d/c from [facility] with UTI and asp pna, presents from [facility] with respiratory distress. Intubated for airway protection. Son at bedside. He endorses that pt barely passed MBSS at last visit and would be amenable to PEG placement, as he does not think patient will be able to meet needs via PO intake once extubated. Her appetite at NH recently has been poor, drinks 3-4 Ensure per day. Currently intubated on VC/AC mode, sedated on propofol @ 3.8mL/hr providing 100kcal/day from lipids. MAP >65, requiring levophed for pressor support. Currently NPO. Skin intact pressure-wise. Lactose and wheat allergies confirmed. Weight stable per son. Son amenable to NGT placement/pending GOC discussion.

## 2018-08-02 NOTE — ED PROVIDER NOTE - CARE PLAN
Principal Discharge DX:	SOB (shortness of breath)  Secondary Diagnosis:	PNA (pneumonia) Principal Discharge DX:	Acute respiratory failure with hypercapnia  Secondary Diagnosis:	PNA (pneumonia)

## 2018-08-02 NOTE — PROCEDURE NOTE - PROCEDURE
<<-----Click on this checkbox to enter Procedure Central line placement  08/02/2018    Active  [id number]

## 2018-08-02 NOTE — ED PROVIDER NOTE - NSCAREINITIATED _GEN_ER
Pt AOx4, denies pain at this time. Pt is s/p right knee I&D. Wound vac patent with minimal sanguinous drainage. IV abx administered, no adverse rxn noted. Pt fatigued, resting quietly in bed, needs met. Call light within reach, personal belongings available, bed in lowest position, treaded socks on, and bed alarm on. Hourly rounding in place.       Elisha Phoenix(Attending)

## 2018-08-02 NOTE — ED PROVIDER NOTE - OBJECTIVE STATEMENT
89F hx dementia, DVT (IVC filter), anemia, dysphagia, celiac dz, BIBEMS for SOB.  per NH they gave pt nebulizer around 5AM and noted respiratory distress.  pt recently admitted for AMS and found to have PNA and UTI.  during that admission was felt pt likely aspirated.  passed modified barium swallow and has been on ensure in NH.  son states he feels pt would benefit from peg tube.

## 2018-08-02 NOTE — CONSULT NOTE ADULT - ATTENDING COMMENTS
Multi organ failure  PN  IV ab  supp rx  intubated  on preesors  Culture  IV AB Multi organ failure  PN  IV ab  supp rx  intubated  on preesors  Culture  IV AB--a/o  discussed with YOSSI

## 2018-08-02 NOTE — H&P ADULT - NSHPLABSRESULTS_GEN_ALL_CORE
.  LABS:                         8.8    18.2  )-----------( 469      ( 02 Aug 2018 06:16 )             30.8         145  |  101  |  52<H>  ----------------------------<  125<H>  5.2   |  33<H>  |  1.14    Ca    9.8      02 Aug 2018 06:16  Mg     2.5         TPro  7.6  /  Alb  3.0<L>  /  TBili  0.3  /  DBili  x   /  AST  17  /  ALT  12  /  AlkPhos  159<H>      PT/INR - ( 02 Aug 2018 06:16 )   PT: 11.7 sec;   INR: 1.05          PTT - ( 02 Aug 2018 06:16 )  PTT:25.6 sec  Urinalysis Basic - ( 02 Aug 2018 06:32 )    Color: Yellow / Appearance: Clear / S.020 / pH: x  Gluc: x / Ketone: NEGATIVE  / Bili: Negative / Urobili: 1.0 E.U./dL   Blood: x / Protein: 100 mg/dL / Nitrite: NEGATIVE   Leuk Esterase: NEGATIVE / RBC: < 5 /HPF / WBC 5-10 /HPF   Sq Epi: x / Non Sq Epi: 0-5 /HPF / Bacteria: Many /HPF      CARDIAC MARKERS ( 02 Aug 2018 06:16 )  x     / 0.05 ng/mL / 31 U/L / x     / 2.0 ng/mL      Serum Pro-Brain Natriuretic Peptide: 83408 pg/mL ( @ 06:16)    Lactate, Blood: 1.0 mmoL/L ( @ 06:16)      RADIOLOGY, EKG & ADDITIONAL TESTS: Reviewed. .  LABS:                         8.8    18.2  )-----------( 469      ( 02 Aug 2018 06:16 )             30.8         145  |  101  |  52<H>  ----------------------------<  125<H>  5.2   |  33<H>  |  1.14    Ca    9.8      02 Aug 2018 06:16  Mg     2.5         TPro  7.6  /  Alb  3.0<L>  /  TBili  0.3  /  DBili  x   /  AST  17  /  ALT  12  /  AlkPhos  159<H>      PT/INR - ( 02 Aug 2018 06:16 )   PT: 11.7 sec;   INR: 1.05          PTT - ( 02 Aug 2018 06:16 )  PTT:25.6 sec  Urinalysis Basic - ( 02 Aug 2018 06:32 )    Color: Yellow / Appearance: Clear / S.020 / pH: x  Gluc: x / Ketone: NEGATIVE  / Bili: Negative / Urobili: 1.0 E.U./dL   Blood: x / Protein: 100 mg/dL / Nitrite: NEGATIVE   Leuk Esterase: NEGATIVE / RBC: < 5 /HPF / WBC 5-10 /HPF   Sq Epi: x / Non Sq Epi: 0-5 /HPF / Bacteria: Many /HPF      CARDIAC MARKERS ( 02 Aug 2018 06:16 )  x     / 0.05 ng/mL / 31 U/L / x     / 2.0 ng/mL      Serum Pro-Brain Natriuretic Peptide: 36013 pg/mL ( @ 06:16)    Lactate, Blood: 1.0 mmoL/L ( @ 06:16)      RADIOLOGY, EKG & ADDITIONAL TESTS: Reviewed.  < from: Echocardiogram (18 @ 11:32) >    The left atrial size is normal. Right atrium not well visualized.Calcified   aortic valve. No aortic regurgitation noted. There is Mild aortic   stenosis.   The calculated aortic valve area using the continuity equation is1.7   cm2. The   calculated aortic valve area indexed to body surface area is 1.1 cm2/m2.   The   peak pressure gradient is 35 mmHg. The mean pressure gradient is 19 mmHg.   There is mild mitral valve thickening. There is trace mitral   regurgitation.Structurally normal tricuspid valve. There is mild   tricuspid   regurgitation.The pulmonary artery systolic pressure is estimated to be   61   mmHg.The pulmonic valve is not well visualized. No pulmonic regurgitation   noted.The right ventricle is normal in size and function.There is a   septal   "knuckle" with no left ventricular outflow obstruction There is mild   concentric left ventricular hypertrophy. The left ventricular wall   motion is   normal. The left ventricular ejection fraction is estimated to be 65-70%    The   aortic root measures 3.9 cm at sinuses (normal less than 4 cm for men,   less   than 3.6 cm for women).  There is no pericardial effusion.When compared   to   prior study PASP has increased. Mild aortic stenosis is new.  Procedure Details

## 2018-08-02 NOTE — CONSULT NOTE ADULT - SUBJECTIVE AND OBJECTIVE BOX
JOSE MARTIN SALAZAR      Patient is a 89y old  Female who presents with a chief complaint of shortness of breath (02 Aug 2018 15:19)      HPI:  ICOMPLETE:  HX obtained from critical care team   HX provided by son : 89 y/o F with PMH dementia, baseline mental status AOX1,  recurrent UTI, celiac's disease, lactose intolerance, right femoral thrombus s/p IVC filter 2017, chronic anemia, with recent discharge from [facility] for UTI and aspiration PNA presents from [facility] for respiratory distress.   Patient was recently discharged () after being treated with meropenem for proteus mirabilis and aerococcus urine UTI and additionally was treated for aspiration PNA.  Per documentation on previous admission discussion for PEG placement, son wanted it in setting of dysphagia she passed the modified barium swallow study and was to trial a liquid/puree diet. No hx of coughing after oral intake or choking , no hx of fever chills    In the ED, VS T 100.5, , BP 94/58, RR 24, SpO2 95% on RA. VBG significant for pH of 7.21, pCO2 98, pO2 79.  Pt placed on BiPAP. Repeat ABG after 90 minutes on BiPAP- pH 7.28, pCO2 70, pO2 66.  The decision was made to intubate in the ED. Central line was placed.   Vanc and meropenem and 1L NS. Tylenol 625mg.  LABS showing leukocytosis 18.2, Hgb 8.8. Bicarb 33, BUN 52, Cr 1.14 (baseline at discharge - BUN 7 Cr 0.61). UA (+) for UTI. Trop 0.05. BNP 28949.   EKG with q waves in lead III unchanged from prior EKG.   Chest xray with b/l pleural infiltrates-unchanged from previous imaging (02 Aug 2018 15:19)      Addl  Medical issues:       HEALTH ISSUES - PROBLEM Dx:  DVT (deep venous thrombosis): DVT (deep venous thrombosis)  Dysphagia: Dysphagia  Celiac disease: Celiac disease  Dementia: Dementia  Anemia: Anemia  Cardiomegaly: Cardiomegaly  Pleural effusion: Pleural effusion  Acute respiratory failure with hypercapnia: Acute respiratory failure with hypercapnia  PNA (pneumonia): PNA (pneumonia)  Septic shock: Septic shock            MEDICATIONS  (STANDING):  ALBUTerol/ipratropium for Nebulization 3 milliLiter(s) Nebulizer every 6 hours  chlorhexidine 0.12% Liquid 15 milliLiter(s) Swish and Spit two times a day  chlorhexidine 2% Cloths 1 Application(s) Topical daily  heparin  Injectable 5000 Unit(s) SubCutaneous every 12 hours  meropenem  IVPB 500 milliGRAM(s) IV Intermittent every 12 hours  norepinephrine Infusion 0.1 MICROgram(s)/kG/Min (9.769 mL/Hr) IV Continuous <Continuous>  pantoprazole  Injectable 40 milliGRAM(s) IV Push daily  propofol Infusion 5 MICROgram(s)/kG/Min (1.563 mL/Hr) IV Continuous <Continuous>    MEDICATIONS  (PRN):          PAST MEDICAL & SURGICAL HISTORY:  Dementia  DVT (deep venous thrombosis)  Dysphagia  Anemia  DJD (degenerative joint disease)  Edema  IBS (irritable bowel syndrome)  Celiac disease  S/P IVC filter  H/O inguinal hernia repair  History of total left hip replacement      REVIEW OF SYSTEMS:  [x] As per HPI          Reviewed   no change                            Changes noted  CONSTITUTIONAL: No fever, weight loss, or fatigue  RESPIRATORY: No cough, wheezing, chills or hemoptysis; No Shortness of Breath  CARDIOVASCULAR: No chest pain, palpitations, dizziness, or leg swelling  GASTROINTESTINAL: No abdominal or epigastric pain. No nausea, vomiting, or hematemesis; No diarrhea or constipation. No melena or hematochezia.  MUSCULOSKELETAL: No joint pain or swelling; No muscle, back, or extremity pain  Neuro:   Grossly  Negative  Psych        Awake  alert  [x] All others negative	  [ ] Unable to obtain      Vital Signs Last 24 Hrs  T(C): 36.9 (02 Aug 2018 21:56), Max: 38.1 (02 Aug 2018 06:18)  T(F): 98.5 (02 Aug 2018 21:56), Max: 100.6 (02 Aug 2018 18:57)  HR: 104 (02 Aug 2018 22:00) (85 - 123)  BP: 129/69 (02 Aug 2018 22:00) (60/39 - 181/105)  BP(mean): 98 (02 Aug 2018 22:00) (48 - 109)  RR: 16 (02 Aug 2018 21:00) (14 - 40)  SpO2: 95% (02 Aug 2018 22:00) (88% - 100%)    PHYSICAL EXAM:      Constitutional:    Eyes:    ENMT:    Neck:    Breasts:    Back:    Respiratory:    Cardiovascular:    Gastrointestinal:    Genitourinary:    Rectal:    Extremities:    Vascular:    Neurological:    Skin:    Lymph Nodes:    Musculoskeletal:    Psychiatric:                              8.8    18.2  )-----------( 469      ( 02 Aug 2018 06:16 )             30.8     08-02    145  |  101  |  52<H>  ----------------------------<  125<H>  5.2   |  33<H>  |  1.14    Ca    9.8      02 Aug 2018 06:16  Mg     2.5     08-02    TPro  7.6  /  Alb  3.0<L>  /  TBili  0.3  /  DBili  x   /  AST  17  /  ALT  12  /  AlkPhos  159<H>  08-02    CARDIAC MARKERS ( 02 Aug 2018 06:16 )  x     / 0.05 ng/mL / 31 U/L / x     / 2.0 ng/mL      PT/INR - ( 02 Aug 2018 06:16 )   PT: 11.7 sec;   INR: 1.05          PTT - ( 02 Aug 2018 06:16 )  PTT:25.6 sec  CAPILLARY BLOOD GLUCOSE      POCT Blood Glucose.: 103 mg/dL (02 Aug 2018 17:27)    Urinalysis Basic - ( 02 Aug 2018 06:32 )    Color: Yellow / Appearance: Clear / S.020 / pH: x  Gluc: x / Ketone: NEGATIVE  / Bili: Negative / Urobili: 1.0 E.U./dL   Blood: x / Protein: 100 mg/dL / Nitrite: NEGATIVE   Leuk Esterase: NEGATIVE / RBC: < 5 /HPF / WBC 5-10 /HPF   Sq Epi: x / Non Sq Epi: 0-5 /HPF / Bacteria: Many /HPF      I&O's Summary    02 Aug 2018 07:01  -  02 Aug 2018 22:31  --------------------------------------------------------  IN: 199.6 mL / OUT: 345 mL / NET: -145.4 mL        Mode: AC/ CMV (Assist Control/ Continuous Mandatory Ventilation)  RR (machine): 16  TV (machine): 360  FiO2: 30  PEEP: 5  ITime: 1  MAP: 9  PIP: 21      ASSESSMENT/PLAN/RECOMMENDATIONS

## 2018-08-02 NOTE — DIETITIAN INITIAL EVALUATION ADULT. - NS AS NUTRI INTERV ENTERAL NUTRITION
Composition/Concentration/Rate/Volume/Route/If EN desired, recommend Jevity 1.2 Farhan @ 38mL/hr x 24hrs plus 1 ProStat (route per team), to provide: 912mL TV, 1295 (including propofol), 66g protein, 736mL free H2O, 91% RDI, 1.56g/kg IBW protein. Start at 20m/hr and advance by [id number] to goal as tolerated. Additional free H2O per team. Monitor for s/s intolerance; maintain aspiration precautions at all times./Schedule/Insert enteral feeding tube

## 2018-08-02 NOTE — CONSULT NOTE ADULT - ASSESSMENT
89 y/o F with PMH dementa, ESBL E.coli UTI, celiac's disease, lactose intolerance, 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 for UTI and aspiration PNA presents from [facility] for respiratory distress found to be septic likely from aspiration PNA and UTI. 89 y/o F with PMH dementa, ESBL E.coli UTI, celiac's disease, lactose intolerance, 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 for UTI and aspiration PNA presents from [facility] for respiratory distress found to be septic likely from aspiration PNA and UTI.     #ID  - Sepsis 2/2 PNA, febrile, tachycardic, leukocytosis, tachypneic, no lactate. CXR with bilateral pleural effusions improved from chest xray on last admission.   - Recurrent UTIs, last admission UCx positive for ESBL proteus mirabilis and aerococcus sensitive to meropenem.     #Cardiac   - Troponin 0.05, EKG unchanged from prior admission. EF in July 2018 65%. BNP 53760    #Respiratory   - Hypercapnic respiratory failure. VBG on admission pH 7.21. pCO2 98, pO2 79     #Renal  - BUN 52 and Cr 1.16 increased from baseline in July - BUN 7 Cr .61 89 y/o F with PMH dementa, ESBL E.coli UTI, celiac's disease, lactose intolerance, R. femoral thrombus s/p IVC filter 1/2017, chronic anemia, with recent discharge from [facility] for UTI and aspiration PNA presents from [facility] for respiratory distress found to be septic likely from aspiration PNA and UTI.     #ID  - Sepsis 2/2 PNA, febrile, tachycardic, leukocytosis, tachypneic, no lactate. CXR with bilateral pleural effusions improved from chest xray on last admission. Likely aspiration PNA in the setting of dysphagia.   - Recurrent UTIs, last admission UCx positive for ESBL proteus mirabilis and aerococcus sensitive to meropenem.  - C/w vancomycin to be dosed by level in setting of kidney function  - C/w meropenem 500mg q 12 - renally dosed.    - F/u BCx, UCx and sputum cultures     #Cardiac   - Troponin 0.05, EKG unchanged from prior admission.  BNP 62850. Last echocardiogram in July had intact LV function with EF of 65%, likely has a component of diastolic CHF in setting of newly elevated BNP   - Would obtain echocardiogram     #Respiratory   - Hypercapnic respiratory failure. VBG on admission pH 7.21. pCO2 98, pO2 79 - placed on bipap with improvement of pCO2 to 70 on repeat ABG she was subsequently intubated.   - Vent settings : FiO2 50, RR 16, Tidal volume 360cc PEEP 5  - Duonebs q6 hours     #GI   - Bowel regimen for constipation   - GI ppx: PPI IV qd     #Renal  - BUN 52 and Cr 1.16 increased from baseline in July - BUN 7 Cr .61 likely prerenal in the setting of sepsis   - Urine lytes     #Heme   - Normocytic anemia - Hgb 8.8. Unchanged from baseline on last admission   - Daily CBC     #Prophylaxis   - GI: PPI IV qd   - DVT: Heparin sq q12  - Consider palliative consult 89 y/o F with PMH dementa, recurrent UTI, celiac's disease, lactose intolerance, R. femoral thrombus s/p IVC filter 1/2017, chronic anemia, with recent discharge from [facility] for UTI and aspiration PNA presents from [facility] for respiratory distress found to be septic likely from aspiration PNA and UTI.     #ID  - Sepsis 2/2 PNA, febrile, tachycardic, leukocytosis, tachypneic, no lactate. CXR with bilateral pleural effusions improved from chest xray on last admission. Likely aspiration PNA in the setting of dysphagia.   - Recurrent UTIs, last admission UCx positive for ESBL proteus mirabilis and aerococcus sensitive to meropenem.  - C/w vancomycin to be dosed by level in setting of kidney function  - C/w meropenem 500mg q 12 - renally dosed.    - F/u BCx, UCx and sputum cultures     #Cardiac   - Troponin 0.05, EKG unchanged from prior admission.  BNP 31180. Last echocardiogram in July had intact LV function with EF of 65%, likely has a component of diastolic CHF in setting of newly elevated BNP   - Would obtain echocardiogram     #Respiratory   - Hypercapnic respiratory failure. VBG on admission pH 7.21. pCO2 98, pO2 79 - placed on bipap with improvement of pCO2 to 70 on repeat ABG she was subsequently intubated.   - Vent settings : FiO2 50, RR 16, Tidal volume 360cc PEEP 5  - Duonebs q6 hours     #GI   - Bowel regimen for constipation   - GI ppx: PPI IV qd     #Renal  - BUN 52 and Cr 1.16 increased from baseline in July - BUN 7 Cr .61 likely prerenal in the setting of sepsis   - Urine lytes     #Heme   - Normocytic anemia - Hgb 8.8. Unchanged from baseline on last admission   - Daily CBC     #Prophylaxis   - GI: PPI IV qd   - DVT: Heparin sq q12  - Consider palliative consult 89 y/o F with PMH dementa, recurrent UTI, celiac's disease, lactose intolerance, R. femoral thrombus s/p IVC filter 1/2017, chronic anemia, with recent discharge from [facility] for UTI and aspiration PNA presents from [facility] for respiratory distress found to be septic likely from aspiration PNA and UTI.     #ID  - Sepsis 2/2 PNA, febrile, tachycardic, leukocytosis, tachypneic, no lactate. CXR with bilateral pleural effusions improved from chest xray on last admission. Likely aspiration PNA in the setting of dysphagia.   - Recurrent UTIs, last admission UCx positive for ESBL proteus mirabilis and aerococcus sensitive to meropenem.  - C/w vancomycin to be dosed by level in setting of kidney function  - C/w meropenem 500mg q 12 - renally dosed.    - F/u BCx, UCx and sputum cultures     #Cardiac   - Troponin 0.05, EKG unchanged from prior admission.  BNP 39784. Last echocardiogram in July had intact LV function with EF of 65%, likely has a component of diastolic CHF in setting of newly elevated BNP   - Would obtain echocardiogram   - Trend trops    #Respiratory   - Hypercapnic respiratory failure. VBG on admission pH 7.21. pCO2 98, pO2 79 - placed on bipap with improvement of pCO2 to 70 on repeat ABG she was subsequently intubated.   - Vent settings : FiO2 50, RR 16, Tidal volume 360cc PEEP 5  - Duonebs q6 hours     #GI   - Bowel regimen for constipation   - GI ppx: PPI IV qd     #Renal  - BUN 52 and Cr 1.16 increased from baseline in July - BUN 7 Cr .61 likely prerenal in the setting of sepsis   - Urine lytes     #Heme   - Normocytic anemia - Hgb 8.8. Unchanged from baseline on last admission   - Daily CBC     #Prophylaxis   - GI: PPI IV qd   - DVT: Heparin sq q12  - Consider palliative consult 89 y/o F with PMH dementa, recurrent UTI, celiac's disease, lactose intolerance, 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 for UTI and aspiration PNA presents from [facility] for respiratory distress found to be septic likely from aspiration PNA and UTI.     #ID  - Sepsis 2/2 PNA, febrile, tachycardic, leukocytosis, tachypneic, no lactate. CXR with bilateral pleural effusions improved from chest xray on last admission. Likely aspiration PNA in the setting of dysphagia.   - Recurrent UTIs, last admission UCx positive for ESBL proteus mirabilis and aerococcus sensitive to meropenem.  - C/w vancomycin to be dosed by level in setting of kidney function  - C/w meropenem 500mg q 12 - renally dosed.    - F/u BCx, UCx and sputum cultures     #Cardiac   - Troponin 0.05, EKG unchanged from prior admission.  BNP 44893. Last echocardiogram in July had intact LV function with EF of 65%, likely has a component of diastolic CHF in setting of newly elevated BNP   - Would obtain echocardiogram   - Trend troponins    #Respiratory   - Hypercapnic respiratory failure. VBG on admission pH 7.21. pCO2 98, pO2 79 - placed on bipap with improvement of pCO2 to 70 on repeat ABG she was subsequently intubated.   - Vent settings : FiO2 50, RR 16, Tidal volume 360cc PEEP 5  - Duonebs q6 hours     #GI   - Bowel regimen for constipation   - GI ppx: PPI IV qd     #Renal  - BUN 52 and Cr 1.16 increased from baseline in July - BUN 7 Cr .61 likely prerenal in the setting of sepsis   - Urine lytes     #Heme   - Normocytic anemia - Hgb 8.8. Unchanged from baseline on last admission   - Daily CBC     #Prophylaxis   - GI: PPI IV qd   - DVT: Heparin sq q12  - Full code: son is HCP and verbally communicated that he would like intubation, CPR, lines and pressors should she require them. - Consider palliative consult. 89 y/o F with PMH dementia, recurrent UTI, Celiac' s disease, lactose intolerance, R. femoral thrombus s/p IVC filter 1/2017, chronic anemia, with recent discharge from [facility] for UTI and aspiration PNA presents from [facility] for respiratory distress found to be septic likely from aspiration PNA and UTI.     #ID  - Sepsis 2/2 PNA, febrile, tachycardic, leukocytosis, tachypneic, no lactate. CXR with bilateral pleural effusions improved from chest xray on last admission. Likely aspiration PNA in the setting of dysphagia.   - Recurrent UTIs, last admission UCx positive for ESBL proteus mirabilis and aerococcus sensitive to meropenem.  - C/w vancomycin to be dosed by level in setting of kidney function  - C/w meropenem 500mg q 12 - renally dosed.    - F/u BCx, UCx and sputum cultures     #Hemodynamics   - Hypotensive on admission 2/2 septic shock, requiring levo gtt  - C/w levo gtt, titrate to MAP of 65   - Urine output ~25cc/hr   - Strict I&Os  - Mental status - unable to assess as she is sedated on a propofol gtt     #Cardiac   - Troponin 0.05, EKG unchanged from prior admission.  BNP 00632. Last echocardiogram in July had intact LV function with EF of 65%, likely has a component of diastolic CHF in setting of newly elevated BNP   - Would obtain echocardiogram   - Trend troponins    #Respiratory   - Hypercapnic respiratory failure. VBG on admission pH 7.21. pCO2 98, pO2 79 - placed on bipap with improvement of pCO2 to 70 on repeat ABG she was subsequently intubated.   - Vent settings : FiO2 50, RR 16, Tidal volume 360cc PEEP 5  - Duonebs q6 hours     #GI   - Bowel regimen for constipation   - GI ppx: PPI IV qd     #Renal  - BUN 52 and Cr 1.16 increased from baseline in July - BUN 7 Cr .61 likely prerenal in the setting of sepsis   - Urine lytes     #Heme   - Normocytic anemia - Hgb 8.8. Unchanged from baseline on last admission   - Daily CBC     #Prophylaxis   - GI: PPI IV qd   - DVT: Heparin sq q12  - Full code: son is HCP and verbally communicated that he would like intubation, CPR, lines and pressors should she require them. - Consider palliative consult. 89 y/o F with PMH dementia, recurrent UTI, Celiac' s disease, lactose intolerance, R. femoral thrombus s/p IVC filter 1/2017, chronic anemia, with recent discharge from [facility] for UTI and aspiration PNA presents from [facility] for respiratory distress found to be septic likely from aspiration PNA and UTI.     #ID  - Sepsis 2/2 PNA, febrile, tachycardic, leukocytosis, tachypneic, no lactate. CXR with bilateral pleural effusions improved from chest xray on last admission. Likely aspiration PNA in the setting of dysphagia.   - Recurrent UTIs, last admission UCx positive for ESBL proteus mirabilis and aerococcus sensitive to meropenem.  - C/w vancomycin to be dosed by level in setting of kidney function  - C/w meropenem 500mg q 12 - renally dosed.    - F/u BCx, UCx and sputum cultures   - contact precautions    #Hemodynamics   - Hypotensive on admission 2/2 septic shock, requiring levo gtt  - C/w levo gtt, titrate to MAP of 65-70   - Urine output ~25cc/hr   - Strict I&Os  - Mental status - unable to assess as she is sedated on a propofol gtt     #Cardiac   - Troponin 0.05, EKG unchanged from prior admission.  BNP 90065. Last echocardiogram in July had intact LV function with EF of 65%, likely has a component of diastolic CHF in setting of newly elevated BNP   - Would obtain echocardiogram   - Trend troponins    #Respiratory   - Hypercapnic respiratory failure. VBG on admission pH 7.21. pCO2 98, pO2 79 - placed on bipap with improvement of pCO2 to 70 on repeat ABG she was subsequently intubated.   - Vent settings : FiO2 50, RR 16, Tidal volume 360cc PEEP 5  - Duonebs q6 hours     #GI   - Bowel regimen for constipation   - GI ppx: PPI IV qd     #Renal  - CAMRYN: BUN 52 and Cr 1.16 increased from baseline in July - BUN 7 Cr .61 likely prerenal in the setting of sepsis   - Urine lytes     #Heme   - Normocytic anemia - Hgb 8.8. Unchanged from baseline on last admission.   - Check iron studies      #Prophylaxis   - GI: PPI IV qd   - DVT: Heparin sq q12  - Full code: son is HCP and verbally communicated that he would like intubation, CPR, lines and pressors should she require them. - Consider palliative consult.

## 2018-08-02 NOTE — PROCEDURE NOTE - NSTRACHPOSTINTU_RESP_A_CORE
Appropriate capnography/Breath sounds bilateral/Breath sounds equal/Chest excursion noted/Chest X-Ray

## 2018-08-02 NOTE — ED PROVIDER NOTE - MEDICAL DECISION MAKING DETAILS
SOB, diffuse rhonchi, febrile, tachycardic, hypotense, concerning for sepsis  -check labs, ekg, ivf, vanc/meropenem, cultures, riley, bipap, cxr

## 2018-08-02 NOTE — ED PROVIDER NOTE - PROGRESS NOTE DETAILS
breathing improved on bipap, pt more alert, ICU consulted.  CXR likely c/w infiltrate breathing improved on bipap, pt more alert, ICU consulted.  CXR likely c/w infiltrate.  elevated pro-bnp and LE edema, will start with 1L NS, concern for fluid overload

## 2018-08-02 NOTE — DIETITIAN INITIAL EVALUATION ADULT. - ENERGY NEEDS
Height 4'9"; #; IBW 92.5#; 123%IBW  BMI 24.8  Ideal body weight used for calculations as pt >120% of IBW. Needs adjusted for vent

## 2018-08-02 NOTE — CHART NOTE - NSCHARTNOTEFT_GEN_A_CORE
Infectious Diseases Anti-infective Approval Note    Medication:  meropenem  Dose: 1g  Route: IV  Frequency: q12h  Duration: 3 d    Dose may be adjusted as needed for alterations in renal function.    *THIS IS NOT AN INFECTIOUS DISEASES CONSULTATION*

## 2018-08-03 LAB
ANION GAP SERPL CALC-SCNC: 11 MMOL/L — SIGNIFICANT CHANGE UP (ref 5–17)
ANION GAP SERPL CALC-SCNC: 12 MMOL/L — SIGNIFICANT CHANGE UP (ref 5–17)
BUN SERPL-MCNC: 38 MG/DL — HIGH (ref 7–23)
BUN SERPL-MCNC: 41 MG/DL — HIGH (ref 7–23)
CALCIUM SERPL-MCNC: 8.8 MG/DL — SIGNIFICANT CHANGE UP (ref 8.4–10.5)
CALCIUM SERPL-MCNC: 8.8 MG/DL — SIGNIFICANT CHANGE UP (ref 8.4–10.5)
CHLORIDE SERPL-SCNC: 103 MMOL/L — SIGNIFICANT CHANGE UP (ref 96–108)
CHLORIDE SERPL-SCNC: 106 MMOL/L — SIGNIFICANT CHANGE UP (ref 96–108)
CO2 SERPL-SCNC: 30 MMOL/L — SIGNIFICANT CHANGE UP (ref 22–31)
CO2 SERPL-SCNC: 30 MMOL/L — SIGNIFICANT CHANGE UP (ref 22–31)
CREAT SERPL-MCNC: 0.96 MG/DL — SIGNIFICANT CHANGE UP (ref 0.5–1.3)
CREAT SERPL-MCNC: 0.98 MG/DL — SIGNIFICANT CHANGE UP (ref 0.5–1.3)
CULTURE RESULTS: NO GROWTH — SIGNIFICANT CHANGE UP
GLUCOSE SERPL-MCNC: 91 MG/DL — SIGNIFICANT CHANGE UP (ref 70–99)
GLUCOSE SERPL-MCNC: 93 MG/DL — SIGNIFICANT CHANGE UP (ref 70–99)
HCT VFR BLD CALC: 25 % — LOW (ref 34.5–45)
HCT VFR BLD CALC: 26.5 % — LOW (ref 34.5–45)
HGB BLD-MCNC: 7.7 G/DL — LOW (ref 11.5–15.5)
HGB BLD-MCNC: 7.9 G/DL — LOW (ref 11.5–15.5)
MAGNESIUM SERPL-MCNC: 2.1 MG/DL — SIGNIFICANT CHANGE UP (ref 1.6–2.6)
MAGNESIUM SERPL-MCNC: 2.1 MG/DL — SIGNIFICANT CHANGE UP (ref 1.6–2.6)
MCHC RBC-ENTMCNC: 26.8 PG — LOW (ref 27–34)
MCHC RBC-ENTMCNC: 27.2 PG — SIGNIFICANT CHANGE UP (ref 27–34)
MCHC RBC-ENTMCNC: 29.8 G/DL — LOW (ref 32–36)
MCHC RBC-ENTMCNC: 30.8 G/DL — LOW (ref 32–36)
MCV RBC AUTO: 88.3 FL — SIGNIFICANT CHANGE UP (ref 80–100)
MCV RBC AUTO: 89.8 FL — SIGNIFICANT CHANGE UP (ref 80–100)
PHOSPHATE SERPL-MCNC: 1.8 MG/DL — LOW (ref 2.5–4.5)
PHOSPHATE SERPL-MCNC: 1.9 MG/DL — LOW (ref 2.5–4.5)
PLATELET # BLD AUTO: 417 K/UL — HIGH (ref 150–400)
PLATELET # BLD AUTO: 432 K/UL — HIGH (ref 150–400)
POTASSIUM SERPL-MCNC: 3.7 MMOL/L — SIGNIFICANT CHANGE UP (ref 3.5–5.3)
POTASSIUM SERPL-MCNC: 3.9 MMOL/L — SIGNIFICANT CHANGE UP (ref 3.5–5.3)
POTASSIUM SERPL-SCNC: 3.7 MMOL/L — SIGNIFICANT CHANGE UP (ref 3.5–5.3)
POTASSIUM SERPL-SCNC: 3.9 MMOL/L — SIGNIFICANT CHANGE UP (ref 3.5–5.3)
RBC # BLD: 2.83 M/UL — LOW (ref 3.8–5.2)
RBC # BLD: 2.95 M/UL — LOW (ref 3.8–5.2)
RBC # FLD: 15.1 % — SIGNIFICANT CHANGE UP (ref 10.3–16.9)
RBC # FLD: 15.2 % — SIGNIFICANT CHANGE UP (ref 10.3–16.9)
SODIUM SERPL-SCNC: 145 MMOL/L — SIGNIFICANT CHANGE UP (ref 135–145)
SODIUM SERPL-SCNC: 147 MMOL/L — HIGH (ref 135–145)
SPECIMEN SOURCE: SIGNIFICANT CHANGE UP
VANCOMYCIN TROUGH SERPL-MCNC: 8.1 UG/ML — LOW (ref 10–20)
WBC # BLD: 11.6 K/UL — HIGH (ref 3.8–10.5)
WBC # BLD: 12.7 K/UL — HIGH (ref 3.8–10.5)
WBC # FLD AUTO: 11.6 K/UL — HIGH (ref 3.8–10.5)
WBC # FLD AUTO: 12.7 K/UL — HIGH (ref 3.8–10.5)

## 2018-08-03 PROCEDURE — 71045 X-RAY EXAM CHEST 1 VIEW: CPT | Mod: 26

## 2018-08-03 PROCEDURE — 99291 CRITICAL CARE FIRST HOUR: CPT

## 2018-08-03 RX ORDER — SODIUM CHLORIDE 9 MG/ML
1000 INJECTION INTRAMUSCULAR; INTRAVENOUS; SUBCUTANEOUS ONCE
Qty: 0 | Refills: 0 | Status: COMPLETED | OUTPATIENT
Start: 2018-08-03 | End: 2018-08-03

## 2018-08-03 RX ORDER — METOPROLOL TARTRATE 50 MG
5 TABLET ORAL EVERY 6 HOURS
Qty: 0 | Refills: 0 | Status: DISCONTINUED | OUTPATIENT
Start: 2018-08-03 | End: 2018-08-07

## 2018-08-03 RX ORDER — METOPROLOL TARTRATE 50 MG
10 TABLET ORAL ONCE
Qty: 0 | Refills: 0 | Status: DISCONTINUED | OUTPATIENT
Start: 2018-08-03 | End: 2018-08-03

## 2018-08-03 RX ORDER — VERAPAMIL HCL 240 MG
5 CAPSULE, EXTENDED RELEASE PELLETS 24 HR ORAL ONCE
Qty: 0 | Refills: 0 | Status: COMPLETED | OUTPATIENT
Start: 2018-08-03 | End: 2018-08-03

## 2018-08-03 RX ORDER — AMIODARONE HYDROCHLORIDE 400 MG/1
1 TABLET ORAL
Qty: 900 | Refills: 0 | Status: DISCONTINUED | OUTPATIENT
Start: 2018-08-03 | End: 2018-08-04

## 2018-08-03 RX ORDER — VANCOMYCIN HCL 1 G
750 VIAL (EA) INTRAVENOUS EVERY 24 HOURS
Qty: 0 | Refills: 0 | Status: DISCONTINUED | OUTPATIENT
Start: 2018-08-03 | End: 2018-08-04

## 2018-08-03 RX ORDER — MAGNESIUM SULFATE 500 MG/ML
1 VIAL (ML) INJECTION ONCE
Qty: 0 | Refills: 0 | Status: COMPLETED | OUTPATIENT
Start: 2018-08-03 | End: 2018-08-03

## 2018-08-03 RX ORDER — METOPROLOL TARTRATE 50 MG
5 TABLET ORAL ONCE
Qty: 0 | Refills: 0 | Status: DISCONTINUED | OUTPATIENT
Start: 2018-08-03 | End: 2018-08-03

## 2018-08-03 RX ORDER — METOPROLOL TARTRATE 50 MG
5 TABLET ORAL EVERY 6 HOURS
Qty: 0 | Refills: 0 | Status: DISCONTINUED | OUTPATIENT
Start: 2018-08-03 | End: 2018-08-03

## 2018-08-03 RX ORDER — POTASSIUM CHLORIDE 20 MEQ
10 PACKET (EA) ORAL ONCE
Qty: 0 | Refills: 0 | Status: COMPLETED | OUTPATIENT
Start: 2018-08-03 | End: 2018-08-03

## 2018-08-03 RX ORDER — METOPROLOL TARTRATE 50 MG
10 TABLET ORAL ONCE
Qty: 0 | Refills: 0 | Status: COMPLETED | OUTPATIENT
Start: 2018-08-03 | End: 2018-08-03

## 2018-08-03 RX ORDER — AMIODARONE HYDROCHLORIDE 400 MG/1
150 TABLET ORAL ONCE
Qty: 0 | Refills: 0 | Status: COMPLETED | OUTPATIENT
Start: 2018-08-03 | End: 2018-08-03

## 2018-08-03 RX ORDER — PROPOFOL 10 MG/ML
30 INJECTION, EMULSION INTRAVENOUS ONCE
Qty: 0 | Refills: 0 | Status: COMPLETED | OUTPATIENT
Start: 2018-08-03 | End: 2018-08-03

## 2018-08-03 RX ORDER — AMIODARONE HYDROCHLORIDE 400 MG/1
0.5 TABLET ORAL
Qty: 900 | Refills: 0 | Status: DISCONTINUED | OUTPATIENT
Start: 2018-08-03 | End: 2018-08-04

## 2018-08-03 RX ADMIN — MEROPENEM 100 MILLIGRAM(S): 1 INJECTION INTRAVENOUS at 08:05

## 2018-08-03 RX ADMIN — Medication 100 GRAM(S): at 07:28

## 2018-08-03 RX ADMIN — CHLORHEXIDINE GLUCONATE 15 MILLILITER(S): 213 SOLUTION TOPICAL at 07:28

## 2018-08-03 RX ADMIN — Medication 9.77 MICROGRAM(S)/KG/MIN: at 00:32

## 2018-08-03 RX ADMIN — Medication 3 MILLILITER(S): at 10:19

## 2018-08-03 RX ADMIN — HEPARIN SODIUM 5000 UNIT(S): 5000 INJECTION INTRAVENOUS; SUBCUTANEOUS at 07:28

## 2018-08-03 RX ADMIN — PROPOFOL 1.56 MICROGRAM(S)/KG/MIN: 10 INJECTION, EMULSION INTRAVENOUS at 23:09

## 2018-08-03 RX ADMIN — SODIUM CHLORIDE 6000 MILLILITER(S): 9 INJECTION INTRAMUSCULAR; INTRAVENOUS; SUBCUTANEOUS at 07:00

## 2018-08-03 RX ADMIN — AMIODARONE HYDROCHLORIDE 33.33 MG/MIN: 400 TABLET ORAL at 08:55

## 2018-08-03 RX ADMIN — Medication 3 MILLILITER(S): at 21:26

## 2018-08-03 RX ADMIN — Medication 9.77 MICROGRAM(S)/KG/MIN: at 23:09

## 2018-08-03 RX ADMIN — CHLORHEXIDINE GLUCONATE 15 MILLILITER(S): 213 SOLUTION TOPICAL at 17:35

## 2018-08-03 RX ADMIN — HEPARIN SODIUM 5000 UNIT(S): 5000 INJECTION INTRAVENOUS; SUBCUTANEOUS at 17:35

## 2018-08-03 RX ADMIN — Medication 250 MILLIGRAM(S): at 10:12

## 2018-08-03 RX ADMIN — Medication 100 MILLIEQUIVALENT(S): at 08:54

## 2018-08-03 RX ADMIN — PROPOFOL 30 MILLIGRAM(S): 10 INJECTION, EMULSION INTRAVENOUS at 09:10

## 2018-08-03 RX ADMIN — Medication 3 MILLILITER(S): at 03:32

## 2018-08-03 RX ADMIN — CHLORHEXIDINE GLUCONATE 1 APPLICATION(S): 213 SOLUTION TOPICAL at 12:06

## 2018-08-03 RX ADMIN — PROPOFOL 1.56 MICROGRAM(S)/KG/MIN: 10 INJECTION, EMULSION INTRAVENOUS at 08:55

## 2018-08-03 RX ADMIN — Medication 5 MILLIGRAM(S): at 06:39

## 2018-08-03 RX ADMIN — PROPOFOL 1.56 MICROGRAM(S)/KG/MIN: 10 INJECTION, EMULSION INTRAVENOUS at 00:31

## 2018-08-03 RX ADMIN — Medication 3 MILLILITER(S): at 17:22

## 2018-08-03 RX ADMIN — Medication 10 MILLIGRAM(S): at 06:38

## 2018-08-03 RX ADMIN — PANTOPRAZOLE SODIUM 40 MILLIGRAM(S): 20 TABLET, DELAYED RELEASE ORAL at 12:06

## 2018-08-03 RX ADMIN — AMIODARONE HYDROCHLORIDE 600 MILLIGRAM(S): 400 TABLET ORAL at 07:27

## 2018-08-03 RX ADMIN — Medication 5 MILLIGRAM(S): at 23:09

## 2018-08-03 RX ADMIN — MEROPENEM 100 MILLIGRAM(S): 1 INJECTION INTRAVENOUS at 17:35

## 2018-08-03 NOTE — PROGRESS NOTE ADULT - ASSESSMENT
89 y/o F with PMH dementia, recurrent UTI, Celiac' s disease, lactose intolerance, R. femoral thrombus s/p IVC filter 1/2017, chronic anemia, with recent discharge from [facility] for UTI and aspiration PNA presents from [facility] for respiratory distress with acute respiratory failure found to be in septic shock likely from aspiration PNA and UTI.       Pulmonology   Hypercapnic resp failure  etiology likely secondary to aspiration pneumonia vs HAP  CXR showing b/l pleural infiltrates, unchanged from pevious xray  In ED VBG significant for pH of 7.21, pCO2 98, pO2 79.Pt placed on BiPAP. Repeat ABG after 90 minutes on BiPAP- pH 7.28, pCO2 70, pO2 66.  The decision was made to intubate in the ED  Pt currently on assist ventilation  continue with propofol drip   FiO2 50, RR 16, Tidal volume 360cc PEEP 5  Oxygenation: FIO2 50, PEEP 5   Ventilation: ON assist mode, patient not using accessory muscles, breathing in sync with ventilator, no nasal flaring noted.   Sputum cx growing staph aureus, will continue with vancomycin for now and deescalate to cefazolin if mssa.     Infectious disease  Septic shock   Upon arrival patient had a fever , was tachycardic, elevated wbc count 18.2, lactate negative   Received 1 L N.S , no additional fluids given because perfusion good- lactate is negative, , no edema noted, extremities not cold- clinically appeared euvolemic etiology related to aspiration pna vs hospital acquired pna   of note usg showing right sided lower lobe consolidation   In setting of recent admit to hospital are treating for hospital acquired pna ,s/p vanc and meropenum in ED  sputum cx showing staph aureus, urine cx negative so far  will d/c meorpenum and c/w vanomycin-dose by level in setting of kidney disease  await final sensitivities, if MSSA can deescalate abx     #hx of recurrent uti  UTI: UA  showing WBC-5-10, BACTERIA-MANY, NEGATIVE NITRITE, Negative leuk esterase     last admission UCx positive for proteus mirabilis and aerococcus , was treated with meropenum  continue with contact precautions for ESBL -esbl Ecoli in 2017  urine cx so far negative     Cardiovascular   septic shock  currently on pressor titrate to a map >65  Troponin 0.05, EKG unchanged from prior admission.  BNP 76557. Last echocardiogram in July had intact LV function with EF of 65%  Little concern for heart failure exacerbation at this point. No need for iv lasik for now  USG showing right sided lower lobe consolidation and no pleural effusions     #hx of paroxysmal a fib however not anticoagulated given hx of gastric avm on egd   This morning patient was  hypotensive and in afib with rvr. Pushed 10 of lopressor, 5 of verapamil. Pt started on amiodarone infusion. Levophed increased to 25 mcg.  Patient continued to be in afib. In setting of increasing levophed requirements and hemodynamic instability patient was shocked. Reverted back to sinus rythm . pressures good.         GI : constipation  continue with bowel regimen    #Renal  - Upon admission patient noted to have an CAMRYN: BUN 52 and Cr 1.16 increased from baseline in July - BUN 7 Cr .61. etiology likely prerenal in the setting of sepsis   - f/u labs this morning shows cr, bun downtrending   continue to monitor     F no iv fluids for now, s/p 1L N.S this am for afib rvr and hemodynamic instability   E replete as needed  N NG in place -can initiate feeds   D MICU   GI ppx: iv protonix   Heparin sq q12  - Full code: son is HCP and verbally communicated that he would like intubation, CPR, lines and pressors should she require them.(AS PER CRIT CARE NOTE/WILL CLARIFY WITH SON GOALS OF CARE ) 89 y/o F with PMH dementia, recurrent UTI, Celiac' s disease, lactose intolerance, R. femoral thrombus s/p IVC filter 1/2017, chronic anemia, with recent discharge from [facility] for UTI and aspiration PNA presents from [facility] for respiratory distress with acute respiratory failure found to be in septic shock likely from aspiration PNA and UTI.       Pulmonology   Hypercapnic resp failure  etiology likely secondary to aspiration pneumonia vs HAP  CXR showing b/l pleural infiltrates, unchanged from pevious xray  In ED VBG significant for pH of 7.21, pCO2 98, pO2 79.Pt placed on BiPAP. Repeat ABG after 90 minutes on BiPAP- pH 7.28, pCO2 70, pO2 66.  The decision was made to intubate in the ED  Pt currently on assist ventilation  continue with propofol drip   FiO2 50, RR 16, Tidal volume 360cc PEEP 5  Oxygenation: FIO2 50, PEEP 5   Ventilation: ON assist mode, patient not using accessory muscles, breathing in sync with ventilator, no nasal flaring noted.   Sputum cx growing staph aureus, will continue with vancomycin for now and deescalate to cefazolin if mssa.     Infectious disease  Septic shock   Upon arrival patient had a fever , was tachycardic, elevated wbc count 18.2, lactate negative   Received 1 L N.S , no additional fluids given because perfusion good- lactate is negative, , no edema noted, extremities not cold- clinically appeared euvolemic etiology related to aspiration pna vs hospital acquired pna   of note usg showing right sided lower lobe consolidation   In setting of recent admit to hospital are treating for hospital acquired pna ,s/p vanc and meropenum in ED  sputum cx showing staph aureus, urine cx negative so far  c/w vanomycin-dose by level in setting of kidney disease and meropenum for now.   await final sensitivities, if MSSA can deescalate abx     #hx of recurrent uti  UTI: UA  showing WBC-5-10, BACTERIA-MANY, NEGATIVE NITRITE, Negative leuk esterase     last admission UCx positive for proteus mirabilis and aerococcus , was treated with meropenum  continue with contact precautions for ESBL -esbl Ecoli in 2017  urine cx so far negative     Cardiovascular   septic shock  currently on pressor titrate to a map >65  Troponin 0.05, EKG unchanged from prior admission.  BNP 68997. Last echocardiogram in July had intact LV function with EF of 65%  Little concern for heart failure exacerbation at this point. No need for iv lasik for now  USG showing right sided lower lobe consolidation and no pleural effusions     #hx of paroxysmal a fib however not anticoagulated given hx of gastric avm on egd   This morning patient was  hypotensive and in afib with rvr. Pushed 10 of lopressor, 5 of verapamil. Pt started on amiodarone infusion. Levophed increased to 25 mcg.  Patient continued to be in afib. In setting of increasing levophed requirements and hemodynamic instability patient was shocked. Reverted back to sinus rythm . pressures good.         GI : constipation  continue with bowel regimen    #Renal  - Upon admission patient noted to have an CAMRYN: BUN 52 and Cr 1.16 increased from baseline in July - BUN 7 Cr .61. etiology likely prerenal in the setting of sepsis   - f/u labs this morning shows cr, bun downtrending   continue to monitor     F no iv fluids for now, s/p 1L N.S this am for afib rvr and hemodynamic instability   E replete as needed  N NG in place -can initiate feeds   D MICU   GI ppx: iv protonix   Heparin sq q12  - Full code: son is HCP and verbally communicated that he would like intubation, CPR, lines and pressors should she require them.(AS PER CRIT CARE NOTE/WILL CLARIFY WITH SON GOALS OF CARE ) 89 y/o F with PMH dementia, recurrent UTI, Celiac' s disease, lactose intolerance, R. femoral thrombus s/p IVC filter 1/2017, chronic anemia, with recent discharge from [facility] for UTI and aspiration PNA presents from [facility] for respiratory distress with acute respiratory failure found to be in septic shock likely from aspiration PNA and UTI.       Pulmonology   Hypercapnic resp failure  etiology likely secondary to aspiration pneumonia vs HAP  CXR showing b/l pleural infiltrates, unchanged from pevious xray  In ED VBG significant for pH of 7.21, pCO2 98, pO2 79.Pt placed on BiPAP. Repeat ABG after 90 minutes on BiPAP- pH 7.28, pCO2 70, pO2 66.  The decision was made to intubate in the ED  Pt currently on assist ventilation  continue with propofol drip   FiO2 50, RR 16, Tidal volume 360cc PEEP 5  Oxygenation: FIO2 50, PEEP 5   Ventilation: ON assist mode, patient not using accessory muscles, breathing in sync with ventilator, no nasal flaring noted.   Sputum cx growing staph aureus, will continue with vancomycin for now and deescalate to cefazolin if mssa.     Infectious disease  Septic shock   Upon arrival patient had a fever , was tachycardic, elevated wbc count 18.2, lactate negative   Received 1 L N.S , no additional fluids given because perfusion good- lactate is negative, , no edema noted, extremities not cold- clinically appeared euvolemic etiology related to aspiration pna vs hospital acquired pna   of note usg showing right sided lower lobe consolidation   In setting of recent admit to hospital are treating for hospital acquired pna ,s/p vanc and meropenum in ED  sputum cx showing staph aureus, urine cx negative so far  c/w vanomycin-dose by level in setting of kidney disease and meropenum for now.   await final sensitivities, if MSSA can deescalate abx     #hx of recurrent uti  UTI: UA  showing WBC-5-10, BACTERIA-MANY, NEGATIVE NITRITE, Negative leuk esterase     last admission UCx positive for proteus mirabilis and aerococcus , was treated with meropenum  continue with contact precautions for ESBL -esbl Ecoli in 2017  urine cx so far negative     Cardiovascular   septic shock  currently on pressor titrate to a map >65  Troponin 0.05, EKG unchanged from prior admission.  BNP 04449. Last echocardiogram in July had intact LV function with EF of 65%  Little concern for heart failure exacerbation at this point. No need for iv lasik for now  USG showing right sided lower lobe consolidation and no pleural effusions     #hx of paroxysmal a fib however not anticoagulated given hx of gastric avm on egd   This morning patient was  hypotensive and in afib with rvr. Pushed 10 of lopressor, 5 of verapamil. Pt started on amiodarone infusion. Levophed increased to 25 mcg.  Patient continued to be in afib. In setting of increasing levophed requirements and hemodynamic instability patient was shocked. Reverted back to sinus rythm . pressures good.         GI : constipation  continue with bowel regimen    #Renal  - Upon admission patient noted to have an CAMRYN: BUN 52 and Cr 1.16 increased from baseline in July - BUN 7 Cr .61. etiology likely prerenal in the setting of sepsis   - f/u labs this morning shows cr, bun downtrending   continue to monitor     F no iv fluids for now, s/p 1L N.S this am for afib rvr and hemodynamic instability   E replete as needed  N NG in place -can initiate feeds   D MICU   GI ppx: iv protonix   Heparin sq q12  - Full code: son is HCP and verbally communicated that he would like intubation, CPR, lines and pressors should she require them.(AS PER CRIT CARE NOTE/WILL CLARIFY WITH SON GOALS OF CARE )      Critical care time rendered 40 minutes

## 2018-08-03 NOTE — PROGRESS NOTE ADULT - SUBJECTIVE AND OBJECTIVE BOX
OVERNIGHT EVENTS: NG tube passed    SUBJECTIVE / INTERVAL HPI: Patient seen and examined at bedside. Pt hypotensive and in afib with rvr. Pushed 10 of lopressor, 5 of verapamil. Pt started on amiodarone infusion. Levophed increased to 25 mcg. In setting of increasing levophed requirements and hemodynamic instability patient was shocked. Reverted back to sinus rythm . pressures good.     VITAL SIGNS:  Vital Signs Last 24 Hrs  T(C): 37.7 (03 Aug 2018 10:00), Max: 38.1 (02 Aug 2018 18:57)  T(F): 99.9 (03 Aug 2018 10:00), Max: 100.6 (02 Aug 2018 18:57)  HR: 76 (03 Aug 2018 10:00) (76 - 166)  BP: 102/59 (03 Aug 2018 10:00) (60/39 - 170/89)  BP(mean): 86 (03 Aug 2018 10:00) (48 - 143)  RR: 26 (03 Aug 2018 09:15) (14 - 40)  SpO2: 95% (03 Aug 2018 10:00) (93% - 99%)    PHYSICAL EXAM:    General: WDWN  HEENT: NC/AT; PERRL, anicteric sclera; MMM  Neck: supple  Cardiovascular: +S1/S2; RRR  Respiratory: b/l crackles, r>l  Gastrointestinal: soft, NT/ND; +BSx4  Extremities: edema noted on left lower extremity > right  Vascular: 2+ radial, DP/PT pulses B/L  Neurological: aox0, clonus +, cannot assess for sensory or motor deficits. Pt noted to have increased tone all 4 limbs    MEDICATIONS:  MEDICATIONS  (STANDING):  ALBUTerol/ipratropium for Nebulization 3 milliLiter(s) Nebulizer every 6 hours  amiodarone Infusion 1 mG/Min (33.333 mL/Hr) IV Continuous <Continuous>  amiodarone Infusion 0.5 mG/Min (16.667 mL/Hr) IV Continuous <Continuous>  chlorhexidine 0.12% Liquid 15 milliLiter(s) Swish and Spit two times a day  chlorhexidine 2% Cloths 1 Application(s) Topical daily  heparin  Injectable 5000 Unit(s) SubCutaneous every 12 hours  meropenem  IVPB 500 milliGRAM(s) IV Intermittent every 12 hours  metoprolol tartrate Injectable 5 milliGRAM(s) IV Push every 6 hours  norepinephrine Infusion 0.1 MICROgram(s)/kG/Min (9.769 mL/Hr) IV Continuous <Continuous>  pantoprazole  Injectable 40 milliGRAM(s) IV Push daily  propofol Infusion 5 MICROgram(s)/kG/Min (1.563 mL/Hr) IV Continuous <Continuous>  vancomycin  IVPB 750 milliGRAM(s) IV Intermittent every 24 hours    MEDICATIONS  (PRN):      ALLERGIES:  Allergies    amoxicillin (Unknown)  Cipro (Unknown)  clindamycin (Unknown)  lactose (Unknown)  penicillin (Unknown)  Wheat (Unknown)    Intolerances        LABS:                        7.7    11.6  )-----------( 417      ( 03 Aug 2018 05:54 )             25.0     08-03    147<H>  |  106  |  38<H>  ----------------------------<  93  3.9   |  30  |  0.96    Ca    8.8      03 Aug 2018 05:54  Phos  1.9     08-03  Mg     2.1     08-03    TPro  7.6  /  Alb  3.0<L>  /  TBili  0.3  /  DBili  x   /  AST  17  /  ALT  12  /  AlkPhos  159<H>  08-02    PT/INR - ( 02 Aug 2018 06:16 )   PT: 11.7 sec;   INR: 1.05          PTT - ( 02 Aug 2018 06:16 )  PTT:25.6 sec  Urinalysis Basic - ( 02 Aug 2018 06:32 )    Color: Yellow / Appearance: Clear / S.020 / pH: x  Gluc: x / Ketone: NEGATIVE  / Bili: Negative / Urobili: 1.0 E.U./dL   Blood: x / Protein: 100 mg/dL / Nitrite: NEGATIVE   Leuk Esterase: NEGATIVE / RBC: < 5 /HPF / WBC 5-10 /HPF   Sq Epi: x / Non Sq Epi: 0-5 /HPF / Bacteria: Many /HPF      CAPILLARY BLOOD GLUCOSE      POCT Blood Glucose.: 103 mg/dL (02 Aug 2018 17:27)      RADIOLOGY & ADDITIONAL TESTS: Reviewed.    ASSESSMENT:    PLAN: OVERNIGHT EVENTS: NG tube passed    SUBJECTIVE / INTERVAL HPI: Patient seen and examined at bedside. Pt hypotensive and in afib with rvr. Pushed 10 of lopressor, 5 of verapamil. Pt started on amiodarone infusion. Levophed increased to 25 mcg. In setting of increasing levophed requirements and hemodynamic instability patient was shocked. Reverted back to sinus rythm . pressures good.     VITAL SIGNS:  Vital Signs Last 24 Hrs  T(C): 37.7 (03 Aug 2018 10:00), Max: 38.1 (02 Aug 2018 18:57)  T(F): 99.9 (03 Aug 2018 10:00), Max: 100.6 (02 Aug 2018 18:57)  HR: 76 (03 Aug 2018 10:00) (76 - 166)  BP: 102/59 (03 Aug 2018 10:00) (60/39 - 170/89)  BP(mean): 86 (03 Aug 2018 10:00) (48 - 143)  RR: 26 (03 Aug 2018 09:15) (14 - 40)  SpO2: 95% (03 Aug 2018 10:00) (93% - 99%)    PHYSICAL EXAM:    General: WDWN  HEENT: NC/AT; PERRL, anicteric sclera; MMM  Neck: supple  Cardiovascular: +S1/S2; RRR  Respiratory: b/l crackles, r>l  Gastrointestinal: soft, NT/ND; +BSx4  : riley in place  Extremities: edema noted on left lower extremity > right  Vascular: 2+ radial, DP/PT pulses B/L  Neurological: aox0, clonus +, cannot assess for sensory or motor deficits. Pt noted to have increased tone all 4 limbs  Skin: multiple ecchymoses    MEDICATIONS:  MEDICATIONS  (STANDING):  ALBUTerol/ipratropium for Nebulization 3 milliLiter(s) Nebulizer every 6 hours  amiodarone Infusion 1 mG/Min (33.333 mL/Hr) IV Continuous <Continuous>  amiodarone Infusion 0.5 mG/Min (16.667 mL/Hr) IV Continuous <Continuous>  chlorhexidine 0.12% Liquid 15 milliLiter(s) Swish and Spit two times a day  chlorhexidine 2% Cloths 1 Application(s) Topical daily  heparin  Injectable 5000 Unit(s) SubCutaneous every 12 hours  meropenem  IVPB 500 milliGRAM(s) IV Intermittent every 12 hours  metoprolol tartrate Injectable 5 milliGRAM(s) IV Push every 6 hours  norepinephrine Infusion 0.1 MICROgram(s)/kG/Min (9.769 mL/Hr) IV Continuous <Continuous>  pantoprazole  Injectable 40 milliGRAM(s) IV Push daily  propofol Infusion 5 MICROgram(s)/kG/Min (1.563 mL/Hr) IV Continuous <Continuous>  vancomycin  IVPB 750 milliGRAM(s) IV Intermittent every 24 hours    MEDICATIONS  (PRN):      ALLERGIES:  Allergies    amoxicillin (Unknown)  Cipro (Unknown)  clindamycin (Unknown)  lactose (Unknown)  penicillin (Unknown)  Wheat (Unknown)    Intolerances        LABS:                        7.7    11.6  )-----------( 417      ( 03 Aug 2018 05:54 )             25.0     08-03    147<H>  |  106  |  38<H>  ----------------------------<  93  3.9   |  30  |  0.96    Ca    8.8      03 Aug 2018 05:54  Phos  1.9     08-03  Mg     2.1     08-03    TPro  7.6  /  Alb  3.0<L>  /  TBili  0.3  /  DBili  x   /  AST  17  /  ALT  12  /  AlkPhos  159<H>  08-02    PT/INR - ( 02 Aug 2018 06:16 )   PT: 11.7 sec;   INR: 1.05          PTT - ( 02 Aug 2018 06:16 )  PTT:25.6 sec  Urinalysis Basic - ( 02 Aug 2018 06:32 )    Color: Yellow / Appearance: Clear / S.020 / pH: x  Gluc: x / Ketone: NEGATIVE  / Bili: Negative / Urobili: 1.0 E.U./dL   Blood: x / Protein: 100 mg/dL / Nitrite: NEGATIVE   Leuk Esterase: NEGATIVE / RBC: < 5 /HPF / WBC 5-10 /HPF   Sq Epi: x / Non Sq Epi: 0-5 /HPF / Bacteria: Many /HPF      CAPILLARY BLOOD GLUCOSE      POCT Blood Glucose.: 103 mg/dL (02 Aug 2018 17:27)      RADIOLOGY & ADDITIONAL TESTS: Reviewed.    ASSESSMENT:    PLAN:

## 2018-08-03 NOTE — PROGRESS NOTE ADULT - SUBJECTIVE AND OBJECTIVE BOX
Interventional, Pulmonary, Critical, Chest Special Procedures.    Pt was seen and fully examined by myself.     Time spent with patient in minutes:77    Patient is a 89y old  Female who presents with a chief complaint of shortness of breath (02 Aug 2018 15:19) Events leading to this admission reviewd. The patient intubated, sedated, in synchrony c CMV mode,    HPI:  HX obtained from critical care team   HX provided by son : 89 y/o F with PMH dementia, baseline mental status AOX1,  recurrent UTI, celiac's disease, lactose intolerance, right femoral thrombus s/p IVC filter 1/2017, chronic anemia, with recent discharge from [facility] for UTI and aspiration PNA presents from [facility] for respiratory distress.   Patient was recently discharged (7/20) after being treated with meropenem for proteus mirabilis and aerococcus urine UTI and additionally was treated for aspiration PNA.  Per documentation on previous admission discussion for PEG placement, son wanted it in setting of dysphagia she passed the modified barium swallow study and was to trial a liquid/puree diet. No hx of coughing after oral intake or choking , no hx of fever chills    In the ED, VS T 100.5, , BP 94/58, RR 24, SpO2 95% on RA. VBG significant for pH of 7.21, pCO2 98, pO2 79.  Pt placed on BiPAP. Repeat ABG after 90 minutes on BiPAP- pH 7.28, pCO2 70, pO2 66.  The decision was made to intubate in the ED. Central line was placed.   Vanc and meropenem and 1L NS. Tylenol 625mg.  LABS showing leukocytosis 18.2, Hgb 8.8. Bicarb 33, BUN 52, Cr 1.14 (baseline at discharge - BUN 7 Cr 0.61). UA (+) for UTI. Trop 0.05. BNP 88490.   EKG with q waves in lead III unchanged from prior EKG.   Chest xray with b/l pleural infiltrates-unchanged from previous imaging (02 Aug 2018 15:19)    REVIEW OF SYSTEMS:  as above  PAST MEDICAL & SURGICAL HISTORY:  Dementia  DVT (deep venous thrombosis)  Dysphagia  Anemia  DJD (degenerative joint disease)  Edema  IBS (irritable bowel syndrome)  Celiac disease  S/P IVC filter  H/O inguinal hernia repair  History of total left hip replacement    FAMILY HISTORY:  No pertinent family history in first degree relatives    SOCIAL HISTORY:      - Tobacco     - ETOH    Allergies    amoxicillin (Unknown)  Cipro (Unknown)  clindamycin (Unknown)  lactose (Unknown)  penicillin (Unknown)  Wheat (Unknown)    Intolerances      Vital Signs Last 24 Hrs  T(C): 37.7 (03 Aug 2018 10:00), Max: 38.1 (02 Aug 2018 18:57)  T(F): 99.9 (03 Aug 2018 10:00), Max: 100.6 (02 Aug 2018 18:57)  HR: 74 (03 Aug 2018 12:32) (72 - 166)  BP: 103/53 (03 Aug 2018 12:00) (60/39 - 170/89)  BP(mean): 76 (03 Aug 2018 12:00) (48 - 143)  RR: 12 (03 Aug 2018 12:00) (12 - 40)  SpO2: 95% (03 Aug 2018 12:32) (93% - 99%)    08-02 @ 07:01  -  08-03 @ 07:00  --------------------------------------------------------  IN: 385.6 mL / OUT: 745 mL / NET: -359.4 mL    08-03 @ 07:01  -  08-03 @ 13:17  --------------------------------------------------------  IN: 597.4 mL / OUT: 146 mL / NET: 451.4 mL      Mode: AC/ CMV (Assist Control/ Continuous Mandatory Ventilation)  RR (machine): 12  TV (machine): 360  FiO2: 40  PEEP: 5  ITime: 1  MAP: 9.4  PIP: 22    PHYSICAL EXAM:  Un Comfortable, no immediate distress  Eyes: PERRL, EOM intact; conjunctiva and sclera clear  Head: Normocephalic;  No Trauma  ENMT: No nasal discharge, hoarseness, cough or hemoptysis.    Neck: Supple; non tender; no masses or deformities.    No JVD  Respiratory: - WHEEZING   + RLL  RHONCHI  - RALES  + R>L  CRACKLES.  Diminished breath sounds  BILATERAL  RIGHT > LEFT base  Cardiovascular: Regular rate and rhythm. S1 and S2 Normal; No murmurs, gallops or rubs     - PPM/AICD  Gastrointestinal: Soft non-tender, non-distended; Normal bowel sounds; No hepatosplenomegaly.     -PEG    +OGT   + SWENSON  Genitourinary: No costovertebral angle tenderness. No dysuria  Extremities: NO AROM, No clubbing, cyanosis or edema    Vascular: Peripheral pulses palpable 2+ bilaterally  Neurological: Unresponsive to verbal, sedated  Skin: Warm and dry. No obvious rash  Lymph Nodes: No acute cervical or supraclavicular adenopathy  Psychiatric: sedated    DEVICES:  - DENTURES   +IV/ LIJ     7.5 ETUBE   -TRACH   -CTUBE  R / L      LABS:  ABG - ( 02 Aug 2018 09:21 )  pH, Arterial: 7.38  pH, Blood: x     /  pCO2: 55    /  pO2: 302   / HCO3: 32    / Base Excess: 5.7   /  SaO2: 100                                     7.7    11.6  )-----------( 417      ( 03 Aug 2018 05:54 )             25.0     08-03    147<H>  |  106  |  38<H>  ----------------------------<  93  3.9   |  30  |  0.96    Ca    8.8      03 Aug 2018 05:54  Phos  1.9     08-03  Mg     2.1     08-03    TPro  7.6  /  Alb  3.0<L>  /  TBili  0.3  /  DBili  x   /  AST  17  /  ALT  12  /  AlkPhos  159<H>  08-02    PT/INR - ( 02 Aug 2018 06:16 )   PT: 11.7 sec;   INR: 1.05          PTT - ( 02 Aug 2018 06:16 )  PTT:25.6 sec  < from: Xray Chest 1 View- PORTABLE-Urgent (08.03.18 @ 00:30) >    EXAM:  XR CHEST PORTABLE URGENT 1V                          PROCEDURE DATE:  08/03/2018          INTERPRETATION:  Indication: intubated    A single portable view of the chest is submitted. Comparison is made to   the most recent prior study dated August 2, 2018. The upper lung fields   are nonvisualized. Vascular congestion is and effusions are present.   There has been interval placement of a feeding tube with its tip in the   left upper quadrant. Other support lines appear similar to the prior   study.    Impression:    New feeding tub    < end of copied text >  RADIOLOGY & ADDITIONAL STUDIES (The following images were personally reviewed):

## 2018-08-03 NOTE — PROVIDER CONTACT NOTE (CHANGE IN STATUS NOTIFICATION) - ASSESSMENT
Upon reassessment patient noted to have rapid a-fib with HR in the 140's. Patient sedated in bed. No signs and symptoms of acute pain or distress noted.

## 2018-08-03 NOTE — PROGRESS NOTE ADULT - ASSESSMENT
ASSESSMENT/PLAN89 y/o F with PMH dementia, recurrent UTI, Celiac' s disease, lactose intolerance, R. femoral thrombus s/p IVC filter 1/2017, chronic anemia, with recent discharge from [facility] for UTI and aspiration PNA presents from [facility] for respiratory distress with acute respiratory failure found to be in septic shock likely from aspiration PNA and UTI.     1. O2 continue CMV mode, attempt weaning off sedation  2. Bronchodilators:  Atrovent/ albuterol q 4 – 6 hours as needed  3. Corticosteroids: off  4. ID/Antibiotics: Vancomycin/Meropenem, attempt tracheal aspirate CX, send Legionell urine Ag.  5. Cardiac/HTN: optimize BP  6. GI: Rx/ prophylaxis c PPI/H2B  7. Heme: Rx/VT prophylaxis c SQH/SCD/AC  8. Aspiration precautions at all times  Discussed with managing team

## 2018-08-03 NOTE — PROVIDER CONTACT NOTE (CHANGE IN STATUS NOTIFICATION) - SITUATION
Upon reassessment patient noted to have rapid a-fib with HR in the 140's. Patient sedated in bed. No signs and symptoms of acute pain or distress noted. Please refer to EMR for details.

## 2018-08-03 NOTE — ADVANCED PRACTICE NURSE CONSULT - ASSESSMENT
Patient presented on admission with unstageable pressure injury on spine with 50% yellow slough, 25% eschar and 25% pink, non-granulating tissue draining moderate amount of serosanguinous exudate measuring 5 cm x 3.5 cm . Periwound erythematous. Blanchable redness noted on left heel and healed pressure injury on right heel. Patient on AirTAP positioning system with wedges for repositioning patient while in bed. Patient also wearing heel protectors to offload heels. Sanchez GUERRERO present and assisted during assessment.

## 2018-08-03 NOTE — ADVANCED PRACTICE NURSE CONSULT - REASON FOR CONSULT
WOC nurse consult for 89 y/o F with PMH dementia, baseline mental status AOX1,  recurrent UTI, celiac's disease, lactose intolerance, right femoral thrombus s/p IVC filter 1/2017, chronic anemia, with recent discharge from [facility] for UTI and aspiration PNA presents from [facility] for respiratory distress.

## 2018-08-03 NOTE — ADVANCED PRACTICE NURSE CONSULT - RECOMMEDATIONS
Pressure injury on spine - cleanse with normal saline, apply Cavilon liquid skin barrier to periwound, Medihoney to wound, cover with foam dressing every other day of prn if soiled or saturated.   Discussed assessment and recommendations with patient's son and Sanchez. Pressure injury on spine - cleanse with normal saline, apply Cavilon liquid skin barrier to periwound, Medihoney to wound, cover with foam dressing every other day of prn if soiled or saturated.   Discussed assessment and recommendations with patient's son, Sanchez and house staff Josué Burgess.

## 2018-08-03 NOTE — PROVIDER CONTACT NOTE (CHANGE IN STATUS NOTIFICATION) - ACTION/TREATMENT ORDERED:
Appropriate medications given as per MD order. Please review EMR for details. Patient stable. Will endorse to day team. Will continue to monitor.

## 2018-08-04 DIAGNOSIS — K58.9 IRRITABLE BOWEL SYNDROME WITHOUT DIARRHEA: ICD-10-CM

## 2018-08-04 DIAGNOSIS — J18.9 PNEUMONIA, UNSPECIFIED ORGANISM: ICD-10-CM

## 2018-08-04 DIAGNOSIS — D64.9 ANEMIA, UNSPECIFIED: ICD-10-CM

## 2018-08-04 DIAGNOSIS — F03.90 UNSPECIFIED DEMENTIA WITHOUT BEHAVIORAL DISTURBANCE: ICD-10-CM

## 2018-08-04 LAB
-  CEFAZOLIN: SIGNIFICANT CHANGE UP
-  CLINDAMYCIN: SIGNIFICANT CHANGE UP
-  ERYTHROMYCIN: SIGNIFICANT CHANGE UP
-  LINEZOLID: SIGNIFICANT CHANGE UP
-  OXACILLIN: SIGNIFICANT CHANGE UP
-  PENICILLIN: SIGNIFICANT CHANGE UP
-  RIFAMPIN: SIGNIFICANT CHANGE UP
-  TRIMETHOPRIM/SULFAMETHOXAZOLE: SIGNIFICANT CHANGE UP
-  VANCOMYCIN: SIGNIFICANT CHANGE UP
ALBUMIN SERPL ELPH-MCNC: 2.2 G/DL — LOW (ref 3.3–5)
ANION GAP SERPL CALC-SCNC: 13 MMOL/L — SIGNIFICANT CHANGE UP (ref 5–17)
BUN SERPL-MCNC: 32 MG/DL — HIGH (ref 7–23)
CALCIUM SERPL-MCNC: 8.4 MG/DL — SIGNIFICANT CHANGE UP (ref 8.4–10.5)
CHLORIDE SERPL-SCNC: 102 MMOL/L — SIGNIFICANT CHANGE UP (ref 96–108)
CO2 SERPL-SCNC: 27 MMOL/L — SIGNIFICANT CHANGE UP (ref 22–31)
CREAT SERPL-MCNC: 0.99 MG/DL — SIGNIFICANT CHANGE UP (ref 0.5–1.3)
CULTURE RESULTS: SIGNIFICANT CHANGE UP
GLUCOSE SERPL-MCNC: 134 MG/DL — HIGH (ref 70–99)
HCT VFR BLD CALC: 25.1 % — LOW (ref 34.5–45)
HGB BLD-MCNC: 7.9 G/DL — LOW (ref 11.5–15.5)
MAGNESIUM SERPL-MCNC: 2.3 MG/DL — SIGNIFICANT CHANGE UP (ref 1.6–2.6)
MCHC RBC-ENTMCNC: 26.7 PG — LOW (ref 27–34)
MCHC RBC-ENTMCNC: 31.5 G/DL — LOW (ref 32–36)
MCV RBC AUTO: 84.8 FL — SIGNIFICANT CHANGE UP (ref 80–100)
METHOD TYPE: SIGNIFICANT CHANGE UP
ORGANISM # SPEC MICROSCOPIC CNT: SIGNIFICANT CHANGE UP
ORGANISM # SPEC MICROSCOPIC CNT: SIGNIFICANT CHANGE UP
PHOSPHATE SERPL-MCNC: 2.8 MG/DL — SIGNIFICANT CHANGE UP (ref 2.5–4.5)
PLATELET # BLD AUTO: 429 K/UL — HIGH (ref 150–400)
POTASSIUM SERPL-MCNC: 3.9 MMOL/L — SIGNIFICANT CHANGE UP (ref 3.5–5.3)
POTASSIUM SERPL-SCNC: 3.9 MMOL/L — SIGNIFICANT CHANGE UP (ref 3.5–5.3)
RBC # BLD: 2.96 M/UL — LOW (ref 3.8–5.2)
RBC # FLD: 14.9 % — SIGNIFICANT CHANGE UP (ref 10.3–16.9)
SODIUM SERPL-SCNC: 142 MMOL/L — SIGNIFICANT CHANGE UP (ref 135–145)
SPECIMEN SOURCE: SIGNIFICANT CHANGE UP
VANCOMYCIN TROUGH SERPL-MCNC: 12.5 UG/ML — SIGNIFICANT CHANGE UP (ref 10–20)
WBC # BLD: 8.9 K/UL — SIGNIFICANT CHANGE UP (ref 3.8–10.5)
WBC # FLD AUTO: 8.9 K/UL — SIGNIFICANT CHANGE UP (ref 3.8–10.5)

## 2018-08-04 PROCEDURE — 99291 CRITICAL CARE FIRST HOUR: CPT

## 2018-08-04 PROCEDURE — 71045 X-RAY EXAM CHEST 1 VIEW: CPT | Mod: 26

## 2018-08-04 RX ORDER — FUROSEMIDE 40 MG
20 TABLET ORAL ONCE
Qty: 0 | Refills: 0 | Status: COMPLETED | OUTPATIENT
Start: 2018-08-04 | End: 2018-08-04

## 2018-08-04 RX ORDER — VANCOMYCIN HCL 1 G
1000 VIAL (EA) INTRAVENOUS ONCE
Qty: 0 | Refills: 0 | Status: DISCONTINUED | OUTPATIENT
Start: 2018-08-04 | End: 2018-08-04

## 2018-08-04 RX ORDER — CEFAZOLIN SODIUM 1 G
1000 VIAL (EA) INJECTION EVERY 12 HOURS
Qty: 0 | Refills: 0 | Status: COMPLETED | OUTPATIENT
Start: 2018-08-04 | End: 2018-08-08

## 2018-08-04 RX ORDER — AMIODARONE HYDROCHLORIDE 400 MG/1
400 TABLET ORAL
Qty: 0 | Refills: 0 | Status: DISCONTINUED | OUTPATIENT
Start: 2018-08-04 | End: 2018-08-07

## 2018-08-04 RX ADMIN — CHLORHEXIDINE GLUCONATE 1 APPLICATION(S): 213 SOLUTION TOPICAL at 12:48

## 2018-08-04 RX ADMIN — Medication 3 MILLILITER(S): at 04:40

## 2018-08-04 RX ADMIN — AMIODARONE HYDROCHLORIDE 16.67 MG/MIN: 400 TABLET ORAL at 01:28

## 2018-08-04 RX ADMIN — Medication 20 MILLIGRAM(S): at 10:04

## 2018-08-04 RX ADMIN — MEROPENEM 100 MILLIGRAM(S): 1 INJECTION INTRAVENOUS at 07:14

## 2018-08-04 RX ADMIN — Medication 3 MILLILITER(S): at 10:13

## 2018-08-04 RX ADMIN — Medication 5 MILLIGRAM(S): at 18:06

## 2018-08-04 RX ADMIN — Medication 3 MILLILITER(S): at 21:51

## 2018-08-04 RX ADMIN — Medication 5 MILLIGRAM(S): at 12:48

## 2018-08-04 RX ADMIN — Medication 100 MILLIGRAM(S): at 18:05

## 2018-08-04 RX ADMIN — Medication 3 MILLILITER(S): at 15:58

## 2018-08-04 RX ADMIN — CHLORHEXIDINE GLUCONATE 15 MILLILITER(S): 213 SOLUTION TOPICAL at 18:06

## 2018-08-04 RX ADMIN — CHLORHEXIDINE GLUCONATE 15 MILLILITER(S): 213 SOLUTION TOPICAL at 07:14

## 2018-08-04 RX ADMIN — Medication 250 MILLIGRAM(S): at 09:13

## 2018-08-04 RX ADMIN — HEPARIN SODIUM 5000 UNIT(S): 5000 INJECTION INTRAVENOUS; SUBCUTANEOUS at 18:06

## 2018-08-04 RX ADMIN — PANTOPRAZOLE SODIUM 40 MILLIGRAM(S): 20 TABLET, DELAYED RELEASE ORAL at 12:48

## 2018-08-04 RX ADMIN — HEPARIN SODIUM 5000 UNIT(S): 5000 INJECTION INTRAVENOUS; SUBCUTANEOUS at 07:14

## 2018-08-04 RX ADMIN — PROPOFOL 1.56 MICROGRAM(S)/KG/MIN: 10 INJECTION, EMULSION INTRAVENOUS at 18:53

## 2018-08-04 RX ADMIN — AMIODARONE HYDROCHLORIDE 400 MILLIGRAM(S): 400 TABLET ORAL at 09:33

## 2018-08-04 RX ADMIN — Medication 5 MILLIGRAM(S): at 07:14

## 2018-08-04 NOTE — PROGRESS NOTE ADULT - SUBJECTIVE AND OBJECTIVE BOX
OVERNIGHT EVENTS: ALYCIA    SUBJECTIVE / INTERVAL HPI: Patient seen and examined at bedside. DID not tolerate CPAP this morning, got tachypneic, hypoxic. Back on assist. ROS not obtainable    VITAL SIGNS:  Vital Signs Last 24 Hrs  T(C): 37.6 (04 Aug 2018 10:21), Max: 37.9 (04 Aug 2018 04:58)  T(F): 99.6 (04 Aug 2018 10:21), Max: 100.2 (04 Aug 2018 04:58)  HR: 76 (04 Aug 2018 11:00) (64 - 80)  BP: 92/57 (04 Aug 2018 11:00) (73/47 - 107/56)  BP(mean): 73 (04 Aug 2018 11:00) (60 - 84)  RR: 16 (04 Aug 2018 11:00) (11 - 28)  SpO2: 95% (04 Aug 2018 11:59) (94% - 98%)    PHYSICAL EXAM:    General: WDWN  HEENT: NC/AT; PERRL, anicteric sclera; MMM  Neck: supple  Cardiovascular: +S1/S2; RRR  Respiratory: b/l crackles r>l   Gastrointestinal: soft, NT/ND; +BSx4  Extremities: left lower extremity >>right , pitting edema noted   Vascular: 2+ radial, DP/PT pulses B/L  Neurological: moves all extremities though LEs are weak , aox0  Skin: no rash  MSK: OA    MEDICATIONS:  MEDICATIONS  (STANDING):  ALBUTerol/ipratropium for Nebulization 3 milliLiter(s) Nebulizer every 6 hours  amiodarone    Tablet 400 milliGRAM(s) Oral two times a day  chlorhexidine 0.12% Liquid 15 milliLiter(s) Swish and Spit two times a day  chlorhexidine 2% Cloths 1 Application(s) Topical daily  heparin  Injectable 5000 Unit(s) SubCutaneous every 12 hours  metoprolol tartrate Injectable 5 milliGRAM(s) IV Push every 6 hours  norepinephrine Infusion 0.1 MICROgram(s)/kG/Min (9.769 mL/Hr) IV Continuous <Continuous>  pantoprazole  Injectable 40 milliGRAM(s) IV Push daily  propofol Infusion 5 MICROgram(s)/kG/Min (1.563 mL/Hr) IV Continuous <Continuous>    MEDICATIONS  (PRN):      ALLERGIES:  Allergies    amoxicillin (Unknown)  Cipro (Unknown)  clindamycin (Unknown)  lactose (Unknown)  penicillin (Unknown)  Wheat (Unknown)    Intolerances        LABS:                        7.9    8.9   )-----------( 429      ( 04 Aug 2018 02:50 )             25.1     08-04    142  |  102  |  32<H>  ----------------------------<  134<H>  3.9   |  27  |  0.99    Ca    8.4      04 Aug 2018 02:50  Phos  2.8     08-04  Mg     2.3     08-04    TPro  x   /  Alb  2.2<L>  /  TBili  x   /  DBili  x   /  AST  x   /  ALT  x   /  AlkPhos  x   08-04        CAPILLARY BLOOD GLUCOSE      POCT Blood Glucose.: 103 mg/dL (02 Aug 2018 17:27)      RADIOLOGY & ADDITIONAL TESTS: Reviewed.    ASSESSMENT:    PLAN:

## 2018-08-04 NOTE — PROGRESS NOTE ADULT - SUBJECTIVE AND OBJECTIVE BOX
Interventional, Pulmonary, Critical, Chest Special Procedures.    Pt was seen and fully examined by myself.     Time spent with patient in minutes: 67    Patient is a 89y old  Female who presents with a chief complaint of shortness of breath (02 Aug 2018 15:19) The patient intubated, sedated, attemps openiong eyes to commands.    HPI:  ICOMPLETE:  HX obtained from critical care team   HX provided by son : 89 y/o F with PMH dementia, baseline mental status AOX1,  recurrent UTI, celiac's disease, lactose intolerance, right femoral thrombus s/p IVC filter 1/2017, chronic anemia, with recent discharge from [facility] for UTI and aspiration PNA presents from [facility] for respiratory distress.   Patient was recently discharged (7/20) after being treated with meropenem for proteus mirabilis and aerococcus urine UTI and additionally was treated for aspiration PNA.  Per documentation on previous admission discussion for PEG placement, son wanted it in setting of dysphagia she passed the modified barium swallow study and was to trial a liquid/puree diet. No hx of coughing after oral intake or choking , no hx of fever chills    In the ED, VS T 100.5, , BP 94/58, RR 24, SpO2 95% on RA. VBG significant for pH of 7.21, pCO2 98, pO2 79.  Pt placed on BiPAP. Repeat ABG after 90 minutes on BiPAP- pH 7.28, pCO2 70, pO2 66.  The decision was made to intubate in the ED. Central line was placed.   Vanc and meropenem and 1L NS. Tylenol 625mg.  LABS showing leukocytosis 18.2, Hgb 8.8. Bicarb 33, BUN 52, Cr 1.14 (baseline at discharge - BUN 7 Cr 0.61). UA (+) for UTI. Trop 0.05. BNP 52863.   EKG with q waves in lead III unchanged from prior EKG.   Chest xray with b/l pleural infiltrates-unchanged from previous imaging (02 Aug 2018 15:19)    REVIEW OF SYSTEMS: updated  Constitutional: No fever, weight loss, chills or fatigue  Eyes: No eye pain, visual disturbances, or discharge  ENMT:  No difficulty hearing, tinnitus, vertigo; No sinus or throat pain. No epistaxis, dysphagia, dysphonia, hoarseness or odynophagia  Neck: No pain, stiffness or neck swelling.  No masses or deformities  Respiratory: No cough, wheezing, chills or hemoptysis  - COPD  - ILD   - PE   - ASTHMA     - PNEUMONIA  Cardiovascular: No chest pain, dysrhythmia, palpitations, dizziness or edema   - COPD     - CAD   - CHF   - HTN  Gastrointestinal: No abdominal or epigastric pain. No nausea, vomiting or hematemesis; No diarrhea or constipation. No melena or hematochezia. No dysphagia or Icterus.          Genitourinary: No dysuria, frequency, hematuria or incontinence   - CKD/CAMRYN      - ESRD  Neurological: No headaches, memory loss, loss of strength, numbness or tremors      -DEMENTIA     - STROKE    - SEIZURE  Skin: No itching, burning, rashes or lesions   Lymph Nodes: No enlarged glands  Endocrine: No heat or cold intolerance; No hair loss       - DM     - THYROID DISORDER  Musculoskeletal: No joint pain or swelling; No muscle, back or extremity pain  Psychiatric: No depression, anxiety, mood swings or difficulty sleeping  Heme/Lymph: No easy bruising or bleeding gums         - ANEMIA      - CANCER   -COAGULOPATHY  Allergy and Immunologic: No hives or eczema    PAST MEDICAL & SURGICAL HISTORY:  Dementia  DVT (deep venous thrombosis)  Dysphagia  Anemia  DJD (degenerative joint disease)  Edema  IBS (irritable bowel syndrome)  Celiac disease  S/P IVC filter  H/O inguinal hernia repair  History of total left hip replacement    FAMILY HISTORY:  No pertinent family history in first degree relatives    SOCIAL HISTORY:      - Tobacco     - ETOH    Allergies    amoxicillin (Unknown)  Cipro (Unknown)  clindamycin (Unknown)  lactose (Unknown)  penicillin (Unknown)  Wheat (Unknown)    Intolerances      Vital Signs Last 24 Hrs  T(C): 37.6 (04 Aug 2018 10:21), Max: 37.9 (04 Aug 2018 04:58)  T(F): 99.6 (04 Aug 2018 10:21), Max: 100.2 (04 Aug 2018 04:58)  HR: 70 (04 Aug 2018 10:00) (64 - 80)  BP: 93/51 (04 Aug 2018 10:00) (73/47 - 107/65)  BP(mean): 75 (04 Aug 2018 10:00) (60 - 88)  RR: 12 (04 Aug 2018 10:00) (11 - 28)  SpO2: 97% (04 Aug 2018 10:00) (94% - 98%)    08-03 @ 07:01  -  08-04 @ 07:00  --------------------------------------------------------  IN: 1858.6 mL / OUT: 585 mL / NET: 1273.6 mL    08-04 @ 07:01  -  08-04 @ 10:30  --------------------------------------------------------  IN: 74.4 mL / OUT: 30 mL / NET: 44.4 mL      Mode: AC/ CMV (Assist Control/ Continuous Mandatory Ventilation)  RR (machine): 12  TV (machine): 360  FiO2: 40  PEEP: 5  ITime: 1  MAP: 8  PIP: 21    PHYSICAL EXAM:  Un Comfortable, no immediate distress  Eyes: PERRL, EOM intact; conjunctiva and sclera clear  Head: Normocephalic;  No Trauma  ENMT: No nasal discharge, hoarseness, cough or hemoptysis.    Neck: Supple; non tender; no masses or deformities.    No JVD  Respiratory: - WHEEZING   + RLL  RHONCHI  - RALES  + R>L  CRACKLES.  Diminished breath sounds  BILATERAL  RIGHT > LEFT base  Cardiovascular: Regular rate and rhythm. S1 and S2 Normal; No murmurs, gallops or rubs     - PPM/AICD  Gastrointestinal: Soft non-tender, non-distended; Normal bowel sounds; No hepatosplenomegaly.     -PEG    +OGT   + SWENSON  Genitourinary: No costovertebral angle tenderness. No dysuria  Extremities: NO AROM, No clubbing, cyanosis or edema    Vascular: Peripheral pulses palpable 2+ bilaterally  Neurological: Unresponsive to verbal, sedated  Skin: Warm and dry. No obvious rash  Lymph Nodes: No acute cervical or supraclavicular adenopathy  Psychiatric: sedated    DEVICES:  - DENTURES   +IV/ LIJ     7.5 ETUBE   -TRACH   -CTUBE  R / L      LABS:                          7.9    8.9   )-----------( 429      ( 04 Aug 2018 02:50 )             25.1     08-04    142  |  102  |  32<H>  ----------------------------<  134<H>  3.9   |  27  |  0.99    Ca    8.4      04 Aug 2018 02:50  Phos  2.8     08-04  Mg     2.3     08-04    TPro  x   /  Alb  2.2<L>  /  TBili  x   /  DBili  x   /  AST  x   /  ALT  x   /  AlkPhos  x   08-04      RADIOLOGY & ADDITIONAL STUDIES (The following images were personally reviewed):< from: Xray Chest 1 View- PORTABLE-Routine (08.04.18 @ 06:05) >    EXAM:  XR CHEST PORTABLE ROUTINE 1V                          PROCEDURE DATE:  08/04/2018          INTERPRETATION:  Clinical History: Pneumonia    Portable examination chest demonstrates no interval change lung pathology   in comparison to prior examination of the chest 8/3/2018. No interval   change this remaining support devices.    Impression: No interval change lung pathology    < end of copied text >

## 2018-08-04 NOTE — PROGRESS NOTE ADULT - ASSESSMENT
ASSESSMENT/PLAN89 y/o F with PMH dementia, recurrent UTI, Celiac' s disease, lactose intolerance, R. femoral thrombus s/p IVC filter 1/2017, chronic anemia, with recent discharge from [facility] for UTI and aspiration PNA presents from [facility] for respiratory distress with acute respiratory failure found to be in septic shock likely from aspiration PNA and UTI.     1. O2 continue CMV mode, attempt weaning off sedation  2. Bronchodilators:  Atrovent/ albuterol q 4 – 6 hours as needed  3. Corticosteroids: off  4. ID/Antibiotics: Vancomycin/Meropenem, attempt tracheal aspirate CX, send Legionell urine Ag.  5. Cardiac/HTN: optimize BP  6. GI: Rx/ prophylaxis c PPI/H2B  7. Heme: Rx/VT prophylaxis c SQH/SCD/AC  8. Aspiration precautions at all times  Discussed with managing team ICU

## 2018-08-04 NOTE — PROGRESS NOTE ADULT - SUBJECTIVE AND OBJECTIVE BOX
OVERNIGHT EVENTS: ALYCIA    SUBJECTIVE / INTERVAL HPI: Patient seen and examined at bedside. DID not tolerate CPAP this morning, got tachypneic, hypoxic. Back on assist    VITAL SIGNS:  Vital Signs Last 24 Hrs  T(C): 37.6 (04 Aug 2018 10:21), Max: 37.9 (04 Aug 2018 04:58)  T(F): 99.6 (04 Aug 2018 10:21), Max: 100.2 (04 Aug 2018 04:58)  HR: 76 (04 Aug 2018 11:00) (64 - 80)  BP: 92/57 (04 Aug 2018 11:00) (73/47 - 107/56)  BP(mean): 73 (04 Aug 2018 11:00) (60 - 84)  RR: 16 (04 Aug 2018 11:00) (11 - 28)  SpO2: 95% (04 Aug 2018 11:59) (94% - 98%)    PHYSICAL EXAM:    General: WDWN  HEENT: NC/AT; PERRL, anicteric sclera; MMM  Neck: supple  Cardiovascular: +S1/S2; RRR  Respiratory: b/l crackles r>l   Gastrointestinal: soft, NT/ND; +BSx4  Extremities: left lower extremity >>right , pitting edema noted   Vascular: 2+ radial, DP/PT pulses B/L  Neurological: unable to assess for motor or sensory deficits , aox0    MEDICATIONS:  MEDICATIONS  (STANDING):  ALBUTerol/ipratropium for Nebulization 3 milliLiter(s) Nebulizer every 6 hours  amiodarone    Tablet 400 milliGRAM(s) Oral two times a day  chlorhexidine 0.12% Liquid 15 milliLiter(s) Swish and Spit two times a day  chlorhexidine 2% Cloths 1 Application(s) Topical daily  heparin  Injectable 5000 Unit(s) SubCutaneous every 12 hours  metoprolol tartrate Injectable 5 milliGRAM(s) IV Push every 6 hours  norepinephrine Infusion 0.1 MICROgram(s)/kG/Min (9.769 mL/Hr) IV Continuous <Continuous>  pantoprazole  Injectable 40 milliGRAM(s) IV Push daily  propofol Infusion 5 MICROgram(s)/kG/Min (1.563 mL/Hr) IV Continuous <Continuous>    MEDICATIONS  (PRN):      ALLERGIES:  Allergies    amoxicillin (Unknown)  Cipro (Unknown)  clindamycin (Unknown)  lactose (Unknown)  penicillin (Unknown)  Wheat (Unknown)    Intolerances        LABS:                        7.9    8.9   )-----------( 429      ( 04 Aug 2018 02:50 )             25.1     08-04    142  |  102  |  32<H>  ----------------------------<  134<H>  3.9   |  27  |  0.99    Ca    8.4      04 Aug 2018 02:50  Phos  2.8     08-04  Mg     2.3     08-04    TPro  x   /  Alb  2.2<L>  /  TBili  x   /  DBili  x   /  AST  x   /  ALT  x   /  AlkPhos  x   08-04        CAPILLARY BLOOD GLUCOSE      POCT Blood Glucose.: 103 mg/dL (02 Aug 2018 17:27)      RADIOLOGY & ADDITIONAL TESTS: Reviewed.    ASSESSMENT:    PLAN: OVERNIGHT EVENTS: ALYCIA    SUBJECTIVE / INTERVAL HPI: Patient seen and examined at bedside. DID not tolerate CPAP this morning, got tachypneic, hypoxic. Back on assist. ROS not obtainable    VITAL SIGNS:  Vital Signs Last 24 Hrs  T(C): 37.6 (04 Aug 2018 10:21), Max: 37.9 (04 Aug 2018 04:58)  T(F): 99.6 (04 Aug 2018 10:21), Max: 100.2 (04 Aug 2018 04:58)  HR: 76 (04 Aug 2018 11:00) (64 - 80)  BP: 92/57 (04 Aug 2018 11:00) (73/47 - 107/56)  BP(mean): 73 (04 Aug 2018 11:00) (60 - 84)  RR: 16 (04 Aug 2018 11:00) (11 - 28)  SpO2: 95% (04 Aug 2018 11:59) (94% - 98%)    PHYSICAL EXAM:    General: WDWN  HEENT: NC/AT; PERRL, anicteric sclera; MMM  Neck: supple  Cardiovascular: +S1/S2; RRR  Respiratory: b/l crackles r>l   Gastrointestinal: soft, NT/ND; +BSx4  Extremities: left lower extremity >>right , pitting edema noted   Vascular: 2+ radial, DP/PT pulses B/L  Neurological: moves all extremities though LEs are weak , aox0  Skin: no rash  MSK: OA    MEDICATIONS:  MEDICATIONS  (STANDING):  ALBUTerol/ipratropium for Nebulization 3 milliLiter(s) Nebulizer every 6 hours  amiodarone    Tablet 400 milliGRAM(s) Oral two times a day  chlorhexidine 0.12% Liquid 15 milliLiter(s) Swish and Spit two times a day  chlorhexidine 2% Cloths 1 Application(s) Topical daily  heparin  Injectable 5000 Unit(s) SubCutaneous every 12 hours  metoprolol tartrate Injectable 5 milliGRAM(s) IV Push every 6 hours  norepinephrine Infusion 0.1 MICROgram(s)/kG/Min (9.769 mL/Hr) IV Continuous <Continuous>  pantoprazole  Injectable 40 milliGRAM(s) IV Push daily  propofol Infusion 5 MICROgram(s)/kG/Min (1.563 mL/Hr) IV Continuous <Continuous>    MEDICATIONS  (PRN):      ALLERGIES:  Allergies    amoxicillin (Unknown)  Cipro (Unknown)  clindamycin (Unknown)  lactose (Unknown)  penicillin (Unknown)  Wheat (Unknown)    Intolerances        LABS:                        7.9    8.9   )-----------( 429      ( 04 Aug 2018 02:50 )             25.1     08-04    142  |  102  |  32<H>  ----------------------------<  134<H>  3.9   |  27  |  0.99    Ca    8.4      04 Aug 2018 02:50  Phos  2.8     08-04  Mg     2.3     08-04    TPro  x   /  Alb  2.2<L>  /  TBili  x   /  DBili  x   /  AST  x   /  ALT  x   /  AlkPhos  x   08-04        CAPILLARY BLOOD GLUCOSE      POCT Blood Glucose.: 103 mg/dL (02 Aug 2018 17:27)      RADIOLOGY & ADDITIONAL TESTS: Reviewed.    ASSESSMENT:    PLAN:

## 2018-08-04 NOTE — PROGRESS NOTE ADULT - ASSESSMENT
89 y/o F with PMH dementia, recurrent UTI, Celiac' s disease, lactose intolerance, R. femoral thrombus s/p IVC filter 1/2017, chronic anemia, with recent discharge from [facility] for UTI and aspiration PNA presents from [facility] for respiratory distress with acute respiratorfailure found to be in septic shock likely from aspiration PNA and UTI.

## 2018-08-05 DIAGNOSIS — R13.10 DYSPHAGIA, UNSPECIFIED: ICD-10-CM

## 2018-08-05 LAB
ANION GAP SERPL CALC-SCNC: 11 MMOL/L — SIGNIFICANT CHANGE UP (ref 5–17)
BUN SERPL-MCNC: 25 MG/DL — HIGH (ref 7–23)
CALCIUM SERPL-MCNC: 8.3 MG/DL — LOW (ref 8.4–10.5)
CHLORIDE SERPL-SCNC: 101 MMOL/L — SIGNIFICANT CHANGE UP (ref 96–108)
CO2 SERPL-SCNC: 30 MMOL/L — SIGNIFICANT CHANGE UP (ref 22–31)
CREAT SERPL-MCNC: 0.94 MG/DL — SIGNIFICANT CHANGE UP (ref 0.5–1.3)
GLUCOSE SERPL-MCNC: 126 MG/DL — HIGH (ref 70–99)
HCT VFR BLD CALC: 24.2 % — LOW (ref 34.5–45)
HGB BLD-MCNC: 7.8 G/DL — LOW (ref 11.5–15.5)
MAGNESIUM SERPL-MCNC: 1.9 MG/DL — SIGNIFICANT CHANGE UP (ref 1.6–2.6)
MCHC RBC-ENTMCNC: 27.3 PG — SIGNIFICANT CHANGE UP (ref 27–34)
MCHC RBC-ENTMCNC: 32.2 G/DL — SIGNIFICANT CHANGE UP (ref 32–36)
MCV RBC AUTO: 84.6 FL — SIGNIFICANT CHANGE UP (ref 80–100)
PHOSPHATE SERPL-MCNC: 3 MG/DL — SIGNIFICANT CHANGE UP (ref 2.5–4.5)
PLATELET # BLD AUTO: 420 K/UL — HIGH (ref 150–400)
POTASSIUM SERPL-MCNC: 3.7 MMOL/L — SIGNIFICANT CHANGE UP (ref 3.5–5.3)
POTASSIUM SERPL-SCNC: 3.7 MMOL/L — SIGNIFICANT CHANGE UP (ref 3.5–5.3)
RBC # BLD: 2.86 M/UL — LOW (ref 3.8–5.2)
RBC # FLD: 14.9 % — SIGNIFICANT CHANGE UP (ref 10.3–16.9)
SODIUM SERPL-SCNC: 142 MMOL/L — SIGNIFICANT CHANGE UP (ref 135–145)
VANCOMYCIN TROUGH SERPL-MCNC: 17 UG/ML — SIGNIFICANT CHANGE UP (ref 10–20)
WBC # BLD: 9 K/UL — SIGNIFICANT CHANGE UP (ref 3.8–10.5)
WBC # FLD AUTO: 9 K/UL — SIGNIFICANT CHANGE UP (ref 3.8–10.5)

## 2018-08-05 PROCEDURE — 71045 X-RAY EXAM CHEST 1 VIEW: CPT | Mod: 26

## 2018-08-05 PROCEDURE — 99291 CRITICAL CARE FIRST HOUR: CPT

## 2018-08-05 RX ORDER — POTASSIUM CHLORIDE 20 MEQ
10 PACKET (EA) ORAL
Qty: 0 | Refills: 0 | Status: COMPLETED | OUTPATIENT
Start: 2018-08-05 | End: 2018-08-05

## 2018-08-05 RX ORDER — ALBUMIN HUMAN 25 %
50 VIAL (ML) INTRAVENOUS EVERY 8 HOURS
Qty: 0 | Refills: 0 | Status: DISCONTINUED | OUTPATIENT
Start: 2018-08-05 | End: 2018-08-06

## 2018-08-05 RX ADMIN — CHLORHEXIDINE GLUCONATE 15 MILLILITER(S): 213 SOLUTION TOPICAL at 05:25

## 2018-08-05 RX ADMIN — Medication 100 MILLIEQUIVALENT(S): at 08:12

## 2018-08-05 RX ADMIN — Medication 3 MILLILITER(S): at 12:00

## 2018-08-05 RX ADMIN — Medication 5 MILLIGRAM(S): at 12:06

## 2018-08-05 RX ADMIN — Medication 100 MILLIEQUIVALENT(S): at 07:39

## 2018-08-05 RX ADMIN — Medication 50 MILLILITER(S): at 12:01

## 2018-08-05 RX ADMIN — Medication 100 MILLIGRAM(S): at 18:00

## 2018-08-05 RX ADMIN — Medication 3 MILLILITER(S): at 18:00

## 2018-08-05 RX ADMIN — PANTOPRAZOLE SODIUM 40 MILLIGRAM(S): 20 TABLET, DELAYED RELEASE ORAL at 12:06

## 2018-08-05 RX ADMIN — Medication 100 MILLIEQUIVALENT(S): at 05:24

## 2018-08-05 RX ADMIN — HEPARIN SODIUM 5000 UNIT(S): 5000 INJECTION INTRAVENOUS; SUBCUTANEOUS at 05:25

## 2018-08-05 RX ADMIN — Medication 5 MILLIGRAM(S): at 05:25

## 2018-08-05 RX ADMIN — Medication 9.77 MICROGRAM(S)/KG/MIN: at 00:41

## 2018-08-05 RX ADMIN — AMIODARONE HYDROCHLORIDE 400 MILLIGRAM(S): 400 TABLET ORAL at 18:00

## 2018-08-05 RX ADMIN — Medication 100 MILLIGRAM(S): at 05:24

## 2018-08-05 RX ADMIN — AMIODARONE HYDROCHLORIDE 400 MILLIGRAM(S): 400 TABLET ORAL at 05:24

## 2018-08-05 RX ADMIN — Medication 5 MILLIGRAM(S): at 00:41

## 2018-08-05 RX ADMIN — Medication 3 MILLILITER(S): at 05:32

## 2018-08-05 RX ADMIN — CHLORHEXIDINE GLUCONATE 15 MILLILITER(S): 213 SOLUTION TOPICAL at 18:00

## 2018-08-05 RX ADMIN — Medication 5 MILLIGRAM(S): at 18:00

## 2018-08-05 RX ADMIN — HEPARIN SODIUM 5000 UNIT(S): 5000 INJECTION INTRAVENOUS; SUBCUTANEOUS at 18:00

## 2018-08-05 NOTE — PROGRESS NOTE ADULT - ASSESSMENT
89 y/o F with PMH dementia, recurrent UTI, Celiac' s disease, lactose intolerance, R. femoral thrombus s/p IVC filter 1/2017, chronic anemia, with recent discharge from [facility] for UTI and aspiration PNA presents from [facility] for respiratory distress with acute respiratory failure found to be in septic shock likely from aspiration PNA and UTI.       Pulmonology   Hypercapnic resp failure  etiology likely secondary to aspiration pneumonia vs HAP  CXR showing b/l pleural infiltrates, unchanged from pevious xray  In ED VBG significant for pH of 7.21, pCO2 98, pO2 79.Pt placed on BiPAP. Repeat ABG after 90 minutes on BiPAP- pH 7.28, pCO2 70, pO2 66.  The decision was made to intubate in the ED  Pt currently on assist ventilation  CPAP trial this morning - patient tachypneic to 30s  Oxygenation: FIO2 40, PEEP 5   Ventilation: ON assist mode, patient not using accessory muscles, breathing in sync with ventilator, no nasal flaring noted.   Sputum cx growing staph aureus MSSA: deescalate to iv cefazolin in setting of penicillin allergy unable to use nafcillin  Will replete albumin today and repeat level tomorrow. if in high 2s, then will diurese tomorrow.    Infectious disease  Septic shock   Upon arrival patient had a fever , was tachycardic, elevated wbc count 18.2, lactate negative   Received 1 L N.S , no additional fluids given because perfusion good- lactate is negative, , no edema noted, extremities not cold- clinically appeared euvolemic etiology related to aspiration pna vs hospital acquired pna   of note usg showing right sided lower lobe consolidation   In setting of recent admit to hospital are treating for hospital acquired pna ,s/p vanc and meropenum in ED  sputum cx showing staph aureus MSSA  urine cx negative so far  c/w cefazolin in setting of penicillin allergy unable to use nafcillin    #hx of recurrent uti  UTI: UA  showing WBC-5-10, BACTERIA-MANY, NEGATIVE NITRITE, Negative leuk esterase     last admission UCx positive for proteus mirabilis and aerococcus , was treated with meropenum  continue with contact precautions for ESBL -esbl Ecoli in 2017  urine cx so far negative     Cardiovascular   septic shock  currently on pressor NE titrate to a map >65  Troponin 0.05, EKG unchanged from prior admission.  BNP 37357. Last echocardiogram in July had intact LV function with EF of 65%      #hx of paroxysmal a fib however not anticoagulated given hx of gastric avm on egd   on 8/3 patient was  hypotensive and in afib with rvr. Pushed 10 of lopressor, 5 of verapamil. Pt started on amiodarone infusion. Levophed increased to 25 mcg.  Patient continued to be in afib. In setting of increasing levophed requirements and hemodynamic instability patient was shocked. Reverted back to sinus rythm . pressures good.   Pt has been in sinus since   will d/c amio drip and c/w oral amiodarone 400 mg bid     GI : constipation  continue with bowel regimen    #Renal  - Upon admission patient noted to have an CAMRYN: BUN 52 and Cr 1.16 increased from baseline in July - BUN 7 Cr .61. etiology likely prerenal in the setting of sepsis   - f/u labs this morning shows cr, bun downtrending   continue to monitor     F no iv fluids for now  E replete as needed  N NG feeds initiated   D MICU   GI ppx: iv protonix   Heparin sq q12  - Full code: son is HCP and verbally communicated that he would like intubation, CPR, lines and pressors should she require them.  Critical care time rendered 40 minutes 89 y/o F with PMH dementia, recurrent UTI, Celiac' s disease, lactose intolerance, R. femoral thrombus s/p IVC filter 1/2017, chronic anemia, with recent discharge from [facility] for UTI and aspiration PNA presents from [facility] for respiratory distress with acute respiratory failure found to be in septic shock likely from aspiration PNA and UTI.       Pulmonology   Hypercapnic resp failure  etiology likely secondary to aspiration pneumonia vs HAP  CXR showing b/l pleural infiltrates, unchanged from pevious xray  In ED VBG significant for pH of 7.21, pCO2 98, pO2 79.Pt placed on BiPAP. Repeat ABG after 90 minutes on BiPAP- pH 7.28, pCO2 70, pO2 66.  The decision was made to intubate in the ED  Pt currently on assist ventilation  CPAP trial this morning - patient tachypneic to 30s  Oxygenation: FIO2 40, PEEP 5   Ventilation: ON assist mode, patient not using accessory muscles, breathing in sync with ventilator, no nasal flaring noted.   Sputum cx growing staph aureus MSSA: deescalate to iv cefazolin in setting of penicillin allergy unable to use nafcillin  Will replete albumin today and repeat level tomorrow. if in high 2s, then will diurese tomorrow.    Infectious disease  Septic shock   Upon arrival patient had a fever , was tachycardic, elevated wbc count 18.2, lactate negative   Received 1 L N.S , no additional fluids given because perfusion good- lactate is negative, , no edema noted, extremities not cold- clinically appeared euvolemic etiology related to aspiration pna vs hospital acquired pna   of note usg showing right sided lower lobe consolidation   In setting of recent admit to hospital are treating for hospital acquired pna ,s/p vanc and meropenum in ED  sputum cx showing staph aureus MSSA  urine cx negative so far  c/w cefazolin in setting of penicillin allergy unable to use nafcillin    #hx of recurrent uti  UTI: UA  showing WBC-5-10, BACTERIA-MANY, NEGATIVE NITRITE, Negative leuk esterase     last admission UCx positive for proteus mirabilis and aerococcus , was treated with meropenum  continue with contact precautions for ESBL -esbl Ecoli in 2017  urine cx so far negative     Cardiovascular   septic shock  currently on pressor NE titrate to a map >65  Troponin 0.05, EKG unchanged from prior admission.  BNP 21283. Last echocardiogram in July had intact LV function with EF of 65%  adding 25% albumin to try to help mobilize fluid    #hx of paroxysmal a fib however not anticoagulated given hx of gastric avm on egd   on 8/3 patient was  hypotensive and in afib with rvr. Pushed 10 of lopressor, 5 of verapamil. Pt started on amiodarone infusion. Levophed increased to 25 mcg.  Patient continued to be in afib. In setting of increasing levophed requirements and hemodynamic instability patient was shocked. Reverted back to sinus rythm . pressures good.   Pt has been in sinus since   will d/c amio drip and c/w oral amiodarone 400 mg bid     GI : constipation  continue with bowel regimen    #Renal  - Upon admission patient noted to have an CAMRYN: BUN 52 and Cr 1.16 increased from baseline in July - BUN 7 Cr .61. etiology likely prerenal in the setting of sepsis   - f/u labs this morning shows cr, bun downtrending   continue to monitor     F no iv fluids for now  E replete as needed  N NG feeds initiated   D MICU   GI ppx: iv protonix   Heparin sq q12  - Full code: son is HCP and verbally communicated that he would like intubation, CPR, lines and pressors should she require them.  Critical care time rendered 45 minutes

## 2018-08-05 NOTE — PROGRESS NOTE ADULT - SUBJECTIVE AND OBJECTIVE BOX
OVERNIGHT: No overnight events. tmax 100.6  SUBJECTIVE: Patient seen and examined at bedside. more awake and alert today. no pain. failed cpap - accessory muscle use and tachypneic    ROS:  CV: Denies chest pain  Resp: Denies SOB on vent  GI: Denies abdominal pain, constipation, diarrhea, nausea, vomiting  : Denies dysuria  ID: Denies fevers, chills  MSK: Denies joint pain     OBJECTIVE:    VITAL SIGNS:  ICU Vital Signs Last 24 Hrs  T(C): 37.3 (05 Aug 2018 14:18), Max: 38.1 (04 Aug 2018 18:21)  T(F): 99.1 (05 Aug 2018 14:18), Max: 100.6 (04 Aug 2018 18:21)  HR: 76 (05 Aug 2018 15:00) (68 - 88)  BP: 109/60 (05 Aug 2018 15:00) (74/49 - 117/61)  BP(mean): 81 (05 Aug 2018 15:00) (62 - 82)  ABP: --  ABP(mean): --  RR: 14 (05 Aug 2018 15:00) (11 - 31)  SpO2: 96% (05 Aug 2018 15:00) (95% - 98%)    Mode: AC/ CMV (Assist Control/ Continuous Mandatory Ventilation), RR (machine): 12, TV (machine): 360, FiO2: 40, PEEP: 5, ITime: 1, MAP: 8.9, PIP: 23    08-04 @ 07:01  -  08-05 @ 07:00  --------------------------------------------------------  IN: 1726.8 mL / OUT: 1865 mL / NET: -138.2 mL    08-05 @ 07:01  -  08-05 @ 15:22  --------------------------------------------------------  IN: 610.2 mL / OUT: 340 mL / NET: 270.2 mL      CAPILLARY BLOOD GLUCOSE          PHYSICAL EXAM:  General: NAD, comfortable on vent, more awake today  HEENT: NCAT, PERRL, clear conjunctiva, no scleral icterus  Neck: supple, no JVD  Respiratory: b/l crackles  Cardiovascular: RRR, normal S1S2, no M/R/G  Vascular: 2+ radial and DP pulses  Abdomen: soft, NT/ND, bowel sounds in all four quadrants, no palpable masses  Extremities: LE edema   Skin: No rashes present      MEDICATIONS:  MEDICATIONS  (STANDING):  albumin human 25% IVPB 50 milliLiter(s) IV Intermittent every 8 hours  ALBUTerol/ipratropium for Nebulization 3 milliLiter(s) Nebulizer every 6 hours  amiodarone    Tablet 400 milliGRAM(s) Oral two times a day  ceFAZolin   IVPB 1000 milliGRAM(s) IV Intermittent every 12 hours  chlorhexidine 0.12% Liquid 15 milliLiter(s) Swish and Spit two times a day  chlorhexidine 2% Cloths 1 Application(s) Topical daily  heparin  Injectable 5000 Unit(s) SubCutaneous every 12 hours  metoprolol tartrate Injectable 5 milliGRAM(s) IV Push every 6 hours  norepinephrine Infusion 0.1 MICROgram(s)/kG/Min (9.769 mL/Hr) IV Continuous <Continuous>  pantoprazole  Injectable 40 milliGRAM(s) IV Push daily  propofol Infusion 5 MICROgram(s)/kG/Min (1.563 mL/Hr) IV Continuous <Continuous>    MEDICATIONS  (PRN):      ALLERGIES:  Allergies    amoxicillin (Unknown)  Cipro (Unknown)  clindamycin (Unknown)  lactose (Unknown)  penicillin (Unknown)  Wheat (Unknown)    Intolerances        LABS:                        7.8    9.0   )-----------( 420      ( 05 Aug 2018 02:51 )             24.2     08-05    142  |  101  |  25<H>  ----------------------------<  126<H>  3.7   |  30  |  0.94    Ca    8.3<L>      05 Aug 2018 02:50  Phos  3.0     08-05  Mg     1.9     08-05    TPro  x   /  Alb  2.2<L>  /  TBili  x   /  DBili  x   /  AST  x   /  ALT  x   /  AlkPhos  x   08-04          RADIOLOGY & ADDITIONAL TESTS: Reviewed.

## 2018-08-05 NOTE — PROGRESS NOTE ADULT - SUBJECTIVE AND OBJECTIVE BOX
Interventional, Pulmonary, Critical, Chest Special Procedures.    Pt was seen and fully examined by myself.     Time spent with patient in minutes:67    Patient is a 89y old  Female who presents with a chief complaint of shortness of breath (02 Aug 2018 15:19) The patient awake and engaging, responds to commands, pain free when seen. Able to generate 430tv ON DEMAND. Plan for PEG nutrition discussed c pts son and GI.    HPI:  HX obtained from critical care team   HX provided by son : 89 y/o F with PMH dementia, baseline mental status AOX1,  recurrent UTI, celiac's disease, lactose intolerance, right femoral thrombus s/p IVC filter 1/2017, chronic anemia, with recent discharge from [facility] for UTI and aspiration PNA presents from [facility] for respiratory distress.   Patient was recently discharged (7/20) after being treated with meropenem for proteus mirabilis and aerococcus urine UTI and additionally was treated for aspiration PNA.  Per documentation on previous admission discussion for PEG placement, son wanted it in setting of dysphagia she passed the modified barium swallow study and was to trial a liquid/puree diet. No hx of coughing after oral intake or choking , no hx of fever chills    In the ED, VS T 100.5, , BP 94/58, RR 24, SpO2 95% on RA. VBG significant for pH of 7.21, pCO2 98, pO2 79.  Pt placed on BiPAP. Repeat ABG after 90 minutes on BiPAP- pH 7.28, pCO2 70, pO2 66.  The decision was made to intubate in the ED. Central line was placed.   Vanc and meropenem and 1L NS. Tylenol 625mg.  LABS showing leukocytosis 18.2, Hgb 8.8. Bicarb 33, BUN 52, Cr 1.14 (baseline at discharge - BUN 7 Cr 0.61). UA (+) for UTI. Trop 0.05. BNP 80095.   EKG with q waves in lead III unchanged from prior EKG.   Chest xray with b/l pleural infiltrates-unchanged from previous imaging (02 Aug 2018 15:19)    REVIEW OF SYSTEMS:  updated    PAST MEDICAL & SURGICAL HISTORY:  Dementia  DVT (deep venous thrombosis)  Dysphagia  Anemia  DJD (degenerative joint disease)  Edema  IBS (irritable bowel syndrome)  Celiac disease  S/P IVC filter  H/O inguinal hernia repair  History of total left hip replacement    FAMILY HISTORY:  No pertinent family history in first degree relatives    SOCIAL HISTORY:      - Tobacco     - ETOH    Allergies    amoxicillin (Unknown)  Cipro (Unknown)  clindamycin (Unknown)  lactose (Unknown)  penicillin (Unknown)  Wheat (Unknown)    Intolerances      Vital Signs Last 24 Hrs  T(C): 37.8 (05 Aug 2018 09:30), Max: 38.1 (04 Aug 2018 18:21)  T(F): 100 (05 Aug 2018 09:30), Max: 100.6 (04 Aug 2018 18:21)  HR: 88 (05 Aug 2018 10:00) (68 - 88)  BP: 108/59 (05 Aug 2018 10:00) (74/49 - 117/61)  BP(mean): 79 (05 Aug 2018 10:00) (62 - 83)  RR: 15 (05 Aug 2018 10:00) (12 - 31)  SpO2: 96% (05 Aug 2018 11:45) (94% - 98%)    08-04 @ 07:01  -  08-05 @ 07:00  --------------------------------------------------------  IN: 1726.8 mL / OUT: 1865 mL / NET: -138.2 mL    08-05 @ 07:01  -  08-05 @ 11:49  --------------------------------------------------------  IN: 252.6 mL / OUT: 30 mL / NET: 222.6 mL      Mode: AC/ CMV (Assist Control/ Continuous Mandatory Ventilation)  RR (machine): 12  TV (machine): 360  FiO2: 40  PEEP: 5  ITime: 1  MAP: 8.9  PIP: 23    PHYSICAL EXAM:  More Comfortable, no immediate distress  Eyes: PERRL, EOM intact; conjunctiva and sclera clear  Head: Normocephalic;  No Trauma  ENMT: No nasal discharge, hoarseness, cough or hemoptysis.    Neck: Supple; non tender; no masses or deformities.    No JVD  Respiratory: - WHEEZING   + RLL less  RHONCHI  - RALES  + R>L  CRACKLES.  Diminished breath sounds  BILATERAL  RIGHT > LEFT base  Cardiovascular: Regular rate and rhythm. S1 and S2 Normal; No murmurs, gallops or rubs     - PPM/AICD  Gastrointestinal: Soft non-tender, non-distended; Normal bowel sounds; No hepatosplenomegaly.     -PEG    +OGT   + SWENSON  Genitourinary: No costovertebral angle tenderness. No dysuria  Extremities: NO AROM, No clubbing, cyanosis or edema    Vascular: Peripheral pulses palpable 2+ bilaterally  Neurological: responsive to verbal, engaging  Skin: Warm and dry. No obvious rash  Lymph Nodes: No acute cervical or supraclavicular adenopathy  Psychiatric: more engaging    DEVICES:  - DENTURES   +IV/ LIJ     7.5 ETUBE   -TRACH   -CTUBE  R / L      LABS:                          7.8    9.0   )-----------( 420      ( 05 Aug 2018 02:51 )             24.2     08-05    142  |  101  |  25<H>  ----------------------------<  126<H>  3.7   |  30  |  0.94    Ca    8.3<L>      05 Aug 2018 02:50  Phos  3.0     08-05  Mg     1.9     08-05    TPro  x   /  Alb  2.2<L>  /  TBili  x   /  DBili  x   /  AST  x   /  ALT  x   /  AlkPhos  x   08-04    < from: Xray Chest 1 View- PORTABLE-Routine (08.05.18 @ 04:52) >  EXAM:  XR CHEST PORTABLE ROUTINE 1V                          PROCEDURE DATE:  08/05/2018          INTERPRETATION:  Clinical History: Pneumonia    Portable examination chest demonstrates improvement congestion and/or   infiltrates in comparison to prior examination of the chest 8/4/2018.   Bilateral effusions. No interval change this remaining support devices.   The patient's head obscures the lung apices.    Impression: Improvement congestion and/or infiltrates. Bilateral effusions    < end of copied text >    RADIOLOGY & ADDITIONAL STUDIES (The following images were personally reviewed):

## 2018-08-05 NOTE — PROGRESS NOTE ADULT - ATTENDING COMMENTS
Septic shock  Multiorgan failure  Cont ab/fluids  Attempt to wean unsuccessful  SUPP care-prog is poor

## 2018-08-05 NOTE — PROGRESS NOTE ADULT - ASSESSMENT
89 y/o F with PMH dementia, recurrent UTI, Celiac' s disease, lactose intolerance, R. femoral thrombus s/p IVC filter 1/2017, chronic anemia, with recent discharge from [facility] for UTI and aspiration PNA presents from [facility] for respiratory distress with acute respiratory failure found to be in septic shock likely from aspiration PNA and UTI.

## 2018-08-05 NOTE — PROGRESS NOTE ADULT - ASSESSMENT
ASSESSMENT/PLAN89 y/o F with PMH dementia, recurrent UTI, Celiac' s disease, lactose intolerance, 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 for UTI and aspiration PNA presents from [facility] for respiratory distress with acute respiratory failure found to be in septic shock likely from aspiration PNA and UTI.     1. O2 continue CMV mode, attempt weaning but do not extubate in anticipation of PEG  2. Bronchodilators:  Atrovent/ albuterol q 4 – 6 hours as needed  3. Corticosteroids: off  4. ID/Antibiotics: now on Ancef  5. Cardiac/HTN: optimize BP  6. GI: Rx/ prophylaxis c PPI/H2B  7. Heme: Rx/VT prophylaxis c SQH/SCD/AC  8. Aspiration precautions at all times  9. The patient pulmonary stable for PEG  Discussed with managing team ICU, GI

## 2018-08-06 LAB
ANION GAP SERPL CALC-SCNC: 11 MMOL/L — SIGNIFICANT CHANGE UP (ref 5–17)
APTT BLD: 21.2 SEC — LOW (ref 27.5–37.4)
BLD GP AB SCN SERPL QL: NEGATIVE — SIGNIFICANT CHANGE UP
BUN SERPL-MCNC: 19 MG/DL — SIGNIFICANT CHANGE UP (ref 7–23)
CALCIUM SERPL-MCNC: 8.8 MG/DL — SIGNIFICANT CHANGE UP (ref 8.4–10.5)
CHLORIDE SERPL-SCNC: 100 MMOL/L — SIGNIFICANT CHANGE UP (ref 96–108)
CO2 SERPL-SCNC: 28 MMOL/L — SIGNIFICANT CHANGE UP (ref 22–31)
CREAT SERPL-MCNC: 0.81 MG/DL — SIGNIFICANT CHANGE UP (ref 0.5–1.3)
GLUCOSE SERPL-MCNC: 81 MG/DL — SIGNIFICANT CHANGE UP (ref 70–99)
HCT VFR BLD CALC: 24.1 % — LOW (ref 34.5–45)
HGB BLD-MCNC: 7.8 G/DL — LOW (ref 11.5–15.5)
INR BLD: 1.19 — HIGH (ref 0.88–1.16)
MAGNESIUM SERPL-MCNC: 2 MG/DL — SIGNIFICANT CHANGE UP (ref 1.6–2.6)
MCHC RBC-ENTMCNC: 27.2 PG — SIGNIFICANT CHANGE UP (ref 27–34)
MCHC RBC-ENTMCNC: 32.4 G/DL — SIGNIFICANT CHANGE UP (ref 32–36)
MCV RBC AUTO: 84 FL — SIGNIFICANT CHANGE UP (ref 80–100)
PHOSPHATE SERPL-MCNC: 2.7 MG/DL — SIGNIFICANT CHANGE UP (ref 2.5–4.5)
PLATELET # BLD AUTO: 387 K/UL — SIGNIFICANT CHANGE UP (ref 150–400)
POTASSIUM SERPL-MCNC: 4.6 MMOL/L — SIGNIFICANT CHANGE UP (ref 3.5–5.3)
POTASSIUM SERPL-SCNC: 4.6 MMOL/L — SIGNIFICANT CHANGE UP (ref 3.5–5.3)
PROTHROM AB SERPL-ACNC: 13.3 SEC — HIGH (ref 9.8–12.7)
RBC # BLD: 2.87 M/UL — LOW (ref 3.8–5.2)
RBC # FLD: 14.9 % — SIGNIFICANT CHANGE UP (ref 10.3–16.9)
RH IG SCN BLD-IMP: POSITIVE — SIGNIFICANT CHANGE UP
SODIUM SERPL-SCNC: 139 MMOL/L — SIGNIFICANT CHANGE UP (ref 135–145)
WBC # BLD: 10.2 K/UL — SIGNIFICANT CHANGE UP (ref 3.8–10.5)
WBC # FLD AUTO: 10.2 K/UL — SIGNIFICANT CHANGE UP (ref 3.8–10.5)

## 2018-08-06 PROCEDURE — 99233 SBSQ HOSP IP/OBS HIGH 50: CPT | Mod: GC

## 2018-08-06 PROCEDURE — 93010 ELECTROCARDIOGRAM REPORT: CPT

## 2018-08-06 RX ORDER — PROPOFOL 10 MG/ML
10 INJECTION, EMULSION INTRAVENOUS ONCE
Qty: 0 | Refills: 0 | Status: COMPLETED | OUTPATIENT
Start: 2018-08-06 | End: 2018-08-06

## 2018-08-06 RX ORDER — MIDODRINE HYDROCHLORIDE 2.5 MG/1
5 TABLET ORAL EVERY 8 HOURS
Qty: 0 | Refills: 0 | Status: DISCONTINUED | OUTPATIENT
Start: 2018-08-06 | End: 2018-08-07

## 2018-08-06 RX ORDER — ROBINUL 0.2 MG/ML
0.1 INJECTION INTRAMUSCULAR; INTRAVENOUS ONCE
Qty: 0 | Refills: 0 | Status: COMPLETED | OUTPATIENT
Start: 2018-08-06 | End: 2018-08-06

## 2018-08-06 RX ADMIN — HEPARIN SODIUM 5000 UNIT(S): 5000 INJECTION INTRAVENOUS; SUBCUTANEOUS at 19:05

## 2018-08-06 RX ADMIN — AMIODARONE HYDROCHLORIDE 400 MILLIGRAM(S): 400 TABLET ORAL at 19:02

## 2018-08-06 RX ADMIN — MIDODRINE HYDROCHLORIDE 5 MILLIGRAM(S): 2.5 TABLET ORAL at 10:02

## 2018-08-06 RX ADMIN — ROBINUL 0.1 MILLIGRAM(S): 0.2 INJECTION INTRAMUSCULAR; INTRAVENOUS at 15:10

## 2018-08-06 RX ADMIN — AMIODARONE HYDROCHLORIDE 400 MILLIGRAM(S): 400 TABLET ORAL at 05:26

## 2018-08-06 RX ADMIN — MIDODRINE HYDROCHLORIDE 5 MILLIGRAM(S): 2.5 TABLET ORAL at 19:05

## 2018-08-06 RX ADMIN — Medication 50 MILLILITER(S): at 02:53

## 2018-08-06 RX ADMIN — Medication 3 MILLILITER(S): at 01:12

## 2018-08-06 RX ADMIN — Medication 3 MILLILITER(S): at 12:58

## 2018-08-06 RX ADMIN — Medication 5 MILLIGRAM(S): at 19:02

## 2018-08-06 RX ADMIN — Medication 5 MILLIGRAM(S): at 11:46

## 2018-08-06 RX ADMIN — Medication 5 MILLIGRAM(S): at 03:52

## 2018-08-06 RX ADMIN — PROPOFOL 10 MILLIGRAM(S): 10 INJECTION, EMULSION INTRAVENOUS at 12:40

## 2018-08-06 RX ADMIN — CHLORHEXIDINE GLUCONATE 15 MILLILITER(S): 213 SOLUTION TOPICAL at 19:02

## 2018-08-06 RX ADMIN — Medication 100 MILLIGRAM(S): at 05:26

## 2018-08-06 RX ADMIN — Medication 9.77 MICROGRAM(S)/KG/MIN: at 09:50

## 2018-08-06 RX ADMIN — Medication 3 MILLILITER(S): at 18:04

## 2018-08-06 RX ADMIN — CHLORHEXIDINE GLUCONATE 15 MILLILITER(S): 213 SOLUTION TOPICAL at 05:25

## 2018-08-06 RX ADMIN — Medication 3 MILLILITER(S): at 06:45

## 2018-08-06 RX ADMIN — Medication 5 MILLIGRAM(S): at 05:27

## 2018-08-06 RX ADMIN — PROPOFOL 10 MILLIGRAM(S): 10 INJECTION, EMULSION INTRAVENOUS at 12:43

## 2018-08-06 RX ADMIN — PROPOFOL 10 MILLIGRAM(S): 10 INJECTION, EMULSION INTRAVENOUS at 12:46

## 2018-08-06 RX ADMIN — Medication 100 MILLIGRAM(S): at 19:05

## 2018-08-06 RX ADMIN — HEPARIN SODIUM 5000 UNIT(S): 5000 INJECTION INTRAVENOUS; SUBCUTANEOUS at 05:25

## 2018-08-06 RX ADMIN — PANTOPRAZOLE SODIUM 40 MILLIGRAM(S): 20 TABLET, DELAYED RELEASE ORAL at 11:46

## 2018-08-06 NOTE — PROGRESS NOTE ADULT - ASSESSMENT
89 y/o F with PMH dementia, recurrent UTI, Celiac' s disease, lactose intolerance, R. femoral thrombus s/p IVC filter 1/2017, chronic anemia, with recent discharge from [facility] for UTI and aspiration PNA presents from [facility] for respiratory distress with acute respiratory failure found to be in septic shock likely from aspiration PNA and UTI.

## 2018-08-06 NOTE — PROCEDURE NOTE - NSPROCDETAILS_GEN_ALL_CORE
patient pre-oxygenated, tube inserted, placement confirmed
guidewire recovered/lumen(s) aspirated and flushed/sterile dressing applied/sterile technique, catheter placed/ultrasound guidance
stomach/location identified, feeding tube inserted

## 2018-08-06 NOTE — PROGRESS NOTE ADULT - ASSESSMENT
89 y/o F with PMH dementia, recurrent UTI, Celiac' s disease, lactose intolerance, R. femoral thrombus s/p IVC filter 1/2017, chronic anemia, with recent discharge from [facility] for UTI and aspiration PNA presents from [facility] for respiratory distress with acute respiratory failure found to be in septic shock likely from aspiration PNA      Pulmonology   Hypercapnic resp failure  etiology likely secondary to aspiration pneumonia vs HAP  CXR showing b/l pleural infiltrates, unchanged from previous  xray, USG done at bedside showing RLL consolidation  In ED VBG significant for pH of 7.21, pCO2 98, pO2 79.Pt placed on BiPAP. Repeat ABG after 90 minutes on BiPAP- pH 7.28, pCO2 70, pO2 66.  The decision was made to intubate in the ED  Pt currently on assist ventilation  Oxygenation: FIO2 40, PEEP 5   Ventilation: ON assist mode, patient not using accessory muscles, breathing in sync with ventilator, no nasal flaring noted.   Sputum cx growing staph aureus MSSA: deescalated to iv cefazolin in setting of penicillin allergy unable to use nafcillin  c/w Iv cefazolin     Infectious disease  Septic shock   Upon arrival patient had a fever , was tachycardic, elevated wbc count 18.2, lactate negative   Received 1 L N.S , no additional fluids given because perfusion good- lactate is negative, , no edema noted, extremities not cold- clinically appeared euvolemic etiology related to aspiration pna vs hospital acquired pna   In setting of recent admit to hospital are treating for hospital acquired pna ,s/p vanc and meropenum in ED  sputum cx showing staph aureus MSSA  urine cx negative so far  c/w cefazolin in setting of penicillin allergy unable to use nafcillin      #hx of recurrent uti  UTI: UA  showing WBC-5-10, BACTERIA-MANY, NEGATIVE NITRITE, Negative leuk esterase     last admission UCx positive for proteus mirabilis and aerococcus , was treated with meropenum  continue with contact precautions for ESBL -esbl Ecoli in 2017  urine cx so far negative     Cardiovascular   septic shock  currently on pressor NE titrate to a map >65  Troponin 0.05, EKG unchanged from prior admission.  BNP 13245. Last echocardiogram in July had intact LV function with EF of 65%  adding 25% albumin to try to help mobilize fluid    #hx of paroxysmal a fib however not anticoagulated given hx of gastric avm on egd   on 8/3 patient was  hypotensive and in afib with rvr. Pushed 10 of lopressor, 5 of verapamil. Pt started on amiodarone infusion. Levophed increased to 25 mcg.  Patient continued to be in afib. In setting of increasing levophed requirements and hemodynamic instability patient was shocked. Reverted back to sinus rythm . pressures good.   Pt has been in sinus since   will d/c amio drip and c/w oral amiodarone 400 mg bid     GI : constipation  continue with bowel regimen    #Renal  CAMRYN: RESOLVED  - Upon admission patient noted to have an CAMRYN: BUN 52 and Cr 1.16 increased from baseline in July - BUN 7 Cr .61. etiology likely prerenal in the setting of sepsis   continue to monitor     F Iv albumin 25 % intermittent tid   E replete as needed  N NG feeds initiated   D MICU   GI ppx: iv protonix   Heparin sq q12  - Full code: son is HCP and verbally communicated that he would like intubation, CPR, lines and pressors should she require them.  Critical care time rendered 45 minutes 89 y/o F with PMH dementia, recurrent UTI, Celiac' s disease, lactose intolerance, R. femoral thrombus s/p IVC filter 1/2017, chronic anemia, with recent discharge from [facility] for UTI and aspiration PNA presents from [facility] for respiratory distress with acute respiratory failure found to be in septic shock likely from aspiration PNA      Pulmonology   Hypercapnic resp failure  etiology likely secondary to aspiration pneumonia vs HAP  CXR showing b/l pleural infiltrates, unchanged from previous  xray, USG done at bedside showing RLL consolidation  In ED VBG significant for pH of 7.21, pCO2 98, pO2 79.Pt placed on BiPAP. Repeat ABG after 90 minutes on BiPAP- pH 7.28, pCO2 70, pO2 66.  The decision was made to intubate in the ED  Pt currently on assist ventilation  Oxygenation: FIO2 40, PEEP 5   Ventilation: ON assist mode, patient not using accessory muscles, breathing in sync with ventilator, no nasal flaring noted.   Sputum cx growing staph aureus MSSA: deescalated to iv cefazolin in setting of penicillin allergy unable to use nafcillin  c/w Iv cefazolin     Infectious disease  Septic shock   Upon arrival patient had a fever , was tachycardic, elevated wbc count 18.2, lactate negative   Received 1 L N.S , no additional fluids given because perfusion good- lactate is negative, , no edema noted, extremities not cold- clinically appeared euvolemic etiology related to aspiration pna vs hospital acquired pna   In setting of recent admit to hospital are treating for hospital acquired pna ,s/p vanc and meropenum in ED  sputum cx showing staph aureus MSSA  urine cx negative so far  c/w cefazolin in setting of penicillin allergy unable to use nafcillin      #hx of recurrent uti  UTI: UA  showing WBC-5-10, BACTERIA-MANY, NEGATIVE NITRITE, Negative leuk esterase     last admission UCx positive for proteus mirabilis and aerococcus , was treated with meropenum  urine cx so far negative     Cardiovascular   septic shock  currently on pressor NE titrate to a map >65  Troponin 0.05, EKG unchanged from prior admission.  BNP 22898. Last echocardiogram in July had intact LV function with EF of 65%  add midodrine 5 mg tid to help down titrate levo      #hx of paroxysmal a fib however not anticoagulated given hx of gastric avm on egd   on 8/3 patient was  hypotensive and in afib with rvr. Pushed 10 of lopressor, 5 of verapamil. Pt started on amiodarone infusion. Levophed increased to 25 mcg.  Patient continued to be in afib. In setting of increasing levophed requirements and hemodynamic instability patient was shocked. Reverted back to sinus rythm . pressures good.   Pt has been in sinus since   will d/c amio drip and c/w oral amiodarone 400 mg bid     GI :HX of dysphagia  will plan for bedside PEG today     # constipation  continue with bowel regimen    #Renal  CAMRYN: RESOLVED  - Upon admission patient noted to have an CAMRYN: BUN 52 and Cr 1.16 increased from baseline in July - BUN 7 Cr .61. etiology likely prerenal in the setting of sepsis   continue to monitor     F Iv albumin 25 % intermittent tid   E replete as needed  N oG feeds initiated   D MICU   GI ppx: iv protonix   Heparin sq q12  - Full code: son is HCP and verbally communicated that he would like intubation, CPR, lines and pressors should she require them.  Critical care time rendered 45 minutes 89 y/o F with PMH dementia, recurrent UTI, Celiac' s disease, lactose intolerance, R. femoral thrombus s/p IVC filter 1/2017, chronic anemia, with recent discharge from [facility] for UTI and aspiration PNA presents from [facility] for respiratory distress with acute respiratory failure found to be in septic shock likely from aspiration PNA      Pulmonology   Hypercapnic resp failure  etiology likely secondary to aspiration pneumonia vs HAP  CXR showing b/l pleural infiltrates, unchanged from previous  xray, USG done at bedside showing RLL consolidation  In ED VBG significant for pH of 7.21, pCO2 98, pO2 79.Pt placed on BiPAP. Repeat ABG after 90 minutes on BiPAP- pH 7.28, pCO2 70, pO2 66.  The decision was made to intubate in the ED  Pt currently on assist ventilation  Oxygenation: FIO2 40, PEEP 5   Ventilation: ON assist mode, patient not using accessory muscles, breathing in sync with ventilator, no nasal flaring noted.   Sputum cx growing staph aureus MSSA: deescalated to iv cefazolin in setting of penicillin allergy unable to use nafcillin  c/w Iv cefazolin     Infectious disease  Septic shock   Upon arrival patient had a fever , was tachycardic, elevated wbc count 18.2, lactate negative   Received 1 L N.S , no additional fluids given because perfusion good- lactate is negative, , no edema noted, extremities not cold- clinically appeared euvolemic etiology related to aspiration pna vs hospital acquired pna   In setting of recent admit to hospital are treating for hospital acquired pna ,s/p vanc and meropenum in ED  sputum cx showing staph aureus MSSA  urine cx negative so far  c/w cefazolin in setting of penicillin allergy unable to use nafcillin      #hx of recurrent uti  UTI: UA  showing WBC-5-10, BACTERIA-MANY, NEGATIVE NITRITE, Negative leuk esterase     last admission UCx positive for proteus mirabilis and aerococcus , was treated with meropenum  urine cx so far negative     Cardiovascular   septic shock  currently on pressor NE titrate to a map >65  Troponin 0.05, EKG unchanged from prior admission.  BNP 46445. Last echocardiogram in July had intact LV function with EF of 65%  add midodrine 5 mg tid to help down titrate levo      #hx of paroxysmal a fib however not anticoagulated given hx of gastric avm on egd   on 8/3 patient was  hypotensive and in afib with rvr. Pushed 10 of lopressor, 5 of verapamil. Pt started on amiodarone infusion. Levophed increased to 25 mcg.  Patient continued to be in afib. In setting of increasing levophed requirements and hemodynamic instability patient was shocked. Reverted back to sinus rythm . pressures good.   Pt has been in sinus since   will d/c amio drip and c/w oral amiodarone 400 mg bid     GI :HX of dysphagia  PEG placed today 8/6/18    # constipation  continue with bowel regimen    #Renal  CAMRYN: RESOLVED  - Upon admission patient noted to have an CAMRYN: BUN 52 and Cr 1.16 increased from baseline in July - BUN 7 Cr .61. etiology likely prerenal in the setting of sepsis   continue to monitor     F none  E replete as needed  N PEG feeds   D MICU   GI ppx: iv protonix   Heparin sq q12  - Full code: son is HCP and verbally communicated that he would like intubation, CPR, lines and pressors should she require them.  Critical care time rendered 45 minutes

## 2018-08-06 NOTE — CHART NOTE - NSCHARTNOTEFT_GEN_A_CORE
Admitting Diagnosis:   Patient is a 89y old  Female who presents with a chief complaint of shortness of breath (02 Aug 2018 15:19)      PAST MEDICAL & SURGICAL HISTORY:  Dementia  DVT (deep venous thrombosis)  Dysphagia  Anemia  DJD (degenerative joint disease)  Edema  IBS (irritable bowel syndrome)  Celiac disease  S/P IVC filter  H/O inguinal hernia repair  History of total left hip replacement      Current Nutrition Order:  NPO MN for PEG placement     PO Intake: Good (%) [   ]  Fair (50-75%) [   ] Poor (<25%) [   ]- N/A NPO w/EN    GI Issues: No N/V/C/D reported at this time.     Pain: Pt endorses no pain currently     Skin Integrity: Spine stage III pressure injury     Labs:   08-06    139  |  100  |  19  ----------------------------<  81  4.6   |  28  |  0.81    Ca    8.8      06 Aug 2018 06:18  Phos  2.7     08-06  Mg     2.0     08-06      CAPILLARY BLOOD GLUCOSE          Medications:  MEDICATIONS  (STANDING):  ALBUTerol/ipratropium for Nebulization 3 milliLiter(s) Nebulizer every 6 hours  amiodarone    Tablet 400 milliGRAM(s) Oral two times a day  ceFAZolin   IVPB 1000 milliGRAM(s) IV Intermittent every 12 hours  chlorhexidine 0.12% Liquid 15 milliLiter(s) Swish and Spit two times a day  chlorhexidine 2% Cloths 1 Application(s) Topical daily  heparin  Injectable 5000 Unit(s) SubCutaneous every 12 hours  metoprolol tartrate Injectable 5 milliGRAM(s) IV Push every 6 hours  midodrine 5 milliGRAM(s) Oral every 8 hours  norepinephrine Infusion 0.1 MICROgram(s)/kG/Min (9.769 mL/Hr) IV Continuous <Continuous>  pantoprazole  Injectable 40 milliGRAM(s) IV Push daily    MEDICATIONS  (PRN):      Weight: 52.1kg  Daily     Daily     Weight Change: No new weights recorded since admit     Estimated energy needs: Ideal body weight (41.9kg) used for calculations as pt >120% of IBW. Needs adjusted for vent  Calories: 25-30 kcal/kg = 7364-1321 kcal/day  Protein: 1.4-1.6 g/kg = 59-67g protein/day  Fluids: 30-35 mL/kg = 3499-2578 mL/day    Subjective: 89 yo/female with PMHx dementia, UTI, celiac disease, lactose intolerance, R. femoral thrombus, recent d/c from [facility] with UTI and asp pna, presents from [facility] beds at the [facility] with respiratory distress. Intubated for airway protection. Pt seen in room, awake, alert, still intubated on VC/AC mode. Failed CPAP trial on 8/5. MAP 68, requiring small amount of levophed for pressor support. TF on hold for PEG placement. Pt able to nod in response to questions, but noted to be A&Ox1-2 at baseline, therefore unsure of accuracy of responses. She denies nutrition-related complaints or pain. Lytes WNL. Will continue to keep nutrition in line with goals of care at all times.     Previous Nutrition Diagnosis:  Inadequate energy intake RT current NPO status AEB 0% of EER being met    Active [ X-currently active as TF on hold  ]  Resolved [    ]    If resolved, new PES:     Goal: Nutrition will be resumed within 24hrs     Recommendations:  1. As medically feasible, start Osmolite 1.2 Farhan @ 45mL/hr x 24hrs via PEG (1080 mL TV, 1296 kcal, 60g protein, 886mL free H2O, 108% RDI, 1.43g/kg IBW protein). Additional free H2O per team. Monitor for s/s intolerance; maintain aspiration precautions at all times   2. Monitor lytes and replete prn.   3. Trend weights 2x/week    Education: Discussed PEG placement w/patient    Risk Level: High [  X ] Moderate [   ] Low [   ]

## 2018-08-06 NOTE — PROGRESS NOTE ADULT - SUBJECTIVE AND OBJECTIVE BOX
Hospital course    89 y/o F with PMH dementia, recurrent UTI, Celiac' s disease, lactose intolerance, R. femoral thrombus s/p IVC filter 1/2017, chronic anemia, with recent discharge from [facility] for UTI and aspiration PNA presents from [facility] for respiratory distress . Pt was found to be in  acute respiratory failure- ABG showed pH of 7.21, pCO2 98, pO2 79.Pt was intubated in the ED. Pt also in septic shock secondary to aspiration pna, was started on levophed for pressor support.   Pt was initially treated with vanc and meropenum for hospital acquired pna in the setting of recent hospital admission but sputum cx positive for MSSA and abx were deescalated to IV CEFAZOLIN.   Of note patient has a hx of paroxysmal a fib however never anti coagulated given hx of gastric AVM.  During course of hospital admission pt went into rapid afib requiring increasing levophed requirements. Pt was cardioverted and has been in sinus rhythm since. Started on 400 mg amiodarone bid.   Pt has a hx of dysphagia, upon last admission for asp pna there was plan for PEG however pt passed barium swallow and that was held off. In the setting for dysphagia and asp pna Dr Hale planned for bedside PEG tube , placed on 8/6/18      OVERNIGHT EVENTS: ALYCIA     SUBJECTIVE / INTERVAL HPI: Patient seen and examined at bedside. Pt is awake, alert , no acute distress     VITAL SIGNS:  Vital Signs Last 24 Hrs  T(C): 37.9 (06 Aug 2018 06:00), Max: 37.9 (06 Aug 2018 06:00)  T(F): 100.2 (06 Aug 2018 06:00), Max: 100.2 (06 Aug 2018 06:00)  HR: 82 (06 Aug 2018 07:47) (68 - 90)  BP: 105/53 (06 Aug 2018 07:47) (74/49 - 130/59)  BP(mean): 80 (06 Aug 2018 07:47) (62 - 107)  RR: 13 (06 Aug 2018 07:47) (11 - 31)  SpO2: 97% (06 Aug 2018 07:47) (88% - 98%)    PHYSICAL EXAM:    General: WDWN  HEENT: NC/AT; PERRL, anicteric sclera; MMM  Neck: supple  Cardiovascular: +S1/S2; RRR  Respiratory: RLL crackles > left   Gastrointestinal: soft, NT/ND; +BSx4  Extremities: LLE swelling >right . No erythema noted  Vascular: 2+ radial, DP/PT pulses B/L  Neurological: Pt is alert, awake in no acute distress. B/L upper extremity strength distal muscles 5/5. Unable to assess for sensory deficits.     MEDICATIONS:  MEDICATIONS  (STANDING):  albumin human 25% IVPB 50 milliLiter(s) IV Intermittent every 8 hours  ALBUTerol/ipratropium for Nebulization 3 milliLiter(s) Nebulizer every 6 hours  amiodarone    Tablet 400 milliGRAM(s) Oral two times a day  ceFAZolin   IVPB 1000 milliGRAM(s) IV Intermittent every 12 hours  chlorhexidine 0.12% Liquid 15 milliLiter(s) Swish and Spit two times a day  chlorhexidine 2% Cloths 1 Application(s) Topical daily  heparin  Injectable 5000 Unit(s) SubCutaneous every 12 hours  metoprolol tartrate Injectable 5 milliGRAM(s) IV Push every 6 hours  norepinephrine Infusion 0.1 MICROgram(s)/kG/Min (9.769 mL/Hr) IV Continuous <Continuous>  pantoprazole  Injectable 40 milliGRAM(s) IV Push daily  propofol Infusion 5 MICROgram(s)/kG/Min (1.563 mL/Hr) IV Continuous <Continuous>    MEDICATIONS  (PRN):      ALLERGIES:  Allergies    amoxicillin (Unknown)  Cipro (Unknown)  clindamycin (Unknown)  lactose (Unknown)  penicillin (Unknown)  Wheat (Unknown)    Intolerances        LABS:                        7.8    10.2  )-----------( 387      ( 06 Aug 2018 06:18 )             24.1     08-06    139  |  100  |  19  ----------------------------<  81  4.6   |  28  |  0.81    Ca    8.8      06 Aug 2018 06:18  Phos  2.7     08-06  Mg     2.0     08-06      PT/INR - ( 06 Aug 2018 06:19 )   PT: 13.3 sec;   INR: 1.19          PTT - ( 06 Aug 2018 06:19 )  PTT:21.2 sec    CAPILLARY BLOOD GLUCOSE          RADIOLOGY & ADDITIONAL TESTS: Reviewed.    ASSESSMENT:    PLAN:

## 2018-08-06 NOTE — PROCEDURE NOTE - NSINDICATIONS_GEN_A_CORE
critical illness
respiratory distress
feeding tube replacement/failure to thrive/feeding difficulties

## 2018-08-06 NOTE — PROCEDURE NOTE - NSINFORMCONSENT_GEN_A_CORE
Benefits, risks, and possible complications of procedure explained to patient/caregiver who verbalized understanding and gave verbal consent.
This was an emergent procedure.
Benefits, risks, and possible complications of procedure explained to patient/caregiver who verbalized understanding and gave written consent./son Dr. Goff HCP

## 2018-08-06 NOTE — PROGRESS NOTE ADULT - SUBJECTIVE AND OBJECTIVE BOX
CC/ HPI Ms. Goff is a 89 year old lady with dementia, history of ESBL UTI, bilateral DVT s/p IVC filter 2017, who was admitted from the nursing home with pneumonia complicated by septic shock  and respiratory failure, seen this morning resting comfortably on the ventilator    PAST MEDICAL HISTORY:  Dementia  DVT (deep venous thrombosis)  Dysphagia  Anemia  DJD (degenerative joint disease)  Edema  IBS (irritable bowel syndrome)  Celiac disease  H/O inguinal hernia repair  History of total left hip replacement    SOCHX:  -  alcohol    FMHX: FA/MO  - contributory     ROS reviewed below with positive findings marked (+) :  GEN:  fever, chills ENT: tracheostomy,   epistaxis,  sinusitis COR: CAD, CHF,  HTN, dysrhythmia PUL: COPD, ILD, asthma, pneumonia GI: PEG, dysphagia, hemorrhage, other LUDWIN: kidney disease, electrolyte disorder HEM:  anemia, +thrombus, coagulopathy, cancer ENDO:  thyroid disease, diabetes mellitus CNS:  +dementia, stroke, seizure, PSY:  depression, anxiety, other      MEDICATIONS  (STANDING):  ALBUTerol/ipratropium for Nebulization 3 milliLiter(s) Nebulizer every 6 hours  amiodarone    Tablet 400 milliGRAM(s) Oral two times a day  ceFAZolin   IVPB 1000 milliGRAM(s) IV Intermittent every 12 hours  chlorhexidine 0.12% Liquid 15 milliLiter(s) Swish and Spit two times a day  chlorhexidine 2% Cloths 1 Application(s) Topical daily  heparin  Injectable 5000 Unit(s) SubCutaneous every 12 hours  metoprolol tartrate Injectable 5 milliGRAM(s) IV Push every 6 hours  midodrine 5 milliGRAM(s) Oral every 8 hours  norepinephrine Infusion 0.1 MICROgram(s)/kG/Min (9.769 mL/Hr) IV Continuous <Continuous>  pantoprazole  Injectable 40 milliGRAM(s) IV Push daily        Vital Signs Last 24 Hrs  T(C): 37.8 (06 Aug 2018 10:01), Max: 37.9 (06 Aug 2018 06:00)  T(F): 100.1 (06 Aug 2018 10:01), Max: 100.2 (06 Aug 2018 06:00)  HR: 90 (06 Aug 2018 10:00) (68 - 90)  BP: 106/60 (06 Aug 2018 10:00) (89/56 - 130/59)  BP(mean): 84 (06 Aug 2018 10:00) (69 - 107)  RR: 26 (06 Aug 2018 10:00) (11 - 26)  SpO2: 95% (06 Aug 2018 10:00) (88% - 98%)  Mode: AC/ CMV (Assist Control/ Continuous Mandatory Ventilation)  RR (machine): 12  TV (machine): 360  FiO2: 40  PEEP: 5  ITime: 1  MAP: 11  PIP: 29    GENERAL:         comfortable,  - distress.  HEENT:            - trauma,  - icterus,  - injection,  - nasal discharge.  NECK:              - jugular venous distention, - thyromegaly.  LYMPH:           - lymphadenopathy, - masses.  RESP:               + crackles,   - rhonchi,   - wheezes.   COR:                S1S2   - gallops,  - rubs.  ABD:                bowel sounds,   soft, - tender, - distended, - organomegaly.  EXT/MSC:         - cyanosis,  - clubbing,  + edema.    NEURO:            + alert,  + responds to stimuli.                          7.8    10.2  )-----------( 387      ( 06 Aug 2018 06:18 )             24.1     08-06    139  |  100  |  19  ----------------------------<  81  4.6   |  28  |  0.81        Xray Chest  (08.05.18)  Portable examination chest demonstrates improvement congestion and/or infiltrates in comparison to prior examination of the chest 8/4/2018.   Bilateral effusions. No interval change this remaining support devices.  The patient's head obscures the lung apices.          ASSESSMENT/PLAN    1) Respiratory failure  2) Septic shock  3) Pneumonia  4) Cardiomyopathy  5) Dementia    Satisfactory SpO2, Paw on the current vent settings.  Bronchodilators:  Atrovent/ albuterol q 4 – 6 hours as needed  ID/Antibiotics: status post vanco/meropenem, currently on cefazolin  Cardiac/HTN: taper pressors as tolerated  GI: Rx/ prophylaxis c PPI/H2B  Heme: Rx/VT prophylaxis  Discussed with medical team

## 2018-08-06 NOTE — PROGRESS NOTE ADULT - ATTENDING COMMENTS
Septic shock  Multiorgan failure  Cont ab/fluids  Attempt to wean unsuccessful  SUPP care-prog is poor Septic shock  Multiorgan failure  Cont ab/fluids  Attempt to wean unsuccessful  SUPP care-prog is poor  GI--PEG

## 2018-08-06 NOTE — PROGRESS NOTE ADULT - SUBJECTIVE AND OBJECTIVE BOX
OVERNIGHT EVENTS: ALYCIA     SUBJECTIVE / INTERVAL HPI: Patient seen and examined at bedside. Pt is awake, alert , no acute distress     VITAL SIGNS:  Vital Signs Last 24 Hrs  T(C): 37.9 (06 Aug 2018 06:00), Max: 37.9 (06 Aug 2018 06:00)  T(F): 100.2 (06 Aug 2018 06:00), Max: 100.2 (06 Aug 2018 06:00)  HR: 82 (06 Aug 2018 07:47) (68 - 90)  BP: 105/53 (06 Aug 2018 07:47) (74/49 - 130/59)  BP(mean): 80 (06 Aug 2018 07:47) (62 - 107)  RR: 13 (06 Aug 2018 07:47) (11 - 31)  SpO2: 97% (06 Aug 2018 07:47) (88% - 98%)    PHYSICAL EXAM:    General: WDWN  HEENT: NC/AT; PERRL, anicteric sclera; MMM  Neck: supple  Cardiovascular: +S1/S2; RRR  Respiratory: RLL crackles > left   Gastrointestinal: soft, NT/ND; +BSx4  Extremities: LLE swelling >right . No erythema noted  Vascular: 2+ radial, DP/PT pulses B/L  Neurological: Pt is alert, awake in no acute distress. B/L upper extremity strength distal muscles 5/5. Unable to assess for sensory deficits.     MEDICATIONS:  MEDICATIONS  (STANDING):  albumin human 25% IVPB 50 milliLiter(s) IV Intermittent every 8 hours  ALBUTerol/ipratropium for Nebulization 3 milliLiter(s) Nebulizer every 6 hours  amiodarone    Tablet 400 milliGRAM(s) Oral two times a day  ceFAZolin   IVPB 1000 milliGRAM(s) IV Intermittent every 12 hours  chlorhexidine 0.12% Liquid 15 milliLiter(s) Swish and Spit two times a day  chlorhexidine 2% Cloths 1 Application(s) Topical daily  heparin  Injectable 5000 Unit(s) SubCutaneous every 12 hours  metoprolol tartrate Injectable 5 milliGRAM(s) IV Push every 6 hours  norepinephrine Infusion 0.1 MICROgram(s)/kG/Min (9.769 mL/Hr) IV Continuous <Continuous>  pantoprazole  Injectable 40 milliGRAM(s) IV Push daily  propofol Infusion 5 MICROgram(s)/kG/Min (1.563 mL/Hr) IV Continuous <Continuous>    MEDICATIONS  (PRN):      ALLERGIES:  Allergies    amoxicillin (Unknown)  Cipro (Unknown)  clindamycin (Unknown)  lactose (Unknown)  penicillin (Unknown)  Wheat (Unknown)    Intolerances        LABS:                        7.8    10.2  )-----------( 387      ( 06 Aug 2018 06:18 )             24.1     08-06    139  |  100  |  19  ----------------------------<  81  4.6   |  28  |  0.81    Ca    8.8      06 Aug 2018 06:18  Phos  2.7     08-06  Mg     2.0     08-06      PT/INR - ( 06 Aug 2018 06:19 )   PT: 13.3 sec;   INR: 1.19          PTT - ( 06 Aug 2018 06:19 )  PTT:21.2 sec    CAPILLARY BLOOD GLUCOSE          RADIOLOGY & ADDITIONAL TESTS: Reviewed.    ASSESSMENT:    PLAN: Hospital course    89 y/o F with PMH dementia, recurrent UTI, Celiac' s disease, lactose intolerance, R. 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 Pt was found to be in  acute respiratory failure- ABG showed pH of 7.21, pCO2 98, pO2 79.Pt was intubated in the ED. Pt also in septic shock secondary to aspiration pna, was started on levophed for pressor support.   Pt was initially treated with vanc and meropenum for hospital acquired pna in the setting of recent hospital admission but sputum cx positive for MSSA and abx were deescalated to IV CEFAZOLIN.   During course of hospital admission              OVERNIGHT EVENTS: ALYCIA     SUBJECTIVE / INTERVAL HPI: Patient seen and examined at bedside. Pt is awake, alert , no acute distress     VITAL SIGNS:  Vital Signs Last 24 Hrs  T(C): 37.9 (06 Aug 2018 06:00), Max: 37.9 (06 Aug 2018 06:00)  T(F): 100.2 (06 Aug 2018 06:00), Max: 100.2 (06 Aug 2018 06:00)  HR: 82 (06 Aug 2018 07:47) (68 - 90)  BP: 105/53 (06 Aug 2018 07:47) (74/49 - 130/59)  BP(mean): 80 (06 Aug 2018 07:47) (62 - 107)  RR: 13 (06 Aug 2018 07:47) (11 - 31)  SpO2: 97% (06 Aug 2018 07:47) (88% - 98%)    PHYSICAL EXAM:    General: WDWN  HEENT: NC/AT; PERRL, anicteric sclera; MMM  Neck: supple  Cardiovascular: +S1/S2; RRR  Respiratory: RLL crackles > left   Gastrointestinal: soft, NT/ND; +BSx4  Extremities: LLE swelling >right . No erythema noted  Vascular: 2+ radial, DP/PT pulses B/L  Neurological: Pt is alert, awake in no acute distress. B/L upper extremity strength distal muscles 5/5. Unable to assess for sensory deficits.     MEDICATIONS:  MEDICATIONS  (STANDING):  albumin human 25% IVPB 50 milliLiter(s) IV Intermittent every 8 hours  ALBUTerol/ipratropium for Nebulization 3 milliLiter(s) Nebulizer every 6 hours  amiodarone    Tablet 400 milliGRAM(s) Oral two times a day  ceFAZolin   IVPB 1000 milliGRAM(s) IV Intermittent every 12 hours  chlorhexidine 0.12% Liquid 15 milliLiter(s) Swish and Spit two times a day  chlorhexidine 2% Cloths 1 Application(s) Topical daily  heparin  Injectable 5000 Unit(s) SubCutaneous every 12 hours  metoprolol tartrate Injectable 5 milliGRAM(s) IV Push every 6 hours  norepinephrine Infusion 0.1 MICROgram(s)/kG/Min (9.769 mL/Hr) IV Continuous <Continuous>  pantoprazole  Injectable 40 milliGRAM(s) IV Push daily  propofol Infusion 5 MICROgram(s)/kG/Min (1.563 mL/Hr) IV Continuous <Continuous>    MEDICATIONS  (PRN):      ALLERGIES:  Allergies    amoxicillin (Unknown)  Cipro (Unknown)  clindamycin (Unknown)  lactose (Unknown)  penicillin (Unknown)  Wheat (Unknown)    Intolerances        LABS:                        7.8    10.2  )-----------( 387      ( 06 Aug 2018 06:18 )             24.1     08-06    139  |  100  |  19  ----------------------------<  81  4.6   |  28  |  0.81    Ca    8.8      06 Aug 2018 06:18  Phos  2.7     08-06  Mg     2.0     08-06      PT/INR - ( 06 Aug 2018 06:19 )   PT: 13.3 sec;   INR: 1.19          PTT - ( 06 Aug 2018 06:19 )  PTT:21.2 sec    CAPILLARY BLOOD GLUCOSE          RADIOLOGY & ADDITIONAL TESTS: Reviewed.    ASSESSMENT:    PLAN: Hospital course    89 y/o F with PMH dementia, recurrent UTI, Celiac' s disease, lactose intolerance, 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 for UTI and aspiration PNA presents from [facility] for respiratory distress . Pt was found to be in  acute respiratory failure- ABG showed pH of 7.21, pCO2 98, pO2 79.Pt was intubated in the ED. Pt also in septic shock secondary to aspiration pna, was started on levophed for pressor support.   Pt was initially treated with vanc and meropenum for hospital acquired pna in the setting of recent hospital admission but sputum cx positive for MSSA and abx were deescalated to IV CEFAZOLIN.   Of note patient has a hx of paroxysmal a fib however never anti coagulated given hx of gastric AVM.  During course of hospital admission pt went into rapid afib requiring increasing levophed requirements. Pt was cardioverted and has been in sinus rhythm since. Started on 400 mg amiodarone bid.   Pt has a hx of dysphagia, upon last admission for asp pna there was plan for PEG however pt passed barium swallow and that was held off. In the setting for dysphagia and asp pna Dr Hale planned for bedside PEG tube.        OVERNIGHT EVENTS: ALYCIA     SUBJECTIVE / INTERVAL HPI: Patient seen and examined at bedside. Pt is awake, alert , no acute distress     VITAL SIGNS:  Vital Signs Last 24 Hrs  T(C): 37.9 (06 Aug 2018 06:00), Max: 37.9 (06 Aug 2018 06:00)  T(F): 100.2 (06 Aug 2018 06:00), Max: 100.2 (06 Aug 2018 06:00)  HR: 82 (06 Aug 2018 07:47) (68 - 90)  BP: 105/53 (06 Aug 2018 07:47) (74/49 - 130/59)  BP(mean): 80 (06 Aug 2018 07:47) (62 - 107)  RR: 13 (06 Aug 2018 07:47) (11 - 31)  SpO2: 97% (06 Aug 2018 07:47) (88% - 98%)    PHYSICAL EXAM:    General: WDWN  HEENT: NC/AT; PERRL, anicteric sclera; MMM  Neck: supple  Cardiovascular: +S1/S2; RRR  Respiratory: RLL crackles > left   Gastrointestinal: soft, NT/ND; +BSx4  Extremities: LLE swelling >right . No erythema noted  Vascular: 2+ radial, DP/PT pulses B/L  Neurological: Pt is alert, awake in no acute distress. B/L upper extremity strength distal muscles 5/5. Unable to assess for sensory deficits.     MEDICATIONS:  MEDICATIONS  (STANDING):  albumin human 25% IVPB 50 milliLiter(s) IV Intermittent every 8 hours  ALBUTerol/ipratropium for Nebulization 3 milliLiter(s) Nebulizer every 6 hours  amiodarone    Tablet 400 milliGRAM(s) Oral two times a day  ceFAZolin   IVPB 1000 milliGRAM(s) IV Intermittent every 12 hours  chlorhexidine 0.12% Liquid 15 milliLiter(s) Swish and Spit two times a day  chlorhexidine 2% Cloths 1 Application(s) Topical daily  heparin  Injectable 5000 Unit(s) SubCutaneous every 12 hours  metoprolol tartrate Injectable 5 milliGRAM(s) IV Push every 6 hours  norepinephrine Infusion 0.1 MICROgram(s)/kG/Min (9.769 mL/Hr) IV Continuous <Continuous>  pantoprazole  Injectable 40 milliGRAM(s) IV Push daily  propofol Infusion 5 MICROgram(s)/kG/Min (1.563 mL/Hr) IV Continuous <Continuous>    MEDICATIONS  (PRN):      ALLERGIES:  Allergies    amoxicillin (Unknown)  Cipro (Unknown)  clindamycin (Unknown)  lactose (Unknown)  penicillin (Unknown)  Wheat (Unknown)    Intolerances        LABS:                        7.8    10.2  )-----------( 387      ( 06 Aug 2018 06:18 )             24.1     08-06    139  |  100  |  19  ----------------------------<  81  4.6   |  28  |  0.81    Ca    8.8      06 Aug 2018 06:18  Phos  2.7     08-06  Mg     2.0     08-06      PT/INR - ( 06 Aug 2018 06:19 )   PT: 13.3 sec;   INR: 1.19          PTT - ( 06 Aug 2018 06:19 )  PTT:21.2 sec    CAPILLARY BLOOD GLUCOSE          RADIOLOGY & ADDITIONAL TESTS: Reviewed.    ASSESSMENT:    PLAN: Hospital course    89 y/o F with PMH dementia, recurrent UTI, Celiac' s disease, lactose intolerance, R. femoral thrombus s/p IVC filter 1/2017, chronic anemia, with recent discharge from [facility] for UTI and aspiration PNA presents from [facility] for respiratory distress . Pt was found to be in  acute respiratory failure- ABG showed pH of 7.21, pCO2 98, pO2 79.Pt was intubated in the ED. Pt also in septic shock secondary to aspiration pna, was started on levophed for pressor support.   Pt was initially treated with vanc and meropenum for hospital acquired pna in the setting of recent hospital admission but sputum cx positive for MSSA and abx were deescalated to IV CEFAZOLIN.   Of note patient has a hx of paroxysmal a fib however never anti coagulated given hx of gastric AVM.  During course of hospital admission pt went into rapid afib requiring increasing levophed requirements. Pt was cardioverted and has been in sinus rhythm since. Started on 400 mg amiodarone bid.   Pt has a hx of dysphagia, upon last admission for asp pna there was plan for PEG however pt passed barium swallow and that was held off. In the setting for dysphagia and asp pna Dr Hale planned for bedside PEG tube , placed on 8/6/18      OVERNIGHT EVENTS: ALYCIA     SUBJECTIVE / INTERVAL HPI: Patient seen and examined at bedside. Pt is awake, alert , no acute distress     VITAL SIGNS:  Vital Signs Last 24 Hrs  T(C): 37.9 (06 Aug 2018 06:00), Max: 37.9 (06 Aug 2018 06:00)  T(F): 100.2 (06 Aug 2018 06:00), Max: 100.2 (06 Aug 2018 06:00)  HR: 82 (06 Aug 2018 07:47) (68 - 90)  BP: 105/53 (06 Aug 2018 07:47) (74/49 - 130/59)  BP(mean): 80 (06 Aug 2018 07:47) (62 - 107)  RR: 13 (06 Aug 2018 07:47) (11 - 31)  SpO2: 97% (06 Aug 2018 07:47) (88% - 98%)    PHYSICAL EXAM:    General: WDWN  HEENT: NC/AT; PERRL, anicteric sclera; MMM  Neck: supple  Cardiovascular: +S1/S2; RRR  Respiratory: RLL crackles > left   Gastrointestinal: soft, NT/ND; +BSx4  Extremities: LLE swelling >right . No erythema noted  Vascular: 2+ radial, DP/PT pulses B/L  Neurological: Pt is alert, awake in no acute distress. B/L upper extremity strength distal muscles 5/5. Unable to assess for sensory deficits.     MEDICATIONS:  MEDICATIONS  (STANDING):  albumin human 25% IVPB 50 milliLiter(s) IV Intermittent every 8 hours  ALBUTerol/ipratropium for Nebulization 3 milliLiter(s) Nebulizer every 6 hours  amiodarone    Tablet 400 milliGRAM(s) Oral two times a day  ceFAZolin   IVPB 1000 milliGRAM(s) IV Intermittent every 12 hours  chlorhexidine 0.12% Liquid 15 milliLiter(s) Swish and Spit two times a day  chlorhexidine 2% Cloths 1 Application(s) Topical daily  heparin  Injectable 5000 Unit(s) SubCutaneous every 12 hours  metoprolol tartrate Injectable 5 milliGRAM(s) IV Push every 6 hours  norepinephrine Infusion 0.1 MICROgram(s)/kG/Min (9.769 mL/Hr) IV Continuous <Continuous>  pantoprazole  Injectable 40 milliGRAM(s) IV Push daily  propofol Infusion 5 MICROgram(s)/kG/Min (1.563 mL/Hr) IV Continuous <Continuous>    MEDICATIONS  (PRN):      ALLERGIES:  Allergies    amoxicillin (Unknown)  Cipro (Unknown)  clindamycin (Unknown)  lactose (Unknown)  penicillin (Unknown)  Wheat (Unknown)    Intolerances        LABS:                        7.8    10.2  )-----------( 387      ( 06 Aug 2018 06:18 )             24.1     08-06    139  |  100  |  19  ----------------------------<  81  4.6   |  28  |  0.81    Ca    8.8      06 Aug 2018 06:18  Phos  2.7     08-06  Mg     2.0     08-06      PT/INR - ( 06 Aug 2018 06:19 )   PT: 13.3 sec;   INR: 1.19          PTT - ( 06 Aug 2018 06:19 )  PTT:21.2 sec    CAPILLARY BLOOD GLUCOSE          RADIOLOGY & ADDITIONAL TESTS: Reviewed.    ASSESSMENT:    PLAN:

## 2018-08-07 LAB
ANION GAP SERPL CALC-SCNC: 14 MMOL/L — SIGNIFICANT CHANGE UP (ref 5–17)
BUN SERPL-MCNC: 17 MG/DL — SIGNIFICANT CHANGE UP (ref 7–23)
CALCIUM SERPL-MCNC: 8.5 MG/DL — SIGNIFICANT CHANGE UP (ref 8.4–10.5)
CHLORIDE SERPL-SCNC: 100 MMOL/L — SIGNIFICANT CHANGE UP (ref 96–108)
CO2 SERPL-SCNC: 25 MMOL/L — SIGNIFICANT CHANGE UP (ref 22–31)
CREAT SERPL-MCNC: 0.86 MG/DL — SIGNIFICANT CHANGE UP (ref 0.5–1.3)
CULTURE RESULTS: SIGNIFICANT CHANGE UP
CULTURE RESULTS: SIGNIFICANT CHANGE UP
GLUCOSE BLDC GLUCOMTR-MCNC: 120 MG/DL — HIGH (ref 70–99)
GLUCOSE SERPL-MCNC: 65 MG/DL — LOW (ref 70–99)
HCT VFR BLD CALC: 23.7 % — LOW (ref 34.5–45)
HGB BLD-MCNC: 7.4 G/DL — LOW (ref 11.5–15.5)
MAGNESIUM SERPL-MCNC: 1.9 MG/DL — SIGNIFICANT CHANGE UP (ref 1.6–2.6)
MCHC RBC-ENTMCNC: 27.3 PG — SIGNIFICANT CHANGE UP (ref 27–34)
MCHC RBC-ENTMCNC: 31.2 G/DL — LOW (ref 32–36)
MCV RBC AUTO: 87.5 FL — SIGNIFICANT CHANGE UP (ref 80–100)
PHOSPHATE SERPL-MCNC: 3.5 MG/DL — SIGNIFICANT CHANGE UP (ref 2.5–4.5)
PLATELET # BLD AUTO: 371 K/UL — SIGNIFICANT CHANGE UP (ref 150–400)
POTASSIUM SERPL-MCNC: 4.7 MMOL/L — SIGNIFICANT CHANGE UP (ref 3.5–5.3)
POTASSIUM SERPL-SCNC: 4.7 MMOL/L — SIGNIFICANT CHANGE UP (ref 3.5–5.3)
RBC # BLD: 2.71 M/UL — LOW (ref 3.8–5.2)
RBC # FLD: 15.4 % — SIGNIFICANT CHANGE UP (ref 10.3–16.9)
SODIUM SERPL-SCNC: 139 MMOL/L — SIGNIFICANT CHANGE UP (ref 135–145)
SPECIMEN SOURCE: SIGNIFICANT CHANGE UP
SPECIMEN SOURCE: SIGNIFICANT CHANGE UP
WBC # BLD: 9 K/UL — SIGNIFICANT CHANGE UP (ref 3.8–10.5)
WBC # FLD AUTO: 9 K/UL — SIGNIFICANT CHANGE UP (ref 3.8–10.5)

## 2018-08-07 PROCEDURE — 71045 X-RAY EXAM CHEST 1 VIEW: CPT | Mod: 26

## 2018-08-07 PROCEDURE — 99291 CRITICAL CARE FIRST HOUR: CPT

## 2018-08-07 RX ORDER — DEXTROSE 50 % IN WATER 50 %
25 SYRINGE (ML) INTRAVENOUS ONCE
Qty: 0 | Refills: 0 | Status: COMPLETED | OUTPATIENT
Start: 2018-08-07 | End: 2018-08-07

## 2018-08-07 RX ORDER — PANTOPRAZOLE SODIUM 20 MG/1
40 TABLET, DELAYED RELEASE ORAL
Qty: 0 | Refills: 0 | Status: DISCONTINUED | OUTPATIENT
Start: 2018-08-07 | End: 2018-08-14

## 2018-08-07 RX ORDER — METOPROLOL TARTRATE 50 MG
12.5 TABLET ORAL EVERY 12 HOURS
Qty: 0 | Refills: 0 | Status: DISCONTINUED | OUTPATIENT
Start: 2018-08-07 | End: 2018-08-13

## 2018-08-07 RX ORDER — MIDODRINE HYDROCHLORIDE 2.5 MG/1
10 TABLET ORAL EVERY 8 HOURS
Qty: 0 | Refills: 0 | Status: DISCONTINUED | OUTPATIENT
Start: 2018-08-07 | End: 2018-08-14

## 2018-08-07 RX ORDER — MIDODRINE HYDROCHLORIDE 2.5 MG/1
5 TABLET ORAL EVERY 8 HOURS
Qty: 0 | Refills: 0 | Status: DISCONTINUED | OUTPATIENT
Start: 2018-08-07 | End: 2018-08-07

## 2018-08-07 RX ORDER — MAGNESIUM SULFATE 500 MG/ML
1 VIAL (ML) INJECTION ONCE
Qty: 0 | Refills: 0 | Status: COMPLETED | OUTPATIENT
Start: 2018-08-07 | End: 2018-08-07

## 2018-08-07 RX ORDER — AMIODARONE HYDROCHLORIDE 400 MG/1
400 TABLET ORAL
Qty: 0 | Refills: 0 | Status: DISCONTINUED | OUTPATIENT
Start: 2018-08-07 | End: 2018-08-13

## 2018-08-07 RX ORDER — PANTOPRAZOLE SODIUM 20 MG/1
40 TABLET, DELAYED RELEASE ORAL
Qty: 0 | Refills: 0 | Status: DISCONTINUED | OUTPATIENT
Start: 2018-08-07 | End: 2018-08-07

## 2018-08-07 RX ADMIN — Medication 100 GRAM(S): at 09:31

## 2018-08-07 RX ADMIN — CHLORHEXIDINE GLUCONATE 15 MILLILITER(S): 213 SOLUTION TOPICAL at 19:01

## 2018-08-07 RX ADMIN — Medication 5 MILLIGRAM(S): at 06:27

## 2018-08-07 RX ADMIN — Medication 5 MILLIGRAM(S): at 00:10

## 2018-08-07 RX ADMIN — Medication 3 MILLILITER(S): at 00:38

## 2018-08-07 RX ADMIN — MIDODRINE HYDROCHLORIDE 10 MILLIGRAM(S): 2.5 TABLET ORAL at 21:39

## 2018-08-07 RX ADMIN — AMIODARONE HYDROCHLORIDE 400 MILLIGRAM(S): 400 TABLET ORAL at 06:21

## 2018-08-07 RX ADMIN — HEPARIN SODIUM 5000 UNIT(S): 5000 INJECTION INTRAVENOUS; SUBCUTANEOUS at 06:21

## 2018-08-07 RX ADMIN — Medication 9.77 MICROGRAM(S)/KG/MIN: at 19:02

## 2018-08-07 RX ADMIN — Medication 100 MILLIGRAM(S): at 19:01

## 2018-08-07 RX ADMIN — PANTOPRAZOLE SODIUM 40 MILLIGRAM(S): 20 TABLET, DELAYED RELEASE ORAL at 12:41

## 2018-08-07 RX ADMIN — MIDODRINE HYDROCHLORIDE 5 MILLIGRAM(S): 2.5 TABLET ORAL at 15:14

## 2018-08-07 RX ADMIN — CHLORHEXIDINE GLUCONATE 1 APPLICATION(S): 213 SOLUTION TOPICAL at 06:22

## 2018-08-07 RX ADMIN — AMIODARONE HYDROCHLORIDE 400 MILLIGRAM(S): 400 TABLET ORAL at 19:01

## 2018-08-07 RX ADMIN — CHLORHEXIDINE GLUCONATE 15 MILLILITER(S): 213 SOLUTION TOPICAL at 06:21

## 2018-08-07 RX ADMIN — Medication 3 MILLILITER(S): at 18:22

## 2018-08-07 RX ADMIN — Medication 25 MILLILITER(S): at 08:21

## 2018-08-07 RX ADMIN — Medication 5 MILLIGRAM(S): at 12:41

## 2018-08-07 RX ADMIN — Medication 100 MILLIGRAM(S): at 06:21

## 2018-08-07 RX ADMIN — Medication 12.5 MILLIGRAM(S): at 19:01

## 2018-08-07 RX ADMIN — MIDODRINE HYDROCHLORIDE 5 MILLIGRAM(S): 2.5 TABLET ORAL at 06:27

## 2018-08-07 RX ADMIN — Medication 3 MILLILITER(S): at 12:23

## 2018-08-07 RX ADMIN — Medication 3 MILLILITER(S): at 06:49

## 2018-08-07 NOTE — PROGRESS NOTE ADULT - SUBJECTIVE AND OBJECTIVE BOX
OVERNIGHT EVENTS: ALYCIA    SUBJECTIVE / INTERVAL HPI: Patient seen and examined at bedside. S/P day 1 . No acute complaints, on CPAP with PEEP 5, PRESSURE SUPPORT 10     VITAL SIGNS:  Vital Signs Last 24 Hrs  T(C): 37.6 (07 Aug 2018 06:02), Max: 37.8 (06 Aug 2018 10:01)  T(F): 99.6 (07 Aug 2018 06:02), Max: 100.1 (06 Aug 2018 10:01)  HR: 72 (07 Aug 2018 07:00) (64 - 92)  BP: 99/53 (07 Aug 2018 07:00) (76/44 - 130/67)  BP(mean): 73 (07 Aug 2018 07:00) (57 - 98)  RR: 24 (07 Aug 2018 07:00) (11 - 30)  SpO2: 98% (07 Aug 2018 07:00) (93% - 100%)    PHYSICAL EXAM:    General: WDWN  HEENT: NC/AT; PERRL, anicteric sclera; MMM  Neck: supple  Cardiovascular: +S1/S2; RRR  Respiratory: B/L crackles noted.   Gastrointestinal: soft, NT/ND; +BSx4  Extremities: LLE swelling > Right sided   Vascular: 2+ radial, DP/PT pulses B/L  Neurological: AAOx3; no focal deficits    MEDICATIONS:  MEDICATIONS  (STANDING):  ALBUTerol/ipratropium for Nebulization 3 milliLiter(s) Nebulizer every 6 hours  amiodarone    Tablet 400 milliGRAM(s) Oral two times a day  ceFAZolin   IVPB 1000 milliGRAM(s) IV Intermittent every 12 hours  chlorhexidine 0.12% Liquid 15 milliLiter(s) Swish and Spit two times a day  chlorhexidine 2% Cloths 1 Application(s) Topical daily  heparin  Injectable 5000 Unit(s) SubCutaneous every 12 hours  metoprolol tartrate Injectable 5 milliGRAM(s) IV Push every 6 hours  midodrine 5 milliGRAM(s) Oral every 8 hours  norepinephrine Infusion 0.1 MICROgram(s)/kG/Min (9.769 mL/Hr) IV Continuous <Continuous>  pantoprazole  Injectable 40 milliGRAM(s) IV Push daily    MEDICATIONS  (PRN):      ALLERGIES:  Allergies    amoxicillin (Unknown)  Cipro (Unknown)  clindamycin (Unknown)  lactose (Unknown)  penicillin (Unknown)  Wheat (Unknown)    Intolerances        LABS:                        7.4    9.0   )-----------( 371      ( 07 Aug 2018 06:14 )             23.7     08-07    139  |  100  |  17  ----------------------------<  65<L>  4.7   |  25  |  0.86    Ca    8.5      07 Aug 2018 06:14  Phos  3.5     08-07  Mg     1.9     08-07      PT/INR - ( 06 Aug 2018 06:19 )   PT: 13.3 sec;   INR: 1.19          PTT - ( 06 Aug 2018 06:19 )  PTT:21.2 sec    CAPILLARY BLOOD GLUCOSE          RADIOLOGY & ADDITIONAL TESTS: Reviewed.    ASSESSMENT:    PLAN: Hospital course       89 y/o F with PMH dementia, recurrent UTI, Celiac' s disease, lactose intolerance, R. femoral thrombus s/p IVC filter 1/2017, chronic anemia, with recent discharge from [facility] for UTI and aspiration PNA presents from [facility] for respiratory distress . Pt was found to be in  acute respiratory failure- ABG showed pH of 7.21, pCO2 98, pO2 79.Pt was intubated in the ED. Pt also in septic shock secondary to aspiration pna, was started on levophed for pressor support.   Pt was initially treated with vanc and meropenum for hospital acquired pna in the setting of recent hospital admission but sputum cx positive for MSSA and abx were deescalated to IV CEFAZOLIN.   Of note patient has a hx of paroxysmal a fib however never anti coagulated given hx of gastric AVM.  During course of hospital admission pt went into rapid afib requiring increasing levophed requirements. Pt was cardioverted and has been in sinus rhythm since. Started on 400 mg amiodarone bid.   Pt has a hx of dysphagia, upon last admission for asp pna there was plan for PEG however pt passed barium swallow and that was held off. In the setting for dysphagia and asp pna Dr Hale planned for bedside PEG tube , placed on 8/6/18.       OVERNIGHT EVENTS: ALYCIA    SUBJECTIVE / INTERVAL HPI: Patient seen and examined at bedside. S/P day 1 . No acute complaints, on CPAP with PEEP 5, PRESSURE SUPPORT 10     VITAL SIGNS:  Vital Signs Last 24 Hrs  T(C): 37.6 (07 Aug 2018 06:02), Max: 37.8 (06 Aug 2018 10:01)  T(F): 99.6 (07 Aug 2018 06:02), Max: 100.1 (06 Aug 2018 10:01)  HR: 72 (07 Aug 2018 07:00) (64 - 92)  BP: 99/53 (07 Aug 2018 07:00) (76/44 - 130/67)  BP(mean): 73 (07 Aug 2018 07:00) (57 - 98)  RR: 24 (07 Aug 2018 07:00) (11 - 30)  SpO2: 98% (07 Aug 2018 07:00) (93% - 100%)    PHYSICAL EXAM:    General: WDWN  HEENT: NC/AT; PERRL, anicteric sclera; MMM  Neck: supple  Cardiovascular: +S1/S2; RRR  Respiratory: B/L crackles noted.   Gastrointestinal: soft, NT/ND; +BSx4  Extremities: LLE swelling > Right sided   Vascular: 2+ radial, DP/PT pulses B/L  Neurological: AAOx3; no focal deficits    MEDICATIONS:  MEDICATIONS  (STANDING):  ALBUTerol/ipratropium for Nebulization 3 milliLiter(s) Nebulizer every 6 hours  amiodarone    Tablet 400 milliGRAM(s) Oral two times a day  ceFAZolin   IVPB 1000 milliGRAM(s) IV Intermittent every 12 hours  chlorhexidine 0.12% Liquid 15 milliLiter(s) Swish and Spit two times a day  chlorhexidine 2% Cloths 1 Application(s) Topical daily  heparin  Injectable 5000 Unit(s) SubCutaneous every 12 hours  metoprolol tartrate Injectable 5 milliGRAM(s) IV Push every 6 hours  midodrine 5 milliGRAM(s) Oral every 8 hours  norepinephrine Infusion 0.1 MICROgram(s)/kG/Min (9.769 mL/Hr) IV Continuous <Continuous>  pantoprazole  Injectable 40 milliGRAM(s) IV Push daily    MEDICATIONS  (PRN):      ALLERGIES:  Allergies    amoxicillin (Unknown)  Cipro (Unknown)  clindamycin (Unknown)  lactose (Unknown)  penicillin (Unknown)  Wheat (Unknown)    Intolerances        LABS:                        7.4    9.0   )-----------( 371      ( 07 Aug 2018 06:14 )             23.7     08-07    139  |  100  |  17  ----------------------------<  65<L>  4.7   |  25  |  0.86    Ca    8.5      07 Aug 2018 06:14  Phos  3.5     08-07  Mg     1.9     08-07      PT/INR - ( 06 Aug 2018 06:19 )   PT: 13.3 sec;   INR: 1.19          PTT - ( 06 Aug 2018 06:19 )  PTT:21.2 sec    CAPILLARY BLOOD GLUCOSE          RADIOLOGY & ADDITIONAL TESTS: Reviewed.    ASSESSMENT:    PLAN: Hospital course       89 y/o F with PMH dementia, recurrent UTI, Celiac' s disease, lactose intolerance, R. femoral thrombus s/p IVC filter 1/2017, chronic anemia, with recent discharge from [facility] for UTI and aspiration PNA presents from [facility] for respiratory distress . Pt was found to be in  acute respiratory failure- ABG showed pH of 7.21, pCO2 98, pO2 79.Pt was intubated in the ED. Pt also in septic shock secondary to aspiration pna, was started on levophed for pressor support.   Pt was initially treated with vanc and meropenum for hospital acquired pna in the setting of recent hospital admission but sputum cx positive for MSSA and abx were deescalated to IV CEFAZOLIN.   Of note patient has a hx of paroxysmal a fib however never anti coagulated given hx of gastric AVM.  During course of hospital admission pt went into rapid afib requiring increasing levophed requirements. Pt was cardioverted and has been in sinus rhythm since. Started on 400 mg amiodarone bid.   Pt has a hx of dysphagia, upon last admission for asp pna there was plan for PEG however pt passed barium swallow and that was held off. In the setting for dysphagia and asp pna Dr Hale planned for bedside PEG tube , placed on 8/6/18.   Pt extubated today (8/7/18).Plan to start feeds after extubation       OVERNIGHT EVENTS: ALYCIA    SUBJECTIVE / INTERVAL HPI: Patient seen and examined at bedside. S/P day 1 . No acute complaints, on CPAP with PEEP 5, PRESSURE SUPPORT 10     VITAL SIGNS:  Vital Signs Last 24 Hrs  T(C): 37.6 (07 Aug 2018 06:02), Max: 37.8 (06 Aug 2018 10:01)  T(F): 99.6 (07 Aug 2018 06:02), Max: 100.1 (06 Aug 2018 10:01)  HR: 72 (07 Aug 2018 07:00) (64 - 92)  BP: 99/53 (07 Aug 2018 07:00) (76/44 - 130/67)  BP(mean): 73 (07 Aug 2018 07:00) (57 - 98)  RR: 24 (07 Aug 2018 07:00) (11 - 30)  SpO2: 98% (07 Aug 2018 07:00) (93% - 100%)    PHYSICAL EXAM:    General: WDWN  HEENT: NC/AT; PERRL, anicteric sclera; MMM  Neck: supple  Cardiovascular: +S1/S2; RRR  Respiratory: B/L crackles noted.   Gastrointestinal: soft, NT/ND; +BSx4  Extremities: LLE swelling > Right sided   Vascular: 2+ radial, DP/PT pulses B/L  Neurological: AAOx3; no focal deficits    MEDICATIONS:  MEDICATIONS  (STANDING):  ALBUTerol/ipratropium for Nebulization 3 milliLiter(s) Nebulizer every 6 hours  amiodarone    Tablet 400 milliGRAM(s) Oral two times a day  ceFAZolin   IVPB 1000 milliGRAM(s) IV Intermittent every 12 hours  chlorhexidine 0.12% Liquid 15 milliLiter(s) Swish and Spit two times a day  chlorhexidine 2% Cloths 1 Application(s) Topical daily  heparin  Injectable 5000 Unit(s) SubCutaneous every 12 hours  metoprolol tartrate Injectable 5 milliGRAM(s) IV Push every 6 hours  midodrine 5 milliGRAM(s) Oral every 8 hours  norepinephrine Infusion 0.1 MICROgram(s)/kG/Min (9.769 mL/Hr) IV Continuous <Continuous>  pantoprazole  Injectable 40 milliGRAM(s) IV Push daily    MEDICATIONS  (PRN):      ALLERGIES:  Allergies    amoxicillin (Unknown)  Cipro (Unknown)  clindamycin (Unknown)  lactose (Unknown)  penicillin (Unknown)  Wheat (Unknown)    Intolerances        LABS:                        7.4    9.0   )-----------( 371      ( 07 Aug 2018 06:14 )             23.7     08-07    139  |  100  |  17  ----------------------------<  65<L>  4.7   |  25  |  0.86    Ca    8.5      07 Aug 2018 06:14  Phos  3.5     08-07  Mg     1.9     08-07      PT/INR - ( 06 Aug 2018 06:19 )   PT: 13.3 sec;   INR: 1.19          PTT - ( 06 Aug 2018 06:19 )  PTT:21.2 sec    CAPILLARY BLOOD GLUCOSE          RADIOLOGY & ADDITIONAL TESTS: Reviewed.    ASSESSMENT:    PLAN:

## 2018-08-07 NOTE — PROGRESS NOTE ADULT - SUBJECTIVE AND OBJECTIVE BOX
Hospital course       89 y/o F with PMH dementia, recurrent UTI, Celiac' s disease, lactose intolerance, 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 for UTI and aspiration PNA presents from [facility] for respiratory distress . Pt was found to be in  acute respiratory failure- ABG showed pH of 7.21, pCO2 98, pO2 79.Pt was intubated in the ED. Pt also in septic shock secondary to aspiration pna, was started on levophed for pressor support.   Pt was initially treated with vanc and meropenum for hospital acquired pna in the setting of recent hospital admission but sputum cx positive for MSSA and abx were deescalated to IV CEFAZOLIN.   Of note patient has a hx of paroxysmal a fib however never anti coagulated given hx of gastric AVM.  During course of hospital admission pt went into rapid afib requiring increasing levophed requirements. Pt was cardioverted and has been in sinus rhythm since. Started on 400 mg amiodarone bid.   Pt has a hx of dysphagia, upon last admission for asp pna there was plan for PEG however pt passed barium swallow and that was held off. In the setting for dysphagia and asp pna Dr Hale planned for bedside PEG tube , placed on 8/6/18.   Pt extubated today (8/7/18).Plan to start feeds after extubation       OVERNIGHT EVENTS: ALYCIA    SUBJECTIVE / INTERVAL HPI: Patient seen and examined at bedside. S/P day 1 . No acute complaints, on CPAP with PEEP 5, PRESSURE SUPPORT 10     VITAL SIGNS:  Vital Signs Last 24 Hrs  T(C): 37.6 (07 Aug 2018 06:02), Max: 37.8 (06 Aug 2018 10:01)  T(F): 99.6 (07 Aug 2018 06:02), Max: 100.1 (06 Aug 2018 10:01)  HR: 72 (07 Aug 2018 07:00) (64 - 92)  BP: 99/53 (07 Aug 2018 07:00) (76/44 - 130/67)  BP(mean): 73 (07 Aug 2018 07:00) (57 - 98)  RR: 24 (07 Aug 2018 07:00) (11 - 30)  SpO2: 98% (07 Aug 2018 07:00) (93% - 100%)    PHYSICAL EXAM:    General: WDWN  HEENT: NC/AT; PERRL, anicteric sclera; MMM  Neck: supple  Cardiovascular: +S1/S2; RRR  Respiratory: B/L crackles noted.   Gastrointestinal: soft, NT/ND; +BSx4  Extremities: LLE swelling > Right sided   Vascular: 2+ radial, DP/PT pulses B/L  Neurological: AAOx3; no focal deficits    MEDICATIONS:  MEDICATIONS  (STANDING):  ALBUTerol/ipratropium for Nebulization 3 milliLiter(s) Nebulizer every 6 hours  amiodarone    Tablet 400 milliGRAM(s) Oral two times a day  ceFAZolin   IVPB 1000 milliGRAM(s) IV Intermittent every 12 hours  chlorhexidine 0.12% Liquid 15 milliLiter(s) Swish and Spit two times a day  chlorhexidine 2% Cloths 1 Application(s) Topical daily  heparin  Injectable 5000 Unit(s) SubCutaneous every 12 hours  metoprolol tartrate Injectable 5 milliGRAM(s) IV Push every 6 hours  midodrine 5 milliGRAM(s) Oral every 8 hours  norepinephrine Infusion 0.1 MICROgram(s)/kG/Min (9.769 mL/Hr) IV Continuous <Continuous>  pantoprazole  Injectable 40 milliGRAM(s) IV Push daily    MEDICATIONS  (PRN):      ALLERGIES:  Allergies    amoxicillin (Unknown)  Cipro (Unknown)  clindamycin (Unknown)  lactose (Unknown)  penicillin (Unknown)  Wheat (Unknown)    Intolerances        LABS:                        7.4    9.0   )-----------( 371      ( 07 Aug 2018 06:14 )             23.7     08-07    139  |  100  |  17  ----------------------------<  65<L>  4.7   |  25  |  0.86    Ca    8.5      07 Aug 2018 06:14  Phos  3.5     08-07  Mg     1.9     08-07      PT/INR - ( 06 Aug 2018 06:19 )   PT: 13.3 sec;   INR: 1.19          PTT - ( 06 Aug 2018 06:19 )  PTT:21.2 sec    CAPILLARY BLOOD GLUCOSE          RADIOLOGY & ADDITIONAL TESTS: Reviewed.    ASSESSMENT:    PLAN:

## 2018-08-07 NOTE — PROGRESS NOTE ADULT - ASSESSMENT
89 y/o F with PMH dementia, recurrent UTI, Celiac' s disease, lactose intolerance, R. femoral thrombus s/p IVC filter 1/2017, chronic anemia, with recent discharge from [facility] for UTI and aspiration PNA presents from [facility] for respiratory distress with acute respiratory failure found to be in septic shock likely from aspiration PNA and UTI.

## 2018-08-07 NOTE — PROGRESS NOTE ADULT - SUBJECTIVE AND OBJECTIVE BOX
CC/ HPI Ms. Goff is a 89 year old lady with dementia, history of ESBL UTI, bilateral DVT s/p IVC filter 2017, who was admitted from the nursing home with pneumonia complicated by septic shock  and respiratory failure, seen this morning resting comfortably on the ventilator    PAST MEDICAL HISTORY:  Dementia  DVT (deep venous thrombosis)  Dysphagia  Anemia  DJD (degenerative joint disease)  Edema  IBS (irritable bowel syndrome)  Celiac disease  H/O inguinal hernia repair  History of total left hip replacement    SOCHX:  -  alcohol    FMHX: FA/MO  - contributory     ROS reviewed below with positive findings marked (+) :  GEN:  fever, chills ENT: tracheostomy,   epistaxis,  sinusitis COR: CAD, CHF,  HTN, dysrhythmia PUL: COPD, ILD, asthma, pneumonia GI: PEG, dysphagia, hemorrhage, other LUDWIN: kidney disease, electrolyte disorder HEM:  anemia, +thrombus, coagulopathy, cancer ENDO:  thyroid disease, diabetes mellitus CNS:  +dementia, stroke, seizure, PSY:  depression, anxiety, other      MEDICATIONS  (STANDING):  ALBUTerol/ipratropium for Nebulization 3 milliLiter(s) Nebulizer every 6 hours  amiodarone    Tablet 400 milliGRAM(s) Oral two times a day  ceFAZolin   IVPB 1000 milliGRAM(s) IV Intermittent every 12 hours  chlorhexidine 0.12% Liquid 15 milliLiter(s) Swish and Spit two times a day  chlorhexidine 2% Cloths 1 Application(s) Topical daily  heparin  Injectable 5000 Unit(s) SubCutaneous every 12 hours  magnesium sulfate  IVPB 1 Gram(s) IV Intermittent once  metoprolol tartrate Injectable 5 milliGRAM(s) IV Push every 6 hours  midodrine 5 milliGRAM(s) Oral every 8 hours  norepinephrine Infusion 0.1 MICROgram(s)/kG/Min (9.769 mL/Hr) IV Continuous <Continuous>  pantoprazole  Injectable 40 milliGRAM(s) IV Push daily        Vital Signs Last 24 Hrs  T(C): 37.6 (07 Aug 2018 06:02), Max: 37.8 (06 Aug 2018 10:01)  T(F): 99.6 (07 Aug 2018 06:02), Max: 100.1 (06 Aug 2018 10:01)  HR: 80 (07 Aug 2018 08:00) (64 - 92)  BP: 101/54 (07 Aug 2018 08:00) (76/44 - 130/67)  BP(mean): 74 (07 Aug 2018 08:00) (57 - 98)  RR: 27 (07 Aug 2018 08:00) (11 - 30)  SpO2: 96% (07 Aug 2018 08:00) (93% - 100%)  Mode: AC/ CMV (Assist Control/ Continuous Mandatory Ventilation)  RR (machine): 12  TV (machine): 360  FiO2: 40  PEEP: 5  PS: 10  ITime: 1  MAP: 7.4  PIP: 16    GENERAL:         comfortable,  - distress.  HEENT:            - trauma,  - icterus,  - injection,  - nasal discharge.  NECK:              - jugular venous distention, - thyromegaly.  LYMPH:           - lymphadenopathy, - masses.  RESP:               + crackles,   - rhonchi,   - wheezes.   COR:                S1S2   - gallops,  - rubs.  ABD:                bowel sounds,   soft, - tender, - distended.  EXT/MSC:         - cyanosis,  - clubbing,  + edema.    NEURO:             alert,   responds to stimuli.                          7.4    9.0   )-----------( 371      ( 07 Aug 2018 06:14 )             23.7     08-07    139  |  100  |  17  ----------------------------<  65<L>  4.7   |  25  |  0.86        Xray Chest  (08.07.18)  ETT in place.  Bilateral lower lung field infiltrates/ effusions.      ASSESSMENT/PLAN    1) Respiratory failure  2) Septic shock  3) Pneumonia  4) Cardiomyopathy  5) Dementia    Satisfactory SpO2, Paw on the current vent settings.  Evaluate on pressure support for weaning to extubation  Bronchodilators:  Atrovent/ albuterol q 4 – 6 hours as needed  ID/Antibiotics: status post vanco/meropenem, currently on cefazolin  MSSA in respiratory culture, blood cultures negative to date  Cardiac/HTN: taper pressors as tolerated  GI: Rx/ prophylaxis c PPI/H2B  Heme: Rx/VT prophylaxis  Discussed with medical team

## 2018-08-07 NOTE — PROGRESS NOTE ADULT - ASSESSMENT
89 y/o F with PMH dementia, recurrent UTI, Celiac' s disease, lactose intolerance, R. femoral thrombus s/p IVC filter 1/2017, chronic anemia, with recent discharge from [facility] for UTI and aspiration PNA presents from [facility] for respiratory distress with acute respiratory failure found to be in septic shock likely from aspiration PNA      Pulmonology   Hypercapnic resp failure  etiology likely secondary to aspiration pneumonia vs HAP  CXR showing b/l pleural infiltrates, unchanged from previous  xray, Initial USG done at bedside showing RLL consolidation  In ED VBG significant for pH of 7.21, pCO2 98, pO2 79.Pt placed on BiPAP. Repeat ABG after 90 minutes on BiPAP- pH 7.28, pCO2 70, pO2 66.  The decision was made to intubate in the ED  Pt currently on CPAP.-PEEP 5, pressure support 10  Oxygenation: FIO2 40, PEEP 5   Ventilation: ON CPAP patient not using accessory muscles, comfortable, no nasal flaring noted.   Sputum cx growing staph aureus MSSA: deescalated to iv cefazolin in setting of penicillin allergy unable to use nafcillin  c/w Iv cefazolin for a total of 7 days of antibiotics -started on   Will plan to extubate patient today    Infectious disease  Septic shock   Upon arrival patient had a fever , was tachycardic, elevated wbc count 18.2, lactate negative   Received 1 L N.S , no additional fluids given because perfusion good- lactate is negative, , no edema noted, extremities not cold- clinically appeared euvolemic etiology related to aspiration pna vs hospital acquired pna   In setting of recent admit to hospital are treating for hospital acquired pna ,s/p vanc and meropenum in ED  sputum cx showing staph aureus MSSA  urine cx negative so far  c/w cefazolin in setting of penicillin allergy unable to use nafcillin- complete  7 day course of abx      #hx of recurrent uti  UTI: UA  showing WBC-5-10, BACTERIA-MANY, NEGATIVE NITRITE, Negative leuk esterase     last admission UCx positive for proteus mirabilis and aerococcus , was treated with meropenum  urine cx so far negative     Cardiovascular   septic shock  currently on pressor NE titrate to a map >65  Troponin 0.05, EKG unchanged from prior admission.  BNP 20961. Last echocardiogram in July had intact LV function with EF of 65%  c/w midodrine 5 mg tid to help down titrate levo-plan to increase to 10 tid       #hx of paroxysmal a fib however not anticoagulated given hx of gastric avm on egd   on 8/3 patient was  hypotensive and in afib with rvr. Pushed 10 of lopressor, 5 of verapamil. Pt started on amiodarone infusion. Levophed increased to 25 mcg.  Patient continued to be in afib. In setting of increasing levophed requirements and hemodynamic instability patient was shocked. Reverted back to sinus rythm . pressures good.   Pt has been in sinus since   s/p amio drip-  c/w oral amiodarone 400 mg bid     GI :HX of dysphagia  PEG placed  8/6/18    # constipation  continue with bowel regimen    #Renal  CAMRYN: RESOLVED  - Upon admission patient noted to have an CAMRYN: BUN 52 and Cr 1.16 increased from baseline in July - BUN 7 Cr .61. etiology likely prerenal in the setting of sepsis   continue to monitor     F none  E replete as needed  N PEG feeds   D MICU   GI ppx: iv protonix   Heparin sq q12  - Full code: son is HCP and verbally communicated that he would like intubation, CPR, lines and pressors should she require them.  Critical care time rendered 45 minutes 89 y/o F with PMH dementia, recurrent UTI, Celiac' s disease, lactose intolerance, R. femoral thrombus s/p IVC filter 1/2017, chronic anemia, with recent discharge from [facility] for UTI and aspiration PNA presents from [facility] for respiratory distress with acute respiratory failure found to be in septic shock likely from aspiration PNA      Pulmonology   Hypercapnic resp failure  etiology likely secondary to aspiration pneumonia vs HAP  CXR showing b/l pleural infiltrates, unchanged from previous  xray, Initial USG done at bedside showing RLL consolidation  In ED VBG significant for pH of 7.21, pCO2 98, pO2 79.Pt placed on BiPAP. Repeat ABG after 90 minutes on BiPAP- pH 7.28, pCO2 70, pO2 66.  The decision was made to intubate in the ED  Pt currently on CPAP.-PEEP 5, pressure support 10  Oxygenation: FIO2 40, PEEP 5   Ventilation: ON CPAP patient not using accessory muscles, comfortable, no nasal flaring noted.   Sputum cx growing staph aureus MSSA: deescalated to iv cefazolin in setting of penicillin allergy unable to use nafcillin  c/w Iv cefazolin for a total of 7 days of antibiotics -started on   Will plan to extubate patient today    Infectious disease  Septic shock   Upon arrival patient had a fever , was tachycardic, elevated wbc count 18.2, lactate negative   Received 1 L N.S , no additional fluids given because perfusion good- lactate is negative, , no edema noted, extremities not cold- clinically appeared euvolemic etiology related to aspiration pna vs hospital acquired pna   In setting of recent admit to hospital are treating for hospital acquired pna ,s/p vanc and meropenum in ED  sputum cx showing staph aureus MSSA  urine cx negative so far  c/w cefazolin in setting of penicillin allergy unable to use nafcillin- complete  7 day course of abx      #hx of recurrent uti  UTI: UA  showing WBC-5-10, BACTERIA-MANY, NEGATIVE NITRITE, Negative leuk esterase     last admission UCx positive for proteus mirabilis and aerococcus , was treated with meropenum  urine cx so far negative     Cardiovascular   septic shock  currently on pressor NE titrate to a map >65  Troponin 0.05, EKG unchanged from prior admission.  BNP 19903. Last echocardiogram in July had intact LV function with EF of 65%  c/w midodrine 5 mg tid to help down titrate levo-plan to increase to 10 tid       #hx of paroxysmal a fib however not anticoagulated given hx of gastric avm on egd   on 8/3 patient was  hypotensive and in afib with rvr. Pushed 10 of lopressor, 5 of verapamil. Pt started on amiodarone infusion. Levophed increased to 25 mcg.  Patient continued to be in afib. In setting of increasing levophed requirements and hemodynamic instability patient was shocked. Reverted back to sinus rythm . pressures good.   Pt has been in sinus since   s/p amio drip-  c/w oral amiodarone 400 mg bid     GI :HX of dysphagia  PEG placed  8/6/18, will initiate diet after pt is extubated     # constipation  continue with bowel regimen    #Renal  CAMRYN: RESOLVED  - Upon admission patient noted to have an CAMRYN: BUN 52 and Cr 1.16 increased from baseline in July - BUN 7 Cr .61. etiology likely prerenal in the setting of sepsis   continue to monitor     F none  E replete as needed  N PEG feeds   D MICU   GI ppx: iv protonix   Heparin sq q12  - Full code: son is HCP and verbally communicated that he would like intubation, CPR, lines and pressors should she require them.  Critical care time rendered 45 minutes 89 y/o F with PMH dementia, recurrent UTI, Celiac' s disease, lactose intolerance, R. femoral thrombus s/p IVC filter 1/2017, chronic anemia, with recent discharge from [facility] for UTI and aspiration PNA presents from [facility] for respiratory distress with acute respiratory failure found to be in septic shock likely from aspiration PNA      Pulmonology   Hypercapnic resp failure  etiology likely secondary to aspiration pneumonia vs HAP  CXR showing b/l pleural infiltrates, unchanged from previous  xray, Initial USG done at bedside showing RLL consolidation  In ED VBG significant for pH of 7.21, pCO2 98, pO2 79.Pt placed on BiPAP. Repeat ABG after 90 minutes on BiPAP- pH 7.28, pCO2 70, pO2 66.  The decision was made to intubate in the ED  Extubated today 8/7 at 2 pm .   Of note Sputum cx growing staph aureus MSSA: deescalated to iv cefazolin in setting of penicillin allergy unable to use nafcillin  c/w Iv cefazolin for a total of 7 days of antibiotics       Infectious disease  Septic shock   Upon arrival patient had a fever , was tachycardic, elevated wbc count 18.2, lactate negative   Received 1 L N.S , no additional fluids given because perfusion good- lactate is negative, , no edema noted, extremities not cold- clinically appeared euvolemic etiology related to aspiration pna vs hospital acquired pna   In setting of recent admit to hospital are treating for hospital acquired pna ,s/p vanc and meropenum in ED  sputum cx showing staph aureus MSSA  urine cx negative so far  c/w cefazolin in setting of penicillin allergy unable to use nafcillin- complete  7 day course of abx      #hx of recurrent uti  UTI: UA  showing WBC-5-10, BACTERIA-MANY, NEGATIVE NITRITE, Negative leuk esterase     last admission UCx positive for proteus mirabilis and aerococcus , was treated with meropenum  urine cx so far negative     Cardiovascular   septic shock  currently on pressor NE titrate to a map >65  Troponin 0.05, EKG unchanged from prior admission.  BNP 73373. Last echocardiogram in July had intact LV function with EF of 65%  c/w midodrine 5 mg tid to help down titrate levo-plan to increase to 10 tid       #hx of paroxysmal a fib however not anticoagulated given hx of gastric avm on egd   on 8/3 patient was  hypotensive and in afib with rvr. Pushed 10 of lopressor, 5 of verapamil. Pt started on amiodarone infusion. Levophed increased to 25 mcg.  Patient continued to be in afib. In setting of increasing levophed requirements and hemodynamic instability patient was shocked. Reverted back to sinus rythm . pressures good.   Pt has been in sinus since   s/p amio drip-  c/w oral amiodarone 400 mg bid     GI :HX of dysphagia  PEG placed  8/6/18, will initiate diet after pt is extubated     # constipation  continue with bowel regimen    #Renal  CAMRYN: RESOLVED  - Upon admission patient noted to have an CAMRYN: BUN 52 and Cr 1.16 increased from baseline in July - BUN 7 Cr .61. etiology likely prerenal in the setting of sepsis   continue to monitor     F none  E replete as needed  N PEG feeds   D MICU   GI ppx: iv protonix   Heparin sq q12  - Full code: son is HCP and verbally communicated that he would like intubation, CPR, lines and pressors should she require them.  Critical care time rendered 45 minutes

## 2018-08-08 DIAGNOSIS — M19.91 PRIMARY OSTEOARTHRITIS, UNSPECIFIED SITE: ICD-10-CM

## 2018-08-08 DIAGNOSIS — R62.7 ADULT FAILURE TO THRIVE: ICD-10-CM

## 2018-08-08 DIAGNOSIS — G89.29 OTHER CHRONIC PAIN: ICD-10-CM

## 2018-08-08 DIAGNOSIS — F02.80 DEMENTIA IN OTHER DISEASES CLASSIFIED ELSEWHERE, UNSPECIFIED SEVERITY, WITHOUT BEHAVIORAL DISTURBANCE, PSYCHOTIC DISTURBANCE, MOOD DISTURBANCE, AND ANXIETY: ICD-10-CM

## 2018-08-08 DIAGNOSIS — Z51.5 ENCOUNTER FOR PALLIATIVE CARE: ICD-10-CM

## 2018-08-08 DIAGNOSIS — J69.0 PNEUMONITIS DUE TO INHALATION OF FOOD AND VOMIT: ICD-10-CM

## 2018-08-08 DIAGNOSIS — R63.0 ANOREXIA: ICD-10-CM

## 2018-08-08 LAB
ANION GAP SERPL CALC-SCNC: 10 MMOL/L — SIGNIFICANT CHANGE UP (ref 5–17)
BASOPHILS NFR BLD AUTO: 0.5 % — SIGNIFICANT CHANGE UP (ref 0–2)
BLD GP AB SCN SERPL QL: NEGATIVE — SIGNIFICANT CHANGE UP
BUN SERPL-MCNC: 18 MG/DL — SIGNIFICANT CHANGE UP (ref 7–23)
CALCIUM SERPL-MCNC: 8.4 MG/DL — SIGNIFICANT CHANGE UP (ref 8.4–10.5)
CHLORIDE SERPL-SCNC: 104 MMOL/L — SIGNIFICANT CHANGE UP (ref 96–108)
CO2 SERPL-SCNC: 28 MMOL/L — SIGNIFICANT CHANGE UP (ref 22–31)
CREAT SERPL-MCNC: 0.94 MG/DL — SIGNIFICANT CHANGE UP (ref 0.5–1.3)
EOSINOPHIL NFR BLD AUTO: 4.8 % — SIGNIFICANT CHANGE UP (ref 0–6)
GLUCOSE SERPL-MCNC: 125 MG/DL — HIGH (ref 70–99)
HCT VFR BLD CALC: 26.6 % — LOW (ref 34.5–45)
HGB BLD-MCNC: 8.2 G/DL — LOW (ref 11.5–15.5)
LYMPHOCYTES # BLD AUTO: 12.2 % — LOW (ref 13–44)
MAGNESIUM SERPL-MCNC: 2.4 MG/DL — SIGNIFICANT CHANGE UP (ref 1.6–2.6)
MCHC RBC-ENTMCNC: 26.9 PG — LOW (ref 27–34)
MCHC RBC-ENTMCNC: 30.8 G/DL — LOW (ref 32–36)
MCV RBC AUTO: 87.2 FL — SIGNIFICANT CHANGE UP (ref 80–100)
MONOCYTES NFR BLD AUTO: 7 % — SIGNIFICANT CHANGE UP (ref 2–14)
NEUTROPHILS NFR BLD AUTO: 75.5 % — SIGNIFICANT CHANGE UP (ref 43–77)
PHOSPHATE SERPL-MCNC: 3.5 MG/DL — SIGNIFICANT CHANGE UP (ref 2.5–4.5)
PLATELET # BLD AUTO: 445 K/UL — HIGH (ref 150–400)
POTASSIUM SERPL-MCNC: 4.8 MMOL/L — SIGNIFICANT CHANGE UP (ref 3.5–5.3)
POTASSIUM SERPL-SCNC: 4.8 MMOL/L — SIGNIFICANT CHANGE UP (ref 3.5–5.3)
RBC # BLD: 3.05 M/UL — LOW (ref 3.8–5.2)
RBC # FLD: 15.6 % — SIGNIFICANT CHANGE UP (ref 10.3–16.9)
RH IG SCN BLD-IMP: POSITIVE — SIGNIFICANT CHANGE UP
SODIUM SERPL-SCNC: 142 MMOL/L — SIGNIFICANT CHANGE UP (ref 135–145)
WBC # BLD: 11.3 K/UL — HIGH (ref 3.8–10.5)
WBC # FLD AUTO: 11.3 K/UL — HIGH (ref 3.8–10.5)

## 2018-08-08 PROCEDURE — 99291 CRITICAL CARE FIRST HOUR: CPT

## 2018-08-08 PROCEDURE — 99223 1ST HOSP IP/OBS HIGH 75: CPT

## 2018-08-08 PROCEDURE — 71045 X-RAY EXAM CHEST 1 VIEW: CPT | Mod: 26

## 2018-08-08 RX ORDER — DOCUSATE SODIUM 100 MG
100 CAPSULE ORAL
Qty: 0 | Refills: 0 | Status: DISCONTINUED | OUTPATIENT
Start: 2018-08-08 | End: 2018-08-14

## 2018-08-08 RX ORDER — ACETAMINOPHEN 500 MG
650 TABLET ORAL DAILY
Qty: 0 | Refills: 0 | Status: DISCONTINUED | OUTPATIENT
Start: 2018-08-08 | End: 2018-08-14

## 2018-08-08 RX ORDER — DOCUSATE SODIUM 100 MG
100 CAPSULE ORAL DAILY
Qty: 0 | Refills: 0 | Status: DISCONTINUED | OUTPATIENT
Start: 2018-08-08 | End: 2018-08-08

## 2018-08-08 RX ORDER — SENNA PLUS 8.6 MG/1
10 TABLET ORAL AT BEDTIME
Qty: 0 | Refills: 0 | Status: DISCONTINUED | OUTPATIENT
Start: 2018-08-08 | End: 2018-08-14

## 2018-08-08 RX ORDER — ACETAMINOPHEN 500 MG
650 TABLET ORAL DAILY
Qty: 0 | Refills: 0 | Status: DISCONTINUED | OUTPATIENT
Start: 2018-08-08 | End: 2018-08-08

## 2018-08-08 RX ORDER — CHLORHEXIDINE GLUCONATE 213 G/1000ML
1 SOLUTION TOPICAL DAILY
Qty: 0 | Refills: 0 | Status: DISCONTINUED | OUTPATIENT
Start: 2018-08-08 | End: 2018-08-13

## 2018-08-08 RX ORDER — ACETAMINOPHEN 500 MG
650 TABLET ORAL ONCE
Qty: 0 | Refills: 0 | Status: COMPLETED | OUTPATIENT
Start: 2018-08-08 | End: 2018-08-08

## 2018-08-08 RX ORDER — SENNA PLUS 8.6 MG/1
1 TABLET ORAL ONCE
Qty: 0 | Refills: 0 | Status: DISCONTINUED | OUTPATIENT
Start: 2018-08-08 | End: 2018-08-08

## 2018-08-08 RX ADMIN — PANTOPRAZOLE SODIUM 40 MILLIGRAM(S): 20 TABLET, DELAYED RELEASE ORAL at 06:44

## 2018-08-08 RX ADMIN — MIDODRINE HYDROCHLORIDE 10 MILLIGRAM(S): 2.5 TABLET ORAL at 21:27

## 2018-08-08 RX ADMIN — Medication 100 MILLIGRAM(S): at 18:32

## 2018-08-08 RX ADMIN — Medication 3 MILLILITER(S): at 05:49

## 2018-08-08 RX ADMIN — Medication 12.5 MILLIGRAM(S): at 18:33

## 2018-08-08 RX ADMIN — MIDODRINE HYDROCHLORIDE 10 MILLIGRAM(S): 2.5 TABLET ORAL at 14:12

## 2018-08-08 RX ADMIN — Medication 650 MILLIGRAM(S): at 19:12

## 2018-08-08 RX ADMIN — HEPARIN SODIUM 5000 UNIT(S): 5000 INJECTION INTRAVENOUS; SUBCUTANEOUS at 06:44

## 2018-08-08 RX ADMIN — Medication 650 MILLIGRAM(S): at 18:33

## 2018-08-08 RX ADMIN — HEPARIN SODIUM 5000 UNIT(S): 5000 INJECTION INTRAVENOUS; SUBCUTANEOUS at 18:33

## 2018-08-08 RX ADMIN — SENNA PLUS 10 MILLILITER(S): 8.6 TABLET ORAL at 21:27

## 2018-08-08 RX ADMIN — Medication 3 MILLILITER(S): at 01:06

## 2018-08-08 RX ADMIN — AMIODARONE HYDROCHLORIDE 400 MILLIGRAM(S): 400 TABLET ORAL at 06:44

## 2018-08-08 RX ADMIN — AMIODARONE HYDROCHLORIDE 400 MILLIGRAM(S): 400 TABLET ORAL at 18:33

## 2018-08-08 RX ADMIN — Medication 3 MILLILITER(S): at 18:10

## 2018-08-08 RX ADMIN — Medication 100 MILLIGRAM(S): at 18:33

## 2018-08-08 RX ADMIN — CHLORHEXIDINE GLUCONATE 1 APPLICATION(S): 213 SOLUTION TOPICAL at 06:34

## 2018-08-08 RX ADMIN — Medication 100 MILLIGRAM(S): at 06:42

## 2018-08-08 RX ADMIN — MIDODRINE HYDROCHLORIDE 10 MILLIGRAM(S): 2.5 TABLET ORAL at 06:44

## 2018-08-08 RX ADMIN — Medication 12.5 MILLIGRAM(S): at 06:44

## 2018-08-08 RX ADMIN — CHLORHEXIDINE GLUCONATE 15 MILLILITER(S): 213 SOLUTION TOPICAL at 06:44

## 2018-08-08 RX ADMIN — Medication 3 MILLILITER(S): at 12:13

## 2018-08-08 NOTE — CONSULT NOTE ADULT - PROBLEM SELECTOR RECOMMENDATION 4
Patient A and O x 1.  Dementia of longstanding  presently calm.  Discussed this as a progressive disease with son

## 2018-08-08 NOTE — CONSULT NOTE ADULT - PROBLEM SELECTOR RECOMMENDATION 2
Related to osteoarthritis.  Recommend acetaminophen 650 mg once daily prior to getting patient OOB or doing PT    Discussed with son

## 2018-08-08 NOTE — PHYSICAL THERAPY INITIAL EVALUATION ADULT - CRITERIA FOR SKILLED THERAPEUTIC INTERVENTIONS
risk reduction/prevention/rehab potential/impairments found/functional limitations in following categories/therapy frequency/anticipated discharge recommendation

## 2018-08-08 NOTE — PROGRESS NOTE ADULT - SUBJECTIVE AND OBJECTIVE BOX
Pt seen and examined awake, alert, extubated, peg feeding in progress    REVIEW OF SYSTEMS:  unable to      MEDICATIONS:  MEDICATIONS  (STANDING):  ALBUTerol/ipratropium for Nebulization 3 milliLiter(s) Nebulizer every 6 hours  amiodarone    Tablet 400 milliGRAM(s) Oral two times a day  ceFAZolin   IVPB 1000 milliGRAM(s) IV Intermittent every 12 hours  docusate sodium Liquid 100 milliGRAM(s) Oral two times a day  heparin  Injectable 5000 Unit(s) SubCutaneous every 12 hours  metoprolol tartrate 12.5 milliGRAM(s) Oral every 12 hours  midodrine 10 milliGRAM(s) Oral every 8 hours  norepinephrine Infusion 0.1 MICROgram(s)/kG/Min (9.769 mL/Hr) IV Continuous <Continuous>  pantoprazole   Suspension 40 milliGRAM(s) Oral before breakfast  senna Syrup 10 milliLiter(s) Oral at bedtime    MEDICATIONS  (PRN):      Allergies    amoxicillin (Unknown)  Cipro (Unknown)  clindamycin (Unknown)  lactose (Unknown)  penicillin (Unknown)  Wheat (Unknown)    Intolerances        Vital Signs Last 24 Hrs  T(C): 36.2 (08 Aug 2018 09:48), Max: 37.4 (08 Aug 2018 01:27)  T(F): 97.2 (08 Aug 2018 09:48), Max: 99.3 (08 Aug 2018 01:27)  HR: 68 (08 Aug 2018 09:00) (64 - 90)  BP: 99/59 (08 Aug 2018 09:00) (85/51 - 109/54)  BP(mean): 71 (08 Aug 2018 09:00) (59 - 81)  RR: 28 (08 Aug 2018 09:00) (18 - 41)  SpO2: 94% (08 Aug 2018 09:00) (85% - 99%)    08-07 @ 07:01  -  08-08 @ 07:00  --------------------------------------------------------  IN: 465 mL / OUT: 1530 mL / NET: -1065 mL    08-08 @ 07:01  -  08-08 @ 10:03  --------------------------------------------------------  IN: 0 mL / OUT: 75 mL / NET: -75 mL        PHYSICAL EXAM:    General:  in no acute distress  HEENT: MMM, conjunctiva and sclera clear  Gastrointestinal: Soft non-tender non-distended; PEG site clean with dressings on Normal bowel sounds; No hepatosplenomegaly  Skin: Warm and dry. No obvious rash    LABS:      CBC Full  -  ( 08 Aug 2018 06:44 )  WBC Count : 11.3 K/uL  Hemoglobin : 8.2 g/dL  Hematocrit : 26.6 %  Platelet Count - Automated : 445 K/uL  Mean Cell Volume : 87.2 fL  Mean Cell Hemoglobin : 26.9 pg  Mean Cell Hemoglobin Concentration : 30.8 g/dL  Auto Neutrophil # : x  Auto Lymphocyte # : x  Auto Monocyte # : x  Auto Eosinophil # : x  Auto Basophil # : x  Auto Neutrophil % : x  Auto Lymphocyte % : x  Auto Monocyte % : x  Auto Eosinophil % : x  Auto Basophil % : x    08-08    142  |  104  |  18  ----------------------------<  125<H>  4.8   |  28  |  0.94    Ca    8.4      08 Aug 2018 06:44  Phos  3.5     08-08  Mg     2.4     08-08                        RADIOLOGY & ADDITIONAL STUDIES (The following images were personally reviewed):

## 2018-08-08 NOTE — PROGRESS NOTE ADULT - ASSESSMENT
89 y/o F with PMH dementia, recurrent UTI, Celiac' s disease, lactose intolerance, 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 for UTI and aspiration PNA presents from [facility] for respiratory distress with acute respiratory failure found to be in septic shock likely from aspiration PNA      Pulmonology   #Hypercapnic resp failure  etiology likely secondary to aspiration pneumonia vs HAP  CXR showing b/l pleural infiltrates, unchanged from previous  xray, Initial USG done at bedside showing RLL consolidation  In ED VBG significant for pH of 7.21, pCO2 98, pO2 79.Pt placed on BiPAP. Repeat ABG after 90 minutes on BiPAP- pH 7.28, pCO2 70, pO2 66.  The decision was made to intubate in the ED.   Extubated yesterday 8/7 at 2 pm. Now currently on aerosol mask, sating well.   Of note Sputum cx growing staph aureus MSSA: deescalated to iv cefazolin in setting of penicillin allergy unable to use nafcillin  c/w Iv cefazolin for a total of 7 days of antibiotics       Infectious disease  #Septic shock   Upon arrival patient had a fever , was tachycardic, elevated wbc count 18.2, lactate negative   Received 1 L N.S , no additional fluids given because perfusion good- lactate is negative, , no edema noted, extremities not cold- clinically appeared euvolemic etiology related to aspiration pna vs hospital acquired pna   In setting of recent admit to hospital are treating for hospital acquired pna ,s/p vanc and meropenum in ED  sputum cx showing staph aureus MSSA  urine cx negative so far  c/w cefazolin in setting of penicillin allergy unable to use nafcillin- complete  7 day course of abx      #hx of recurrent uti  UTI: UA  showing WBC-5-10, BACTERIA-MANY, NEGATIVE NITRITE, Negative leuk esterase     last admission UCx positive for proteus mirabilis and aerococcus , was treated with meropenum  urine cx so far negative     Cardiovascular   #septic shock  -Now off levophed. MAP goal >60  - Troponin 0.05, EKG unchanged from prior admission.  BNP 79788. Last echocardiogram in July had intact LV function with EF of 65%  - c/w midodrine 5 mg tid       #hx of paroxysmal a fib however not anticoagulated given hx of gastric avm on egd   on 8/3 patient was  hypotensive and in afib with rvr. Pushed 10 of lopressor, 5 of verapamil. Pt started on amiodarone infusion. Levophed increased to 25 mcg.  Patient continued to be in afib. In setting of increasing levophed requirements and hemodynamic instability patient was shocked. Reverted back to sinus rythm . pressures good.   Pt has been in sinus since   s/p amio drip-  c/w oral amiodarone 400 mg bid     GI :HX of dysphagia  PEG placed  8/6/18, will initiate diet after pt is extubated     # constipation  continue with bowel regimen    #Renal  CAMRYN: RESOLVED  - Upon admission patient noted to have an CAMRYN: BUN 52 and Cr 1.16 increased from baseline in July - BUN 7 Cr .61. etiology likely prerenal in the setting of sepsis   continue to monitor     F none  E replete as needed  N PEG feeds   D MICU   GI ppx: iv protonix   Heparin sq q12  - Full code: son is HCP and verbally communicated that he would like intubation, CPR, lines and pressors should she require them.  Critical care time rendered 45 minutes 89 y/o F with PMH dementia, recurrent UTI, Celiac' s disease, lactose intolerance, R. femoral thrombus s/p IVC filter 1/2017, chronic anemia, with recent discharge from [facility] for UTI and aspiration PNA presents from [facility] for respiratory distress with acute respiratory failure found to be in septic shock likely from aspiration PNA      Pulmonology   #Hypercapnic resp failure  etiology likely secondary to aspiration pneumonia vs HAP  CXR showing b/l pleural infiltrates, unchanged from previous  xray, Initial USG done at bedside showing RLL consolidation  In ED VBG significant for pH of 7.21, pCO2 98, pO2 79.Pt placed on BiPAP. Repeat ABG after 90 minutes on BiPAP- pH 7.28, pCO2 70, pO2 66.  The decision was made to intubate in the ED.   Extubated yesterday 8/7 at 2 pm. Now currently on aerosol mask, sating well.   Of note Sputum cx growing staph aureus MSSA: deescalated to iv cefazolin in setting of penicillin allergy unable to use nafcillin  c/w Iv cefazolin for a total of 7 days of antibiotics       Infectious disease  #Septic shock   Upon arrival patient had a fever , was tachycardic, elevated wbc count 18.2, lactate negative   Received 1 L N.S , no additional fluids given because perfusion good- lactate is negative, , no edema noted, extremities not cold- clinically appeared euvolemic etiology related to aspiration pna vs hospital acquired pna   In setting of recent admit to hospital are treating for hospital acquired pna ,s/p vanc and meropenum in ED  sputum cx showing staph aureus MSSA  urine cx negative so far  c/w cefazolin in setting of penicillin allergy unable to use nafcillin- complete  7 day course of abx      #hx of recurrent uti  UTI: UA  showing WBC-5-10, BACTERIA-MANY, NEGATIVE NITRITE, Negative leuk esterase     last admission UCx positive for proteus mirabilis and aerococcus , was treated with meropenum  urine cx so far negative     Cardiovascular   #septic shock  -Now off levophed. MAP goal >60  - Troponin 0.05, EKG unchanged from prior admission.  BNP 20090. Last echocardiogram in July had intact LV function with EF of 65%  - c/w midodrine 10 mg tid       #hx of paroxysmal a fib however not anticoagulated given hx of gastric avm on egd   on 8/3 patient was  hypotensive and in afib with rvr. Pushed 10 of lopressor, 5 of verapamil. Pt started on amiodarone infusion. Levophed increased to 25 mcg.  Patient continued to be in afib. In setting of increasing levophed requirements and hemodynamic instability patient was shocked. Reverted back to sinus rythm . pressures good.   Pt has been in sinus since   s/p amio drip-  c/w oral amiodarone 400 mg bid     GI :HX of dysphagia  PEG placed  8/6/18  - refeeding today    # constipation  continue with bowel regimen    #Renal  CAMRYN: RESOLVED  - Upon admission patient noted to have an CAMRYN: BUN 52 and Cr 1.16 increased from baseline in July - BUN 7 Cr .61. etiology likely prerenal in the - setting of sepsis   - continue to monitor     #Chronic Pain: Related to osteoarthritis.   - acetaminophen 650 mg once daily prior to getting patient OOB or doing PT per Palliative     F none  E replete as needed  N PEG feeds   D MICU   GI ppx: iv protonix   Heparin sq q12  - Full code: son is HCP and verbally communicated that he would like intubation, CPR, lines and pressors should she require them.  Critical care time rendered 45 minutes

## 2018-08-08 NOTE — PROGRESS NOTE ADULT - ATTENDING COMMENTS
Patient seen and examined with house-staff during bedside rounds  Resident note read, including vitals, physical findings, laboratory data, and radiological reports.   Revisions included below.  Case discussed with House staff  Direct personal management at bedside  and extensive interpretation of data. Decision making of high complexity.    A bit tachypneic on afternoon rounds; Will leave in MICU over night

## 2018-08-08 NOTE — CONSULT NOTE ADULT - PROBLEM SELECTOR RECOMMENDATION 5
Son has chosen to have PEG placed.  We discussed that aspiration certainly remains possible despite the PEG.  Son not really open to this conversation

## 2018-08-08 NOTE — CONSULT NOTE ADULT - SUBJECTIVE AND OBJECTIVE BOX
JOSE MARTIN GOFF   MRN-[id number]     (08/15/1920):     HPI:  HX provided by son : 89 y/o F with PMH dementia, baseline mental status AOX1,  recurrent UTI, celiac's disease, lactose intolerance, right femoral thrombus s/p IVC filter 2017, chronic anemia, with recent discharge from [facility] for UTI and aspiration PNA presents from [facility] for respiratory distress.   Patient was recently discharged () after being treated with meropenem for proteus mirabilis and aerococcus urine UTI and additionally was treated for aspiration PNA.  Per documentation on previous admission discussion for PEG placement, son wanted it in setting of dysphagia she passed the modified barium swallow study and was to trial a liquid/puree diet. No hx of coughing after oral intake or choking , no hx of fever chills    In the ED, VS T 100.5, , BP 94/58, RR 24, SpO2 95% on RA. VBG significant for pH of 7.21, pCO2 98, pO2 79.  Pt placed on BiPAP. Repeat ABG after 90 minutes on BiPAP- pH 7.28, pCO2 70, pO2 66.  The decision was made to intubate in the ED. Central line was placed.   Vanc and meropenem and 1L NS. Tylenol 625mg.  LABS showing leukocytosis 18.2, Hgb 8.8. Bicarb 33, BUN 52, Cr 1.14 (baseline at discharge - BUN 7 Cr 0.61). UA (+) for UTI. Trop 0.05. BNP 69446.   EKG with q waves in lead III unchanged from prior EKG.   Chest xray with b/l pleural infiltrates-unchanged from previous imaging (02 Aug 2018 15:19)    Hospital Course and Subjective:  Patient treated with antibiotics, pressors and ventilatory support.  She was successfully weaned from the ventilator.  Started on midodrine and is presently off pressors.  Awake alert, answering simple questions and following simple commands.    Son reports that she is "full code" and that she has a good QOL at the nursing home.  Watches TV and her aides read her the paper.  She has been bedbound for more than a year.  He feels that she has pain from her osteoarthritis particularly in her knees.  Patient herself denies pain, but when the nurses attempr to sit her on the side of the bed she cries out in discomfort and cannot flex at the knee.      PAST MEDICAL & SURGICAL HISTORY:  Dementia  DVT (deep venous thrombosis)  Dysphagia  Anemia  DJD (degenerative joint disease)  Edema  IBS (irritable bowel syndrome)  Celiac disease  S/P IVC filter  H/O inguinal hernia repair  History of total left hip replacement      FAMILY HISTORY:  No pertinent family history in first degree relatives  son  of lymphoma 11 years ago        ROS:    Unable to attain due to:  dementia- lack of reliability.  All history obtained from son                    Dyspnea (Jose 0-10):   none noted                     N/V (Y/N):          N                    Secretions (Y/N) :      N          Agitation(Y/N):         N  Pain (Y/N):     denies, but see above  -Provocation/Palliation:  -Quality/Quantity:  -Radiating:  -Severity:  -Timing/Frequency:  -Impact on ADLs:    General:  Denied  HEENT:    Denied  Neck:  Denied  CVS:  episode of PAF during this hospitalization  Resp:  Denied  GI:  history of GI bleed aggravated by NSAIDS.  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  Patient reported to have sprue  :  recurrent UTI  Musc:  see above  Neuro:  Denied  Psych:  dementia- generally pleasant demeanor  Skin:  Denied  Lymph:  Denied    Allergies    amoxicillin (Unknown)  Cipro (Unknown)  clindamycin (Unknown)  lactose (Unknown)  penicillin (Unknown)  Wheat (Unknown)    Intolerances        Opiate Naive (Y/N): yes.  Was on PRN Tylenol for pain at NH.  She never asked for it, but when given, son feels she was more relaxed and cooperative      Medications:      MEDICATIONS  (STANDING):  acetaminophen   Tablet. 650 milliGRAM(s) Oral once  ALBUTerol/ipratropium for Nebulization 3 milliLiter(s) Nebulizer every 6 hours  amiodarone    Tablet 400 milliGRAM(s) Oral two times a day  ceFAZolin   IVPB 1000 milliGRAM(s) IV Intermittent every 12 hours  docusate sodium Liquid 100 milliGRAM(s) Oral two times a day  heparin  Injectable 5000 Unit(s) SubCutaneous every 12 hours  metoprolol tartrate 12.5 milliGRAM(s) Oral every 12 hours  midodrine 10 milliGRAM(s) Oral every 8 hours  pantoprazole   Suspension 40 milliGRAM(s) Oral before breakfast  senna Syrup 10 milliLiter(s) Oral at bedtime    MEDICATIONS  (PRN):      Labs:    CBC:                        8.2    11.3  )-----------( 445      ( 08 Aug 2018 06:44 )             26.6     CMP:        142  |  104  |  18  ----------------------------<  125<H>  4.8   |  28  |  0.94    Ca    8.4      08 Aug 2018 06:44  Phos  3.5     -  Mg     2.4                 Imaging:  Reviewed  < from: Xray Chest 1 View- PORTABLE-Routine (18 @ 06:20) >  EXAM:  XR CHEST PORTABLE ROUTINE 1V                          PROCEDURE DATE:  2018          INTERPRETATION:  Portable Chest X-Ray dated 2018 6:20 AM    Indication: pna    Prior studies: 2018    An AP portable view of the chest revealssmall bilateral pleural   effusions. Moderate pulmonary venous congestion. Bibasilar atelectasis or   infiltrate. Tip of endotracheal tube in the origin of the right mainstem   bronchus and it should be pulled back by 3 cm.    IMPRESSION:  Abnormal position of the endotracheal tube with its tip at the origin of   the right mainstem bronchus.     Moderate CHF. Bibasilar atelectasis or infiltrates.    < end of copied text >    PEx:  T(C): 36.2 (18 @ 09:48), Max: 37.4 (18 @ 01:27)  HR: 76 (18 @ 13:00) (64 - 88)  BP: 84/50 (18 @ 13:00) (79/39 - 108/54)  RR: 27 (18 @ 13:00) (18 - 38)  SpO2: 93% (18 @ 13:00) (85% - 99%)  Wt(kg): --  Daily     Daily   CAPILLARY BLOOD GLUCOSE      POCT Blood Glucose.: 120 mg/dL (07 Aug 2018 09:57)    I&O's Summary    07 Aug 2018 07:01  -  08 Aug 2018 07:00  --------------------------------------------------------  IN: 465 mL / OUT: 1530 mL / NET: -1065 mL    08 Aug 2018 07:  -  08 Aug 2018 13:44  --------------------------------------------------------  IN: 4 mL / OUT: 115 mL / NET: -111 mL        General: elderly female awake alert and in NAD  HEENT:  essentially edentulous upper jaw  Neck: supple, no bruit  CVS: regualr rate and rhythm  Resp: decreased BS at both bases, randa wheeze  GI:  PEG in place, non tender abdomen.  Normal BS  :  Tubbs  Musc:   generalized weakness.  osteoarthritis of both knees with valgus deformity  Neuro: non focal, oriented to person only  Psych:   pleasant, smiling  Skin: normal  Lymph: no adenopathy  Preadmit Karnofsky:                            50 %           Current Karnofsky:     40%  Cachexia (Y/N): no  BMI: 24.8    Advanced Directives:     Full Code  HCP    Decision maker: Son Dr. Esvin Goff   [phone]  Legal surrogate: same    Social History: lived in [facility] since 2017    30 years ago.  One son  11 years ago.  Dr. Esvin Goff visits mother every day.  He is a gastroenterologist and is aware of all details regarding his mothers health.  Extremely involved    GOALS OF CARE DISCUSSION       Palliative care info/counseling provided  Discussed that patient's recurrent infection and PNA related to progressive disease.  Discussed that son noted that she has never been this ill- and I discussed that this may be part of the process for her.  We talked about the "what if" next time she cannot come off the ventilator, but son not really able to contemplate  He feels her QOL is certainly good enough to warrant this level of ICU care	                 Documentation of GOC: 	For the present patient is full code and had a PEG placed to all for hydration.          REFERRALS	        Palliative Med                              Massage Therapy       Music Therapy         Nutrition              PT/OT

## 2018-08-08 NOTE — PROGRESS NOTE ADULT - SUBJECTIVE AND OBJECTIVE BOX
CC/ HPI Ms. Goff is a 89 year old lady with dementia, history of ESBL UTI, bilateral DVT s/p IVC filter 2017, who was admitted from the nursing home with pneumonia complicated by septic shock  and respiratory failure, seen this morning resting comfortably in bed.    PAST MEDICAL HISTORY:  Dementia  DVT (deep venous thrombosis)  Dysphagia  Anemia  DJD (degenerative joint disease)  Edema  IBS (irritable bowel syndrome)  Celiac disease  H/O inguinal hernia repair  History of total left hip replacement    SOCHX:  -  alcohol    FMHX: FA/MO  - contributory     ROS reviewed below with positive findings marked (+) :  GEN:  fever, chills ENT: tracheostomy,   epistaxis,  sinusitis COR: CAD, CHF,  HTN, dysrhythmia PUL: COPD, ILD, asthma, pneumonia GI: PEG, dysphagia, hemorrhage, other LUDWIN: kidney disease, electrolyte disorder HEM:  anemia, +thrombus, coagulopathy, cancer ENDO:  thyroid disease, diabetes mellitus CNS:  +dementia, stroke, seizure, PSY:  depression, anxiety, other      MEDICATIONS  (STANDING):  ALBUTerol/ipratropium for Nebulization 3 milliLiter(s) Nebulizer every 6 hours  amiodarone    Tablet 400 milliGRAM(s) Oral two times a day  ceFAZolin   IVPB 1000 milliGRAM(s) IV Intermittent every 12 hours  docusate sodium Liquid 100 milliGRAM(s) Oral two times a day  heparin  Injectable 5000 Unit(s) SubCutaneous every 12 hours  metoprolol tartrate 12.5 milliGRAM(s) Oral every 12 hours  midodrine 10 milliGRAM(s) Oral every 8 hours  norepinephrine Infusion 0.1 MICROgram(s)/kG/Min (9.769 mL/Hr) IV Continuous <Continuous>  pantoprazole   Suspension 40 milliGRAM(s) Oral before breakfast  senna Syrup 10 milliLiter(s) Oral at bedtime    MEDICATIONS  (PRN):      Vital Signs Last 24 Hrs  T(C): 36.2 (08 Aug 2018 09:48), Max: 37.4 (08 Aug 2018 01:27)  T(F): 97.2 (08 Aug 2018 09:48), Max: 99.3 (08 Aug 2018 01:27)  HR: 66 (08 Aug 2018 11:00) (64 - 88)  BP: 79/39 (08 Aug 2018 11:00) (79/39 - 108/54)  BP(mean): 58 (08 Aug 2018 11:00) (58 - 78)  RR: 27 (08 Aug 2018 11:00) (18 - 38)  SpO2: 97% (08 Aug 2018 11:00) (85% - 99%)  Mode: standby    GENERAL:         comfortable,  - distress.  HEENT:            - trauma,  - icterus,  - injection,  - nasal discharge.  NECK:              - jugular venous distention, - thyromegaly.  LYMPH:           - lymphadenopathy, - masses.  RESP:               + crackles,   - rhonchi,   - wheezes.   COR:                S1S2   - gallops,  - rubs.  ABD:                bowel sounds,   soft, - tender, - distended, - organomegaly.  EXT/MSC:         - cyanosis,  - clubbing,  + edema.    NEURO:             alert,   responds to stimuli.                        8.2    11.3  )-----------( 445      ( 08 Aug 2018 06:44 )             26.6     08-08    142  |  104  |  18  ----------------------------<  125<H>  4.8   |  28  |  0.94        Xray Chest  (08.08.18)  Bilateral lower lung field infiltrates/ effusions.      ASSESSMENT/PLAN    1) Status post respiratory failure  2) Septic shock  3) Pneumonia  4) Cardiomyopathy  5) Dementia    Satisfactory SpO2, oxygen as needed  Evaluate on pressure support for weaning to extubation  Bronchodilators:  Atrovent/ albuterol q 4 – 6 hours as needed  ID/Antibiotics: status post vanco/meropenem, currently on cefazolin  MSSA in respiratory culture, blood cultures negative to date  Cardiac/HTN: taper pressors as tolerated  GI: Rx/ prophylaxis c PPI/H2B  Heme: Rx/VT prophylaxis  Discussed with medical team

## 2018-08-08 NOTE — PHYSICAL THERAPY INITIAL EVALUATION ADULT - ADDITIONAL COMMENTS
[facility]. Unable to obtain social hx 2/2 pt A&Ox1. As per RN Ofelia pts son states that pt is able to get to the chair w/ assist and sits up x7hrs/day.

## 2018-08-08 NOTE — PHYSICAL THERAPY INITIAL EVALUATION ADULT - PERTINENT HX OF CURRENT PROBLEM, REHAB EVAL
89 y/o F with PMH dementia, recurrent UTI, Celiac' s disease, lactose intolerance, R. femoral thrombus s/p IVC filter 1/2017, chronic anemia, with recent discharge from [facility] for UTI and aspiration PNA presents from [facility] for respiratory distress .

## 2018-08-08 NOTE — PHYSICAL THERAPY INITIAL EVALUATION ADULT - IMPAIRMENTS FOUND, PT EVAL
aerobic capacity/endurance/muscle strength/poor safety awareness/gross motor/cognitive impairment/fine motor/arousal, attention, and cognition/gait, locomotion, and balance/posture

## 2018-08-08 NOTE — CONSULT NOTE ADULT - ASSESSMENT
Almost 89 yo female with 2 years of general decline, hospitalized with sepsis secondary to PNA and history of recurrent UTIs  Presently, has survived this episode and is awake and alert.  She is weaker than baseline.

## 2018-08-08 NOTE — CONSULT NOTE ADULT - PROBLEM SELECTOR PROBLEM 1
Septic shock
Aspiration pneumonia due to gastric secretions, unspecified laterality, unspecified part of lung

## 2018-08-08 NOTE — PHYSICAL THERAPY INITIAL EVALUATION ADULT - IMPAIRED TRANSFERS: SIT/STAND, REHAB EVAL
cognition/narrow base of support/decreased strength/abnormal muscle tone/impaired balance/impaired motor control

## 2018-08-08 NOTE — CONSULT NOTE ADULT - PROBLEM SELECTOR RECOMMENDATION 3
Overall decline- but son describes QOL as acceptable to patient and himself.  He wishes to continue any and all treatments. We did discuss that there will come an episode where she may not respond to treatment and be on the ventilator.  He was not open to discussion

## 2018-08-08 NOTE — CONSULT NOTE ADULT - PROBLEM SELECTOR RECOMMENDATION 6
Support provided to patient and family. Patient to have access to supportive services during rest of hospital stay as the patient/family deemed necessary ie. Chaplaincy, Massage therapy, Music therapy, Patient and family supportive services, Palliative SW, etc.    Will continue to follow to try to develop a trusting relationship if possible with the HCP son

## 2018-08-08 NOTE — PROGRESS NOTE ADULT - SUBJECTIVE AND OBJECTIVE BOX
89 y/o F with PMH dementia, recurrent UTI, Celiac' s disease, lactose intolerance, 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 for UTI and aspiration PNA presents from [facility] for respiratory distress . Pt was found to be in  acute respiratory failure- ABG showed pH of 7.21, pCO2 98, pO2 79.Pt was intubated in the ED. Pt also in septic shock secondary to aspiration pna, was started on levophed for pressor support.   Pt was initially treated with vanc and meropenum for hospital acquired pna in the setting of recent hospital admission but sputum cx positive for MSSA and abx were deescalated to IV CEFAZOLIN.   Of note patient has a hx of paroxysmal a fib however never anti coagulated given hx of gastric AVM.  During course of hospital admission pt went into rapid afib requiring increasing levophed requirements. Pt was cardioverted and has been in sinus rhythm since. Started on 400 mg amiodarone bid.   Pt has a hx of dysphagia, upon last admission for asp pna there was plan for PEG however pt passed barium swallow and that was held off. In the setting for dysphagia and asp pna Dr Hale planned for bedside PEG tube , placed on 8/6/18.   Pt extubated today (8/7/18).Plan to start feeds after extubation         Patient is a 89y old  Female who presents with a chief complaint of shortness of breath (02 Aug 2018 15:19)      INTERVAL HPI:    OVERNIGHT EVENTS: no acute events. saturating > 88-93% on aeresol mask       ICU Vital Signs Last 24 Hrs  T(C): 37.4 (08 Aug 2018 01:27), Max: 37.4 (07 Aug 2018 10:00)  T(F): 99.3 (08 Aug 2018 01:27), Max: 99.3 (07 Aug 2018 10:00)  HR: 76 (08 Aug 2018 05:00) (70 - 90)  BP: 105/51 (08 Aug 2018 05:00) (79/45 - 109/54)  BP(mean): 76 (08 Aug 2018 05:00) (59 - 81)  ABP: --  ABP(mean): --  RR: 31 (08 Aug 2018 05:00) (19 - 41)  SpO2: 97% (08 Aug 2018 05:00) (85% - 99%)    I&O's Summary    06 Aug 2018 07:01  -  07 Aug 2018 07:00  --------------------------------------------------------  IN: 502 mL / OUT: 1229 mL / NET: -727 mL    07 Aug 2018 07:01  -  08 Aug 2018 06:14  --------------------------------------------------------  IN: 465 mL / OUT: 1450 mL / NET: -985 mL      Mode: standby      LABS:                        7.4    9.0   )-----------( 371      ( 07 Aug 2018 06:14 )             23.7     08-07    139  |  100  |  17  ----------------------------<  65<L>  4.7   |  25  |  0.86    Ca    8.5      07 Aug 2018 06:14  Phos  3.5     08-07  Mg     1.9     08-07      PT/INR - ( 06 Aug 2018 06:19 )   PT: 13.3 sec;   INR: 1.19          PTT - ( 06 Aug 2018 06:19 )  PTT:21.2 sec    CAPILLARY BLOOD GLUCOSE      POCT Blood Glucose.: 120 mg/dL (07 Aug 2018 09:57)        RADIOLOGY & ADDITIONAL TESTS:    Consultant(s) Notes Reviewed:  [x ] YES  [ ] NO    MEDICATIONS  (STANDING):  ALBUTerol/ipratropium for Nebulization 3 milliLiter(s) Nebulizer every 6 hours  amiodarone    Tablet 400 milliGRAM(s) Oral two times a day  ceFAZolin   IVPB 1000 milliGRAM(s) IV Intermittent every 12 hours  chlorhexidine 0.12% Liquid 15 milliLiter(s) Swish and Spit two times a day  chlorhexidine 2% Cloths 1 Application(s) Topical daily  heparin  Injectable 5000 Unit(s) SubCutaneous every 12 hours  metoprolol tartrate 12.5 milliGRAM(s) Oral every 12 hours  midodrine 10 milliGRAM(s) Oral every 8 hours  norepinephrine Infusion 0.1 MICROgram(s)/kG/Min (9.769 mL/Hr) IV Continuous <Continuous>  pantoprazole   Suspension 40 milliGRAM(s) Oral before breakfast    MEDICATIONS  (PRN):      PHYSICAL EXAM:  GENERAL: well built, well nourished  HEAD:  Atraumatic, Normocephalic  EYES: EOMI, PERRLA, conjunctiva and sclera clear  ENT: No tonsillar erythema, exudates, or enlargement; Moist mucous membranes, Good dentition, No lesions  NECK: Supple, No JVD, Normal thyroid, no enlarged nodes  NERVOUS SYSTEM:  Alert & Oriented X3, Good concentration; Motor Strength 5/5 B/L upper and lower extremities; DTRs 2+ intact and symmetric, sensory intact  CHEST/LUNG: B/L good air entry; No rales, rhonchi, or wheezing  HEART: S1S2 normal, no S3, Regular rate and rhythm; No murmurs, rubs, or gallops  ABDOMEN: Soft, Nontender, Nondistended; Bowel sounds present  EXTREMITIES:  2+ Peripheral Pulses, No clubbing, cyanosis, or edema  LYMPH: No lymphadenopathy noted  SKIN: No rashes or lesions    Care Discussed with Consultants/Other Providers [ x] YES  [ ] NO 89 y/o F with PMH dementia, recurrent UTI, Celiac' s disease, lactose intolerance, 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 for UTI and aspiration PNA presents from [facility] for respiratory distress . Pt was found to be in  acute respiratory failure- ABG showed pH of 7.21, pCO2 98, pO2 79.Pt was intubated in the ED. Pt also in septic shock secondary to aspiration pna, was started on levophed for pressor support.   Pt was initially treated with vanc and meropenum for hospital acquired pna in the setting of recent hospital admission but sputum cx positive for MSSA and abx were deescalated to IV CEFAZOLIN.   Of note patient has a hx of paroxysmal a fib however never anti coagulated given hx of gastric AVM.  During course of hospital admission pt went into rapid afib requiring increasing levophed requirements. Pt was cardioverted and has been in sinus rhythm since. Started on 400 mg amiodarone bid.   Pt has a hx of dysphagia, upon last admission for asp pna there was plan for PEG however pt passed barium swallow and that was held off. In the setting for dysphagia and asp pna Dr Hale planned for bedside PEG tube , placed on 8/6/18.   Pt extubated today (8/7/18).Plan to start feeds after extubation     OVERNIGHT EVENTS: no acute events.   saturating > 88-93% on aeresol mask    INTERVAL HPI: Pt examined at bedside. Resting comfortably. Pt is responding to verbal commands. Currently saturating well on aeresol mask     ICU Vital Signs Last 24 Hrs  T(C): 37.2 (08 Aug 2018 14:16), Max: 37.4 (08 Aug 2018 01:27)  T(F): 98.9 (08 Aug 2018 14:16), Max: 99.3 (08 Aug 2018 01:27)  HR: 78 (08 Aug 2018 14:15) (64 - 88)  BP: 99/54 (08 Aug 2018 14:15) (79/39 - 108/54)  BP(mean): 75 (08 Aug 2018 14:15) (58 - 78)  ABP: --  ABP(mean): --  RR: 35 (08 Aug 2018 14:15) (18 - 38)  SpO2: 97% (08 Aug 2018 14:00) (85% - 99%)    I&O's Summary    07 Aug 2018 07:01  -  08 Aug 2018 07:00  --------------------------------------------------------  IN: 465 mL / OUT: 1530 mL / NET: -1065 mL    08 Aug 2018 07:01  -  08 Aug 2018 15:36  --------------------------------------------------------  IN: 4 mL / OUT: 115 mL / NET: -111 mL          LABS:                        8.2    11.3  )-----------( 445      ( 08 Aug 2018 06:44 )             26.6     08-08    142  |  104  |  18  ----------------------------<  125<H>  4.8   |  28  |  0.94    Ca    8.4      08 Aug 2018 06:44  Phos  3.5     08-08  Mg     2.4     08-08          CAPILLARY BLOOD GLUCOSE            RADIOLOGY & ADDITIONAL TESTS:    Consultant(s) Notes Reviewed:  [x ] YES  [ ] NO    MEDICATIONS  (STANDING):  acetaminophen    Suspension. 650 milliGRAM(s) Oral daily  acetaminophen   Tablet. 650 milliGRAM(s) Oral once  ALBUTerol/ipratropium for Nebulization 3 milliLiter(s) Nebulizer every 6 hours  amiodarone    Tablet 400 milliGRAM(s) Oral two times a day  ceFAZolin   IVPB 1000 milliGRAM(s) IV Intermittent every 12 hours  docusate sodium Liquid 100 milliGRAM(s) Oral two times a day  heparin  Injectable 5000 Unit(s) SubCutaneous every 12 hours  metoprolol tartrate 12.5 milliGRAM(s) Oral every 12 hours  midodrine 10 milliGRAM(s) Oral every 8 hours  pantoprazole   Suspension 40 milliGRAM(s) Oral before breakfast  senna Syrup 10 milliLiter(s) Oral at bedtime    MEDICATIONS  (PRN):      PHYSICAL EXAM:  General: WDWN. Currently on aerosol mask.   HEENT: NC/AT; PERRL, anicteric sclera; MMM  Neck: supple  Cardiovascular: +S1/S2; RRR  Respiratory: B/L crackles noted.   Gastrointestinal: soft, NT/ND; +BSx4  Extremities: LLE swelling > Right sided   Vascular: 2+ radial, DP/PT pulses B/L  Neurological: AAOx3; no focal deficits    Care Discussed with Consultants/Other Providers [ x] YES  [ ] NO

## 2018-08-08 NOTE — PHYSICAL THERAPY INITIAL EVALUATION ADULT - GENERAL OBSERVATIONS, REHAB EVAL
Pt received sitting in bedside chair +aerosol mask 8LPM O2, +tele, +heplock, +PEG, HHA present, pleasantly confused

## 2018-08-08 NOTE — CHART NOTE - NSCHARTNOTEFT_GEN_A_CORE
Admitting Diagnosis:   Patient is a 89y old  Female who presents with a chief complaint of shortness of breath (02 Aug 2018 15:19)      PAST MEDICAL & SURGICAL HISTORY:  Dementia  DVT (deep venous thrombosis)  Dysphagia  Anemia  DJD (degenerative joint disease)  Edema  IBS (irritable bowel syndrome)  Celiac disease  S/P IVC filter  H/O inguinal hernia repair  History of total left hip replacement      Current Nutrition Order:  Osmolite 1.2 Farhan @ 45mL/hr x 24hrs via PEG (1080 mL TV, 1296 kcal, 60g protein, 886mL free H2O, 108% RDI, 1.43g/kg IBW protein).    PO Intake: Good (%) [   ]  Fair (50-75%) [   ] Poor (<25%) [   ]- N/A NPO w/EN    GI Issues: No N/V/C/D reported at this time.     Pain: No pain endorsed    Skin Integrity: Spine stage III pressure injury     Labs:   08-08    142  |  104  |  18  ----------------------------<  125<H>  4.8   |  28  |  0.94    Ca    8.4      08 Aug 2018 06:44  Phos  3.5     08-08  Mg     2.4     08-08      CAPILLARY BLOOD GLUCOSE          Medications:  MEDICATIONS  (STANDING):  ALBUTerol/ipratropium for Nebulization 3 milliLiter(s) Nebulizer every 6 hours  amiodarone    Tablet 400 milliGRAM(s) Oral two times a day  ceFAZolin   IVPB 1000 milliGRAM(s) IV Intermittent every 12 hours  docusate sodium Liquid 100 milliGRAM(s) Oral two times a day  heparin  Injectable 5000 Unit(s) SubCutaneous every 12 hours  metoprolol tartrate 12.5 milliGRAM(s) Oral every 12 hours  midodrine 10 milliGRAM(s) Oral every 8 hours  norepinephrine Infusion 0.1 MICROgram(s)/kG/Min (9.769 mL/Hr) IV Continuous <Continuous>  pantoprazole   Suspension 40 milliGRAM(s) Oral before breakfast  senna Syrup 10 milliLiter(s) Oral at bedtime    MEDICATIONS  (PRN):      Weight: 52.1kg  Daily     Daily     Weight Change: No new weights recorded since admit     Estimated energy needs: Ideal body weight (41.9kg) used for calculations as pt >120% of IBW. Needs estimated for maintenance in older adults; adjusted for wound healing (PU)  Calories: 25-30 kcal/kg = 2992-2616 kcal/day  Protein: 1.2-1.4 g/kg = 50-59g protein/day  Fluids: 30-35 mL/kg = 3738-1359 mL/day    Subjective: 89 yo/female with PMHx dementia, UTI, celiac disease, lactose intolerance, R. femoral thrombus, recent d/c from [facility] with UTI and asp pna, presents from [facility] with respiratory distress; subsequently intubated for airway protection. S/p PEG placement on 8/6 and s/p extubation on 8/7. Pt seen in room, asleep, left to rest at this time. Breathing comfortably with face tent on. TF running at goal with good tolerance per RN. Lytes WNL.     Previous Nutrition Diagnosis:  Inadequate energy intake RT current NPO status AEB 0% of EER being met    Active [  ]  Resolved [  X  ]    If resolved, new PES: Increased protein-calorie needs RT increased demand AEB wound healing    Goal: Pt will continue to meet % of EER per day via tolerated route     Recommendations:  1. Continue with current EN order. Additional free H2O per team. Monitor for s/s intolerance; maintain aspiration precautions at all times   2. Monitor lytes and replete prn.   3. Trend weights 2x/week  4. Keep nutrition in line with goals of care at all times.     Education: N/A    Risk Level: High [  X ] Moderate [   ] Low [   ].

## 2018-08-08 NOTE — PHYSICAL THERAPY INITIAL EVALUATION ADULT - TRANSFER SAFETY CONCERNS NOTED: SIT/STAND, REHAB EVAL
decreased balance during turns/decreased step length/decreased safety awareness/losing balance/decreased sequencing ability/decreased weight-shifting ability

## 2018-08-08 NOTE — CONSULT NOTE ADULT - PROBLEM SELECTOR RECOMMENDATION 9
Patient presented in respiratory hfailure requiring intubation.  Presently off ventilator and doing a bit better.  Would continue antibiotics

## 2018-08-09 DIAGNOSIS — Z29.9 ENCOUNTER FOR PROPHYLACTIC MEASURES, UNSPECIFIED: ICD-10-CM

## 2018-08-09 DIAGNOSIS — I48.91 UNSPECIFIED ATRIAL FIBRILLATION: ICD-10-CM

## 2018-08-09 DIAGNOSIS — N17.9 ACUTE KIDNEY FAILURE, UNSPECIFIED: ICD-10-CM

## 2018-08-09 DIAGNOSIS — Z91.89 OTHER SPECIFIED PERSONAL RISK FACTORS, NOT ELSEWHERE CLASSIFIED: ICD-10-CM

## 2018-08-09 DIAGNOSIS — R63.8 OTHER SYMPTOMS AND SIGNS CONCERNING FOOD AND FLUID INTAKE: ICD-10-CM

## 2018-08-09 LAB
ANION GAP SERPL CALC-SCNC: 9 MMOL/L — SIGNIFICANT CHANGE UP (ref 5–17)
BUN SERPL-MCNC: 22 MG/DL — SIGNIFICANT CHANGE UP (ref 7–23)
CALCIUM SERPL-MCNC: 8.7 MG/DL — SIGNIFICANT CHANGE UP (ref 8.4–10.5)
CHLORIDE SERPL-SCNC: 101 MMOL/L — SIGNIFICANT CHANGE UP (ref 96–108)
CO2 SERPL-SCNC: 29 MMOL/L — SIGNIFICANT CHANGE UP (ref 22–31)
CREAT SERPL-MCNC: 0.99 MG/DL — SIGNIFICANT CHANGE UP (ref 0.5–1.3)
GLUCOSE BLDC GLUCOMTR-MCNC: 114 MG/DL — HIGH (ref 70–99)
GLUCOSE BLDC GLUCOMTR-MCNC: 133 MG/DL — HIGH (ref 70–99)
GLUCOSE BLDC GLUCOMTR-MCNC: 97 MG/DL — SIGNIFICANT CHANGE UP (ref 70–99)
GLUCOSE SERPL-MCNC: 129 MG/DL — HIGH (ref 70–99)
HCT VFR BLD CALC: 26.3 % — LOW (ref 34.5–45)
HGB BLD-MCNC: 8 G/DL — LOW (ref 11.5–15.5)
MAGNESIUM SERPL-MCNC: 2.4 MG/DL — SIGNIFICANT CHANGE UP (ref 1.6–2.6)
MCHC RBC-ENTMCNC: 26.7 PG — LOW (ref 27–34)
MCHC RBC-ENTMCNC: 30.4 G/DL — LOW (ref 32–36)
MCV RBC AUTO: 87.7 FL — SIGNIFICANT CHANGE UP (ref 80–100)
PHOSPHATE SERPL-MCNC: 3.7 MG/DL — SIGNIFICANT CHANGE UP (ref 2.5–4.5)
PLATELET # BLD AUTO: 395 K/UL — SIGNIFICANT CHANGE UP (ref 150–400)
POTASSIUM SERPL-MCNC: 5 MMOL/L — SIGNIFICANT CHANGE UP (ref 3.5–5.3)
POTASSIUM SERPL-SCNC: 5 MMOL/L — SIGNIFICANT CHANGE UP (ref 3.5–5.3)
RBC # BLD: 3 M/UL — LOW (ref 3.8–5.2)
RBC # FLD: 15.7 % — SIGNIFICANT CHANGE UP (ref 10.3–16.9)
SODIUM SERPL-SCNC: 139 MMOL/L — SIGNIFICANT CHANGE UP (ref 135–145)
WBC # BLD: 11.2 K/UL — HIGH (ref 3.8–10.5)
WBC # FLD AUTO: 11.2 K/UL — HIGH (ref 3.8–10.5)

## 2018-08-09 PROCEDURE — 71045 X-RAY EXAM CHEST 1 VIEW: CPT | Mod: 26

## 2018-08-09 RX ORDER — POLYETHYLENE GLYCOL 3350 17 G/17G
17 POWDER, FOR SOLUTION ORAL DAILY
Qty: 0 | Refills: 0 | Status: DISCONTINUED | OUTPATIENT
Start: 2018-08-09 | End: 2018-08-14

## 2018-08-09 RX ORDER — POLYETHYLENE GLYCOL 3350 17 G/17G
17 POWDER, FOR SOLUTION ORAL
Qty: 0 | Refills: 0 | Status: DISCONTINUED | OUTPATIENT
Start: 2018-08-09 | End: 2018-08-09

## 2018-08-09 RX ADMIN — HEPARIN SODIUM 5000 UNIT(S): 5000 INJECTION INTRAVENOUS; SUBCUTANEOUS at 06:02

## 2018-08-09 RX ADMIN — Medication 650 MILLIGRAM(S): at 10:18

## 2018-08-09 RX ADMIN — SENNA PLUS 10 MILLILITER(S): 8.6 TABLET ORAL at 22:38

## 2018-08-09 RX ADMIN — MIDODRINE HYDROCHLORIDE 10 MILLIGRAM(S): 2.5 TABLET ORAL at 06:02

## 2018-08-09 RX ADMIN — AMIODARONE HYDROCHLORIDE 400 MILLIGRAM(S): 400 TABLET ORAL at 06:02

## 2018-08-09 RX ADMIN — HEPARIN SODIUM 5000 UNIT(S): 5000 INJECTION INTRAVENOUS; SUBCUTANEOUS at 17:49

## 2018-08-09 RX ADMIN — PANTOPRAZOLE SODIUM 40 MILLIGRAM(S): 20 TABLET, DELAYED RELEASE ORAL at 06:02

## 2018-08-09 RX ADMIN — Medication 12.5 MILLIGRAM(S): at 17:48

## 2018-08-09 RX ADMIN — Medication 100 MILLIGRAM(S): at 06:02

## 2018-08-09 RX ADMIN — Medication 100 MILLIGRAM(S): at 17:48

## 2018-08-09 RX ADMIN — Medication 12.5 MILLIGRAM(S): at 06:02

## 2018-08-09 RX ADMIN — Medication 650 MILLIGRAM(S): at 11:14

## 2018-08-09 RX ADMIN — POLYETHYLENE GLYCOL 3350 17 GRAM(S): 17 POWDER, FOR SOLUTION ORAL at 12:11

## 2018-08-09 RX ADMIN — Medication 3 MILLILITER(S): at 12:48

## 2018-08-09 RX ADMIN — Medication 3 MILLILITER(S): at 00:58

## 2018-08-09 RX ADMIN — Medication 3 MILLILITER(S): at 05:39

## 2018-08-09 RX ADMIN — AMIODARONE HYDROCHLORIDE 400 MILLIGRAM(S): 400 TABLET ORAL at 17:48

## 2018-08-09 RX ADMIN — MIDODRINE HYDROCHLORIDE 10 MILLIGRAM(S): 2.5 TABLET ORAL at 14:09

## 2018-08-09 RX ADMIN — MIDODRINE HYDROCHLORIDE 10 MILLIGRAM(S): 2.5 TABLET ORAL at 22:38

## 2018-08-09 RX ADMIN — Medication 3 MILLILITER(S): at 17:48

## 2018-08-09 NOTE — PROGRESS NOTE ADULT - PROBLEM SELECTOR PLAN 3
Patient with history of paroxysmal a fib however not anticoagulated given hx of gastric avm on egd. Patient hypotensive on 8/3 and in afib with rvr, s/p 10 of lopressor, 5 of verapamil. Pt then was started on amiodarone infusion and Levophed was increased to 25 mcg.    -patient s/p cardioversion 2/2 increasing levophed requirements and hemodynamic instability  -Patient now in sinus rhythm  and better BP control  -s/p amio drip  -c/w oral amiodarone 400 mg bid   -c/w lopressor 12.5mg BID

## 2018-08-09 NOTE — PROGRESS NOTE ADULT - SUBJECTIVE AND OBJECTIVE BOX
89 y/o F with PMH dementia, recurrent UTI, Celiac' s disease, lactose intolerance, R. femoral thrombus s/p IVC filter 1/2017, chronic anemia, with recent discharge from [facility] for UTI and aspiration PNA presents from [facility] for respiratory distress . Pt was found to be in  acute respiratory failure- ABG showed pH of 7.21, pCO2 98, pO2 79.Pt was intubated in the ED. Pt also in septic shock secondary to aspiration pna, was started on levophed for pressor support.   Pt was initially treated with vanc and meropenum for hospital acquired pna in the setting of recent hospital admission but sputum cx positive for MSSA and abx were deescalated to IV CEFAZOLIN.   Of note patient has a hx of paroxysmal a fib however never anti coagulated given hx of gastric AVM.  During course of hospital admission pt went into rapid afib requiring increasing levophed requirements. Pt was cardioverted and has been in sinus rhythm since. Started on 400 mg amiodarone bid.   Pt has a hx of dysphagia, upon last admission for asp pna there was plan for PEG however pt passed barium swallow and that was held off. In the setting for dysphagia and asp pna Dr Hale planned for bedside PEG tube , placed on 8/6/18.   Pt extubated today (8/7/18).Plan to start feeds after extubation     OVERNIGHT EVENTS: Pt did not tolerate high flow, continued with face tent.  Pt noted to have increased secretions overnight, attempted suction twice .    INTERVAL HPI: Pt examined at bedside. Resting comfortably. Pt is responding to verbal commands. Currently saturating well on aeresol mask 89 y/o F with PMH dementia, recurrent UTI, Celiac' s disease, lactose intolerance, R. femoral thrombus s/p IVC filter 1/2017, chronic anemia, with recent discharge from [facility] for UTI and aspiration PNA presents from [facility] for respiratory distress . Pt was found to be in  acute respiratory failure- ABG showed pH of 7.21, pCO2 98, pO2 79.Pt was intubated in the ED. Pt also in septic shock secondary to aspiration pna, was started on levophed for pressor support.   Pt was initially treated with vanc and meropenum for hospital acquired pna in the setting of recent hospital admission but sputum cx positive for MSSA and abx were deescalated to IV CEFAZOLIN.   Of note patient has a hx of paroxysmal a fib however never anti coagulated given hx of gastric AVM.  During course of hospital admission pt went into rapid afib requiring increasing levophed requirements. Pt was cardioverted and has been in sinus rhythm since. Started on 400 mg amiodarone bid.   Pt has a hx of dysphagia, upon last admission for asp pna there was plan for PEG however pt passed barium swallow and that was held off. In the setting for dysphagia and asp pna Dr Hale planned for bedside PEG tube , placed on 8/6/18.   Pt extubated today (8/7/18).Plan to start feeds after extubation     OVERNIGHT EVENTS: Pt did not tolerate high flow, continued with face tent.  Pt noted to have increased secretions overnight, attempted suction twice .    INTERVAL HPI: Pt examined at bedside. Resting comfortably. Pt is responding to verbal commands. Currently saturating well on aeresol mask.    ICU Vital Signs Last 24 Hrs  T(C): 36.6 (09 Aug 2018 14:27), Max: 36.7 (08 Aug 2018 22:17)  T(F): 97.9 (09 Aug 2018 14:27), Max: 98.1 (08 Aug 2018 22:17)  HR: 80 (09 Aug 2018 15:00) (62 - 80)  BP: 110/64 (09 Aug 2018 15:00) (81/50 - 110/64)  BP(mean): 78 (09 Aug 2018 15:00) (61 - 85)  ABP: --  ABP(mean): --  RR: 27 (09 Aug 2018 15:00) (20 - 36)  SpO2: 92% (09 Aug 2018 15:00) (86% - 100%)    I&O's Summary    08 Aug 2018 07:01  -  09 Aug 2018 07:00  --------------------------------------------------------  IN: 1534 mL / OUT: 670 mL / NET: 864 mL    09 Aug 2018 07:01  -  09 Aug 2018 16:36  --------------------------------------------------------  IN: 540 mL / OUT: 0 mL / NET: 540 mL          LABS:                        8.0    11.2  )-----------( 395      ( 09 Aug 2018 05:33 )             26.3     08-09    139  |  101  |  22  ----------------------------<  129<H>  5.0   |  29  |  0.99    Ca    8.7      09 Aug 2018 05:33  Phos  3.7     08-09  Mg     2.4     08-09          CAPILLARY BLOOD GLUCOSE      POCT Blood Glucose.: 133 mg/dL (09 Aug 2018 11:17)        RADIOLOGY & ADDITIONAL TESTS:    Consultant(s) Notes Reviewed:  [x ] YES  [ ] NO    MEDICATIONS  (STANDING):  acetaminophen    Suspension. 650 milliGRAM(s) Oral daily  ALBUTerol/ipratropium for Nebulization 3 milliLiter(s) Nebulizer every 6 hours  amiodarone    Tablet 400 milliGRAM(s) Oral two times a day  chlorhexidine 2% Cloths 1 Application(s) Topical daily  docusate sodium Liquid 100 milliGRAM(s) Oral two times a day  heparin  Injectable 5000 Unit(s) SubCutaneous every 12 hours  metoprolol tartrate 12.5 milliGRAM(s) Oral every 12 hours  midodrine 10 milliGRAM(s) Oral every 8 hours  pantoprazole   Suspension 40 milliGRAM(s) Oral before breakfast  polyethylene glycol 3350 17 Gram(s) Oral daily  senna Syrup 10 milliLiter(s) Oral at bedtime    MEDICATIONS  (PRN):    PHYSICAL EXAM:  General: WDWN. Currently on aerosol mask.   HEENT: NC/AT; PERRL, anicteric sclera; MMM  Neck: supple  Cardiovascular: +S1/S2; RRR  Respiratory: B/L crackles noted.   Gastrointestinal: soft, NT/ND; +BSx4  Extremities: LLE swelling > Right sided   Vascular: 2+ radial, DP/PT pulses B/L  Neurological: AAOx3; no focal deficits    Care Discussed with Consultants/Other Providers [ x] YES  [ ] NO TRANSFER TO [facility] FROM [facility]    HOSPITAL COURSE  89 y/o F with PMH dementia, recurrent UTI, Celiac' s disease, lactose intolerance, R. femoral thrombus s/p IVC filter 1/2017, chronic anemia, with recent discharge from [facility] for UTI and aspiration PNA presents from [facility] for respiratory distress . Pt was found to be in  acute respiratory failure- ABG showed pH of 7.21, pCO2 98, pO2 79.Pt was intubated in the ED. Pt also in septic shock secondary to aspiration pna, was started on levophed for pressor support.   Pt was initially treated with vanc and meropenum for hospital acquired pna in the setting of recent hospital admission but sputum cx positive for MSSA and abx were deescalated to IV CEFAZOLIN.   Of note patient has a hx of paroxysmal a fib however never anti coagulated given hx of gastric AVM.  During course of hospital admission pt went into rapid afib requiring increasing levophed requirements. Pt was cardioverted and has been in sinus rhythm since. Started on 400 mg amiodarone bid.   Pt has a hx of dysphagia, upon last admission for asp pna there was plan for PEG however pt passed barium swallow and that was held off. In the setting for dysphagia and asp pna Dr Hale planned for bedside PEG tube , placed on 8/6/18. Pt extubated (8/7/18). Started feeds after extubation. Pt was taken off of levophed. SPB ranges in 90-95s. MAP 65-75. Pt was attempted to swich to high flow, did not tolerate. Tubbs Cath removed 8/9 at 10:30 am, currently still incontinent.  Pt started on miralax for constipation. Will be stepped down to 7 LACH.     OVERNIGHT EVENTS: Pt did not tolerate high flow, continued with face tent.  Pt noted to have increased secretions overnight, attempted suction twice .    INTERVAL HPI: Pt examined at bedside. Resting comfortably. Pt is responding to verbal commands. Currently saturating well on aeresol mask.    ICU Vital Signs Last 24 Hrs  T(C): 36.6 (09 Aug 2018 14:27), Max: 36.7 (08 Aug 2018 22:17)  T(F): 97.9 (09 Aug 2018 14:27), Max: 98.1 (08 Aug 2018 22:17)  HR: 80 (09 Aug 2018 15:00) (62 - 80)  BP: 110/64 (09 Aug 2018 15:00) (81/50 - 110/64)  BP(mean): 78 (09 Aug 2018 15:00) (61 - 85)  ABP: --  ABP(mean): --  RR: 27 (09 Aug 2018 15:00) (20 - 36)  SpO2: 92% (09 Aug 2018 15:00) (86% - 100%)    I&O's Summary    08 Aug 2018 07:01  -  09 Aug 2018 07:00  --------------------------------------------------------  IN: 1534 mL / OUT: 670 mL / NET: 864 mL    09 Aug 2018 07:01  -  09 Aug 2018 16:36  --------------------------------------------------------  IN: 540 mL / OUT: 0 mL / NET: 540 mL          LABS:                        8.0    11.2  )-----------( 395      ( 09 Aug 2018 05:33 )             26.3     08-09    139  |  101  |  22  ----------------------------<  129<H>  5.0   |  29  |  0.99    Ca    8.7      09 Aug 2018 05:33  Phos  3.7     08-09  Mg     2.4     08-09          CAPILLARY BLOOD GLUCOSE      POCT Blood Glucose.: 133 mg/dL (09 Aug 2018 11:17)        RADIOLOGY & ADDITIONAL TESTS:    Consultant(s) Notes Reviewed:  [x ] YES  [ ] NO    MEDICATIONS  (STANDING):  acetaminophen    Suspension. 650 milliGRAM(s) Oral daily  ALBUTerol/ipratropium for Nebulization 3 milliLiter(s) Nebulizer every 6 hours  amiodarone    Tablet 400 milliGRAM(s) Oral two times a day  chlorhexidine 2% Cloths 1 Application(s) Topical daily  docusate sodium Liquid 100 milliGRAM(s) Oral two times a day  heparin  Injectable 5000 Unit(s) SubCutaneous every 12 hours  metoprolol tartrate 12.5 milliGRAM(s) Oral every 12 hours  midodrine 10 milliGRAM(s) Oral every 8 hours  pantoprazole   Suspension 40 milliGRAM(s) Oral before breakfast  polyethylene glycol 3350 17 Gram(s) Oral daily  senna Syrup 10 milliLiter(s) Oral at bedtime    MEDICATIONS  (PRN):    PHYSICAL EXAM:  General: WDWN. Currently on aerosol mask.   HEENT: NC/AT; PERRL, anicteric sclera; MMM  Neck: supple  Cardiovascular: +S1/S2; RRR  Respiratory: B/L crackles noted.   Gastrointestinal: soft, NT/ND; +BSx4  Extremities: LLE swelling > Right sided   Vascular: 2+ radial, DP/PT pulses B/L  Neurological: AAOx3; no focal deficits    Care Discussed with Consultants/Other Providers [ x] YES  [ ] NO

## 2018-08-09 NOTE — PROGRESS NOTE ADULT - SUBJECTIVE AND OBJECTIVE BOX
HOSPITAL COURSE  89 y/o F with PMH dementia, recurrent UTI, Celiac' s disease, lactose intolerance, R. femoral thrombus s/p IVC filter 1/2017, chronic anemia, with recent discharge from [facility] for UTI and aspiration PNA presents from [facility] for respiratory distress . Pt was found to be in  acute respiratory failure- ABG showed pH of 7.21, pCO2 98, pO2 79.Pt was intubated in the ED. Pt also in septic shock secondary to aspiration pna, was started on levophed for pressor support.   Pt was initially treated with vanc and meropenum for hospital acquired pna in the setting of recent hospital admission but sputum cx positive for MSSA and abx were deescalated to IV cefazolin.   Of note patient has a hx of paroxysmal a fib however never anti coagulated given hx of gastric AVM.  During course of hospital admission pt went into rapid afib requiring increasing levophed requirements. Pt was cardioverted and has been in sinus rhythm since. Started on 400 mg amiodarone bid.   Pt has a hx of dysphagia, upon last admission for asp pna there was plan for PEG however pt passed barium swallow and that was held off. In the setting for dysphagia and asp pna Dr Hale planned for bedside PEG tube , placed on 8/6/18. Pt extubated (8/7/18). Started feeds after extubation. Pt was taken off of levophed. SPB ranges in 90-95s. MAP 65-75. Pt was attempted to switch to high flow, did not tolerate. Tubbs Cath removed 8/9 at 10:30 am, currently still incontinent.  Pt started on miralax for constipation. Will be stepped down to 7 LACH.     OVERNIGHT EVENTS: Pt did not tolerate high flow, continued with face tent.  Pt noted to have increased secretions overnight, attempted suction twice .    INTERVAL HPI: Pt examined at bedside. Resting comfortably. Pt is responding to verbal commands. Currently saturating well on aerosol mask.    OVERNIGHT EVENTS:    VITAL SIGNS:  Vital Signs Last 24 Hrs  T(C): 36.4 (09 Aug 2018 19:00), Max: 36.7 (08 Aug 2018 22:17)  T(F): 97.5 (09 Aug 2018 19:00), Max: 98.1 (08 Aug 2018 22:17)  HR: 62 (09 Aug 2018 18:36) (62 - 80)  BP: 106/59 (09 Aug 2018 18:36) (81/50 - 110/64)  BP(mean): 77 (09 Aug 2018 16:50) (64 - 85)  RR: 22 (09 Aug 2018 18:36) (20 - 36)  SpO2: 99% (09 Aug 2018 18:36) (86% - 99%)    PHYSICAL EXAM:    General: Patient awake and alert but not responding to commands. Resting comfortably on aerosol mask.  HEENT: PERRL, anicteric sclera; MMM  Neck: supple  Cardiovascular: +S1/S2, RRR  Respiratory: In no respiratory distress. Pleural rub appreciated in multiple lung fields on exhalation.   Gastrointestinal: soft, NT/ND; +BSx4  Extremities: Trace to 1+ pitting edema on left foot. No edema appreciated on right lower extremity. No erythema, no cyanosis b/l.  Vascular: 2+ radial, dorsalis pedis pulses B/L  Neurological: Limited due to patient's condition. Awake and alert but not oriented to person, place, or time. Patient mostly nonverbal but will smile at provider throughout exam.     MEDICATIONS:  MEDICATIONS  (STANDING):  acetaminophen    Suspension. 650 milliGRAM(s) Oral daily  ALBUTerol/ipratropium for Nebulization 3 milliLiter(s) Nebulizer every 6 hours  amiodarone    Tablet 400 milliGRAM(s) Oral two times a day  chlorhexidine 2% Cloths 1 Application(s) Topical daily  docusate sodium Liquid 100 milliGRAM(s) Oral two times a day  heparin  Injectable 5000 Unit(s) SubCutaneous every 12 hours  metoprolol tartrate 12.5 milliGRAM(s) Oral every 12 hours  midodrine 10 milliGRAM(s) Oral every 8 hours  pantoprazole   Suspension 40 milliGRAM(s) Oral before breakfast  polyethylene glycol 3350 17 Gram(s) Oral daily  senna Syrup 10 milliLiter(s) Oral at bedtime    MEDICATIONS  (PRN):      ALLERGIES:  Allergies    amoxicillin (Unknown)  Cipro (Unknown)  clindamycin (Unknown)  lactose (Unknown)  penicillin (Unknown)  Wheat (Unknown)    Intolerances        LABS:                        8.0    11.2  )-----------( 395      ( 09 Aug 2018 05:33 )             26.3     08-09    139  |  101  |  22  ----------------------------<  129<H>  5.0   |  29  |  0.99    Ca    8.7      09 Aug 2018 05:33  Phos  3.7     08-09  Mg     2.4     08-09          CAPILLARY BLOOD GLUCOSE      POCT Blood Glucose.: 97 mg/dL (09 Aug 2018 16:57)      RADIOLOGY & ADDITIONAL TESTS: Reviewed.

## 2018-08-09 NOTE — PROGRESS NOTE ADULT - SUBJECTIVE AND OBJECTIVE BOX
Transfer Acceptance Note from [facility] to [address] [place] [address] COURSE  89 y/o F with PMH dementia, recurrent UTI, Celiac' s disease, lactose intolerance, R. femoral thrombus s/p IVC filter 1/2017, chronic anemia, with recent discharge from [facility] for UTI and aspiration PNA presents from [facility] for respiratory distress . Pt was found to be in  acute respiratory failure- ABG showed pH of 7.21, pCO2 98, pO2 79.Pt was intubated in the ED. Pt also in septic shock secondary to aspiration pna, was started on levophed for pressor support.   Pt was initially treated with vanc and meropenum for hospital acquired pna in the setting of recent hospital admission but sputum cx positive for MSSA and abx were deescalated to IV cefazolin.   Of note patient has a hx of paroxysmal a fib however never anti coagulated given hx of gastric AVM.  During course of hospital admission pt went into rapid afib requiring increasing levophed requirements. Pt was cardioverted and has been in sinus rhythm since. Started on 400 mg amiodarone bid.   Pt has a hx of dysphagia, upon last admission for asp pna there was plan for PEG however pt passed barium swallow and that was held off. In the setting for dysphagia and asp pna Dr Hale planned for bedside PEG tube , placed on 8/6/18. Pt extubated (8/7/18). Started feeds after extubation. Pt was taken off of levophed. SPB ranges in 90-95s. MAP 65-75. Pt was attempted to switch to high flow, did not tolerate. Tubbs Cath removed 8/9 at 10:30 am, currently still incontinent.  Pt started on miralax for constipation. Will be stepped down to 7 LACH.     OVERNIGHT EVENTS: Pt did not tolerate high flow, continued with face tent.  Pt noted to have increased secretions overnight, attempted suction twice .    INTERVAL HPI: Pt examined at bedside. Resting comfortably. Pt is responding to verbal commands. Currently saturating well on aerosol mask.    OVERNIGHT EVENTS:    VITAL SIGNS:  Vital Signs Last 24 Hrs  T(C): 36.4 (09 Aug 2018 19:00), Max: 36.7 (08 Aug 2018 22:17)  T(F): 97.5 (09 Aug 2018 19:00), Max: 98.1 (08 Aug 2018 22:17)  HR: 62 (09 Aug 2018 18:36) (62 - 80)  BP: 106/59 (09 Aug 2018 18:36) (81/50 - 110/64)  BP(mean): 77 (09 Aug 2018 16:50) (64 - 85)  RR: 22 (09 Aug 2018 18:36) (20 - 36)  SpO2: 99% (09 Aug 2018 18:36) (86% - 99%)    PHYSICAL EXAM:    General: Patient awake and alert but not responding to commands. Resting comfortably on aerosol mask.  HEENT: PERRL, anicteric sclera; MMM  Neck: supple  Cardiovascular: +S1/S2, RRR  Respiratory: In no respiratory distress. Pleural rub appreciated in multiple lung fields on exhalation.   Gastrointestinal: soft, NT/ND; +BSx4  Extremities: Trace to 1+ pitting edema on left foot. No edema appreciated on right lower extremity. No erythema, no cyanosis b/l.  Vascular: 2+ radial, dorsalis pedis pulses B/L  Neurological: Limited due to patient's condition. Awake and alert but not oriented to person, place, or time. Patient mostly nonverbal but will smile at provider throughout exam.     MEDICATIONS:  MEDICATIONS  (STANDING):  acetaminophen    Suspension. 650 milliGRAM(s) Oral daily  ALBUTerol/ipratropium for Nebulization 3 milliLiter(s) Nebulizer every 6 hours  amiodarone    Tablet 400 milliGRAM(s) Oral two times a day  chlorhexidine 2% Cloths 1 Application(s) Topical daily  docusate sodium Liquid 100 milliGRAM(s) Oral two times a day  heparin  Injectable 5000 Unit(s) SubCutaneous every 12 hours  metoprolol tartrate 12.5 milliGRAM(s) Oral every 12 hours  midodrine 10 milliGRAM(s) Oral every 8 hours  pantoprazole   Suspension 40 milliGRAM(s) Oral before breakfast  polyethylene glycol 3350 17 Gram(s) Oral daily  senna Syrup 10 milliLiter(s) Oral at bedtime    MEDICATIONS  (PRN):      ALLERGIES:  Allergies    amoxicillin (Unknown)  Cipro (Unknown)  clindamycin (Unknown)  lactose (Unknown)  penicillin (Unknown)  Wheat (Unknown)    Intolerances        LABS:                        8.0    11.2  )-----------( 395      ( 09 Aug 2018 05:33 )             26.3     08-09    139  |  101  |  22  ----------------------------<  129<H>  5.0   |  29  |  0.99    Ca    8.7      09 Aug 2018 05:33  Phos  3.7     08-09  Mg     2.4     08-09          CAPILLARY BLOOD GLUCOSE      POCT Blood Glucose.: 97 mg/dL (09 Aug 2018 16:57)      RADIOLOGY & ADDITIONAL TESTS: Reviewed.

## 2018-08-09 NOTE — PROGRESS NOTE ADULT - PROBLEM SELECTOR PLAN 4
Patient with dysphagia and aspiration PNA  -PEG placed  8/6/18  -refeeding yesterday. Tolerating well.

## 2018-08-09 NOTE — PROGRESS NOTE ADULT - PROBLEM SELECTOR PLAN 1
Etiology likely secondary to aspiration pneumonia vs HAP. CXR showed b/l pleural infiltrates. Initial USG done at bedside showing RLL consolidation. In ED VBG significant for pH of 7.21, pCO2 98, pO2 79. Pt placed on BiPAP. Repeat ABG after 90 minutes on BiPAP- pH 7.28, pCO2 70, pO2 66; patient subsequently intubated in the ED  - patient now extubated 8/7 and currently on aerosol mask, sating well.   - Sputum cx grew staph aureus MSSA. Patient now s/p 7 day treatment of IV cefazolin

## 2018-08-09 NOTE — PROGRESS NOTE ADULT - ASSESSMENT
89 y/o F with PMH dementia, recurrent UTI, Celiac' s disease, lactose intolerance, 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 for UTI and aspiration PNA presents from [facility] for respiratory distress with acute respiratory failure found to be in septic shock likely from aspiration PNA      Pulmonology   #Hypercapnic resp failure  etiology likely secondary to aspiration pneumonia vs HAP  CXR showing b/l pleural infiltrates, unchanged from previous  xray, Initial USG done at bedside showing RLL consolidation  In ED VBG significant for pH of 7.21, pCO2 98, pO2 79.Pt placed on BiPAP. Repeat ABG after 90 minutes on BiPAP- pH 7.28, pCO2 70, pO2 66.  The decision was made to intubate in the ED.   Extubated 8/7 at 2 pm. Now currently on aerosol mask, sating well.   Of note Sputum cx growing staph aureus MSSA: deescalated to iv cefazolin in setting of penicillin allergy unable to use nafcillin  Finished cefazolin for a total of 7 days       Infectious disease  #Septic shock   Upon arrival patient had a fever , was tachycardic, elevated wbc count 18.2, lactate negative   Received 1 L N.S , no additional fluids given because perfusion good- lactate is negative, , no edema noted, extremities not cold- clinically appeared euvolemic etiology related to aspiration pna vs hospital acquired pna   In setting of recent admit to hospital are treating for hospital acquired pna ,s/p vanc and meropenum in ED  sputum cx showing staph aureus MSSA  urine cx negative so far  cefazolin in setting of penicillin allergy unable to use nafcillin- completed 7 day course of abx      #hx of recurrent uti  UTI: UA  showing WBC-5-10, BACTERIA-MANY, NEGATIVE NITRITE, Negative leuk esterase     last admission UCx positive for proteus mirabilis and aerococcus , was treated with meropenum  urine cx so far negative     Cardiovascular   #septic shock  -Now off levophed. MAP goal >60  - Troponin 0.05, EKG unchanged from prior admission.  BNP 25036. Last echocardiogram in July had intact LV function with EF of 65%  - c/w midodrine 10 mg tid       #hx of paroxysmal a fib however not anticoagulated given hx of gastric avm on egd   on 8/3 patient was  hypotensive and in afib with rvr. Pushed 10 of lopressor, 5 of verapamil. Pt started on amiodarone infusion. Levophed increased to 25 mcg.  Patient continued to be in afib. In setting of increasing levophed requirements and hemodynamic instability patient was shocked. Reverted back to sinus rythm . pressures good.   Pt has been in sinus since   s/p amio drip-  c/w oral amiodarone 400 mg bid     GI :HX of dysphagia  PEG placed  8/6/18  - refeeding yesterday. Tolerating well.     # constipation  continue with bowel regimen    #Renal  CAMRYN: RESOLVED  - Upon admission patient noted to have an CAMRYN: BUN 52 and Cr 1.16 increased from baseline in July - BUN 7 Cr .61. etiology likely prerenal in the - setting of sepsis   - continue to monitor     #Chronic Pain: Related to osteoarthritis.   - acetaminophen 650 mg once daily prior to getting patient OOB or doing PT per Palliative     F none  E replete as needed  N PEG feeds   D Transfer to [facility]   GI ppx: iv protonix  Heparin sq q12  - Full code: son is HCP and verbally communicated that he would like intubation, CPR, lines and pressors should she require them.  Critical care time rendered 45 minutes 89 y/o F with PMH dementia, recurrent UTI, Celiac' s disease, lactose intolerance, R. femoral thrombus s/p IVC filter 1/2017, chronic anemia, with recent discharge from [facility] for UTI and aspiration PNA presents from [facility] for respiratory distress with acute respiratory failure found to be in septic shock likely from aspiration PNA      Pulmonology   #Hypercapnic resp failure  etiology likely secondary to aspiration pneumonia vs HAP  CXR showing b/l pleural infiltrates, unchanged from previous  xray, Initial USG done at bedside showing RLL consolidation  In ED VBG significant for pH of 7.21, pCO2 98, pO2 79.Pt placed on BiPAP. Repeat ABG after 90 minutes on BiPAP- pH 7.28, pCO2 70, pO2 66.  The decision was made to intubate in the ED.   Extubated 8/7 at 2 pm. Now currently on aerosol mask, sating well.   Of note Sputum cx growing staph aureus MSSA: deescalated to iv cefazolin in setting of penicillin allergy unable to use nafcillin  Finished cefazolin for a total of 7 days       Infectious disease  #Septic shock   Upon arrival patient had a fever , was tachycardic, elevated wbc count 18.2, lactate negative   Received 1 L N.S , no additional fluids given because perfusion good- lactate is negative, , no edema noted, extremities not cold- clinically appeared euvolemic etiology related to aspiration pna vs hospital acquired pna   In setting of recent admit to hospital are treating for hospital acquired pna ,s/p vanc and meropenum in ED  sputum cx showing staph aureus MSSA  urine cx negative so far  cefazolin in setting of penicillin allergy unable to use nafcillin- completed 7 day course of abx      #hx of recurrent uti  UTI: UA  showing WBC-5-10, BACTERIA-MANY, NEGATIVE NITRITE, Negative leuk esterase     last admission UCx positive for proteus mirabilis and aerococcus , was treated with meropenum  urine cx so far negative     Cardiovascular   #septic shock  -Now off levophed. MAP goal >60  - Troponin 0.05, EKG unchanged from prior admission.  BNP 86125. Last echocardiogram in July had intact LV function with EF of 65%  - c/w midodrine 10 mg tid       #hx of paroxysmal a fib however not anticoagulated given hx of gastric avm on egd   on 8/3 patient was  hypotensive and in afib with rvr. Pushed 10 of lopressor, 5 of verapamil. Pt started on amiodarone infusion. Levophed increased to 25 mcg.  Patient continued to be in afib. In setting of increasing levophed requirements and hemodynamic instability patient was shocked. Reverted back to sinus rythm . pressures good.   Pt has been in sinus since   s/p amio drip-  c/w oral amiodarone 400 mg bid     GI :HX of dysphagia  PEG placed  8/6/18  - refeeding yesterday. Tolerating well.     # constipation  continue with bowel regimen    #Renal  CAMRYN: RESOLVED  - Upon admission patient noted to have an CAMRYN: BUN 52 and Cr 1.16 increased from baseline in July - BUN 7 Cr .61. etiology likely prerenal in the - setting of sepsis   - continue to monitor     #Chronic Pain: Related to osteoarthritis.   - acetaminophen 650 mg once daily prior to getting patient OOB or doing PT per Palliative     F none  E replete as needed  N PEG feeds   D Transfer to [facility]   GI ppx: iv protonix  Heparin sq q12  - Full code: son is HCP and verbally communicated that he would like intubation, CPR, lines and pressors should she require them.

## 2018-08-09 NOTE — ADVANCED PRACTICE NURSE CONSULT - ASSESSMENT
Referral made for site to right buttock which measures approx 1.1x 1.6 cm. Appears to be skin tear, no drainage noted, area beginning to dry up.

## 2018-08-09 NOTE — PROGRESS NOTE ADULT - ASSESSMENT
89 y/o F with PMH dementia, recurrent UTI, Celiac' s disease, lactose intolerance, R. femoral thrombus s/p IVC filter 1/2017, chronic anemia, with recent discharge from [facility] for UTI and aspiration PNA presents from [facility] for respiratory distress with acute respiratory failure found to be in septic shock likely from aspiration PNA

## 2018-08-09 NOTE — PROGRESS NOTE ADULT - SUBJECTIVE AND OBJECTIVE BOX
CC/ HPI Ms. Goff is a 89 year old lady with dementia, history of ESBL UTI, bilateral DVT s/p IVC filter 2017, who was admitted from the nursing home with pneumonia complicated by septic shock  and respiratory failure, seen this morning resting comfortably in bed.    PAST MEDICAL HISTORY:  Dementia  DVT (deep venous thrombosis)  Dysphagia  Anemia  DJD (degenerative joint disease)  Edema  IBS (irritable bowel syndrome)  Celiac disease  H/O inguinal hernia repair  History of total left hip replacement    SOCHX:  -  alcohol    FMHX: FA/MO  - contributory     ROS reviewed below with positive findings marked (+) :  GEN:  fever, chills ENT: tracheostomy,   epistaxis,  sinusitis COR: CAD, CHF,  HTN, dysrhythmia PUL: COPD, ILD, asthma, pneumonia GI: PEG, dysphagia, hemorrhage, other LUDWIN: kidney disease, electrolyte disorder HEM:  anemia, +thrombus, coagulopathy, cancer ENDO:  thyroid disease, diabetes mellitus CNS:  +dementia, stroke, seizure, PSY:  depression, anxiety, other        MEDICATIONS  (STANDING):  acetaminophen    Suspension. 650 milliGRAM(s) Oral daily  ALBUTerol/ipratropium for Nebulization 3 milliLiter(s) Nebulizer every 6 hours  amiodarone    Tablet 400 milliGRAM(s) Oral two times a day  chlorhexidine 2% Cloths 1 Application(s) Topical daily  docusate sodium Liquid 100 milliGRAM(s) Oral two times a day  heparin  Injectable 5000 Unit(s) SubCutaneous every 12 hours  metoprolol tartrate 12.5 milliGRAM(s) Oral every 12 hours  midodrine 10 milliGRAM(s) Oral every 8 hours  pantoprazole   Suspension 40 milliGRAM(s) Oral before breakfast  polyethylene glycol 3350 17 Gram(s) Oral daily  senna Syrup 10 milliLiter(s) Oral at bedtime    MEDICATIONS  (PRN):      Vital Signs Last 24 Hrs  T(C): 36.6 (09 Aug 2018 14:27), Max: 36.7 (08 Aug 2018 22:17)  T(F): 97.9 (09 Aug 2018 14:27), Max: 98.1 (08 Aug 2018 22:17)  HR: 80 (09 Aug 2018 15:00) (62 - 80)  BP: 110/64 (09 Aug 2018 15:00) (81/50 - 110/64)  BP(mean): 78 (09 Aug 2018 15:00) (61 - 85)  RR: 27 (09 Aug 2018 15:00) (20 - 36)  SpO2: 92% (09 Aug 2018 15:00) (86% - 100%)    GENERAL:         comfortable,  - distress.  HEENT:            - trauma,  - icterus,  - injection,  - nasal discharge.  NECK:              - jugular venous distention, - thyromegaly.  LYMPH:           - lymphadenopathy, - masses.  RESP:               + crackles,   - rhonchi,   - wheezes.   COR:                S1S2   - gallops,  - rubs.  ABD:                bowel sounds,   soft, - tender, - distended.  EXT/MSC:         - cyanosis,  - clubbing,  - edema.    NEURO:             alert,   responds to stimuli.                        8.0    11.2  )-----------( 395      ( 09 Aug 2018 05:33 )             26.3     08-09    139  |  101  |  22  ----------------------------<  129<H>  5.0   |  29  |  0.99        Xray Chest  (08.09.18)  Bibasilar infiltrates/atelectasis. Mild pulmonary venous congestion.            ASSESSMENT/PLAN    1) Status post respiratory failure  2) Septic shock  3) Pneumonia  4) Cardiomyopathy  5) Dementia    Satisfactory SpO2, oxygen as needed  Evaluate on pressure support for weaning to extubation  Bronchodilators:  Atrovent/ albuterol q 4 – 6 hours as needed  ID/Antibiotics:  cefazolin  MSSA in respiratory culture, blood cultures negative to date  Cardiac/HTN: off pressors as tolerated  GI: Rx/ prophylaxis c PPI/H2B  Heme: Rx/VT prophylaxis  Discussed with medical team

## 2018-08-09 NOTE — PROGRESS NOTE ADULT - PROBLEM SELECTOR PLAN 6
Patient nonverbal and AAOx0 at baseline. Will smile at provider but not responding to commands.   -continue to monitor

## 2018-08-10 LAB
ALBUMIN SERPL ELPH-MCNC: 2.8 G/DL — LOW (ref 3.3–5)
ALP SERPL-CCNC: 120 U/L — SIGNIFICANT CHANGE UP (ref 40–120)
ALT FLD-CCNC: 6 U/L — LOW (ref 10–45)
ANION GAP SERPL CALC-SCNC: 9 MMOL/L — SIGNIFICANT CHANGE UP (ref 5–17)
AST SERPL-CCNC: 20 U/L — SIGNIFICANT CHANGE UP (ref 10–40)
BILIRUB SERPL-MCNC: 0.4 MG/DL — SIGNIFICANT CHANGE UP (ref 0.2–1.2)
BUN SERPL-MCNC: 24 MG/DL — HIGH (ref 7–23)
CALCIUM SERPL-MCNC: 9 MG/DL — SIGNIFICANT CHANGE UP (ref 8.4–10.5)
CHLORIDE SERPL-SCNC: 99 MMOL/L — SIGNIFICANT CHANGE UP (ref 96–108)
CO2 SERPL-SCNC: 32 MMOL/L — HIGH (ref 22–31)
CREAT SERPL-MCNC: 0.9 MG/DL — SIGNIFICANT CHANGE UP (ref 0.5–1.3)
GLUCOSE SERPL-MCNC: 126 MG/DL — HIGH (ref 70–99)
HCT VFR BLD CALC: 26.4 % — LOW (ref 34.5–45)
HGB BLD-MCNC: 8 G/DL — LOW (ref 11.5–15.5)
MAGNESIUM SERPL-MCNC: 2.2 MG/DL — SIGNIFICANT CHANGE UP (ref 1.6–2.6)
MCHC RBC-ENTMCNC: 27 PG — SIGNIFICANT CHANGE UP (ref 27–34)
MCHC RBC-ENTMCNC: 30.3 G/DL — LOW (ref 32–36)
MCV RBC AUTO: 89.2 FL — SIGNIFICANT CHANGE UP (ref 80–100)
PHOSPHATE SERPL-MCNC: 3.2 MG/DL — SIGNIFICANT CHANGE UP (ref 2.5–4.5)
PLATELET # BLD AUTO: 457 K/UL — HIGH (ref 150–400)
POTASSIUM SERPL-MCNC: 4.8 MMOL/L — SIGNIFICANT CHANGE UP (ref 3.5–5.3)
POTASSIUM SERPL-SCNC: 4.8 MMOL/L — SIGNIFICANT CHANGE UP (ref 3.5–5.3)
PROT SERPL-MCNC: 6.4 G/DL — SIGNIFICANT CHANGE UP (ref 6–8.3)
RBC # BLD: 2.96 M/UL — LOW (ref 3.8–5.2)
RBC # FLD: 16 % — SIGNIFICANT CHANGE UP (ref 10.3–16.9)
SODIUM SERPL-SCNC: 140 MMOL/L — SIGNIFICANT CHANGE UP (ref 135–145)
WBC # BLD: 9.6 K/UL — SIGNIFICANT CHANGE UP (ref 3.8–10.5)
WBC # FLD AUTO: 9.6 K/UL — SIGNIFICANT CHANGE UP (ref 3.8–10.5)

## 2018-08-10 PROCEDURE — 99233 SBSQ HOSP IP/OBS HIGH 50: CPT

## 2018-08-10 RX ORDER — FUROSEMIDE 40 MG
20 TABLET ORAL ONCE
Qty: 0 | Refills: 0 | Status: COMPLETED | OUTPATIENT
Start: 2018-08-10 | End: 2018-08-10

## 2018-08-10 RX ADMIN — PANTOPRAZOLE SODIUM 40 MILLIGRAM(S): 20 TABLET, DELAYED RELEASE ORAL at 07:38

## 2018-08-10 RX ADMIN — AMIODARONE HYDROCHLORIDE 400 MILLIGRAM(S): 400 TABLET ORAL at 17:32

## 2018-08-10 RX ADMIN — Medication 100 MILLIGRAM(S): at 17:32

## 2018-08-10 RX ADMIN — HEPARIN SODIUM 5000 UNIT(S): 5000 INJECTION INTRAVENOUS; SUBCUTANEOUS at 05:34

## 2018-08-10 RX ADMIN — MIDODRINE HYDROCHLORIDE 10 MILLIGRAM(S): 2.5 TABLET ORAL at 13:09

## 2018-08-10 RX ADMIN — Medication 12.5 MILLIGRAM(S): at 17:32

## 2018-08-10 RX ADMIN — Medication 3 MILLILITER(S): at 00:49

## 2018-08-10 RX ADMIN — CHLORHEXIDINE GLUCONATE 1 APPLICATION(S): 213 SOLUTION TOPICAL at 05:42

## 2018-08-10 RX ADMIN — Medication 650 MILLIGRAM(S): at 10:55

## 2018-08-10 RX ADMIN — Medication 650 MILLIGRAM(S): at 11:50

## 2018-08-10 RX ADMIN — Medication 20 MILLIGRAM(S): at 09:53

## 2018-08-10 RX ADMIN — Medication 3 MILLILITER(S): at 10:55

## 2018-08-10 RX ADMIN — Medication 3 MILLILITER(S): at 17:31

## 2018-08-10 RX ADMIN — MIDODRINE HYDROCHLORIDE 10 MILLIGRAM(S): 2.5 TABLET ORAL at 05:33

## 2018-08-10 RX ADMIN — SENNA PLUS 10 MILLILITER(S): 8.6 TABLET ORAL at 22:05

## 2018-08-10 RX ADMIN — MIDODRINE HYDROCHLORIDE 10 MILLIGRAM(S): 2.5 TABLET ORAL at 22:02

## 2018-08-10 RX ADMIN — Medication 3 MILLILITER(S): at 05:33

## 2018-08-10 RX ADMIN — Medication 100 MILLIGRAM(S): at 05:34

## 2018-08-10 RX ADMIN — Medication 12.5 MILLIGRAM(S): at 05:34

## 2018-08-10 RX ADMIN — HEPARIN SODIUM 5000 UNIT(S): 5000 INJECTION INTRAVENOUS; SUBCUTANEOUS at 17:32

## 2018-08-10 RX ADMIN — AMIODARONE HYDROCHLORIDE 400 MILLIGRAM(S): 400 TABLET ORAL at 05:33

## 2018-08-10 NOTE — PROGRESS NOTE ADULT - PROBLEM SELECTOR PLAN 6
Support provided to patient and family. Patient to have access to supportive services during rest of hospital stay as the patient/family deemed necessary ie. Chaplaincy, Massage therapy, Music therapy, Patient and family supportive services, Palliative SW, etc.  As discussed during the palliative IDT meeting and as identified during the patients PSSA screening the patient would benefit from: continued support for son who is very attentive to mother's needs.  He seems to be having difficulty in accepting patient's advanced age and poor medical condition.  Will continue to follow

## 2018-08-10 NOTE — PROGRESS NOTE ADULT - SUBJECTIVE AND OBJECTIVE BOX
OVERNIGHT EVENTS: ALYCIA    SUBJECTIVE / INTERVAL HPI: Patient seen and examined at bedside.     VITAL SIGNS:  Vital Signs Last 24 Hrs  T(C): 36.7 (10 Aug 2018 14:06), Max: 36.9 (10 Aug 2018 01:00)  T(F): 98.1 (10 Aug 2018 14:06), Max: 98.5 (10 Aug 2018 01:00)  HR: 72 (10 Aug 2018 13:07) (62 - 76)  BP: 107/59 (10 Aug 2018 13:07) (92/57 - 117/57)  BP(mean): 76 (10 Aug 2018 13:07) (71 - 80)  RR: 14 (10 Aug 2018 13:07) (14 - 30)  SpO2: 96% (10 Aug 2018 13:07) (93% - 99%)    PHYSICAL EXAM:    General: Patient awake and alert but not responding to commands. Tachypneic but not using respiratory accessory muscles.  HEENT: PERRL, anicteric sclera; MMM  Neck: supple  Cardiovascular: +S1/S2, RRR  Respiratory: In no respiratory distress. Crackles appreciated over b/l bases. No rhonchi, no wheezing appreciated b/l  Gastrointestinal: soft, NT/ND; +BSx4  Extremities: b/l extremities with 2+ pitting edema, R>L leg. No erythema, no cyanosis b/l.  Vascular: 2+ radial pulses, 1+ dorsalis pedis pulses B/L  Neurological: Limited due to patient's condition. Awake and alert but not oriented to person, place, or time. Patient mostly nonverbal but will smile at provider throughout exam.    MEDICATIONS:  MEDICATIONS  (STANDING):  acetaminophen    Suspension. 650 milliGRAM(s) Oral daily  ALBUTerol/ipratropium for Nebulization 3 milliLiter(s) Nebulizer every 6 hours  amiodarone    Tablet 400 milliGRAM(s) Oral two times a day  chlorhexidine 2% Cloths 1 Application(s) Topical daily  docusate sodium Liquid 100 milliGRAM(s) Oral two times a day  heparin  Injectable 5000 Unit(s) SubCutaneous every 12 hours  metoprolol tartrate 12.5 milliGRAM(s) Oral every 12 hours  midodrine 10 milliGRAM(s) Oral every 8 hours  pantoprazole   Suspension 40 milliGRAM(s) Oral before breakfast  polyethylene glycol 3350 17 Gram(s) Oral daily  senna Syrup 10 milliLiter(s) Oral at bedtime    MEDICATIONS  (PRN):      ALLERGIES:  Allergies    amoxicillin (Unknown)  Cipro (Unknown)  clindamycin (Unknown)  lactose (Unknown)  penicillin (Unknown)  Wheat (Unknown)    Intolerances        LABS:                        8.0    9.6   )-----------( 457      ( 10 Aug 2018 10:53 )             26.4     08-10    140  |  99  |  24<H>  ----------------------------<  126<H>  4.8   |  32<H>  |  0.90    Ca    9.0      10 Aug 2018 10:53  Phos  3.2     08-10  Mg     2.2     08-10    TPro  6.4  /  Alb  2.8<L>  /  TBili  0.4  /  DBili  x   /  AST  20  /  ALT  6<L>  /  AlkPhos  120  08-10        CAPILLARY BLOOD GLUCOSE      POCT Blood Glucose.: 114 mg/dL (09 Aug 2018 21:42)      RADIOLOGY & ADDITIONAL TESTS: Reviewed.

## 2018-08-10 NOTE — PROGRESS NOTE ADULT - ASSESSMENT
ASSESSMENT/PLAN    1) Status post respiratory failure  2) Septic shock  3) Pneumonia  4) Cardiomyopathy  5) Dementia    Satisfactory SpO2, continue NC  Bronchodilators:  Atrovent/ albuterol q 4 – 6 hours as needed  ID/Antibiotics:  cefazolin  MSSA in respiratory culture, blood cultures negative to date  Cardiac/HTN: off pressors as tolerated  GI: Rx/ prophylaxis c PPI/H2B  Heme: Rx/VT prophylaxis Heparin sq  Aspiration precautions at all times  Discussed with medical team

## 2018-08-10 NOTE — PROGRESS NOTE ADULT - ASSESSMENT
Almost 89 yo female with 2 years of general decline, hospitalized with sepsis secondary to PNA and history of recurrent UTIs  Presently, has survived this episode and is awake and alert.  She is weaker than baseline. Likely continues to aspirate despite being NPO

## 2018-08-10 NOTE — PROGRESS NOTE ADULT - SUBJECTIVE AND OBJECTIVE BOX
OVERNIGHT EVENTS:    SUBJECTIVE / INTERVAL HPI: Patient seen and examined at bedside.     VITAL SIGNS:  Vital Signs Last 24 Hrs  T(C): 36.7 (10 Aug 2018 14:06), Max: 36.9 (10 Aug 2018 01:00)  T(F): 98.1 (10 Aug 2018 14:06), Max: 98.5 (10 Aug 2018 01:00)  HR: 72 (10 Aug 2018 13:07) (62 - 76)  BP: 107/59 (10 Aug 2018 13:07) (92/57 - 117/57)  BP(mean): 76 (10 Aug 2018 13:07) (71 - 80)  RR: 14 (10 Aug 2018 13:07) (14 - 30)  SpO2: 96% (10 Aug 2018 13:07) (93% - 99%)    PHYSICAL EXAM:    General: Patient awake and alert but not responding to commands. Tachypneic but not using respiratory accessory muscles.  HEENT: PERRL, anicteric sclera; MMM  Neck: supple  Cardiovascular: +S1/S2, RRR  Respiratory: In no respiratory distress. Crackles appreciated over b/l bases. No rhonchi, no wheezing appreciated b/l  Gastrointestinal: soft, NT/ND; +BSx4  Extremities: b/l extremities with 2+ pitting edema, R>L leg. No erythema, no cyanosis b/l.  Vascular: 2+ radial pulses, 1+ dorsalis pedis pulses B/L  Neurological: Limited due to patient's condition. Awake and alert but not oriented to person, place, or time. Patient mostly nonverbal but will smile at provider throughout exam.    MEDICATIONS:  MEDICATIONS  (STANDING):  acetaminophen    Suspension. 650 milliGRAM(s) Oral daily  ALBUTerol/ipratropium for Nebulization 3 milliLiter(s) Nebulizer every 6 hours  amiodarone    Tablet 400 milliGRAM(s) Oral two times a day  chlorhexidine 2% Cloths 1 Application(s) Topical daily  docusate sodium Liquid 100 milliGRAM(s) Oral two times a day  heparin  Injectable 5000 Unit(s) SubCutaneous every 12 hours  metoprolol tartrate 12.5 milliGRAM(s) Oral every 12 hours  midodrine 10 milliGRAM(s) Oral every 8 hours  pantoprazole   Suspension 40 milliGRAM(s) Oral before breakfast  polyethylene glycol 3350 17 Gram(s) Oral daily  senna Syrup 10 milliLiter(s) Oral at bedtime    MEDICATIONS  (PRN):      ALLERGIES:  Allergies    amoxicillin (Unknown)  Cipro (Unknown)  clindamycin (Unknown)  lactose (Unknown)  penicillin (Unknown)  Wheat (Unknown)    Intolerances        LABS:                        8.0    9.6   )-----------( 457      ( 10 Aug 2018 10:53 )             26.4     08-10    140  |  99  |  24<H>  ----------------------------<  126<H>  4.8   |  32<H>  |  0.90    Ca    9.0      10 Aug 2018 10:53  Phos  3.2     08-10  Mg     2.2     08-10    TPro  6.4  /  Alb  2.8<L>  /  TBili  0.4  /  DBili  x   /  AST  20  /  ALT  6<L>  /  AlkPhos  120  08-10        CAPILLARY BLOOD GLUCOSE      POCT Blood Glucose.: 114 mg/dL (09 Aug 2018 21:42)      RADIOLOGY & ADDITIONAL TESTS: Reviewed. OVERNIGHT EVENTS: ALYCIA    SUBJECTIVE / INTERVAL HPI: Patient seen and examined at bedside.     VITAL SIGNS:  Vital Signs Last 24 Hrs  T(C): 36.7 (10 Aug 2018 14:06), Max: 36.9 (10 Aug 2018 01:00)  T(F): 98.1 (10 Aug 2018 14:06), Max: 98.5 (10 Aug 2018 01:00)  HR: 72 (10 Aug 2018 13:07) (62 - 76)  BP: 107/59 (10 Aug 2018 13:07) (92/57 - 117/57)  BP(mean): 76 (10 Aug 2018 13:07) (71 - 80)  RR: 14 (10 Aug 2018 13:07) (14 - 30)  SpO2: 96% (10 Aug 2018 13:07) (93% - 99%)    PHYSICAL EXAM:    General: Patient awake and alert but not responding to commands. Tachypneic but not using respiratory accessory muscles.  HEENT: PERRL, anicteric sclera; MMM  Neck: supple  Cardiovascular: +S1/S2, RRR  Respiratory: In no respiratory distress. Crackles appreciated over b/l bases. No rhonchi, no wheezing appreciated b/l  Gastrointestinal: soft, NT/ND; +BSx4  Extremities: b/l extremities with 2+ pitting edema, R>L leg. No erythema, no cyanosis b/l.  Vascular: 2+ radial pulses, 1+ dorsalis pedis pulses B/L  Neurological: Limited due to patient's condition. Awake and alert but not oriented to person, place, or time. Patient mostly nonverbal but will smile at provider throughout exam.    MEDICATIONS:  MEDICATIONS  (STANDING):  acetaminophen    Suspension. 650 milliGRAM(s) Oral daily  ALBUTerol/ipratropium for Nebulization 3 milliLiter(s) Nebulizer every 6 hours  amiodarone    Tablet 400 milliGRAM(s) Oral two times a day  chlorhexidine 2% Cloths 1 Application(s) Topical daily  docusate sodium Liquid 100 milliGRAM(s) Oral two times a day  heparin  Injectable 5000 Unit(s) SubCutaneous every 12 hours  metoprolol tartrate 12.5 milliGRAM(s) Oral every 12 hours  midodrine 10 milliGRAM(s) Oral every 8 hours  pantoprazole   Suspension 40 milliGRAM(s) Oral before breakfast  polyethylene glycol 3350 17 Gram(s) Oral daily  senna Syrup 10 milliLiter(s) Oral at bedtime    MEDICATIONS  (PRN):      ALLERGIES:  Allergies    amoxicillin (Unknown)  Cipro (Unknown)  clindamycin (Unknown)  lactose (Unknown)  penicillin (Unknown)  Wheat (Unknown)    Intolerances        LABS:                        8.0    9.6   )-----------( 457      ( 10 Aug 2018 10:53 )             26.4     08-10    140  |  99  |  24<H>  ----------------------------<  126<H>  4.8   |  32<H>  |  0.90    Ca    9.0      10 Aug 2018 10:53  Phos  3.2     08-10  Mg     2.2     08-10    TPro  6.4  /  Alb  2.8<L>  /  TBili  0.4  /  DBili  x   /  AST  20  /  ALT  6<L>  /  AlkPhos  120  08-10        CAPILLARY BLOOD GLUCOSE      POCT Blood Glucose.: 114 mg/dL (09 Aug 2018 21:42)      RADIOLOGY & ADDITIONAL TESTS: Reviewed.

## 2018-08-10 NOTE — PROGRESS NOTE ADULT - SUBJECTIVE AND OBJECTIVE BOX
Interventional, Pulmonary, Critical, Chest Special Procedures.    Pt was seen and fully examined by myself.     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   Patient was recently discharged (7/20) after being treated with meropenem for proteus mirabilis and aerococcus urine UTI and additionally was treated for aspiration PNA.  Per documentation on previous admission discussion for PEG placement, son wanted it in setting of dysphagia she passed the modified barium swallow study and was to trial a liquid/puree diet. No hx of coughing after oral intake or choking , no hx of fever chills    In the ED, VS T 100.5, , BP 94/58, RR 24, SpO2 95% on RA. VBG significant for pH of 7.21, pCO2 98, pO2 79.  Pt placed on BiPAP. Repeat ABG after 90 minutes on BiPAP- pH 7.28, pCO2 70, pO2 66.  The decision was made to intubate in the ED. Central line was placed.   Vanc and meropenem and 1L NS. Tylenol 625mg.  LABS showing leukocytosis 18.2, Hgb 8.8. Bicarb 33, BUN 52, Cr 1.14 (baseline at discharge - BUN 7 Cr 0.61). UA (+) for UTI. Trop 0.05. BNP 86100.   EKG with q waves in lead III unchanged from prior EKG.   Chest xray with b/l pleural infiltrates-unchanged from previous imaging (02 Aug 2018 15:19)    REVIEW OF SYSTEMS:  ROS reviewed below with positive findings marked (+) :  GEN:  fever, chills ENT: tracheostomy,   epistaxis,  sinusitis COR: CAD, CHF,  HTN, dysrhythmia PUL: COPD, ILD, asthma, pneumonia GI: PEG, dysphagia, hemorrhage, other LUDWIN: kidney disease, electrolyte disorder HEM:  anemia, +thrombus, coagulopathy, cancer ENDO:  thyroid disease, diabetes mellitus CNS:  +dementia, stroke, seizure, PSY:  depression, anxiety, other    PAST MEDICAL & SURGICAL HISTORY:  Dementia  DVT (deep venous thrombosis)  Dysphagia  Anemia  DJD (degenerative joint disease)  Edema  IBS (irritable bowel syndrome)  Celiac disease  S/P IVC filter  H/O inguinal hernia repair  History of total left hip replacement    FAMILY HISTORY:  No pertinent family history in first degree relatives    SOCIAL HISTORY:      - Tobacco     - ETOH    Allergies    amoxicillin (Unknown)  Cipro (Unknown)  clindamycin (Unknown)  lactose (Unknown)  penicillin (Unknown)  Wheat (Unknown)    Intolerances      Vital Signs Last 24 Hrs  T(C): 36.8 (10 Aug 2018 09:00), Max: 36.9 (10 Aug 2018 01:00)  T(F): 98.3 (10 Aug 2018 09:00), Max: 98.5 (10 Aug 2018 01:00)  HR: 70 (10 Aug 2018 08:28) (62 - 80)  BP: 117/57 (10 Aug 2018 08:28) (81/50 - 117/57)  BP(mean): 80 (10 Aug 2018 08:28) (65 - 80)  RR: 16 (10 Aug 2018 08:28) (16 - 33)  SpO2: 97% (10 Aug 2018 08:28) (92% - 99%)    08-09 @ 07:01  -  08-10 @ 07:00  --------------------------------------------------------  IN: 630 mL / OUT: 0 mL / NET: 630 mL        PHYSICAL EXAM:  GENERAL:         comfortable,  - distress.  HEENT:            - trauma,  - icterus,  - injection,  - nasal discharge.  NECK:              - jugular venous distention, - thyromegaly.  LYMPH:           - lymphadenopathy, - masses.  RESP:               + crackles,   - rhonchi,   - wheezes.   COR:                S1S2   - gallops,  - rubs.  ABD:                bowel sounds,   soft, - tender, - distended.  EXT/MSC:         - cyanosis,  - clubbing,  - edema.    NEURO:             not engaging,   responds to stimuli.    DEVICES:  - DENTURES   +IV R / L     - ETUBE   -TRACH   -CTUBE  R / L      LABS:                          8.0    9.6   )-----------( 457      ( 10 Aug 2018 10:53 )             26.4     08-10    140  |  99  |  24<H>  ----------------------------<  126<H>  4.8   |  32<H>  |  0.90    Ca    9.0      10 Aug 2018 10:53  Phos  3.2     08-10  Mg     2.2     08-10    TPro  6.4  /  Alb  2.8<L>  /  TBili  0.4  /  DBili  x   /  AST  20  /  ALT  6<L>  /  AlkPhos  120  08-10      < from: Xray Chest 1 View- PORTABLE-Routine (08.09.18 @ 06:30) >    EXAM:  XR CHEST PORTABLE ROUTINE 1V                          PROCEDURE DATE:  08/09/2018          INTERPRETATION:  Portable Chest X-Ray dated 8/9/2018 6:30 AM    Indication: aspiration    Prior studies: 8/8/2018 at 7:44 AM    An AP portable view ofthe chest reveals small bilateral pleural   effusions. Bibasilar infiltrates/atelectasis. Mild pulmonary venous   congestion. Left apex is obscured by the patient's jaw. Limited study as   the patient is markedly rotated to the left.    IMPRESSION:  No significant interval change.    < end of copied text >  RADIOLOGY & ADDITIONAL STUDIES (The following images were personally reviewed):

## 2018-08-10 NOTE — PROGRESS NOTE ADULT - PROBLEM SELECTOR PLAN 1
Etiology likely secondary to aspiration pneumonia vs HAP. CXR showed b/l pleural infiltrates. Initial USG done at bedside showing RLL consolidation. In ED VBG significant for pH of 7.21, pCO2 98, pO2 79. Pt placed on BiPAP. Repeat ABG after 90 minutes on BiPAP- pH 7.28, pCO2 70, pO2 66; patient subsequently intubated in the ED  - patient now extubated 8/7 and currently on aerosol mask, sating well.   - Sputum cx grew staph aureus MSSA. Patient now s/p 7 day treatment of IV cefazolin  -patient appeared tachypneic but sating well and no use of accessory muscles of respiration on aerosol mask. Some bibasilar rales appreciated at the bases; pushed iv lasix 20mg x1  -pulm following recs appreciated  -c/w duonebs   -c/w NC    #Cardiomyopathy: Patient appears somewhat hypervolemic with bibasilar crackles on exam and b/l edema on lower extremity and dependent areas  -patient tachypneic today, possible fluid overloaded state contributing, s/p 20 lasix IV  -continue gentle diuresis Etiology likely secondary to aspiration pneumonia vs HAP. CXR showed b/l pleural infiltrates. Initial USG done at bedside showing RLL consolidation. In ED VBG significant for pH of 7.21, pCO2 98, pO2 79. Pt placed on BiPAP. Repeat ABG after 90 minutes on BiPAP- pH 7.28, pCO2 70, pO2 66; patient subsequently intubated in the ED  - patient now extubated 8/7 and currently on aerosol mask, sating well.   - Sputum cx grew staph aureus MSSA. Patient now s/p 7 day treatment of IV cefazolin  -patient appeared tachypneic but sating well and no use of accessory muscles of respiration on aerosol mask. Some bibasilar rales appreciated at the bases; pushed iv lasix 20mg x1  -pulm following recs appreciated  -c/w duonebs   -c/w NC    #Cardiomyopathy: Patient appears somewhat hypervolemic with bibasilar crackles on exam and b/l edema on lower extremity and dependent areas  -patient tachypneic today, possible fluid overloaded state contributing, s/p 20 lasix IV  -f/u repeat AM chest xray  -continue gentle diuresis Etiology likely secondary to aspiration pneumonia vs HAP. CXR showed b/l pleural infiltrates. Initial USG done at bedside showing RLL consolidation. In ED VBG significant for pH of 7.21, pCO2 98, pO2 79. Pt placed on BiPAP. Repeat ABG after 90 minutes on BiPAP- pH 7.28, pCO2 70, pO2 66; patient subsequently intubated in the ED  - patient now extubated 8/7 and currently on aerosol mask, sating well.   - Sputum cx grew staph aureus MSSA. Patient now s/p 7 day treatment of IV cefazolin  -patient appeared tachypneic but sating well and no use of accessory muscles of respiration on aerosol mask. Some bibasilar rales appreciated at the bases; pushed iv lasix 20mg x1  -pulm following recs appreciated  -c/w duonebs   -c/w NC, can increase to HFNC if required    #Cardiomyopathy: Patient appears somewhat hypervolemic with bibasilar crackles on exam and b/l edema on lower extremity and dependent areas  -patient tachypneic today, possible fluid overloaded state contributing, s/p 20 lasix IV  -f/u repeat AM chest xray  -continue gentle diuresis

## 2018-08-10 NOTE — PROGRESS NOTE ADULT - PROBLEM SELECTOR PLAN 1
Problem/Recommendation - 1:  Problem: Aspiration pneumonia due to gastric secretions, unspecified laterality, unspecified part of lung. Recommendation: Patient presented in respiratory failure requiring intubation.  Presently off ventilator and doing a bit better.  Would continue antibiotics and try to mobilize secretions.  Prognosis guarded

## 2018-08-10 NOTE — PROGRESS NOTE ADULT - PROBLEM SELECTOR PLAN 6
Patient nonverbal and AAOx0 at baseline. Will smile at provider but not responding to commands.   -continue to monitor  -palliative following, recs appreciated Patient nonverbal and AAOx0 at baseline. Will smile at provider but not responding to commands.   -continue to monitor  -palliative following, recs appreciated. Patient nonverbal and AAOx0 at baseline. Will smile at provider but not responding to commands.   -continue to monitor  -palliative following, recs appreciated. Patient's son again confirming full code status

## 2018-08-10 NOTE — PROGRESS NOTE ADULT - PROBLEM SELECTOR PLAN 3
Overall decline- but son describes QOL as acceptable to patient and himself.  He wishes to continue any and all treatments. We did discuss that there will come an episode where she may not respond to treatment and be on the ventilator.  He was not open to discussion.  Discussed with Dr. Motta who reports that he has attempted similar conversations and counseling

## 2018-08-10 NOTE — PROGRESS NOTE ADULT - SUBJECTIVE AND OBJECTIVE BOX
JOSE MARTIN GOFF   MRN-[id number]     (08/15/1920):     CC: recurrent aspiration, fraility    Hospital Course and Subjective:  Nursing home resident  with hx of dementia,  recurrent UTI, celiac disease, lactose intolerance and aspiration pneumonias presented febrile with hypercapneic respiratory failure requiring intubation.  Patient treated with antibiotics, pressors and ventilatory support.  She was successfully weaned from the ventilator.  Started on midodrine and is presently off pressors.  Awake alert, answering simple questions and following simple commands.    Son reports that she is "full code" and that she has a good QOL at the nursing home.  Watches TV and her aides read her the paper.  She has been bedbound for more than a year.  He feels that she has pain from her osteoarthritis particularly in her knees.  Patient herself denies pain, but when the nurses attempt to sit her on the side of the bed she cries out in discomfort and cannot flex at the knee.    She was stepped down form the ICU, again could not bend her knees so PT to help get her OOB if possible.  Son had reported that she spends several hours daily in wheelchaire while at U.  She was started on a daily dose of Tylenol at 10 AM two days ago, but it is unclear if it makes any difference    Patient says she is "fine" but reliability is suspect.  Private aide at the bedside, but aide only met patient last week and cannot provide additional history or insight        ROS:    Unable to attain due to:  dementia- lack of reliability.  All history obtained from son on a prior visit                    Dyspnea (Jose 0-10):   none noted                     N/V (Y/N):          N                    Secretions (Y/N) :      N          Agitation(Y/N):         N  Pain (Y/N):     denies, but see above  -Provocation/Palliation:  -Quality/Quantity:  -Radiating:  -Severity:  -Timing/Frequency:  -Impact on ADLs:    General:  Denied  HEENT:    Denied  Neck:  Denied  CVS:  episode of PAF during this hospitalization  Resp:  Denied  GI:  history of GI bleed aggravated by NSAIDS.  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  Patient reported to have sprue  :  recurrent UTI  Musc:  see above  Neuro:  Denied  Psych:  dementia- generally pleasant demeanor  Skin:  Denied  Lymph:  Denied    Allergies    amoxicillin (Unknown)  Cipro (Unknown)  clindamycin (Unknown)  lactose (Unknown)  penicillin (Unknown)  Wheat (Unknown)    Intolerances        Opiate Naive (Y/N): yes.  Was on PRN Tylenol for pain at NH.  She never asked for it, but when given, son feels she was more relaxed and cooperative      Medications:      MEDICATIONS  (STANDING):  acetaminophen    Suspension. 650 milliGRAM(s) Oral daily  ALBUTerol/ipratropium for Nebulization 3 milliLiter(s) Nebulizer every 6 hours  amiodarone    Tablet 400 milliGRAM(s) Oral two times a day  chlorhexidine 2% Cloths 1 Application(s) Topical daily  docusate sodium Liquid 100 milliGRAM(s) Oral two times a day  heparin  Injectable 5000 Unit(s) SubCutaneous every 12 hours  metoprolol tartrate 12.5 milliGRAM(s) Oral every 12 hours  midodrine 10 milliGRAM(s) Oral every 8 hours  pantoprazole   Suspension 40 milliGRAM(s) Oral before breakfast  polyethylene glycol 3350 17 Gram(s) Oral daily  senna Syrup 10 milliLiter(s) Oral at bedtime    MEDICATIONS  (PRN):        Labs:                          8.0    9.6   )-----------( 457      ( 10 Aug 2018 10:53 )             26.4   08-10    140  |  99  |  24<H>  ----------------------------<  126<H>  4.8   |  32<H>  |  0.90    Ca    9.0      10 Aug 2018 10:53  Phos  3.2     08-10  Mg     2.2     08-10    TPro  6.4  /  Alb  2.8<L>  /  TBili  0.4  /  DBili  x   /  AST  20  /  ALT  6<L>  /  AlkPhos  120  08-10              Imaging:  Reviewed  < from: Xray Chest 1 View- PORTABLE-Routine (18 @ 06:20) >  EXAM:  XR CHEST PORTABLE ROUTINE 1V                          PROCEDURE DATE:  2018          INTERPRETATION:  Portable Chest X-Ray dated 2018 6:20 AM    Indication: pna    Prior studies: 2018    An AP portable view of the chest revealssmall bilateral pleural   effusions. Moderate pulmonary venous congestion. Bibasilar atelectasis or   infiltrate. Tip of endotracheal tube in the origin of the right mainstem   bronchus and it should be pulled back by 3 cm.    IMPRESSION:  Abnormal position of the endotracheal tube with its tip at the origin of   the right mainstem bronchus.     Moderate CHF. Bibasilar atelectasis or infiltrates.    < end of copied text >  < from: Xray Chest 1 View- PORTABLE-Routine (18 @ 06:30) >  EXAM:  XR CHEST PORTABLE ROUTINE 1V                          PROCEDURE DATE:  2018          INTERPRETATION:  Portable Chest X-Ray dated 2018 6:30 AM    Indication: aspiration    Prior studies: 2018 at 7:44 AM    An AP portable view ofthe chest reveals small bilateral pleural   effusions. Bibasilar infiltrates/atelectasis. Mild pulmonary venous   congestion. Left apex is obscured by the patient's jaw. Limited study as   the patient is markedly rotated to the left.    IMPRESSION:  No significant interval change.    < end of copied text >    PEx:  ICU Vital Signs Last 24 Hrs  T(C): 36.7 (10 Aug 2018 14:06), Max: 36.9 (10 Aug 2018 01:00)  T(F): 98.1 (10 Aug 2018 14:06), Max: 98.5 (10 Aug 2018 01:00)  HR: 72 (10 Aug 2018 13:07) (62 - 80)  BP: 107/59 (10 Aug 2018 13:07) (92/57 - 117/57)  BP(mean): 76 (10 Aug 2018 13:07) (71 - 80)  ABP: --  ABP(mean): --  RR: 14 (10 Aug 2018 13:07) (14 - 30)  SpO2: 96% (10 Aug 2018 13:07) (92% - 99%)            General: elderly female awake alert and in NAD,   HEENT:  essentially edentulous upper jaw,   Neck: supple, no bruit  CVS: regular rate and rhythm  Resp: decreased BS at both bases, significant upper airway secretions  GI:  PEG in place, non tender abdomen.  Normal BS  :  Tubbs  Musc:   generalized weakness.  osteoarthritis of both knees with valgus deformity  Neuro: non focal, oriented to person only  Psych:   pleasant, smiling  Skin: normal  Lymph: no adenopathy  Preadmit Karnofsky:                            50 %           Current Karnofsky:     40%  Cachexia (Y/N): no  BMI: 24.8    Advanced Directives:     Full Code  HCP    Decision maker: Son Dr. Esvin Goff   [phone]  Legal surrogate: same    Social History: lived in [facility] since 2017    30 years ago.  One son  11 years ago.  Dr. Esvin Goff visits mother every day.  He is a gastroenterologist and is aware of all details regarding his mothers health.  Extremely involved    GOALS OF CARE DISCUSSION       Palliative care info/counseling provided during last visit Discussed that patient's recurrent infection and PNA related to progressive disease.  Discussed that son noted that she has never been this ill- and I discussed that this may be part of the process for her.  We talked about the "what if" next time she cannot come off the ventilator, but son not really able to contemplate  He feels her QOL is certainly good enough to warrant this level of ICU care	                 Documentation of GOC: 	For the present patient is full code and had a PEG placed to  for hydration. She is NPO          REFERRALS	        Palliative Med                              Massage Therapy       Music Therapy         Nutrition              PT/OT

## 2018-08-10 NOTE — PROGRESS NOTE ADULT - PROBLEM SELECTOR PLAN 4
Patient A and O x 1.  Dementia of longstanding  presently calm.  Discussed this as a progressive disease with son. patient with limited conversation today- likely at baseline

## 2018-08-10 NOTE — PROGRESS NOTE ADULT - ASSESSMENT
89 y/o F with PMH dementia, recurrent UTI, Celiac' s disease, lactose intolerance, 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 for UTI and aspiration PNA presents from [facility] for respiratory distress with acute respiratory failure found to be in septic shock likely from aspiration PNA

## 2018-08-10 NOTE — PROGRESS NOTE ADULT - ASSESSMENT
89 y/o F with PMH dementia, recurrent UTI, Celiac' s disease, lactose intolerance, 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 for UTI and aspiration PNA presents from [facility] for respiratory distress with acute respiratory failure found to be in septic shock likely from aspiration PNA

## 2018-08-10 NOTE — PROGRESS NOTE ADULT - PROBLEM SELECTOR PLAN 4
Patient with dysphagia and aspiration PNA  -PEG placed  8/6/18  -refeeding yesterday. Tolerating well. Patient with dysphagia and aspiration PNA  -PEG placed  8/6/18  -c/w Osmolite 1.2 @ 20cc/hr

## 2018-08-11 LAB
ALBUMIN SERPL ELPH-MCNC: 2.7 G/DL — LOW (ref 3.3–5)
ALP SERPL-CCNC: 125 U/L — HIGH (ref 40–120)
ALT FLD-CCNC: 7 U/L — LOW (ref 10–45)
ANION GAP SERPL CALC-SCNC: 10 MMOL/L — SIGNIFICANT CHANGE UP (ref 5–17)
AST SERPL-CCNC: 18 U/L — SIGNIFICANT CHANGE UP (ref 10–40)
BILIRUB SERPL-MCNC: 0.4 MG/DL — SIGNIFICANT CHANGE UP (ref 0.2–1.2)
BUN SERPL-MCNC: 29 MG/DL — HIGH (ref 7–23)
CALCIUM SERPL-MCNC: 9.3 MG/DL — SIGNIFICANT CHANGE UP (ref 8.4–10.5)
CHLORIDE SERPL-SCNC: 99 MMOL/L — SIGNIFICANT CHANGE UP (ref 96–108)
CO2 SERPL-SCNC: 31 MMOL/L — SIGNIFICANT CHANGE UP (ref 22–31)
CREAT SERPL-MCNC: 1.01 MG/DL — SIGNIFICANT CHANGE UP (ref 0.5–1.3)
GLUCOSE SERPL-MCNC: 116 MG/DL — HIGH (ref 70–99)
HCT VFR BLD CALC: 26.6 % — LOW (ref 34.5–45)
HGB BLD-MCNC: 8.2 G/DL — LOW (ref 11.5–15.5)
MAGNESIUM SERPL-MCNC: 2.1 MG/DL — SIGNIFICANT CHANGE UP (ref 1.6–2.6)
MCHC RBC-ENTMCNC: 27.1 PG — SIGNIFICANT CHANGE UP (ref 27–34)
MCHC RBC-ENTMCNC: 30.8 G/DL — LOW (ref 32–36)
MCV RBC AUTO: 87.8 FL — SIGNIFICANT CHANGE UP (ref 80–100)
PHOSPHATE SERPL-MCNC: 2.7 MG/DL — SIGNIFICANT CHANGE UP (ref 2.5–4.5)
PLATELET # BLD AUTO: 430 K/UL — HIGH (ref 150–400)
POTASSIUM SERPL-MCNC: 4.4 MMOL/L — SIGNIFICANT CHANGE UP (ref 3.5–5.3)
POTASSIUM SERPL-SCNC: 4.4 MMOL/L — SIGNIFICANT CHANGE UP (ref 3.5–5.3)
PROT SERPL-MCNC: 6.8 G/DL — SIGNIFICANT CHANGE UP (ref 6–8.3)
RBC # BLD: 3.03 M/UL — LOW (ref 3.8–5.2)
RBC # FLD: 16.5 % — SIGNIFICANT CHANGE UP (ref 10.3–16.9)
SODIUM SERPL-SCNC: 140 MMOL/L — SIGNIFICANT CHANGE UP (ref 135–145)
WBC # BLD: 10.4 K/UL — SIGNIFICANT CHANGE UP (ref 3.8–10.5)
WBC # FLD AUTO: 10.4 K/UL — SIGNIFICANT CHANGE UP (ref 3.8–10.5)

## 2018-08-11 PROCEDURE — 71045 X-RAY EXAM CHEST 1 VIEW: CPT | Mod: 26

## 2018-08-11 PROCEDURE — 99232 SBSQ HOSP IP/OBS MODERATE 35: CPT | Mod: GC

## 2018-08-11 RX ORDER — TIOTROPIUM BROMIDE 18 UG/1
1 CAPSULE ORAL; RESPIRATORY (INHALATION) DAILY
Qty: 0 | Refills: 0 | Status: DISCONTINUED | OUTPATIENT
Start: 2018-08-11 | End: 2018-08-11

## 2018-08-11 RX ORDER — ALBUTEROL 90 UG/1
2 AEROSOL, METERED ORAL EVERY 4 HOURS
Qty: 0 | Refills: 0 | Status: DISCONTINUED | OUTPATIENT
Start: 2018-08-11 | End: 2018-08-11

## 2018-08-11 RX ORDER — IPRATROPIUM/ALBUTEROL SULFATE 18-103MCG
3 AEROSOL WITH ADAPTER (GRAM) INHALATION EVERY 4 HOURS
Qty: 0 | Refills: 0 | Status: DISCONTINUED | OUTPATIENT
Start: 2018-08-11 | End: 2018-08-14

## 2018-08-11 RX ORDER — ACETYLCYSTEINE 200 MG/ML
5 VIAL (ML) MISCELLANEOUS ONCE
Qty: 0 | Refills: 0 | Status: DISCONTINUED | OUTPATIENT
Start: 2018-08-11 | End: 2018-08-11

## 2018-08-11 RX ORDER — FUROSEMIDE 40 MG
20 TABLET ORAL ONCE
Qty: 0 | Refills: 0 | Status: COMPLETED | OUTPATIENT
Start: 2018-08-11 | End: 2018-08-11

## 2018-08-11 RX ORDER — ACETYLCYSTEINE 200 MG/ML
2.5 VIAL (ML) MISCELLANEOUS ONCE
Qty: 0 | Refills: 0 | Status: COMPLETED | OUTPATIENT
Start: 2018-08-11 | End: 2018-08-11

## 2018-08-11 RX ADMIN — AMIODARONE HYDROCHLORIDE 400 MILLIGRAM(S): 400 TABLET ORAL at 06:16

## 2018-08-11 RX ADMIN — MIDODRINE HYDROCHLORIDE 10 MILLIGRAM(S): 2.5 TABLET ORAL at 06:18

## 2018-08-11 RX ADMIN — PANTOPRAZOLE SODIUM 40 MILLIGRAM(S): 20 TABLET, DELAYED RELEASE ORAL at 06:23

## 2018-08-11 RX ADMIN — Medication 100 MILLIGRAM(S): at 18:21

## 2018-08-11 RX ADMIN — Medication 3 MILLILITER(S): at 21:24

## 2018-08-11 RX ADMIN — Medication 100 MILLIGRAM(S): at 06:17

## 2018-08-11 RX ADMIN — Medication 2.5 MILLILITER(S): at 03:39

## 2018-08-11 RX ADMIN — Medication 3 MILLILITER(S): at 08:57

## 2018-08-11 RX ADMIN — Medication 3 MILLILITER(S): at 00:55

## 2018-08-11 RX ADMIN — Medication 12.5 MILLIGRAM(S): at 06:19

## 2018-08-11 RX ADMIN — MIDODRINE HYDROCHLORIDE 10 MILLIGRAM(S): 2.5 TABLET ORAL at 21:24

## 2018-08-11 RX ADMIN — Medication 3 MILLILITER(S): at 13:58

## 2018-08-11 RX ADMIN — Medication 650 MILLIGRAM(S): at 11:33

## 2018-08-11 RX ADMIN — MIDODRINE HYDROCHLORIDE 10 MILLIGRAM(S): 2.5 TABLET ORAL at 13:58

## 2018-08-11 RX ADMIN — HEPARIN SODIUM 5000 UNIT(S): 5000 INJECTION INTRAVENOUS; SUBCUTANEOUS at 18:21

## 2018-08-11 RX ADMIN — Medication 3 MILLILITER(S): at 18:22

## 2018-08-11 RX ADMIN — Medication 12.5 MILLIGRAM(S): at 21:24

## 2018-08-11 RX ADMIN — AMIODARONE HYDROCHLORIDE 400 MILLIGRAM(S): 400 TABLET ORAL at 18:21

## 2018-08-11 RX ADMIN — Medication 3 MILLILITER(S): at 05:59

## 2018-08-11 RX ADMIN — Medication 650 MILLIGRAM(S): at 12:17

## 2018-08-11 RX ADMIN — HEPARIN SODIUM 5000 UNIT(S): 5000 INJECTION INTRAVENOUS; SUBCUTANEOUS at 06:17

## 2018-08-11 NOTE — PROVIDER CONTACT NOTE (OTHER) - SITUATION
MD Ferrer called to bedside, patient was sating at 86, refused NC to be placed back on. MD came to bedside.

## 2018-08-11 NOTE — PROVIDER CONTACT NOTE (OTHER) - SITUATION
Pt coughing frequently, sounding congested, and not able to bring anything up.  Oxygen saturations are borderline at 89-91% on 3L NC.  Pt resting comfortably with no distress noted

## 2018-08-11 NOTE — PROGRESS NOTE ADULT - PROBLEM SELECTOR PLAN 3
y  -Patient now in sinus rhythm  and better BP control  -s/p amio drip  -c/w oral amiodarone 400 mg bid   -c/w lopressor 12.5mg BID

## 2018-08-11 NOTE — PROGRESS NOTE ADULT - ATTENDING COMMENTS
Multi-organ failure  PN/resp failure-extubated  GI-PEG  Anemia-stable  prog is poor  PAF--in NSR  no a/c

## 2018-08-11 NOTE — PROGRESS NOTE ADULT - SUBJECTIVE AND OBJECTIVE BOX
OVERNIGHT EVENTS: O/N: d/alissa FSG monitoring. Desaturated to 89%, received mucomyst and duonebs with resolution to 95%    SUBJECTIVE / INTERVAL HPI: Patient seen and examined at bedside. Patient admits to some SOB. Patient denies chest pain, abdominal pain, nausea, vomiting, diarrhea, constipation, fevers, chills, night sweats. Patient has     VITAL SIGNS:  Vital Signs Last 24 Hrs  T(C): 36.9 (11 Aug 2018 17:30), Max: 37.3 (10 Aug 2018 22:00)  T(F): 98.5 (11 Aug 2018 17:30), Max: 99.1 (10 Aug 2018 22:00)  HR: 80 (11 Aug 2018 15:02) (68 - 108)  BP: 90/52 (11 Aug 2018 15:02) (81/44 - 127/55)  BP(mean): 68 (11 Aug 2018 15:02) (57 - 76)  RR: 20 (11 Aug 2018 15:02) (16 - 30)  SpO2: 98% (11 Aug 2018 15:02) (89% - 99%)    PHYSICAL EXAM:    General: Patient awake and alert, more verbally responsive today. Cachectic appearing.   HEENT: PERRL, anicteric sclera; MMM  Neck: supple  Cardiovascular: +S1/S2, RRR  Respiratory: In no respiratory distress. Crackles appreciated over b/l bases. No rhonchi, no wheezing appreciated b/l  Gastrointestinal: soft, NT/ND; +BSx4  Extremities: b/l extremities with 2+ pitting edema, best appreciated in dependent areas. No erythema, no cyanosis b/l.  Vascular: 2+ radial pulses, 1+ dorsalis pedis pulses B/L  Neurological: Limited due to patient's condition. Awake and alert but not oriented to person, place, or time. Patient mostly nonverbal but will smile at provider throughout exam. Patient verbalizing more appropriately to questions today to questioning today.     MEDICATIONS:  MEDICATIONS  (STANDING):  acetaminophen    Suspension. 650 milliGRAM(s) Oral daily  ALBUTerol/ipratropium for Nebulization 3 milliLiter(s) Nebulizer every 4 hours  amiodarone    Tablet 400 milliGRAM(s) Oral two times a day  chlorhexidine 2% Cloths 1 Application(s) Topical daily  docusate sodium Liquid 100 milliGRAM(s) Oral two times a day  heparin  Injectable 5000 Unit(s) SubCutaneous every 12 hours  metoprolol tartrate 12.5 milliGRAM(s) Oral every 12 hours  midodrine 10 milliGRAM(s) Oral every 8 hours  pantoprazole   Suspension 40 milliGRAM(s) Oral before breakfast  polyethylene glycol 3350 17 Gram(s) Oral daily  senna Syrup 10 milliLiter(s) Oral at bedtime    MEDICATIONS  (PRN):      ALLERGIES:  Allergies    amoxicillin (Unknown)  Cipro (Unknown)  clindamycin (Unknown)  lactose (Unknown)  penicillin (Unknown)  Wheat (Unknown)    Intolerances        LABS:                        8.2    10.4  )-----------( 430      ( 11 Aug 2018 06:24 )             26.6     08-11    140  |  99  |  29<H>  ----------------------------<  116<H>  4.4   |  31  |  1.01    Ca    9.3      11 Aug 2018 06:26  Phos  2.7     08-11  Mg     2.1     08-11    TPro  6.8  /  Alb  2.7<L>  /  TBili  0.4  /  DBili  x   /  AST  18  /  ALT  7<L>  /  AlkPhos  125<H>  08-11        CAPILLARY BLOOD GLUCOSE      POCT Blood Glucose.: 114 mg/dL (09 Aug 2018 21:42)      RADIOLOGY & ADDITIONAL TESTS: Reviewed.

## 2018-08-11 NOTE — PROGRESS NOTE ADULT - PROBLEM SELECTOR PLAN 6
Patient nonverbal and AAOx0 at baseline. Will smile at provider and occasionally respond to provider but generally does not follow commands and nonverbal  -continue to monitor  -palliative following, recs appreciated. Patient's son confirmed full code status  -OOBTC with assistance if tolerated

## 2018-08-11 NOTE — PROGRESS NOTE ADULT - PROBLEM SELECTOR PLAN 3
Patient with history of paroxysmal a fib however not anticoagulated given hx of gastric avm on egd. Patient hypotensive on 8/3 and in afib with rvr, s/p 10 of lopressor, 5 of verapamil. Pt then was started on amiodarone infusion and Levophed was increased to 25 mcg.  Patient s/p cardioversion 2/2 increasing levophed requirements and hemodynamic instability. Patient now in sinus rhythm  and better BP control and s/p amio drip  -c/w oral amiodarone 400 mg bid   -c/w lopressor 12.5mg BID

## 2018-08-11 NOTE — PROGRESS NOTE ADULT - SUBJECTIVE AND OBJECTIVE BOX
Interventional, Pulmonary, Critical, Chest Special Procedures.    Pt was seen and fully examined by myself.     Time spent with patient in minutes:37    Patient is a 89y old  Female who presents with a chief complaint of shortness of breath (02 Aug 2018 15:19)The patient appears more comfortable, answers simple questions, no really engaging. The patient discussed in details c her son - physician.    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   Patient was recently discharged (7/20) after being treated with meropenem for proteus mirabilis and aerococcus urine UTI and additionally was treated for aspiration PNA.  Per documentation on previous admission discussion for PEG placement, son wanted it in setting of dysphagia she passed the modified barium swallow study and was to trial a liquid/puree diet. No hx of coughing after oral intake or choking , no hx of fever chills    In the ED, VS T 100.5, , BP 94/58, RR 24, SpO2 95% on RA. VBG significant for pH of 7.21, pCO2 98, pO2 79.  Pt placed on BiPAP. Repeat ABG after 90 minutes on BiPAP- pH 7.28, pCO2 70, pO2 66.  The decision was made to intubate in the ED. Central line was placed.   Vanc and meropenem and 1L NS. Tylenol 625mg.  LABS showing leukocytosis 18.2, Hgb 8.8. Bicarb 33, BUN 52, Cr 1.14 (baseline at discharge - BUN 7 Cr 0.61). UA (+) for UTI. Trop 0.05. BNP 60768.   EKG with q waves in lead III unchanged from prior EKG.   Chest xray with b/l pleural infiltrates-unchanged from previous imaging (02 Aug 2018 15:19)    REVIEW OF SYSTEMS:  ROS reviewed below with positive findings marked (+) :  GEN:  fever, chills ENT: tracheostomy,   epistaxis,  sinusitis COR: CAD, CHF,  HTN, dysrhythmia PUL: COPD, ILD, asthma, pneumonia GI: PEG, dysphagia, hemorrhage, other LUDWIN: kidney disease, electrolyte disorder HEM:  anemia, +thrombus, coagulopathy, cancer ENDO:  thyroid disease, diabetes mellitus CNS:  +dementia, stroke, seizure, PSY:  depression, anxiety, other  PAST MEDICAL & SURGICAL HISTORY:  Dementia  DVT (deep venous thrombosis)  Dysphagia  Anemia  DJD (degenerative joint disease)  Edema  IBS (irritable bowel syndrome)  Celiac disease  S/P IVC filter  H/O inguinal hernia repair  History of total left hip replacement    FAMILY HISTORY:  No pertinent family history in first degree relatives    SOCIAL HISTORY:      - Tobacco     - ETOH    Allergies    amoxicillin (Unknown)  Cipro (Unknown)  clindamycin (Unknown)  lactose (Unknown)  penicillin (Unknown)  Wheat (Unknown)    Intolerances      Vital Signs Last 24 Hrs  T(C): 36.7 (11 Aug 2018 09:17), Max: 37.3 (10 Aug 2018 22:00)  T(F): 98 (11 Aug 2018 09:17), Max: 99.1 (10 Aug 2018 22:00)  HR: 78 (11 Aug 2018 12:20) (68 - 108)  BP: 91/50 (11 Aug 2018 12:20) (91/50 - 127/55)  BP(mean): 66 (11 Aug 2018 12:20) (66 - 79)  RR: 16 (11 Aug 2018 12:20) (16 - 30)  SpO2: 97% (11 Aug 2018 12:20) (89% - 99%)    08-10 @ 07:01 - 08-11 @ 07:00  --------------------------------------------------------  IN: 1055 mL / OUT: 0 mL / NET: 1055 mL    08-11 @ 07:01  -  08-11 @ 14:02  --------------------------------------------------------  IN: 180 mL / OUT: 0 mL / NET: 180 mL        PHYSICAL EXAM:  GENERAL:         comfortable,  - distress.  HEENT:            - trauma,  - icterus,  - injection,  - nasal discharge.  NECK:              - jugular venous distention, - thyromegaly.  LYMPH:           - lymphadenopathy, - masses.  RESP:               + crackles,   - rhonchi,   - wheezes.   COR:                S1S2   - gallops,  - rubs.  ABD:                bowel sounds,   soft, - tender, - distended, PEG site is clean  EXT/MSC:         - cyanosis,  - clubbing,  - edema.    NEURO:             not engaging,   responds to stimuli.    DEVICES:  - DENTURES   +IV R / L     - ETUBE   -TRACH   -CTUBE  R / L      LABS:                          8.2    10.4  )-----------( 430      ( 11 Aug 2018 06:24 )             26.6     08-11    140  |  99  |  29<H>  ----------------------------<  116<H>  4.4   |  31  |  1.01    Ca    9.3      11 Aug 2018 06:26  Phos  2.7     08-11  Mg     2.1     08-11    TPro  6.8  /  Alb  2.7<L>  /  TBili  0.4  /  DBili  x   /  AST  18  /  ALT  7<L>  /  AlkPhos  125<H>  08-11      RADIOLOGY & ADDITIONAL STUDIES (The following images were personally reviewed):series reviewed

## 2018-08-11 NOTE — PROVIDER CONTACT NOTE (OTHER) - SITUATION
MD called to bedside, pt on NC desatted to 89 on NC Patient refused suctioning, MD made aware pt likes to remove NC. 97.8 T. MD aware.

## 2018-08-11 NOTE — PROGRESS NOTE ADULT - PROBLEM SELECTOR PLAN 1
Etiology likely secondary to aspiration pneumonia vs HAP. CXR showed b/l pleural infiltrates. Initial USG done at bedside showing RLL consolidation. In ED VBG significant for pH of 7.21, pCO2 98, pO2 79. Pt placed on BiPAP. Repeat ABG after 90 minutes on BiPAP- pH 7.28, pCO2 70, pO2 66; patient subsequently intubated in the ED. Patient extubated 8/7. Sputum cx grew staph aureus MSSA and is s/p 7 day treatment of IV cefazolin  -Patient desated to 89% overnight and received mucomyst and duonebs with resolution to 95%. Patient has been in no respiratory distress throughout day and satting well on 5L NC  -pulm following recs appreciated  -c/w duonebs   -c/w NC, can increase to HFNC if required    #Cardiomyopathy: Patient appears somewhat hypervolemic with bibasilar crackles on exam and b/l edema on lower extremity and dependent areas  -c/w IV lasix 20mg if BP can tolerate. Has been held today due to multiple low readings in the 80s-90s SBP  -repeat CXR 8/11 poor quality film, consider repeating

## 2018-08-11 NOTE — PROGRESS NOTE ADULT - ASSESSMENT
ASSESSMENT/PLAN    1) Status post respiratory failure  2) Septic shock  3) Pneumonia aspiration  4) Cardiomyopathy  5) Dementia    Satisfactory SpO2, continue NC, follow [id number]  Bronchodilators:  Atrovent/ albuterol q 4 – 6 hours as needed  ID/Antibiotics:  continue  MSSA in respiratory culture, blood cultures negative to date  Cardiac/HTN: off pressors as tolerated  GI: Rx/ prophylaxis c PPI/H2B  Heme: Rx/VT prophylaxis Heparin sq  Aspiration precautions at all times  Discussed with medical team ICU

## 2018-08-11 NOTE — PROVIDER CONTACT NOTE (OTHER) - ACTION/TREATMENT ORDERED:
MD to bedside.  Try nasal suction and chest PT, however patient will not allow it.  MD ordered one time dose mucomyst to help patient try and clear airways.  Will continue to monitor
MD at bedside, no nebulizer per MD . Face tent if needed,  at bedside if pt desats again

## 2018-08-11 NOTE — PROGRESS NOTE ADULT - PROBLEM SELECTOR PLAN 4
Patient with dysphagia and aspiration PNA. s/p PEG 8/6/18  -c/w Osmolite 1.2 @ 20cc/hr  -c/w aspiration precautions

## 2018-08-11 NOTE — PROGRESS NOTE ADULT - ASSESSMENT
89 y/o F with PMH dementia, recurrent UTI, Celiac' s disease, lactose intolerance, R. femoral thrombus s/p IVC filter 1/2017, chronic anemia, with recent discharge from [facility] for UTI and aspiration PNA presents from [facility] for respiratory distress with acute respiratory failure found to be in septic shock likely from aspiration PNA. Now more hemodynamically and clinically, continuing supplemental O2

## 2018-08-12 LAB
ANION GAP SERPL CALC-SCNC: 8 MMOL/L — SIGNIFICANT CHANGE UP (ref 5–17)
BUN SERPL-MCNC: 30 MG/DL — HIGH (ref 7–23)
CALCIUM SERPL-MCNC: 9 MG/DL — SIGNIFICANT CHANGE UP (ref 8.4–10.5)
CHLORIDE SERPL-SCNC: 99 MMOL/L — SIGNIFICANT CHANGE UP (ref 96–108)
CO2 SERPL-SCNC: 34 MMOL/L — HIGH (ref 22–31)
CREAT SERPL-MCNC: 1.04 MG/DL — SIGNIFICANT CHANGE UP (ref 0.5–1.3)
GLUCOSE SERPL-MCNC: 138 MG/DL — HIGH (ref 70–99)
HCT VFR BLD CALC: 24.6 % — LOW (ref 34.5–45)
HGB BLD-MCNC: 7.5 G/DL — LOW (ref 11.5–15.5)
MAGNESIUM SERPL-MCNC: 2.3 MG/DL — SIGNIFICANT CHANGE UP (ref 1.6–2.6)
MCHC RBC-ENTMCNC: 27 PG — SIGNIFICANT CHANGE UP (ref 27–34)
MCHC RBC-ENTMCNC: 30.5 G/DL — LOW (ref 32–36)
MCV RBC AUTO: 88.5 FL — SIGNIFICANT CHANGE UP (ref 80–100)
PLATELET # BLD AUTO: 467 K/UL — HIGH (ref 150–400)
POTASSIUM SERPL-MCNC: 5 MMOL/L — SIGNIFICANT CHANGE UP (ref 3.5–5.3)
POTASSIUM SERPL-SCNC: 5 MMOL/L — SIGNIFICANT CHANGE UP (ref 3.5–5.3)
RBC # BLD: 2.78 M/UL — LOW (ref 3.8–5.2)
RBC # FLD: 16.5 % — SIGNIFICANT CHANGE UP (ref 10.3–16.9)
SODIUM SERPL-SCNC: 141 MMOL/L — SIGNIFICANT CHANGE UP (ref 135–145)
WBC # BLD: 10.8 K/UL — HIGH (ref 3.8–10.5)
WBC # FLD AUTO: 10.8 K/UL — HIGH (ref 3.8–10.5)

## 2018-08-12 PROCEDURE — 71045 X-RAY EXAM CHEST 1 VIEW: CPT | Mod: 26

## 2018-08-12 PROCEDURE — 99233 SBSQ HOSP IP/OBS HIGH 50: CPT | Mod: GC

## 2018-08-12 RX ADMIN — AMIODARONE HYDROCHLORIDE 400 MILLIGRAM(S): 400 TABLET ORAL at 06:02

## 2018-08-12 RX ADMIN — HEPARIN SODIUM 5000 UNIT(S): 5000 INJECTION INTRAVENOUS; SUBCUTANEOUS at 06:03

## 2018-08-12 RX ADMIN — Medication 12.5 MILLIGRAM(S): at 17:43

## 2018-08-12 RX ADMIN — AMIODARONE HYDROCHLORIDE 400 MILLIGRAM(S): 400 TABLET ORAL at 17:32

## 2018-08-12 RX ADMIN — Medication 3 MILLILITER(S): at 22:42

## 2018-08-12 RX ADMIN — MIDODRINE HYDROCHLORIDE 10 MILLIGRAM(S): 2.5 TABLET ORAL at 06:05

## 2018-08-12 RX ADMIN — Medication 3 MILLILITER(S): at 00:28

## 2018-08-12 RX ADMIN — Medication 650 MILLIGRAM(S): at 09:26

## 2018-08-12 RX ADMIN — Medication 12.5 MILLIGRAM(S): at 06:04

## 2018-08-12 RX ADMIN — MIDODRINE HYDROCHLORIDE 10 MILLIGRAM(S): 2.5 TABLET ORAL at 22:42

## 2018-08-12 RX ADMIN — HEPARIN SODIUM 5000 UNIT(S): 5000 INJECTION INTRAVENOUS; SUBCUTANEOUS at 17:32

## 2018-08-12 RX ADMIN — POLYETHYLENE GLYCOL 3350 17 GRAM(S): 17 POWDER, FOR SOLUTION ORAL at 09:26

## 2018-08-12 RX ADMIN — Medication 650 MILLIGRAM(S): at 09:25

## 2018-08-12 RX ADMIN — Medication 3 MILLILITER(S): at 06:01

## 2018-08-12 RX ADMIN — Medication 3 MILLILITER(S): at 13:16

## 2018-08-12 RX ADMIN — Medication 100 MILLIGRAM(S): at 17:32

## 2018-08-12 RX ADMIN — Medication 3 MILLILITER(S): at 16:22

## 2018-08-12 RX ADMIN — Medication 3 MILLILITER(S): at 09:26

## 2018-08-12 RX ADMIN — Medication 100 MILLIGRAM(S): at 06:02

## 2018-08-12 RX ADMIN — PANTOPRAZOLE SODIUM 40 MILLIGRAM(S): 20 TABLET, DELAYED RELEASE ORAL at 06:06

## 2018-08-12 RX ADMIN — SENNA PLUS 10 MILLILITER(S): 8.6 TABLET ORAL at 22:42

## 2018-08-12 RX ADMIN — MIDODRINE HYDROCHLORIDE 10 MILLIGRAM(S): 2.5 TABLET ORAL at 13:16

## 2018-08-12 NOTE — PROGRESS NOTE ADULT - ASSESSMENT
89 y/o F with PMH dementia, recurrent UTI, Celiac' s disease, lactose intolerance, 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 for UTI and aspiration PNA presents from [facility] for respiratory distress with acute respiratory failure found to be in septic shock likely from aspiration PNA. Now more hemodynamically and clinically, continuing supplemental O2

## 2018-08-12 NOTE — PROGRESS NOTE ADULT - ASSESSMENT
89 y/o F with PMH dementia, recurrent UTI, Celiac' s disease, lactose intolerance, R. femoral thrombus s/p IVC filter 1/2017, chronic anemia, with recent discharge from [facility] for UTI and aspiration PNA presents from [facility] for respiratory distress with acute respiratory failure found to be in septic shock likely from aspiration PNA. Now more hemodynamically stable, saturating well on 5L nasal cannula.

## 2018-08-12 NOTE — PROGRESS NOTE ADULT - PROBLEM SELECTOR PLAN 1
Etiology likely secondary to aspiration pneumonia vs HAP. CXR showed b/l pleural infiltrates. Initial USG done at bedside showing RLL consolidation. In ED VBG significant for pH of 7.21, pCO2 98, pO2 79. Pt placed on BiPAP. Repeat ABG after 90 minutes on BiPAP- pH 7.28, pCO2 70, pO2 66; patient subsequently intubated in the ED. Patient extubated 8/7. Sputum cx grew staph aureus MSSA and is s/p 7 day treatment of IV cefazolin  -Patient saturating well on 5L NC  -pulm following recs appreciated  -c/w duonebs   -c/w NC, can increase to HFNC if required    #Cardiomyopathy: Patient appears somewhat hypervolemic with bibasilar crackles on exam and b/l edema on lower extremity and dependent areas  -c/w IV lasix 20mg if BP can tolerate. Has been held today due to multiple low readings in the 80s-90s SBP  -repeat CXR 8/11 poor quality film, consider repeating

## 2018-08-12 NOTE — PROGRESS NOTE ADULT - SUBJECTIVE AND OBJECTIVE BOX
Interventional, Pulmonary, Critical, Chest Special Procedures.    Pt was seen and fully examined by myself.     Time spent with patient in minutes:37    Patient is a 89y old  Female who presents with a chief complaint of shortness of breath (02 Aug 2018 15:19)The patient more awake and engaging today, appears eupneic when seen, rudimentary verbal response    HPI:  HX obtained from critical care team   HX provided by son : 89 y/o F with PMH dementia, baseline mental status AOX1,  recurrent UTI, celiac's disease, lactose intolerance, right femoral thrombus s/p IVC filter 1/2017, chronic anemia, with recent discharge from [facility] for UTI and aspiration PNA presents from [facility] for respiratory distress.   Patient was recently discharged (7/20) after being treated with meropenem for proteus mirabilis and aerococcus urine UTI and additionally was treated for aspiration PNA.  Per documentation on previous admission discussion for PEG placement, son wanted it in setting of dysphagia she passed the modified barium swallow study and was to trial a liquid/puree diet. No hx of coughing after oral intake or choking , no hx of fever chills    In the ED, VS T 100.5, , BP 94/58, RR 24, SpO2 95% on RA. VBG significant for pH of 7.21, pCO2 98, pO2 79.  Pt placed on BiPAP. Repeat ABG after 90 minutes on BiPAP- pH 7.28, pCO2 70, pO2 66.  The decision was made to intubate in the ED. Central line was placed.   Vanc and meropenem and 1L NS. Tylenol 625mg.  LABS showing leukocytosis 18.2, Hgb 8.8. Bicarb 33, BUN 52, Cr 1.14 (baseline at discharge - BUN 7 Cr 0.61). UA (+) for UTI. Trop 0.05. BNP 07688.   EKG with q waves in lead III unchanged from prior EKG.   Chest xray with b/l pleural infiltrates-unchanged from previous imaging (02 Aug 2018 15:19)    REVIEW OF SYSTEMS:  ROS reviewed below with positive findings marked (+) :  GEN:  fever, chills ENT: tracheostomy,   epistaxis,  sinusitis COR: CAD, CHF,  HTN, dysrhythmia PUL: COPD, ILD, asthma, pneumonia GI: PEG, dysphagia, hemorrhage, other LUDWIN: kidney disease, electrolyte disorder HEM:  anemia, +thrombus, coagulopathy, cancer ENDO:  thyroid disease, diabetes mellitus CNS:  +dementia, stroke, seizure, PSY:  depression, anxiety, other    PAST MEDICAL & SURGICAL HISTORY:  Dementia  DVT (deep venous thrombosis)  Dysphagia  Anemia  DJD (degenerative joint disease)  Edema  IBS (irritable bowel syndrome)  Celiac disease  S/P IVC filter  H/O inguinal hernia repair  History of total left hip replacement    FAMILY HISTORY:  No pertinent family history in first degree relatives    SOCIAL HISTORY:      - Tobacco     - ETOH    Allergies    amoxicillin (Unknown)  Cipro (Unknown)  clindamycin (Unknown)  lactose (Unknown)  penicillin (Unknown)  Wheat (Unknown)    Intolerances      Vital Signs Last 24 Hrs  T(C): 37 (12 Aug 2018 13:54), Max: 37.1 (12 Aug 2018 09:23)  T(F): 98.6 (12 Aug 2018 13:54), Max: 98.8 (12 Aug 2018 09:23)  HR: 76 (12 Aug 2018 16:24) (74 - 80)  BP: 95/49 (12 Aug 2018 16:24) (91/53 - 97/53)  BP(mean): 69 (12 Aug 2018 16:24) (66 - 70)  RR: 18 (12 Aug 2018 16:24) (16 - 19)  SpO2: 97% (12 Aug 2018 16:24) (94% - 97%)    08-11 @ 07:01 - 08-12 @ 07:00  --------------------------------------------------------  IN: 1045 mL / OUT: 0 mL / NET: 1045 mL    08-12 @ 07:01 - 08-12 @ 16:42  --------------------------------------------------------  IN: 270 mL / OUT: 0 mL / NET: 270 mL        PHYSICAL EXAM:  GENERAL:         comfortable,  - distress.  HEENT:            - trauma,  - icterus,  - injection,  - nasal discharge.  NECK:              - jugular venous distention, - thyromegaly.  LYMPH:           - lymphadenopathy, - masses.  RESP:               + crackles,   - rhonchi,   - wheezes.   COR:                S1S2   - gallops,  - rubs.  ABD:                bowel sounds,   soft, - tender, - distended, PEG site is clean  EXT/MSC:         - cyanosis,  - clubbing,  - edema.    NEURO:             not engaging,   responds to stimuli.    DEVICES:  - DENTURES   +IV R / L     - ETUBE   -TRACH   -CTUBE  R / L      LABS:                          7.5    10.8  )-----------( 467      ( 12 Aug 2018 10:54 )             24.6     08-12    141  |  99  |  30<H>  ----------------------------<  138<H>  5.0   |  34<H>  |  1.04    Ca    9.0      12 Aug 2018 10:54  Phos  2.7     08-11  Mg     2.3     08-12    TPro  6.8  /  Alb  2.7<L>  /  TBili  0.4  /  DBili  x   /  AST  18  /  ALT  7<L>  /  AlkPhos  125<H>  08-11      RADIOLOGY & ADDITIONAL STUDIES (The following images were personally reviewed):CXR series

## 2018-08-12 NOTE — PROGRESS NOTE ADULT - SUBJECTIVE AND OBJECTIVE BOX
INTERVAL HPI/OVERNIGHT EVENTS:  Patient was seen and examined at bedside. Unable to elicit information from patient as she has dementia.     VITAL SIGNS:  T(F): 98.6 (08-12-18 @ 13:54)  HR: 76 (08-12-18 @ 16:24)  BP: 95/49 (08-12-18 @ 16:24)  RR: 18 (08-12-18 @ 16:24)  SpO2: 97% (08-12-18 @ 16:24)  Wt(kg): --    PHYSICAL EXAM:    General: Patient awake and alert but not responding to commands. Tachypneic but not using respiratory accessory muscles.  HEENT: PERRL, anicteric sclera; MMM  Neck: supple  Cardiovascular: +S1/S2, RRR  Respiratory:  Crackles appreciated over b/l bases.  no w/r/r  Gastrointestinal: soft, NT/ND; +BSx4  Extremities: b/l extremities with 2+ pitting edema, R>L leg. No erythema, no cyanosis b/l.  Vascular: 2+ radial pulses, 1+ dorsalis pedis pulses B/L  Neurological: Limited due to patient's condition. Awake and alert but not oriented to person, place, or time. Patient mostly nonverbal but will smille and track.    MEDICATIONS  (STANDING):  acetaminophen    Suspension. 650 milliGRAM(s) Oral daily  ALBUTerol/ipratropium for Nebulization 3 milliLiter(s) Nebulizer every 4 hours  amiodarone    Tablet 400 milliGRAM(s) Oral two times a day  chlorhexidine 2% Cloths 1 Application(s) Topical daily  docusate sodium Liquid 100 milliGRAM(s) Oral two times a day  heparin  Injectable 5000 Unit(s) SubCutaneous every 12 hours  metoprolol tartrate 12.5 milliGRAM(s) Oral every 12 hours  midodrine 10 milliGRAM(s) Oral every 8 hours  pantoprazole   Suspension 40 milliGRAM(s) Oral before breakfast  polyethylene glycol 3350 17 Gram(s) Oral daily  senna Syrup 10 milliLiter(s) Oral at bedtime    MEDICATIONS  (PRN):      Allergies    amoxicillin (Unknown)  Cipro (Unknown)  clindamycin (Unknown)  lactose (Unknown)  penicillin (Unknown)  Wheat (Unknown)    Intolerances        LABS:                        7.5    10.8  )-----------( 467      ( 12 Aug 2018 10:54 )             24.6     08-12    141  |  99  |  30<H>  ----------------------------<  138<H>  5.0   |  34<H>  |  1.04    Ca    9.0      12 Aug 2018 10:54  Phos  2.7     08-11  Mg     2.3     08-12    TPro  6.8  /  Alb  2.7<L>  /  TBili  0.4  /  DBili  x   /  AST  18  /  ALT  7<L>  /  AlkPhos  125<H>  08-11          RADIOLOGY & ADDITIONAL TESTS:  Reviewed

## 2018-08-12 NOTE — PROGRESS NOTE ADULT - ASSESSMENT
ASSESSMENT/PLAN    1) Status post respiratory failure  2) Septic shock  3) Pneumonia aspiration  4) Cardiomyopathy  5) Dementia  6) Anemia    Satisfactory SpO2, continue NC, follow [id number]  Bronchodilators:  Atrovent/ albuterol q 4 – 6 hours as needed  ID/Antibiotics:  continue  MSSA in respiratory culture, blood cultures negative to date  Cardiac/HTN: off pressors as tolerated  GI: Rx/ prophylaxis c PPI/H2B  Heme: Rx/VT prophylaxis Heparin sq, follow H/H closely  Aspiration precautions at all times  Discussed with medical team ICU

## 2018-08-13 DIAGNOSIS — I48.0 PAROXYSMAL ATRIAL FIBRILLATION: ICD-10-CM

## 2018-08-13 LAB
ANION GAP SERPL CALC-SCNC: 8 MMOL/L — SIGNIFICANT CHANGE UP (ref 5–17)
BLD GP AB SCN SERPL QL: NEGATIVE — SIGNIFICANT CHANGE UP
BUN SERPL-MCNC: 31 MG/DL — HIGH (ref 7–23)
CALCIUM SERPL-MCNC: 9.2 MG/DL — SIGNIFICANT CHANGE UP (ref 8.4–10.5)
CHLORIDE SERPL-SCNC: 99 MMOL/L — SIGNIFICANT CHANGE UP (ref 96–108)
CO2 SERPL-SCNC: 35 MMOL/L — HIGH (ref 22–31)
CREAT SERPL-MCNC: 1.04 MG/DL — SIGNIFICANT CHANGE UP (ref 0.5–1.3)
GLUCOSE SERPL-MCNC: 102 MG/DL — HIGH (ref 70–99)
HCT VFR BLD CALC: 25.1 % — LOW (ref 34.5–45)
HGB BLD-MCNC: 7.6 G/DL — LOW (ref 11.5–15.5)
MAGNESIUM SERPL-MCNC: 2.4 MG/DL — SIGNIFICANT CHANGE UP (ref 1.6–2.6)
MCHC RBC-ENTMCNC: 27.6 PG — SIGNIFICANT CHANGE UP (ref 27–34)
MCHC RBC-ENTMCNC: 30.3 G/DL — LOW (ref 32–36)
MCV RBC AUTO: 91.3 FL — SIGNIFICANT CHANGE UP (ref 80–100)
PLATELET # BLD AUTO: 511 K/UL — HIGH (ref 150–400)
POTASSIUM SERPL-MCNC: 5.1 MMOL/L — SIGNIFICANT CHANGE UP (ref 3.5–5.3)
POTASSIUM SERPL-SCNC: 5.1 MMOL/L — SIGNIFICANT CHANGE UP (ref 3.5–5.3)
RBC # BLD: 2.75 M/UL — LOW (ref 3.8–5.2)
RBC # FLD: 17.1 % — HIGH (ref 10.3–16.9)
RH IG SCN BLD-IMP: POSITIVE — SIGNIFICANT CHANGE UP
SODIUM SERPL-SCNC: 142 MMOL/L — SIGNIFICANT CHANGE UP (ref 135–145)
WBC # BLD: 7 K/UL — SIGNIFICANT CHANGE UP (ref 3.8–10.5)
WBC # FLD AUTO: 7 K/UL — SIGNIFICANT CHANGE UP (ref 3.8–10.5)

## 2018-08-13 PROCEDURE — 99232 SBSQ HOSP IP/OBS MODERATE 35: CPT

## 2018-08-13 PROCEDURE — 99222 1ST HOSP IP/OBS MODERATE 55: CPT

## 2018-08-13 PROCEDURE — 71045 X-RAY EXAM CHEST 1 VIEW: CPT | Mod: 26

## 2018-08-13 RX ORDER — AMIODARONE HYDROCHLORIDE 400 MG/1
200 TABLET ORAL
Qty: 0 | Refills: 0 | Status: DISCONTINUED | OUTPATIENT
Start: 2018-08-14 | End: 2018-08-14

## 2018-08-13 RX ORDER — FUROSEMIDE 40 MG
20 TABLET ORAL ONCE
Qty: 0 | Refills: 0 | Status: COMPLETED | OUTPATIENT
Start: 2018-08-13 | End: 2018-08-13

## 2018-08-13 RX ORDER — AMIODARONE HYDROCHLORIDE 400 MG/1
400 TABLET ORAL ONCE
Qty: 0 | Refills: 0 | Status: COMPLETED | OUTPATIENT
Start: 2018-08-13 | End: 2018-08-13

## 2018-08-13 RX ADMIN — Medication 3 MILLILITER(S): at 00:30

## 2018-08-13 RX ADMIN — Medication 650 MILLIGRAM(S): at 09:01

## 2018-08-13 RX ADMIN — SENNA PLUS 10 MILLILITER(S): 8.6 TABLET ORAL at 21:51

## 2018-08-13 RX ADMIN — HEPARIN SODIUM 5000 UNIT(S): 5000 INJECTION INTRAVENOUS; SUBCUTANEOUS at 16:51

## 2018-08-13 RX ADMIN — Medication 12.5 MILLIGRAM(S): at 07:41

## 2018-08-13 RX ADMIN — Medication 3 MILLILITER(S): at 08:58

## 2018-08-13 RX ADMIN — HEPARIN SODIUM 5000 UNIT(S): 5000 INJECTION INTRAVENOUS; SUBCUTANEOUS at 07:42

## 2018-08-13 RX ADMIN — Medication 20 MILLIGRAM(S): at 08:58

## 2018-08-13 RX ADMIN — POLYETHYLENE GLYCOL 3350 17 GRAM(S): 17 POWDER, FOR SOLUTION ORAL at 12:46

## 2018-08-13 RX ADMIN — Medication 3 MILLILITER(S): at 07:41

## 2018-08-13 RX ADMIN — MIDODRINE HYDROCHLORIDE 10 MILLIGRAM(S): 2.5 TABLET ORAL at 16:35

## 2018-08-13 RX ADMIN — PANTOPRAZOLE SODIUM 40 MILLIGRAM(S): 20 TABLET, DELAYED RELEASE ORAL at 07:41

## 2018-08-13 RX ADMIN — Medication 3 MILLILITER(S): at 21:50

## 2018-08-13 RX ADMIN — Medication 100 MILLIGRAM(S): at 07:41

## 2018-08-13 RX ADMIN — AMIODARONE HYDROCHLORIDE 400 MILLIGRAM(S): 400 TABLET ORAL at 07:41

## 2018-08-13 RX ADMIN — MIDODRINE HYDROCHLORIDE 10 MILLIGRAM(S): 2.5 TABLET ORAL at 21:50

## 2018-08-13 RX ADMIN — Medication 100 MILLIGRAM(S): at 16:51

## 2018-08-13 RX ADMIN — Medication 3 MILLILITER(S): at 16:35

## 2018-08-13 RX ADMIN — MIDODRINE HYDROCHLORIDE 10 MILLIGRAM(S): 2.5 TABLET ORAL at 07:41

## 2018-08-13 RX ADMIN — Medication 3 MILLILITER(S): at 12:46

## 2018-08-13 RX ADMIN — AMIODARONE HYDROCHLORIDE 400 MILLIGRAM(S): 400 TABLET ORAL at 16:51

## 2018-08-13 RX ADMIN — CHLORHEXIDINE GLUCONATE 1 APPLICATION(S): 213 SOLUTION TOPICAL at 07:41

## 2018-08-13 NOTE — PROGRESS NOTE ADULT - PROBLEM SELECTOR PLAN 6
Patient nonverbal and AAOx0 at baseline. Will smile at provider and occasionally respond to provider but generally does not follow commands   -continue to monitor  -palliative following, recs appreciated. Patient's son confirmed full code status  -OOBTC with assistance if tolerated. Patient nonverbal and AAOx0 at baseline. Will smile at provider and occasionally respond to provider but generally does not follow commands   -continue to monitor  -palliative following, recs appreciated. Patient's son confirmed full code status  -OOBTC with assistance if tolerated.    #urinary incontinence  -likely 2/2 dementia

## 2018-08-13 NOTE — CONSULT NOTE ADULT - SUBJECTIVE AND OBJECTIVE BOX
CHIEF COMPLAINT:    HPI:  89F w/PMHx dementia (baseline AOX1), recurrent UTI, Celiac dz, R femoral DVT s/p IVC filter (1/2017), chronic anemia, with recent hosp in [facility] for UTI and aspiration PNA sent in from [facility] for respiratory distress/acute resp failure, found to be in septic shock and acute hypercapneic resp failure 2/2 likely asp PNA. Patient was recently discharged (7/20) after being treated with meropenem for proteus mirabilis and aerococcus urine UTI and additionally was treated for aspiration PNA. Per documentation on previous admission discussion for PEG placement, son wanted it in setting of dysphagia, though she passed the MBS and was started on a liquid/puree diet. No hx of coughing after oral intake or choking , no hx of fever chills    In the ED, VS T 100.5, , BP 94/58, RR 24, SpO2 95% on RA. VBG significant for pH of 7.21, pCO2 98, pO2 79.  Pt placed on BiPAP for acute hypercapneic resp failure. Repeat ABG after 90 minutes on BiPAP- pH 7.28, pCO2 70, pO2 66.  The decision was made to intubate in the ED. Central line was placed.   Vanc and meropenem and 1L NS. Tylenol 625mg.  LABS showing leukocytosis 18.2, Hgb 8.8. Bicarb 33, BUN 52, Cr 1.14 (baseline at discharge - BUN 7 Cr 0.61). UA (+) for UTI. Trop 0.05. BNP 32701.  EKG with q waves in lead III unchanged from prior EKG.   Chest xray with b/l pleural infiltrates-unchanged from previous imaging (02 Aug 2018 15:19)    Interval History:  Sputum cx w/MSSA, now s/p 7d course cefazolin  Off levo since 8/8, on midodrine 10 TID.  Extubated 8/7 to face tent. Still w/hypoxic resp failure 2/2 asp PNA and mild vol overload, attempting to diurese. Now satting well on 5LNC  Hosp course c/b dev of new afib w/RVR w/HD instability while in MICU, converted to NSR after receiving amio and getting shocked x1. Currently on amio 400 BID, lopressor 12.5 BID. Not on A/C due to h/o gastric AVM  PEG tube placed 8/6. has been tolerating TF.    PAST MEDICAL & SURGICAL HISTORY:  Dementia  DVT (deep venous thrombosis)  Dysphagia  Anemia  DJD (degenerative joint disease)  Edema  IBS (irritable bowel syndrome)  Celiac disease  S/P IVC filter  H/O inguinal hernia repair  History of total left hip replacement    [ ] Diabetes   [ ] Hypertension  [ ] Hyperlipidemia  [ ] CAD  [ ] PCI  [ ] CABG    PREVIOUS DIAGNOSTIC TESTING:    [ ] Echocardiogram:  [ ] Stress Test:  [ ] Catheterization: 	    FAMILY HISTORY:  No pertinent family history in first degree relatives    SOCIAL HISTORY:    [ ] Non-smoker  [ ] Current Smoker  [ ] Former Smoker  [ ] Alcohol Use  [ ] Drug Use    ALLERGIES/INTOLERANCES:  amoxicillin (Unknown)  Cipro (Unknown)  clindamycin (Unknown)  lactose (Unknown)  penicillin (Unknown)  Wheat (Unknown)    HOME MEDICATIONS:    INPATIENT MEDICATIONS:  amiodarone    Tablet 400 milliGRAM(s) Oral two times a day  metoprolol tartrate 12.5 milliGRAM(s) Oral every 12 hours  midodrine 10 milliGRAM(s) Oral every 8 hours    heparin  Injectable 5000 Unit(s) SubCutaneous every 12 hours    acetaminophen    Suspension. 650 milliGRAM(s) Oral daily  ALBUTerol/ipratropium for Nebulization 3 milliLiter(s) Nebulizer every 4 hours  chlorhexidine 2% Cloths 1 Application(s) Topical daily  docusate sodium Liquid 100 milliGRAM(s) Oral two times a day  pantoprazole   Suspension 40 milliGRAM(s) Oral before breakfast  polyethylene glycol 3350 17 Gram(s) Oral daily  senna Syrup 10 milliLiter(s) Oral at bedtime      REVIEW OF SYSTEMS:  [ ] All other review of systems are negative unless indicated above.  [ ] Unable to obtain due to:    PHYSICAL EXAM:    T(C): 37.1 (08-13-18 @ 10:38), Max: 37.1 (08-13-18 @ 10:38)  HR: 66 (08-13-18 @ 08:11) (66 - 76)  BP: 103/56 (08-13-18 @ 08:11) (91/53 - 103/56)  RR: 16 (08-13-18 @ 08:11) (16 - 18)  SpO2: 99% (08-13-18 @ 08:11) (94% - 99%)  Wt(kg): --    I&O's Summary    12 Aug 2018 07:01  -  13 Aug 2018 07:00  --------------------------------------------------------  IN: 1035 mL / OUT: 0 mL / NET: 1035 mL    GENERAL: NAD, well-developed  HEAD:  NCAT  HEENT: EOMI, PERRL, conjunctiva and sclera clear; moist mucosa; Neck supple, No JVD  CARDIOVASCULAR: RRR, normal S1 S2, no M/R/G, no JVD, neg HJR, nondisplaced PMI, no LE edema  RESPIRATORY: Lungs clear to auscultation b/l, no C/W/R  GASTROINTESTINAL: +BS, soft, non-distended, non-tender, no HSM  VASCULAR: Peripheral pulses palpable 2+ bilaterally  EXTREMITIES: Warm. No clubbing, cyanosis or edema. Normal range of motion.  SKIN: No rashes, lesions, ecchymoses, or cyanosis  NEURO: AAOx3, no focal deficits  PSYCH: Nl behavior, nl affect  LINES:    TELEMETRY: 	      ECG:  	  	  LABS:                        7.6    7.0   )-----------( 511      ( 13 Aug 2018 07:37 )             25.1     08-13    142  |  99  |  31<H>  ----------------------------<  102<H>  5.1   |  35<H>  |  1.04    Ca    9.2      13 Aug 2018 07:36  Mg     2.4     08-13        Lipid Profile:   HgA1c:   TSH:     CARDIAC MARKERS:          proBNP:     RADIOLOGY: CHIEF COMPLAINT:    HPI:  89F w/PMHx dementia (baseline AOX1), recurrent UTI, Celiac dz, R femoral DVT s/p IVC filter (1/2017), chronic anemia, with recent hosp in [facility] for UTI and aspiration PNA sent in from [facility] for respiratory distress/acute resp failure, found to be in septic shock and acute hypercapneic resp failure 2/2 likely asp PNA. Patient was recently discharged (7/20) after being treated with meropenem for proteus mirabilis and aerococcus urine UTI and additionally was treated for aspiration PNA. Per documentation on previous admission discussion for PEG placement, son wanted it in setting of dysphagia, though she passed the MBS and was started on a liquid/puree diet. No hx of coughing after oral intake or choking , no hx of fever chills    In the ED, VS T 100.5, , BP 94/58, RR 24, SpO2 95% on RA. VBG significant for pH of 7.21, pCO2 98, pO2 79.  Pt placed on BiPAP for acute hypercapneic resp failure. Repeat ABG after 90 minutes on BiPAP- pH 7.28, pCO2 70, pO2 66.  The decision was made to intubate in the ED. Central line was placed.   Vanc and meropenem and 1L NS. Tylenol 625mg.  LABS showing leukocytosis 18.2, Hgb 8.8. Bicarb 33, BUN 52, Cr 1.14 (baseline at discharge - BUN 7 Cr 0.61). UA (+) for UTI. Trop 0.05. BNP 41876.  EKG with q waves in lead III unchanged from prior EKG.   Chest xray with b/l pleural infiltrates-unchanged from previous imaging (02 Aug 2018 15:19)    Interval History:  Sputum cx w/MSSA, now s/p 7d course cefazolin  Off levo since 8/8, on midodrine 10 TID.  Extubated 8/7 to face tent. Still w/hypoxic resp failure 2/2 asp PNA and mild vol overload, attempting to diurese. Now satting well on 5LNC  Hosp course c/b dev of new afib w/RVR w/HD instability while in MICU, converted to NSR after receiving amio and getting shocked x1. Currently on amio 400 BID, lopressor 12.5 BID. Not on A/C due to h/o gastric AVM  PEG tube placed 8/6. has been tolerating TF.    PAST MEDICAL & SURGICAL HISTORY:  Dementia  DVT (deep venous thrombosis)  Dysphagia  Anemia  DJD (degenerative joint disease)  Edema  IBS (irritable bowel syndrome)  Celiac disease  S/P IVC filter  H/O inguinal hernia repair  History of total left hip replacement    [ ] Diabetes   [ ] Hypertension  [ ] Hyperlipidemia  [ ] CAD  [ ] PCI  [ ] CABG    PREVIOUS DIAGNOSTIC TESTING:    [x] Echocardiogram:  TTE (7/16/2018): Mild concentric LVH, no WMA, EF 65-70%, new mild AS (1.7cm2, mean gradient 19mmHg), PASP 61mmHg  [ ] Stress Test:  [ ] Catheterization: 	    FAMILY HISTORY:  No pertinent family history in first degree relatives    SOCIAL HISTORY:    [ ] Non-smoker  [ ] Current Smoker  [ ] Former Smoker  [ ] Alcohol Use  [ ] Drug Use    ALLERGIES/INTOLERANCES:  amoxicillin (Unknown)  Cipro (Unknown)  clindamycin (Unknown)  lactose (Unknown)  penicillin (Unknown)  Wheat (Unknown)    HOME MEDICATIONS:    INPATIENT MEDICATIONS:  amiodarone    Tablet 400 milliGRAM(s) Oral two times a day  metoprolol tartrate 12.5 milliGRAM(s) Oral every 12 hours  midodrine 10 milliGRAM(s) Oral every 8 hours    heparin  Injectable 5000 Unit(s) SubCutaneous every 12 hours    acetaminophen    Suspension. 650 milliGRAM(s) Oral daily  ALBUTerol/ipratropium for Nebulization 3 milliLiter(s) Nebulizer every 4 hours  chlorhexidine 2% Cloths 1 Application(s) Topical daily  docusate sodium Liquid 100 milliGRAM(s) Oral two times a day  pantoprazole   Suspension 40 milliGRAM(s) Oral before breakfast  polyethylene glycol 3350 17 Gram(s) Oral daily  senna Syrup 10 milliLiter(s) Oral at bedtime      REVIEW OF SYSTEMS:  [ ] All other review of systems are negative unless indicated above.  [ ] Unable to obtain due to:    PHYSICAL EXAM:    T(C): 37.1 (08-13-18 @ 10:38), Max: 37.1 (08-13-18 @ 10:38)  HR: 66 (08-13-18 @ 08:11) (66 - 76)  BP: 103/56 (08-13-18 @ 08:11) (91/53 - 103/56)  RR: 16 (08-13-18 @ 08:11) (16 - 18)  SpO2: 99% (08-13-18 @ 08:11) (94% - 99%)  Wt(kg): --    I&O's Summary    12 Aug 2018 07:01  -  13 Aug 2018 07:00  --------------------------------------------------------  IN: 1035 mL / OUT: 0 mL / NET: 1035 mL    GENERAL: NAD, well-developed  HEAD:  NCAT  HEENT: EOMI, PERRL, conjunctiva and sclera clear; moist mucosa; Neck supple, No JVD  CARDIOVASCULAR: RRR, normal S1 S2, no M/R/G, no JVD, neg HJR, nondisplaced PMI, no LE edema  RESPIRATORY: Lungs clear to auscultation b/l, no C/W/R  GASTROINTESTINAL: +BS, soft, non-distended, non-tender, no HSM  VASCULAR: Peripheral pulses palpable 2+ bilaterally  EXTREMITIES: Warm. No clubbing, cyanosis or edema. Normal range of motion.  SKIN: No rashes, lesions, ecchymoses, or cyanosis  NEURO: AAOx3, no focal deficits  PSYCH: Nl behavior, nl affect  LINES:    TELEMETRY: 	      ECG:  	  EKG (8/6/2018): NSR@81, normal axis, normal intervals, nonspec ST-TW changes (Q-wave in III, TWI in III, V1 and V3, TWF in aVF). No sig changes from prior  	  LABS:                        7.6    7.0   )-----------( 511      ( 13 Aug 2018 07:37 )             25.1     08-13    142  |  99  |  31<H>  ----------------------------<  102<H>  5.1   |  35<H>  |  1.04    Ca    9.2      13 Aug 2018 07:36  Mg     2.4     08-13        Lipid Profile:   HgA1c:   TSH:     CARDIAC MARKERS:          proBNP:     RADIOLOGY:  CXR (8/13): On personal read, poorly positioned, however appears to have interval improvement of pulm venous congestion, however still w/poor visualization of b/l CPA consistent with persistent b/l PLEF CHIEF COMPLAINT:    HPI:  89F w/PMHx dementia (baseline AOX1), recurrent UTI, Celiac dz, R femoral DVT s/p IVC filter (1/2017), chronic anemia, with recent hosp in [facility] for UTI and aspiration PNA sent in from [facility] for respiratory distress/acute resp failure, found to be in septic shock and acute hypercapneic resp failure 2/2 likely asp PNA. Patient was recently discharged (7/20) after being treated with meropenem for proteus mirabilis and aerococcus urine UTI and additionally was treated for aspiration PNA. Per documentation on previous admission discussion for PEG placement, son wanted it in setting of dysphagia, though she passed the MBS and was started on a liquid/puree diet. No hx of coughing after oral intake or choking , no hx of fever chills    In the ED, VS T 100.5, , BP 94/58, RR 24, SpO2 95% on RA. VBG significant for pH of 7.21, pCO2 98, pO2 79.  Pt placed on BiPAP for acute hypercapneic resp failure. Repeat ABG after 90 minutes on BiPAP- pH 7.28, pCO2 70, pO2 66.  The decision was made to intubate in the ED. Central line was placed.   Vanc and meropenem and 1L NS. Tylenol 625mg.  LABS showing leukocytosis 18.2, Hgb 8.8. Bicarb 33, BUN 52, Cr 1.14 (baseline at discharge - BUN 7 Cr 0.61). UA (+) for UTI. Trop 0.05. BNP 36319.  EKG with q waves in lead III unchanged from prior EKG.   Chest xray with b/l pleural infiltrates-unchanged from previous imaging (02 Aug 2018 15:19)    Interval History:  Sputum cx w/MSSA, now s/p 7d course cefazolin  Off levo since 8/8, on midodrine 10 TID.  Extubated 8/7 to face tent. Still w/hypoxic resp failure 2/2 asp PNA and mild vol overload, attempting to diurese. Now satting well on 5LNC  Hosp course c/b dev of new afib w/RVR w/HD instability while in MICU, converted to NSR after receiving amio and getting shocked x1. Currently on amio 400 BID, lopressor 12.5 BID. Not on A/C due to h/o gastric AVM  PEG tube placed 8/6. has been tolerating TF.    PAST MEDICAL & SURGICAL HISTORY:  Dementia  DVT (deep venous thrombosis)  Dysphagia  Anemia  DJD (degenerative joint disease)  Edema  IBS (irritable bowel syndrome)  Celiac disease  S/P IVC filter  H/O inguinal hernia repair  History of total left hip replacement    [ ] Diabetes   [ ] Hypertension  [ ] Hyperlipidemia  [ ] CAD  [ ] PCI  [ ] CABG    PREVIOUS DIAGNOSTIC TESTING:    [x] Echocardiogram:  TTE (7/16/2018): Mild concentric LVH, no WMA, EF 65-70%, new mild AS (1.7cm2, mean gradient 19mmHg), PASP 61mmHg  [ ] Stress Test:  [ ] Catheterization: 	    FAMILY HISTORY:  No pertinent family history in first degree relatives    SOCIAL HISTORY:    [ ] Non-smoker  [ ] Current Smoker  [ ] Former Smoker  [ ] Alcohol Use  [ ] Drug Use    ALLERGIES/INTOLERANCES:  amoxicillin (Unknown)  Cipro (Unknown)  clindamycin (Unknown)  lactose (Unknown)  penicillin (Unknown)  Wheat (Unknown)    HOME MEDICATIONS:    INPATIENT MEDICATIONS:  amiodarone    Tablet 400 milliGRAM(s) Oral two times a day  metoprolol tartrate 12.5 milliGRAM(s) Oral every 12 hours  midodrine 10 milliGRAM(s) Oral every 8 hours    heparin  Injectable 5000 Unit(s) SubCutaneous every 12 hours    acetaminophen    Suspension. 650 milliGRAM(s) Oral daily  ALBUTerol/ipratropium for Nebulization 3 milliLiter(s) Nebulizer every 4 hours  chlorhexidine 2% Cloths 1 Application(s) Topical daily  docusate sodium Liquid 100 milliGRAM(s) Oral two times a day  pantoprazole   Suspension 40 milliGRAM(s) Oral before breakfast  polyethylene glycol 3350 17 Gram(s) Oral daily  senna Syrup 10 milliLiter(s) Oral at bedtime      REVIEW OF SYSTEMS:  [ ] All other review of systems are negative unless indicated above.  [ ] Unable to obtain due to:    PHYSICAL EXAM:    T(C): 37.1 (08-13-18 @ 10:38), Max: 37.1 (08-13-18 @ 10:38)  HR: 66 (08-13-18 @ 08:11) (66 - 76)  BP: 103/56 (08-13-18 @ 08:11) (91/53 - 103/56)  RR: 16 (08-13-18 @ 08:11) (16 - 18)  SpO2: 99% (08-13-18 @ 08:11) (94% - 99%)  Wt(kg): --    I&O's Summary    12 Aug 2018 07:01  -  13 Aug 2018 07:00  --------------------------------------------------------  IN: 1035 mL / OUT: 0 mL / NET: 1035 mL    GENERAL: NAD, well-developed  HEAD:  NCAT  HEENT: EOMI, PERRL, conjunctiva and sclera clear; dry mucosa; Neck supple, No JVD  CARDIOVASCULAR: RRR, normal S1 S2, no M/R/G, no JVD, neg HJR, nondisplaced PMI, no LE edema  RESPIRATORY: Bibasilar crackles  GASTROINTESTINAL: +BS, soft, non-distended, non-tender, no HSM  VASCULAR: Peripheral pulses palpable 2+ bilaterally  EXTREMITIES: Warm. No clubbing, cyanosis. Trace edema.  SKIN: No rashes, lesions, ecchymoses, or cyanosis  NEURO: AAOx3, no focal deficits  PSYCH: Nl behavior, nl affect  LINES:    TELEMETRY: Not on tele    ECG:  	  No EKG to review from today  EKG (8/6/2018): NSR@81, normal axis, normal intervals, nonspec ST-TW changes (Q-wave in III, TWI in III, V1 and V3, TWF in aVF). No sig changes from prior  	  LABS:                        7.6    7.0   )-----------( 511      ( 13 Aug 2018 07:37 )             25.1     08-13    142  |  99  |  31<H>  ----------------------------<  102<H>  5.1   |  35<H>  |  1.04    Ca    9.2      13 Aug 2018 07:36  Mg     2.4     08-13        Lipid Profile:   HgA1c:   TSH:     CARDIAC MARKERS:          proBNP:     RADIOLOGY:  CXR (8/13): On personal read, poorly positioned, however appears to have interval improvement of pulm venous congestion, however still w/poor visualization of b/l CPA consistent with persistent b/l PLEF

## 2018-08-13 NOTE — PROGRESS NOTE ADULT - ASSESSMENT
A - dementia/atrial fibrillation/ aspiration pneumonia/GI AVM/anemia/hypotension    Suggest:    continue off ABX, culture for spike  dc lopressor  cardiol to see per sons request  amio dose to be decreased  feeds as ordered  transfuse for hgb<7  transfer to floor

## 2018-08-13 NOTE — PROGRESS NOTE ADULT - ASSESSMENT
89 y/o F with PMH dementia, recurrent UTI, Celiac' s disease, lactose intolerance, 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 for UTI and aspiration PNA presents from [facility] for respiratory distress with acute respiratory failure found to be in septic shock likely from aspiration PNA. Now more hemodynamically stable, saturating well on 5L nasal cannula.

## 2018-08-13 NOTE — PROGRESS NOTE ADULT - PROBLEM SELECTOR PLAN 3
No AC given hx of gastric avm on egd. Patient hypotensive on 8/3 and in afib with rvr, s/p 10 of lopressor, 5 of verapamil. Pt then was started on amiodarone infusion and Levophed was increased to 25 mcg.  Patient s/p cardioversion 2/2 increasing levophed requirements and hemodynamic instability. Patient now in sinus rhythm  and better BP control and s/p amio drip  -c/w oral amiodarone 400 mg bid. Starting 8/14, 200mg BID  -d/c lopressor per Dr. desai No AC given hx of gastric avm on egd. Patient hypotensive on 8/3 and in afib with rvr, s/p 10 of lopressor, 5 of verapamil. Pt then was started on amiodarone infusion and Levophed was increased to 25 mcg.  Patient s/p cardioversion 2/2 increasing levophed requirements and hemodynamic instability. Patient now in sinus rhythm  and better BP control and s/p amio drip  -c/w oral amiodarone 400 mg bid. Starting 8/14, 200mg BID  -d/c lopressor per Dr. desai    #Anemia  -2/2 chronic disease  -transfuse to Hb goal of 7.  -maintain active T&S (last done 8/13/18)

## 2018-08-13 NOTE — PROGRESS NOTE ADULT - ATTENDING COMMENTS
Patient seen and examined with house-staff during bedside rounds  Resident note read, including vitals, physical findings, laboratory data, and radiological reports.   Revisions included below.  Case discussed with House staff  Direct personal management at bedside  and extensive interpretation of data. Decision making of high complexity. Sepsis resolve  Anemia 7.6--chr  O2 taper if possibel

## 2018-08-13 NOTE — CONSULT NOTE ADULT - ASSESSMENT
89F w/PMHx dementia (baseline AOX1), recurrent UTI, Celiac dz, R femoral DVT s/p IVC filter (1/2017), chronic anemia, with recent hosp in [facility] for UTI and aspiration PNA sent in from [facility] for respiratory distress/acute resp failure, found to be in septic shock and acute hypercapneic resp failure 2/2 likely asp PNA. Hosp course c/b afib w/RVR w/HD instability req electrical cardioversion, now on amio and lopressor in NSR. 89F w/PMHx dementia (baseline AOX1), recurrent UTI, Celiac dz, R femoral DVT s/p IVC filter (1/2017), chronic anemia, with recent hosp in [facility] for UTI and aspiration PNA sent in from [facility] for respiratory distress/acute resp failure, found to be in septic shock and acute hypercapneic resp failure 2/2 likely asp PNA. Hosp course c/b afib w/RVR w/HD instability req electrical cardioversion, now on amio and lopressor in NSR.    - Pt not on tele, no EKG since 8/6, please repeat tomorrow  - HR regular on exam, rate controlled. C/w amio load, transition to 200mg PO qd. Lopressor d/c by primary team.  - Titrate off midodrine as able  - Pt w/bibasilar crackles on exam, only trace edema, dry MM. CXR poorly positioned but appears to have some interval improvement of pulm venous congestion, but poor visualization of CPA suggestive of persistent b/l PLEF. Net +1L o/n, +4L for HC. Pt appears comfortable on 5LNC. Given soft BP ok to monitor strict I/O, daily wts off diuretics for now. Reassess if poor u/o or worsening resp status  - Repeat CXR tomorrow  - Please have pt f/u w/Dr. Sana Anguiano within 1 week of d/c

## 2018-08-13 NOTE — PROGRESS NOTE ADULT - PROBLEM SELECTOR PLAN 1
Etiology likely secondary to aspiration pneumonia vs HAP. CXR showed b/l pleural infiltrates. Initial USG done at bedside showing RLL consolidation. In ED VBG significant for pH of 7.21, pCO2 98, pO2 79. Pt placed on BiPAP. Repeat ABG after 90 minutes on BiPAP- pH 7.28, pCO2 70, pO2 66; patient subsequently intubated in the ED. Patient extubated 8/7. Sputum cx grew staph aureus MSSA and is s/p 7 day treatment of IV cefazolin  -Patient saturating well on 5L NC. Goal to decrease to home 2L NC   -pulm following recs appreciated  -c/w duonebs   -c/w NC, can increase to HFNC if required    #Cardiomyopathy: Patient appears somewhat hypervolemic with bibasilar crackles on exam and b/l edema on lower extremity and dependent areas  -c/w IV lasix 20mg if BP can tolerate.   -repeat CXR 8/13 demonstrated mild decrease in size of left pleural effusion

## 2018-08-13 NOTE — CONSULT NOTE ADULT - ATTENDING COMMENTS
Assessment: Patient personally seen and examined myself during rounds with the Cardiology Fellow  ON DATE 8/13/18  Cardiology Fellow note read, including vitals, physical findings, laboratory data, and radiological reports.   Revisions included below.   Direct personal management at bed side and extensive interpretation of the data.    Plan was outlined and discussed in details with the Cardiology Fellow.    Decision making of high complexity   Risk high of complications, morbidity, and/or mortality  Assessment and Action taken for acute disease activity to reflect the level of care provided:  -Hemodynamic evaluation and support  -Medication reconciliation  -Review laboratory data  -EKG reviewed   -Echo reviewed   -ACS assessment and treatment as applicable  -Heart failure assessment and treatment as applicable  -Cardiac Telemetry reviewed  -Interdisciplinary discussion with IC / EP / HF / CTS teams as needed  TIME SPENT in evaluation and management, reassessments, review and interpretation of labs and x-rays, and hemodynamic management, formulating a plan and coordinating care: ___50____ MIN.  Time does not include procedural time.    Sana Anguiano MD  Cardiology     Mobile: [phone]  Office: [phone]  [address] [place] [id number]

## 2018-08-13 NOTE — PROGRESS NOTE ADULT - SUBJECTIVE AND OBJECTIVE BOX
Patient is a 89y old  Female who presents with a chief complaint of shortness of breath (02 Aug 2018 15:19)        INTERVAL HPI/OVERNIGHT EVENTS: none    SYMPTOMS none, no SOB, pain    DRIPS none        ICU Vital Signs Last 24 Hrs  T(C): 37.1 (13 Aug 2018 10:38), Max: 37.1 (13 Aug 2018 10:38)  T(F): 98.7 (13 Aug 2018 10:38), Max: 98.7 (13 Aug 2018 10:38)  HR: 66 (13 Aug 2018 08:11) (66 - 76)  BP: 103/56 (13 Aug 2018 08:11) (91/53 - 103/56)  BP(mean): 75 (13 Aug 2018 08:11) (67 - 75)  ABP: --  ABP(mean): --  RR: 16 (13 Aug 2018 08:11) (16 - 18)  SpO2: 99% (13 Aug 2018 08:11) (94% - 99%)      I&O's Summary    12 Aug 2018 07:01  -  13 Aug 2018 07:00  --------------------------------------------------------  IN: 1035 mL / OUT: 0 mL / NET: 1035 mL        EXAM    Chest decreased bs at bases    Heart rr    Abdomen soft nontender with bs    Extremities trace edma    Neuro awake, answers some questions by nodding      LABS:                            7.6    7.0   )-----------( 511      ( 13 Aug 2018 07:37 )             25.1     08-13    142  |  99  |  31<H>  ----------------------------<  102<H>  5.1   |  35<H>  |  1.04    Ca    9.2      13 Aug 2018 07:36  Mg     2.4     08-13                RADIOLOGY & ADDITIONAL STUDIES:    CRITICAL CARE TIME SPENT:

## 2018-08-13 NOTE — PROGRESS NOTE ADULT - SUBJECTIVE AND OBJECTIVE BOX
CC/ HPI Ms. Goff is a 89 year old lady with dementia, history of ESBL UTI, bilateral DVT s/p IVC filter 2017, who was admitted from the nursing home with pneumonia complicated by septic shock  and respiratory failure, seen this morning resting comfortably in bed.    PAST MEDICAL HISTORY:  Dementia  DVT (deep venous thrombosis)  Dysphagia  Anemia  DJD (degenerative joint disease)  Edema  IBS (irritable bowel syndrome)  Celiac disease  H/O inguinal hernia repair  History of total left hip replacement    SOCHX:  -  alcohol    FMHX: FA/MO  - contributory     ROS reviewed below with positive findings marked (+) :  GEN:  fever, chills ENT: tracheostomy,   epistaxis,  sinusitis COR: CAD, CHF,  HTN, dysrhythmia PUL: COPD, ILD, asthma, pneumonia GI: PEG, dysphagia, hemorrhage, other LUDWIN: kidney disease, electrolyte disorder HEM:  anemia, +thrombus, coagulopathy, cancer ENDO:  thyroid disease, diabetes mellitus CNS:  +dementia, stroke, seizure, PSY:  depression, anxiety, other        MEDICATIONS  (STANDING):  acetaminophen    Suspension. 650 milliGRAM(s) Oral daily  ALBUTerol/ipratropium for Nebulization 3 milliLiter(s) Nebulizer every 4 hours  amiodarone    Tablet 400 milliGRAM(s) Oral once  docusate sodium Liquid 100 milliGRAM(s) Oral two times a day  heparin  Injectable 5000 Unit(s) SubCutaneous every 12 hours  midodrine 10 milliGRAM(s) Oral every 8 hours  pantoprazole   Suspension 40 milliGRAM(s) Oral before breakfast  polyethylene glycol 3350 17 Gram(s) Oral daily  senna Syrup 10 milliLiter(s) Oral at bedtime    MEDICATIONS  (PRN):      Vital Signs Last 24 Hrs  T(C): 36.6 (13 Aug 2018 13:36), Max: 37.1 (13 Aug 2018 10:38)  T(F): 97.8 (13 Aug 2018 13:36), Max: 98.7 (13 Aug 2018 10:38)  HR: 87 (13 Aug 2018 13:36) (66 - 87)  BP: 124/67 (13 Aug 2018 13:36) (94/55 - 124/67)  BP(mean): 72 (13 Aug 2018 12:34) (69 - 75)  RR: 19 (13 Aug 2018 13:36) (16 - 19)  SpO2: 97% (13 Aug 2018 13:36) (94% - 99%)    GENERAL:         comfortable,  - distress.  HEENT:            - trauma,  - icterus,  - injection,  - nasal discharge.  NECK:              - jugular venous distention, - thyromegaly.  LYMPH:           - lymphadenopathy, - masses.  RESP:               + crackles,   - rhonchi,   - wheezes.   COR:                S1S2   - gallops,  - rubs.  ABD:                bowel sounds,   soft, - tender, - distended.  EXT/MSC:         - cyanosis,  - clubbing,  + edema.    NEURO:             alert,   responds to stimuli.                        7.6    7.0   )-----------( 511      ( 13 Aug 2018 07:37 )             25.1     08-13    142  |  99  |  31<H>  ----------------------------<  102<H>  5.1   |  35<H>  |  1.04      Xray Chest  (08.13.18)  Bibasilar infiltrates/atelectasis/effusions.    Echocardiogram (07.16.18) The left atrial size is normal. Right atrium not well visualized. Calcified aortic valve. No aortic regurgitation noted. There is Mild aortic stenosis.   The calculated aortic valve area using the continuity equation is1.7 cm2. The  calculated aortic valve area indexed to body surface area is 1.1 cm2/m2.  There is trace mitral   regurgitation. Structurally normal tricuspid valve. There is mild tricuspid  regurgitation. The pulmonary artery systolic pressure is estimated to be 61 mmHg. The right ventricle is normal in size and function.  There is a septal "knuckle" with no left ventricular outflow obstruction There is mild concentric left ventricular hypertrophy. The left ventricular wall motion is  normal. The left ventricular ejection fraction is estimated to be 65-70% .    ASSESSMENT/PLAN    1) Status post aspiration pneumonia  2) Atelectasis/ pleural effusions  3) Cardiomyopathy  4) Pulmonary hypertension  5) Dementia    Satisfactory SpO2, oxygen as needed  Bronchodilators:  Atrovent/ albuterol q 4 – 6 hours as needed  ID/Antibiotics:  status post cefazolin, blood cultures negative  Cardiac/HTN: amiodarone  GI: Rx/ prophylaxis c PPI/H2B  Heme: Rx/VT prophylaxis  Discussed with medical team CC/ HPI Ms. Goff is a 89 year old lady with dementia, history of ESBL UTI, bilateral DVT s/p IVC filter 2017, who was admitted from the nursing home with pneumonia complicated by septic shock and respiratory failure, seen this morning resting comfortably in bed.    PAST MEDICAL HISTORY:  Dementia  DVT (deep venous thrombosis)  Dysphagia  Anemia  DJD (degenerative joint disease)  Edema  IBS (irritable bowel syndrome)  Celiac disease  H/O inguinal hernia repair  History of total left hip replacement    SOCHX:  -  alcohol    FMHX: FA/MO  - contributory     ROS reviewed below with positive findings marked (+) :  GEN:  fever, chills ENT: tracheostomy,   epistaxis,  sinusitis COR: CAD, CHF,  HTN, dysrhythmia PUL: COPD, ILD, asthma, pneumonia GI: PEG, dysphagia, hemorrhage, other LUDWIN: kidney disease, electrolyte disorder HEM:  anemia, +thrombus, coagulopathy, cancer ENDO:  thyroid disease, diabetes mellitus CNS:  +dementia, stroke, seizure, PSY:  depression, anxiety, other        MEDICATIONS  (STANDING):  acetaminophen    Suspension. 650 milliGRAM(s) Oral daily  ALBUTerol/ipratropium for Nebulization 3 milliLiter(s) Nebulizer every 4 hours  amiodarone    Tablet 400 milliGRAM(s) Oral once  docusate sodium Liquid 100 milliGRAM(s) Oral two times a day  heparin  Injectable 5000 Unit(s) SubCutaneous every 12 hours  midodrine 10 milliGRAM(s) Oral every 8 hours  pantoprazole   Suspension 40 milliGRAM(s) Oral before breakfast  polyethylene glycol 3350 17 Gram(s) Oral daily  senna Syrup 10 milliLiter(s) Oral at bedtime    MEDICATIONS  (PRN):      Vital Signs Last 24 Hrs  T(C): 36.6 (13 Aug 2018 13:36), Max: 37.1 (13 Aug 2018 10:38)  T(F): 97.8 (13 Aug 2018 13:36), Max: 98.7 (13 Aug 2018 10:38)  HR: 87 (13 Aug 2018 13:36) (66 - 87)  BP: 124/67 (13 Aug 2018 13:36) (94/55 - 124/67)  BP(mean): 72 (13 Aug 2018 12:34) (69 - 75)  RR: 19 (13 Aug 2018 13:36) (16 - 19)  SpO2: 97% (13 Aug 2018 13:36) (94% - 99%)    GENERAL:         comfortable,  - distress.  HEENT:            - trauma,  - icterus,  - injection,  - nasal discharge.  NECK:              - jugular venous distention, - thyromegaly.  LYMPH:           - lymphadenopathy, - masses.  RESP:               + crackles,   - rhonchi,   - wheezes.   COR:                S1S2   - gallops,  - rubs.  ABD:                bowel sounds,   soft, - tender, - distended.  EXT/MSC:         - cyanosis,  - clubbing,  + edema.    NEURO:             alert,   responds to stimuli.                        7.6    7.0   )-----------( 511      ( 13 Aug 2018 07:37 )             25.1     08-13    142  |  99  |  31<H>  ----------------------------<  102<H>  5.1   |  35<H>  |  1.04      Xray Chest  (08.13.18)  Bibasilar infiltrates/atelectasis/effusions.    Echocardiogram (07.16.18) The left atrial size is normal. Right atrium not well visualized. Calcified aortic valve. No aortic regurgitation noted. There is Mild aortic stenosis.   The calculated aortic valve area using the continuity equation is1.7 cm2. The  calculated aortic valve area indexed to body surface area is 1.1 cm2/m2.  There is trace mitral   regurgitation. Structurally normal tricuspid valve. There is mild tricuspid  regurgitation. The pulmonary artery systolic pressure is estimated to be 61 mmHg. The right ventricle is normal in size and function.  There is a septal "knuckle" with no left ventricular outflow obstruction There is mild concentric left ventricular hypertrophy. The left ventricular wall motion is  normal. The left ventricular ejection fraction is estimated to be 65-70% .    ASSESSMENT/PLAN    1) Status post aspiration pneumonia  2) Atelectasis/ pleural effusions  3) Cardiomyopathy  4) Pulmonary hypertension  5) Dementia    Satisfactory SpO2, oxygen as needed  Bronchodilators:  Atrovent/ albuterol q 4 – 6 hours as needed  ID/Antibiotics:  status post cefazolin, blood cultures negative  Cardiac/HTN: amiodarone  GI: Rx/ prophylaxis c PPI/H2B  Heme: Rx/VT prophylaxis  Discussed with medical team

## 2018-08-13 NOTE — CHART NOTE - NSCHARTNOTEFT_GEN_A_CORE
Admitting Diagnosis:   Patient is a 89y old  Female who presents with a chief complaint of shortness of breath (02 Aug 2018 15:19)      PAST MEDICAL & SURGICAL HISTORY:  Dementia  DVT (deep venous thrombosis)  Dysphagia  Anemia  DJD (degenerative joint disease)  Edema  IBS (irritable bowel syndrome)  Celiac disease  S/P IVC filter  H/O inguinal hernia repair  History of total left hip replacement    Current Nutrition Order:  Osmolite 1.2 Farhan @ 45mL/hr x 24hrs via PEG (1080 mL TV, 1296 kcal, 60g protein, 886mL free H2O, 108% RDI, 1.43g/kg IBW protein).    PO Intake: Good (%) [   ]  Fair (50-75%) [   ] Poor (<25%) [   ]- N/A NPO w/EN    GI Issues: Unable to assess at this time 2/2 AMS; good tolerance of EN per RN     Pain: Unable to assess at this time 2/2 AMS     Skin Integrity: Spine stage III pressure injury     Labs:   08-13    142  |  99  |  31<H>  ----------------------------<  102<H>  5.1   |  35<H>  |  1.04    Ca    9.2      13 Aug 2018 07:36  Mg     2.4     08-13      CAPILLARY BLOOD GLUCOSE          Medications:  MEDICATIONS  (STANDING):  acetaminophen    Suspension. 650 milliGRAM(s) Oral daily  ALBUTerol/ipratropium for Nebulization 3 milliLiter(s) Nebulizer every 4 hours  amiodarone    Tablet 400 milliGRAM(s) Oral two times a day  chlorhexidine 2% Cloths 1 Application(s) Topical daily  docusate sodium Liquid 100 milliGRAM(s) Oral two times a day  heparin  Injectable 5000 Unit(s) SubCutaneous every 12 hours  metoprolol tartrate 12.5 milliGRAM(s) Oral every 12 hours  midodrine 10 milliGRAM(s) Oral every 8 hours  pantoprazole   Suspension 40 milliGRAM(s) Oral before breakfast  polyethylene glycol 3350 17 Gram(s) Oral daily  senna Syrup 10 milliLiter(s) Oral at bedtime    MEDICATIONS  (PRN):      Weight: 52.1kg  Daily     Daily     Weight Change: No new weights recorded since admit     Estimated energy needs: Ideal body weight (41.9kg) used for calculations as pt >120% of IBW. Needs estimated for maintenance in older adults; adjusted for wound healing (PU), moderate malnutrition  Calories: 25-30 kcal/kg = 8879-0399 kcal/day  Protein: 1.2-1.4 g/kg = 50-59g protein/day  Fluids: 30-35 mL/kg = 1801-7981 mL/day    Subjective: 89 yo/female with PMHx dementia, UTI, celiac disease, lactose intolerance, R. femoral thrombus, recent d/c from [facility] with UTI and asp pna, presents from [facility] with respiratory distress; subsequently intubated for airway protection. S/p PEG placement on 8/6 and s/p extubation on 8/7. Pt seen in room, eyes intermittently open but noted to be A&Ox0-1 and not verbally responsive. Breathing comfortably on NC. Unable to obtain nutrition-related complaints from pt at this time, though RN endorses good tolerance of feeds. Nutrition focused physical exam completed, significant for moderate protein-calorie malnutrition.     Previous Nutrition Diagnosis:  Increased protein-calorie needs RT increased demand AEB wound healing (stage III pressure injury)    Active [ X ]  Resolved [    ]    If resolved, new PES:     Goal: Pt will continue to meet % of EER per day via tolerated route     Recommendations:  1. Continue with current EN order. Additional free H2O per team. Monitor for s/s intolerance; maintain aspiration precautions at all times   2. Monitor lytes and replete prn.   3. Trend weights 2x/week  4. Keep nutrition in line with goals of care at all times.     Education: N/A    Risk Level: High [ X  ] Moderate [   ] Low [   ].

## 2018-08-13 NOTE — CHART NOTE - NSCHARTNOTEFT_GEN_A_CORE
Upon Nutritional Assessment by the Registered Dietitian your patient was determined to meet criteria / has evidence of the following diagnosis/diagnoses:          [ ]  Mild Protein Calorie Malnutrition        [X ]  Moderate Protein Calorie Malnutrition        [ ] Severe Protein Calorie Malnutrition        [ ] Unspecified Protein Calorie Malnutrition        [ ] Underweight / BMI <19        [ ] Morbid Obesity / BMI > 40      Findings as based on:  •  Comprehensive nutrition assessment and consultation    Per physical assessment: Muscle Wasting- Temporal [ X  ]  Clavicle/Pectoral [ X  ]  Shoulder/Deltoid [ X  ]  Scapula [   ]  Interosseous [   ]  Quadriceps [   ]  Gastrocnemius [   ]; Fat Wasting- Orbital [ X ]  Buccal [ X  ]  Triceps [   ]  Rib [   ]--> Suspect moderate protein-calorie malnutrition 2/2 to physical assessment, and poor intake PTA; please see malnutrition chart note.       Treatment:    The following diet has been recommended:  Continue with current EN order; Monitor for s/s intolerance; maintain aspiration precautions at all times       PROVIDER Section:     By signing this assessment you are acknowledging and agree with the diagnosis/diagnoses assigned by the Registered Dietitian    Comments:

## 2018-08-13 NOTE — PROGRESS NOTE ADULT - SUBJECTIVE AND OBJECTIVE BOX
SUBJECTIVE: Comfortable    OBJECTIVE:  VITAL SIGNS:  T(C): 37.1 (08-13-18 @ 10:38), Max: 37.1 (08-13-18 @ 10:38)  T(F): 98.7 (08-13-18 @ 10:38), Max: 98.7 (08-13-18 @ 10:38)  HR: 74 (08-13-18 @ 12:34) (66 - 76)  BP: 94/55 (08-13-18 @ 12:34) (94/55 - 103/56)  BP(mean): 72 (08-13-18 @ 12:34) (69 - 75)  RR: 17 (08-13-18 @ 12:34) (16 - 18)  SpO2: 94% (08-13-18 @ 12:34) (94% - 99%)  Wt(kg): --    PHYSICAL EXAM:    Constitutional: elderly female, pleasant and sitting comfortably in a chair; NAD; pt is minimally responsive but can answer yes or no.   Head: NC/AT  Eyes: PERRL, EOMI, clear conjunctiva  ENT: no nasal discharge; MMM  Neck: no JVD   Respiratory: rhonchi appreciated in upper left lung fields; decreased breath sounds in lower right lung fields, no retractions  Cardiac: +S1/S2; RRR; no M/R/G  Gastrointestinal: soft, NT/ND; no rebound or guarding; normoactive BS; PEG tube in place and site of insertion is clean/dry/intact  Back: spine midline  Extremities: WWP, no clubbing or cyanosis; 1+ pitting edema b/l; mild chronic venous stasis changes  Dermatologic: skin warm, dry and intact; no rashes, wounds, or scars  Neurologic: AAOx1 to person; unable to assess most cranial nerves as pt unable to follow commands; no obvious focal deficits     MEDICATIONS  (STANDING):  acetaminophen    Suspension. 650 milliGRAM(s) Oral daily  ALBUTerol/ipratropium for Nebulization 3 milliLiter(s) Nebulizer every 4 hours  amiodarone    Tablet 400 milliGRAM(s) Oral two times a day  chlorhexidine 2% Cloths 1 Application(s) Topical daily  docusate sodium Liquid 100 milliGRAM(s) Oral two times a day  heparin  Injectable 5000 Unit(s) SubCutaneous every 12 hours  metoprolol tartrate 12.5 milliGRAM(s) Oral every 12 hours  midodrine 10 milliGRAM(s) Oral every 8 hours  pantoprazole   Suspension 40 milliGRAM(s) Oral before breakfast  polyethylene glycol 3350 17 Gram(s) Oral daily  senna Syrup 10 milliLiter(s) Oral at bedtime    MEDICATIONS  (PRN):    .  LABS:                         7.6    7.0   )-----------( 511      ( 13 Aug 2018 07:37 )             25.1     08-13    142  |  99  |  31<H>  ----------------------------<  102<H>  5.1   |  35<H>  |  1.04    Ca    9.2      13 Aug 2018 07:36  Mg     2.4     08-13      RADIOLOGY, EKG & ADDITIONAL TESTS: Reviewed.     < from: Xray Chest 1 View- PORTABLE-Routine (08.13.18 @ 06:38) >  s: 8/12/2018    An AP portable view of thechest reveals mild decrease size of the left   pleural effusion. Otherwise, no significant interval change in the   pulmonary pathology and support devices, as previously reported.    IMPRESSION:  Mild decreased size of left pleural effusion.        < end of copied text >

## 2018-08-13 NOTE — PROGRESS NOTE ADULT - PROBLEM SELECTOR PLAN 1
Likely 2/2 aspiration pneumonia vs HAP. CXR showed b/l pleural infiltrates. Initial USG done at bedside showing RLL consolidation. In ED VBG significant for pH of 7.21, pCO2 98, pO2 79. Pt placed on BiPAP. Repeat ABG after 90 minutes on BiPAP- pH 7.28, pCO2 70, pO2 66; patient subsequently intubated in the ED. Patient extubated 8/7. Sputum cx grew staph aureus MSSA and is s/p 7 day treatment of IV cefazolin  -Patient saturating well on 5L NC  -pulm following recs appreciated  -c/w duonebs   -c/w NC, can increase to HFNC if required (have HFNC at bedside)    #Cardiomyopathy  B/l LE edema; no crackles appreciated today  -c/w IV lasix 20mg if BP can tolerate. Has been held today due to multiple low readings in the 80s-90s SBP  -repeat CXR 8/11 poor quality film, consider repeating. Likely 2/2 aspiration pneumonia vs HAP. CXR showed b/l pleural infiltrates. Initial USG done at bedside showing RLL consolidation. In ED VBG significant for pH of 7.21, pCO2 98, pO2 79. Pt placed on BiPAP. Repeat ABG after 90 minutes on BiPAP- pH 7.28, pCO2 70, pO2 66; patient subsequently intubated in the ED. Patient extubated 8/7. Sputum cx grew staph aureus MSSA and is s/p 7 day treatment of IV cefazolin  -Patient saturating well on 5L NC  -pulm following recs appreciated  -c/w duonebs   -c/w NC, can increase to HFNC if required (have HFNC at bedside)  -if T >100.4, pt will need blood cultures. No Abx for now.     #Cardiomyopathy  B/l LE edema; no crackles appreciated today. CXR showing b/l pleural effusion 8/9/18  -c/w IV lasix 20mg if BP can tolerate. Has been held today due to multiple low readings in the 80s-90s SBP  -f/u repeat CXR

## 2018-08-13 NOTE — PROGRESS NOTE ADULT - ASSESSMENT
89 y/o F with PMH dementia, recurrent UTI, Celiac' s disease, lactose intolerance, paroxsymal Afib (not on AC 2/2 GI AVMs). R. femoral thrombus s/p IVC filter 1/2017, chronic anemia, with recent discharge from [facility] for UTI and aspiration PNA presents from [facility] for respiratory distress, admitted for hypoxic respiratory failure and septic shock 2/2 aspiration pneumonia.

## 2018-08-13 NOTE — PROGRESS NOTE ADULT - ASSESSMENT
89 y/o F with PMH dementia, recurrent UTI, Celiac' s disease, lactose intolerance, R. femoral thrombus s/p IVC filter 1/2017, chronic anemia, with recent discharge from [facility] for UTI and aspiration PNA presents from [facility] for respiratory distress with acute respiratory failure found to be in septic shock likely from aspiration PNA. Now more hemodynamically stable, saturating well on 5L nasal cannula.

## 2018-08-13 NOTE — PROGRESS NOTE ADULT - SUBJECTIVE AND OBJECTIVE BOX
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 Pt was found to be in  acute respiratory failure- ABG showed pH of 7.21, pCO2 98, pO2 79.Pt was intubated in the ED. Pt also in septic shock secondary to aspiration pna, was started on levophed for pressor support.   Pt was initially treated with vanc and meropenum for hospital acquired pna in the setting of recent hospital admission but sputum cx positive for MSSA and abx were deescalated to IV cefazolin.   Of note patient has a hx of paroxysmal a fib however never anti coagulated given hx of gastric AVM.  During course of hospital admission pt went into rapid afib requiring increasing levophed requirements. Pt was cardioverted and has been in sinus rhythm since. Started on 400 mg amiodarone bid with plan to decrease amio to 200mg BID on 8/14/18.   Pt has a hx of dysphagia, upon last admission for asp pna there was plan for PEG however pt passed barium swallow and that was held off. In the setting for dysphagia and asp pna Dr Hale planned for bedside PEG tube , placed on 8/6/18. Pt extubated (8/7/18). Started feeds after extubation. Pt was taken off of levophed. SPB ranges in 90-95s. MAP 65-75. Pt was attempted to switch to high flow, did not tolerate. Tubbs Cath removed 8/9 at 10:30 am, currently still incontinent.  Pt started on miralax for constipation.   Patient transferred to [facility], and placed on NC 5L with goal of decreasing to home 2L NC. Patient also noted to be hypervolemic and has been receiving gentle diuresis with 20mg Lasix if BP supports (on midodrine for BP support).     OVERNIGHT EVENTS: ALYCIA    SUBJECTIVE / INTERVAL HPI: Patient seen and examined at bedside.     VITAL SIGNS:  Vital Signs Last 24 Hrs  T(C): 37.1 (13 Aug 2018 10:38), Max: 37.1 (13 Aug 2018 10:38)  T(F): 98.7 (13 Aug 2018 10:38), Max: 98.7 (13 Aug 2018 10:38)  HR: 66 (13 Aug 2018 08:11) (66 - 76)  BP: 103/56 (13 Aug 2018 08:11) (91/53 - 103/56)  BP(mean): 75 (13 Aug 2018 08:11) (67 - 75)  RR: 16 (13 Aug 2018 08:11) (16 - 18)  SpO2: 99% (13 Aug 2018 08:11) (94% - 99%)    PHYSICAL EXAM:    General: Patient awake and alert, oriented x 0. Cachectic appearing.   HEENT: PERRL, anicteric sclera; MMM  Neck: supple  Cardiovascular: +S1/S2, RRR  Respiratory: In no respiratory distress. Crackles appreciated over b/l bases. No rhonchi, no wheezing appreciated b/l  Gastrointestinal: soft, NT/ND; +BSx4  Extremities: b/l extremities with 2+ pitting edema, best appreciated in dependent areas. No erythema, no cyanosis b/l.  Vascular: 2+ radial pulses, 1+ dorsalis pedis pulses B/L  Neurological: Limited due to patient's condition. Awake and alert but not oriented to person, place, or time. Patient mostly nonverbal but will smile at provider throughout exam. Patient verbalizing more appropriately to questions today to questioning today.     MEDICATIONS:  MEDICATIONS  (STANDING):  acetaminophen    Suspension. 650 milliGRAM(s) Oral daily  ALBUTerol/ipratropium for Nebulization 3 milliLiter(s) Nebulizer every 4 hours  amiodarone    Tablet 400 milliGRAM(s) Oral two times a day  chlorhexidine 2% Cloths 1 Application(s) Topical daily  docusate sodium Liquid 100 milliGRAM(s) Oral two times a day  heparin  Injectable 5000 Unit(s) SubCutaneous every 12 hours  metoprolol tartrate 12.5 milliGRAM(s) Oral every 12 hours  midodrine 10 milliGRAM(s) Oral every 8 hours  pantoprazole   Suspension 40 milliGRAM(s) Oral before breakfast  polyethylene glycol 3350 17 Gram(s) Oral daily  senna Syrup 10 milliLiter(s) Oral at bedtime    MEDICATIONS  (PRN):      ALLERGIES:  Allergies    amoxicillin (Unknown)  Cipro (Unknown)  clindamycin (Unknown)  lactose (Unknown)  penicillin (Unknown)  Wheat (Unknown)    Intolerances        LABS:                        7.6    7.0   )-----------( 511      ( 13 Aug 2018 07:37 )             25.1     08-13    142  |  99  |  31<H>  ----------------------------<  102<H>  5.1   |  35<H>  |  1.04    Ca    9.2      13 Aug 2018 07:36  Mg     2.4     08-13          CAPILLARY BLOOD GLUCOSE          RADIOLOGY & ADDITIONAL TESTS: Reviewed. 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 for UTI and aspiration PNA presents from [facility] for respiratory distress . Pt was found to be in  acute respiratory failure- ABG showed pH of 7.21, pCO2 98, pO2 79.Pt was intubated in the ED. Pt also in septic shock secondary to aspiration pna, was started on levophed for pressor support.   Pt was initially treated with vanc and meropenum for hospital acquired pna in the setting of recent hospital admission but sputum cx positive for MSSA and abx were deescalated to IV cefazolin.   Of note patient has a hx of paroxysmal a fib however never anti coagulated given hx of gastric AVM.  During course of hospital admission pt went into rapid afib requiring increasing levophed requirements. Pt was cardioverted and has been in sinus rhythm since. Started on 400 mg amiodarone bid with plan to decrease amio to 200mg BID on 8/14/18.   Pt has a hx of dysphagia, upon last admission for asp pna there was plan for PEG however pt passed barium swallow and that was held off. In the setting for dysphagia and asp pna Dr Hale planned for bedside PEG tube , placed on 8/6/18. Pt extubated (8/7/18). Started feeds after extubation. Pt was taken off of levophed. SPB ranges in 90-95s. MAP 65-75. Pt was attempted to switch to high flow, did not tolerate. Tubbs Cath removed 8/9 at 10:30 am, currently still incontinent.  Pt started on miralax for constipation.   Patient transferred to [facility], and placed on NC 5L with goal of decreasing to home 2L NC. Patient also noted to be hypervolemic and has been receiving gentle diuresis with 20mg Lasix if BP supports (on midodrine for BP support). Patient has been stable on NC but occasionally desaturates if it falls out.    OVERNIGHT EVENTS: ALYCIA    SUBJECTIVE / INTERVAL HPI: Patient seen and examined at bedside.     VITAL SIGNS:  Vital Signs Last 24 Hrs  T(C): 37.1 (13 Aug 2018 10:38), Max: 37.1 (13 Aug 2018 10:38)  T(F): 98.7 (13 Aug 2018 10:38), Max: 98.7 (13 Aug 2018 10:38)  HR: 66 (13 Aug 2018 08:11) (66 - 76)  BP: 103/56 (13 Aug 2018 08:11) (91/53 - 103/56)  BP(mean): 75 (13 Aug 2018 08:11) (67 - 75)  RR: 16 (13 Aug 2018 08:11) (16 - 18)  SpO2: 99% (13 Aug 2018 08:11) (94% - 99%)    PHYSICAL EXAM:    General: Patient awake and alert, more verbally responsive today. Cachectic appearing.   HEENT: PERRL, anicteric sclera; MMM  Neck: supple  Cardiovascular: +S1/S2, RRR  Respiratory: In no respiratory distress. Crackles appreciated over b/l bases. No rhonchi, no wheezing appreciated b/l  Gastrointestinal: soft, NT/ND; +BSx4  Extremities: b/l extremities with 2+ pitting edema, best appreciated in dependent areas. No erythema, no cyanosis b/l.  Vascular: 2+ radial pulses, 1+ dorsalis pedis pulses B/L  Neurological: Limited due to patient's condition. Awake and alert but not oriented to person, place, or time. Patient mostly nonverbal but will smile at provider throughout exam. Patient verbalizing more appropriately to questions today to questioning today.     MEDICATIONS:  MEDICATIONS  (STANDING):  acetaminophen    Suspension. 650 milliGRAM(s) Oral daily  ALBUTerol/ipratropium for Nebulization 3 milliLiter(s) Nebulizer every 4 hours  amiodarone    Tablet 400 milliGRAM(s) Oral two times a day  chlorhexidine 2% Cloths 1 Application(s) Topical daily  docusate sodium Liquid 100 milliGRAM(s) Oral two times a day  heparin  Injectable 5000 Unit(s) SubCutaneous every 12 hours  metoprolol tartrate 12.5 milliGRAM(s) Oral every 12 hours  midodrine 10 milliGRAM(s) Oral every 8 hours  pantoprazole   Suspension 40 milliGRAM(s) Oral before breakfast  polyethylene glycol 3350 17 Gram(s) Oral daily  senna Syrup 10 milliLiter(s) Oral at bedtime    MEDICATIONS  (PRN):      ALLERGIES:  Allergies    amoxicillin (Unknown)  Cipro (Unknown)  clindamycin (Unknown)  lactose (Unknown)  penicillin (Unknown)  Wheat (Unknown)    Intolerances        LABS:                        7.6    7.0   )-----------( 511      ( 13 Aug 2018 07:37 )             25.1     08-13    142  |  99  |  31<H>  ----------------------------<  102<H>  5.1   |  35<H>  |  1.04    Ca    9.2      13 Aug 2018 07:36  Mg     2.4     08-13          CAPILLARY BLOOD GLUCOSE          RADIOLOGY & ADDITIONAL TESTS: Reviewed. PGY-1 Transfer Note  [facility] to [facility]    Hospital Course  89 y/o F with PMH dementia, recurrent UTI, Celiac' s disease, lactose intolerance, R. 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 Pt was found to be in  acute respiratory failure- ABG showed pH of 7.21, pCO2 98, pO2 79.Pt was intubated in the ED. Pt also in septic shock secondary to aspiration pna, was started on levophed for pressor support.   Pt was initially treated with vanc and meropenum for hospital acquired pna in the setting of recent hospital admission but sputum cx positive for MSSA and abx were deescalated to IV cefazolin.   Of note patient has a hx of paroxysmal a fib however never anti coagulated given hx of gastric AVM.  During course of hospital admission pt went into rapid afib requiring increasing levophed requirements. Pt was cardioverted and has been in sinus rhythm since. Started on 400 mg amiodarone bid with plan to decrease amio to 200mg BID on 8/14/18.   Pt has a hx of dysphagia, upon last admission for asp pna there was plan for PEG however pt passed barium swallow and that was held off. In the setting for dysphagia and asp pna Dr Hale planned for bedside PEG tube , placed on 8/6/18. Pt extubated (8/7/18). Started feeds after extubation. Pt was taken off of levophed. SPB ranges in 90-95s. MAP 65-75. Pt was attempted to switch to high flow, did not tolerate. Tubbs Cath removed 8/9 at 10:30 am, currently still incontinent.  Pt started on miralax for constipation.   Patient transferred to [facility] for closer surveillance of respiratory status and BP. Patient was placed on NC 5L with goal of decreasing to home 2L NC. Patient also noted to be hypervolemic and has been receiving gentle diuresis with 20mg Lasix if BP supports (on midodrine for BP support). Patient has been stable on NC but occasionally desaturates if it falls out.    OVERNIGHT EVENTS: ALYCIA    SUBJECTIVE / INTERVAL HPI: Patient seen and examined at bedside. Patient denies chest pain, SOB, abdominal pain, nausea, vomiting, diarrhea, constipation, fevers, chills, night sweats.    VITAL SIGNS:  Vital Signs Last 24 Hrs  T(C): 37.1 (13 Aug 2018 10:38), Max: 37.1 (13 Aug 2018 10:38)  T(F): 98.7 (13 Aug 2018 10:38), Max: 98.7 (13 Aug 2018 10:38)  HR: 66 (13 Aug 2018 08:11) (66 - 76)  BP: 103/56 (13 Aug 2018 08:11) (91/53 - 103/56)  BP(mean): 75 (13 Aug 2018 08:11) (67 - 75)  RR: 16 (13 Aug 2018 08:11) (16 - 18)  SpO2: 99% (13 Aug 2018 08:11) (94% - 99%)    PHYSICAL EXAM:    General: Patient awake and alert, more verbally responsive today. Cachectic.   HEENT: PERRL, anicteric sclera; MMM  Neck: supple  Cardiovascular: +S1/S2, RRR  Respiratory: In no respiratory distress. Crackles appreciated over b/l bases. No rhonchi, no wheezing appreciated b/l  Gastrointestinal: soft, NT/ND; +BSx4  Extremities: b/l extremities with 2+ pitting edema, best appreciated in dependent areas. No erythema, no cyanosis b/l.  Vascular: 2+ radial pulses, 1+ dorsalis pedis pulses B/L  Neurological: Limited due to patient's condition. Awake and alert but not oriented to person, place, or time. Patient mostly nonverbal but will smile at provider throughout exam. Patient able to occasionally respond to questioning with one word answers ("yes" or "no")    MEDICATIONS:  MEDICATIONS  (STANDING):  acetaminophen    Suspension. 650 milliGRAM(s) Oral daily  ALBUTerol/ipratropium for Nebulization 3 milliLiter(s) Nebulizer every 4 hours  amiodarone    Tablet 400 milliGRAM(s) Oral two times a day  chlorhexidine 2% Cloths 1 Application(s) Topical daily  docusate sodium Liquid 100 milliGRAM(s) Oral two times a day  heparin  Injectable 5000 Unit(s) SubCutaneous every 12 hours  metoprolol tartrate 12.5 milliGRAM(s) Oral every 12 hours  midodrine 10 milliGRAM(s) Oral every 8 hours  pantoprazole   Suspension 40 milliGRAM(s) Oral before breakfast  polyethylene glycol 3350 17 Gram(s) Oral daily  senna Syrup 10 milliLiter(s) Oral at bedtime    MEDICATIONS  (PRN):      ALLERGIES:  Allergies    amoxicillin (Unknown)  Cipro (Unknown)  clindamycin (Unknown)  lactose (Unknown)  penicillin (Unknown)  Wheat (Unknown)    Intolerances        LABS:                        7.6    7.0   )-----------( 511      ( 13 Aug 2018 07:37 )             25.1     08-13    142  |  99  |  31<H>  ----------------------------<  102<H>  5.1   |  35<H>  |  1.04    Ca    9.2      13 Aug 2018 07:36  Mg     2.4     08-13          CAPILLARY BLOOD GLUCOSE          RADIOLOGY & ADDITIONAL TESTS: Reviewed.    < from: Xray Chest 1 View- PORTABLE-Routine (08.13.18 @ 06:38) >    IMPRESSION:  Mild decreased size of left pleural effusion.

## 2018-08-13 NOTE — PROGRESS NOTE ADULT - PROBLEM SELECTOR PLAN 4
Patient with dysphagia and aspiration PNA. s/p PEG 8/6/18  -c/w Osmolite 1.2 @ 20cc/hr  -c/w aspiration precautions.

## 2018-08-13 NOTE — PROGRESS NOTE ADULT - PROBLEM SELECTOR PLAN 3
Patient with history of paroxysmal a fib however not anticoagulated given hx of gastric avm on egd. Patient hypotensive on 8/3 and in afib with rvr, s/p 10 of lopressor, 5 of verapamil. Pt then was started on amiodarone infusion and Levophed was increased to 25 mcg.  Patient s/p cardioversion 2/2 increasing levophed requirements and hemodynamic instability. Patient now in sinus rhythm  and better BP control and s/p amio drip  -c/w oral amiodarone 400 mg bid   -d/alissa lopressor given patient on midodrine

## 2018-08-13 NOTE — PROGRESS NOTE ADULT - SUBJECTIVE AND OBJECTIVE BOX
HOSPITAL COURSE: 89 y/o F with PMH dementia, recurrent UTI, Celiac' s disease, lactose intolerance, R. 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 Pt was found to be in  acute respiratory failure- ABG showed pH of 7.21, pCO2 98, pO2 79.Pt was intubated in the ED. Pt also in septic shock secondary to aspiration pna, was started on levophed for pressor support. Pt was initially treated with vanc and meropenum for hospital acquired pna in the setting of recent hospital admission but sputum cx positive for MSSA and abx were deescalated to IV cefazolin. Of note patient has a hx of paroxysmal a fib however never anti coagulated given hx of gastric AVM.  During course of hospital admission pt went into rapid afib requiring increasing levophed requirements. Pt was cardioverted and has been in sinus rhythm since. Started on 400 mg amiodarone bid with plan to decrease amio to 200mg BID on 8/14/18. Pt has a hx of dysphagia, upon last admission for asp pna there was plan for PEG however pt passed barium swallow and that was held off. In the setting for dysphagia and asp pna Dr Hale planned for bedside PEG tube , placed on 8/6/18. Pt extubated (8/7/18). Started feeds after extubation. Pt was taken off of levophed. SPB ranges in 90-95s. MAP 65-75. Pt was attempted to switch to high flow, did not tolerate. Tubbs Cath removed 8/9 at 10:30 am, currently still incontinent.  Pt started on miralax for constipation.   Patient transferred to [facility], and placed on NC 5L with goal of decreasing to home 2L NC. Patient also noted to be hypervolemic and has been receiving gentle diuresis with 20mg Lasix if BP supports (on midodrine for BP support). Pt now stable for further management on [facility].    SUBJECTIVE: Pt seen and examined at bedside.    OBJECTIVE:  VITAL SIGNS:  T(C): 37.1 (08-13-18 @ 10:38), Max: 37.1 (08-13-18 @ 10:38)  T(F): 98.7 (08-13-18 @ 10:38), Max: 98.7 (08-13-18 @ 10:38)  HR: 74 (08-13-18 @ 12:34) (66 - 76)  BP: 94/55 (08-13-18 @ 12:34) (94/55 - 103/56)  BP(mean): 72 (08-13-18 @ 12:34) (69 - 75)  RR: 17 (08-13-18 @ 12:34) (16 - 18)  SpO2: 94% (08-13-18 @ 12:34) (94% - 99%)  Wt(kg): --    PHYSICAL EXAM:    Constitutional: WDWN resting comfortably in bed; NAD  Head: NC/AT  Eyes: PERRL, EOMI, clear conjunctiva  ENT: no nasal discharge; uvula midline, no oropharyngeal erythema or exudates; MMM  Neck: supple; no JVD or thyromegaly  Respiratory: CTA B/L; no W/R/R, no retractions  Cardiac: +S1/S2; RRR; no M/R/G; PMI non-displaced  Gastrointestinal: soft, NT/ND; no rebound or guarding; +BSx4  Genitourinary: normal external genitalia  Back: spine midline, no bony tenderness or step-offs; no CVAT B/L  Extremities: WWP, no clubbing or cyanosis; no peripheral edema  Musculoskeletal: NROM x4; no joint swelling, tenderness or erythema  Vascular: 2+ radial, femoral, DP/PT pulses B/L  Dermatologic: skin warm, dry and intact; no rashes, wounds, or scars  Lymphatic: no submandibular or cervical LAD  Neurologic: AAOx3; CNII-XII grossly intact; no focal deficits  Psychiatric: affect and characteristics of appearance, verbalizations, behaviors are appropriate    MEDICATIONS  (STANDING):  acetaminophen    Suspension. 650 milliGRAM(s) Oral daily  ALBUTerol/ipratropium for Nebulization 3 milliLiter(s) Nebulizer every 4 hours  amiodarone    Tablet 400 milliGRAM(s) Oral two times a day  chlorhexidine 2% Cloths 1 Application(s) Topical daily  docusate sodium Liquid 100 milliGRAM(s) Oral two times a day  heparin  Injectable 5000 Unit(s) SubCutaneous every 12 hours  metoprolol tartrate 12.5 milliGRAM(s) Oral every 12 hours  midodrine 10 milliGRAM(s) Oral every 8 hours  pantoprazole   Suspension 40 milliGRAM(s) Oral before breakfast  polyethylene glycol 3350 17 Gram(s) Oral daily  senna Syrup 10 milliLiter(s) Oral at bedtime    MEDICATIONS  (PRN):    .  LABS:                         7.6    7.0   )-----------( 511      ( 13 Aug 2018 07:37 )             25.1     08-13    142  |  99  |  31<H>  ----------------------------<  102<H>  5.1   |  35<H>  |  1.04    Ca    9.2      13 Aug 2018 07:36  Mg     2.4     08-13      RADIOLOGY, EKG & ADDITIONAL TESTS: Reviewed. HOSPITAL COURSE: 89 y/o F with PMH dementia, recurrent UTI, Celiac' s disease, lactose intolerance, R. femoral thrombus s/p IVC filter 1/2017, chronic anemia, with recent discharge from [facility] for UTI and aspiration PNA presents from [facility] for respiratory distress. Pt was found to be in  acute respiratory failure- ABG showed pH of 7.21, pCO2 98, pO2 79.Pt was intubated in the ED. Pt also in septic shock secondary to aspiration pna, was started on levophed for pressor support. Pt was initially treated with vanc and meropenum for hospital acquired pna in the setting of recent hospital admission but sputum cx positive for MSSA and abx were deescalated to IV cefazolin. Of note patient has a hx of paroxysmal a fib however never anti coagulated given hx of gastric AVM.  During course of hospital admission pt went into rapid afib requiring increasing levophed requirements. Pt was cardioverted and has been in sinus rhythm since. Started on 400 mg amiodarone bid with plan to decrease amio to 200mg BID on 8/14/18. Pt has a hx of dysphagia, upon last admission for asp pna there was plan for PEG however pt passed barium swallow and that was held off. In the setting for dysphagia and asp pna Dr Hale planned for bedside PEG tube , placed on 8/6/18. Pt extubated (8/7/18). Started feeds after extubation. Pt was taken off of levophed. SPB ranges in 90-95s. MAP 65-75. Pt was attempted to switch to high flow, did not tolerate. Tubbs Cath removed 8/9 at 10:30 am, currently still incontinent.  Pt started on miralax for constipation.   Patient transferred to [facility], and placed on NC 5L with goal of decreasing to home 2L NC. Patient also noted to be hypervolemic and has been receiving gentle diuresis with 20mg Lasix if BP supports (on midodrine for BP support). Pt now stable for further management on [facility].    SUBJECTIVE: Pt seen and examined at bedside. Pt denies headache/dizziness/SOB/chest pain/abdominal pain     OBJECTIVE:  VITAL SIGNS:  T(C): 37.1 (08-13-18 @ 10:38), Max: 37.1 (08-13-18 @ 10:38)  T(F): 98.7 (08-13-18 @ 10:38), Max: 98.7 (08-13-18 @ 10:38)  HR: 74 (08-13-18 @ 12:34) (66 - 76)  BP: 94/55 (08-13-18 @ 12:34) (94/55 - 103/56)  BP(mean): 72 (08-13-18 @ 12:34) (69 - 75)  RR: 17 (08-13-18 @ 12:34) (16 - 18)  SpO2: 94% (08-13-18 @ 12:34) (94% - 99%)  Wt(kg): --    PHYSICAL EXAM:    Constitutional: elderly female, pleasant and sitting comfortably in a chair; NAD; pt is minimally responsive but can answer yes or no.   Head: NC/AT  Eyes: PERRL, EOMI, clear conjunctiva  ENT: no nasal discharge; MMM  Neck: no JVD   Respiratory: rhonchi appreciated in upper left lung fields; decreased breath sounds in lower right lung fields, no retractions  Cardiac: +S1/S2; RRR; no M/R/G  Gastrointestinal: soft, NT/ND; no rebound or guarding; normoactive BS; PEG tube in place and site of insertion is clean/dry/intact  Back: spine midline  Extremities: WWP, no clubbing or cyanosis; 1+ pitting edema b/l; mild chronic venous stasis changes  Dermatologic: skin warm, dry and intact; no rashes, wounds, or scars  Neurologic: AAOx1 to person; unable to assess most cranial nerves as pt unable to follow commands; no obvious focal deficits     MEDICATIONS  (STANDING):  acetaminophen    Suspension. 650 milliGRAM(s) Oral daily  ALBUTerol/ipratropium for Nebulization 3 milliLiter(s) Nebulizer every 4 hours  amiodarone    Tablet 400 milliGRAM(s) Oral two times a day  chlorhexidine 2% Cloths 1 Application(s) Topical daily  docusate sodium Liquid 100 milliGRAM(s) Oral two times a day  heparin  Injectable 5000 Unit(s) SubCutaneous every 12 hours  metoprolol tartrate 12.5 milliGRAM(s) Oral every 12 hours  midodrine 10 milliGRAM(s) Oral every 8 hours  pantoprazole   Suspension 40 milliGRAM(s) Oral before breakfast  polyethylene glycol 3350 17 Gram(s) Oral daily  senna Syrup 10 milliLiter(s) Oral at bedtime    MEDICATIONS  (PRN):    .  LABS:                         7.6    7.0   )-----------( 511      ( 13 Aug 2018 07:37 )             25.1     08-13    142  |  99  |  31<H>  ----------------------------<  102<H>  5.1   |  35<H>  |  1.04    Ca    9.2      13 Aug 2018 07:36  Mg     2.4     08-13      RADIOLOGY, EKG & ADDITIONAL TESTS: Reviewed.

## 2018-08-14 ENCOUNTER — INBOUND DOCUMENT (OUTPATIENT)
Age: 83
End: 2018-08-14

## 2018-08-14 ENCOUNTER — TRANSCRIPTION ENCOUNTER (OUTPATIENT)
Age: 83
End: 2018-08-14

## 2018-08-14 VITALS
DIASTOLIC BLOOD PRESSURE: 62 MMHG | TEMPERATURE: 98 F | RESPIRATION RATE: 18 BRPM | OXYGEN SATURATION: 93 % | SYSTOLIC BLOOD PRESSURE: 109 MMHG | HEART RATE: 80 BPM

## 2018-08-14 LAB
ANION GAP SERPL CALC-SCNC: 8 MMOL/L — SIGNIFICANT CHANGE UP (ref 5–17)
BUN SERPL-MCNC: 29 MG/DL — HIGH (ref 7–23)
CALCIUM SERPL-MCNC: 9.1 MG/DL — SIGNIFICANT CHANGE UP (ref 8.4–10.5)
CHLORIDE SERPL-SCNC: 95 MMOL/L — LOW (ref 96–108)
CO2 SERPL-SCNC: 34 MMOL/L — HIGH (ref 22–31)
CREAT SERPL-MCNC: 1.07 MG/DL — SIGNIFICANT CHANGE UP (ref 0.5–1.3)
GLUCOSE SERPL-MCNC: 126 MG/DL — HIGH (ref 70–99)
HCT VFR BLD CALC: 26.6 % — LOW (ref 34.5–45)
HGB BLD-MCNC: 7.8 G/DL — LOW (ref 11.5–15.5)
MAGNESIUM SERPL-MCNC: 2.3 MG/DL — SIGNIFICANT CHANGE UP (ref 1.6–2.6)
MCHC RBC-ENTMCNC: 26.5 PG — LOW (ref 27–34)
MCHC RBC-ENTMCNC: 29.3 G/DL — LOW (ref 32–36)
MCV RBC AUTO: 90.5 FL — SIGNIFICANT CHANGE UP (ref 80–100)
PHOSPHATE SERPL-MCNC: 4.1 MG/DL — SIGNIFICANT CHANGE UP (ref 2.5–4.5)
PLATELET # BLD AUTO: 527 K/UL — HIGH (ref 150–400)
POTASSIUM SERPL-MCNC: 5 MMOL/L — SIGNIFICANT CHANGE UP (ref 3.5–5.3)
POTASSIUM SERPL-SCNC: 5 MMOL/L — SIGNIFICANT CHANGE UP (ref 3.5–5.3)
RBC # BLD: 2.94 M/UL — LOW (ref 3.8–5.2)
RBC # FLD: 17 % — HIGH (ref 10.3–16.9)
SODIUM SERPL-SCNC: 137 MMOL/L — SIGNIFICANT CHANGE UP (ref 135–145)
WBC # BLD: 9.2 K/UL — SIGNIFICANT CHANGE UP (ref 3.8–10.5)
WBC # FLD AUTO: 9.2 K/UL — SIGNIFICANT CHANGE UP (ref 3.8–10.5)

## 2018-08-14 PROCEDURE — 84145 PROCALCITONIN (PCT): CPT

## 2018-08-14 PROCEDURE — 82803 BLOOD GASES ANY COMBINATION: CPT

## 2018-08-14 PROCEDURE — 99291 CRITICAL CARE FIRST HOUR: CPT

## 2018-08-14 PROCEDURE — 85025 COMPLETE CBC W/AUTO DIFF WBC: CPT

## 2018-08-14 PROCEDURE — 51702 INSERT TEMP BLADDER CATH: CPT | Mod: XU

## 2018-08-14 PROCEDURE — 83605 ASSAY OF LACTIC ACID: CPT

## 2018-08-14 PROCEDURE — 71045 X-RAY EXAM CHEST 1 VIEW: CPT | Mod: 26

## 2018-08-14 PROCEDURE — 86901 BLOOD TYPING SEROLOGIC RH(D): CPT

## 2018-08-14 PROCEDURE — 83735 ASSAY OF MAGNESIUM: CPT

## 2018-08-14 PROCEDURE — 87086 URINE CULTURE/COLONY COUNT: CPT

## 2018-08-14 PROCEDURE — 31500 INSERT EMERGENCY AIRWAY: CPT

## 2018-08-14 PROCEDURE — 80048 BASIC METABOLIC PNL TOTAL CA: CPT

## 2018-08-14 PROCEDURE — 82040 ASSAY OF SERUM ALBUMIN: CPT

## 2018-08-14 PROCEDURE — 80202 ASSAY OF VANCOMYCIN: CPT

## 2018-08-14 PROCEDURE — 86900 BLOOD TYPING SEROLOGIC ABO: CPT

## 2018-08-14 PROCEDURE — 97162 PT EVAL MOD COMPLEX 30 MIN: CPT

## 2018-08-14 PROCEDURE — 87070 CULTURE OTHR SPECIMN AEROBIC: CPT

## 2018-08-14 PROCEDURE — 93005 ELECTROCARDIOGRAM TRACING: CPT | Mod: XU

## 2018-08-14 PROCEDURE — 82962 GLUCOSE BLOOD TEST: CPT

## 2018-08-14 PROCEDURE — 87186 SC STD MICRODIL/AGAR DIL: CPT

## 2018-08-14 PROCEDURE — 85027 COMPLETE CBC AUTOMATED: CPT

## 2018-08-14 PROCEDURE — 97530 THERAPEUTIC ACTIVITIES: CPT

## 2018-08-14 PROCEDURE — 97110 THERAPEUTIC EXERCISES: CPT

## 2018-08-14 PROCEDURE — 84100 ASSAY OF PHOSPHORUS: CPT

## 2018-08-14 PROCEDURE — 80053 COMPREHEN METABOLIC PANEL: CPT

## 2018-08-14 PROCEDURE — 87040 BLOOD CULTURE FOR BACTERIA: CPT

## 2018-08-14 PROCEDURE — 36415 COLL VENOUS BLD VENIPUNCTURE: CPT

## 2018-08-14 PROCEDURE — 81001 URINALYSIS AUTO W/SCOPE: CPT

## 2018-08-14 PROCEDURE — 96375 TX/PRO/DX INJ NEW DRUG ADDON: CPT | Mod: XU

## 2018-08-14 PROCEDURE — 83880 ASSAY OF NATRIURETIC PEPTIDE: CPT

## 2018-08-14 PROCEDURE — 94003 VENT MGMT INPAT SUBQ DAY: CPT

## 2018-08-14 PROCEDURE — 82553 CREATINE MB FRACTION: CPT

## 2018-08-14 PROCEDURE — 85730 THROMBOPLASTIN TIME PARTIAL: CPT

## 2018-08-14 PROCEDURE — 84484 ASSAY OF TROPONIN QUANT: CPT

## 2018-08-14 PROCEDURE — 94660 CPAP INITIATION&MGMT: CPT

## 2018-08-14 PROCEDURE — 82550 ASSAY OF CK (CPK): CPT

## 2018-08-14 PROCEDURE — 94640 AIRWAY INHALATION TREATMENT: CPT

## 2018-08-14 PROCEDURE — P9047: CPT

## 2018-08-14 PROCEDURE — 85610 PROTHROMBIN TIME: CPT

## 2018-08-14 PROCEDURE — L8699: CPT

## 2018-08-14 PROCEDURE — 86850 RBC ANTIBODY SCREEN: CPT

## 2018-08-14 PROCEDURE — 96374 THER/PROPH/DIAG INJ IV PUSH: CPT | Mod: XU

## 2018-08-14 PROCEDURE — 94002 VENT MGMT INPAT INIT DAY: CPT

## 2018-08-14 PROCEDURE — 71045 X-RAY EXAM CHEST 1 VIEW: CPT

## 2018-08-14 PROCEDURE — 83690 ASSAY OF LIPASE: CPT

## 2018-08-14 RX ORDER — AMIODARONE HYDROCHLORIDE 400 MG/1
200 TABLET ORAL DAILY
Qty: 0 | Refills: 0 | Status: DISCONTINUED | OUTPATIENT
Start: 2018-08-15 | End: 2018-08-14

## 2018-08-14 RX ORDER — MIDODRINE HYDROCHLORIDE 2.5 MG/1
1 TABLET ORAL
Qty: 0 | Refills: 0 | COMMUNITY
Start: 2018-08-14

## 2018-08-14 RX ORDER — AMIODARONE HYDROCHLORIDE 400 MG/1
1 TABLET ORAL
Qty: 0 | Refills: 0 | COMMUNITY
Start: 2018-08-14

## 2018-08-14 RX ADMIN — Medication 650 MILLIGRAM(S): at 10:40

## 2018-08-14 RX ADMIN — Medication 3 MILLILITER(S): at 03:03

## 2018-08-14 RX ADMIN — MIDODRINE HYDROCHLORIDE 10 MILLIGRAM(S): 2.5 TABLET ORAL at 05:51

## 2018-08-14 RX ADMIN — Medication 100 MILLIGRAM(S): at 05:51

## 2018-08-14 RX ADMIN — PANTOPRAZOLE SODIUM 40 MILLIGRAM(S): 20 TABLET, DELAYED RELEASE ORAL at 06:00

## 2018-08-14 RX ADMIN — Medication 3 MILLILITER(S): at 09:40

## 2018-08-14 RX ADMIN — Medication 650 MILLIGRAM(S): at 09:40

## 2018-08-14 RX ADMIN — AMIODARONE HYDROCHLORIDE 200 MILLIGRAM(S): 400 TABLET ORAL at 05:52

## 2018-08-14 RX ADMIN — Medication 3 MILLILITER(S): at 13:10

## 2018-08-14 RX ADMIN — Medication 3 MILLILITER(S): at 05:49

## 2018-08-14 RX ADMIN — MIDODRINE HYDROCHLORIDE 10 MILLIGRAM(S): 2.5 TABLET ORAL at 13:10

## 2018-08-14 RX ADMIN — HEPARIN SODIUM 5000 UNIT(S): 5000 INJECTION INTRAVENOUS; SUBCUTANEOUS at 05:52

## 2018-08-14 NOTE — PROGRESS NOTE ADULT - PROBLEM SELECTOR PLAN 6
Patient nonverbal and AAOx0 at baseline. Will smile at provider and occasionally respond to provider but generally does not follow commands   -continue to monitor  -palliative following, recs appreciated. Patient's son confirmed full code status  -OOBTC with assistance if tolerated.    #urinary incontinence  -likely 2/2 dementia

## 2018-08-14 NOTE — PROGRESS NOTE ADULT - SUBJECTIVE AND OBJECTIVE BOX
OVERNIGHT: ALYCIA  SUBJECTIVE: Pt seen and examined at bedside. Pt is nonverbal. Nods head to her name, but is not answering any other questions this morning.     OBJECTIVE:  Vital Signs Last 24 Hrs  T(C): 36.6 (14 Aug 2018 08:28), Max: 37 (14 Aug 2018 05:00)  T(F): 97.8 (14 Aug 2018 08:28), Max: 98.6 (14 Aug 2018 05:00)  HR: 80 (14 Aug 2018 08:28) (72 - 88)  BP: 109/62 (14 Aug 2018 08:28) (94/55 - 124/67)  BP(mean): 72 (13 Aug 2018 12:34) (72 - 72)  RR: 18 (14 Aug 2018 08:28) (16 - 19)  SpO2: 93% (14 Aug 2018 08:28) (92% - 97%)    PHYSICAL EXAM:    Constitutional: elderly female, resting comfortably in bed; NAD;   Head: NC/AT  Eyes:  clear conjunctiva  ENT: no nasal discharge; MMM  Respiratory: rhonchi and wheezes present bilaterally, no retractions  Cardiac: +S1/S2; RRR; no M/R/G  Gastrointestinal: soft, NT/ND; no rebound or guarding; normoactive BS; PEG tube in place and site of insertion is clean/dry/intact  Neurologic: unable to assess as pt is unresponsive/cannot follow commands; no obvious focal deficits     MEDICATIONS  (STANDING):  acetaminophen    Suspension. 650 milliGRAM(s) Oral daily  ALBUTerol/ipratropium for Nebulization 3 milliLiter(s) Nebulizer every 4 hours  amiodarone    Tablet 200 milliGRAM(s) Oral two times a day  docusate sodium Liquid 100 milliGRAM(s) Oral two times a day  heparin  Injectable 5000 Unit(s) SubCutaneous every 12 hours  midodrine 10 milliGRAM(s) Oral every 8 hours  pantoprazole   Suspension 40 milliGRAM(s) Oral before breakfast  polyethylene glycol 3350 17 Gram(s) Oral daily  senna Syrup 10 milliLiter(s) Oral at bedtime    MEDICATIONS  (PRN):    .  LABS:                         7.8    9.2   )-----------( 527      ( 14 Aug 2018 06:40 )             26.6     08-14    137  |  95<L>  |  29<H>  ----------------------------<  126<H>  5.0   |  34<H>  |  1.07    Ca    9.1      14 Aug 2018 06:40  Phos  4.1     08-14  Mg     2.3     08-14                    RADIOLOGY, EKG & ADDITIONAL TESTS: Reviewed.

## 2018-08-14 NOTE — DISCHARGE NOTE ADULT - CARE PROVIDERS DIRECT ADDRESSES
,DirectAddress_Unknown ,DirectAddress_Unknown,[email or web address]

## 2018-08-14 NOTE — PROGRESS NOTE ADULT - PROVIDER SPECIALTY LIST ADULT
Critical Care
Gastroenterology
Internal Medicine
MICU
Palliative Care
Pulmonology
Internal Medicine
MICU
Pulmonology
Pulmonology
Internal Medicine

## 2018-08-14 NOTE — PROGRESS NOTE ADULT - PROBLEM SELECTOR PROBLEM 1
Dysphagia, unspecified type
Celiac disease
Dementia without behavioral disturbance, unspecified dementia type
Dementia without behavioral disturbance, unspecified dementia type
Acute respiratory failure with hypercapnia
Aspiration pneumonia due to gastric secretions, unspecified laterality, unspecified part of lung
Dementia without behavioral disturbance, unspecified dementia type
Acute respiratory failure with hypercapnia
Dysphagia, unspecified type
Acute respiratory failure with hypercapnia
Acute respiratory failure with hypercapnia
Septic shock
Acute respiratory failure with hypercapnia

## 2018-08-14 NOTE — PROGRESS NOTE ADULT - ASSESSMENT
89 y/o F with PMH dementia, recurrent UTI, Celiac' s disease, lactose intolerance, paroxsymal Afib (not on AC 2/2 GI AVMs). 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 for UTI and aspiration PNA presents from [facility] for respiratory distress, admitted for hypoxic respiratory failure and septic shock 2/2 aspiration pneumonia.

## 2018-08-14 NOTE — PROGRESS NOTE ADULT - PROBLEM SELECTOR PROBLEM 6
Dementia associated with other underlying disease without behavioral disturbance
Dementia associated with other underlying disease without behavioral disturbance
Dementia without behavioral disturbance, unspecified dementia type
Anemia
Dementia associated with other underlying disease without behavioral disturbance
Dementia without behavioral disturbance, unspecified dementia type
Anemia, unspecified type
Cardiomegaly
Dementia associated with other underlying disease without behavioral disturbance
Anemia, unspecified type
Dementia associated with other underlying disease without behavioral disturbance
Anemia, unspecified type
Dementia associated with other underlying disease without behavioral disturbance
Palliative care by specialist
Dementia associated with other underlying disease without behavioral disturbance

## 2018-08-14 NOTE — PROGRESS NOTE ADULT - PROBLEM SELECTOR PROBLEM 5
CAMRYN (acute kidney injury)
CAMRYN (acute kidney injury)
Acute respiratory failure with hypercapnia
CAMRYN (acute kidney injury)
Cardiomegaly
Acute respiratory failure with hypercapnia
CAMRYN (acute kidney injury)
Acute respiratory failure with hypercapnia
CAMRYN (acute kidney injury)
Irritable bowel syndrome, unspecified type
Acute respiratory failure with hypercapnia
Acute respiratory failure with hypercapnia
CAMRYN (acute kidney injury)
Decreased appetite
CAMRYN (acute kidney injury)

## 2018-08-14 NOTE — PROGRESS NOTE ADULT - PROBLEM SELECTOR PLAN 9
F: none  E: replete as needed  N: PEG feeds     FULL CODE: son is HCP and verbally communicated that he would like intubation, CPR, lines and pressors should she require them.
F: none  E: replete as needed  N: PEG feeds     FULL CODE: son is HCP and verbally communicated that he would like intubation, CPR, lines and pressors should she require them.
DVT ppx: Heparin sq q12  GI ppx: iv protonix.
F: none  E: replete as needed  N: PEG feeds     FULL CODE: son is HCP and verbally communicated that he would like intubation, CPR, lines and pressors should she require them.
DVT ppx: Heparin sq q12  GI ppx: iv protonix.
F: none  E: replete as needed  N: PEG feeds     FULL CODE: son is HCP and verbally communicated that he would like intubation, CPR, lines and pressors should she require them.

## 2018-08-14 NOTE — PROGRESS NOTE ADULT - PROBLEM SELECTOR PROBLEM 7
Other chronic pain
Primary osteoarthritis, unspecified site
Dementia
Primary osteoarthritis, unspecified site
Acute respiratory failure with hypercapnia
Other chronic pain

## 2018-08-14 NOTE — DISCHARGE NOTE ADULT - MEDICATION SUMMARY - MEDICATIONS TO TAKE
I will START or STAY ON the medications listed below when I get home from the hospital:    acetaminophen 650 mg rectal suppository  -- 1 suppository(ies) rectally every 6 hours, As needed, Moderate Pain (4 - 6)  -- Indication: For Pain    amiodarone 200 mg oral tablet  -- 1 tab(s) by mouth once a day  -- Indication: For Afib    Megace 40 mg/mL oral suspension  -- 10 milliliter(s) by mouth once a day  -- Indication: For Constipation    ipratropium-albuterol 0.5 mg-2.5 mg/3 mLinhalation solution  -- 3 milliliter(s) inhaled every 6 hours  -- Indication: For wheezing    ferrous sulfate 325 mg (65 mg elemental iron) oral tablet  -- 1 tab(s) by mouth 3 times a day  -- Indication: For Nutrition, metabolism, and development symptoms    Dulcolax Stool Softener 100 mg oral capsule  -- 1 cap(s) by mouth 2 times a day  -- Indication: For Constipation    midodrine 10 mg oral tablet  -- 1 tab(s) by mouth every 8 hours  -- Indication: For hypotension    omeprazole 20 mg oral delayed release tablet  -- 1 tab(s) by mouth once a day before breakfast  -- Obtain medical advice before taking any non-prescription drugs as some may affect the action of this medication.  Swallow whole.  Do not crush.    -- Indication: For gerd

## 2018-08-14 NOTE — DISCHARGE NOTE ADULT - PLAN OF CARE
Prevent recurrence You were admitted for respiratory distress, which was likely from a pnuemonia due to aspiration. Your imaging showed bilateral pleural infiltrates and a consolidation in the right lower lobe. You were placed on BiPAP, but your breathing worsened requiring you to be intubated. You were also treated with IV antibiotics for the pneumonia. Eventually you improved and were extubated. You had a feeding tube placed to reduce your risk of aspirating. By the end of your hospital stay you were breathing well with supplemental oxygen and were stable for discharge. Your blood pressure was still low, and you were given midodrine. You will go back to your nursing home and be seen by Dr. Motta there. Your oxygen and midodrine will be tapered at your nursing home.

## 2018-08-14 NOTE — PROGRESS NOTE ADULT - PROBLEM SELECTOR PROBLEM 2
Septic shock
Anemia, unspecified type
Anemia, unspecified type
Celiac disease
Other chronic pain
Septic shock
Anemia, unspecified type
Septic shock
Septic shock
PNA (pneumonia)
Septic shock

## 2018-08-14 NOTE — PROGRESS NOTE ADULT - PROBLEM SELECTOR PLAN 1
Likely 2/2 aspiration pneumonia vs HAP. CXR showed b/l pleural infiltrates. Initial USG done at bedside showing RLL consolidation. In ED VBG significant for pH of 7.21, pCO2 98, pO2 79. Pt placed on BiPAP. Repeat ABG after 90 minutes on BiPAP- pH 7.28, pCO2 70, pO2 66; patient subsequently intubated in the ED. Patient extubated 8/7. Sputum cx grew staph aureus MSSA and is s/p 7 day treatment of IV cefazolin  -Patient saturating well on 5L NC  -pulm recs appreciated  -c/w duonebs   -c/w NC, can increase to HFNC if required (have HFNC at bedside)  -if T >100.4, pt will need blood cultures. No Abx for now.   -f/u repeat CXR    #Cardiomyopathy  CXR showing b/l pleural effusion 8/9/18  -holding lasix for now  -f/u repeat CXR  -monitor strict I/O, daily wts  -pt should follow up with Dr. Stacy in 1 week

## 2018-08-14 NOTE — PROGRESS NOTE ADULT - PROBLEM SELECTOR PROBLEM 8
Prophylactic measure
Prophylactic measure
Nutrition, metabolism, and development symptoms
Pneumonia due to infectious organism, unspecified laterality, unspecified part of lung
Pneumonia due to infectious organism, unspecified laterality, unspecified part of lung
Celiac disease
Prophylactic measure
Nutrition, metabolism, and development symptoms
Prophylactic measure

## 2018-08-14 NOTE — DISCHARGE NOTE ADULT - HOSPITAL COURSE
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 Pt was found to be in  acute respiratory failure- ABG showed pH of 7.21, pCO2 98, pO2 79.Pt was intubated in the ED. Pt also in septic shock secondary to aspiration pna, was started on levophed for pressor support. Pt was initially treated with vanc and meropenum for hospital acquired pna in the setting of recent hospital admission but sputum cx positive for MSSA and abx were deescalated to IV cefazolin. Of note patient has a hx of paroxysmal a fib however never anti coagulated given hx of gastric AVM.  During course of hospital admission pt went into rapid afib requiring increasing levophed requirements. Pt was cardioverted and has been in sinus rhythm since. Started on 400 mg amiodarone bid with plan to decrease amio to 200mg QD on 8/14/18. Pt has a hx of dysphagia, upon last admission for asp pna there was plan for PEG however pt passed barium swallow and that was held off. In the setting for dysphagia and asp pna Dr Hale planned for bedside PEG tube , placed on 8/6/18. Pt extubated (8/7/18). Started feeds after extubation. Pt was taken off of levophed. SPB ranges in 90-95s. MAP 65-75. Pt was attempted to switch to high flow, did not tolerate. Tubbs Cath removed 8/9 at 10:30 am, currently still incontinent.  Pt started on miralax for constipation.   Patient transferred to [facility], and placed on NC 5L with goal of decreasing to home 2L NC. Patient also noted to be hypervolemic and has been receiving gentle diuresis with 20mg Lasix if BP supports (on midodrine for BP support). Eventually lasix was DC'd as pt appeared clinically dry on exam; lasix can be restarted as needed. Pt became stable for transfer to [facility]. On medical floors pt was stable and saturating well on 5L O2, safe for discharge back to nursing home with O2 weaned as possible. On discharge pt continues to be on midodrine for hypotension--can taper as appropriate.    On the day of discharge, the patient was seen and examined. Symptoms improved. Vital signs are stable. Labs and imaging reviewed. Patient is medically optimized and hemodynamically stable. Return precautions discussed, medication teach back done, and importance of physician followup emphasized. The patient verbalized understanding.

## 2018-08-14 NOTE — DISCHARGE NOTE ADULT - CARE PLAN
Principal Discharge DX:	Acute respiratory failure with hypercapnia  Goal:	Prevent recurrence  Assessment and plan of treatment:	You were admitted for respiratory distress, which was likely from a pnuemonia due to aspiration. Your imaging showed bilateral pleural infiltrates and a consolidation in the right lower lobe. You were placed on BiPAP, but your breathing worsened requiring you to be intubated. You were also treated with IV antibiotics for the pneumonia. Eventually you improved and were extubated. You had a feeding tube placed to reduce your risk of aspirating. By the end of your hospital stay you were breathing well with supplemental oxygen and were stable for discharge. Your blood pressure was still low, and you were given midodrine. You will go back to your nursing home and be seen by Dr. Motta there. Your oxygen and midodrine will be tapered at your nursing home.

## 2018-08-14 NOTE — PROGRESS NOTE ADULT - PROBLEM SELECTOR PLAN 2
Resolved. Upon arrival patient had a fever , was tachycardic, elevated wbc count 18.2, lactate negative. Received 1 L N.S , no additional fluids given because perfusion good- lactate is negative, no edema noted, extremities not cold- clinically appeared euvolemic etiology related to aspiration pna vs hospital acquired pna   In setting of recent admit to hospital are treating for hospital acquired pna ,s/p vanc and meropenum in ED. Troponin 0.05, EKG unchanged from prior admission.  BNP 08854. Last echocardiogram in July had intact LV function with EF of 65%. Patient required levophed for BP support.  -No longer on levophed. MAP goal >60  -c/w midodrine 10 mg tid   -sputum cx showing staph aureus MSSA    -urine cx negative  - completed IV cefazolin 7 day course
Resolved. Upon arrival patient had a fever , was tachycardic, elevated wbc count 18.2, lactate negative. Received 1 L N.S , no additional fluids given because perfusion good- lactate is negative, no edema noted, extremities not cold- clinically appeared euvolemic etiology related to aspiration pna vs hospital acquired pna   In setting of recent admit to hospital are treating for hospital acquired pna ,s/p vanc and meropenum in ED. Troponin 0.05, EKG unchanged from prior admission.  BNP 73448. Last echocardiogram in July had intact LV function with EF of 65%. Patient required levophed for BP support.  -No longer on levophed. MAP goal >60  -c/w midodrine 10 mg tid   -sputum cx showing staph aureus MSSA    -urine cx negative  - completed IV cefazolin 7 day course
Related to osteoarthritis.  Recommend acetaminophen 650 mg once daily prior to getting patient OOB or doing PT.
Resolved. Upon arrival patient had a fever , was tachycardic, elevated wbc count 18.2, lactate negative. Received 1 L N.S , no additional fluids given because perfusion good- lactate is negative, no edema noted, extremities not cold- clinically appeared euvolemic etiology related to aspiration pna vs hospital acquired pna   In setting of recent admit to hospital are treating for hospital acquired pna ,s/p vanc and meropenum in ED. Troponin 0.05, EKG unchanged from prior admission.  BNP 59301. Last echocardiogram in July had intact LV function with EF of 65%. Patient required levophed for BP support.  -No longer on levophed. MAP goal >60  -c/w midodrine 10 mg tid   -sputum cx showing staph aureus MSSA    -urine cx negative  - completed IV cefazolin 7 day course
Resolved. Upon arrival patient had a fever , was tachycardic, elevated wbc count 18.2, lactate negative. Received 1 L N.S , no additional fluids given because perfusion good- lactate is negative, no edema noted, extremities not cold- clinically appeared euvolemic etiology related to aspiration pna vs hospital acquired pna   In setting of recent admit to hospital are treating for hospital acquired pna ,s/p vanc and meropenum in ED. Troponin 0.05, EKG unchanged from prior admission.  BNP 90854. Last echocardiogram in July had intact LV function with EF of 65%. Patient required levophed for BP support.  -No longer on levophed. MAP goal >60  -c/w midodrine 10 mg tid   -sputum cx showing staph aureus MSSA    -urine cx negative  - completed IV cefazolin 7 day course
Resolved. Upon arrival patient had a fever , was tachycardic, elevated wbc count 18.2, lactate negative. Received 1 L N.S , no additional fluids given because perfusion good- lactate is negative, no edema noted, extremities not cold- clinically appeared euvolemic etiology related to aspiration pna vs hospital acquired pna   In setting of recent admit to hospital are treating for hospital acquired pna ,s/p vanc and meropenum in ED. Troponin 0.05, EKG unchanged from prior admission.  BNP 93862. Last echocardiogram in July had intact LV function with EF of 65%. Patient required levophed for BP support.  -No longer on levophed. MAP goal >60  -c/w midodrine 10 mg tid   -sputum cx showing staph aureus MSSA    -urine cx negative  - completed IV cefazolin 7 day course
Resolved. Upon arrival patient had a fever , was tachycardic, elevated wbc count 18.2, lactate negative. Received 1 L N.S , no additional fluids given because perfusion good- lactate is negative, no edema noted, extremities not cold- clinically appeared euvolemic etiology related to aspiration pna vs hospital acquired pna   In setting of recent admit to hospital are treating for hospital acquired pna ,s/p vanc and meropenum in ED. Troponin 0.05, EKG unchanged from prior admission.  BNP 71407. Last echocardiogram in July had intact LV function with EF of 65%. Patient required levophed for BP support.  -No longer on levophed. MAP goal >60  -c/w midodrine 10 mg tid   -sputum cx showing staph aureus MSSA    -urine cx negative  - completed IV cefazolin 7 day course
Resolved. Upon arrival patient had a fever , was tachycardic, elevated wbc count 18.2, lactate negative. Received 1 L N.S , no additional fluids given because perfusion good- lactate is negative, no edema noted, extremities not cold- clinically appeared euvolemic etiology related to aspiration pna vs hospital acquired pna   In setting of recent admit to hospital are treating for hospital acquired pna ,s/p vanc and meropenum in ED. Troponin 0.05, EKG unchanged from prior admission.  BNP 65138. Last echocardiogram in July had intact LV function with EF of 65%. Patient required levophed for BP support.  -No longer on levophed. MAP goal >60  -c/w midodrine 10 mg tid   -sputum cx showing staph aureus MSSA. s/p completed IV cefazolin 7 day course  -urine cx negative
resolved
Resolved. Upon arrival patient had a fever , was tachycardic, elevated wbc count 18.2, lactate negative. Received 1 L N.S. Troponin 0.05, EKG unchanged from prior admission.  BNP 93257. Last echocardiogram in July had intact LV function with EF of 65%. Patient required levophed for BP support.  -No longer on levophed. MAP goal >60  - s/p IV cefazolin 7 day course for MSSA pneumonia  -c/w midodrine 10 mg tid for hypotension. Titrate down.
Resolved. Upon arrival patient had a fever , was tachycardic, elevated wbc count 18.2, lactate negative. Received 1 L N.S , no additional fluids given because perfusion good- lactate is negative, no edema noted, extremities not cold- clinically appeared euvolemic etiology related to aspiration pna vs hospital acquired pna   In setting of recent admit to hospital are treating for hospital acquired pna ,s/p vanc and meropenum in ED. Troponin 0.05, EKG unchanged from prior admission.  BNP 05128. Last echocardiogram in July had intact LV function with EF of 65%. Patient required levophed for BP support.  -No longer on levophed. MAP goal >60  -c/w midodrine 10 mg tid   -sputum cx showing staph aureus MSSA    -urine cx negative  - completed IV cefazolin 7 day course
possibly
Resolved. Upon arrival patient had a fever , was tachycardic, elevated wbc count 18.2, lactate negative. Received 1 L N.S. Troponin 0.05, EKG unchanged from prior admission.  BNP 99099. Last echocardiogram in July had intact LV function with EF of 65%. Patient required levophed for BP support.  -No longer on levophed. MAP goal >60  - s/p IV cefazolin 7 day course for MSSA pneumonia  -c/w midodrine 10 mg tid for hypotension

## 2018-08-14 NOTE — PROGRESS NOTE ADULT - PROBLEM SELECTOR PLAN 7
#Chronic Pain: Related to osteoarthritis.   - acetaminophen 650 mg once daily prior to getting patient OOB or doing PT per Palliative   -palliative recs appreciated
#Chronic Pain: Related to osteoarthritis.   - acetaminophen 650 mg once daily prior to getting patient OOB or doing PT per Palliative   -palliative recs appreciated
Related to osteoarthritis.   - acetaminophen 650 mg once daily prior to getting patient OOB or doing PT per Palliative   -palliative recs appreciated  -OOBTC with assistance if tolerated.    #constipation  -c/w miralax, colace, senna
prognosis remains poor
#Chronic Pain: Related to osteoarthritis.   - acetaminophen 650 mg once daily prior to getting patient OOB or doing PT per Palliative   -palliative recs appreciated  -OOBTC with assistance if tolerated
Related to osteoarthritis.   - acetaminophen 650 mg once daily prior to getting patient OOB or doing PT per Palliative   -palliative recs appreciated  -OOBTC with assistance if tolerated.    #constipation  -c/w miralax, colace, senna
#Chronic Pain: Related to osteoarthritis.   - acetaminophen 650 mg once daily prior to getting patient OOB or doing PT per Palliative   -palliative recs appreciated  -OOBTC with assistance if tolerated
#Chronic Pain: Related to osteoarthritis.   - acetaminophen 650 mg once daily prior to getting patient OOB or doing PT per Palliative   -palliative recs appreciated
#Chronic Pain: Related to osteoarthritis.   - acetaminophen 650 mg once daily prior to getting patient OOB or doing PT per Palliative   -palliative recs appreciated
#Chronic Pain: Related to osteoarthritis.   - acetaminophen 650 mg once daily prior to getting patient OOB or doing PT per Palliative   -palliative recs appreciated  -OOBTC with assistance if tolerated

## 2018-08-14 NOTE — PROGRESS NOTE ADULT - PROBLEM SELECTOR PLAN 10
1) PCP Contacted on Admission: (Y/N) --> Name & Phone #:  2) Date of Contact with PCP:  3) PCP Contacted at Discharge: (Y/N, N/A)  4) Summary of Handoff Given to PCP:   5) Post-Discharge Appointment Date and Location:    Patient's case and plan discussed with primary team and consultation services.
Transition of Care     1) PCP Contacted on Admission: (Y/N) --> Name & Phone #: Dr. Motta contacted on day of transfer to [facility] ([phone])  2) Date of Contact with PCP: 8/13/18  3) PCP Contacted at Discharge: (Y/N)  4) Summary of Handoff Given to PCP:  5) Post-Discharge Appointment Date and Location:
1) PCP Contacted on Admission: (Y/N) --> Name & Phone #:  2) Date of Contact with PCP:  3) PCP Contacted at Discharge: (Y/N, N/A)  4) Summary of Handoff Given to PCP:   5) Post-Discharge Appointment Date and Location:    Patient's case and plan discussed with primary team and consultation services.
1) PCP Contacted on Admission: (Y/N) --> Name & Phone #:  2) Date of Contact with PCP:  3) PCP Contacted at Discharge: (Y/N, N/A)  4) Summary of Handoff Given to PCP:   5) Post-Discharge Appointment Date and Location:    Patient's case and plan discussed with primary team and consultation services.
Transition of Care     1) PCP Contacted on Admission: (Y/N) --> Name & Phone #: Dr. Motta contacted on day of transfer to [facility] ([phone])  2) Date of Contact with PCP: 8/13/18  3) PCP Contacted at Discharge: (Y/N)  4) Summary of Handoff Given to PCP: PCP following in house.   5) Post-Discharge Appointment Date and Location:
1) PCP Contacted on Admission: (Y/N) --> Name & Phone #:  2) Date of Contact with PCP:  3) PCP Contacted at Discharge: (Y/N, N/A)  4) Summary of Handoff Given to PCP:   5) Post-Discharge Appointment Date and Location:    Patient's case and plan discussed with primary team and consultation services.

## 2018-08-14 NOTE — PROGRESS NOTE ADULT - PROBLEM SELECTOR PLAN 8
DVT ppx: Heparin sq q12  GI ppx: iv protonix
DVT ppx: Heparin sq q12  GI ppx: iv protonix
F: none  E: replete as needed  N: PEG feeds     FULL CODE: son is HCP and verbally communicated that he would like intubation, CPR, lines and pressors should she require them.
DVT ppx: Heparin sq q12  GI ppx: iv protonix
F: none  E: replete as needed  N: PEG feeds     FULL CODE: son is HCP and verbally communicated that he would like intubation, CPR, lines and pressors should she require them.
DVT ppx: Heparin sq q12  GI ppx: iv protonix

## 2018-08-14 NOTE — DISCHARGE NOTE ADULT - ADDITIONAL INSTRUCTIONS
Follow up with Dr. Motta while at nursing [place]. Follow up with Dr. Motta while at nursing home.  Cardiology appointment with Dr. Mcdermott: Tuesday 8/21 11:50am.

## 2018-08-14 NOTE — PROGRESS NOTE ADULT - PROBLEM SELECTOR PROBLEM 10
Transition of care performed with sharing of clinical summary
DVT (deep venous thrombosis)
Transition of care performed with sharing of clinical summary

## 2018-08-14 NOTE — PROGRESS NOTE ADULT - PROBLEM SELECTOR PROBLEM 3
Dementia associated with other underlying disease without behavioral disturbance
Acute respiratory failure with hypercapnia
Atrial fibrillation
Atrial fibrillation
Dementia associated with other underlying disease without behavioral disturbance
Dysphagia, unspecified type
Dysphagia, unspecified type
Pneumonia due to infectious organism, unspecified laterality, unspecified part of lung
Dysphagia, unspecified type
Adult failure to thrive
Atrial fibrillation
Paroxysmal atrial fibrillation
Atrial fibrillation
Paroxysmal atrial fibrillation

## 2018-08-14 NOTE — DISCHARGE NOTE ADULT - PATIENT PORTAL LINK FT
You can access the [facility] Patient Portal, offered by [facility], by registering with the following website: [email or web address]

## 2018-08-14 NOTE — PROGRESS NOTE ADULT - PROBLEM SELECTOR PROBLEM 9
Nutrition, metabolism, and development symptoms
Nutrition, metabolism, and development symptoms
Septic shock
Prophylactic measure
Septic shock
Dysphagia
Nutrition, metabolism, and development symptoms
Prophylactic measure
Nutrition, metabolism, and development symptoms

## 2018-08-14 NOTE — PROGRESS NOTE ADULT - PROBLEM SELECTOR PLAN 3
No AC given hx of gastric avm on egd. Patient hypotensive on 8/3 and in afib with rvr, s/p 10 of lopressor, 5 of verapamil. Pt then was started on amiodarone infusion and Levophed was increased to 25 mcg.  Patient s/p cardioversion 2/2 increasing levophed requirements and hemodynamic instability. Patient now in sinus rhythm  and better BP control and s/p amio drip  -c/w oral amiodarone 200mg BID  -f/u EKG    #Anemia  -2/2 chronic disease  -transfuse to Hb goal of 7.  -maintain active T&S (last done 8/13/18)

## 2018-08-14 NOTE — PROGRESS NOTE ADULT - PROBLEM SELECTOR PROBLEM 4
Pleural effusion
Dysphagia
Pleural effusion
Dysphagia
Dysphagia
Pleural effusion
Dementia without behavioral disturbance, unspecified dementia type
Septic shock
Dementia associated with other underlying disease without behavioral disturbance
Dysphagia

## 2018-08-14 NOTE — DISCHARGE NOTE ADULT - CARE PROVIDER_API CALL
Dl Motta (MD), Internal Medicine  [address] [address]  Phone: [phone]  Fax: [phone] Dl Motta), Internal Medicine  [address] [address]  Phone: [phone]  Fax: [phone]    Sana Anguiano), Internal Medicine  [address] [id number]  Phone: [phone]  Fax: [phone]

## 2018-08-14 NOTE — PROGRESS NOTE ADULT - SUBJECTIVE AND OBJECTIVE BOX
CC/ HPI Ms. Goff is a 89 year old lady with dementia, history of ESBL UTI, bilateral DVT s/p IVC filter 2017, who was admitted from the nursing home with pneumonia complicated by septic shock and respiratory failure, seen this morning resting comfortably in bed.    PAST MEDICAL HISTORY:  Dementia  DVT (deep venous thrombosis)  Dysphagia  Anemia  DJD (degenerative joint disease)  Edema  IBS (irritable bowel syndrome)  Celiac disease  H/O inguinal hernia repair  History of total left hip replacement    SOCHX:  -  alcohol    FMHX: FA/MO  - contributory     ROS reviewed below with positive findings marked (+) :  GEN:  fever, chills ENT: tracheostomy,   epistaxis,  sinusitis COR: CAD, CHF,  HTN, dysrhythmia PUL: COPD, ILD, asthma, pneumonia GI: PEG, dysphagia, hemorrhage, other LUDWIN: kidney disease, electrolyte disorder HEM:  anemia, +thrombus, coagulopathy, cancer ENDO:  thyroid disease, diabetes mellitus CNS:  +dementia, stroke, seizure, PSY:  depression, anxiety, other      MEDICATIONS  (STANDING):  acetaminophen    Suspension. 650 milliGRAM(s) Oral daily  ALBUTerol/ipratropium for Nebulization 3 milliLiter(s) Nebulizer every 4 hours  amiodarone    Tablet 200 milliGRAM(s) Oral two times a day  docusate sodium Liquid 100 milliGRAM(s) Oral two times a day  heparin  Injectable 5000 Unit(s) SubCutaneous every 12 hours  midodrine 10 milliGRAM(s) Oral every 8 hours  pantoprazole   Suspension 40 milliGRAM(s) Oral before breakfast  polyethylene glycol 3350 17 Gram(s) Oral daily  senna Syrup 10 milliLiter(s) Oral at bedtime    MEDICATIONS  (PRN):      Vital Signs Last 24 Hrs  T(C): 36.6 (14 Aug 2018 08:28), Max: 37.1 (13 Aug 2018 10:38)  T(F): 97.8 (14 Aug 2018 08:28), Max: 98.7 (13 Aug 2018 10:38)  HR: 80 (14 Aug 2018 08:28) (72 - 88)  BP: 109/62 (14 Aug 2018 08:28) (94/55 - 124/67)  BP(mean): 72 (13 Aug 2018 12:34) (72 - 72)  RR: 18 (14 Aug 2018 08:28) (16 - 19)  SpO2: 93% (14 Aug 2018 08:28) (92% - 97%)    GENERAL:         comfortable,  - distress.  HEENT:            - trauma,  - icterus,  - injection,  - nasal discharge.  NECK:              - jugular venous distention, - thyromegaly.  LYMPH:           - lymphadenopathy, - masses.  RESP:               + crackles,   - rhonchi,   - wheezes.   COR:                S1S2   - gallops,  - rubs.  ABD:                bowel sounds,   soft, - tender, - distended.  EXT/MSC:         - cyanosis,  - clubbing,  - edema.    NEURO:             alert,   responds to stimuli.                          7.8    9.2   )-----------( 527      ( 14 Aug 2018 06:40 )             26.6     08-14    137  |  95<L>  |  29<H>  ----------------------------<  126<H>  5.0   |  34<H>  |  1.07        Xray Chest  (08.13.18)  Bibasilar infiltrates/atelectasis/effusions.    Echocardiogram (07.16.18) The left atrial size is normal. Right atrium not well visualized. Calcified aortic valve. No aortic regurgitation noted. There is Mild aortic stenosis.  The calculated aortic valve area using the continuity equation is1.7 cm2. The  calculated aortic valve area indexed to body surface area is 1.1 cm2/m2.  There is trace mitral  regurgitation. Structurally normal tricuspid valve. There is mild tricuspid  regurgitation. The pulmonary artery systolic pressure is estimated to be 61 mmHg. The right ventricle is normal in size and function.  There is a septal "knuckle" with no left ventricular outflow obstruction There is mild concentric left ventricular hypertrophy. The left ventricular wall motion is  normal. The left ventricular ejection fraction is estimated to be 65-70% .    ASSESSMENT/PLAN    1) Status post aspiration pneumonia  2) Atelectasis/ pleural effusions  3) Cardiomyopathy  4) Pulmonary hypertension  5) Dementia    Satisfactory SpO2, oxygen as needed  Bronchodilators:  Atrovent/ albuterol q 4 – 6 hours as needed  ID/Antibiotics:  status post cefazolin, blood cultures negative  Cardiac/HTN: amiodarone  GI: Rx/ prophylaxis c PPI/H2B  Heme: Rx/VT prophylaxis  Discussed with medical team

## 2018-08-14 NOTE — PROGRESS NOTE ADULT - PROBLEM SELECTOR PLAN 5
Resolved. Upon admission patient noted to have an CAMRYN: BUN 52 and Cr 1.16 increased from baseline in July - BUN 7 Cr .61. etiology likely prerenal in the - setting of sepsis   - continue to monitor
Resolved. Upon admission patient noted to have an CAMRYN: BUN 52 and Cr 1.16 increased from baseline in July - BUN 7 Cr .61. etiology likely prerenal in the - setting of sepsis   -Cr 1.04 today, closer to baseline. continue to monitor   -bladder scan showed 126cc 8/10/11.
CHF with very high BNP
Resolved. Upon admission patient noted to have an CAMRYN: BUN 52 and Cr 1.16 increased from baseline in July - BUN 7 Cr .61. etiology likely prerenal in the - setting of sepsis   -Cr 1.03, closer to baseline. continue to monitor   -bladder scan showed 126cc 8/10/11
Resolved. Upon admission patient noted to have an CAMRYN: BUN 52 and Cr 1.16 increased from baseline in July - BUN 7 Cr .61. etiology likely prerenal in the - setting of sepsis   -Cr 1.04 today, closer to baseline. continue to monitor   -bladder scan showed 126cc 8/10/11.
Resolved. Upon admission patient noted to have an CAMRYN: BUN 52 and Cr 1.16 increased from baseline in July - BUN 7 Cr .61. etiology likely prerenal in the - setting of sepsis   -Cr 1.03, closer to baseline. continue to monitor   -bladder scan showed 126cc 8/10/11
VENT support
Resolved. Upon admission patient noted to have an CAMRYN: BUN 52 and Cr 1.16 increased from baseline in July - BUN 7 Cr .61. etiology likely prerenal in the - setting of sepsis   - continue to monitor
Resolved. Upon admission patient noted to have an CAMRYN: BUN 52 and Cr 1.16 increased from baseline in July - BUN 7 Cr .61. etiology likely prerenal in the - setting of sepsis   - continue to monitor  -bladder scan showed 126cc
Resolved. Upon admission patient noted to have an CAMRYN: BUN 52 and Cr 1.16 increased from baseline in July - BUN 7 Cr .61. etiology likely prerenal in the - setting of sepsis   -Cr 1.03, closer to baseline. continue to monitor   -bladder scan showed 126cc 8/10/11
Son has chosen to have PEG placed.  We discussed that aspiration certainly remains possible despite the PEG.  Son not really open to this conversation.
Resolved. Upon admission patient noted to have an CAMRYN: BUN 52 and Cr 1.16 increased from baseline in July - BUN 7 Cr .61. etiology likely prerenal in the - setting of sepsis   -Cr 1.03, closer to baseline. continue to monitor   -bladder scan showed 126cc 8/10/11
Resolved. Upon admission patient noted to have an CAMRYN: BUN 52 and Cr 1.16 increased from baseline in July - BUN 7 Cr .61. etiology likely prerenal in the - setting of sepsis   -Cr 1.04 continue to monitor   -bladder scan showed 126cc 8/10/11
VENT support
resolved

## 2018-08-17 ENCOUNTER — INPATIENT (INPATIENT)
Facility: HOSPITAL | Age: 83
LOS: 12 days | DRG: 870 | End: 2018-08-30
Attending: STUDENT IN AN ORGANIZED HEALTH CARE EDUCATION/TRAINING PROGRAM | Admitting: STUDENT IN AN ORGANIZED HEALTH CARE EDUCATION/TRAINING PROGRAM
Payer: MEDICARE

## 2018-08-17 VITALS
HEART RATE: 55 BPM | TEMPERATURE: 97 F | DIASTOLIC BLOOD PRESSURE: 45 MMHG | RESPIRATION RATE: 12 BRPM | SYSTOLIC BLOOD PRESSURE: 50 MMHG | OXYGEN SATURATION: 92 %

## 2018-08-17 DIAGNOSIS — I82.91 CHRONIC EMBOLISM AND THROMBOSIS OF UNSPECIFIED VEIN: ICD-10-CM

## 2018-08-17 DIAGNOSIS — Z96.642 PRESENCE OF LEFT ARTIFICIAL HIP JOINT: Chronic | ICD-10-CM

## 2018-08-17 DIAGNOSIS — K90.0 CELIAC DISEASE: ICD-10-CM

## 2018-08-17 DIAGNOSIS — Z95.828 PRESENCE OF OTHER VASCULAR IMPLANTS AND GRAFTS: Chronic | ICD-10-CM

## 2018-08-17 DIAGNOSIS — F03.90 UNSPECIFIED DEMENTIA WITHOUT BEHAVIORAL DISTURBANCE: ICD-10-CM

## 2018-08-17 DIAGNOSIS — I46.9 CARDIAC ARREST, CAUSE UNSPECIFIED: ICD-10-CM

## 2018-08-17 DIAGNOSIS — Z51.5 ENCOUNTER FOR PALLIATIVE CARE: ICD-10-CM

## 2018-08-17 DIAGNOSIS — Z98.89 OTHER SPECIFIED POSTPROCEDURAL STATES: Chronic | ICD-10-CM

## 2018-08-17 DIAGNOSIS — J69.0 PNEUMONITIS DUE TO INHALATION OF FOOD AND VOMIT: ICD-10-CM

## 2018-08-17 LAB
ALBUMIN SERPL ELPH-MCNC: 2.7 G/DL — LOW (ref 3.3–5)
ALP SERPL-CCNC: 132 U/L — HIGH (ref 40–120)
ALT FLD-CCNC: 46 U/L — HIGH (ref 10–45)
ANION GAP SERPL CALC-SCNC: 13 MMOL/L — SIGNIFICANT CHANGE UP (ref 5–17)
ANION GAP SERPL CALC-SCNC: 18 MMOL/L — HIGH (ref 5–17)
APPEARANCE UR: CLEAR — SIGNIFICANT CHANGE UP
APTT BLD: 25.7 SEC — LOW (ref 27.5–37.4)
AST SERPL-CCNC: 79 U/L — HIGH (ref 10–40)
BACTERIA # UR AUTO: ABNORMAL /HPF
BASE EXCESS BLDA CALC-SCNC: 0.4 MMOL/L — SIGNIFICANT CHANGE UP (ref -2–3)
BASE EXCESS BLDV CALC-SCNC: -3.8 MMOL/L — SIGNIFICANT CHANGE UP
BASE EXCESS BLDV CALC-SCNC: SIGNIFICANT CHANGE UP MMOL/L
BASOPHILS NFR BLD AUTO: 1 % — SIGNIFICANT CHANGE UP (ref 0–2)
BILIRUB SERPL-MCNC: 0.5 MG/DL — SIGNIFICANT CHANGE UP (ref 0.2–1.2)
BILIRUB UR-MCNC: NEGATIVE — SIGNIFICANT CHANGE UP
BLD GP AB SCN SERPL QL: NEGATIVE — SIGNIFICANT CHANGE UP
BUN SERPL-MCNC: 44 MG/DL — HIGH (ref 7–23)
BUN SERPL-MCNC: 44 MG/DL — HIGH (ref 7–23)
CA-I SERPL-SCNC: 1.08 MMOL/L — LOW (ref 1.12–1.3)
CALCIUM SERPL-MCNC: 7.9 MG/DL — LOW (ref 8.4–10.5)
CALCIUM SERPL-MCNC: 8.7 MG/DL — SIGNIFICANT CHANGE UP (ref 8.4–10.5)
CHLORIDE SERPL-SCNC: 95 MMOL/L — LOW (ref 96–108)
CHLORIDE SERPL-SCNC: 99 MMOL/L — SIGNIFICANT CHANGE UP (ref 96–108)
CO2 SERPL-SCNC: 24 MMOL/L — SIGNIFICANT CHANGE UP (ref 22–31)
CO2 SERPL-SCNC: 25 MMOL/L — SIGNIFICANT CHANGE UP (ref 22–31)
COLOR SPEC: YELLOW — SIGNIFICANT CHANGE UP
CREAT SERPL-MCNC: 1.11 MG/DL — SIGNIFICANT CHANGE UP (ref 0.5–1.3)
CREAT SERPL-MCNC: 1.23 MG/DL — SIGNIFICANT CHANGE UP (ref 0.5–1.3)
DIFF PNL FLD: ABNORMAL
EOSINOPHIL NFR BLD AUTO: 2 % — SIGNIFICANT CHANGE UP (ref 0–6)
EPI CELLS # UR: SIGNIFICANT CHANGE UP /HPF (ref 0–5)
EXTRA BLUE TOP TUBE: SIGNIFICANT CHANGE UP
EXTRA LAVENDER TOP TUBE: SIGNIFICANT CHANGE UP
GAS PNL BLDA: SIGNIFICANT CHANGE UP
GAS PNL BLDV: 138 MMOL/L — SIGNIFICANT CHANGE UP (ref 138–146)
GAS PNL BLDV: SIGNIFICANT CHANGE UP
GAS PNL BLDV: SIGNIFICANT CHANGE UP
GLUCOSE SERPL-MCNC: 152 MG/DL — HIGH (ref 70–99)
GLUCOSE SERPL-MCNC: 230 MG/DL — HIGH (ref 70–99)
GLUCOSE UR QL: NEGATIVE — SIGNIFICANT CHANGE UP
HCO3 BLDA-SCNC: 27 MMOL/L — SIGNIFICANT CHANGE UP (ref 21–28)
HCO3 BLDV-SCNC: 26 MMOL/L — SIGNIFICANT CHANGE UP (ref 20–27)
HCO3 BLDV-SCNC: SIGNIFICANT CHANGE UP MMOL/L (ref 20–27)
HCT VFR BLD CALC: 24.6 % — LOW (ref 34.5–45)
HCT VFR BLD CALC: 24.9 % — LOW (ref 34.5–45)
HGB BLD-MCNC: 7.2 G/DL — LOW (ref 11.5–15.5)
HGB BLD-MCNC: 7.3 G/DL — LOW (ref 11.5–15.5)
HYPOCHROMIA BLD QL: SLIGHT — SIGNIFICANT CHANGE UP
INR BLD: 1.18 — HIGH (ref 0.88–1.16)
KETONES UR-MCNC: NEGATIVE — SIGNIFICANT CHANGE UP
LACTATE SERPL-SCNC: 2 MMOL/L — SIGNIFICANT CHANGE UP (ref 0.5–2)
LACTATE SERPL-SCNC: 4.7 MMOL/L — CRITICAL HIGH (ref 0.5–2)
LACTATE SERPL-SCNC: 6 MMOL/L — CRITICAL HIGH (ref 0.5–2)
LEUKOCYTE ESTERASE UR-ACNC: NEGATIVE — SIGNIFICANT CHANGE UP
LG PLATELETS BLD QL AUTO: PRESENT — SIGNIFICANT CHANGE UP
LYMPHOCYTES # BLD AUTO: 19 % — SIGNIFICANT CHANGE UP (ref 13–44)
MACROCYTES BLD QL: SLIGHT — SIGNIFICANT CHANGE UP
MAGNESIUM SERPL-MCNC: 2.1 MG/DL — SIGNIFICANT CHANGE UP (ref 1.6–2.6)
MANUAL SMEAR VERIFICATION: SIGNIFICANT CHANGE UP
MCHC RBC-ENTMCNC: 27.3 PG — SIGNIFICANT CHANGE UP (ref 27–34)
MCHC RBC-ENTMCNC: 27.5 PG — SIGNIFICANT CHANGE UP (ref 27–34)
MCHC RBC-ENTMCNC: 28.9 G/DL — LOW (ref 32–36)
MCHC RBC-ENTMCNC: 29.7 G/DL — LOW (ref 32–36)
MCV RBC AUTO: 92.8 FL — SIGNIFICANT CHANGE UP (ref 80–100)
MCV RBC AUTO: 94.3 FL — SIGNIFICANT CHANGE UP (ref 80–100)
MICROCYTES BLD QL: SLIGHT — SIGNIFICANT CHANGE UP
MONOCYTES NFR BLD AUTO: 2 % — SIGNIFICANT CHANGE UP (ref 2–14)
NEUTROPHILS NFR BLD AUTO: 61 % — SIGNIFICANT CHANGE UP (ref 43–77)
NEUTS BAND # BLD: 15 % — HIGH
NITRITE UR-MCNC: NEGATIVE — SIGNIFICANT CHANGE UP
NT-PROBNP SERPL-SCNC: 8827 PG/ML — HIGH (ref 0–300)
OVALOCYTES BLD QL SMEAR: SLIGHT — SIGNIFICANT CHANGE UP
PCO2 BLDA: 55 MMHG — HIGH (ref 32–45)
PCO2 BLDV: 89 MMHG — HIGH (ref 41–51)
PCO2 BLDV: SIGNIFICANT CHANGE UP MMHG (ref 41–51)
PH BLDA: 7.31 — LOW (ref 7.35–7.45)
PH BLDV: 7.09 — CRITICAL LOW (ref 7.32–7.43)
PH BLDV: SIGNIFICANT CHANGE UP (ref 7.32–7.43)
PH UR: 6 — SIGNIFICANT CHANGE UP (ref 5–8)
PLAT MORPH BLD: ABNORMAL
PLATELET # BLD AUTO: 431 K/UL — HIGH (ref 150–400)
PLATELET # BLD AUTO: 439 K/UL — HIGH (ref 150–400)
PLATELET COUNT - ESTIMATE: ABNORMAL
PO2 BLDA: 96 MMHG — SIGNIFICANT CHANGE UP (ref 83–108)
PO2 BLDV: 69 MMHG — SIGNIFICANT CHANGE UP
PO2 BLDV: SIGNIFICANT CHANGE UP MMHG
POLYCHROMASIA BLD QL SMEAR: SLIGHT — SIGNIFICANT CHANGE UP
POTASSIUM BLDV-SCNC: 4.6 MMOL/L — SIGNIFICANT CHANGE UP (ref 3.5–4.9)
POTASSIUM SERPL-MCNC: 4.6 MMOL/L — SIGNIFICANT CHANGE UP (ref 3.5–5.3)
POTASSIUM SERPL-MCNC: 5.6 MMOL/L — HIGH (ref 3.5–5.3)
POTASSIUM SERPL-SCNC: 4.6 MMOL/L — SIGNIFICANT CHANGE UP (ref 3.5–5.3)
POTASSIUM SERPL-SCNC: 5.6 MMOL/L — HIGH (ref 3.5–5.3)
PROT SERPL-MCNC: 7.1 G/DL — SIGNIFICANT CHANGE UP (ref 6–8.3)
PROT UR-MCNC: 30 MG/DL
PROTHROM AB SERPL-ACNC: 13.1 SEC — HIGH (ref 9.8–12.7)
RBC # BLD: 2.64 M/UL — LOW (ref 3.8–5.2)
RBC # BLD: 2.65 M/UL — LOW (ref 3.8–5.2)
RBC # FLD: 17.9 % — HIGH (ref 10.3–16.9)
RBC # FLD: 18 % — HIGH (ref 10.3–16.9)
RBC BLD AUTO: ABNORMAL
RBC CASTS # UR COMP ASSIST: < 5 /HPF — SIGNIFICANT CHANGE UP
RH IG SCN BLD-IMP: POSITIVE — SIGNIFICANT CHANGE UP
SAO2 % BLDA: 97 % — SIGNIFICANT CHANGE UP (ref 95–100)
SAO2 % BLDV: 84 % — SIGNIFICANT CHANGE UP
SAO2 % BLDV: SIGNIFICANT CHANGE UP %
SODIUM SERPL-SCNC: 137 MMOL/L — SIGNIFICANT CHANGE UP (ref 135–145)
SODIUM SERPL-SCNC: 137 MMOL/L — SIGNIFICANT CHANGE UP (ref 135–145)
SP GR SPEC: 1.01 — SIGNIFICANT CHANGE UP (ref 1–1.03)
TROPONIN T SERPL-MCNC: 0.03 NG/ML — HIGH (ref 0–0.01)
UROBILINOGEN FLD QL: 1 E.U./DL — SIGNIFICANT CHANGE UP
WBC # BLD: 11.3 K/UL — HIGH (ref 3.8–10.5)
WBC # BLD: 17.9 K/UL — HIGH (ref 3.8–10.5)
WBC # FLD AUTO: 11.3 K/UL — HIGH (ref 3.8–10.5)
WBC # FLD AUTO: 17.9 K/UL — HIGH (ref 3.8–10.5)
WBC UR QL: < 5 /HPF — SIGNIFICANT CHANGE UP

## 2018-08-17 PROCEDURE — 99291 CRITICAL CARE FIRST HOUR: CPT | Mod: 25

## 2018-08-17 PROCEDURE — 71045 X-RAY EXAM CHEST 1 VIEW: CPT | Mod: 26

## 2018-08-17 PROCEDURE — 71045 X-RAY EXAM CHEST 1 VIEW: CPT | Mod: 26,77

## 2018-08-17 PROCEDURE — 99222 1ST HOSP IP/OBS MODERATE 55: CPT

## 2018-08-17 PROCEDURE — 92950 HEART/LUNG RESUSCITATION CPR: CPT

## 2018-08-17 PROCEDURE — 99292 CRITICAL CARE ADDL 30 MIN: CPT | Mod: 25

## 2018-08-17 PROCEDURE — 99223 1ST HOSP IP/OBS HIGH 75: CPT

## 2018-08-17 PROCEDURE — 99291 CRITICAL CARE FIRST HOUR: CPT

## 2018-08-17 PROCEDURE — 99497 ADVNCD CARE PLAN 30 MIN: CPT | Mod: 25

## 2018-08-17 RX ORDER — SODIUM CHLORIDE 9 MG/ML
250 INJECTION INTRAMUSCULAR; INTRAVENOUS; SUBCUTANEOUS ONCE
Qty: 0 | Refills: 0 | Status: COMPLETED | OUTPATIENT
Start: 2018-08-17 | End: 2018-08-17

## 2018-08-17 RX ORDER — CHLORHEXIDINE GLUCONATE 213 G/1000ML
1 SOLUTION TOPICAL DAILY
Qty: 0 | Refills: 0 | Status: DISCONTINUED | OUTPATIENT
Start: 2018-08-17 | End: 2018-08-29

## 2018-08-17 RX ORDER — DEXMEDETOMIDINE HYDROCHLORIDE IN 0.9% SODIUM CHLORIDE 4 UG/ML
0.2 INJECTION INTRAVENOUS
Qty: 200 | Refills: 0 | Status: DISCONTINUED | OUTPATIENT
Start: 2018-08-17 | End: 2018-08-17

## 2018-08-17 RX ORDER — MEROPENEM 1 G/30ML
1000 INJECTION INTRAVENOUS ONCE
Qty: 0 | Refills: 0 | Status: COMPLETED | OUTPATIENT
Start: 2018-08-17 | End: 2018-08-17

## 2018-08-17 RX ORDER — PANTOPRAZOLE SODIUM 20 MG/1
40 TABLET, DELAYED RELEASE ORAL DAILY
Qty: 0 | Refills: 0 | Status: DISCONTINUED | OUTPATIENT
Start: 2018-08-17 | End: 2018-08-18

## 2018-08-17 RX ORDER — VANCOMYCIN HCL 1 G
1000 VIAL (EA) INTRAVENOUS ONCE
Qty: 0 | Refills: 0 | Status: COMPLETED | OUTPATIENT
Start: 2018-08-17 | End: 2018-08-17

## 2018-08-17 RX ORDER — FENTANYL CITRATE 50 UG/ML
25 INJECTION INTRAVENOUS ONCE
Qty: 0 | Refills: 0 | Status: DISCONTINUED | OUTPATIENT
Start: 2018-08-17 | End: 2018-08-17

## 2018-08-17 RX ORDER — SODIUM CHLORIDE 9 MG/ML
1000 INJECTION INTRAMUSCULAR; INTRAVENOUS; SUBCUTANEOUS ONCE
Qty: 0 | Refills: 0 | Status: COMPLETED | OUTPATIENT
Start: 2018-08-17 | End: 2018-08-17

## 2018-08-17 RX ORDER — VANCOMYCIN HCL 1 G
1000 VIAL (EA) INTRAVENOUS EVERY 24 HOURS
Qty: 0 | Refills: 0 | Status: DISCONTINUED | OUTPATIENT
Start: 2018-08-18 | End: 2018-08-18

## 2018-08-17 RX ORDER — MEROPENEM 1 G/30ML
500 INJECTION INTRAVENOUS EVERY 12 HOURS
Qty: 0 | Refills: 0 | Status: DISCONTINUED | OUTPATIENT
Start: 2018-08-17 | End: 2018-08-20

## 2018-08-17 RX ORDER — NOREPINEPHRINE BITARTRATE/D5W 8 MG/250ML
0.05 PLASTIC BAG, INJECTION (ML) INTRAVENOUS
Qty: 8 | Refills: 0 | Status: DISCONTINUED | OUTPATIENT
Start: 2018-08-17 | End: 2018-08-22

## 2018-08-17 RX ORDER — ACETAMINOPHEN 500 MG
1000 TABLET ORAL ONCE
Qty: 0 | Refills: 0 | Status: DISCONTINUED | OUTPATIENT
Start: 2018-08-17 | End: 2018-08-17

## 2018-08-17 RX ORDER — HEPARIN SODIUM 5000 [USP'U]/ML
5000 INJECTION INTRAVENOUS; SUBCUTANEOUS EVERY 12 HOURS
Qty: 0 | Refills: 0 | Status: DISCONTINUED | OUTPATIENT
Start: 2018-08-17 | End: 2018-08-23

## 2018-08-17 RX ORDER — AMIODARONE HYDROCHLORIDE 400 MG/1
200 TABLET ORAL DAILY
Qty: 0 | Refills: 0 | Status: DISCONTINUED | OUTPATIENT
Start: 2018-08-17 | End: 2018-08-28

## 2018-08-17 RX ORDER — ACETAMINOPHEN 500 MG
650 TABLET ORAL ONCE
Qty: 0 | Refills: 0 | Status: COMPLETED | OUTPATIENT
Start: 2018-08-17 | End: 2018-08-17

## 2018-08-17 RX ORDER — MIDODRINE HYDROCHLORIDE 2.5 MG/1
10 TABLET ORAL EVERY 8 HOURS
Qty: 0 | Refills: 0 | Status: DISCONTINUED | OUTPATIENT
Start: 2018-08-17 | End: 2018-08-17

## 2018-08-17 RX ORDER — CHLORHEXIDINE GLUCONATE 213 G/1000ML
15 SOLUTION TOPICAL
Qty: 0 | Refills: 0 | Status: DISCONTINUED | OUTPATIENT
Start: 2018-08-17 | End: 2018-08-29

## 2018-08-17 RX ORDER — PROPOFOL 10 MG/ML
5 INJECTION, EMULSION INTRAVENOUS
Qty: 1000 | Refills: 0 | Status: DISCONTINUED | OUTPATIENT
Start: 2018-08-17 | End: 2018-08-17

## 2018-08-17 RX ORDER — MIDAZOLAM HYDROCHLORIDE 1 MG/ML
1 INJECTION, SOLUTION INTRAMUSCULAR; INTRAVENOUS ONCE
Qty: 0 | Refills: 0 | Status: DISCONTINUED | OUTPATIENT
Start: 2018-08-17 | End: 2018-08-17

## 2018-08-17 RX ADMIN — PANTOPRAZOLE SODIUM 40 MILLIGRAM(S): 20 TABLET, DELAYED RELEASE ORAL at 12:23

## 2018-08-17 RX ADMIN — HEPARIN SODIUM 5000 UNIT(S): 5000 INJECTION INTRAVENOUS; SUBCUTANEOUS at 17:31

## 2018-08-17 RX ADMIN — SODIUM CHLORIDE 1000 MILLILITER(S): 9 INJECTION INTRAMUSCULAR; INTRAVENOUS; SUBCUTANEOUS at 00:00

## 2018-08-17 RX ADMIN — Medication 4.88 MICROGRAM(S)/KG/MIN: at 00:01

## 2018-08-17 RX ADMIN — PROPOFOL 1.56 MICROGRAM(S)/KG/MIN: 10 INJECTION, EMULSION INTRAVENOUS at 17:48

## 2018-08-17 RX ADMIN — Medication 4.88 MICROGRAM(S)/KG/MIN: at 12:22

## 2018-08-17 RX ADMIN — FENTANYL CITRATE 25 MICROGRAM(S): 50 INJECTION INTRAVENOUS at 13:51

## 2018-08-17 RX ADMIN — MEROPENEM 100 MILLIGRAM(S): 1 INJECTION INTRAVENOUS at 13:57

## 2018-08-17 RX ADMIN — Medication 260 MILLIGRAM(S): at 12:56

## 2018-08-17 RX ADMIN — CHLORHEXIDINE GLUCONATE 15 MILLILITER(S): 213 SOLUTION TOPICAL at 12:23

## 2018-08-17 RX ADMIN — CHLORHEXIDINE GLUCONATE 1 APPLICATION(S): 213 SOLUTION TOPICAL at 12:21

## 2018-08-17 RX ADMIN — Medication 250 MILLIGRAM(S): at 00:14

## 2018-08-17 RX ADMIN — HEPARIN SODIUM 5000 UNIT(S): 5000 INJECTION INTRAVENOUS; SUBCUTANEOUS at 07:24

## 2018-08-17 RX ADMIN — SODIUM CHLORIDE 2000 MILLILITER(S): 9 INJECTION INTRAMUSCULAR; INTRAVENOUS; SUBCUTANEOUS at 04:00

## 2018-08-17 RX ADMIN — SODIUM CHLORIDE 500 MILLILITER(S): 9 INJECTION INTRAMUSCULAR; INTRAVENOUS; SUBCUTANEOUS at 12:24

## 2018-08-17 RX ADMIN — DEXMEDETOMIDINE HYDROCHLORIDE IN 0.9% SODIUM CHLORIDE 2.6 MICROGRAM(S)/KG/HR: 4 INJECTION INTRAVENOUS at 16:25

## 2018-08-17 RX ADMIN — FENTANYL CITRATE 25 MICROGRAM(S): 50 INJECTION INTRAVENOUS at 13:57

## 2018-08-17 RX ADMIN — MIDAZOLAM HYDROCHLORIDE 1 MILLIGRAM(S): 1 INJECTION, SOLUTION INTRAMUSCULAR; INTRAVENOUS at 04:09

## 2018-08-17 RX ADMIN — MEROPENEM 100 MILLIGRAM(S): 1 INJECTION INTRAVENOUS at 00:13

## 2018-08-17 NOTE — CONSULT NOTE ADULT - ASSESSMENT
89 yo female with several recent admissions for FTT and aspiration pneumonia presents with cardiac arrest.

## 2018-08-17 NOTE — CONSULT NOTE ADULT - ASSESSMENT
- Please repeat EKG      *NOTE INCOMPLETE* 89F w/PMHx dementia (baseline AOX1), recurrent UTI, Celiac dz, R femoral DVT s/p IVC filter (1/2017), chronic anemia, w/mult recent hosp for septic shock and hypoxic/hypercapnic resp failure in setting of proteus and aerococcus UTI and asp PNA, brought in after cardiac arrest, likely PEA in setting of asp w/hypoxic and hypercapnic resp failure    - Cardiac arrest likely PEA in setting of asp w/hypoxic and hypercapnic resp failure (repeat ABG shows improvement in resp status w/mech vent)  - NSR on tele, however EKG obtained on admission w/possible junctional rhythm, please repeat today. CE not sig elev, unlikely ACS  - C/w amio 200 qd, keep K>4, Mg>2. Cont to monitor on tele  - Resp failure primarily from asp, however possible some component of vol overload based on CXR findings. Pro-BNP elev, but markedly dec compared to prior admission.  - No noted JVD or LE edema. Lung exam w/rales and rhonchi. MM dry. Would avoid diuretics in setting of septic shock, monitor strict I/O, daily wts (would also be judicious w/further IVF, pt received 3.5L NS on admission)  - Pt febrile, septic from asp pneumonitis vs PNA. Abx as per pulm, titrate off levo as able.   - Rest of care as per ICU team

## 2018-08-17 NOTE — PROGRESS NOTE ADULT - ATTENDING COMMENTS
Pt s/o cardiopulmonary arrest, intubated on vent and overbreathing. Requiring vasopressors for BP control. Significant secretions via ETT resembling enteral feeds, suspected aspiration. Neuro exam limited - pt has no evidence of corneal or gag reflexes, withdraws to noxious stimuli, no purposeful movement. Discussions in progress with pall care and son/HCP. For now will continue critical level of care. Standard vent bundle, GI/DVT prophy. Holding feeds. Glycemic control.

## 2018-08-17 NOTE — CONSULT NOTE ADULT - PROBLEM SELECTOR RECOMMENDATION 4
Patient with very poor prognosis.  Son with unrealistic expectations.  Will continue to provide support and try to help him reconsider level of aggressive care  Support provided to patient and family. Patient to have access to supportive services during rest of hospital stay as the patient/family deemed necessary ie. Chaplaincy, Massage therapy, Music therapy, Patient and family supportive services, Palliative SW, etc.

## 2018-08-17 NOTE — H&P ADULT - HISTORY OF PRESENT ILLNESS
89 F w/ pmhx of dementia, R. femoral thrombus s/p IVC filter 1/2017, chronic anemia, presents to ED from [facility] after cardio-pulmonary arrest. Patient recently admitted to MICU for hypoxic respiratory failure 2/2 aspiration pneumonia. Patient eventually extubated, received PEG tube, and discharged back to [facility] on 08/14. Hospital course complicated by unstable atrial fibrillation s/p cardioversion, placed on amiodarone with stabilization of HR. Per ER, patient unable to tolerate secretions, found to be hypoxic at NH and eventually lost a pulse. Patient intubated in the [facility], ACLS protocol begun and ROSC achieved after 3 rounds of epinephrine.

## 2018-08-17 NOTE — PROGRESS NOTE ADULT - SUBJECTIVE AND OBJECTIVE BOX
INCOMPLETE    Advance care planning meeting  Start time: 2:40 PM  End time:   3:15 PM  Total time:  35 minutes    A face to face meeting to discuss advance care planning was held today regarding: JOSE MARTIN GOFF  Primary decision maker: diogo Goff MD patient son and [phone]  Alternate/surrogate:  Discussed advance directives including, but not limited to, healthcare proxy and code status.  Decision regarding code status:  Documentation completed today: Advance care planning meeting  Start time: 2:40 PM  End time:   3:15 PM  Total time:  35 minutes    In addition to the E and M visit documented earlier today, A face to face meeting to discuss advance care planning was held today regarding: JOSE MARTIN GOFF  Primary decision maker: Esvin Goff MD patient son and [phone]  Alternate/surrogate:  Discussed advance directives including, but not limited to, healthcare proxy and code status. Patient presently in ICU after cardiac arrest, on ventilator and pressors.  Discussed patient's dier circumstances, projected poor outcome.  Discussed patient advance directive which son reports states to continue treatment unless there is no hope for meaningful recovery.  Son concerned re neurologic status and Neurology has been consulted.  At present son wishes all treatment.  We discussed possible DNR if patient had a cardiac arrythmia.  Son said he would think about it.  He is also asking for input from clergy (oma estrada), but did not indicated what denomination.  Son accepted small doses of pain medicine, preferring short acting formulation (IV Fentanyl)  Decision regarding code status:  full code  Documentation completed today: none.    Discussed with MICU Team, discussed with Dr. Raymundo

## 2018-08-17 NOTE — CONSULT NOTE ADULT - PROBLEM SELECTOR RECOMMENDATION 9
Patient s/p cardiac arrest and requiring 3 rounds of epinephrine.  Etiology likely recurrect aspiration.  Concern regarding neuro function.  To be seen by neurology.  Given events and patient recent history, prognosis is very poor    Patient appears to be in pain.  Team plans to give IV Tylenol, but if distress continues would consider small dose of opiates even in the setting of hoping her mental status will improve.

## 2018-08-17 NOTE — H&P ADULT - ASSESSMENT
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 ROSC achieved after apx 28 minutes.   - continue vancomycin and meropenem for broad coverage  - 2L NS bolus given in ER, will give additional 1L and monitor volume status   - Lactate elevated; HD improved while on Levophed   - afebrile upon arrival, currently on cooling blanket after cardiac arrest     Cardiac  #Cardiac Arrest: likely after pulmonary arrest from above  - EKG without ischemic changes   - trops 0.03. will not trend     #Atrial flutter s/p cardioversion last admission  - c/w amiodarone 200 daily     Dispo: MICU 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 ROSC achieved after apx 28 minutes.   - continue vancomycin and meropenem for broad coverage  - 2L NS bolus given in ER, will give additional 1L and monitor volume status   - Lactate elevated; HD improved while on Levophed   - Ng sump continued with suction     Cardiac  #Cardiac Arrest: likely after pulmonary arrest from above  - EKG without ischemic changes   - trops 0.03. will not trend   -A line in place-right radial  -central line passed, cxr confirmed position       #Atrial flutter s/p cardioversion last admission  - c/w amiodarone 200 daily   -no A/C in setting of PMH of gastric AVM    # ID  -c/w vanc and meropenum for aspiration pna  -f/u blood cx     F NO FLUIDS FOR NOW  E REPLETE AS NEEDED  N HOLD PEG FEEDS FOR NOW   Dispo: MICU

## 2018-08-17 NOTE — CONSULT NOTE ADULT - ASSESSMENT
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 ROSC achieved after apx 28 minutes.   - continue vancomycin and meropenem for broad coverage  - 2L NS bolus given in ER, will give additional 1L and monitor volume status   - Lactate elevated; HD improved while on Levophed   - afebrile upon arrival, currently on cooling blanket after cardiac arrest     Cardiac  #Cardiac Arrest: likely after pulmonary arrest from above  - EKG without ischemic changes   - trops 0.03. will not trend     Dispo: MICU

## 2018-08-17 NOTE — PROGRESS NOTE ADULT - ASSESSMENT
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 ROSC achieved after apx 28 minutes.   - continue vancomycin and meropenem for broad coverage  - 2L NS bolus given in ER, will give additional 1L and monitor volume status   - Lactate elevated; HD improved while on Levophed   - Ng sump continued with suction     Cardiac  #Cardiac Arrest: likely after pulmonary arrest from above  - EKG without ischemic changes   - trops 0.03. will not trend   -A line in place-right radial  -central line passed, cxr confirmed position       #Atrial flutter s/p cardioversion last admission  - c/w amiodarone 200 daily   -no A/C in setting of PMH of gastric AVM    # ID  -c/w vanc and meropenum for aspiration pna  -f/u blood cx     F NO FLUIDS FOR NOW  E REPLETE AS NEEDED  N HOLD PEG FEEDS FOR NOW   Dispo: MICU 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 ROSC achieved after apx 28 minutes.   - Pt currently intubated  - continue vancomycin and meropenem for broad coverage  - Vanc trough tonight   - Lactate improved; HD improved while on Levophed   - Ng sump continued with suction   - f/up blood and sputum cultures    Cardiac  #Cardiac Arrest: likely after pulmonary arrest from above  - EKG without ischemic changes   - trops 0.03. will not trend   - A line in place-right radial  -central line passed, cxr confirmed position   - Levophed drip    #Atrial flutter s/p cardioversion last admission  - c/w amiodarone 200 daily   -no A/C in setting of PMH of gastric AVM    ID  -c/w vanc and meropenum for aspiration pna  -f/u blood cx     NEURO  Possible anoxic brain injury  - Neuro consult for possible MRI  - Palliative consult    F NO FLUIDS FOR NOW  E REPLETE AS NEEDED  N HOLD PEG FEEDS FOR NOW   Dispo: MICU 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 ROSC achieved after apx 28 minutes.   - Pt currently intubated  - continue vancomycin and meropenem for broad coverage  - Vanc trough tonight   - Lactate improved; HD improved while on Levophed   - Ng sump continued with suction   - f/up blood and sputum cultures  - 250 cc bolus when urine output <25cc/hr    Cardiac  #Cardiac Arrest: likely after pulmonary arrest from above  - EKG without ischemic changes   - trops 0.03. will not trend   - A line in place-right radial  -central line passed, cxr confirmed position   - start precedex, switch to propofol tonight    #Atrial flutter s/p cardioversion last admission  - c/w amiodarone 200 daily   -no A/C in setting of PMH of gastric AVM    ID  -c/w vanc and meropenum for aspiration pna  -f/u blood cx     NEURO  Possible anoxic brain injury  - Neuro consult for possible MRI  - Palliative consult    F NO FLUIDS FOR NOW  E REPLETE AS NEEDED  N HOLD PEG FEEDS FOR NOW   Dispo: MICU

## 2018-08-17 NOTE — CONSULT NOTE ADULT - SUBJECTIVE AND OBJECTIVE BOX
Neurology Consult Note    HPI: 89 year-old female presents from [facility] after cardio-pulmonary arrest last night s/p intubation and 3 rounds of epinephrine with altered mental status.  Her course is complicated by fever requiring antibiotics and hypotension requiring Levophed.    She was recently hospitalized for hypoxic respiratory failure secondary to aspiration pneumonia with intubation, unstable atrial fibrillation and placement of PEG tube.  As per her son and MICU Residents, patient looked reasonable at time of discharge.  As per her son, she was able to acknowledge him on her birthday this past week.  She's had trouble with her secretions yesterday that required suctioning.    She's had overall decline over the past year.    Note: She did receive low dose Fentanyl earlier today for increased respiratory rate presumed due to pain.    PAST MEDICAL & SURGICAL HISTORY:  Dementia  DVT (deep venous thrombosis)  Dysphagia  Anemia  DJD (degenerative joint disease)  Edema  IBS (irritable bowel syndrome)  Celiac disease  S/P IVC filter  H/O inguinal hernia repair  History of total left hip replacement      MEDICATIONS  (STANDING):  amiodarone    Tablet 200 milliGRAM(s) Oral daily  chlorhexidine 0.12% Liquid 15 milliLiter(s) Swish and Spit two times a day  chlorhexidine 2% Cloths 1 Application(s) Topical daily  dexmedetomidine Infusion 0.2 MICROgram(s)/kG/Hr (2.605 mL/Hr) IV Continuous <Continuous>  heparin  Injectable 5000 Unit(s) SubCutaneous every 12 hours  meropenem  IVPB 500 milliGRAM(s) IV Intermittent every 12 hours  norepinephrine Infusion 0.05 MICROgram(s)/kG/Min (4.884 mL/Hr) IV Continuous <Continuous>  pantoprazole  Injectable 40 milliGRAM(s) IV Push daily    Allergies    amoxicillin (Rash)  Cipro (Rash)  clindamycin (Unknown)  lactose (Unknown)  penicillin (Rash)  Wheat (Unknown)    FAMILY HISTORY:  No pertinent family history in first degree relatives    Social History:  at [facility]    REVIEW OF SYSTEMS:  As per HPI, otherwise negative for Constitutional, Eyes, Ears/Nose/Mouth/Throat, Neck, Cardiovascular, Respiratory, Gastrointestinal, Genitourinary, Skin, Endocrine, Musculoskeletal, Psychiatric, and Hematologic/Lymphatic.    Vital Signs Last 24 Hrs  T(C): 37.3 (17 Aug 2018 14:00), Max: 38.6 (17 Aug 2018 11:00)  T(F): 99.2 (17 Aug 2018 14:00), Max: 101.4 (17 Aug 2018 11:00)  HR: 84 (17 Aug 2018 15:00) (55 - 92)  BP: 104/48 (17 Aug 2018 15:00) (50/45 - 128/58)  BP(mean): 68 (17 Aug 2018 15:00) (62 - 84)  RR: 19 (17 Aug 2018 15:00) (12 - 61)  SpO2: 98% (17 Aug 2018 15:00) (87% - 100%)    Physical exam:  General: lying in bed, intubated, no acute distress  - breathing over vent  CV: reg rate and rhythm  Extremities: 2+ radial and dorsalis pedis pulses bilaterally    Neurological examination:  Mental status: She opens eyes with initial pressure to her shoulder and then again sometimes during the exam after stimulation but not all times.  No recent sedation besides Fentanyl earlier  Cranial nerves: pupils equally round and reactive to light, eyes midline but negative oculocephalic reflex, No corneal reflex in either eye, + gag reflex  Motor: No spontaneous motion of extremities; Increased tone (right more than left upper extremities, right knee and both ankles) with decreased ROM   Sensation: No response to pain to either side   Coordination: uncooperative  Reflexes: absent Babinski on right, upgoing Babinski on left  Gait: deferred    Labs:                        7.3    11.3  )-----------( 431      ( 17 Aug 2018 06:54 )             24.6         137  |  99  |  44<H>  ----------------------------<  152<H>  4.6   |  25  |  1.11    Ca    7.9<L>      17 Aug 2018 06:54  Mg     2.1         TPro  7.1  /  Alb  2.7<L>  /  TBili  0.5  /  DBili  x   /  AST  79<H>  /  ALT  46<H>  /  AlkPhos  132<H>      PT/INR - ( 17 Aug 2018 06:54 )   PT: 13.1 sec;   INR: 1.18     PTT - ( 17 Aug 2018 06:54 )  PTT:25.7 sec    Urinalysis Basic - ( 17 Aug 2018 01:43 )    Color: Yellow / Appearance: Clear / S.010 / pH: x  Gluc: x / Ketone: NEGATIVE  / Bili: Negative / Urobili: 1.0 E.U./dL   Blood: x / Protein: 30 mg/dL / Nitrite: NEGATIVE   Leuk Esterase: NEGATIVE / RBC: < 5 /HPF / WBC < 5 /HPF   Sq Epi: x / Non Sq Epi: 0-5 /HPF / Bacteria: Many /HPF    Radiology and Additional Studies:  no new cerebral imaging    Assessment and plan: 89 year-old female with multiple medical problems presents from [facility] post cardiac arrest s/p intubation.  Her mental status is depressed with loss of some brainstem reflexes.  She still has pupillary reflex, gag reflex and breathing over vent.  Overall poor prognosis.  Had extended conversation with her son regarding goals of care.  He wants all interventions at this time including antibiotics for her fever and pressors for her blood pressure maintenance.  I recommend continued observation with documentation of her neurological exam including brain stem reflexes with further discussion with her son within the next few days depending on her exam.  Would avoid BZD and other medications that can overshadow her neuro exam long term.  Propofol and presidex are reasonable.

## 2018-08-17 NOTE — CONSULT NOTE ADULT - PROBLEM SELECTOR PROBLEM 3
Aspiration pneumonia, unspecified aspiration pneumonia type, unspecified laterality, unspecified part of lung
Cardiac arrest

## 2018-08-17 NOTE — ED ADULT NURSE NOTE - OBJECTIVE STATEMENT
89 y/o female bibEMS unresponsive to verbal or painful stimuli. Pt was in nursing home, called for RRT. Pt at nursing home was in cardiac arrest. As per EMS, CPR and resusitation medications administered. Pt. w/ ROSC was brought to [facility]. Pt had assisted breathing by bag valve mask w/ 100% oxygen. Respiratory at [facility] for ventilation. Bilateral 20G on left and right arm placed. Blood drawn. Fluids 2 x 1LNS infusing. Vancomycin 1gram IVSS, Zoysn, and meripenim given as ordered. NG tube placed by attending due to secretions oozing from mouth. Norephinepherine ordered and administered, as pt was hypotensive, 55/40mmHG. Cold therapy initiated w/ gaymar set to 93.2 F. Rectal temperature at time of procedures 97.3 rectally. Tubbs placed 14F. Urine collected and sent. pt has VS at 0200 74HR, 104/60mmHg, RR15, 99% supplemental oxygen. Pt is for MICU consult in progress. Pt has bed sores stage II on sacrum. Blister formation on mid back. Will continue to monitor.

## 2018-08-17 NOTE — CONSULT NOTE ADULT - SUBJECTIVE AND OBJECTIVE BOX
ICU Consult Medicine Resident Note          Past Medical History:  Past Surgical History:  Medications:  Allergies:  Social History:  Family History:    Physical Examination:   Vital Signs Last 24 Hrs  T(C): 34.6 (17 Aug 2018 02:12), Max: 36.3 (17 Aug 2018 00:45)  T(F): 94.3 (17 Aug 2018 02:12), Max: 97.3 (17 Aug 2018 00:45)  HR: 73 (17 Aug 2018 02:12) (55 - 76)  BP: 111/56 (17 Aug 2018 02:12) (50/45 - 111/56)  BP(mean): --  RR: 15 (17 Aug 2018 02:12) (12 - 15)  SpO2: 100% (17 Aug 2018 02:12) (92% - 100%)  I&O's Detail    CAPILLARY BLOOD GLUCOSE        General:  HEENT:  Neck:  Heart:  Lungs:  Abdomen:  Rectal:  Back:  Genitourinary:  Extremities:  Neurological:  Skin:     Labs/Imaging:       CARDIAC MARKERS ( 17 Aug 2018 00:59 )  x     / 0.03 ng/mL / x     / x     / x          CBC Full  -  ( 17 Aug 2018 01:00 )  WBC Count : 17.9 K/uL  Hemoglobin : 7.2 g/dL  Hematocrit : 24.9 %  Platelet Count - Automated : 439 K/uL  Mean Cell Volume : 94.3 fL  Mean Cell Hemoglobin : 27.3 pg  Mean Cell Hemoglobin Concentration : 28.9 g/dL  Auto Neutrophil # : x  Auto Lymphocyte # : x  Auto Monocyte # : x  Auto Eosinophil # : x  Auto Basophil # : x  Auto Neutrophil % : 61.0 %  Auto Lymphocyte % : 19.0 %  Auto Monocyte % : 2.0 %  Auto Eosinophil % : 2.0 %  Auto Basophil % : 1.0 %        137  |  95<L>  |  44<H>  ----------------------------<  230<H>  5.6<H>   |  24  |  1.23    Ca    8.7      17 Aug 2018 00:59    TPro  7.1  /  Alb  2.7<L>  /  TBili  0.5  /  DBili  x   /  AST  79<H>  /  ALT  46<H>  /  AlkPhos  132<H>      LIVER FUNCTIONS - ( 17 Aug 2018 00:59 )  Alb: 2.7 g/dL / Pro: 7.1 g/dL / ALK PHOS: 132 U/L / ALT: 46 U/L / AST: 79 U/L / GGT: x             Urinalysis Basic - ( 17 Aug 2018 01:43 )    Color: Yellow / Appearance: Clear / S.010 / pH: x  Gluc: x / Ketone: NEGATIVE  / Bili: Negative / Urobili: 1.0 E.U./dL   Blood: x / Protein: 30 mg/dL / Nitrite: NEGATIVE   Leuk Esterase: NEGATIVE / RBC: < 5 /HPF / WBC < 5 /HPF   Sq Epi: x / Non Sq Epi: 0-5 /HPF / Bacteria: Many /HPF ICU Consult Medicine Resident Note    89 F w/ pmhx of dementia, R. femoral thrombus s/p IVC filter 2017, chronic anemia, presents to ED from [facility] after cardio-pulmonary arrest. Patient recently admitted to MICU for hypoxic respiratory failure 2/2 aspiration pneumonia. Patient eventually extubated, received PEG tube, and discharged back to [facility] on . Hospital course complicated by unstable atrial fibrillation s/p cardioversion, placed on amiodarone with stabilization of HR. Per ER, patient unable to tolerate secretions, found to be hypoxic at NH and eventually lost a pulse. Patient intubated in the [facility], ACLS protocol begun and ROSC achieved after 3 rounds of epinephrine.       Past Medical History:  Past Surgical History:  Medications:  Allergies:  Social History:  Family History:    Physical Examination:   Vital Signs Last 24 Hrs  T(C): 34.6 (17 Aug 2018 02:12), Max: 36.3 (17 Aug 2018 00:45)  T(F): 94.3 (17 Aug 2018 02:12), Max: 97.3 (17 Aug 2018 00:45)  HR: 73 (17 Aug 2018 02:12) (55 - 76)  BP: 111/56 (17 Aug 2018 02:12) (50/45 - 111/56)  BP(mean): --  RR: 15 (17 Aug 2018 02:12) (12 - 15)  SpO2: 100% (17 Aug 2018 02:12) (92% - 100%)  I&O's Detail    CAPILLARY BLOOD GLUCOSE        General:  HEENT:  Neck:  Heart:  Lungs:  Abdomen:  Rectal:  Back:  Genitourinary:  Extremities:  Neurological:  Skin:     Labs/Imaging:       CARDIAC MARKERS ( 17 Aug 2018 00:59 )  x     / 0.03 ng/mL / x     / x     / x          CBC Full  -  ( 17 Aug 2018 01:00 )  WBC Count : 17.9 K/uL  Hemoglobin : 7.2 g/dL  Hematocrit : 24.9 %  Platelet Count - Automated : 439 K/uL  Mean Cell Volume : 94.3 fL  Mean Cell Hemoglobin : 27.3 pg  Mean Cell Hemoglobin Concentration : 28.9 g/dL  Auto Neutrophil # : x  Auto Lymphocyte # : x  Auto Monocyte # : x  Auto Eosinophil # : x  Auto Basophil # : x  Auto Neutrophil % : 61.0 %  Auto Lymphocyte % : 19.0 %  Auto Monocyte % : 2.0 %  Auto Eosinophil % : 2.0 %  Auto Basophil % : 1.0 %        137  |  95<L>  |  44<H>  ----------------------------<  230<H>  5.6<H>   |  24  |  1.23    Ca    8.7      17 Aug 2018 00:59    TPro  7.1  /  Alb  2.7<L>  /  TBili  0.5  /  DBili  x   /  AST  79<H>  /  ALT  46<H>  /  AlkPhos  132<H>      LIVER FUNCTIONS - ( 17 Aug 2018 00:59 )  Alb: 2.7 g/dL / Pro: 7.1 g/dL / ALK PHOS: 132 U/L / ALT: 46 U/L / AST: 79 U/L / GGT: x             Urinalysis Basic - ( 17 Aug 2018 01:43 )    Color: Yellow / Appearance: Clear / S.010 / pH: x  Gluc: x / Ketone: NEGATIVE  / Bili: Negative / Urobili: 1.0 E.U./dL   Blood: x / Protein: 30 mg/dL / Nitrite: NEGATIVE   Leuk Esterase: NEGATIVE / RBC: < 5 /HPF / WBC < 5 /HPF   Sq Epi: x / Non Sq Epi: 0-5 /HPF / Bacteria: Many /HPF ICU Consult Medicine Resident Note    89 F w/ pmhx of dementia, R. femoral thrombus s/p IVC filter 2017, chronic anemia, presents to ED from [facility] after cardio-pulmonary arrest. Patient recently admitted to MICU for hypoxic respiratory failure 2/2 aspiration pneumonia. Patient eventually extubated, received PEG tube, and discharged back to [facility] on . Hospital course complicated by unstable atrial fibrillation s/p cardioversion, placed on amiodarone with stabilization of HR. Per ER, patient unable to tolerate secretions, found to be hypoxic at NH and eventually lost a pulse. Patient intubated in the [facility], ACLS protocol begun and ROSC achieved after 3 rounds of epinephrine.       Physical Examination:   Vital Signs Last 24 Hrs  T(C): 34.6 (17 Aug 2018 02:12), Max: 36.3 (17 Aug 2018 00:45)  T(F): 94.3 (17 Aug 2018 02:12), Max: 97.3 (17 Aug 2018 00:45)  HR: 73 (17 Aug 2018 02:12) (55 - 76)  BP: 111/56 (17 Aug 2018 02:12) (50/45 - 111/56)  BP(mean): --  RR: 15 (17 Aug 2018 02:12) (12 - 15)  SpO2: 100% (17 Aug 2018 02:12) (92% - 100%)  I&O's Detail    CAPILLARY BLOOD GLUCOSE        General: intubated, not sedated, minimally responsive   HEENT: fixed pupils   Neck: supple   Heart: S1. S2, RRR   Lungs: bilateral rhonchi   Abdomen: soft, NTND   Extremities: warm, perfused   Neurological: minimally responsive, baseline dementia       CARDIAC MARKERS ( 17 Aug 2018 00:59 )  x     / 0.03 ng/mL / x     / x     / x          CBC Full  -  ( 17 Aug 2018 01:00 )  WBC Count : 17.9 K/uL  Hemoglobin : 7.2 g/dL  Hematocrit : 24.9 %  Platelet Count - Automated : 439 K/uL  Mean Cell Volume : 94.3 fL  Mean Cell Hemoglobin : 27.3 pg  Mean Cell Hemoglobin Concentration : 28.9 g/dL  Auto Neutrophil # : x  Auto Lymphocyte # : x  Auto Monocyte # : x  Auto Eosinophil # : x  Auto Basophil # : x  Auto Neutrophil % : 61.0 %  Auto Lymphocyte % : 19.0 %  Auto Monocyte % : 2.0 %  Auto Eosinophil % : 2.0 %  Auto Basophil % : 1.0 %        137  |  95<L>  |  44<H>  ----------------------------<  230<H>  5.6<H>   |  24  |  1.23    Ca    8.7      17 Aug 2018 00:59    TPro  7.1  /  Alb  2.7<L>  /  TBili  0.5  /  DBili  x   /  AST  79<H>  /  ALT  46<H>  /  AlkPhos  132<H>      LIVER FUNCTIONS - ( 17 Aug 2018 00:59 )  Alb: 2.7 g/dL / Pro: 7.1 g/dL / ALK PHOS: 132 U/L / ALT: 46 U/L / AST: 79 U/L / GGT: x             Urinalysis Basic - ( 17 Aug 2018 01:43 )    Color: Yellow / Appearance: Clear / S.010 / pH: x  Gluc: x / Ketone: NEGATIVE  / Bili: Negative / Urobili: 1.0 E.U./dL   Blood: x / Protein: 30 mg/dL / Nitrite: NEGATIVE   Leuk Esterase: NEGATIVE / RBC: < 5 /HPF / WBC < 5 /HPF   Sq Epi: x / Non Sq Epi: 0-5 /HPF / Bacteria: Many /HPF

## 2018-08-17 NOTE — CONSULT NOTE ADULT - SUBJECTIVE AND OBJECTIVE BOX
CHIEF COMPLAINT:    HPI:  89F w/PMHx dementia (baseline AOX1), recurrent UTI, Celiac dz, R femoral DVT s/p IVC filter (1/2017), chronic anemia, w/mult recent hosp for septic shock and hypoxic/hypercapnic resp failure in setting of proteus and aerococcus UTI and asp PNA, brought in after cardiac arrest. Most recent hosp course c/b dev of new afib w/RVR w/HD instability while in MICU, converted to NSR after receiving amio and getting shocked x1. Pt remained in NSR on amio, BP maintained w/midodrine. No A/C due to h/o gastric AVM. Tx for MSSA PNA w/7d course cefazolin. Of note pt still w/some signs of vol overload on CXR, diuresed, pt comfortable on NC by d/c. PEG placed on 8/6 during recent hosp, d/c back to NH on 8/14 on NC. Per report, at NH pt having sig difficulty managing secretions, found to be hypoxic and unresponsive, and eventually lost pulse (no rhythm strips available, but likely PEA in setting of hypoxic resp failure). Patient intubated in the field, ACLS protocol begun and ROSC achieved after 3 rounds of epinephrine. Currently intubated in MICU, off sedation, OES but does not follow commands or interact. On levo.    PAST MEDICAL & SURGICAL HISTORY:  Dementia  DVT (deep venous thrombosis)  Dysphagia  Anemia  DJD (degenerative joint disease)  Edema  IBS (irritable bowel syndrome)  Celiac disease  S/P IVC filter  H/O inguinal hernia repair  History of total left hip replacement    [ ] Diabetes   [ ] Hypertension  [ ] Hyperlipidemia  [ ] CAD  [ ] PCI  [ ] CABG    PREVIOUS DIAGNOSTIC TESTING:    [x] Echocardiogram:  TTE (7/16/2018): Mild concentric LVH, no WMA, EF 65-70%, new mild AS (1.7cm2, mean gradient 19mmHg), PASP 61mmHg  [ ] Stress Test:  [ ] Catheterization: 	    FAMILY HISTORY:  No pertinent family history in first degree relatives    SOCIAL HISTORY:    [ ] Non-smoker  [ ] Current Smoker  [ ] Former Smoker  [ ] Alcohol Use  [ ] Drug Use    ALLERGIES/INTOLERANCES:  amoxicillin (Rash)  Cipro (Rash)  clindamycin (Unknown)  lactose (Unknown)  penicillin (Rash)  Wheat (Unknown)    HOME MEDICATIONS:    INPATIENT MEDICATIONS:  amiodarone    Tablet 200 milliGRAM(s) Oral daily  norepinephrine Infusion 0.05 MICROgram(s)/kG/Min (4.884 mL/Hr) IV Continuous <Continuous>    heparin  Injectable 5000 Unit(s) SubCutaneous every 12 hours    chlorhexidine 0.12% Liquid 15 milliLiter(s) Swish and Spit two times a day  chlorhexidine 2% Cloths 1 Application(s) Topical daily  meropenem  IVPB 500 milliGRAM(s) IV Intermittent every 12 hours  pantoprazole  Injectable 40 milliGRAM(s) IV Push daily      REVIEW OF SYSTEMS:  [ ] All other review of systems are negative unless indicated above.  [x] Unable to obtain due to: poor mental status    PHYSICAL EXAM:    T(C): 37.8 (08-17-18 @ 09:57), Max: 37.8 (08-17-18 @ 09:57)  HR: 92 (08-17-18 @ 11:00) (55 - 92)  BP: 84/50 (08-17-18 @ 08:00) (50/45 - 112/52)  RR: 38 (08-17-18 @ 10:00) (12 - 38)  SpO2: 95% (08-17-18 @ 11:00) (87% - 100%)  Wt(kg): --    I&O's Summary    16 Aug 2018 07:01  -  17 Aug 2018 07:00  --------------------------------------------------------  IN: 87.8 mL / OUT: 235 mL / NET: -147.2 mL    17 Aug 2018 07:01  -  17 Aug 2018 11:52  --------------------------------------------------------  IN: 48 mL / OUT: 67 mL / NET: -19 mL    GENERAL: intubated, off sed, OES but not interactive  HEAD:  NCAT  HEENT: Conjunctiva and sclera clear; moist mucosa; Neck supple, No JVD  CARDIOVASCULAR: RRR, normal S1 S2, no M/R/G, no JVD, neg HJR, nondisplaced PMI, no LE edema  RESPIRATORY: Limited exam, dec BS b/l, mild b/l insp rales, exp rhonchi  GASTROINTESTINAL: +BS, soft, non-distended, non-tender, no HSM  VASCULAR: Peripheral pulses palpable 2+ bilaterally  EXTREMITIES: Warm. No clubbing, cyanosis or edema  SKIN: No rashes, lesions, ecchymoses, or cyanosis  NEURO: OES, not FC, blinks to threat, does not track w/eyes  LINES:    TELEMETRY: NSR@93, no events    ECG:  EKG (8/17/2018): Sinus arrest vs fine afib (no visualized P-waves) w/junctional escape rhythm @75m, low voltage in limb leads, RAD, TWI in I, aVL, and V1  EKG (8/6/2018): NSR@81, normal axis, normal intervals, nonspec ST-TW changes (Q-wave in III, TWI in III, V1 and V3, TWF in aVF). No sig changes from prior  	  LABS:                        7.3    11.3  )-----------( 431      ( 17 Aug 2018 06:54 )             24.6     08-17    137  |  99  |  44<H>  ----------------------------<  152<H>  4.6   |  25  |  1.11    Ca    7.9<L>      17 Aug 2018 06:54  Mg     2.1     08-17    TPro  7.1  /  Alb  2.7<L>  /  TBili  0.5  /  DBili  x   /  AST  79<H>  /  ALT  46<H>  /  AlkPhos  132<H>  08-17      Lipid Profile:   HgA1c:   TSH:     CARDIAC MARKERS:      proBNP: Serum Pro-Brain Natriuretic Peptide: 8827 pg/mL (08-17 @ 00:59)      RADIOLOGY: CHIEF COMPLAINT:    HPI:  89F w/PMHx dementia (baseline AOX1), recurrent UTI, Celiac dz, R femoral DVT s/p IVC filter (1/2017), chronic anemia, w/mult recent hosp for septic shock and hypoxic/hypercapnic resp failure in setting of proteus and aerococcus UTI and asp PNA, brought in after cardiac arrest. Most recent hosp course c/b dev of new afib w/RVR w/HD instability while in MICU, converted to NSR after receiving amio and getting shocked x1. Pt remained in NSR on amio, BP maintained w/midodrine. No A/C due to h/o gastric AVM. Tx for MSSA PNA w/7d course cefazolin. Of note pt still w/some signs of vol overload on CXR, diuresed, pt comfortable on NC by d/c. PEG placed on 8/6 during recent hosp, d/c back to NH on 8/14 on NC. Per report, at NH pt having sig difficulty managing secretions, found to be hypoxic and unresponsive, and eventually lost pulse (no rhythm strips available, but likely PEA in setting of hypoxic resp failure). Patient intubated in the field, ACLS protocol begun and ROSC achieved after 3 rounds of epinephrine. Currently intubated in MICU, off sedation, OES but does not follow commands or interact. On levo.    PAST MEDICAL & SURGICAL HISTORY:  Dementia  DVT (deep venous thrombosis)  Dysphagia  Anemia  DJD (degenerative joint disease)  Edema  IBS (irritable bowel syndrome)  Celiac disease  S/P IVC filter  H/O inguinal hernia repair  History of total left hip replacement    [ ] Diabetes   [ ] Hypertension  [ ] Hyperlipidemia  [ ] CAD  [ ] PCI  [ ] CABG    PREVIOUS DIAGNOSTIC TESTING:    [x] Echocardiogram:  TTE (7/16/2018): Mild concentric LVH, no WMA, EF 65-70%, new mild AS (1.7cm2, mean gradient 19mmHg), PASP 61mmHg  [ ] Stress Test:  [ ] Catheterization: 	    FAMILY HISTORY:  No pertinent family history in first degree relatives    SOCIAL HISTORY:    [ ] Non-smoker  [ ] Current Smoker  [ ] Former Smoker  [ ] Alcohol Use  [ ] Drug Use    ALLERGIES/INTOLERANCES:  amoxicillin (Rash)  Cipro (Rash)  clindamycin (Unknown)  lactose (Unknown)  penicillin (Rash)  Wheat (Unknown)    HOME MEDICATIONS: Amio 200qd, midodrine 10 q8h    INPATIENT MEDICATIONS:  amiodarone    Tablet 200 milliGRAM(s) Oral daily  norepinephrine Infusion 0.05 MICROgram(s)/kG/Min (4.884 mL/Hr) IV Continuous <Continuous>    heparin  Injectable 5000 Unit(s) SubCutaneous every 12 hours    chlorhexidine 0.12% Liquid 15 milliLiter(s) Swish and Spit two times a day  chlorhexidine 2% Cloths 1 Application(s) Topical daily  meropenem  IVPB 500 milliGRAM(s) IV Intermittent every 12 hours  pantoprazole  Injectable 40 milliGRAM(s) IV Push daily      REVIEW OF SYSTEMS:  [ ] All other review of systems are negative unless indicated above.  [x] Unable to obtain due to: poor mental status    PHYSICAL EXAM:    T(C): 37.8 (08-17-18 @ 09:57), Max: 37.8 (08-17-18 @ 09:57)  HR: 92 (08-17-18 @ 11:00) (55 - 92)  BP: 84/50 (08-17-18 @ 08:00) (50/45 - 112/52)  RR: 38 (08-17-18 @ 10:00) (12 - 38)  SpO2: 95% (08-17-18 @ 11:00) (87% - 100%)  Wt(kg): --    I&O's Summary    16 Aug 2018 07:01  -  17 Aug 2018 07:00  --------------------------------------------------------  IN: 87.8 mL / OUT: 235 mL / NET: -147.2 mL    17 Aug 2018 07:01  -  17 Aug 2018 11:52  --------------------------------------------------------  IN: 48 mL / OUT: 67 mL / NET: -19 mL    GENERAL: intubated, off sed, OES but not interactive  HEAD:  NCAT  HEENT: Conjunctiva and sclera clear; moist mucosa; Neck supple, No JVD  CARDIOVASCULAR: RRR, normal S1 S2, no M/R/G, no JVD, neg HJR, nondisplaced PMI, no LE edema  RESPIRATORY: Limited exam, dec BS b/l, mild b/l insp rales, exp rhonchi  GASTROINTESTINAL: +BS, soft, non-distended, non-tender, no HSM  VASCULAR: Peripheral pulses palpable 2+ bilaterally  EXTREMITIES: Warm. No clubbing, cyanosis or edema  SKIN: No rashes, lesions, ecchymoses, or cyanosis  NEURO: OES, not FC, blinks to threat, does not track w/eyes  LINES:    TELEMETRY: NSR@93, no events    ECG:  EKG (8/17/2018): Sinus arrest vs fine afib (no visualized P-waves) w/junctional escape rhythm @75m, low voltage in limb leads, RAD, TWI in I, aVL, and V1  EKG (8/6/2018): NSR@81, normal axis, normal intervals, nonspec ST-TW changes (Q-wave in III, TWI in III, V1 and V3, TWF in aVF). No sig changes from prior  	  LABS:                        7.3    11.3  )-----------( 431      ( 17 Aug 2018 06:54 )             24.6     08-17    137  |  99  |  44<H>  ----------------------------<  152<H>  4.6   |  25  |  1.11    Ca    7.9<L>      17 Aug 2018 06:54  Mg     2.1     08-17    TPro  7.1  /  Alb  2.7<L>  /  TBili  0.5  /  DBili  x   /  AST  79<H>  /  ALT  46<H>  /  AlkPhos  132<H>  08-17      Lipid Profile:   HgA1c:   TSH:     CARDIAC MARKERS:      proBNP: Serum Pro-Brain Natriuretic Peptide: 8827 pg/mL (08-17 @ 00:59)      RADIOLOGY:  CXR (8/17/18):  Mild pulm congestion, small b/l PLEF

## 2018-08-17 NOTE — CHART NOTE - NSCHARTNOTEFT_GEN_A_CORE
Infectious Diseases Anti-infective Approval Note    Medication: meropenem  Dose: 500mg   Route: IV  Frequency: q12h  Duration: 3 days    Dose may be adjusted as needed for alterations in renal function.    *THIS IS NOT AN INFECTIOUS DISEASES CONSULTATION*

## 2018-08-17 NOTE — CONSULT NOTE ADULT - SUBJECTIVE AND OBJECTIVE BOX
JOSE MARTIN SALAZAR   MRN-[id number]     (08/15/1920):     HPI:  89 F w/ pmhx of dementia, R. femoral thrombus s/p IVC filter 2017, chronic anemia, presents to ED from [facility] after cardio-pulmonary arrest. Patient recently admitted to MICU for hypoxic respiratory failure 2/2 aspiration pneumonia. Patient eventually extubated, received PEG tube, and discharged back to [facility] on . Hospital course complicated by unstable atrial fibrillation s/p cardioversion, placed on amiodarone with stabilization of HR. Per ER, patient unable to tolerate secretions, found to be hypoxic at NH and eventually lost a pulse. Patient intubated in the [facility], ACLS protocol begun and ROSC achieved after 3 rounds of epinephrine. (17 Aug 2018 03:20)      Patient known to me from previous admission.  Discharged just 2.5 days ago. At that time she required intubation for aspiration pneumonia.  She was successfully extubated.  PEG was placed.  I had explained to son that placement of feeding tube was not a protection against recurrent aspiration.   Additional history obtained from aide at the bedside.  She has known patient 3 years.  According to aide, patient at that time was able to be wheeled to the park and was conversant.  Until about 2 months ago, although bedridden, patient would watch TV and read the newspaper.  Over the past several weeks, aide has noted significant physical and mental decline    PAST MEDICAL & SURGICAL HISTORY:  Dementia  DVT (deep venous thrombosis)  Dysphagia  Anemia  DJD (degenerative joint disease)  Edema  IBS (irritable bowel syndrome)  Celiac disease  S/P IVC filter  H/O inguinal hernia repair  History of total left hip replacement      FAMILY HISTORY:  son  of lymphoma        ROS:    Unable to obtain due to: patient non-communicative on ventilator,  Some history as above                     Dyspnea (Jose 0-10):      patient overbreathing the vent at 40 breaths/minute                  N/V (Y/N):                  N            Secretions (Y/N) :         yes- thick brown secretions       Agitation(Y/N):  Y  Pain (Y/N):     appears in pain, cannot assess further  -Provocation/Palliation:  -Quality/Quantity:  -Radiating:  -Severity:  -Timing/Frequency:  -Impact on ADLs:    Remaining 12 system ROS unobtainable at this time    Allergies    amoxicillin (Rash)  Cipro (Rash)  clindamycin (Unknown)  lactose (Unknown)  penicillin (Rash)  Wheat (Unknown)    Intolerances        Opiate Naive (Y/N):  yes      Medications:      MEDICATIONS  (STANDING):  amiodarone    Tablet 200 milliGRAM(s) Oral daily  chlorhexidine 0.12% Liquid 15 milliLiter(s) Swish and Spit two times a day  chlorhexidine 2% Cloths 1 Application(s) Topical daily  heparin  Injectable 5000 Unit(s) SubCutaneous every 12 hours  meropenem  IVPB 500 milliGRAM(s) IV Intermittent every 12 hours  norepinephrine Infusion 0.05 MICROgram(s)/kG/Min (4.884 mL/Hr) IV Continuous <Continuous>  pantoprazole  Injectable 40 milliGRAM(s) IV Push daily    MEDICATIONS  (PRN):      Labs:    CBC:                        7.3    11.3  )-----------( 431      ( 17 Aug 2018 06:54 )             24.6     CMP:        137  |  99  |  44<H>  ----------------------------<  152<H>  4.6   |  25  |  1.11    Ca    7.9<L>      17 Aug 2018 06:54  Mg     2.1         TPro  7.1  /  Alb  2.7<L>  /  TBili  0.5  /  DBili  x   /  AST  79<H>  /  ALT  46<H>  /  AlkPhos  132<H>      PT/INR - ( 17 Aug 2018 06:54 )   PT: 13.1 sec;   INR: 1.18          PTT - ( 17 Aug 2018 06:54 )  PTT:25.7 sec  Urinalysis Basic - ( 17 Aug 2018 01:43 )    Color: Yellow / Appearance: Clear / S.010 / pH: x  Gluc: x / Ketone: NEGATIVE  / Bili: Negative / Urobili: 1.0 E.U./dL   Blood: x / Protein: 30 mg/dL / Nitrite: NEGATIVE   Leuk Esterase: NEGATIVE / RBC: < 5 /HPF / WBC < 5 /HPF   Sq Epi: x / Non Sq Epi: 0-5 /HPF / Bacteria: Many /HPF        Imaging:  Reviewed    < from: Xray Chest 1 View-PORTABLE IMMEDIATE (18 @ 05:47) >  EXAM:  XR CHEST PORTABLE IMMED 1V                          PROCEDURE DATE:  2018          INTERPRETATION:  Portable AP Radiograph dated 2018 5:47 AM    CLINICAL INFORMATION: 89 years, Female, post central line    PRIOR STUDIES: Chest x-ray 2018 at 12:54 AM    FINDINGS:  Tip of endotracheal tube 2.3 cm above the angel. Right internal jugular   venous catheter with the tip in the right atrium. NG tube with its tip in   the stomach. Trace left and small right pleural effusion, increased. Mild   pulmonary venous congestion. Bibasilar atelectasis. Dextroscoliosis.    IMPRESSION: Slight increase in the bibasilar pleural effusions.    < end of copied text >    PEx:  T(C): 37.9 (18 @ 12:00), Max: 38.6 (18 @ 11:00)  HR: 92 (18 @ 12:00) (55 - 92)  BP: 84/50 (18 @ 08:00) (50/45 - 112/52)  RR: 61 (18 @ 12:00) (12 - 61)  SpO2: 96% (18 @ 12:00) (87% - 100%)  Wt(kg): --  Daily Height in cm: 145 (17 Aug 2018 00:48)    Daily Weight in k.9 (17 Aug 2018 12:27)  CAPILLARY BLOOD GLUCOSE        I&O's Summary    16 Aug 2018 07:  -  17 Aug 2018 07:00  --------------------------------------------------------  IN: 87.8 mL / OUT: 235 mL / NET: -147.2 mL    17 Aug 2018 07:  -  17 Aug 2018 13:30  --------------------------------------------------------  IN: 328 mL / OUT: 77 mL / NET: 251 mL        General: elderly frail female who appears in distress.  When toughed or disturbed opens eyes in a startle  HEENT:  ET tube in place.  some blood from mouth  Neck: anterior flexion with some regidity  CVS: tachycardia  Resp: overbreathing the vent.  Being suction for thich brownish secretions at some points appears to have paradoxical respirations  GI:  abdomen protuberant, benign PEG in place  :  Tubbs  Musc:   sarcopenia  Neuro: absent corneal, positive gag, strtle reflex.  non focal  Psych:   cannot assess  Skin:   no skin tears  Lymph:no adenopathy  Preadmit Karnofsky:                       40%           Current Karnofsky:    10 %  Cachexia (Y/N): yes  BMI: 24.2    Advanced Directives:     Full Code      Decision maker: Esvin Salazar MD  Son and [phone]  Legal surrogate: same    Social History: widdowed for 30 years.  one son  of lymphoma.  Dr. Salazar is second son and very devoted    GOALS OF CARE DISCUSSION       Palliative care info/counseling  to be provided when son returns to MICU	                Documentation of GOC: Patient is Full code- discussed by patient's primary Dr. Motta and pulmonologist Dr. James on numerous occassions with the son.  At our last visit, son described QOL of patient as good.  He reported to nursing that he is concerned about her mental status this time- and would like her seen by neurology	          REFERRALS	        Palliative Med            Patient/Family Support       Massage Therapy       Music Therapy

## 2018-08-17 NOTE — PROCEDURE NOTE - NSPROCDETAILS_GEN_ALL_CORE
sterile technique, catheter placed/lumen(s) aspirated and flushed/ultrasound guidance/sterile dressing applied/guidewire recovered
positive blood return obtained via catheter/connected to a pressurized flush line/all materials/supplies accounted for at end of procedure/location identified, draped/prepped, sterile technique used, needle inserted/introduced/sutured in place

## 2018-08-17 NOTE — DIETITIAN INITIAL EVALUATION ADULT. - OTHER INFO
89 yo/female with PMHx dementia, DVT s/p IVC filter, anemia, recent admission w/intubation and PEG placement, presents from NH s/p cardio pulmonary arrest w/eventual ROSC, followed by intubation. Pt seen in room, intubated on VC/AC mode, off of sedation at this time. MAP 83, requiring levophed for pressor support. NGT in place, set to LIWS with output. Unable to obtain any further complaints at this time, though pt well known to this RD from admission last week. PEG in place, TF not running at this time. Skin noted w/stage III pressure injury. Allergies to lactose and wheat noted. Palliative consulted.  No education necessary at this time. Will monitor for goals of care and keep nutrition in line with care at all times.

## 2018-08-17 NOTE — ED ADULT NURSE NOTE - NSIMPLEMENTINTERV_GEN_ALL_ED
Implemented All Fall with Harm Risk Interventions:  [place] to call system. Call bell, personal items and telephone within reach. Instruct patient to call for assistance. Room bathroom lighting operational. Non-slip footwear when patient is off stretcher. Physically safe environment: no spills, clutter or unnecessary equipment. Stretcher in lowest position, wheels locked, appropriate side rails in place. Provide visual cue, wrist band, yellow gown, etc. Monitor gait and stability. Monitor for mental status changes and reorient to person, place, and time. Review medications for side effects contributing to fall risk. Reinforce activity limits and safety measures with patient and family. Provide visual clues: red socks.

## 2018-08-17 NOTE — PROCEDURE NOTE - NSINDICATIONS_GEN_A_CORE
arterial puncture to obtain ABG's/monitoring purposes/critical patient
critical illness/emergency venous access/venous access/volume resuscitation/hemodynamic monitoring

## 2018-08-17 NOTE — ED PROVIDER NOTE - CRITICAL CARE PROVIDED
additional history taking/consultation with other physicians/conducted a detailed discussion of DNR status/telephone consultation with the patient's family/direct patient care (not related to procedure)/interpretation of diagnostic studies/consult w/ pt's family directly relating to pts condition/documentation

## 2018-08-17 NOTE — ED PROVIDER NOTE - MEDICAL DECISION MAKING DETAILS
pt with cardiac arrest s/p resp arrest most likely from aspiration - fluids, broad spectrum antibiotics, NGT placed in ED, workup shows elevated wbc with bandemia, xray with multilobar infiltrate ? secondary to aspiration, son at bedside and requests aggressive care, MICU consulted and pt admitted to MICU

## 2018-08-17 NOTE — ED PROVIDER NOTE - OBJECTIVE STATEMENT
89 y/o f from [facility] s/p cardiac arrest.  Pt ? aspirated with hypoxia, resp arrest followed by cardiac arrest.  CPR immediately started, EMS intubated and 3 epis given with ROSC and then transfer to ED.  Pt with hx of recent resp failure secondary to aspiration pna (recent admission one week prior).  Hx of dementia, dysphagia with recent g tube placement on 8/6/18.  Pt full code.

## 2018-08-17 NOTE — CONSULT NOTE ADULT - PROBLEM SELECTOR RECOMMENDATION 3
Recurrent, despite placement of PEG and NPO status.  COnsistent with end staage dementia and debility

## 2018-08-17 NOTE — CONSULT NOTE ADULT - SUBJECTIVE AND OBJECTIVE BOX
JOSE MARTIN SALAZAR      Patient is a 89y old  Female who presents with a chief complaint of cardio-pulmonary arrest (17 Aug 2018 03:20)      HPI:  89 F w/ pmhx of dementia, R. femoral thrombus s/p IVC filter 2017, chronic anemia, [facility]. Patient recently admitted to MICU for hypoxic respiratory failure 2/2 aspiration pneumonia. Patient eventually extubated, received PEG tube, and [facility] . Hospital course complicated by unstable atrial fibrillation s/p cardioversion, placed on amiodarone with stabilization of HR. Per ER, patient unable to tolerate secretions, found to be hypoxic at NH and eventually lost a pulse. Patient intubated in the [facility], ACLS protocol begun and ROSC achieved after 3 rounds of epinephrine. (17 Aug 2018 03:20)      Addl  Medical issues: Multiorgan failure      HEALTH ISSUES - PROBLEM Dx:            MEDICATIONS  (STANDING):  amiodarone    Tablet 200 milliGRAM(s) Oral daily  chlorhexidine 0.12% Liquid 15 milliLiter(s) Swish and Spit two times a day  chlorhexidine 2% Cloths 1 Application(s) Topical daily  heparin  Injectable 5000 Unit(s) SubCutaneous every 12 hours  meropenem  IVPB 500 milliGRAM(s) IV Intermittent every 12 hours  midodrine 10 milliGRAM(s) Oral every 8 hours  norepinephrine Infusion 0.05 MICROgram(s)/kG/Min (4.884 mL/Hr) IV Continuous <Continuous>  pantoprazole  Injectable 40 milliGRAM(s) IV Push daily    MEDICATIONS  (PRN):          PAST MEDICAL & SURGICAL HISTORY:  Dementia  DVT (deep venous thrombosis)  Dysphagia  Anemia  DJD (degenerative joint disease)  Edema  IBS (irritable bowel syndrome)  Celiac disease  S/P IVC filter  H/O inguinal hernia repair  History of total left hip replacement      REVIEW OF SYSTEMS:  [x] As per HPI          Reviewed   no change                            Changes noted  CONSTITUTIONAL: No fever, weight loss, or fatigue  RESPIRATORY: No cough, wheezing, chills or hemoptysis; No Shortness of Breath  CARDIOVASCULAR: No chest pain, palpitations, dizziness, or leg swelling  GASTROINTESTINAL: No abdominal or epigastric pain. No nausea, vomiting, or hematemesis; No diarrhea or constipation. No melena or hematochezia.  MUSCULOSKELETAL: No joint pain or swelling; No muscle, back, or extremity pain  Neuro:   Grossly  Negative  Psych        Awake  alert  [x] All others negative	  [ ] Unable to obtain      Vital Signs Last 24 Hrs  T(C): 37.8 (17 Aug 2018 09:57), Max: 37.8 (17 Aug 2018 09:57)  T(F): 100 (17 Aug 2018 09:57), Max: 100 (17 Aug 2018 09:57)  HR: 90 (17 Aug 2018 10:00) (55 - 90)  BP: 84/50 (17 Aug 2018 08:00) (50/45 - 112/52)  BP(mean): 63 (17 Aug 2018 08:00) (63 - 82)  RR: 38 (17 Aug 2018 10:00) (12 - 38)  SpO2: 94% (17 Aug 2018 10:00) (87% - 100%)    PHYSICAL EXAM:      Constitutional:    Eyes:    ENMT:    Neck:    Breasts:    Back:    Respiratory:    Cardiovascular:    Gastrointestinal:    Genitourinary:    Rectal:    Extremities:    Vascular:    Neurological:    Skin:    Lymph Nodes:    Musculoskeletal:    Psychiatric:                              7.3    11.3  )-----------( 431      ( 17 Aug 2018 06:54 )             24.6     08    137  |  99  |  44<H>  ----------------------------<  152<H>  4.6   |  25  |  1.11    Ca    7.9<L>      17 Aug 2018 06:54  Mg     2.1         TPro  7.1  /  Alb  2.7<L>  /  TBili  0.5  /  DBili  x   /  AST  79<H>  /  ALT  46<H>  /  AlkPhos  132<H>  08    CARDIAC MARKERS ( 17 Aug 2018 00:59 )  x     / 0.03 ng/mL / x     / x     / x          PT/INR - ( 17 Aug 2018 06:54 )   PT: 13.1 sec;   INR: 1.18          PTT - ( 17 Aug 2018 06:54 )  PTT:25.7 sec  CAPILLARY BLOOD GLUCOSE        Urinalysis Basic - ( 17 Aug 2018 01:43 )    Color: Yellow / Appearance: Clear / S.010 / pH: x  Gluc: x / Ketone: NEGATIVE  / Bili: Negative / Urobili: 1.0 E.U./dL   Blood: x / Protein: 30 mg/dL / Nitrite: NEGATIVE   Leuk Esterase: NEGATIVE / RBC: < 5 /HPF / WBC < 5 /HPF   Sq Epi: x / Non Sq Epi: 0-5 /HPF / Bacteria: Many /HPF      I&O's Summary    16 Aug 2018 07:  -  17 Aug 2018 07:00  --------------------------------------------------------  IN: 87.8 mL / OUT: 235 mL / NET: -147.2 mL    17 Aug 2018 07:  -  17 Aug 2018 11:00  --------------------------------------------------------  IN: 48 mL / OUT: 67 mL / NET: -19 mL        Mode: AC/ CMV (Assist Control/ Continuous Mandatory Ventilation)  RR (machine): 14  TV (machine): 400  FiO2: 40  PEEP: 5  ITime: 1  MAP: 12  PIP: 35      ASSESSMENT/PLAN/RECOMMENDATIONS

## 2018-08-17 NOTE — PROGRESS NOTE ADULT - SUBJECTIVE AND OBJECTIVE BOX
OVERNIGHT EVENTS: Pt admitted overnight. Right IJ central line placed.     INTERVAL HPI/Subjective HPI: Pt is examined at bedside. Pt is currently intubated and sedated. She is noted to have episodes of opening eyes abruptly, resembling a startle response.     ICU Vital Signs Last 24 Hrs  T(C): 37.9 (17 Aug 2018 12:00), Max: 38.6 (17 Aug 2018 11:00)  T(F): 100.3 (17 Aug 2018 12:00), Max: 101.4 (17 Aug 2018 11:00)  HR: 88 (17 Aug 2018 13:00) (55 - 92)  BP: 84/50 (17 Aug 2018 08:00) (50/45 - 112/52)  BP(mean): 63 (17 Aug 2018 08:00) (63 - 82)  ABP: 122/56 (17 Aug 2018 13:00) (102/52 - 128/58)  ABP(mean): 80 (17 Aug 2018 13:) (68 - 84)  RR: 48 (17 Aug 2018 13:) (12 - 61)  SpO2: 97% (17 Aug 2018 13:) (87% - 100%)    I&O's Summary    16 Aug 2018 07:01  -  17 Aug 2018 07:00  --------------------------------------------------------  IN: 87.8 mL / OUT: 235 mL / NET: -147.2 mL    17 Aug 2018 07:01  -  17 Aug 2018 13:53  --------------------------------------------------------  IN: 456 mL / OUT: 152 mL / NET: 304 mL      Mode: AC/ CMV (Assist Control/ Continuous Mandatory Ventilation)  RR (machine): 14  TV (machine): 360  FiO2: 40  PEEP: 5  ITime: 1  MAP: 15  PIP: 30      LABS:                        7.3    11.3  )-----------( 431      ( 17 Aug 2018 06:54 )             24.6     08-17    137  |  99  |  44<H>  ----------------------------<  152<H>  4.6   |  25  |  1.11    Ca    7.9<L>      17 Aug 2018 06:54  Mg     2.1         TPro  7.1  /  Alb  2.7<L>  /  TBili  0.5  /  DBili  x   /  AST  79<H>  /  ALT  46<H>  /  AlkPhos  132<H>      PT/INR - ( 17 Aug 2018 06:54 )   PT: 13.1 sec;   INR: 1.18          PTT - ( 17 Aug 2018 06:54 )  PTT:25.7 sec  Urinalysis Basic - ( 17 Aug 2018 01:43 )    Color: Yellow / Appearance: Clear / S.010 / pH: x  Gluc: x / Ketone: NEGATIVE  / Bili: Negative / Urobili: 1.0 E.U./dL   Blood: x / Protein: 30 mg/dL / Nitrite: NEGATIVE   Leuk Esterase: NEGATIVE / RBC: < 5 /HPF / WBC < 5 /HPF   Sq Epi: x / Non Sq Epi: 0-5 /HPF / Bacteria: Many /HPF      CAPILLARY BLOOD GLUCOSE        ABG - ( 17 Aug 2018 07:07 )  pH, Arterial: 7.31  pH, Blood: x     /  pCO2: 55    /  pO2: 96    / HCO3: 27    / Base Excess: 0.4   /  SaO2: 97                  RADIOLOGY & ADDITIONAL TESTS:    Consultant(s) Notes Reviewed:  [x ] YES  [ ] NO    MEDICATIONS  (STANDING):  amiodarone    Tablet 200 milliGRAM(s) Oral daily  chlorhexidine 0.12% Liquid 15 milliLiter(s) Swish and Spit two times a day  chlorhexidine 2% Cloths 1 Application(s) Topical daily  heparin  Injectable 5000 Unit(s) SubCutaneous every 12 hours  meropenem  IVPB 500 milliGRAM(s) IV Intermittent every 12 hours  norepinephrine Infusion 0.05 MICROgram(s)/kG/Min (4.884 mL/Hr) IV Continuous <Continuous>  pantoprazole  Injectable 40 milliGRAM(s) IV Push daily    MEDICATIONS  (PRN):      PHYSICAL EXAM:  GENERAL: well built, well nourished  HEAD:  Atraumatic, Normocephalic  EYES: EOMI, PERRLA, conjunctiva and sclera clear  ENT: No tonsillar erythema, exudates, or enlargement; Moist mucous membranes, Good dentition, No lesions  NECK: Supple, No JVD, Normal thyroid, no enlarged nodes  NERVOUS SYSTEM:  Alert & Oriented X3, Good concentration; Motor Strength 5/5 B/L upper and lower extremities; DTRs 2+ intact and symmetric, sensory intact  CHEST/LUNG: B/L good air entry; No rales, rhonchi, or wheezing  HEART: S1S2 normal, no S3, Regular rate and rhythm; No murmurs, rubs, or gallops  ABDOMEN: Soft, Nontender, Nondistended; Bowel sounds present  EXTREMITIES:  2+ Peripheral Pulses, No clubbing, cyanosis, or edema  LYMPH: No lymphadenopathy noted  SKIN: No rashes or lesions    Care Discussed with Consultants/Other Providers [ x] YES  [ ] NO OVERNIGHT EVENTS: Pt admitted overnight. Right IJ central line placed.  Right radial A line placed.     INTERVAL HPI/Subjective HPI: Pt is examined at bedside. Pt is currently intubated. She is unresponsive to commands. She is noted to have episodes of opening eyes abruptly, resembling a startle response.     ICU Vital Signs Last 24 Hrs  T(C): 37.9 (17 Aug 2018 12:00), Max: 38.6 (17 Aug 2018 11:00)  T(F): 100.3 (17 Aug 2018 12:00), Max: 101.4 (17 Aug 2018 11:00)  HR: 88 (17 Aug 2018 13:00) (55 - 92)  BP: 84/50 (17 Aug 2018 08:00) (50/45 - 112/52)  BP(mean): 63 (17 Aug 2018 08:00) (63 - 82)  ABP: 122/56 (17 Aug 2018 13:00) (102/52 - 128/58)  ABP(mean): 80 (17 Aug 2018 13:) (68 - 84)  RR: 48 (17 Aug 2018 13:) (12 - 61)  SpO2: 97% (17 Aug 2018 13:00) (87% - 100%)    I&O's Summary    16 Aug 2018 07:  -  17 Aug 2018 07:00  --------------------------------------------------------  IN: 87.8 mL / OUT: 235 mL / NET: -147.2 mL    17 Aug 2018 07:01  -  17 Aug 2018 13:53  --------------------------------------------------------  IN: 456 mL / OUT: 152 mL / NET: 304 mL      Mode: AC/ CMV (Assist Control/ Continuous Mandatory Ventilation)  RR (machine): 14  TV (machine): 360  FiO2: 40  PEEP: 5  ITime: 1  MAP: 15  PIP: 30      LABS:                        7.3    11.3  )-----------( 431      ( 17 Aug 2018 06:54 )             24.6         137  |  99  |  44<H>  ----------------------------<  152<H>  4.6   |  25  |  1.11    Ca    7.9<L>      17 Aug 2018 06:54  Mg     2.1         TPro  7.1  /  Alb  2.7<L>  /  TBili  0.5  /  DBili  x   /  AST  79<H>  /  ALT  46<H>  /  AlkPhos  132<H>      PT/INR - ( 17 Aug 2018 06:54 )   PT: 13.1 sec;   INR: 1.18          PTT - ( 17 Aug 2018 06:54 )  PTT:25.7 sec  Urinalysis Basic - ( 17 Aug 2018 01:43 )    Color: Yellow / Appearance: Clear / S.010 / pH: x  Gluc: x / Ketone: NEGATIVE  / Bili: Negative / Urobili: 1.0 E.U./dL   Blood: x / Protein: 30 mg/dL / Nitrite: NEGATIVE   Leuk Esterase: NEGATIVE / RBC: < 5 /HPF / WBC < 5 /HPF   Sq Epi: x / Non Sq Epi: 0-5 /HPF / Bacteria: Many /HPF      CAPILLARY BLOOD GLUCOSE        ABG - ( 17 Aug 2018 07:07 )  pH, Arterial: 7.31  pH, Blood: x     /  pCO2: 55    /  pO2: 96    / HCO3: 27    / Base Excess: 0.4   /  SaO2: 97                  RADIOLOGY & ADDITIONAL TESTS:    Consultant(s) Notes Reviewed:  [x ] YES  [ ] NO    MEDICATIONS  (STANDING):  amiodarone    Tablet 200 milliGRAM(s) Oral daily  chlorhexidine 0.12% Liquid 15 milliLiter(s) Swish and Spit two times a day  chlorhexidine 2% Cloths 1 Application(s) Topical daily  heparin  Injectable 5000 Unit(s) SubCutaneous every 12 hours  meropenem  IVPB 500 milliGRAM(s) IV Intermittent every 12 hours  norepinephrine Infusion 0.05 MICROgram(s)/kG/Min (4.884 mL/Hr) IV Continuous <Continuous>  pantoprazole  Injectable 40 milliGRAM(s) IV Push daily    MEDICATIONS  (PRN):      PHYSICAL EXAM:  GENERAL: intubated, toxic appearing  HEAD:  Atraumatic, Normocephalic  EYES: Pupils dilated, mildly responsive to light, conjunctiva and sclera clear  NECK: Supple, No JVD, Normal thyroid, no enlarged nodes  NERVOUS SYSTEM: Not alert, not oriented, unresponsive to commands. episodes of "startle" like response. no corneal reflex, no gag reflex. Upward babinski test on left foot.    CHEST/LUNG: B/L good air entry; No rales, rhonchi, or wheezing  HEART: S1S2 normal, no S3, Regular rate and rhythm; No murmurs, rubs, or gallops  ABDOMEN: Soft, Nontender, Nondistended; Bowel sounds present  LYMPH: No lymphadenopathy noted  SKIN: No rashes or lesions    Care Discussed with Consultants/Other Providers [ x] YES  [ ] NO

## 2018-08-17 NOTE — CONSULT NOTE ADULT - PROBLEM SELECTOR RECOMMENDATION 2
of longstanding, but aide notes serious decline has occurred over the past several months.  No recommendations

## 2018-08-17 NOTE — ED ADULT TRIAGE NOTE - CHIEF COMPLAINT QUOTE
pt BIBA from [facility] post cardiac arrest CPR in prog 28minutes ROSC achieved at 3 epi's intubated in field

## 2018-08-17 NOTE — PROGRESS NOTE ADULT - SUBJECTIVE AND OBJECTIVE BOX
CC/ HPI Ms. Goff is a 89 year old lady with dementia, history bilateral DVT s/p IVC filter 2017, with multiple episodes of pneumonia, most recently complicated by septic shock and respiratory failure, who is now admitted from the nursing home following cardiopulmonary arrest, seen this morning resting comfortably on the ventilator.    PAST MEDICAL HISTORY:  Dementia  DVT (deep venous thrombosis)  Dysphagia  Anemia  DJD (degenerative joint disease)  Edema  IBS (irritable bowel syndrome)  Celiac disease  H/O inguinal hernia repair  History of total left hip replacement    SOCHX:  -  alcohol    FMHX: FA/MO  - contributory     ROS reviewed below with positive findings marked (+) :  GEN:  fever, chills ENT: tracheostomy,   epistaxis,  sinusitis COR: CAD, CHF,  HTN, dysrhythmia PUL: COPD, ILD, asthma, pneumonia GI: PEG, dysphagia, hemorrhage, other LUDWIN: kidney disease, electrolyte disorder HEM:  anemia, +thrombus, coagulopathy, cancer ENDO:  thyroid disease, diabetes mellitus CNS:  +dementia, stroke, seizure, PSY:  depression, anxiety, other       MEDICATIONS  (STANDING):  amiodarone    Tablet 200 milliGRAM(s) Oral daily  chlorhexidine 0.12% Liquid 15 milliLiter(s) Swish and Spit two times a day  chlorhexidine 2% Cloths 1 Application(s) Topical daily  dexmedetomidine Infusion 0.2 MICROgram(s)/kG/Hr (2.605 mL/Hr) IV Continuous <Continuous>  heparin  Injectable 5000 Unit(s) SubCutaneous every 12 hours  meropenem  IVPB 500 milliGRAM(s) IV Intermittent every 12 hours  norepinephrine Infusion 0.05 MICROgram(s)/kG/Min (4.884 mL/Hr) IV Continuous <Continuous>  pantoprazole  Injectable 40 milliGRAM(s) IV Push daily  propofol Infusion 5 MICROgram(s)/kG/Min (1.563 mL/Hr) IV Continuous <Continuous>        Vital Signs Last 24 Hrs  T(C): 37.7 (17 Aug 2018 17:08), Max: 38.6 (17 Aug 2018 11:00)  T(F): 99.9 (17 Aug 2018 17:08), Max: 101.4 (17 Aug 2018 11:00)  HR: 89 (17 Aug 2018 16:55) (55 - 92)  BP: 105/58 (17 Aug 2018 16:00) (50/45 - 128/58)  BP(mean): 79 (17 Aug 2018 16:00) (62 - 84)  RR: 31 (17 Aug 2018 16:00) (12 - 61)  SpO2: 99% (17 Aug 2018 16:55) (87% - 100%)  Mode: AC/ CMV (Assist Control/ Continuous Mandatory Ventilation)  RR (machine): 14  TV (machine): 360  FiO2: 40  PEEP: 5  ITime: 1  MAP: 16  PIP: 33    GENERAL:           intubated on the ventilator, comfortable,  - distress.  HEENT:            - trauma,  - icterus,  - injection,  - nasal discharge.  NECK:              - jugular venous distention, - thyromegaly.  LYMPH:           - lymphadenopathy, - masses.  RESP:               +rhonchi, - rales,   - wheezes.   COR:                S1S2   - gallops,  - rubs.  ABD:                bowel sounds,   soft, - tender, - distended.  EXT/MSC:         - cyanosis,  - clubbing,  - edema.    NEURO:            - alert,  + responds to stimuli.      ABG - ( 17 Aug 2018 07:07 )  pH, Arterial: 7.31  pH, Blood: x     /  pCO2: 55    /  pO2: 96    / HCO3: 27    / Base Excess: 0.4   /  SaO2: 97                                7.3    11.3  )-----------( 431      ( 17 Aug 2018 06:54 )             24.6     08-17    137  |  99  |  44<H>  ----------------------------<  152<H>  4.6   |  25  |  1.11        Xray Chest (08.17.18)  Tip of endotracheal tube 2.3 cm above the angel. Right internal jugular venous catheter with the tip in the right atrium. NG tube with its tip in the stomach. Trace left and small right pleural effusion, increased. Mild pulmonary venous congestion. Bibasilar atelectasis.      Echocardiogram (07.16.18) The left atrial size is normal. Right atrium not well visualized. Calcified aortic valve. No aortic regurgitation noted. There is Mild aortic stenosis.  The calculated aortic valve area using the continuity equation is1.7 cm2. The  calculated aortic valve area indexed to body surface area is 1.1 cm2/m2.  There is trace mitral  regurgitation. Structurally normal tricuspid valve. There is mild tricuspid  regurgitation. The pulmonary artery systolic pressure is estimated to be 61 mmHg. The right ventricle is normal in size and function.  There is a septal "knuckle" with no left ventricular outflow obstruction There is mild concentric left ventricular hypertrophy. The left ventricular wall motion is  normal. The left ventricular ejection fraction is estimated to be 65-70% .    ASSESSMENT/PLAN    1) Respiratory failure  2) Status post cardiac arrest  3) Pneumonia  4) Pulmonary hypertension  5) Dementia    Satisfactory SpO2, Paw on the current ventilator settings  Bronchodilators:  Atrovent/ albuterol q 4 – 6 hours as needed  ID/Antibiotics: vancomycin, meropenem  Cardiac/HTN: amiodarone, taper pressors as tolerated  GI: Rx/ prophylaxis c PPI/H2B  Heme: Rx/VT prophylaxis  Discussed with medical team, poor prognosis, palliative care consult

## 2018-08-18 LAB
ANION GAP SERPL CALC-SCNC: 18 MMOL/L — HIGH (ref 5–17)
BASE EXCESS BLDA CALC-SCNC: -0.2 MMOL/L — SIGNIFICANT CHANGE UP (ref -2–3)
BUN SERPL-MCNC: 43 MG/DL — HIGH (ref 7–23)
CALCIUM SERPL-MCNC: 8.3 MG/DL — LOW (ref 8.4–10.5)
CHLORIDE SERPL-SCNC: 100 MMOL/L — SIGNIFICANT CHANGE UP (ref 96–108)
CO2 SERPL-SCNC: 21 MMOL/L — LOW (ref 22–31)
CREAT SERPL-MCNC: 1.2 MG/DL — SIGNIFICANT CHANGE UP (ref 0.5–1.3)
GAS PNL BLDA: SIGNIFICANT CHANGE UP
GLUCOSE SERPL-MCNC: 81 MG/DL — SIGNIFICANT CHANGE UP (ref 70–99)
GRAM STN FLD: SIGNIFICANT CHANGE UP
HCO3 BLDA-SCNC: 22 MMOL/L — SIGNIFICANT CHANGE UP (ref 21–28)
HCT VFR BLD CALC: 20.2 % — CRITICAL LOW (ref 34.5–45)
HCT VFR BLD CALC: 23 % — LOW (ref 34.5–45)
HGB BLD-MCNC: 6.2 G/DL — CRITICAL LOW (ref 11.5–15.5)
HGB BLD-MCNC: 7.7 G/DL — LOW (ref 11.5–15.5)
MAGNESIUM SERPL-MCNC: 2.1 MG/DL — SIGNIFICANT CHANGE UP (ref 1.6–2.6)
MCHC RBC-ENTMCNC: 27.1 PG — SIGNIFICANT CHANGE UP (ref 27–34)
MCHC RBC-ENTMCNC: 28.6 PG — SIGNIFICANT CHANGE UP (ref 27–34)
MCHC RBC-ENTMCNC: 30.7 G/DL — LOW (ref 32–36)
MCHC RBC-ENTMCNC: 33.5 G/DL — SIGNIFICANT CHANGE UP (ref 32–36)
MCV RBC AUTO: 85.5 FL — SIGNIFICANT CHANGE UP (ref 80–100)
MCV RBC AUTO: 88.2 FL — SIGNIFICANT CHANGE UP (ref 80–100)
PCO2 BLDA: 26 MMHG — LOW (ref 32–45)
PH BLDA: 7.54 — HIGH (ref 7.35–7.45)
PHOSPHATE SERPL-MCNC: 2.7 MG/DL — SIGNIFICANT CHANGE UP (ref 2.5–4.5)
PLATELET # BLD AUTO: 347 K/UL — SIGNIFICANT CHANGE UP (ref 150–400)
PLATELET # BLD AUTO: 378 K/UL — SIGNIFICANT CHANGE UP (ref 150–400)
PO2 BLDA: 129 MMHG — HIGH (ref 83–108)
POTASSIUM SERPL-MCNC: 3.4 MMOL/L — LOW (ref 3.5–5.3)
POTASSIUM SERPL-SCNC: 3.4 MMOL/L — LOW (ref 3.5–5.3)
RBC # BLD: 2.29 M/UL — LOW (ref 3.8–5.2)
RBC # BLD: 2.69 M/UL — LOW (ref 3.8–5.2)
RBC # FLD: 16.6 % — SIGNIFICANT CHANGE UP (ref 10.3–16.9)
RBC # FLD: 18.3 % — HIGH (ref 10.3–16.9)
SAO2 % BLDA: 99 % — SIGNIFICANT CHANGE UP (ref 95–100)
SODIUM SERPL-SCNC: 139 MMOL/L — SIGNIFICANT CHANGE UP (ref 135–145)
SPECIMEN SOURCE: SIGNIFICANT CHANGE UP
VANCOMYCIN FLD-MCNC: 10.8 UG/ML — SIGNIFICANT CHANGE UP
WBC # BLD: 12.5 K/UL — HIGH (ref 3.8–10.5)
WBC # BLD: 12.9 K/UL — HIGH (ref 3.8–10.5)
WBC # FLD AUTO: 12.5 K/UL — HIGH (ref 3.8–10.5)
WBC # FLD AUTO: 12.9 K/UL — HIGH (ref 3.8–10.5)

## 2018-08-18 PROCEDURE — 71045 X-RAY EXAM CHEST 1 VIEW: CPT | Mod: 26

## 2018-08-18 PROCEDURE — 99291 CRITICAL CARE FIRST HOUR: CPT

## 2018-08-18 RX ORDER — ACETAMINOPHEN 500 MG
1000 TABLET ORAL ONCE
Qty: 0 | Refills: 0 | Status: COMPLETED | OUTPATIENT
Start: 2018-08-18 | End: 2018-08-18

## 2018-08-18 RX ORDER — POTASSIUM CHLORIDE 20 MEQ
20 PACKET (EA) ORAL ONCE
Qty: 0 | Refills: 0 | Status: COMPLETED | OUTPATIENT
Start: 2018-08-18 | End: 2018-08-18

## 2018-08-18 RX ORDER — PROPOFOL 10 MG/ML
0.5 INJECTION, EMULSION INTRAVENOUS
Qty: 1000 | Refills: 0 | Status: DISCONTINUED | OUTPATIENT
Start: 2018-08-18 | End: 2018-08-19

## 2018-08-18 RX ORDER — PANTOPRAZOLE SODIUM 20 MG/1
40 TABLET, DELAYED RELEASE ORAL EVERY 12 HOURS
Qty: 0 | Refills: 0 | Status: DISCONTINUED | OUTPATIENT
Start: 2018-08-18 | End: 2018-08-19

## 2018-08-18 RX ADMIN — PANTOPRAZOLE SODIUM 40 MILLIGRAM(S): 20 TABLET, DELAYED RELEASE ORAL at 12:21

## 2018-08-18 RX ADMIN — PROPOFOL 0.16 MICROGRAM(S)/KG/MIN: 10 INJECTION, EMULSION INTRAVENOUS at 12:22

## 2018-08-18 RX ADMIN — CHLORHEXIDINE GLUCONATE 15 MILLILITER(S): 213 SOLUTION TOPICAL at 12:21

## 2018-08-18 RX ADMIN — Medication 400 MILLIGRAM(S): at 23:20

## 2018-08-18 RX ADMIN — CHLORHEXIDINE GLUCONATE 15 MILLILITER(S): 213 SOLUTION TOPICAL at 23:08

## 2018-08-18 RX ADMIN — MEROPENEM 100 MILLIGRAM(S): 1 INJECTION INTRAVENOUS at 23:52

## 2018-08-18 RX ADMIN — CHLORHEXIDINE GLUCONATE 15 MILLILITER(S): 213 SOLUTION TOPICAL at 00:10

## 2018-08-18 RX ADMIN — Medication 100 MILLIEQUIVALENT(S): at 07:54

## 2018-08-18 RX ADMIN — MEROPENEM 100 MILLIGRAM(S): 1 INJECTION INTRAVENOUS at 12:22

## 2018-08-18 RX ADMIN — Medication 250 MILLIGRAM(S): at 00:39

## 2018-08-18 RX ADMIN — MEROPENEM 100 MILLIGRAM(S): 1 INJECTION INTRAVENOUS at 00:14

## 2018-08-18 RX ADMIN — HEPARIN SODIUM 5000 UNIT(S): 5000 INJECTION INTRAVENOUS; SUBCUTANEOUS at 06:59

## 2018-08-18 RX ADMIN — PANTOPRAZOLE SODIUM 40 MILLIGRAM(S): 20 TABLET, DELAYED RELEASE ORAL at 23:08

## 2018-08-18 RX ADMIN — HEPARIN SODIUM 5000 UNIT(S): 5000 INJECTION INTRAVENOUS; SUBCUTANEOUS at 18:05

## 2018-08-18 NOTE — CONSULT NOTE ADULT - SUBJECTIVE AND OBJECTIVE BOX
JOSE MARTIN SALAZAR      Patient is a 89y old  Female who presents with a chief complaint of cardio-pulmonary arrest (17 Aug 2018 03:20)      HPI:  89 F w/ pmhx of dementia, R. femoral thrombus s/p IVC filter 2017, chronic anemia, presents to ED from [facility] after cardio-pulmonary arrest. Patient recently admitted to MICU for hypoxic respiratory failure 2/2 aspiration pneumonia. Patient eventually extubated, received PEG tube, and discharged back to [facility] on . Hospital course complicated by unstable atrial fibrillation s/p cardioversion, placed on amiodarone with stabilization of HR. Per ER, patient unable to tolerate secretions, found to be hypoxic at NH and eventually lost a pulse. Patient intubated in the [facility], ACLS protocol begun and ROSC achieved after 3 rounds of epinephrine. (17 Aug 2018 03:20)      Addl  Medical issues:       HEALTH ISSUES - PROBLEM Dx:  Palliative care by specialist: Palliative care by specialist  Aspiration pneumonia, unspecified aspiration pneumonia type, unspecified laterality, unspecified part of lung: Aspiration pneumonia, unspecified aspiration pneumonia type, unspecified laterality, unspecified part of lung  Dementia without behavioral disturbance, unspecified dementia type: Dementia without behavioral disturbance, unspecified dementia type  Cardiac arrest: Cardiac arrest  Celiac disease: Celiac disease  Chronic deep vein thrombosis (DVT) of non-extremity vein: Chronic deep vein thrombosis (DVT) of non-extremity vein            MEDICATIONS  (STANDING):  amiodarone    Tablet 200 milliGRAM(s) Oral daily  chlorhexidine 0.12% Liquid 15 milliLiter(s) Swish and Spit two times a day  chlorhexidine 2% Cloths 1 Application(s) Topical daily  heparin  Injectable 5000 Unit(s) SubCutaneous every 12 hours  meropenem  IVPB 500 milliGRAM(s) IV Intermittent every 12 hours  norepinephrine Infusion 0.05 MICROgram(s)/kG/Min (4.884 mL/Hr) IV Continuous <Continuous>  pantoprazole  Injectable 40 milliGRAM(s) IV Push every 12 hours  propofol Infusion 0.5 MICROgram(s)/kG/Min (0.156 mL/Hr) IV Continuous <Continuous>    MEDICATIONS  (PRN):          PAST MEDICAL & SURGICAL HISTORY:  Dementia  DVT (deep venous thrombosis)  Dysphagia  Anemia  DJD (degenerative joint disease)  Edema  IBS (irritable bowel syndrome)  Celiac disease  S/P IVC filter  H/O inguinal hernia repair  History of total left hip replacement      REVIEW OF SYSTEMS:  [x] As per HPI           [ ] Unable to obtain      Vital Signs Last 24 Hrs  T(C): 38.1 (18 Aug 2018 14:10), Max: 38.1 (18 Aug 2018 10:00)  T(F): 100.5 (18 Aug 2018 14:10), Max: 100.5 (18 Aug 2018 10:00)  HR: 80 (18 Aug 2018 14:00) (78 - 90)  BP: 97/57 (18 Aug 2018 10:00) (88/50 - 115/62)  BP(mean): 70 (18 Aug 2018 10:00) (60 - 83)  RR: 24 (18 Aug 2018 14:00) (14 - 56)  SpO2: 97% (18 Aug 2018 14:00) (97% - 100%)    PHYSICAL EXAM:      Constitutional:vent unresp    Eyes:    ENMT:    Neck:neg    Breasts:d    Back:    Respiratory:bilat rhonchi    Cardiovascular:s1s2    Gastrointestinal:PEG    Genitourinary:    Rectal:    Extremities:decr ROM    Vascular:    Neurological:sedated    Skin:    Lymph Nodes:    Musculoskeletal:    Psychiatric: sedated                              6.2    12.9  )-----------( 378      ( 18 Aug 2018 06:01 )             20.2     08-18    139  |  100  |  43<H>  ----------------------------<  81  3.4<L>   |  21<L>  |  1.20    Ca    8.3<L>      18 Aug 2018 06:01  Phos  2.7     08-18  Mg     2.1     08-18    TPro  7.1  /  Alb  2.7<L>  /  TBili  0.5  /  DBili  x   /  AST  79<H>  /  ALT  46<H>  /  AlkPhos  132<H>  08-17    CARDIAC MARKERS ( 17 Aug 2018 00:59 )  x     / 0.03 ng/mL / x     / x     / x          PT/INR - ( 17 Aug 2018 06:54 )   PT: 13.1 sec;   INR: 1.18          PTT - ( 17 Aug 2018 06:54 )  PTT:25.7 sec  CAPILLARY BLOOD GLUCOSE        Urinalysis Basic - ( 17 Aug 2018 01:43 )    Color: Yellow / Appearance: Clear / S.010 / pH: x  Gluc: x / Ketone: NEGATIVE  / Bili: Negative / Urobili: 1.0 E.U./dL   Blood: x / Protein: 30 mg/dL / Nitrite: NEGATIVE   Leuk Esterase: NEGATIVE / RBC: < 5 /HPF / WBC < 5 /HPF   Sq Epi: x / Non Sq Epi: 0-5 /HPF / Bacteria: Many /HPF      I&O's Summary    17 Aug 2018 07:  -  18 Aug 2018 07:00  --------------------------------------------------------  IN: 1014.7 mL / OUT: 715 mL / NET: 299.7 mL    18 Aug 2018 07:  -  18 Aug 2018 14:49  --------------------------------------------------------  IN: 121 mL / OUT: 285 mL / NET: -164 mL        Mode: AC/ CMV (Assist Control/ Continuous Mandatory Ventilation)  RR (machine): 14  TV (machine): 360  FiO2: 40  PEEP: 5  ITime: 1  MAP: 12  PIP: 26      ASSESSMENT/PLAN/RECOMMENDATIONS

## 2018-08-18 NOTE — CONSULT NOTE ADULT - ASSESSMENT
anemia: acute on top on chronic. possible anoxic brain injury  not candidate for GI intervention  supportive  if no brain activity and no chance of improvement consider terminal wean

## 2018-08-18 NOTE — PROGRESS NOTE ADULT - SUBJECTIVE AND OBJECTIVE BOX
CC/ HPI Ms. Goff is a 89 year old lady with dementia, history bilateral DVT s/p IVC filter 2017, with multiple episodes of pneumonia, most recently complicated by septic shock and respiratory failure, who is now admitted from the nursing home following cardiopulmonary arrest, seen this morning resting comfortably on the ventilator.    PAST MEDICAL HISTORY:  Dementia  DVT (deep venous thrombosis)  Dysphagia  Anemia  DJD (degenerative joint disease)  Edema  IBS (irritable bowel syndrome)  Celiac disease  H/O inguinal hernia repair  History of total left hip replacement    SOCHX:  -  alcohol    FMHX: FA/MO  - contributory     ROS reviewed below with positive findings marked (+) :  GEN:  fever, chills ENT: tracheostomy,   epistaxis,  sinusitis COR: CAD, CHF,  HTN, dysrhythmia PUL: COPD, ILD, asthma, pneumonia GI: PEG, dysphagia, hemorrhage, other LUDWIN: kidney disease, electrolyte disorder HEM:  anemia, +thrombus, coagulopathy, cancer ENDO:  thyroid disease, diabetes mellitus CNS:  +dementia, stroke, seizure, PSY:  depression, anxiety, other        MEDICATIONS  (STANDING):  amiodarone    Tablet 200 milliGRAM(s) Oral daily  chlorhexidine 0.12% Liquid 15 milliLiter(s) Swish and Spit two times a day  chlorhexidine 2% Cloths 1 Application(s) Topical daily  heparin  Injectable 5000 Unit(s) SubCutaneous every 12 hours  meropenem  IVPB 500 milliGRAM(s) IV Intermittent every 12 hours  norepinephrine Infusion 0.05 MICROgram(s)/kG/Min (4.884 mL/Hr) IV Continuous <Continuous>  pantoprazole  Injectable 40 milliGRAM(s) IV Push every 12 hours  propofol Infusion 0.5 MICROgram(s)/kG/Min (0.156 mL/Hr) IV Continuous <Continuous>  vancomycin  IVPB 1000 milliGRAM(s) IV Intermittent every 24 hours        Vital Signs Last 24 Hrs  T(C): 38 (18 Aug 2018 09:00), Max: 38.6 (17 Aug 2018 11:00)  T(F): 100.4 (18 Aug 2018 09:00), Max: 101.4 (17 Aug 2018 11:00)  HR: 84 (18 Aug 2018 09:00) (78 - 92)  BP: 98/53 (18 Aug 2018 09:00) (88/50 - 128/58)  BP(mean): 67 (18 Aug 2018 09:00) (60 - 84)  RR: 21 (18 Aug 2018 09:00) (14 - 61)  SpO2: 98% (18 Aug 2018 09:00) (94% - 100%)  Mode: AC/ CMV (Assist Control/ Continuous Mandatory Ventilation)  RR (machine): 14  TV (machine): 360  FiO2: 40  PEEP: 5  ITime: 1  MAP: 12  PIP: 27    GENERAL:         intubated, + comfortable,  - distress.  HEENT:            - trauma,  - icterus,  - injection,  - nasal discharge.  NECK:              - jugular venous distention.  LYMPH:           - lymphadenopathy, - masses.  RESP:               + clear,   - rales,   - rhonchi,   - wheezes.   COR:                S1S2   - gallops,  - rubs.  ABD:                bowel sounds,   soft, - tender, - distended.  EXT/MSC:         - cyanosis,  - clubbing,  - edema.    NEURO:           -  alert. - response to verbal stimuli    ABG - ( 18 Aug 2018 01:08 )  pH, Arterial: 7.54  pH, Blood: x     /  pCO2: 26    /  pO2: 129   / HCO3: 22    / Base Excess: -0.2  /  SaO2: 99                              6.2    12.9  )-----------( 378      ( 18 Aug 2018 06:01 )             20.2     08-18    139  |  100  |  43<H>  ----------------------------<  81  3.4<L>   |  21<L>  |  1.20        Xray Chest (08.18.18)  Portable examination chest demonstrates no interval change lung pathology in comparison to prior examination of the chest 8/17/2018. Patient's head obscures the left upper lung field. No interval change position remaining support devices.  Impression: No interval change lung pathology      Xray Chest (08.17.18)  Tip of endotracheal tube 2.3 cm above the angel. Right internal jugular venous catheter with the tip in the right atrium. NG tube with its tip in the stomach. Trace left and small right pleural effusion, increased. Mild pulmonary venous congestion. Bibasilar atelectasis.      Echocardiogram (07.16.18) The left atrial size is normal. Right atrium not well visualized. Calcified aortic valve. No aortic regurgitation noted. There is Mild aortic stenosis.  The calculated aortic valve area using the continuity equation is1.7 cm2. The  calculated aortic valve area indexed to body surface area is 1.1 cm2/m2.  There is trace mitral  regurgitation. Structurally normal tricuspid valve. There is mild tricuspid  regurgitation. The pulmonary artery systolic pressure is estimated to be 61 mmHg. The right ventricle is normal in size and function.  There is a septal "knuckle" with no left ventricular outflow obstruction There is mild concentric left ventricular hypertrophy. The left ventricular wall motion is  normal. The left ventricular ejection fraction is estimated to be 65-70% .    ASSESSMENT/PLAN    1) Respiratory failure  2) Status post cardiac arrest  3) Pneumonia  4) Pulmonary hypertension  5) Anoxic brain injury  6) Anemia; rule out GIB    Satisfactory SpO2, Paw on the current ventilator settings  Bronchodilators:  Atrovent/ albuterol q 4 – 6 hours as needed  ID/Antibiotics: vancomycin, meropenem, follow up cultures  Cardiac/HTN: amiodarone, taper pressors as tolerated  GI: Rx c PPI/H2B  Heme: Rx/VT prophylaxis, blood products as necessary  Discussed with medical team, poor prognosis.  Dr. Lainez will cover until August 27, 2018 CC/ HPI Ms. Goff is a 89 year old lady with dementia, history bilateral DVT s/p IVC filter 2017, with multiple episodes of pneumonia, most recently complicated by septic shock and respiratory failure, who is now admitted from the nursing home following cardiopulmonary arrest, seen this morning resting comfortably on the ventilator.    PAST MEDICAL HISTORY:  Dementia  DVT (deep venous thrombosis)  Dysphagia  Anemia  DJD (degenerative joint disease)  Edema  IBS (irritable bowel syndrome)  Celiac disease  H/O inguinal hernia repair  History of total left hip replacement    SOCHX:  -  alcohol    FMHX: FA/MO  - contributory     ROS reviewed below with positive findings marked (+) :  GEN:  fever, chills ENT: tracheostomy,   epistaxis,  sinusitis COR: CAD, CHF,  HTN, dysrhythmia PUL: COPD, ILD, asthma, pneumonia GI: PEG, dysphagia, hemorrhage, other LUDWIN: kidney disease, electrolyte disorder HEM:  anemia, +thrombus, coagulopathy, cancer ENDO:  thyroid disease, diabetes mellitus CNS:  +dementia, stroke, seizure, PSY:  depression, anxiety, other        MEDICATIONS  (STANDING):  amiodarone    Tablet 200 milliGRAM(s) Oral daily  chlorhexidine 0.12% Liquid 15 milliLiter(s) Swish and Spit two times a day  chlorhexidine 2% Cloths 1 Application(s) Topical daily  heparin  Injectable 5000 Unit(s) SubCutaneous every 12 hours  meropenem  IVPB 500 milliGRAM(s) IV Intermittent every 12 hours  norepinephrine Infusion 0.05 MICROgram(s)/kG/Min (4.884 mL/Hr) IV Continuous <Continuous>  pantoprazole  Injectable 40 milliGRAM(s) IV Push every 12 hours  propofol Infusion 0.5 MICROgram(s)/kG/Min (0.156 mL/Hr) IV Continuous <Continuous>  vancomycin  IVPB 1000 milliGRAM(s) IV Intermittent every 24 hours        Vital Signs Last 24 Hrs  T(C): 38 (18 Aug 2018 09:00), Max: 38.6 (17 Aug 2018 11:00)  T(F): 100.4 (18 Aug 2018 09:00), Max: 101.4 (17 Aug 2018 11:00)  HR: 84 (18 Aug 2018 09:00) (78 - 92)  BP: 98/53 (18 Aug 2018 09:00) (88/50 - 128/58)  BP(mean): 67 (18 Aug 2018 09:00) (60 - 84)  RR: 21 (18 Aug 2018 09:00) (14 - 61)  SpO2: 98% (18 Aug 2018 09:00) (94% - 100%)  Mode: AC/ CMV (Assist Control/ Continuous Mandatory Ventilation)  RR (machine): 14  TV (machine): 360  FiO2: 40  PEEP: 5  ITime: 1  MAP: 12  PIP: 27    GENERAL:         intubated, + comfortable,  - distress.  HEENT:            - trauma,  - icterus,  - injection,  - nasal discharge.  NECK:              - jugular venous distention.  LYMPH:           - lymphadenopathy, - masses.  RESP:               + clear,   - rales,   - rhonchi,   - wheezes.   COR:                S1S2   - gallops,  - rubs.  ABD:                bowel sounds,   soft, - tender, - distended.  EXT/MSC:         - cyanosis,  - clubbing,  - edema.    NEURO:           -  alert. - response to verbal stimuli    ABG - ( 18 Aug 2018 01:08 )  pH, Arterial: 7.54  pH, Blood: x     /  pCO2: 26    /  pO2: 129   / HCO3: 22    / Base Excess: -0.2  /  SaO2: 99                              6.2    12.9  )-----------( 378      ( 18 Aug 2018 06:01 )             20.2     08-18    139  |  100  |  43<H>  ----------------------------<  81  3.4<L>   |  21<L>  |  1.20        Xray Chest (08.18.18)  Portable examination chest demonstrates no interval change lung pathology in comparison to prior examination of the chest 8/17/2018. Patient's head obscures the left upper lung field. No interval change position remaining support devices.  Impression: No interval change lung pathology      Xray Chest (08.17.18)  Tip of endotracheal tube 2.3 cm above the angel. Right internal jugular venous catheter with the tip in the right atrium. NG tube with its tip in the stomach. Trace left and small right pleural effusion, increased. Mild pulmonary venous congestion. Bibasilar atelectasis.      Echocardiogram (07.16.18) The left atrial size is normal. Right atrium not well visualized. Calcified aortic valve. No aortic regurgitation noted. There is Mild aortic stenosis.  The calculated aortic valve area using the continuity equation is1.7 cm2. The  calculated aortic valve area indexed to body surface area is 1.1 cm2/m2.  There is trace mitral  regurgitation. Structurally normal tricuspid valve. There is mild tricuspid  regurgitation. The pulmonary artery systolic pressure is estimated to be 61 mmHg. The right ventricle is normal in size and function.  There is a septal "knuckle" with no left ventricular outflow obstruction There is mild concentric left ventricular hypertrophy. The left ventricular wall motion is  normal. The left ventricular ejection fraction is estimated to be 65-70% .    ASSESSMENT/PLAN    1) Respiratory failure  2) Status post cardiac arrest  3) Pneumonia  4) Pulmonary hypertension  5) Anoxic brain injury  6) Anemia; rule out GIB    Satisfactory SpO2, Paw on the current ventilator settings  Bronchodilators:  Atrovent/ albuterol q 4 – 6 hours as needed  ID/Antibiotics: vancomycin, meropenem, follow up cultures  Cardiac/HTN: amiodarone, taper pressors as tolerated  GI: Rx c PPI/H2B  Heme: Rx/VT prophylaxis, blood products as necessary  Discussed with medical team, poor prognosis.  Dr. Lainez will cover until August 27, 2018

## 2018-08-18 NOTE — CONSULT NOTE ADULT - CONSULT REQUESTED DATE/TIME
17-Aug-2018
17-Aug-2018 02:57
17-Aug-2018 08:00
18-Aug-2018
18-Aug-2018 18:24
17-Aug-2018 15:30
17-Aug-2018 11:00

## 2018-08-18 NOTE — PROGRESS NOTE ADULT - ASSESSMENT
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 ROSC achieved after apx 28 minutes.   - Pt currently intubated  - d/c vancomycin   - c/w meropenem for broad coverage  - Lactate improved; HD improved while on Levophed   - Ng sump continued with suction   - f/up blood and sputum cultures  - 250 cc bolus when urine output <25cc/hr    Cardiac  #Cardiac Arrest: likely after pulmonary arrest from above  - EKG without ischemic changes   - trops 0.03. will not trend   - A line in place-right radial  -central line passed, cxr confirmed position   - start precedex, switch to propofol tonight    #Atrial flutter s/p cardioversion last admission  - c/w amiodarone 200 daily   -no A/C in setting of PMH of gastric AVM    ID  -c/w meropenum   -f/u blood cx     HEMATOLOGY  #Anemia  Hgb overnight was 6.2. Gave 1 unit PRBCs.   - Pt has a history of AVMS  - currently on PEG tube placed by Dr. Hale  - GI Dr. Hale consulted; monitor h/h. Pt likely not candidate for invasive procedures    NEURO  Possible anoxic brain injury  - Neuro consult for possible MRI  - Palliative consult    F NO FLUIDS FOR NOW  E REPLETE AS NEEDED  N HOLD PEG FEEDS FOR NOW   Dispo: MICU 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 ROSC achieved after apx 28 minutes.   - Pt currently intubated  - d/c vancomycin   - c/w meropenem for broad coverage  - Lactate improved; HD improved while on Levophed   - Ng sump continued with suction   - f/up blood and sputum cultures  - 250 cc bolus when urine output <25cc/hr    Cardiac  #Cardiac Arrest: likely after pulmonary arrest from above  - EKG without ischemic changes   - trops 0.03. will not trend   - A line in place-right radial  -central line passed, cxr confirmed position   - downtitrate propofol, switch to precedex sunday to wean off sedatives for neuro MRI evaluation via Dr. Harris    #Atrial flutter s/p cardioversion last admission  - c/w amiodarone 200 daily   -no A/C in setting of PMH of gastric AVM    ID  -c/w meropenum   -f/u blood cx     HEMATOLOGY  #Anemia  Hgb overnight was 6.2. Gave 1 unit PRBCs. Fup Hgb 7.7  - Pt has a history of AVMS  - currently on PEG tube placed by Dr. Hale  - GI Dr. Hale consulted; monitor h/h. Pt likely not candidate for invasive procedures. Will reasses monday. Monitor H/H    NEURO  Possible anoxic brain injury  - Neuro consult for possible MRI  - downtitrate propofol, switch to precedex sunday to wean off sedatives for neuro MRI evaluation via Dr. Harris  - Palliative consult    F NO FLUIDS FOR NOW  E REPLETE AS NEEDED  N HOLD PEG FEEDS FOR NOW   Dispo: MICU

## 2018-08-18 NOTE — PROGRESS NOTE ADULT - ASSESSMENT
A -anoxic brain  injury/respiratory  failure/ afib/aspiration    Suggest  maintain on mv  taper off propofol  abx fu cultures may e able to narrow abx  consider enteral feeds  contiinue amiodarone  temp control

## 2018-08-18 NOTE — CONSULT NOTE ADULT - SUBJECTIVE AND OBJECTIVE BOX
HPI:  89 y/o F pmhx of dementia, R. femoral thrombus s/p IVC filter 1/2017, chronic anemia, presents to ED from [facility] after cardio-pulmonary arrest. Patient recently admitted to MICU for hypoxic respiratory failure 2/2 aspiration pneumonia. Patient eventually extubated,  PEG tube placed by me, and discharged back to [facility] on 08/14. Hospital course complicated by unstable atrial fibrillation s/p cardioversion, placed on amiodarone with stabilization of HR. Per ER, patient unable to tolerate secretions, found to be hypoxic at NH and eventually lost a pulse. Patient intubated in the [facility], ACLS protocol begun and ROSC achieved after 3 rounds of epinephrine. (17 Aug 2018 03:20) Called to see because of anemia. Drop in Hgb w/o reports of melena      REVIEW OF SYSTEMS:  unresponsive    PAST MEDICAL & SURGICAL HISTORY:  Dementia  DVT (deep venous thrombosis)  Dysphagia  Anemia  DJD (degenerative joint disease)  Edema  IBS (irritable bowel syndrome)  Celiac disease  S/P IVC filter  H/O inguinal hernia repair  History of total left hip replacement      FAMILY HISTORY:  No pertinent family history in first degree relatives      SOCIAL HISTORY:  Smoking Status: [ ] Current, [ ] Former, [ ] Never  Pack Years:    MEDICATIONS:  MEDICATIONS  (STANDING):  amiodarone    Tablet 200 milliGRAM(s) Oral daily  chlorhexidine 0.12% Liquid 15 milliLiter(s) Swish and Spit two times a day  chlorhexidine 2% Cloths 1 Application(s) Topical daily  heparin  Injectable 5000 Unit(s) SubCutaneous every 12 hours  meropenem  IVPB 500 milliGRAM(s) IV Intermittent every 12 hours  norepinephrine Infusion 0.05 MICROgram(s)/kG/Min (4.884 mL/Hr) IV Continuous <Continuous>  pantoprazole  Injectable 40 milliGRAM(s) IV Push every 12 hours  propofol Infusion 0.5 MICROgram(s)/kG/Min (0.156 mL/Hr) IV Continuous <Continuous>    MEDICATIONS  (PRN):      Allergies    amoxicillin (Rash)  Cipro (Rash)  clindamycin (Unknown)  lactose (Unknown)  penicillin (Rash)  Wheat (Unknown)    Intolerances        Vital Signs Last 24 Hrs  T(C): 38.8 (18 Aug 2018 18:17), Max: 38.8 (18 Aug 2018 18:17)  T(F): 101.9 (18 Aug 2018 18:17), Max: 101.9 (18 Aug 2018 18:17)  HR: 88 (18 Aug 2018 18:00) (78 - 88)  BP: 97/57 (18 Aug 2018 10:00) (88/50 - 115/62)  BP(mean): 70 (18 Aug 2018 10:00) (60 - 83)  RR: 25 (18 Aug 2018 18:00) (14 - 54)  SpO2: 98% (18 Aug 2018 18:00) (97% - 100%)    08-17 @ 07:01  -  08-18 @ 07:00  --------------------------------------------------------  IN: 1014.7 mL / OUT: 715 mL / NET: 299.7 mL    08-18 @ 07:01  -  08-18 @ 18:24  --------------------------------------------------------  IN: 157.2 mL / OUT: 375 mL / NET: -217.8 mL      Mode: AC/ CMV (Assist Control/ Continuous Mandatory Ventilation)  RR (machine): 14  TV (machine): 360  FiO2: 40  PEEP: 5  ITime: 1  MAP: 16  PIP: 30      PHYSICAL EXAM:    General: intubated; in no acute distress, unreponsive  HEENT: MMM, conjunctiva and sclera clear, NGT  Gastrointestinal: Soft, non-tender non-distended; Normal bowel sounds; PEG  Extremities: Normal range of motion, No clubbing, cyanosis or edema  Skin: Warm and dry. No obvious rash      LABS:                        7.7    12.5  )-----------( 347      ( 18 Aug 2018 16:27 )             23.0     08-18    139  |  100  |  43<H>  ----------------------------<  81  3.4<L>   |  21<L>  |  1.20    Ca    8.3<L>      18 Aug 2018 06:01  Phos  2.7     08-18  Mg     2.1     08-18    TPro  7.1  /  Alb  2.7<L>  /  TBili  0.5  /  DBili  x   /  AST  79<H>  /  ALT  46<H>  /  AlkPhos  132<H>  08-17          RADIOLOGY & ADDITIONAL STUDIES:

## 2018-08-18 NOTE — CONSULT NOTE ADULT - ATTENDING COMMENTS
s/p CRA  Supp rx  ID--ab  prog is poor
Assessment: Patient personally seen and examined myself during rounds with the House Staff/Fellow  ON DATE 8/17/18  House Staff/Fellow note read, including vitals, physical findings, laboratory data, and radiological reports.   Revisions included below.   Direct personal management at bed side and extensive interpretation of the data.    Plan was outlined and discussed in details with the House Staff/Fellow.    Decision making of high complexity   Risk high of complications, morbidity, and/or mortality  Assessment and Action taken for acute disease activity to reflect the level of care provided:  -Hemodynamic evaluation and support  -ACS assessment and treatment as applicable  -Heart failure assessment and treatment as applicable  -Cardiac Telemetry reviewed  -Medication reconciliation  -Review laboratory data  -EKG reviewed   -Echo reviewed  -Interdisciplinary discussion with IC / EP / HF / CTS teams as needed  My plan includes :  close hemodynamic monitoring and management   Monitor for arrhythmias and monitor parameters for organ perfusion  monitor neurologic status  Head of the bed should remain elevated to 45 deg .   chest PT and IS will be encouraged  monitor adequacy of oxygenation and ventilation and attempt to wean oxygen  Nutritional goals will be met using po eventually , ensure adequate caloric intake and montior the same  Stress ulcer and VTE prophylaxis will be achieved    Glycemic control is satisfactory  Electrolytes have been replete as necessary and wound care has been carried out. Pain control has been achieved.   aggressive physical therapy and early mobility and ambulation goals will be met   The family was updated about the course and plan  CRITICAL CARE TIME SPENT in evaluation and management, reassessments, review and interpretation of labs and x-rays, ventilator and hemodynamic management, formulating a plan and coordinating care: ___90____ MIN.  Time does not include procedural time.    Sana Anguiano MD    Mobile: [phone]

## 2018-08-18 NOTE — CONSULT NOTE ADULT - CONSULT REASON
Cardiac arrest
Cardio-pulmonary Arrest
Medicine
Medicne
acute anemia
Altered mental status
symptom management and further discussion of GOC

## 2018-08-18 NOTE — PROGRESS NOTE ADULT - SUBJECTIVE AND OBJECTIVE BOX
Patient is a 89y old  Female who presents with a chief complaint of cardio-pulmonary arrest (17 Aug 2018 03:20)      INTERVAL HPI/OVERNIGHT EVENTS:  ICU Vital Signs Last 24 Hrs  T(C): 37.1 (18 Aug 2018 01:36), Max: 38.6 (17 Aug 2018 11:00)  T(F): 98.8 (18 Aug 2018 01:36), Max: 101.4 (17 Aug 2018 11:00)  HR: 84 (18 Aug 2018 06:00) (78 - 92)  BP: 98/57 (18 Aug 2018 06:00) (84/50 - 128/58)  BP(mean): 72 (18 Aug 2018 06:00) (60 - 84)  ABP: 106/46 (18 Aug 2018 06:00) (94/44 - 134/66)  ABP(mean): 66 (18 Aug 2018 06:00) (62 - 90)  RR: 34 (18 Aug 2018 06:00) (14 - 61)  SpO2: 98% (18 Aug 2018 06:00) (93% - 100%)    I&O's Summary    16 Aug 2018 07:01  -  17 Aug 2018 07:00  --------------------------------------------------------  IN: 87.8 mL / OUT: 235 mL / NET: -147.2 mL    17 Aug 2018 07:01  -  18 Aug 2018 06:47  --------------------------------------------------------  IN: 1014.7 mL / OUT: 715 mL / NET: 299.7 mL      Mode: AC/ CMV (Assist Control/ Continuous Mandatory Ventilation)  RR (machine): 14  TV (machine): 360  FiO2: 40  PEEP: 5  ITime: 1  MAP: 12  PIP: 27      LABS:                        6.2    12.9  )-----------( 378      ( 18 Aug 2018 06:01 )             20.2     08-17    137  |  99  |  44<H>  ----------------------------<  152<H>  4.6   |  25  |  1.11    Ca    7.9<L>      17 Aug 2018 06:54  Mg     2.1         TPro  7.1  /  Alb  2.7<L>  /  TBili  0.5  /  DBili  x   /  AST  79<H>  /  ALT  46<H>  /  AlkPhos  132<H>      PT/INR - ( 17 Aug 2018 06:54 )   PT: 13.1 sec;   INR: 1.18          PTT - ( 17 Aug 2018 06:54 )  PTT:25.7 sec  Urinalysis Basic - ( 17 Aug 2018 01:43 )    Color: Yellow / Appearance: Clear / S.010 / pH: x  Gluc: x / Ketone: NEGATIVE  / Bili: Negative / Urobili: 1.0 E.U./dL   Blood: x / Protein: 30 mg/dL / Nitrite: NEGATIVE   Leuk Esterase: NEGATIVE / RBC: < 5 /HPF / WBC < 5 /HPF   Sq Epi: x / Non Sq Epi: 0-5 /HPF / Bacteria: Many /HPF      CAPILLARY BLOOD GLUCOSE        ABG - ( 18 Aug 2018 01:08 )  pH, Arterial: 7.54  pH, Blood: x     /  pCO2: 26    /  pO2: 129   / HCO3: 22    / Base Excess: -0.2  /  SaO2: 99                  RADIOLOGY & ADDITIONAL TESTS:    Consultant(s) Notes Reviewed:  [x ] YES  [ ] NO    MEDICATIONS  (STANDING):  amiodarone    Tablet 200 milliGRAM(s) Oral daily  chlorhexidine 0.12% Liquid 15 milliLiter(s) Swish and Spit two times a day  chlorhexidine 2% Cloths 1 Application(s) Topical daily  heparin  Injectable 5000 Unit(s) SubCutaneous every 12 hours  meropenem  IVPB 500 milliGRAM(s) IV Intermittent every 12 hours  norepinephrine Infusion 0.05 MICROgram(s)/kG/Min (4.884 mL/Hr) IV Continuous <Continuous>  pantoprazole  Injectable 40 milliGRAM(s) IV Push daily  propofol Infusion 0.5 MICROgram(s)/kG/Min (0.156 mL/Hr) IV Continuous <Continuous>  vancomycin  IVPB 1000 milliGRAM(s) IV Intermittent every 24 hours    MEDICATIONS  (PRN):      PHYSICAL EXAM:  GENERAL: well built, well nourished  HEAD:  Atraumatic, Normocephalic  EYES: EOMI, PERRLA, conjunctiva and sclera clear  ENT: No tonsillar erythema, exudates, or enlargement; Moist mucous membranes, Good dentition, No lesions  NECK: Supple, No JVD, Normal thyroid, no enlarged nodes  NERVOUS SYSTEM:  Alert & Oriented X3, Good concentration; Motor Strength 5/5 B/L upper and lower extremities; DTRs 2+ intact and symmetric, sensory intact  CHEST/LUNG: B/L good air entry; No rales, rhonchi, or wheezing  HEART: S1S2 normal, no S3, Regular rate and rhythm; No murmurs, rubs, or gallops  ABDOMEN: Soft, Nontender, Nondistended; Bowel sounds present  EXTREMITIES:  2+ Peripheral Pulses, No clubbing, cyanosis, or edema  LYMPH: No lymphadenopathy noted  SKIN: No rashes or lesions    Care Discussed with Consultants/Other Providers [ x] YES  [ ] NO OVERNIGHT EVENTS: Pt Agitated, tachypneic , awake once propofol stopped, gave pt propofol bolus and restarted the propofol  . Repleted k . ordered 1 U PRBC    INTERVAL HPI: Pt is examined at bedside. Pt is intubated and sedated. She is unresponsive to commands.    ICU Vital Signs Last 24 Hrs  T(C): 37.1 (18 Aug 2018 01:36), Max: 38.6 (17 Aug 2018 11:00)  T(F): 98.8 (18 Aug 2018 01:36), Max: 101.4 (17 Aug 2018 11:00)  HR: 84 (18 Aug 2018 06:00) (78 - 92)  BP: 98/57 (18 Aug 2018 06:00) (84/50 - 128/58)  BP(mean): 72 (18 Aug 2018 06:00) (60 - 84)  ABP: 106/46 (18 Aug 2018 06:00) (94/44 - 134/66)  ABP(mean): 66 (18 Aug 2018 06:00) (62 - 90)  RR: 34 (18 Aug 2018 06:00) (14 - 61)  SpO2: 98% (18 Aug 2018 06:00) (93% - 100%)    I&O's Summary    16 Aug 2018 07:  -  17 Aug 2018 07:00  --------------------------------------------------------  IN: 87.8 mL / OUT: 235 mL / NET: -147.2 mL    17 Aug 2018 07:  -  18 Aug 2018 06:47  --------------------------------------------------------  IN: 1014.7 mL / OUT: 715 mL / NET: 299.7 mL      Mode: AC/ CMV (Assist Control/ Continuous Mandatory Ventilation)  RR (machine): 14  TV (machine): 360  FiO2: 40  PEEP: 5  ITime: 1  MAP: 12  PIP: 27      LABS:                        6.2    12.9  )-----------( 378      ( 18 Aug 2018 06:01 )             20.2         137  |  99  |  44<H>  ----------------------------<  152<H>  4.6   |  25  |  1.11    Ca    7.9<L>      17 Aug 2018 06:54  Mg     2.1         TPro  7.1  /  Alb  2.7<L>  /  TBili  0.5  /  DBili  x   /  AST  79<H>  /  ALT  46<H>  /  AlkPhos  132<H>      PT/INR - ( 17 Aug 2018 06:54 )   PT: 13.1 sec;   INR: 1.18          PTT - ( 17 Aug 2018 06:54 )  PTT:25.7 sec  Urinalysis Basic - ( 17 Aug 2018 01:43 )    Color: Yellow / Appearance: Clear / S.010 / pH: x  Gluc: x / Ketone: NEGATIVE  / Bili: Negative / Urobili: 1.0 E.U./dL   Blood: x / Protein: 30 mg/dL / Nitrite: NEGATIVE   Leuk Esterase: NEGATIVE / RBC: < 5 /HPF / WBC < 5 /HPF   Sq Epi: x / Non Sq Epi: 0-5 /HPF / Bacteria: Many /HPF      CAPILLARY BLOOD GLUCOSE        ABG - ( 18 Aug 2018 01:08 )  pH, Arterial: 7.54  pH, Blood: x     /  pCO2: 26    /  pO2: 129   / HCO3: 22    / Base Excess: -0.2  /  SaO2: 99                  RADIOLOGY & ADDITIONAL TESTS:    Consultant(s) Notes Reviewed:  [x ] YES  [ ] NO    MEDICATIONS  (STANDING):  amiodarone    Tablet 200 milliGRAM(s) Oral daily  chlorhexidine 0.12% Liquid 15 milliLiter(s) Swish and Spit two times a day  chlorhexidine 2% Cloths 1 Application(s) Topical daily  heparin  Injectable 5000 Unit(s) SubCutaneous every 12 hours  meropenem  IVPB 500 milliGRAM(s) IV Intermittent every 12 hours  norepinephrine Infusion 0.05 MICROgram(s)/kG/Min (4.884 mL/Hr) IV Continuous <Continuous>  pantoprazole  Injectable 40 milliGRAM(s) IV Push daily  propofol Infusion 0.5 MICROgram(s)/kG/Min (0.156 mL/Hr) IV Continuous <Continuous>  vancomycin  IVPB 1000 milliGRAM(s) IV Intermittent every 24 hours    MEDICATIONS  (PRN):      PHYSICAL EXAM:  GENERAL: intubated, toxic appearing  HEAD:  Atraumatic, Normocephalic  EYES: Pupils dilated, mildly responsive to light, conjunctiva and sclera clear  NECK: Supple, No JVD, Normal thyroid, no enlarged nodes  NERVOUS SYSTEM: Not alert, not oriented, unresponsive to commands. episodes of "startle" like response. no corneal reflex, no gag reflex. Upward babinski test on left foot.    CHEST/LUNG: B/L good air entry; No rales, rhonchi, or wheezing  HEART: S1S2 normal, no S3, Regular rate and rhythm; No murmurs, rubs, or gallops  ABDOMEN: Soft, Nontender, Nondistended; Bowel sounds present  LYMPH: No lymphadenopathy noted  SKIN: No rashes or lesions    Care Discussed with Consultants/Other Providers [ x] YES  [ ] NO

## 2018-08-18 NOTE — PROGRESS NOTE ADULT - SUBJECTIVE AND OBJECTIVE BOX
Patient is a 89y old  Female who presents with a chief complaint of cardio-pulmonary arrest (17 Aug 2018 03:20)        INTERVAL HPI/OVERNIGHT EVENTS: reamins unresposnive on sedation    SYMPTOMS sedated    DRIPS levo, propofol    Mode: AC/ CMV (Assist Control/ Continuous Mandatory Ventilation)  RR (machine): 14  TV (machine): 360  FiO2: 40  PEEP: 5  ITime: 1  MAP: 12  PIP: 26      ICU Vital Signs Last 24 Hrs  T(C): 38.1 (18 Aug 2018 10:00), Max: 38.1 (18 Aug 2018 10:00)  T(F): 100.5 (18 Aug 2018 10:00), Max: 100.5 (18 Aug 2018 10:00)  HR: 80 (18 Aug 2018 14:00) (78 - 90)  BP: 97/57 (18 Aug 2018 10:00) (88/50 - 115/62)  BP(mean): 70 (18 Aug 2018 10:00) (60 - 83)  ABP: 106/52 (18 Aug 2018 14:00) (98/42 - 134/66)  ABP(mean): 72 (18 Aug 2018 14:00) (62 - 90)  RR: 24 (18 Aug 2018 14:00) (14 - 56)  SpO2: 97% (18 Aug 2018 14:00) (97% - 100%)      I&O's Summary    17 Aug 2018 07:  -  18 Aug 2018 07:00  --------------------------------------------------------  IN: 1014.7 mL / OUT: 715 mL / NET: 299.7 mL    18 Aug 2018 07:01  -  18 Aug 2018 14:05  --------------------------------------------------------  IN: 121 mL / OUT: 285 mL / NET: -164 mL        EXAM    Chest ronchi    Heart ireg    Abdomen soft nontender with bs    Extremities trace edema    Neuro comstose      LABS:    ABG - ( 18 Aug 2018 01:08 )  pH: 7.54  /  pCO2: 26    /  pO2: 129   / HCO3: 22    / Base Excess: -0.2  /  SaO2: 99                                      6.2    12.9  )-----------( 378      ( 18 Aug 2018 06:01 )             20.2     08-18    139  |  100  |  43<H>  ----------------------------<  81  3.4<L>   |  21<L>  |  1.20    Ca    8.3<L>      18 Aug 2018 06:01  Phos  2.7       Mg     2.1         TPro  7.1  /  Alb  2.7<L>  /  TBili  0.5  /  DBili  x   /  AST  79<H>  /  ALT  46<H>  /  AlkPhos  132<H>  08    PT/INR - ( 17 Aug 2018 06:54 )   PT: 13.1 sec;   INR: 1.18          PTT - ( 17 Aug 2018 06:54 )  PTT:25.7 sec  Urinalysis Basic - ( 17 Aug 2018 01:43 )    Color: Yellow / Appearance: Clear / S.010 / pH: x  Gluc: x / Ketone: NEGATIVE  / Bili: Negative / Urobili: 1.0 E.U./dL   Blood: x / Protein: 30 mg/dL / Nitrite: NEGATIVE   Leuk Esterase: NEGATIVE / RBC: < 5 /HPF / WBC < 5 /HPF   Sq Epi: x / Non Sq Epi: 0-5 /HPF / Bacteria: Many /HPF            RADIOLOGY & ADDITIONAL STUDIES: cxr no change, sputum proteus    CRITICAL CARE TIME SPENT:

## 2018-08-19 DIAGNOSIS — R65.21 SEVERE SEPSIS WITH SEPTIC SHOCK: ICD-10-CM

## 2018-08-19 DIAGNOSIS — Z88.0 ALLERGY STATUS TO PENICILLIN: ICD-10-CM

## 2018-08-19 DIAGNOSIS — N39.0 URINARY TRACT INFECTION, SITE NOT SPECIFIED: ICD-10-CM

## 2018-08-19 DIAGNOSIS — E73.9 LACTOSE INTOLERANCE, UNSPECIFIED: ICD-10-CM

## 2018-08-19 DIAGNOSIS — E87.70 FLUID OVERLOAD, UNSPECIFIED: ICD-10-CM

## 2018-08-19 DIAGNOSIS — Z88.1 ALLERGY STATUS TO OTHER ANTIBIOTIC AGENTS STATUS: ICD-10-CM

## 2018-08-19 DIAGNOSIS — A41.9 SEPSIS, UNSPECIFIED ORGANISM: ICD-10-CM

## 2018-08-19 DIAGNOSIS — Z51.5 ENCOUNTER FOR PALLIATIVE CARE: ICD-10-CM

## 2018-08-19 DIAGNOSIS — R62.7 ADULT FAILURE TO THRIVE: ICD-10-CM

## 2018-08-19 DIAGNOSIS — Z78.1 PHYSICAL RESTRAINT STATUS: ICD-10-CM

## 2018-08-19 DIAGNOSIS — G89.29 OTHER CHRONIC PAIN: ICD-10-CM

## 2018-08-19 DIAGNOSIS — Z91.018 ALLERGY TO OTHER FOODS: ICD-10-CM

## 2018-08-19 DIAGNOSIS — Z98.890 OTHER SPECIFIED POSTPROCEDURAL STATES: ICD-10-CM

## 2018-08-19 DIAGNOSIS — E44.0 MODERATE PROTEIN-CALORIE MALNUTRITION: ICD-10-CM

## 2018-08-19 DIAGNOSIS — Z95.828 PRESENCE OF OTHER VASCULAR IMPLANTS AND GRAFTS: ICD-10-CM

## 2018-08-19 DIAGNOSIS — N17.9 ACUTE KIDNEY FAILURE, UNSPECIFIED: ICD-10-CM

## 2018-08-19 DIAGNOSIS — J69.0 PNEUMONITIS DUE TO INHALATION OF FOOD AND VOMIT: ICD-10-CM

## 2018-08-19 DIAGNOSIS — D64.9 ANEMIA, UNSPECIFIED: ICD-10-CM

## 2018-08-19 DIAGNOSIS — I48.0 PAROXYSMAL ATRIAL FIBRILLATION: ICD-10-CM

## 2018-08-19 DIAGNOSIS — Z86.718 PERSONAL HISTORY OF OTHER VENOUS THROMBOSIS AND EMBOLISM: ICD-10-CM

## 2018-08-19 DIAGNOSIS — F03.90 UNSPECIFIED DEMENTIA WITHOUT BEHAVIORAL DISTURBANCE: ICD-10-CM

## 2018-08-19 DIAGNOSIS — J96.02 ACUTE RESPIRATORY FAILURE WITH HYPERCAPNIA: ICD-10-CM

## 2018-08-19 DIAGNOSIS — I27.20 PULMONARY HYPERTENSION, UNSPECIFIED: ICD-10-CM

## 2018-08-19 DIAGNOSIS — Z96.642 PRESENCE OF LEFT ARTIFICIAL HIP JOINT: ICD-10-CM

## 2018-08-19 DIAGNOSIS — J90 PLEURAL EFFUSION, NOT ELSEWHERE CLASSIFIED: ICD-10-CM

## 2018-08-19 DIAGNOSIS — K59.00 CONSTIPATION, UNSPECIFIED: ICD-10-CM

## 2018-08-19 DIAGNOSIS — J98.11 ATELECTASIS: ICD-10-CM

## 2018-08-19 DIAGNOSIS — R32 UNSPECIFIED URINARY INCONTINENCE: ICD-10-CM

## 2018-08-19 DIAGNOSIS — R13.10 DYSPHAGIA, UNSPECIFIED: ICD-10-CM

## 2018-08-19 DIAGNOSIS — K31.7 POLYP OF STOMACH AND DUODENUM: ICD-10-CM

## 2018-08-19 DIAGNOSIS — K29.70 GASTRITIS, UNSPECIFIED, WITHOUT BLEEDING: ICD-10-CM

## 2018-08-19 DIAGNOSIS — K29.80 DUODENITIS WITHOUT BLEEDING: ICD-10-CM

## 2018-08-19 DIAGNOSIS — I51.7 CARDIOMEGALY: ICD-10-CM

## 2018-08-19 DIAGNOSIS — K90.0 CELIAC DISEASE: ICD-10-CM

## 2018-08-19 DIAGNOSIS — I42.9 CARDIOMYOPATHY, UNSPECIFIED: ICD-10-CM

## 2018-08-19 DIAGNOSIS — K21.9 GASTRO-ESOPHAGEAL REFLUX DISEASE WITHOUT ESOPHAGITIS: ICD-10-CM

## 2018-08-19 LAB
-  AMPICILLIN/SULBACTAM: SIGNIFICANT CHANGE UP
-  AMPICILLIN: SIGNIFICANT CHANGE UP
-  CEFAZOLIN: SIGNIFICANT CHANGE UP
-  CEFTRIAXONE: SIGNIFICANT CHANGE UP
-  CIPROFLOXACIN: SIGNIFICANT CHANGE UP
-  GENTAMICIN: SIGNIFICANT CHANGE UP
-  PIPERACILLIN/TAZOBACTAM: SIGNIFICANT CHANGE UP
-  TOBRAMYCIN: SIGNIFICANT CHANGE UP
-  TRIMETHOPRIM/SULFAMETHOXAZOLE: SIGNIFICANT CHANGE UP
ALBUMIN SERPL ELPH-MCNC: 2.5 G/DL — LOW (ref 3.3–5)
ALP SERPL-CCNC: 104 U/L — SIGNIFICANT CHANGE UP (ref 40–120)
ALT FLD-CCNC: 28 U/L — SIGNIFICANT CHANGE UP (ref 10–45)
ANION GAP SERPL CALC-SCNC: 19 MMOL/L — HIGH (ref 5–17)
AST SERPL-CCNC: 54 U/L — HIGH (ref 10–40)
BILIRUB DIRECT SERPL-MCNC: 0.5 MG/DL — HIGH (ref 0–0.2)
BILIRUB INDIRECT FLD-MCNC: 0.6 MG/DL — SIGNIFICANT CHANGE UP (ref 0.2–1)
BILIRUB SERPL-MCNC: 1.1 MG/DL — SIGNIFICANT CHANGE UP (ref 0.2–1.2)
BUN SERPL-MCNC: 39 MG/DL — HIGH (ref 7–23)
CALCIUM SERPL-MCNC: 8.4 MG/DL — SIGNIFICANT CHANGE UP (ref 8.4–10.5)
CHLORIDE SERPL-SCNC: 101 MMOL/L — SIGNIFICANT CHANGE UP (ref 96–108)
CO2 SERPL-SCNC: 22 MMOL/L — SIGNIFICANT CHANGE UP (ref 22–31)
CREAT ?TM UR-MCNC: 20 MG/DL — SIGNIFICANT CHANGE UP
CREAT SERPL-MCNC: 1.34 MG/DL — HIGH (ref 0.5–1.3)
CULTURE RESULTS: NO GROWTH — SIGNIFICANT CHANGE UP
GLUCOSE SERPL-MCNC: 81 MG/DL — SIGNIFICANT CHANGE UP (ref 70–99)
HCT VFR BLD CALC: 26.8 % — LOW (ref 34.5–45)
HGB BLD-MCNC: 8.3 G/DL — LOW (ref 11.5–15.5)
MAGNESIUM SERPL-MCNC: 2.3 MG/DL — SIGNIFICANT CHANGE UP (ref 1.6–2.6)
MCHC RBC-ENTMCNC: 27.3 PG — SIGNIFICANT CHANGE UP (ref 27–34)
MCHC RBC-ENTMCNC: 31 G/DL — LOW (ref 32–36)
MCV RBC AUTO: 88.2 FL — SIGNIFICANT CHANGE UP (ref 80–100)
METHOD TYPE: SIGNIFICANT CHANGE UP
OSMOLALITY UR: 361 MOSMOL/KG — SIGNIFICANT CHANGE UP (ref 100–650)
PHOSPHATE SERPL-MCNC: 3.1 MG/DL — SIGNIFICANT CHANGE UP (ref 2.5–4.5)
PLATELET # BLD AUTO: 406 K/UL — HIGH (ref 150–400)
POTASSIUM SERPL-MCNC: 3.9 MMOL/L — SIGNIFICANT CHANGE UP (ref 3.5–5.3)
POTASSIUM SERPL-SCNC: 3.9 MMOL/L — SIGNIFICANT CHANGE UP (ref 3.5–5.3)
PROT SERPL-MCNC: 6.7 G/DL — SIGNIFICANT CHANGE UP (ref 6–8.3)
RBC # BLD: 3.04 M/UL — LOW (ref 3.8–5.2)
RBC # FLD: 17.1 % — HIGH (ref 10.3–16.9)
SODIUM SERPL-SCNC: 142 MMOL/L — SIGNIFICANT CHANGE UP (ref 135–145)
SODIUM UR-SCNC: 98 MMOL/L — SIGNIFICANT CHANGE UP
SPECIMEN SOURCE: SIGNIFICANT CHANGE UP
UUN UR-MCNC: 289 MG/DL — SIGNIFICANT CHANGE UP
WBC # BLD: 12 K/UL — HIGH (ref 3.8–10.5)
WBC # FLD AUTO: 12 K/UL — HIGH (ref 3.8–10.5)

## 2018-08-19 PROCEDURE — 99291 CRITICAL CARE FIRST HOUR: CPT

## 2018-08-19 PROCEDURE — 71045 X-RAY EXAM CHEST 1 VIEW: CPT | Mod: 26

## 2018-08-19 PROCEDURE — 71045 X-RAY EXAM CHEST 1 VIEW: CPT | Mod: 26,77

## 2018-08-19 RX ORDER — INFLUENZA VIRUS VACCINE 15; 15; 15; 15 UG/.5ML; UG/.5ML; UG/.5ML; UG/.5ML
0.5 SUSPENSION INTRAMUSCULAR ONCE
Qty: 0 | Refills: 0 | Status: DISCONTINUED | OUTPATIENT
Start: 2018-08-19 | End: 2018-08-20

## 2018-08-19 RX ORDER — ACETAMINOPHEN 500 MG
1000 TABLET ORAL ONCE
Qty: 0 | Refills: 0 | Status: COMPLETED | OUTPATIENT
Start: 2018-08-19 | End: 2018-08-19

## 2018-08-19 RX ORDER — PANTOPRAZOLE SODIUM 20 MG/1
40 TABLET, DELAYED RELEASE ORAL
Qty: 0 | Refills: 0 | Status: DISCONTINUED | OUTPATIENT
Start: 2018-08-20 | End: 2018-08-20

## 2018-08-19 RX ORDER — FUROSEMIDE 40 MG
20 TABLET ORAL ONCE
Qty: 0 | Refills: 0 | Status: COMPLETED | OUTPATIENT
Start: 2018-08-19 | End: 2018-08-19

## 2018-08-19 RX ADMIN — CHLORHEXIDINE GLUCONATE 1 APPLICATION(S): 213 SOLUTION TOPICAL at 06:00

## 2018-08-19 RX ADMIN — Medication 1 APPLICATION(S): at 15:17

## 2018-08-19 RX ADMIN — HEPARIN SODIUM 5000 UNIT(S): 5000 INJECTION INTRAVENOUS; SUBCUTANEOUS at 18:48

## 2018-08-19 RX ADMIN — Medication 400 MILLIGRAM(S): at 12:07

## 2018-08-19 RX ADMIN — Medication 20 MILLIGRAM(S): at 09:36

## 2018-08-19 RX ADMIN — PANTOPRAZOLE SODIUM 40 MILLIGRAM(S): 20 TABLET, DELAYED RELEASE ORAL at 12:07

## 2018-08-19 RX ADMIN — Medication 1 DROP(S): at 15:16

## 2018-08-19 RX ADMIN — MEROPENEM 100 MILLIGRAM(S): 1 INJECTION INTRAVENOUS at 12:08

## 2018-08-19 RX ADMIN — AMIODARONE HYDROCHLORIDE 200 MILLIGRAM(S): 400 TABLET ORAL at 06:00

## 2018-08-19 RX ADMIN — HEPARIN SODIUM 5000 UNIT(S): 5000 INJECTION INTRAVENOUS; SUBCUTANEOUS at 06:00

## 2018-08-19 RX ADMIN — CHLORHEXIDINE GLUCONATE 15 MILLILITER(S): 213 SOLUTION TOPICAL at 12:07

## 2018-08-19 NOTE — PROGRESS NOTE ADULT - ASSESSMENT
A - anoxic brain injury/ sp arrest/acute respiratory failure/dementia/afib/ATN CAMRYN    Suggest:  taper levo as tolerated  maintain on mv  narrow abx pending sensitivities  hold feed pending decison re-LOC  continue amio for afib  sc heaprin for now

## 2018-08-19 NOTE — PROGRESS NOTE ADULT - SUBJECTIVE AND OBJECTIVE BOX
Interventional, Pulmonary, Critical, Chest Special Procedures.    Pt was seen and fully examined by myself.     Time spent with patient in minutes:77    Patient is a 89y old  Female who presents with a chief complaint of cardio-pulmonary arrest (17 Aug 2018 03:20)Events leading to this admission reviewed. The patient unresponsive on CMV mode.  Vent circuit integrity verified and preserved.     HPI:  89 F w/ pmhx of dementia, R. femoral thrombus s/p IVC filter 1/2017, chronic anemia, presents to ED from [facility] after cardio-pulmonary arrest. Patient recently admitted to MICU for hypoxic respiratory failure 2/2 aspiration pneumonia. Patient eventually extubated, received PEG tube, and discharged back to [facility] on 08/14. Hospital course complicated by unstable atrial fibrillation s/p cardioversion, placed on amiodarone with stabilization of HR. Per ER, patient unable to tolerate secretions, found to be hypoxic at NH and eventually lost a pulse. Patient intubated in the field, ACLS protocol begun and ROSC achieved after 3 rounds of epinephrine. (17 Aug 2018 03:20)    REVIEW OF SYSTEMS:  Constitutional: No fever, +weight loss, chills + fatigue  Eyes: No eye pain, visual disturbances, or discharge  ENMT:  + difficulty hearing, tinnitus, vertigo; No sinus or throat pain. No epistaxis, +dysphagia, dysphonia, hoarseness   Neck: No pain, stiffness or neck swelling.  No masses or deformities  Respiratory: +cough, wheezing, chills or hemoptysis  - COPD  - ILD   - PE   - ASTHMA     +PNEUMONIA  Cardiovascular: No chest pain, +dysrhythmia, palpitations, dizziness or edema   - COPD     - CAD   - CHF   - HTN  Gastrointestinal: No abdominal or epigastric pain. No nausea, vomiting or hematemesis; No diarrhea or constipation. No melena or hematochezia. No dysphagia or Icterus.          Genitourinary: No dysuria, frequency, hematuria + incontinence   - CKD/CAMRYN      - ESRD  Neurological: No headaches, memory loss, loss of strength, numbness or tremors      +DEMENTIA     - STROKE    - SEIZURE  Skin: No itching, burning, rashes or lesions   Lymph Nodes: No enlarged glands  Endocrine: No heat or cold intolerance; No hair loss       - DM     - THYROID DISORDER  Musculoskeletal: No joint pain or swelling; No muscle, back or extremity pain  Psychiatric: Dementia, anxiety, mood swings or difficulty sleeping  Heme/Lymph: No easy bruising or bleeding gums         +ANEMIA      - CANCER   =COAGULOPATHY  Allergy and Immunologic: No hives or eczema    PAST MEDICAL & SURGICAL HISTORY:  Dementia  DVT (deep venous thrombosis)  Dysphagia  Anemia  DJD (degenerative joint disease)  Edema  IBS (irritable bowel syndrome)  Celiac disease  S/P IVC filter  H/O inguinal hernia repair  History of total left hip replacement    FAMILY HISTORY:  No pertinent family history in first degree relatives    SOCIAL HISTORY:      - Tobacco     - ETOH    Allergies    amoxicillin (Rash)  Cipro (Rash)  clindamycin (Unknown)  lactose (Unknown)  penicillin (Rash)  Wheat (Unknown)    Intolerances      Vital Signs Last 24 Hrs  T(C): 39 (19 Aug 2018 11:00), Max: 39 (19 Aug 2018 11:00)  T(F): 102.2 (19 Aug 2018 11:00), Max: 102.2 (19 Aug 2018 11:00)  HR: 90 (19 Aug 2018 11:00) (76 - 94)  BP: 132/67 (19 Aug 2018 09:00) (110/59 - 132/67)  BP(mean): 95 (19 Aug 2018 09:00) (78 - 95)  RR: 24 (19 Aug 2018 11:00) (13 - 40)  SpO2: 98% (19 Aug 2018 11:00) (97% - 100%)    08-18 @ 07:01 - 08-19 @ 07:00  --------------------------------------------------------  IN: 718 mL / OUT: 1385 mL / NET: -667 mL    08-19 @ 07:01 - 08-19 @ 12:00  --------------------------------------------------------  IN: 23 mL / OUT: 285 mL / NET: -262 mL      Mode: AC/ CMV (Assist Control/ Continuous Mandatory Ventilation)  RR (machine): 14  TV (machine): 360  FiO2: 40  PEEP: 5  ITime: 1  MAP: 11  PIP: 39    PHYSICAL EXAM:  Un Comfortable, no immediate distress  Eyes: PERRL, EOM intact; conjunctiva and sclera clear  Head: Normocephalic;  No Trauma  ENMT: No nasal discharge, hoarseness, +cough   Neck: Supple; non tender; no masses or deformities.    No JVD  Respiratory:   - WHEEZING   +RHONCHI  - RALES  + CRACKLES.  Diminished breath sounds  BILATERAL  RIGHT > LEFT base  Cardiovascular: Regular rate and rhythm. S1 and S2 Normal; No murmurs, gallops or rubs     - PPM/AICD  Gastrointestinal: Soft non-tender, non-distended; Normal bowel sounds; No hepatosplenomegaly.     +_ site clean PEG    -  GT   + SWENSON  Genitourinary: No costovertebral angle tenderness. No dysuria  Extremities: NO AROM, No clubbing, cyanosis or edema    Vascular: Peripheral pulses palpable 2+ bilaterally  Neurological: Intubated sedated   Skin: Warm and dry. No obvious rash  Lymph Nodes: No acute cervical or supraclavicular adenopathy  Psychiatric:On vent.    DEVICES:  - DENTURES   +IV R / L     - ETUBE   -TRACH   -CTUBE  R / L      LABS:  ABG - ( 18 Aug 2018 01:08 )  pH, Arterial: 7.54  pH, Blood: x     /  pCO2: 26    /  pO2: 129   / HCO3: 22    / Base Excess: -0.2  /  SaO2: 99                                      8.3    12.0  )-----------( 406      ( 19 Aug 2018 07:08 )             26.8     08-19    142  |  101  |  39<H>  ----------------------------<  81  3.9   |  22  |  1.34<H>    Ca    8.4      19 Aug 2018 07:08  Phos  3.1     08-19  Mg     2.3     08-19    TPro  6.7  /  Alb  2.5<L>  /  TBili  1.1  /  DBili  0.5<H>  /  AST  54<H>  /  ALT  28  /  AlkPhos  104  08-19    < from: Xray Chest 1 View- PORTABLE-Routine (08.19.18 @ 06:20) >    EXAM:  XR CHEST PORTABLE ROUTINE 1V                          PROCEDURE DATE:  08/19/2018          INTERPRETATION:  Clinical History: Pneumonia    Portable examination chest demonstrates no significant change lung   pathology in comparison to prior examination of the chest 8/18/18.   Patient's head obscures the left lung field. No interval change this   remaining support devices.    Impression: Limited examination. No interval change lung pathology    < end of copied text >    RADIOLOGY & ADDITIONAL STUDIES (The following images were personally reviewed):

## 2018-08-19 NOTE — PROGRESS NOTE ADULT - ASSESSMENT
ASSESSMENT/PLAN    1) Respiratory failure  2) Status post cardiac arrest  3) Pneumonia recurrent aspiration  4) Pulmonary hypertension  5) Anoxic brain injury  6) Anemia; rule out GIB    Satisfactory SpO2, Continue CMV mode  Bronchodilators:  Atrovent/ albuterol q 4 – 6 hours as needed  ID/Antibiotics: vancomycin, meropenem, follow up cultures  Cardiac/HTN: amiodarone, taper pressors as tolerated  GI: Rx c PPI/H2B  Heme: Rx/VT prophylaxis, blood products as necessary  Aspiration precautions  Discussed with medical team, poor prognosis. Palliative feedback

## 2018-08-19 NOTE — PROGRESS NOTE ADULT - ASSESSMENT
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 ROSC achieved after apx 28 minutes.   - Pt currently intubated  - d/c vancomycin   - Lactate improved; HD improved while on Levophed   - Ng sump continued with suction   - BCx NGTD, f/u final  - Scx growing proteus (pan sensitive) and staph aureus (sensitivities pending)  - c/w meropenem for broad coverage in setting of pcn allergy  - 250 cc bolus when urine output <25cc/hr  - gave lasix 20 IV for signs of fluid overload, follow up Uop    Cardiac  #Cardiac Arrest: likely after pulmonary arrest from above  - EKG without ischemic changes   - trops 0.03. will not trend   - A line in place-right radial  -central line passed, cxr confirmed position   - pt has been off all sedatives since yesterday night and will remain off for neuro evaluation by Dr. Harris tomorrow (8/20)    #Atrial flutter s/p cardioversion last admission  - c/w amiodarone 200 daily   -no A/C in setting of PMH of gastric AVM    ID  -c/w meropenum   -f/u blood cx     HEMATOLOGY  #Anemia  Hgb overnight was 6.2. Gave 1 unit PRBCs. Fup Hgb 7.7  - Pt has a history of AVMS  - currently on PEG tube placed by Dr. Hale  - GI Dr. Hale consulted; monitor h/h. Pt likely not candidate for invasive procedures. Will reasses monday. Monitor H/H    NEURO  Possible anoxic brain injury  - Neuro consult for possible MRI  - off sedatives for neuro MRI evaluation by Dr. Harris tomorrow 8/20  - Palliative consult    F NO FLUIDS FOR NOW  E REPLETE AS NEEDED  N HOLD PEG FEEDS FOR NOW   - PPX- GI ppx Protonix  - Dispo: MICU

## 2018-08-19 NOTE — PROGRESS NOTE ADULT - SUBJECTIVE AND OBJECTIVE BOX
Pt seen and examined intubated  H/H stable post transfusion    REVIEW OF SYSTEMS:  unable      MEDICATIONS:  MEDICATIONS  (STANDING):  amiodarone    Tablet 200 milliGRAM(s) Oral daily  artificial  tears Solution 1 Drop(s) Both EYES daily  chlorhexidine 0.12% Liquid 15 milliLiter(s) Swish and Spit two times a day  chlorhexidine 2% Cloths 1 Application(s) Topical daily  heparin  Injectable 5000 Unit(s) SubCutaneous every 12 hours  influenza   Vaccine 0.5 milliLiter(s) IntraMuscular once  meropenem  IVPB 500 milliGRAM(s) IV Intermittent every 12 hours  norepinephrine Infusion 0.05 MICROgram(s)/kG/Min (4.884 mL/Hr) IV Continuous <Continuous>  pantoprazole  Injectable 40 milliGRAM(s) IV Push every 12 hours  petrolatum Ophthalmic Ointment 1 Application(s) Both EYES daily  propofol Infusion 0.5 MICROgram(s)/kG/Min (0.156 mL/Hr) IV Continuous <Continuous>    MEDICATIONS  (PRN):      Allergies    amoxicillin (Rash)  Cipro (Rash)  clindamycin (Unknown)  lactose (Unknown)  penicillin (Rash)  Wheat (Unknown)    Intolerances        Vital Signs Last 24 Hrs  T(C): 38.5 (19 Aug 2018 09:00), Max: 38.9 (18 Aug 2018 23:00)  T(F): 101.3 (19 Aug 2018 09:00), Max: 102 (18 Aug 2018 23:00)  HR: 84 (19 Aug 2018 10:00) (76 - 94)  BP: 132/67 (19 Aug 2018 09:00) (110/59 - 132/67)  BP(mean): 95 (19 Aug 2018 09:00) (78 - 95)  RR: 14 (19 Aug 2018 10:00) (13 - 40)  SpO2: 100% (19 Aug 2018 10:00) (97% - 100%)    08-18 @ 07:01  -  08-19 @ 07:00  --------------------------------------------------------  IN: 718 mL / OUT: 1385 mL / NET: -667 mL    08-19 @ 07:01 - 08-19 @ 11:31  --------------------------------------------------------  IN: 19 mL / OUT: 285 mL / NET: -266 mL        PHYSICAL EXAM:    General: in no acute distress  HEENT: MMM, conjunctiva and sclera clear  Gastrointestinal: Soft non-tender non-distended; Normal bowel sounds; Peg  Skin: Warm and dry. No obvious rash    LABS:  ABG - ( 18 Aug 2018 01:08 )  pH, Arterial: 7.54  pH, Blood: x     /  pCO2: 26    /  pO2: 129   / HCO3: 22    / Base Excess: -0.2  /  SaO2: 99                  CBC Full  -  ( 19 Aug 2018 07:08 )  WBC Count : 12.0 K/uL  Hemoglobin : 8.3 g/dL  Hematocrit : 26.8 %  Platelet Count - Automated : 406 K/uL  Mean Cell Volume : 88.2 fL  Mean Cell Hemoglobin : 27.3 pg  Mean Cell Hemoglobin Concentration : 31.0 g/dL  Auto Neutrophil # : x  Auto Lymphocyte # : x  Auto Monocyte # : x  Auto Eosinophil # : x  Auto Basophil # : x  Auto Neutrophil % : x  Auto Lymphocyte % : x  Auto Monocyte % : x  Auto Eosinophil % : x  Auto Basophil % : x    08-19    142  |  101  |  39<H>  ----------------------------<  81  3.9   |  22  |  1.34<H>    Ca    8.4      19 Aug 2018 07:08  Phos  3.1     08-19  Mg     2.3     08-19    TPro  6.7  /  Alb  2.5<L>  /  TBili  1.1  /  DBili  0.5<H>  /  AST  54<H>  /  ALT  28  /  AlkPhos  104  08-19                      RADIOLOGY & ADDITIONAL STUDIES (The following images were personally reviewed):

## 2018-08-19 NOTE — PROGRESS NOTE ADULT - SUBJECTIVE AND OBJECTIVE BOX
Patient is a 89y old  Female who presents with a chief complaint of cardio-pulmonary arrest (17 Aug 2018 03:20)        INTERVAL HPI/OVERNIGHT EVENTS: none    SYMPTOMS unresponsive, comatose    DRIPS levo    Mode: AC/ CMV (Assist Control/ Continuous Mandatory Ventilation)  RR (machine): 14  TV (machine): 360  FiO2: 40  PEEP: 5  ITime: 1  MAP: 11  PIP: 39      ICU Vital Signs Last 24 Hrs  T(C): 39 (19 Aug 2018 11:00), Max: 39 (19 Aug 2018 11:00)  T(F): 102.2 (19 Aug 2018 11:00), Max: 102.2 (19 Aug 2018 11:00)  HR: 90 (19 Aug 2018 11:00) (76 - 94)  BP: 132/67 (19 Aug 2018 09:00) (110/59 - 132/67)  BP(mean): 95 (19 Aug 2018 09:00) (78 - 95)  ABP: 122/54 (19 Aug 2018 11:00) (84/44 - 138/64)  ABP(mean): 80 (19 Aug 2018 11:00) (57 - 95)  RR: 24 (19 Aug 2018 11:00) (13 - 40)  SpO2: 98% (19 Aug 2018 11:00) (97% - 100%)      I&O's Summary    18 Aug 2018 07:01  -  19 Aug 2018 07:00  --------------------------------------------------------  IN: 718 mL / OUT: 1385 mL / NET: -667 mL    19 Aug 2018 07:01  -  19 Aug 2018 12:26  --------------------------------------------------------  IN: 23 mL / OUT: 285 mL / NET: -262 mL        EXAM    Chest ronchi    Heart reg    Abdomen soft nontender with bs    Extremities trace edema    Neuro comatose minimal response to pain      LABS:    ABG - ( 18 Aug 2018 01:08 )  pH: 7.54  /  pCO2: 26    /  pO2: 129   / HCO3: 22    / Base Excess: -0.2  /  SaO2: 99                                      8.3    12.0  )-----------( 406      ( 19 Aug 2018 07:08 )             26.8     08-19    142  |  101  |  39<H>  ----------------------------<  81  3.9   |  22  |  1.34<H>    Ca    8.4      19 Aug 2018 07:08  Phos  3.1     08-19  Mg     2.3     08-19    TPro  6.7  /  Alb  2.5<L>  /  TBili  1.1  /  DBili  0.5<H>  /  AST  54<H>  /  ALT  28  /  AlkPhos  104  08-19          CXr no change, cultures proteus in sputm    RADIOLOGY & ADDITIONAL STUDIES:    CRITICAL CARE TIME SPENT: 36 min

## 2018-08-19 NOTE — PROGRESS NOTE ADULT - SUBJECTIVE AND OBJECTIVE BOX
INTERVAL HPI/OVERNIGHT EVENTS: Pt spiked fever overnight to 101.9, got tylenol and defervesced. S/p 1u pRBC with improvement in H&H.    SUBJECTIVE: Patient seen and examined at bedside. Pt with eyes open but not awake, not alert, not responsive to verbal or painful stimuli, not able to follow commands.    OBJECTIVE:    VITAL SIGNS:  ICU Vital Signs Last 24 Hrs  T(C): 38.1 (19 Aug 2018 14:00), Max: 39.2 (19 Aug 2018 12:00)  T(F): 100.5 (19 Aug 2018 14:00), Max: 102.6 (19 Aug 2018 12:00)  HR: 80 (19 Aug 2018 15:00) (76 - 94)  BP: 132/67 (19 Aug 2018 09:00) (110/59 - 132/67)  BP(mean): 95 (19 Aug 2018 09:00) (78 - 95)  ABP: 96/46 (19 Aug 2018 15:00) (84/44 - 138/64)  ABP(mean): 64 (19 Aug 2018 15:00) (57 - 95)  RR: 13 (19 Aug 2018 15:00) (13 - 40)  SpO2: 98% (19 Aug 2018 15:00) (96% - 100%)    Mode: AC/ CMV (Assist Control/ Continuous Mandatory Ventilation), RR (machine): 14, TV (machine): 360, FiO2: 40, PEEP: 5, ITime: 1, MAP: 11, PIP: 32    08-18 @ 07:01  -  08-19 @ 07:00  --------------------------------------------------------  IN: 718 mL / OUT: 1385 mL / NET: -667 mL    08-19 @ 07:01  -  08-19 @ 15:06  --------------------------------------------------------  IN: 190 mL / OUT: 1095 mL / NET: -905 mL      CAPILLARY BLOOD GLUCOSE          PHYSICAL EXAM:  GENERAL: Intubated, vegetative   HEAD:  Atraumatic, Normocephalic  EYES: Pupils dilated, mildly responsive to light, conjunctiva and sclera clear  NECK: Supple, No JVD, Normal thyroid, no enlarged nodes  NERVOUS SYSTEM: Eyes open but not awake, not alert, unresponsive to commands/verbal/painful stimili. Episodes of "startle" like response. +corneal reflex, no gag reflex.    CHEST/LUNG: B/L good air entry; No rales, rhonchi, or wheezing  HEART: S1S2 normal, no S3, Regular rate and rhythm; No murmurs, rubs, or gallops  ABDOMEN: Soft, Nontender, Nondistended; Bowel sounds present  EXTREMITIEs: B/L pedal 2+ pitting edema  LYMPH: No lymphadenopathy noted  SKIN: No rashes or lesions    MEDICATIONS:  MEDICATIONS  (STANDING):  amiodarone    Tablet 200 milliGRAM(s) Oral daily  artificial  tears Solution 1 Drop(s) Both EYES daily  chlorhexidine 0.12% Liquid 15 milliLiter(s) Swish and Spit two times a day  chlorhexidine 2% Cloths 1 Application(s) Topical daily  heparin  Injectable 5000 Unit(s) SubCutaneous every 12 hours  influenza   Vaccine 0.5 milliLiter(s) IntraMuscular once  meropenem  IVPB 500 milliGRAM(s) IV Intermittent every 12 hours  norepinephrine Infusion 0.05 MICROgram(s)/kG/Min (4.884 mL/Hr) IV Continuous <Continuous>  petrolatum Ophthalmic Ointment 1 Application(s) Both EYES daily  propofol Infusion 0.5 MICROgram(s)/kG/Min (0.156 mL/Hr) IV Continuous <Continuous>    MEDICATIONS  (PRN):      ALLERGIES:  Allergies    amoxicillin (Rash)  Cipro (Rash)  clindamycin (Unknown)  lactose (Unknown)  penicillin (Rash)  Wheat (Unknown)    Intolerances        LABS:                        8.3    12.0  )-----------( 406      ( 19 Aug 2018 07:08 )             26.8     08-19    142  |  101  |  39<H>  ----------------------------<  81  3.9   |  22  |  1.34<H>    Ca    8.4      19 Aug 2018 07:08  Phos  3.1     08-19  Mg     2.3     08-19    TPro  6.7  /  Alb  2.5<L>  /  TBili  1.1  /  DBili  0.5<H>  /  AST  54<H>  /  ALT  28  /  AlkPhos  104  08-19    Culture - Sputum . (08.17.18 @ 15:58)    -  Gentamicin: S <=1    -  Piperacillin/Tazobactam: S <=8    -  Tobramycin: S <=2    -  Trimethoprim/Sulfamethoxazole: S <=0.5/9.5    Gram Stain:   Numerous white blood cells  Few epithelial cells  Moderate Gram Positive Cocci in Clusters  Moderate Gram Negative Rods    -  Ampicillin: S <=2 These ampicillin results predict results for amoxicillin    -  Ampicillin/Sulbactam: S <=4/2    -  Cefazolin: S <=2    -  Ceftriaxone: S <=1 Enterobacter, Citrobacter, and Serratia may develop resistance during prolonged therapy    -  Ciprofloxacin: S <=0.5    Specimen Source: .Sputum Sputum    Culture Results:   Moderate Proteus mirabilis  Moderate Staphylococcus aureus  Susceptibility to follow.    Organism Identification: Proteus mirabilis    Organism: Proteus mirabilis    Method Type: JACKIE          RADIOLOGY & ADDITIONAL TESTS: Reviewed.

## 2018-08-20 DIAGNOSIS — D64.9 ANEMIA, UNSPECIFIED: ICD-10-CM

## 2018-08-20 DIAGNOSIS — M19.91 PRIMARY OSTEOARTHRITIS, UNSPECIFIED SITE: ICD-10-CM

## 2018-08-20 LAB
-  CEFAZOLIN: SIGNIFICANT CHANGE UP
-  CLINDAMYCIN: SIGNIFICANT CHANGE UP
-  ERYTHROMYCIN: SIGNIFICANT CHANGE UP
-  LINEZOLID: SIGNIFICANT CHANGE UP
-  OXACILLIN: SIGNIFICANT CHANGE UP
-  PENICILLIN: SIGNIFICANT CHANGE UP
-  RIFAMPIN: SIGNIFICANT CHANGE UP
-  TRIMETHOPRIM/SULFAMETHOXAZOLE: SIGNIFICANT CHANGE UP
-  VANCOMYCIN: SIGNIFICANT CHANGE UP
ANION GAP SERPL CALC-SCNC: 23 MMOL/L — HIGH (ref 5–17)
BLD GP AB SCN SERPL QL: NEGATIVE — SIGNIFICANT CHANGE UP
BUN SERPL-MCNC: 35 MG/DL — HIGH (ref 7–23)
CALCIUM SERPL-MCNC: 8 MG/DL — LOW (ref 8.4–10.5)
CHLORIDE SERPL-SCNC: 101 MMOL/L — SIGNIFICANT CHANGE UP (ref 96–108)
CO2 SERPL-SCNC: 18 MMOL/L — LOW (ref 22–31)
CREAT SERPL-MCNC: 1.37 MG/DL — HIGH (ref 0.5–1.3)
CULTURE RESULTS: SIGNIFICANT CHANGE UP
GLUCOSE BLDC GLUCOMTR-MCNC: 79 MG/DL — SIGNIFICANT CHANGE UP (ref 70–99)
GLUCOSE BLDC GLUCOMTR-MCNC: 95 MG/DL — SIGNIFICANT CHANGE UP (ref 70–99)
GLUCOSE SERPL-MCNC: 64 MG/DL — LOW (ref 70–99)
HCT VFR BLD CALC: 25.3 % — LOW (ref 34.5–45)
HGB BLD-MCNC: 8.1 G/DL — LOW (ref 11.5–15.5)
MAGNESIUM SERPL-MCNC: 2.1 MG/DL — SIGNIFICANT CHANGE UP (ref 1.6–2.6)
MCHC RBC-ENTMCNC: 28.1 PG — SIGNIFICANT CHANGE UP (ref 27–34)
MCHC RBC-ENTMCNC: 32 G/DL — SIGNIFICANT CHANGE UP (ref 32–36)
MCV RBC AUTO: 87.8 FL — SIGNIFICANT CHANGE UP (ref 80–100)
METHOD TYPE: SIGNIFICANT CHANGE UP
ORGANISM # SPEC MICROSCOPIC CNT: SIGNIFICANT CHANGE UP
PHOSPHATE SERPL-MCNC: 3.3 MG/DL — SIGNIFICANT CHANGE UP (ref 2.5–4.5)
PLATELET # BLD AUTO: 351 K/UL — SIGNIFICANT CHANGE UP (ref 150–400)
POTASSIUM SERPL-MCNC: 3.4 MMOL/L — LOW (ref 3.5–5.3)
POTASSIUM SERPL-SCNC: 3.4 MMOL/L — LOW (ref 3.5–5.3)
RBC # BLD: 2.88 M/UL — LOW (ref 3.8–5.2)
RBC # FLD: 17.3 % — HIGH (ref 10.3–16.9)
RH IG SCN BLD-IMP: POSITIVE — SIGNIFICANT CHANGE UP
SODIUM SERPL-SCNC: 142 MMOL/L — SIGNIFICANT CHANGE UP (ref 135–145)
SPECIMEN SOURCE: SIGNIFICANT CHANGE UP
WBC # BLD: 10.5 K/UL — SIGNIFICANT CHANGE UP (ref 3.8–10.5)
WBC # FLD AUTO: 10.5 K/UL — SIGNIFICANT CHANGE UP (ref 3.8–10.5)

## 2018-08-20 PROCEDURE — 99233 SBSQ HOSP IP/OBS HIGH 50: CPT

## 2018-08-20 PROCEDURE — 99291 CRITICAL CARE FIRST HOUR: CPT

## 2018-08-20 PROCEDURE — 71045 X-RAY EXAM CHEST 1 VIEW: CPT | Mod: 26

## 2018-08-20 RX ORDER — CEFAZOLIN SODIUM 1 G
1000 VIAL (EA) INJECTION EVERY 12 HOURS
Qty: 0 | Refills: 0 | Status: DISCONTINUED | OUTPATIENT
Start: 2018-08-20 | End: 2018-08-20

## 2018-08-20 RX ORDER — ACETAMINOPHEN 500 MG
1000 TABLET ORAL ONCE
Qty: 0 | Refills: 0 | Status: COMPLETED | OUTPATIENT
Start: 2018-08-20 | End: 2018-08-20

## 2018-08-20 RX ORDER — CEFAZOLIN SODIUM 1 G
1000 VIAL (EA) INJECTION EVERY 12 HOURS
Qty: 0 | Refills: 0 | Status: DISCONTINUED | OUTPATIENT
Start: 2018-08-20 | End: 2018-08-23

## 2018-08-20 RX ORDER — POTASSIUM CHLORIDE 20 MEQ
20 PACKET (EA) ORAL ONCE
Qty: 0 | Refills: 0 | Status: COMPLETED | OUTPATIENT
Start: 2018-08-20 | End: 2018-08-20

## 2018-08-20 RX ORDER — MIDODRINE HYDROCHLORIDE 2.5 MG/1
10 TABLET ORAL EVERY 8 HOURS
Qty: 0 | Refills: 0 | Status: DISCONTINUED | OUTPATIENT
Start: 2018-08-20 | End: 2018-08-23

## 2018-08-20 RX ORDER — DEXTROSE 50 % IN WATER 50 %
25 SYRINGE (ML) INTRAVENOUS ONCE
Qty: 0 | Refills: 0 | Status: COMPLETED | OUTPATIENT
Start: 2018-08-20 | End: 2018-08-20

## 2018-08-20 RX ORDER — PANTOPRAZOLE SODIUM 20 MG/1
40 TABLET, DELAYED RELEASE ORAL DAILY
Qty: 0 | Refills: 0 | Status: DISCONTINUED | OUTPATIENT
Start: 2018-08-21 | End: 2018-08-23

## 2018-08-20 RX ADMIN — AMIODARONE HYDROCHLORIDE 200 MILLIGRAM(S): 400 TABLET ORAL at 05:55

## 2018-08-20 RX ADMIN — MEROPENEM 100 MILLIGRAM(S): 1 INJECTION INTRAVENOUS at 00:12

## 2018-08-20 RX ADMIN — CHLORHEXIDINE GLUCONATE 15 MILLILITER(S): 213 SOLUTION TOPICAL at 12:47

## 2018-08-20 RX ADMIN — Medication 400 MILLIGRAM(S): at 03:59

## 2018-08-20 RX ADMIN — Medication 100 MILLIEQUIVALENT(S): at 05:55

## 2018-08-20 RX ADMIN — Medication 20 MILLIEQUIVALENT(S): at 11:25

## 2018-08-20 RX ADMIN — HEPARIN SODIUM 5000 UNIT(S): 5000 INJECTION INTRAVENOUS; SUBCUTANEOUS at 17:57

## 2018-08-20 RX ADMIN — Medication 1 APPLICATION(S): at 12:48

## 2018-08-20 RX ADMIN — Medication 25 MILLILITER(S): at 11:25

## 2018-08-20 RX ADMIN — CHLORHEXIDINE GLUCONATE 15 MILLILITER(S): 213 SOLUTION TOPICAL at 23:00

## 2018-08-20 RX ADMIN — Medication 100 MILLIGRAM(S): at 11:28

## 2018-08-20 RX ADMIN — PANTOPRAZOLE SODIUM 40 MILLIGRAM(S): 20 TABLET, DELAYED RELEASE ORAL at 05:56

## 2018-08-20 RX ADMIN — CHLORHEXIDINE GLUCONATE 1 APPLICATION(S): 213 SOLUTION TOPICAL at 05:56

## 2018-08-20 RX ADMIN — HEPARIN SODIUM 5000 UNIT(S): 5000 INJECTION INTRAVENOUS; SUBCUTANEOUS at 05:55

## 2018-08-20 RX ADMIN — CHLORHEXIDINE GLUCONATE 15 MILLILITER(S): 213 SOLUTION TOPICAL at 00:12

## 2018-08-20 RX ADMIN — Medication 100 MILLIGRAM(S): at 22:59

## 2018-08-20 RX ADMIN — MIDODRINE HYDROCHLORIDE 10 MILLIGRAM(S): 2.5 TABLET ORAL at 15:23

## 2018-08-20 RX ADMIN — MIDODRINE HYDROCHLORIDE 10 MILLIGRAM(S): 2.5 TABLET ORAL at 23:00

## 2018-08-20 RX ADMIN — Medication 1 DROP(S): at 12:48

## 2018-08-20 NOTE — PROGRESS NOTE ADULT - ASSESSMENT
89F w/PMHx dementia (baseline AOX1), recurrent UTI, Celiac dz, R femoral DVT s/p IVC filter (1/2017), chronic anemia, w/mult recent hosp for septic shock and hypoxic/hypercapnic resp failure in setting of proteus and aerococcus UTI and asp PNA, brought in after cardiac arrest, likely PEA in setting of asp w/hypoxic and hypercapnic resp failure. Now w/likely HIE    - Cardiac arrest likely PEA in setting of asp w/hypoxic and hypercapnic resp failure  - NSR on tele, however EKG obtained on admission w/possible junctional rhythm. If continuing active medical care, please get repeat EKG. CE not sig elev, unlikely ACS  - Resp failure primarily from asp, however possible some component of vol overload based on CXR findings. Pro-BNP elev, but markedly dec compared to prior admission.  - Net -2.4L for hosp course. No noted JVD or LE edema. Lung exam w/rales and rhonchi. MM dry. Would avoid diuretics in setting of septic shock on levo, monitor strict I/O, daily wts (would also be judicious w/further IVF, pt received 3.5L NS on admission)  - C/w amio 200 qd, keep K>4, Mg>2. 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 Abx as per primary team/pulm, titrate off levo as able.  - Pt w/poor neurologic function off sedation since Saturday. Neuro following, still w/some pupillary and gag reflex, breathing over vent occ. Plan for MRI today to assess cognitive status.  - Poor overall prognosis, ICU team having active GOC discussion w/pt's son, in conjunction w/palliative and neuro  - Rest of care as per ICU team 89F w/PMHx dementia (baseline AOX1), recurrent UTI, Celiac dz, R femoral DVT s/p IVC filter (1/2017), chronic anemia, w/mult recent hosp for septic shock and hypoxic/hypercapnic resp failure in setting of proteus and aerococcus UTI and asp PNA, brought in after cardiac arrest, likely PEA in setting of asp w/hypoxic and hypercapnic resp failure. Now w/likely HIE    - Cardiac arrest likely PEA in setting of asp w/hypoxic and hypercapnic resp failure  - NSR on tele, episode of afib RVR o/n, however EKG obtained on admission w/possible junctional rhythm. If continuing active medical care, please get repeat EKG. CE not sig elev, unlikely ACS  - Resp failure primarily from asp, however possible some component of vol overload based on CXR findings. Pro-BNP elev, but markedly dec compared to prior admission.  - Net -2.4L for hosp course. No noted JVD or LE edema. Lung exam w/rales and rhonchi. MM dry. Would avoid diuretics in setting of septic shock on levo, monitor strict I/O, daily wts (would also be judicious w/further IV)  - C/w amio 200 qd, keep K>4, Mg>2. 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 Abx as per primary team/pulm, titrate off levo as able.  - Pt w/poor neurologic function off sedation since Saturday. Neuro following, still w/some pupillary and gag reflex, breathing over vent occ. Plan for MRI today to assess cognitive status.  - Poor overall prognosis, ICU team having active GOC discussion w/pt's son, in conjunction w/palliative and neuro  - Rest of care as per ICU team

## 2018-08-20 NOTE — PROGRESS NOTE ADULT - ATTENDING COMMENTS
89F s/p cardiopulmonary arrest with evidence of anoxic brain injury, with sluggish pupillary reflexes and overbreathing the vent, but otherwise no purposeful neuro recovery thus far. She is intubated, oxygenating well. On broad coverage for staph and proteus. Anemia appears stable at the moment, monitor H/H.    Neuro eval pending, consider CTH  Cont abx, narrow pending sensitivities  Trial of enteral feeds via PEG  Pall care feedback appreciated.  Standard vent bundle  Glycemic control  GI/DVT prophy.

## 2018-08-20 NOTE — PROGRESS NOTE ADULT - SUBJECTIVE AND OBJECTIVE BOX
Neurology Follow Up     Interval History:  Patient seen and examined.  She was on Presidex on Friday and Propofol on Saturday but no sedation since then.  She had fever each night.  Now on Ancef.  Still on pressors for her blood pressure.    Medications:  MEDICATIONS  (STANDING):  amiodarone    Tablet 200 milliGRAM(s) Oral daily  artificial  tears Solution 1 Drop(s) Both EYES daily  ceFAZolin   IVPB 1000 milliGRAM(s) IV Intermittent every 12 hours  chlorhexidine 0.12% Liquid 15 milliLiter(s) Swish and Spit two times a day  chlorhexidine 2% Cloths 1 Application(s) Topical daily  heparin  Injectable 5000 Unit(s) SubCutaneous every 12 hours  midodrine 10 milliGRAM(s) Oral every 8 hours  norepinephrine Infusion 0.05 MICROgram(s)/kG/Min (4.884 mL/Hr) IV Continuous <Continuous>  petrolatum Ophthalmic Ointment 1 Application(s) Both EYES daily    Allergies    amoxicillin (Rash)  Cipro (Rash)  clindamycin (Unknown)  lactose (Unknown)  penicillin (Rash)  Wheat (Unknown)    Exam:  Vital Signs Last 24 Hrs  T(C): 36.8 (20 Aug 2018 14:56), Max: 38.4 (20 Aug 2018 04:00)  T(F): 98.2 (20 Aug 2018 14:56), Max: 101.2 (20 Aug 2018 04:00)  HR: 78 (20 Aug 2018 13:00) (76 - 120)  BP: 94/55 (19 Aug 2018 21:00) (94/55 - 94/55)  BP(mean): 70 (19 Aug 2018 21:00) (70 - 70)  RR: 14 (20 Aug 2018 13:00) (13 - 28)  SpO2: 97% (20 Aug 2018 13:00) (96% - 98%)    Gen: Lying in bed, NAD, intubated - not breathing over vent at first (14/14) but then increased to 20's in response to pain  Neuro:  Mental status: no response to pain to shoulder (equivalent of less response than 3 days ago), intermittent eye opening but no sequence and not for specific response.  not awake to follow commands   Cranial nerves: eyes midline, no oculocephalic reflex bilaterally, pupils reactive to light bilaterally, variable assessment of corneal reflex since hard to keep her eyelids open, unable assess facial secondary to ETT, minimal gag reflex at beginning of process (less reactive than 3 days ago)  Motor: No spontaneous movements   Sensation: variable, possible movement of left fingers after delay    Coordination: unable to assess   Reflexes: absent Babinski bilaterally  Gait: deferred    Labs:                        8.1    10.5  )-----------( 351      ( 20 Aug 2018 04:48 )             25.3     08-20    142  |  101  |  35<H>  ----------------------------<  64<L>  3.4<L>   |  18<L>  |  1.37<H>    Ca    8.0<L>      20 Aug 2018 04:47  Phos  3.3     08-20  Mg     2.1     08-20    LIVER FUNCTIONS - ( 19 Aug 2018 07:08 )  Alb: 2.5 g/dL / Pro: 6.7 g/dL / ALK PHOS: 104 U/L / ALT: 28 U/L / AST: 54 U/L / GGT: x           Radiology:  no new cerebral imaging    Assessment and plan: 89 year-old female with multiple medical problems presents from [facility] post cardiac arrest s/p intubation.  She has further loss of brainstem reflexes and slower responses overall compared to 3 days ago.  She still has pupillary reflex, gag reflex and breathing over vent but all decreased compared to previous responses.  Overall prognosis remains poor.    Had extended conversation with her son regarding goals of care.  He wants all interventions at this time including antibiotics for her fever and pressors for her blood pressure maintenance.  I recommend continued observation with documentation of her neurological exam including brain stem reflexes with further discussion with her son within the next few days depending on her exam.  Would avoid BZD and other medications that can overshadow her neuro exam long term.  Propofol and presidex are reasonable. Neurology Follow Up     Interval History:  Patient seen and examined.  She was on Presidex on Friday and Propofol on Saturday but no sedation since then.  She had fever each night.  Now on Ancef.  Still on pressors for her blood pressure.    Medications:  MEDICATIONS  (STANDING):  amiodarone    Tablet 200 milliGRAM(s) Oral daily  artificial  tears Solution 1 Drop(s) Both EYES daily  ceFAZolin   IVPB 1000 milliGRAM(s) IV Intermittent every 12 hours  chlorhexidine 0.12% Liquid 15 milliLiter(s) Swish and Spit two times a day  chlorhexidine 2% Cloths 1 Application(s) Topical daily  heparin  Injectable 5000 Unit(s) SubCutaneous every 12 hours  midodrine 10 milliGRAM(s) Oral every 8 hours  norepinephrine Infusion 0.05 MICROgram(s)/kG/Min (4.884 mL/Hr) IV Continuous <Continuous>  petrolatum Ophthalmic Ointment 1 Application(s) Both EYES daily    Allergies    amoxicillin (Rash)  Cipro (Rash)  clindamycin (Unknown)  lactose (Unknown)  penicillin (Rash)  Wheat (Unknown)    Exam:  Vital Signs Last 24 Hrs  T(C): 36.8 (20 Aug 2018 14:56), Max: 38.4 (20 Aug 2018 04:00)  T(F): 98.2 (20 Aug 2018 14:56), Max: 101.2 (20 Aug 2018 04:00)  HR: 78 (20 Aug 2018 13:00) (76 - 120)  BP: 94/55 (19 Aug 2018 21:00) (94/55 - 94/55)  BP(mean): 70 (19 Aug 2018 21:00) (70 - 70)  RR: 14 (20 Aug 2018 13:00) (13 - 28)  SpO2: 97% (20 Aug 2018 13:00) (96% - 98%)    Gen: Lying in bed, NAD, intubated - not breathing over vent at first (14/14) but then increased to 20's in response to pain  Neuro:  Mental status: no response to pain to shoulder (equivalent of less response than 3 days ago), intermittent eye opening but no sequence and not for specific response.  not awake to follow commands   Cranial nerves: eyes midline, no oculocephalic reflex bilaterally, pupils reactive to light bilaterally, variable assessment of corneal reflex since hard to keep her eyelids open, unable assess facial secondary to ETT, minimal gag reflex at beginning of process (less reactive than 3 days ago)  Motor: No spontaneous movements   Sensation: variable, possible movement of left fingers after delay    Coordination: unable to assess   Reflexes: absent Babinski bilaterally  Gait: deferred    Labs:                        8.1    10.5  )-----------( 351      ( 20 Aug 2018 04:48 )             25.3     08-20    142  |  101  |  35<H>  ----------------------------<  64<L>  3.4<L>   |  18<L>  |  1.37<H>    Ca    8.0<L>      20 Aug 2018 04:47  Phos  3.3     08-20  Mg     2.1     08-20    LIVER FUNCTIONS - ( 19 Aug 2018 07:08 )  Alb: 2.5 g/dL / Pro: 6.7 g/dL / ALK PHOS: 104 U/L / ALT: 28 U/L / AST: 54 U/L / GGT: x           Radiology:  no new cerebral imaging    Assessment and plan: 89 year-old female with multiple medical problems presents from [facility] post cardiac arrest s/p intubation.  She has further loss of brainstem reflexes and slower responses overall compared to 3 days ago.  She still has pupillary reflex, gag reflex and breathing over vent but all decreased compared to previous responses.  Overall prognosis remains poor.    Discussed above with patient's son.  He acknowledging information but would like additional information including CT Head without contrast in order for him to make further decisions.  Therefore, repeat CT Head without contrast to be performed depending on her respiratory status.

## 2018-08-20 NOTE — PROGRESS NOTE ADULT - SUBJECTIVE AND OBJECTIVE BOX
Patient is a 89y old  Female who presents with a chief complaint of cardio-pulmonary arrest (17 Aug 2018 03:20)      INTERVAL HPI/OVERNIGHT EVENTS:  ICU Vital Signs Last 24 Hrs  T(C): 37.8 (20 Aug 2018 05:00), Max: 39.2 (19 Aug 2018 12:00)  T(F): 100 (20 Aug 2018 05:00), Max: 102.6 (19 Aug 2018 12:00)  HR: 82 (20 Aug 2018 06:23) (76 - 120)  BP: 94/55 (19 Aug 2018 21:00) (94/55 - 132/67)  BP(mean): 70 (19 Aug 2018 21:00) (70 - 95)  ABP: 96/44 (20 Aug 2018 05:00) (89/40 - 138/64)  ABP(mean): 62 (20 Aug 2018 05:00) (60 - 95)  RR: 14 (20 Aug 2018 06:23) (13 - 28)  SpO2: 97% (20 Aug 2018 06:23) (96% - 100%)    I&O's Summary    18 Aug 2018 07:01  -  19 Aug 2018 07:00  --------------------------------------------------------  IN: 718 mL / OUT: 1385 mL / NET: -667 mL    19 Aug 2018 07:01  -  20 Aug 2018 06:56  --------------------------------------------------------  IN: 454 mL / OUT: 1955 mL / NET: -1501 mL      Mode: AC/ CMV (Assist Control/ Continuous Mandatory Ventilation)  RR (machine): 14  TV (machine): 360  FiO2: 40  PEEP: 5  ITime: 1  MAP: 11  PIP: 29      LABS:                        8.1    10.5  )-----------( 351      ( 20 Aug 2018 04:48 )             25.3     08-20    142  |  101  |  35<H>  ----------------------------<  64<L>  3.4<L>   |  18<L>  |  1.37<H>    Ca    8.0<L>      20 Aug 2018 04:47  Phos  3.3     08-20  Mg     2.1     08-20    TPro  6.7  /  Alb  2.5<L>  /  TBili  1.1  /  DBili  0.5<H>  /  AST  54<H>  /  ALT  28  /  AlkPhos  104  08-19        CAPILLARY BLOOD GLUCOSE            RADIOLOGY & ADDITIONAL TESTS:    Consultant(s) Notes Reviewed:  [x ] YES  [ ] NO    MEDICATIONS  (STANDING):  amiodarone    Tablet 200 milliGRAM(s) Oral daily  artificial  tears Solution 1 Drop(s) Both EYES daily  chlorhexidine 0.12% Liquid 15 milliLiter(s) Swish and Spit two times a day  chlorhexidine 2% Cloths 1 Application(s) Topical daily  heparin  Injectable 5000 Unit(s) SubCutaneous every 12 hours  influenza   Vaccine 0.5 milliLiter(s) IntraMuscular once  meropenem  IVPB 500 milliGRAM(s) IV Intermittent every 12 hours  norepinephrine Infusion 0.05 MICROgram(s)/kG/Min (4.884 mL/Hr) IV Continuous <Continuous>  pantoprazole    Tablet 40 milliGRAM(s) Oral before breakfast  petrolatum Ophthalmic Ointment 1 Application(s) Both EYES daily    MEDICATIONS  (PRN):      PHYSICAL EXAM:  GENERAL: well built, well nourished  HEAD:  Atraumatic, Normocephalic  EYES: EOMI, PERRLA, conjunctiva and sclera clear  ENT: No tonsillar erythema, exudates, or enlargement; Moist mucous membranes, Good dentition, No lesions  NECK: Supple, No JVD, Normal thyroid, no enlarged nodes  NERVOUS SYSTEM:  Alert & Oriented X3, Good concentration; Motor Strength 5/5 B/L upper and lower extremities; DTRs 2+ intact and symmetric, sensory intact  CHEST/LUNG: B/L good air entry; No rales, rhonchi, or wheezing  HEART: S1S2 normal, no S3, Regular rate and rhythm; No murmurs, rubs, or gallops  ABDOMEN: Soft, Nontender, Nondistended; Bowel sounds present  EXTREMITIES:  2+ Peripheral Pulses, No clubbing, cyanosis, or edema  LYMPH: No lymphadenopathy noted  SKIN: No rashes or lesions    Care Discussed with Consultants/Other Providers [ x] YES  [ ] NO OVERNIGHT EVENTS: 101.2 fever overnight, tylenol given    INTERVAL HPI: Pt is examined at bedside. Pt is intubated and off sedation for MRI today. Pt is unresponsive to verbal commands. Pt is not agitated, resting comfortably in bed. Neurologically unchanged.     ICU Vital Signs Last 24 Hrs  T(C): 37.8 (20 Aug 2018 05:00), Max: 39.2 (19 Aug 2018 12:00)  T(F): 100 (20 Aug 2018 05:00), Max: 102.6 (19 Aug 2018 12:00)  HR: 82 (20 Aug 2018 06:23) (76 - 120)  BP: 94/55 (19 Aug 2018 21:00) (94/55 - 132/67)  BP(mean): 70 (19 Aug 2018 21:00) (70 - 95)  ABP: 96/44 (20 Aug 2018 05:00) (89/40 - 138/64)  ABP(mean): 62 (20 Aug 2018 05:00) (60 - 95)  RR: 14 (20 Aug 2018 06:23) (13 - 28)  SpO2: 97% (20 Aug 2018 06:23) (96% - 100%)    I&O's Summary    18 Aug 2018 07:01  -  19 Aug 2018 07:00  --------------------------------------------------------  IN: 718 mL / OUT: 1385 mL / NET: -667 mL    19 Aug 2018 07:01  -  20 Aug 2018 06:56  --------------------------------------------------------  IN: 454 mL / OUT: 1955 mL / NET: -1501 mL      Mode: AC/ CMV (Assist Control/ Continuous Mandatory Ventilation)  RR (machine): 14  TV (machine): 360  FiO2: 40  PEEP: 5  ITime: 1  MAP: 11  PIP: 29      LABS:                        8.1    10.5  )-----------( 351      ( 20 Aug 2018 04:48 )             25.3     08-20    142  |  101  |  35<H>  ----------------------------<  64<L>  3.4<L>   |  18<L>  |  1.37<H>    Ca    8.0<L>      20 Aug 2018 04:47  Phos  3.3     08-20  Mg     2.1     08-20    TPro  6.7  /  Alb  2.5<L>  /  TBili  1.1  /  DBili  0.5<H>  /  AST  54<H>  /  ALT  28  /  AlkPhos  104  08-19        CAPILLARY BLOOD GLUCOSE            RADIOLOGY & ADDITIONAL TESTS:    Consultant(s) Notes Reviewed:  [x ] YES  [ ] NO    MEDICATIONS  (STANDING):  amiodarone    Tablet 200 milliGRAM(s) Oral daily  artificial  tears Solution 1 Drop(s) Both EYES daily  chlorhexidine 0.12% Liquid 15 milliLiter(s) Swish and Spit two times a day  chlorhexidine 2% Cloths 1 Application(s) Topical daily  heparin  Injectable 5000 Unit(s) SubCutaneous every 12 hours  influenza   Vaccine 0.5 milliLiter(s) IntraMuscular once  meropenem  IVPB 500 milliGRAM(s) IV Intermittent every 12 hours  norepinephrine Infusion 0.05 MICROgram(s)/kG/Min (4.884 mL/Hr) IV Continuous <Continuous>  pantoprazole    Tablet 40 milliGRAM(s) Oral before breakfast  petrolatum Ophthalmic Ointment 1 Application(s) Both EYES daily    MEDICATIONS  (PRN):      PHYSICAL EXAM:  GENERAL: Intubated, vegetative   HEAD:  Atraumatic, Normocephalic  EYES: Pupils dilated, mildly responsive to light, conjunctiva and sclera clear  NECK: Supple, No JVD, Normal thyroid, no enlarged nodes  NERVOUS SYSTEM: Eyes open but not awake, not alert, unresponsive to commands/verbal/painful stimili. Episodes of "startle" like response. +corneal reflex, no gag reflex.    CHEST/LUNG: B/L good air entry; No rales, rhonchi, or wheezing  HEART: S1S2 normal, no S3, Regular rate and rhythm; No murmurs, rubs, or gallops  ABDOMEN: Soft, Nontender, Nondistended; Bowel sounds present  EXTREMITIEs: B/L pedal 2+ pitting edema  LYMPH: No lymphadenopathy noted  SKIN: No rashes or lesions    Care Discussed with Consultants/Other Providers [ x] YES  [ ] NO OVERNIGHT EVENTS: 101.2 fever overnight, tylenol given    INTERVAL HPI: Pt is examined at bedside. Pt is intubated and off sedation for CTH today and full neuro evaluation. Pt is unresponsive to verbal commands. Pt is not agitated, resting comfortably in bed. Neurologically unchanged.     ICU Vital Signs Last 24 Hrs  T(C): 37.8 (20 Aug 2018 05:00), Max: 39.2 (19 Aug 2018 12:00)  T(F): 100 (20 Aug 2018 05:00), Max: 102.6 (19 Aug 2018 12:00)  HR: 82 (20 Aug 2018 06:23) (76 - 120)  BP: 94/55 (19 Aug 2018 21:00) (94/55 - 132/67)  BP(mean): 70 (19 Aug 2018 21:00) (70 - 95)  ABP: 96/44 (20 Aug 2018 05:00) (89/40 - 138/64)  ABP(mean): 62 (20 Aug 2018 05:00) (60 - 95)  RR: 14 (20 Aug 2018 06:23) (13 - 28)  SpO2: 97% (20 Aug 2018 06:23) (96% - 100%)    I&O's Summary    18 Aug 2018 07:01  -  19 Aug 2018 07:00  --------------------------------------------------------  IN: 718 mL / OUT: 1385 mL / NET: -667 mL    19 Aug 2018 07:01  -  20 Aug 2018 06:56  --------------------------------------------------------  IN: 454 mL / OUT: 1955 mL / NET: -1501 mL      Mode: AC/ CMV (Assist Control/ Continuous Mandatory Ventilation)  RR (machine): 14  TV (machine): 360  FiO2: 40  PEEP: 5  ITime: 1  MAP: 11  PIP: 29      LABS:                        8.1    10.5  )-----------( 351      ( 20 Aug 2018 04:48 )             25.3     08-20    142  |  101  |  35<H>  ----------------------------<  64<L>  3.4<L>   |  18<L>  |  1.37<H>    Ca    8.0<L>      20 Aug 2018 04:47  Phos  3.3     08-20  Mg     2.1     08-20    TPro  6.7  /  Alb  2.5<L>  /  TBili  1.1  /  DBili  0.5<H>  /  AST  54<H>  /  ALT  28  /  AlkPhos  104  08-19        CAPILLARY BLOOD GLUCOSE            RADIOLOGY & ADDITIONAL TESTS:    Consultant(s) Notes Reviewed:  [x ] YES  [ ] NO    MEDICATIONS  (STANDING):  amiodarone    Tablet 200 milliGRAM(s) Oral daily  artificial  tears Solution 1 Drop(s) Both EYES daily  chlorhexidine 0.12% Liquid 15 milliLiter(s) Swish and Spit two times a day  chlorhexidine 2% Cloths 1 Application(s) Topical daily  heparin  Injectable 5000 Unit(s) SubCutaneous every 12 hours  influenza   Vaccine 0.5 milliLiter(s) IntraMuscular once  meropenem  IVPB 500 milliGRAM(s) IV Intermittent every 12 hours  norepinephrine Infusion 0.05 MICROgram(s)/kG/Min (4.884 mL/Hr) IV Continuous <Continuous>  pantoprazole    Tablet 40 milliGRAM(s) Oral before breakfast  petrolatum Ophthalmic Ointment 1 Application(s) Both EYES daily    MEDICATIONS  (PRN):      PHYSICAL EXAM:  GENERAL: Intubated, vegetative   HEAD:  Atraumatic, Normocephalic  EYES: Pupils dilated, mildly responsive to light, conjunctiva and sclera clear  NECK: Supple, No JVD, Normal thyroid, no enlarged nodes  NERVOUS SYSTEM: Eyes open but not awake, not alert, unresponsive to commands/verbal/painful stimili. Episodes of "startle" like response. +corneal reflex, no gag reflex.    CHEST/LUNG: B/L good air entry; No rales, rhonchi, or wheezing  HEART: S1S2 normal, no S3, Regular rate and rhythm; No murmurs, rubs, or gallops  ABDOMEN: Soft, Nontender, Nondistended; Bowel sounds present  EXTREMITIEs: B/L pedal 2+ pitting edema  LYMPH: No lymphadenopathy noted  SKIN: No rashes or lesions    Care Discussed with Consultants/Other Providers [ x] YES  [ ] NO

## 2018-08-20 NOTE — PROGRESS NOTE ADULT - SUBJECTIVE AND OBJECTIVE BOX
INTERVAL EVENTS:    Pt seen and examined at bedside, no major events o/n. No sedation, not following commands    MEDICATIONS  (STANDING):  amiodarone    Tablet 200 milliGRAM(s) Oral daily  artificial  tears Solution 1 Drop(s) Both EYES daily  ceFAZolin   IVPB 1000 milliGRAM(s) IV Intermittent every 12 hours  chlorhexidine 0.12% Liquid 15 milliLiter(s) Swish and Spit two times a day  chlorhexidine 2% Cloths 1 Application(s) Topical daily  heparin  Injectable 5000 Unit(s) SubCutaneous every 12 hours  midodrine 10 milliGRAM(s) Oral every 8 hours  norepinephrine Infusion 0.05 MICROgram(s)/kG/Min (4.884 mL/Hr) IV Continuous <Continuous>  petrolatum Ophthalmic Ointment 1 Application(s) Both EYES daily    MEDICATIONS  (PRN):      Vital Signs Last 24 Hrs  T(C): 36.8 (20 Aug 2018 14:56), Max: 38.4 (20 Aug 2018 04:00)  T(F): 98.2 (20 Aug 2018 14:56), Max: 101.2 (20 Aug 2018 04:00)  HR: 78 (20 Aug 2018 15:00) (76 - 120)  BP: 94/55 (19 Aug 2018 21:00) (94/55 - 94/55)  BP(mean): 70 (19 Aug 2018 21:00) (70 - 70)  RR: 14 (20 Aug 2018 15:00) (13 - 28)  SpO2: 97% (20 Aug 2018 15:00) (96% - 98%)     PHYSICAL EXAM:  GENERAL: intubated, off sed, OES but not interactive  HEAD:  NCAT  HEENT: Conjunctiva and sclera clear; moist mucosa; Neck supple, No JVD  CARDIOVASCULAR: RRR, normal S1 S2, no M/R/G, no JVD, neg HJR, nondisplaced PMI, no LE edema  RESPIRATORY: Limited exam, dec BS b/l, mild b/l insp rales, exp rhonchi  GASTROINTESTINAL: +BS, soft, non-distended, non-tender, no HSM  VASCULAR: Peripheral pulses palpable 2+ bilaterally  EXTREMITIES: Warm. No clubbing, cyanosis or edema  SKIN: No rashes, lesions, ecchymoses, or cyanosis  NEURO: OES, not FC, blinks to threat, does not track w/eyes    LABS:                        8.1    10.5  )-----------( 351      ( 20 Aug 2018 04:48 )             25.3     08-20    142  |  101  |  35<H>  ----------------------------<  64<L>  3.4<L>   |  18<L>  |  1.37<H>    Ca    8.0<L>      20 Aug 2018 04:47  Phos  3.3     08-20  Mg     2.1     08-20    TPro  6.7  /  Alb  2.5<L>  /  TBili  1.1  /  DBili  0.5<H>  /  AST  54<H>  /  ALT  28  /  AlkPhos  104  08-19            I&O's Summary    19 Aug 2018 07:01  -  20 Aug 2018 07:00  --------------------------------------------------------  IN: 474 mL / OUT: 2055 mL / NET: -1581 mL    20 Aug 2018 07:01  -  20 Aug 2018 16:03  --------------------------------------------------------  IN: 80 mL / OUT: 350 mL / NET: -270 mL      BNP  RADIOLOGY & ADDITIONAL STUDIES:  CXR (8/20): Improvement in R PLEF    TELEMETRY:    EKG: No EKG for review INTERVAL EVENTS:    Pt seen and examined at bedside, no major events o/n. No sedation, not following commands    MEDICATIONS  (STANDING):  amiodarone    Tablet 200 milliGRAM(s) Oral daily  artificial  tears Solution 1 Drop(s) Both EYES daily  ceFAZolin   IVPB 1000 milliGRAM(s) IV Intermittent every 12 hours  chlorhexidine 0.12% Liquid 15 milliLiter(s) Swish and Spit two times a day  chlorhexidine 2% Cloths 1 Application(s) Topical daily  heparin  Injectable 5000 Unit(s) SubCutaneous every 12 hours  midodrine 10 milliGRAM(s) Oral every 8 hours  norepinephrine Infusion 0.05 MICROgram(s)/kG/Min (4.884 mL/Hr) IV Continuous <Continuous>  petrolatum Ophthalmic Ointment 1 Application(s) Both EYES daily    MEDICATIONS  (PRN):      Vital Signs Last 24 Hrs  T(C): 36.8 (20 Aug 2018 14:56), Max: 38.4 (20 Aug 2018 04:00)  T(F): 98.2 (20 Aug 2018 14:56), Max: 101.2 (20 Aug 2018 04:00)  HR: 78 (20 Aug 2018 15:00) (76 - 120)  BP: 94/55 (19 Aug 2018 21:00) (94/55 - 94/55)  BP(mean): 70 (19 Aug 2018 21:00) (70 - 70)  RR: 14 (20 Aug 2018 15:00) (13 - 28)  SpO2: 97% (20 Aug 2018 15:00) (96% - 98%)     PHYSICAL EXAM:  GENERAL: intubated, off sed, OES but not interactive  HEAD:  NCAT  HEENT: Conjunctiva and sclera clear; moist mucosa; Neck supple, No JVD  CARDIOVASCULAR: RRR, normal S1 S2, no M/R/G, no JVD, neg HJR, nondisplaced PMI, no LE edema  RESPIRATORY: Limited exam, dec BS b/l, mild b/l insp rales, exp rhonchi  GASTROINTESTINAL: +BS, soft, non-distended, non-tender, no HSM  VASCULAR: Peripheral pulses palpable 2+ bilaterally  EXTREMITIES: Warm. No clubbing, cyanosis or edema  SKIN: No rashes, lesions, ecchymoses, or cyanosis  NEURO: OES, not FC, blinks to threat, does not track w/eyes    LABS:                        8.1    10.5  )-----------( 351      ( 20 Aug 2018 04:48 )             25.3     08-20    142  |  101  |  35<H>  ----------------------------<  64<L>  3.4<L>   |  18<L>  |  1.37<H>    Ca    8.0<L>      20 Aug 2018 04:47  Phos  3.3     08-20  Mg     2.1     08-20    TPro  6.7  /  Alb  2.5<L>  /  TBili  1.1  /  DBili  0.5<H>  /  AST  54<H>  /  ALT  28  /  AlkPhos  104  08-19            I&O's Summary    19 Aug 2018 07:01  -  20 Aug 2018 07:00  --------------------------------------------------------  IN: 474 mL / OUT: 2055 mL / NET: -1581 mL    20 Aug 2018 07:01  -  20 Aug 2018 16:03  --------------------------------------------------------  IN: 80 mL / OUT: 350 mL / NET: -270 mL      BNP  RADIOLOGY & ADDITIONAL STUDIES:  CXR (8/20): Improvement in R PLEF    TELEMETRY: NSR in 80s, episode of afib w/RVR to 130s o/n    EKG: No EKG for review

## 2018-08-20 NOTE — PROGRESS NOTE ADULT - SUBJECTIVE AND OBJECTIVE BOX
Interventional, Pulmonary, Critical, Chest Special Procedures.    Pt was seen and fully examined by myself.     Time spent with patient in minutes: 67    Patient is a 89y old  Female who presents with a chief complaint of cardio-pulmonary arrest (17 Aug 2018 03:20)The patient appears comfortable and in synchrony c CMV mode. Vent circuit integrity revised and presrved.    HPI:  89 F w/ pmhx of dementia, R. femoral thrombus s/p IVC filter 1/2017, chronic anemia, presents to ED from [facility] after cardio-pulmonary arrest. Patient recently admitted to MICU for hypoxic respiratory failure 2/2 aspiration pneumonia. Patient eventually extubated, received PEG tube, and discharged back to [facility] on 08/14. Hospital course complicated by unstable atrial fibrillation s/p cardioversion, placed on amiodarone with stabilization of HR. Per ER, patient unable to tolerate secretions, found to be hypoxic at NH and eventually lost a pulse. Patient intubated in the feild, ACLS protocol begun and ROSC achieved after 3 rounds of epinephrine. (17 Aug 2018 03:20)    REVIEW OF SYSTEMS:  Constitutional: No fever, +weight loss, chills + fatigue  Eyes: No eye pain, visual disturbances, or discharge  ENMT:  + difficulty hearing, tinnitus, vertigo; No sinus or throat pain. No epistaxis, +dysphagia, dysphonia, hoarseness   Neck: No pain, stiffness or neck swelling.  No masses or deformities  Respiratory: +cough, wheezing, chills or hemoptysis  - COPD  - ILD   - PE   - ASTHMA     +PNEUMONIA  Cardiovascular: No chest pain, +dysrhythmia, palpitations, dizziness or edema   - COPD     - CAD   - CHF   - HTN  Gastrointestinal: No abdominal or epigastric pain. No nausea, vomiting or hematemesis; No diarrhea or constipation. No melena or hematochezia. No dysphagia or Icterus.          Genitourinary: No dysuria, frequency, hematuria + incontinence   - CKD/CAMRYN      - ESRD  Neurological: No headaches, memory loss, loss of strength, numbness or tremors      +DEMENTIA     - STROKE    - SEIZURE  Skin: No itching, burning, rashes or lesions   Lymph Nodes: No enlarged glands  Endocrine: No heat or cold intolerance; No hair loss       - DM     - THYROID DISORDER  Musculoskeletal: No joint pain or swelling; No muscle, back or extremity pain  Psychiatric: Dementia, anxiety, mood swings or difficulty sleeping  Heme/Lymph: No easy bruising or bleeding gums         +ANEMIA     PAST MEDICAL & SURGICAL HISTORY:  Dementia  DVT (deep venous thrombosis)  Dysphagia  Anemia  DJD (degenerative joint disease)  Edema  IBS (irritable bowel syndrome)  Celiac disease  S/P IVC filter  H/O inguinal hernia repair  History of total left hip replacement    FAMILY HISTORY:  No pertinent family history in first degree relatives    SOCIAL HISTORY:      - Tobacco     - ETOH    Allergies    amoxicillin (Rash)  Cipro (Rash)  clindamycin (Unknown)  lactose (Unknown)  penicillin (Rash)  Wheat (Unknown)    Intolerances      Vital Signs Last 24 Hrs  T(C): 37.2 (20 Aug 2018 10:00), Max: 38.4 (20 Aug 2018 04:00)  T(F): 98.9 (20 Aug 2018 10:00), Max: 101.2 (20 Aug 2018 04:00)  HR: 78 (20 Aug 2018 13:00) (76 - 120)  BP: 94/55 (19 Aug 2018 21:00) (94/55 - 94/55)  BP(mean): 70 (19 Aug 2018 21:00) (70 - 70)  RR: 14 (20 Aug 2018 13:00) (13 - 28)  SpO2: 97% (20 Aug 2018 13:00) (96% - 98%)    08-19 @ 07:01 - 08-20 @ 07:00  --------------------------------------------------------  IN: 474 mL / OUT: 2055 mL / NET: -1581 mL    08-20 @ 07:01 - 08-20 @ 13:46  --------------------------------------------------------  IN: 30 mL / OUT: 140 mL / NET: -110 mL      Mode: AC/ CMV (Assist Control/ Continuous Mandatory Ventilation)  RR (machine): 14  TV (machine): 360  FiO2: 40  PEEP: 5  ITime: 1  MAP: 11  PIP: 31    PHYSICAL EXAM:  Un Comfortable, no immediate distress  Eyes: PERRL, EOM intact; conjunctiva and sclera clear  Head: Normocephalic;  No Trauma  ENMT: No nasal discharge, hoarseness, +cough   Neck: Supple; non tender; no masses or deformities.    No JVD  Respiratory:   - WHEEZING   +RHONCHI  - RALES  + CRACKLES.  Diminished breath sounds  BILATERAL  RIGHT > LEFT base  Cardiovascular: Regular rate and rhythm. S1 and S2 Normal; No murmurs, gallops or rubs     - PPM/AICD  Gastrointestinal: Soft non-tender, non-distended; Normal bowel sounds; No hepatosplenomegaly.     +_ site clean PEG    -+NGT GT   + SWENSON  Genitourinary: No costovertebral angle tenderness. No dysuria  Extremities: NO AROM, No clubbing, cyanosis or edema    Vascular: Peripheral pulses palpable 2+ bilaterally  Neurological: Intubated sedated   Skin: Warm and dry. No obvious rash  Lymph Nodes: No acute cervical or supraclavicular adenopathy  Psychiatric:On vent. unrseponsive to verbal stimuli    DEVICES:  - DENTURES   +IV R / L     +6.5 ETUBE   -TRACH   -CTUBE  R / L      LABS:                          8.1    10.5  )-----------( 351      ( 20 Aug 2018 04:48 )             25.3     08-20    142  |  101  |  35<H>  ----------------------------<  64<L>  3.4<L>   |  18<L>  |  1.37<H>    Ca    8.0<L>      20 Aug 2018 04:47  Phos  3.3     08-20  Mg     2.1     08-20    TPro  6.7  /  Alb  2.5<L>  /  TBili  1.1  /  DBili  0.5<H>  /  AST  54<H>  /  ALT  28  /  AlkPhos  104  08-19      < from: Xray Chest 1 View- PORTABLE-Urgent (08.19.18 @ 08:49) >    EXAM:  XR CHEST PORTABLE URGENT 1V                          PROCEDURE DATE:  08/19/2018          INTERPRETATION:  Portable AP Radiograph dated 8/19/2018 8:49 AM    CLINICAL INFORMATION: Patient intubated.     COMPARISON: Chest x-ray from 8/17/2018 at 5:42 AM.    FINDINGS:   Endotracheal tube is approximately 2 cm above the nagel. There is a   right-sided central venous catheter with its tip slightly distal to the   superior atriocaval junction. Enteric tube is visualized below the   diaphragm but its tip is not visualized on this image.     Patient's head is flexed limiting evaluation of the lung apices. There is   enlargement of the cardiomediastinal silhouette. Prominent right hilum   which may be due to positioning. There is pulmonary venous congestion.   There is a right lower lung zone opacity, similar to prior exam. Small   pleural effusions, right greater than left.    There are no significant interval changes seen in the osseous structures   and soft tissues.    IMPRESSION:  Probable small pleural effusions.. Pulmonary venous congestion with focal   consolidation in the right lower lung zone. Differential includes   pulmonary edema and/or superimposed pneumonia.    < end of copied text >  RADIOLOGY & ADDITIONAL STUDIES (The following images were personally reviewed):

## 2018-08-20 NOTE — PROGRESS NOTE ADULT - ATTENDING COMMENTS
Assessment: Patient personally seen and examined myself during rounds with the House Staff/Fellow  ON DATE 8/20/18  House Staff/Fellow note read, including vitals, physical findings, laboratory data, and radiological reports.   Revisions included below.   Direct personal management at bed side and extensive interpretation of the data.    Plan was outlined and discussed in details with the House Staff/Fellow.    Decision making of high complexity   Risk high of complications, morbidity, and/or mortality  Assessment and Action taken for acute disease activity to reflect the level of care provided:  -Hemodynamic evaluation and support  -ACS assessment and treatment as applicable  -Heart failure assessment and treatment as applicable  -Cardiac Telemetry reviewed  -Medication reconciliation  -Review laboratory data  -EKG reviewed   -Echo reviewed  -Interdisciplinary discussion with IC / EP / HF / CTS teams as needed  My plan includes :  close hemodynamic monitoring and management   Monitor for arrhythmias and monitor parameters for organ perfusion  monitor neurologic status  Head of the bed should remain elevated to 45 deg .   chest PT and IS will be encouraged  monitor adequacy of oxygenation and ventilation and attempt to wean oxygen  Nutritional goals will be met using po eventually , ensure adequate caloric intake and montior the same  Stress ulcer and VTE prophylaxis will be achieved    Glycemic control is satisfactory  Electrolytes have been replete as necessary and wound care has been carried out. Pain control has been achieved.   aggressive physical therapy and early mobility and ambulation goals will be met   The family was updated about the course and plan  CRITICAL CARE TIME SPENT in evaluation and management, reassessments, review and interpretation of labs and x-rays, ventilator and hemodynamic management, formulating a plan and coordinating care: ___90____ MIN.  Time does not include procedural time.    Sana Anguiano MD    Mobile: [phone]

## 2018-08-20 NOTE — PROGRESS NOTE ADULT - ASSESSMENT
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 ROSC achieved after apx 28 minutes.   - Pt currently intubated  - d/c vancomycin   - Lactate improved; HD improved while on Levophed   - Ng sump continued with suction   - BCx NGTD, f/u final  - Scx growing proteus (pan sensitive) and staph aureus (sensitivities pending)  - c/w meropenem for broad coverage in setting of pcn allergy  - 250 cc bolus when urine output <25cc/hr    Cardiac  #Cardiac Arrest: likely after pulmonary arrest from above  - EKG without ischemic changes   - trops 0.03. will not trend   - A line in place-right radial  -central line passed, cxr confirmed position   - pt has been off all sedatives since yesterday night and will remain off for neuro evaluation by Dr. Harris today (8/20)    #Atrial flutter s/p cardioversion last admission  - c/w amiodarone 200 daily   -no A/C in setting of PMH of gastric AVM    ID  -c/w meropenum   -f/u blood cx     HEMATOLOGY  #Anemia; now stable hgb 8.1  - Pt has a history of AVMS  - currently on PEG tube placed by Dr. Hale  - GI Dr. Hale consulted; monitor h/h. Pt likely not candidate for invasive procedures. Will reasses monday.  - -Monitor H/H    NEURO  Possible anoxic brain injury  - Neuro consult for possible MRI  - off sedatives for neuro MRI evaluation by Dr. 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 ROSC achieved after apx 28 minutes.   - Pt currently intubated  - d/c vancomycin   - Lactate improved; HD improved while on Levophed   - Ng sump continued with suction   - BCx NGTD, f/u final  - Scx growing proteus (pan sensitive) and staph aureus (sensitivities pending)  - c/w meropenem for broad coverage in setting of pcn allergy  - 250 cc bolus when urine output <25cc/hr    Cardiac  #Cardiac Arrest: likely after pulmonary arrest from above  - EKG without ischemic changes   - trops 0.03. will not trend   - A line in place-right radial  -central line passed, cxr confirmed position   - pt has been off all sedatives since yesterday night and will remain off for neuro evaluation by Dr. Harris today (8/20)    #Atrial flutter s/p cardioversion last admission  - c/w amiodarone 200 daily   -no A/C in setting of PMH of gastric AVM    ID  #Aspiration PNA  - CXR: RLL opacity with bilateral pleural effusions.  - Sputum cultures +Pansensitive Proteus and staph aureous.   -c/w meropenum. Vanco d/c'ed  -f/u blood cx     HEMATOLOGY  #Anemia; now stable hgb 8.1  - Pt has a history of AVMS  - currently on PEG tube placed by Dr. Hale  - GI Dr. Hale consulted; monitor h/h. Pt likely not candidate for invasive procedures. Will reasses monday.  - -Monitor H/H    NEURO  Possible anoxic brain injury  - Neuro consult. CT today  - off sedatives for neuro neuro evaluation by Dr. 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 ROSC achieved after apx 28 minutes.   - Pt currently intubated  - d/c vancomycin   - Lactate improved; HD improved while on Levophed   - Ng sump continued with suction   - BCx NGTD, f/u final  - Scx growing proteus (pan sensitive) and staph aureus (sensitivities pending)  -start Ancef. Meropenem and Vanco d/c'ed  - 250 cc bolus when urine output <25cc/hr    Cardiac  #Cardiac Arrest: likely after pulmonary arrest from above  - EKG without ischemic changes   - trops 0.03. will not trend   - A line in place-right radial  -central line passed, cxr confirmed position   - pt has been off all sedatives since yesterday night and will remain off for neuro evaluation by Dr. Harris today (8/20)    #Atrial flutter s/p cardioversion last admission  - c/w amiodarone 200 daily   -no A/C in setting of PMH of gastric AVM    ID  #Aspiration PNA (Gram negative Proteus and MSSA)  - CXR: RLL opacity with bilateral pleural effusions.  - Sputum cultures +Pansensitive Proteus and MSSA.   -start Ancef. Meropenem and Vanco d/c'ed  -f/u blood cx     HEMATOLOGY  #Anemia; now stable hgb 8.1  - Pt has a history of AVMS  - currently on PEG tube placed by Dr. Hale  - GI Dr. Hale consulted; monitor h/h. Pt likely not candidate for invasive procedures. Will reasses monday.  - -Monitor H/H    NEURO  Possible anoxic brain injury  - Neuro consult. CT today  - off sedatives for neuro neuro evaluation by Dr. Harris today  - Palliative consult    F NO FLUIDS FOR NOW  E REPLETE AS NEEDED  N Restart peg feeds  - PPX- GI ppx Protonix  - Dispo: MICU

## 2018-08-20 NOTE — PROGRESS NOTE ADULT - SUBJECTIVE AND OBJECTIVE BOX
Patient is a 89y old  Female who presents with a chief complaint of cardio-pulmonary arrest (17 Aug 2018 03:20)      INTERVAL HPI/OVERNIGHT EVENTS:  ICU Vital Signs Last 24 Hrs  T(C): 37.8 (20 Aug 2018 05:00), Max: 39.2 (19 Aug 2018 12:00)  T(F): 100 (20 Aug 2018 05:00), Max: 102.6 (19 Aug 2018 12:00)  HR: 82 (20 Aug 2018 06:23) (76 - 120)  BP: 94/55 (19 Aug 2018 21:00) (94/55 - 132/67)  BP(mean): 70 (19 Aug 2018 21:00) (70 - 95)  ABP: 96/44 (20 Aug 2018 05:00) (89/40 - 138/64)  ABP(mean): 62 (20 Aug 2018 05:00) (60 - 95)  RR: 14 (20 Aug 2018 06:23) (13 - 28)  SpO2: 97% (20 Aug 2018 06:23) (96% - 100%)    I&O's Summary    18 Aug 2018 07:01  -  19 Aug 2018 07:00  --------------------------------------------------------  IN: 718 mL / OUT: 1385 mL / NET: -667 mL    19 Aug 2018 07:01  -  20 Aug 2018 06:56  --------------------------------------------------------  IN: 454 mL / OUT: 1955 mL / NET: -1501 mL      Mode: AC/ CMV (Assist Control/ Continuous Mandatory Ventilation)  RR (machine): 14  TV (machine): 360  FiO2: 40  PEEP: 5  ITime: 1  MAP: 11  PIP: 29      LABS:                        8.1    10.5  )-----------( 351      ( 20 Aug 2018 04:48 )             25.3     08-20    142  |  101  |  35<H>  ----------------------------<  64<L>  3.4<L>   |  18<L>  |  1.37<H>    Ca    8.0<L>      20 Aug 2018 04:47  Phos  3.3     08-20  Mg     2.1     08-20    TPro  6.7  /  Alb  2.5<L>  /  TBili  1.1  /  DBili  0.5<H>  /  AST  54<H>  /  ALT  28  /  AlkPhos  104  08-19        CAPILLARY BLOOD GLUCOSE            RADIOLOGY & ADDITIONAL TESTS:    Consultant(s) Notes Reviewed:  [x ] YES  [ ] NO    MEDICATIONS  (STANDING):  amiodarone    Tablet 200 milliGRAM(s) Oral daily  artificial  tears Solution 1 Drop(s) Both EYES daily  chlorhexidine 0.12% Liquid 15 milliLiter(s) Swish and Spit two times a day  chlorhexidine 2% Cloths 1 Application(s) Topical daily  heparin  Injectable 5000 Unit(s) SubCutaneous every 12 hours  influenza   Vaccine 0.5 milliLiter(s) IntraMuscular once  meropenem  IVPB 500 milliGRAM(s) IV Intermittent every 12 hours  norepinephrine Infusion 0.05 MICROgram(s)/kG/Min (4.884 mL/Hr) IV Continuous <Continuous>  pantoprazole    Tablet 40 milliGRAM(s) Oral before breakfast  petrolatum Ophthalmic Ointment 1 Application(s) Both EYES daily    MEDICATIONS  (PRN):      PHYSICAL EXAM:  GENERAL: well built, well nourished  HEAD:  Atraumatic, Normocephalic  EYES: EOMI, PERRLA, conjunctiva and sclera clear  ENT: No tonsillar erythema, exudates, or enlargement; Moist mucous membranes, Good dentition, No lesions  NECK: Supple, No JVD, Normal thyroid, no enlarged nodes  NERVOUS SYSTEM:  Alert & Oriented X3, Good concentration; Motor Strength 5/5 B/L upper and lower extremities; DTRs 2+ intact and symmetric, sensory intact  CHEST/LUNG: B/L good air entry; No rales, rhonchi, or wheezing  HEART: S1S2 normal, no S3, Regular rate and rhythm; No murmurs, rubs, or gallops  ABDOMEN: Soft, Nontender, Nondistended; Bowel sounds present  EXTREMITIES:  2+ Peripheral Pulses, No clubbing, cyanosis, or edema  LYMPH: No lymphadenopathy noted  SKIN: No rashes or lesions    Care Discussed with Consultants/Other Providers [ x] YES  [ ] NO

## 2018-08-21 ENCOUNTER — APPOINTMENT (OUTPATIENT)
Dept: HEART AND VASCULAR | Facility: CLINIC | Age: 83
End: 2018-08-21

## 2018-08-21 LAB
ANION GAP SERPL CALC-SCNC: 18 MMOL/L — HIGH (ref 5–17)
BUN SERPL-MCNC: 33 MG/DL — HIGH (ref 7–23)
CALCIUM SERPL-MCNC: 8.9 MG/DL — SIGNIFICANT CHANGE UP (ref 8.4–10.5)
CHLORIDE SERPL-SCNC: 103 MMOL/L — SIGNIFICANT CHANGE UP (ref 96–108)
CO2 SERPL-SCNC: 22 MMOL/L — SIGNIFICANT CHANGE UP (ref 22–31)
CREAT SERPL-MCNC: 1.3 MG/DL — SIGNIFICANT CHANGE UP (ref 0.5–1.3)
GLUCOSE BLDC GLUCOMTR-MCNC: 132 MG/DL — HIGH (ref 70–99)
GLUCOSE SERPL-MCNC: 116 MG/DL — HIGH (ref 70–99)
HCT VFR BLD CALC: 26.5 % — LOW (ref 34.5–45)
HGB BLD-MCNC: 8.5 G/DL — LOW (ref 11.5–15.5)
MAGNESIUM SERPL-MCNC: 2.2 MG/DL — SIGNIFICANT CHANGE UP (ref 1.6–2.6)
MCHC RBC-ENTMCNC: 28.2 PG — SIGNIFICANT CHANGE UP (ref 27–34)
MCHC RBC-ENTMCNC: 32.1 G/DL — SIGNIFICANT CHANGE UP (ref 32–36)
MCV RBC AUTO: 88 FL — SIGNIFICANT CHANGE UP (ref 80–100)
PHOSPHATE SERPL-MCNC: 3.3 MG/DL — SIGNIFICANT CHANGE UP (ref 2.5–4.5)
PLATELET # BLD AUTO: 360 K/UL — SIGNIFICANT CHANGE UP (ref 150–400)
POTASSIUM SERPL-MCNC: 3.6 MMOL/L — SIGNIFICANT CHANGE UP (ref 3.5–5.3)
POTASSIUM SERPL-SCNC: 3.6 MMOL/L — SIGNIFICANT CHANGE UP (ref 3.5–5.3)
RBC # BLD: 3.01 M/UL — LOW (ref 3.8–5.2)
RBC # FLD: 17.6 % — HIGH (ref 10.3–16.9)
SODIUM SERPL-SCNC: 143 MMOL/L — SIGNIFICANT CHANGE UP (ref 135–145)
WBC # BLD: 9 K/UL — SIGNIFICANT CHANGE UP (ref 3.8–10.5)
WBC # FLD AUTO: 9 K/UL — SIGNIFICANT CHANGE UP (ref 3.8–10.5)

## 2018-08-21 PROCEDURE — 99291 CRITICAL CARE FIRST HOUR: CPT

## 2018-08-21 PROCEDURE — 70450 CT HEAD/BRAIN W/O DYE: CPT | Mod: 26

## 2018-08-21 PROCEDURE — 99233 SBSQ HOSP IP/OBS HIGH 50: CPT

## 2018-08-21 PROCEDURE — 71045 X-RAY EXAM CHEST 1 VIEW: CPT | Mod: 26

## 2018-08-21 RX ORDER — POTASSIUM CHLORIDE 20 MEQ
10 PACKET (EA) ORAL ONCE
Qty: 0 | Refills: 0 | Status: COMPLETED | OUTPATIENT
Start: 2018-08-21 | End: 2018-08-21

## 2018-08-21 RX ORDER — SODIUM CHLORIDE 9 MG/ML
250 INJECTION INTRAMUSCULAR; INTRAVENOUS; SUBCUTANEOUS ONCE
Qty: 0 | Refills: 0 | Status: COMPLETED | OUTPATIENT
Start: 2018-08-21 | End: 2018-08-21

## 2018-08-21 RX ADMIN — SODIUM CHLORIDE 500 MILLILITER(S): 9 INJECTION INTRAMUSCULAR; INTRAVENOUS; SUBCUTANEOUS at 14:13

## 2018-08-21 RX ADMIN — Medication 1 APPLICATION(S): at 11:38

## 2018-08-21 RX ADMIN — MIDODRINE HYDROCHLORIDE 10 MILLIGRAM(S): 2.5 TABLET ORAL at 06:22

## 2018-08-21 RX ADMIN — CHLORHEXIDINE GLUCONATE 1 APPLICATION(S): 213 SOLUTION TOPICAL at 06:27

## 2018-08-21 RX ADMIN — MIDODRINE HYDROCHLORIDE 10 MILLIGRAM(S): 2.5 TABLET ORAL at 22:44

## 2018-08-21 RX ADMIN — CHLORHEXIDINE GLUCONATE 15 MILLILITER(S): 213 SOLUTION TOPICAL at 11:38

## 2018-08-21 RX ADMIN — Medication 100 MILLIGRAM(S): at 11:38

## 2018-08-21 RX ADMIN — Medication 1 DROP(S): at 11:38

## 2018-08-21 RX ADMIN — MIDODRINE HYDROCHLORIDE 10 MILLIGRAM(S): 2.5 TABLET ORAL at 12:39

## 2018-08-21 RX ADMIN — HEPARIN SODIUM 5000 UNIT(S): 5000 INJECTION INTRAVENOUS; SUBCUTANEOUS at 06:22

## 2018-08-21 RX ADMIN — Medication 100 MILLIEQUIVALENT(S): at 06:22

## 2018-08-21 RX ADMIN — AMIODARONE HYDROCHLORIDE 200 MILLIGRAM(S): 400 TABLET ORAL at 06:22

## 2018-08-21 RX ADMIN — HEPARIN SODIUM 5000 UNIT(S): 5000 INJECTION INTRAVENOUS; SUBCUTANEOUS at 18:17

## 2018-08-21 RX ADMIN — Medication 100 MILLIGRAM(S): at 22:44

## 2018-08-21 RX ADMIN — PANTOPRAZOLE SODIUM 40 MILLIGRAM(S): 20 TABLET, DELAYED RELEASE ORAL at 11:38

## 2018-08-21 NOTE — PROGRESS NOTE ADULT - SUBJECTIVE AND OBJECTIVE BOX
Patient is a 89y old  Female who presents with a chief complaint of cardio-pulmonary arrest (17 Aug 2018 03:20)      INTERVAL HPI/OVERNIGHT EVENTS:  ICU Vital Signs Last 24 Hrs  T(C): 37.5 (21 Aug 2018 04:48), Max: 37.6 (20 Aug 2018 07:00)  T(F): 99.5 (21 Aug 2018 04:48), Max: 99.6 (20 Aug 2018 07:00)  HR: 85 (21 Aug 2018 06:01) (72 - 86)  BP: 155/74 (21 Aug 2018 02:00) (106/53 - 155/74)  BP(mean): 108 (21 Aug 2018 02:00) (68 - 108)  ABP: 102/54 (21 Aug 2018 06:00) (98/46 - 148/74)  ABP(mean): 72 (21 Aug 2018 06:00) (66 - 108)  RR: 14 (21 Aug 2018 06:01) (7 - 21)  SpO2: 97% (21 Aug 2018 06:01) (95% - 98%)    I&O's Summary    19 Aug 2018 07:01  -  20 Aug 2018 07:00  --------------------------------------------------------  IN: 474 mL / OUT: 2055 mL / NET: -1581 mL    20 Aug 2018 07:01  -  21 Aug 2018 06:15  --------------------------------------------------------  IN: 671 mL / OUT: 1125 mL / NET: -454 mL      Mode: AC/ CMV (Assist Control/ Continuous Mandatory Ventilation)  RR (machine): 14  TV (machine): 360  FiO2: 40  PEEP: 5  ITime: 1  MAP: 9  PIP: 27      LABS:                        8.5    9.0   )-----------( 360      ( 21 Aug 2018 05:19 )             26.5     08-21    143  |  103  |  33<H>  ----------------------------<  116<H>  3.6   |  22  |  1.30    Ca    8.9      21 Aug 2018 05:19  Phos  3.3     08-21  Mg     2.2     08-21    TPro  6.7  /  Alb  2.5<L>  /  TBili  1.1  /  DBili  0.5<H>  /  AST  54<H>  /  ALT  28  /  AlkPhos  104  08-19        CAPILLARY BLOOD GLUCOSE      POCT Blood Glucose.: 95 mg/dL (20 Aug 2018 16:30)  POCT Blood Glucose.: 79 mg/dL (20 Aug 2018 11:12)        RADIOLOGY & ADDITIONAL TESTS:    Consultant(s) Notes Reviewed:  [x ] YES  [ ] NO    MEDICATIONS  (STANDING):  amiodarone    Tablet 200 milliGRAM(s) Oral daily  artificial  tears Solution 1 Drop(s) Both EYES daily  ceFAZolin   IVPB 1000 milliGRAM(s) IV Intermittent every 12 hours  chlorhexidine 0.12% Liquid 15 milliLiter(s) Swish and Spit two times a day  chlorhexidine 2% Cloths 1 Application(s) Topical daily  heparin  Injectable 5000 Unit(s) SubCutaneous every 12 hours  midodrine 10 milliGRAM(s) Oral every 8 hours  norepinephrine Infusion 0.05 MICROgram(s)/kG/Min (4.884 mL/Hr) IV Continuous <Continuous>  pantoprazole   Suspension 40 milliGRAM(s) Enteral Tube daily  petrolatum Ophthalmic Ointment 1 Application(s) Both EYES daily  potassium chloride  10 mEq/100 mL IVPB 10 milliEquivalent(s) IV Intermittent once    MEDICATIONS  (PRN):      PHYSICAL EXAM:  GENERAL: well built, well nourished  HEAD:  Atraumatic, Normocephalic  EYES: EOMI, PERRLA, conjunctiva and sclera clear  ENT: No tonsillar erythema, exudates, or enlargement; Moist mucous membranes, Good dentition, No lesions  NECK: Supple, No JVD, Normal thyroid, no enlarged nodes  NERVOUS SYSTEM:  Alert & Oriented X3, Good concentration; Motor Strength 5/5 B/L upper and lower extremities; DTRs 2+ intact and symmetric, sensory intact  CHEST/LUNG: B/L good air entry; No rales, rhonchi, or wheezing  HEART: S1S2 normal, no S3, Regular rate and rhythm; No murmurs, rubs, or gallops  ABDOMEN: Soft, Nontender, Nondistended; Bowel sounds present  EXTREMITIES:  2+ Peripheral Pulses, No clubbing, cyanosis, or edema  LYMPH: No lymphadenopathy noted  SKIN: No rashes or lesions    Care Discussed with Consultants/Other Providers [ x] YES  [ ] NO OVERNIGHT EVENTS: ALYCIA    INTERVAL HPI: Pt is examined at bedside. No change in neurological status. Pt is intubated. She is not sedated. Pt is not alert/oriented.     ICU Vital Signs Last 24 Hrs  T(C): 37.5 (21 Aug 2018 04:48), Max: 37.6 (20 Aug 2018 07:00)  T(F): 99.5 (21 Aug 2018 04:48), Max: 99.6 (20 Aug 2018 07:00)  HR: 85 (21 Aug 2018 06:01) (72 - 86)  BP: 155/74 (21 Aug 2018 02:00) (106/53 - 155/74)  BP(mean): 108 (21 Aug 2018 02:00) (68 - 108)  ABP: 102/54 (21 Aug 2018 06:00) (98/46 - 148/74)  ABP(mean): 72 (21 Aug 2018 06:00) (66 - 108)  RR: 14 (21 Aug 2018 06:01) (7 - 21)  SpO2: 97% (21 Aug 2018 06:01) (95% - 98%)    I&O's Summary    19 Aug 2018 07:01  -  20 Aug 2018 07:00  --------------------------------------------------------  IN: 474 mL / OUT: 2055 mL / NET: -1581 mL    20 Aug 2018 07:01  -  21 Aug 2018 06:15  --------------------------------------------------------  IN: 671 mL / OUT: 1125 mL / NET: -454 mL      Mode: AC/ CMV (Assist Control/ Continuous Mandatory Ventilation)  RR (machine): 14  TV (machine): 360  FiO2: 40  PEEP: 5  ITime: 1  MAP: 9  PIP: 27      LABS:                        8.5    9.0   )-----------( 360      ( 21 Aug 2018 05:19 )             26.5     08-21    143  |  103  |  33<H>  ----------------------------<  116<H>  3.6   |  22  |  1.30    Ca    8.9      21 Aug 2018 05:19  Phos  3.3     08-21  Mg     2.2     08-21    TPro  6.7  /  Alb  2.5<L>  /  TBili  1.1  /  DBili  0.5<H>  /  AST  54<H>  /  ALT  28  /  AlkPhos  104  08-19        CAPILLARY BLOOD GLUCOSE      POCT Blood Glucose.: 95 mg/dL (20 Aug 2018 16:30)  POCT Blood Glucose.: 79 mg/dL (20 Aug 2018 11:12)        RADIOLOGY & ADDITIONAL TESTS:    Consultant(s) Notes Reviewed:  [x ] YES  [ ] NO    MEDICATIONS  (STANDING):  amiodarone    Tablet 200 milliGRAM(s) Oral daily  artificial  tears Solution 1 Drop(s) Both EYES daily  ceFAZolin   IVPB 1000 milliGRAM(s) IV Intermittent every 12 hours  chlorhexidine 0.12% Liquid 15 milliLiter(s) Swish and Spit two times a day  chlorhexidine 2% Cloths 1 Application(s) Topical daily  heparin  Injectable 5000 Unit(s) SubCutaneous every 12 hours  midodrine 10 milliGRAM(s) Oral every 8 hours  norepinephrine Infusion 0.05 MICROgram(s)/kG/Min (4.884 mL/Hr) IV Continuous <Continuous>  pantoprazole   Suspension 40 milliGRAM(s) Enteral Tube daily  petrolatum Ophthalmic Ointment 1 Application(s) Both EYES daily  potassium chloride  10 mEq/100 mL IVPB 10 milliEquivalent(s) IV Intermittent once    MEDICATIONS  (PRN):      PHYSICAL EXAM:  GENERAL: well built, well nourished  HEAD:  Atraumatic, Normocephalic  EYES: EOMI, PERRLA, conjunctiva and sclera clear  ENT: No tonsillar erythema, exudates, or enlargement; Moist mucous membranes, Good dentition, No lesions  NECK: Supple, No JVD, Normal thyroid, no enlarged nodes  NERVOUS SYSTEM:  Alert & Oriented X3, Good concentration; Motor Strength 5/5 B/L upper and lower extremities; DTRs 2+ intact and symmetric, sensory intact  CHEST/LUNG: B/L good air entry; No rales, rhonchi, or wheezing  HEART: S1S2 normal, no S3, Regular rate and rhythm; No murmurs, rubs, or gallops  ABDOMEN: Soft, Nontender, Nondistended; Bowel sounds present  EXTREMITIES:  2+ Peripheral Pulses, No clubbing, cyanosis, or edema  LYMPH: No lymphadenopathy noted  SKIN: No rashes or lesions    Care Discussed with Consultants/Other Providers [ x] YES  [ ] NO OVERNIGHT EVENTS: ALYCIA    INTERVAL HPI: Pt is examined at bedside. No change in neurological status. Pt is intubated. She is not sedated. Pt is not alert/oriented.     ICU Vital Signs Last 24 Hrs  T(C): 37.5 (21 Aug 2018 04:48), Max: 37.6 (20 Aug 2018 07:00)  T(F): 99.5 (21 Aug 2018 04:48), Max: 99.6 (20 Aug 2018 07:00)  HR: 85 (21 Aug 2018 06:01) (72 - 86)  BP: 155/74 (21 Aug 2018 02:00) (106/53 - 155/74)  BP(mean): 108 (21 Aug 2018 02:00) (68 - 108)  ABP: 102/54 (21 Aug 2018 06:00) (98/46 - 148/74)  ABP(mean): 72 (21 Aug 2018 06:00) (66 - 108)  RR: 14 (21 Aug 2018 06:01) (7 - 21)  SpO2: 97% (21 Aug 2018 06:01) (95% - 98%)    I&O's Summary    19 Aug 2018 07:01  -  20 Aug 2018 07:00  --------------------------------------------------------  IN: 474 mL / OUT: 2055 mL / NET: -1581 mL    20 Aug 2018 07:01  -  21 Aug 2018 06:15  --------------------------------------------------------  IN: 671 mL / OUT: 1125 mL / NET: -454 mL      Mode: AC/ CMV (Assist Control/ Continuous Mandatory Ventilation)  RR (machine): 14  TV (machine): 360  FiO2: 40  PEEP: 5  ITime: 1  MAP: 9  PIP: 27      LABS:                        8.5    9.0   )-----------( 360      ( 21 Aug 2018 05:19 )             26.5     08-21    143  |  103  |  33<H>  ----------------------------<  116<H>  3.6   |  22  |  1.30    Ca    8.9      21 Aug 2018 05:19  Phos  3.3     08-21  Mg     2.2     08-21    TPro  6.7  /  Alb  2.5<L>  /  TBili  1.1  /  DBili  0.5<H>  /  AST  54<H>  /  ALT  28  /  AlkPhos  104  08-19        CAPILLARY BLOOD GLUCOSE      POCT Blood Glucose.: 95 mg/dL (20 Aug 2018 16:30)  POCT Blood Glucose.: 79 mg/dL (20 Aug 2018 11:12)        RADIOLOGY & ADDITIONAL TESTS:    Consultant(s) Notes Reviewed:  [x ] YES  [ ] NO    MEDICATIONS  (STANDING):  amiodarone    Tablet 200 milliGRAM(s) Oral daily  artificial  tears Solution 1 Drop(s) Both EYES daily  ceFAZolin   IVPB 1000 milliGRAM(s) IV Intermittent every 12 hours  chlorhexidine 0.12% Liquid 15 milliLiter(s) Swish and Spit two times a day  chlorhexidine 2% Cloths 1 Application(s) Topical daily  heparin  Injectable 5000 Unit(s) SubCutaneous every 12 hours  midodrine 10 milliGRAM(s) Oral every 8 hours  norepinephrine Infusion 0.05 MICROgram(s)/kG/Min (4.884 mL/Hr) IV Continuous <Continuous>  pantoprazole   Suspension 40 milliGRAM(s) Enteral Tube daily  petrolatum Ophthalmic Ointment 1 Application(s) Both EYES daily  potassium chloride  10 mEq/100 mL IVPB 10 milliEquivalent(s) IV Intermittent once    MEDICATIONS  (PRN):      PHYSICAL EXAM:  GENERAL: Intubated  HEAD:  Atraumatic, Normocephalic  EYES: Pupils dilated, mildly responsive to light, conjunctiva and sclera clear  NECK: Supple, No JVD, Normal thyroid, no enlarged nodes  NERVOUS SYSTEM: Eyes open but not awake, not alert, unresponsive to commands/verbal/painful stimili. +corneal reflex, pupils reactive to light. Minimal gag reflex.    CHEST/LUNG: B/L good air entry; No rales, rhonchi, or wheezing  HEART: S1S2 normal, no S3, Regular rate and rhythm; No murmurs, rubs, or gallops  ABDOMEN: Soft, Nontender, Nondistended; Bowel sounds present  EXTREMITIEs: B/L pedal 2+ pitting edema  LYMPH: No lymphadenopathy noted  SKIN: No rashes or lesions    Care Discussed with Consultants/Other Providers [ x] YES  [ ] NO OVERNIGHT EVENTS: ALYCIA    INTERVAL HPI: Pt is examined at bedside. No change in neurological status. Pt is intubated. She is not sedated. Pt is not alert/oriented.     ICU Vital Signs Last 24 Hrs  T(C): 37.5 (21 Aug 2018 04:48), Max: 37.6 (20 Aug 2018 07:00)  T(F): 99.5 (21 Aug 2018 04:48), Max: 99.6 (20 Aug 2018 07:00)  HR: 85 (21 Aug 2018 06:01) (72 - 86)  BP: 155/74 (21 Aug 2018 02:00) (106/53 - 155/74)  BP(mean): 108 (21 Aug 2018 02:00) (68 - 108)  ABP: 102/54 (21 Aug 2018 06:00) (98/46 - 148/74)  ABP(mean): 72 (21 Aug 2018 06:00) (66 - 108)  RR: 14 (21 Aug 2018 06:01) (7 - 21)  SpO2: 97% (21 Aug 2018 06:01) (95% - 98%)    I&O's Summary    19 Aug 2018 07:01  -  20 Aug 2018 07:00  --------------------------------------------------------  IN: 474 mL / OUT: 2055 mL / NET: -1581 mL    20 Aug 2018 07:01  -  21 Aug 2018 06:15  --------------------------------------------------------  IN: 671 mL / OUT: 1125 mL / NET: -454 mL      Mode: AC/ CMV (Assist Control/ Continuous Mandatory Ventilation)  RR (machine): 14  TV (machine): 360  FiO2: 40  PEEP: 5  ITime: 1  MAP: 9  PIP: 27      LABS:                        8.5    9.0   )-----------( 360      ( 21 Aug 2018 05:19 )             26.5     08-21    143  |  103  |  33<H>  ----------------------------<  116<H>  3.6   |  22  |  1.30    Ca    8.9      21 Aug 2018 05:19  Phos  3.3     08-21  Mg     2.2     08-21    TPro  6.7  /  Alb  2.5<L>  /  TBili  1.1  /  DBili  0.5<H>  /  AST  54<H>  /  ALT  28  /  AlkPhos  104  08-19        CAPILLARY BLOOD GLUCOSE      POCT Blood Glucose.: 95 mg/dL (20 Aug 2018 16:30)  POCT Blood Glucose.: 79 mg/dL (20 Aug 2018 11:12)        RADIOLOGY & ADDITIONAL TESTS:    Consultant(s) Notes Reviewed:  [x ] YES  [ ] NO    MEDICATIONS  (STANDING):  amiodarone    Tablet 200 milliGRAM(s) Oral daily  artificial  tears Solution 1 Drop(s) Both EYES daily  ceFAZolin   IVPB 1000 milliGRAM(s) IV Intermittent every 12 hours  chlorhexidine 0.12% Liquid 15 milliLiter(s) Swish and Spit two times a day  chlorhexidine 2% Cloths 1 Application(s) Topical daily  heparin  Injectable 5000 Unit(s) SubCutaneous every 12 hours  midodrine 10 milliGRAM(s) Oral every 8 hours  norepinephrine Infusion 0.05 MICROgram(s)/kG/Min (4.884 mL/Hr) IV Continuous <Continuous>  pantoprazole   Suspension 40 milliGRAM(s) Enteral Tube daily  petrolatum Ophthalmic Ointment 1 Application(s) Both EYES daily  potassium chloride  10 mEq/100 mL IVPB 10 milliEquivalent(s) IV Intermittent once    MEDICATIONS  (PRN):    PHYSICAL EXAM:  GENERAL: Intubated  HEAD:  Atraumatic, Normocephalic  EYES: Pupils dilated, mildly responsive to light, conjunctiva and sclera clear  NECK: Supple, No JVD, Normal thyroid, no enlarged nodes  NERVOUS SYSTEM: Eyes open but not awake, not alert, unresponsive to commands/verbal/painful stimili. +corneal reflex, pupils reactive to light. Minimal gag reflex.    CHEST/LUNG: B/L good air entry; No rales, rhonchi, or wheezing  HEART: S1S2 normal, no S3, Regular rate and rhythm; No murmurs, rubs, or gallops  ABDOMEN: Soft, Nontender, Nondistended; Bowel sounds present  EXTREMITIEs: B/L pedal 2+ pitting edema  LYMPH: No lymphadenopathy noted  SKIN: No rashes or lesions    Care Discussed with Consultants/Other Providers [ x] YES  [ ] NO OVERNIGHT EVENTS: ALYCIA    INTERVAL HPI: Pt is examined at bedside. No change in neurological status. Pt is intubated. She is not sedated. Pt is not alert/oriented.     ICU Vital Signs Last 24 Hrs  T(C): 37.5 (21 Aug 2018 04:48), Max: 37.6 (20 Aug 2018 07:00)  T(F): 99.5 (21 Aug 2018 04:48), Max: 99.6 (20 Aug 2018 07:00)  HR: 85 (21 Aug 2018 06:01) (72 - 86)  BP: 155/74 (21 Aug 2018 02:00) (106/53 - 155/74)  BP(mean): 108 (21 Aug 2018 02:00) (68 - 108)  ABP: 102/54 (21 Aug 2018 06:00) (98/46 - 148/74)  ABP(mean): 72 (21 Aug 2018 06:00) (66 - 108)  RR: 14 (21 Aug 2018 06:01) (7 - 21)  SpO2: 97% (21 Aug 2018 06:01) (95% - 98%)    I&O's Summary    19 Aug 2018 07:01  -  20 Aug 2018 07:00  --------------------------------------------------------  IN: 474 mL / OUT: 2055 mL / NET: -1581 mL    20 Aug 2018 07:01  -  21 Aug 2018 06:15  --------------------------------------------------------  IN: 671 mL / OUT: 1125 mL / NET: -454 mL      Mode: AC/ CMV (Assist Control/ Continuous Mandatory Ventilation)  RR (machine): 14  TV (machine): 360  FiO2: 40  PEEP: 5  ITime: 1  MAP: 9  PIP: 27      LABS:                        8.5    9.0   )-----------( 360      ( 21 Aug 2018 05:19 )             26.5     08-21    143  |  103  |  33<H>  ----------------------------<  116<H>  3.6   |  22  |  1.30    Ca    8.9      21 Aug 2018 05:19  Phos  3.3     08-21  Mg     2.2     08-21    TPro  6.7  /  Alb  2.5<L>  /  TBili  1.1  /  DBili  0.5<H>  /  AST  54<H>  /  ALT  28  /  AlkPhos  104  08-19        CAPILLARY BLOOD GLUCOSE      POCT Blood Glucose.: 95 mg/dL (20 Aug 2018 16:30)  POCT Blood Glucose.: 79 mg/dL (20 Aug 2018 11:12)        RADIOLOGY & ADDITIONAL TESTS:    Consultant(s) Notes Reviewed:  [x ] YES  [ ] NO    MEDICATIONS  (STANDING):  amiodarone    Tablet 200 milliGRAM(s) Oral daily  artificial  tears Solution 1 Drop(s) Both EYES daily  ceFAZolin   IVPB 1000 milliGRAM(s) IV Intermittent every 12 hours  chlorhexidine 0.12% Liquid 15 milliLiter(s) Swish and Spit two times a day  chlorhexidine 2% Cloths 1 Application(s) Topical daily  heparin  Injectable 5000 Unit(s) SubCutaneous every 12 hours  midodrine 10 milliGRAM(s) Oral every 8 hours  norepinephrine Infusion 0.05 MICROgram(s)/kG/Min (4.884 mL/Hr) IV Continuous <Continuous>  pantoprazole   Suspension 40 milliGRAM(s) Enteral Tube daily  petrolatum Ophthalmic Ointment 1 Application(s) Both EYES daily  potassium chloride  10 mEq/100 mL IVPB 10 milliEquivalent(s) IV Intermittent once    MEDICATIONS  (PRN):    PHYSICAL EXAM:  GENERAL: Intubated  HEAD:  Atraumatic, Normocephalic  EYES: Pupils dilated, mildly responsive to light, conjunctiva and sclera clear  NECK: Supple, No JVD, Normal thyroid, no enlarged nodes  NERVOUS SYSTEM: Eyes open but not awake, not alert, unresponsive to commands/verbal/painful stimili. +corneal reflex, pupils reactive to light. Minimal gag reflex.    CHEST/LUNG: B/L good air entry; No rales, rhonchi, or wheezing  HEART: S1S2 normal, no S3, Regular rate and rhythm; No murmurs, rubs, or gallops  ABDOMEN: Soft, Nontender, Nondistended; Bowel sounds present  EXTREMITIEs: B/L pedal 2+ pitting edema; UE withdrawal to noxious stimuli  LYMPH: No lymphadenopathy noted  SKIN: No rashes or lesions    Care Discussed with Consultants/Other Providers [ x] YES  [ ] NO

## 2018-08-21 NOTE — PROGRESS NOTE ADULT - SUBJECTIVE AND OBJECTIVE BOX
Neurology Follow Up     Interval History:  Patient seen and examined.  CT Head performed last night.    Still on pressors for her blood pressure.    Medications:  MEDICATIONS  (STANDING):  amiodarone    Tablet 200 milliGRAM(s) Oral daily  artificial  tears Solution 1 Drop(s) Both EYES daily  ceFAZolin   IVPB 1000 milliGRAM(s) IV Intermittent every 12 hours  chlorhexidine 0.12% Liquid 15 milliLiter(s) Swish and Spit two times a day  chlorhexidine 2% Cloths 1 Application(s) Topical daily  heparin  Injectable 5000 Unit(s) SubCutaneous every 12 hours  midodrine 10 milliGRAM(s) Oral every 8 hours  norepinephrine Infusion 0.05 MICROgram(s)/kG/Min (4.884 mL/Hr) IV Continuous <Continuous>  pantoprazole   Suspension 40 milliGRAM(s) Enteral Tube daily  petrolatum Ophthalmic Ointment 1 Application(s) Both EYES daily    Allergies    amoxicillin (Rash)  Cipro (Rash)  clindamycin (Unknown)  lactose (Unknown)  penicillin (Rash)  Wheat (Unknown)    Exam:  ICU Vital Signs Last 24 Hrs  T(C): 36.4 (21 Aug 2018 14:14), Max: 37.6 (20 Aug 2018 21:00)  T(F): 97.6 (21 Aug 2018 14:14), Max: 99.6 (20 Aug 2018 21:00)  HR: 60 (21 Aug 2018 16:00) (58 - 86)  BP: 155/74 (21 Aug 2018 02:00) (106/53 - 155/74)  BP(mean): 108 (21 Aug 2018 02:00) (68 - 108)  ABP: 94/44 (21 Aug 2018 16:00) (92/44 - 148/74)  ABP(mean): 62 (21 Aug 2018 16:00) (62 - 108)  RR: 11 (21 Aug 2018 16:00) (7 - 21)  SpO2: 97% (21 Aug 2018 16:00) (95% - 98%)    Gen: Lying in bed, NAD, intubated - not breathing over vent at first (14/14) but then increased to upper teen's in response to pain  Neuro:  Mental status: no response to pain to shoulder, fewer intermittent eye openings but no sequence and not for specific response.  not awake to follow commands   Cranial nerves: eyes midline, no oculocephalic reflex bilaterally, pupils reactive to light bilaterally, variable assessment of corneal reflex since hard to keep her eyelids open, unable assess facial secondary to ETT, minimal gag reflex at beginning of process (less reactive than 3 days ago)  Motor: No spontaneous movements   Sensation: variable, possible movement of left fingers after delay    Coordination: unable to assess   Reflexes: absent Babinski bilaterally  Gait: deferred    Labs:                        8.5    9.0   )-----------( 360      ( 21 Aug 2018 05:19 )             26.5   08-21    143  |  103  |  33<H>  ----------------------------<  116<H>  3.6   |  22  |  1.30    Ca    8.9      21 Aug 2018 05:19  Phos  3.3     08-21  Mg     2.2     08-21    Radiology:  CT Head without contrast (8/21/2018): Diffuse loss of gray-white differentiation concerning for anoxic brain injury.    Assessment and plan: 89 year-old female with multiple medical problems presents from [facility] post cardiac arrest s/p intubation.  Her neurological status is slightly slower than yesterday's.  She still has pupillary reflex, gag reflex and breathing over vent but all decreased compared to previous responses.  Reviewed CT Head that shows diffuse brain injury.  Overall prognosis remains poor.      Discussed above with patient's son.

## 2018-08-21 NOTE — PROGRESS NOTE ADULT - ATTENDING COMMENTS
Assessment: Patient personally seen and examined myself during rounds with the House Staff/Fellow  ON DATE 8/21/18  House Staff/Fellow note read, including vitals, physical findings, laboratory data, and radiological reports.   Revisions included below.   Direct personal management at bed side and extensive interpretation of the data.    Plan was outlined and discussed in details with the House Staff/Fellow.    Decision making of high complexity   Risk high of complications, morbidity, and/or mortality  Assessment and Action taken for acute disease activity to reflect the level of care provided:  -Hemodynamic evaluation and support  -ACS assessment and treatment as applicable  -Heart failure assessment and treatment as applicable  -Cardiac Telemetry reviewed  -Medication reconciliation  -Review laboratory data  -EKG reviewed   -Echo reviewed  -Interdisciplinary discussion with IC / EP / HF / CTS teams as needed  My plan includes :  close hemodynamic monitoring and management   Monitor for arrhythmias and monitor parameters for organ perfusion  monitor neurologic status  Head of the bed should remain elevated to 45 deg .   chest PT and IS will be encouraged  monitor adequacy of oxygenation and ventilation and attempt to wean oxygen  Nutritional goals will be met using po eventually , ensure adequate caloric intake and montior the same  Stress ulcer and VTE prophylaxis will be achieved    Glycemic control is satisfactory  Electrolytes have been replete as necessary and wound care has been carried out. Pain control has been achieved.   aggressive physical therapy and early mobility and ambulation goals will be met   The family was updated about the course and plan  CRITICAL CARE TIME SPENT in evaluation and management, reassessments, review and interpretation of labs and x-rays, ventilator and hemodynamic management, formulating a plan and coordinating care: ___90____ MIN.  Time does not include procedural time.    Sana Anguiano MD    Mobile: [phone]

## 2018-08-21 NOTE — PROGRESS NOTE ADULT - ATTENDING COMMENTS
Pt with severe anoxic injury. discussed with Neurologist Dr. Harris. Agree pt has grave prognosis and will discuss goals of care with pts son and Palliative care team. complete 7 d abx and continue enteral feeds. Taper off Levo.

## 2018-08-21 NOTE — PROGRESS NOTE ADULT - ASSESSMENT
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 ROSC achieved after apx 28 minutes.   - Pt currently intubated. No sedation. Assessing neuro status daily  - Scx growing proteus (pan sensitive) and staph aureus (sensitivities pending)  -start Ancef. Meropenem and Vanco d/c'ed  - Ng sump continued with suction   - BCx NGTD, f/u final  - 250 cc bolus when urine output <25cc/hr  - weaned off levophed today. c/w midodrine.     ID  #Aspiration PNA (Gram negative Proteus and MSSA)  - CXR: RLL opacity with bilateral pleural effusions.  - Sputum cultures +Pansensitive Proteus and MSSA.   -start Ancef. Meropenem and Vanco d/c'ed  -f/u blood cx     NEURO  #AMS: Likely due to anoxic brain injury  - CTH: Poor gray white differentiation signifying possible anoxic brain injury. Poor study due to artifact.   - Poor prognosis for recovery per neuro.   - Palliative on board: In active discussion with son Dr. Goff    CV  #Cardiac Arrest: likely after pulmonary arrest from above  - EKG without ischemic changes   - trops 0.03. will not trend   - A line in place-right radial  -central line passed, cxr confirmed position   - levophed weaned off today, c/w midodrine.     #Atrial flutter s/p cardioversion last admission  - c/w amiodarone 200 daily   - no A/C in setting of PMH of gastric AVM    HEMATOLOGY  #Anemia; now stable hgb 8.1  - Pt has a history of AVMS  - GI Dr. Hale consulted; monitor h/h. Pt likely not candidate for invasive procedures. Monitor H/H    F NO FLUIDS FOR NOW  E REPLETE AS NEEDED  N Restart peg feeds  - PPX- GI ppx Protonix  - Dispo: MICU

## 2018-08-21 NOTE — PROGRESS NOTE ADULT - SUBJECTIVE AND OBJECTIVE BOX
Interventional, Pulmonary, Critical, Chest Special Procedures.    Pt was seen and fully examined by myself.     Time spent with patient in minutes:67    Patient is a 89y old  Female who presents with a chief complaint of cardio-pulmonary arrest (17 Aug 2018 03:20)The patient unresponsive, in synchrony c CMV mode. Circuit integrity verified and preserved.    HPI:  89 F w/ pmhx of dementia, R. femoral thrombus s/p IVC filter 1/2017, chronic anemia, presents to ED from [facility] after cardio-pulmonary arrest. Patient recently admitted to MICU for hypoxic respiratory failure 2/2 aspiration pneumonia. Patient eventually extubated, received PEG tube, and discharged back to [facility] on 08/14. Hospital course complicated by unstable atrial fibrillation s/p cardioversion, placed on amiodarone with stabilization of HR. Per ER, patient unable to tolerate secretions, found to be hypoxic at NH and eventually lost a pulse. Patient intubated in the [facility], ACLS protocol begun and ROSC achieved after 3 rounds of epinephrine. (17 Aug 2018 03:20)    REVIEW OF SYSTEMS:  Constitutional: No fever, +weight loss, chills + fatigue  Eyes: No eye pain, visual disturbances, or discharge  ENMT:  + difficulty hearing, tinnitus, vertigo; No sinus or throat pain. No epistaxis, +dysphagia, dysphonia, hoarseness   Neck: No pain, stiffness or neck swelling.  No masses or deformities  Respiratory: +cough, wheezing, chills or hemoptysis  - COPD  - ILD   - PE   - ASTHMA     +PNEUMONIA  Cardiovascular: No chest pain, +dysrhythmia, palpitations, dizziness or edema   - COPD     - CAD   - CHF   - HTN  Gastrointestinal: No abdominal or epigastric pain. No nausea, vomiting or hematemesis; No diarrhea or constipation. No melena or hematochezia. No dysphagia or Icterus.          Genitourinary: No dysuria, frequency, hematuria + incontinence   - CKD/CAMRYN      - ESRD  Neurological: No headaches, memory loss, loss of strength, numbness or tremors      +DEMENTIA     PAST MEDICAL & SURGICAL HISTORY:  Dementia  DVT (deep venous thrombosis)  Dysphagia  Anemia  DJD (degenerative joint disease)  Edema  IBS (irritable bowel syndrome)  Celiac disease  S/P IVC filter  H/O inguinal hernia repair  History of total left hip replacement    FAMILY HISTORY:  No pertinent family history in first degree relatives    SOCIAL HISTORY:      - Tobacco     - ETOH    Allergies    amoxicillin (Rash)  Cipro (Rash)  clindamycin (Unknown)  lactose (Unknown)  penicillin (Rash)  Wheat (Unknown)    Intolerances      Vital Signs Last 24 Hrs  T(C): 36.6 (21 Aug 2018 09:04), Max: 37.6 (20 Aug 2018 21:00)  T(F): 97.8 (21 Aug 2018 09:04), Max: 99.6 (20 Aug 2018 21:00)  HR: 64 (21 Aug 2018 13:00) (64 - 86)  BP: 155/74 (21 Aug 2018 02:00) (106/53 - 155/74)  BP(mean): 108 (21 Aug 2018 02:00) (68 - 108)  RR: 13 (21 Aug 2018 13:00) (7 - 21)  SpO2: 97% (21 Aug 2018 13:00) (95% - 98%)    08-20 @ 07:01  -  08-21 @ 07:00  --------------------------------------------------------  IN: 820 mL / OUT: 1150 mL / NET: -330 mL    08-21 @ 07:01  -  08-21 @ 13:06  --------------------------------------------------------  IN: 327 mL / OUT: 160 mL / NET: 167 mL      Mode: AC/ CMV (Assist Control/ Continuous Mandatory Ventilation)  RR (machine): 10  TV (machine): 360  FiO2: 40  PEEP: 5  ITime: 1  MAP: 11  PIP: 26    PHYSICAL EXAM:  Un Comfortable, no immediate distress  Eyes: PERRL, EOM intact; conjunctiva and sclera clear  Head: Normocephalic;  No Trauma  ENMT: No nasal discharge, hoarseness, +cough   Neck: Supple; non tender; no masses or deformities.    No JVD  Respiratory:   - WHEEZING   +RHONCHI  - RALES  + CRACKLES.  Diminished breath sounds  BILATERAL  RIGHT > LEFT base  Cardiovascular: Regular rate and rhythm. S1 and S2 Normal; No murmurs, gallops or rubs     - PPM/AICD  Gastrointestinal: Soft non-tender, non-distended; Normal bowel sounds; No hepatosplenomegaly.     +_ site clean PEG    -+NGT GT   + SWENSON  Genitourinary: No costovertebral angle tenderness. No dysuria  Extremities: NO AROM, No clubbing, cyanosis or edema    Vascular: Peripheral pulses palpable 2+ bilaterally  Neurological: Intubated sedated   Skin: Warm and dry. No obvious rash  Lymph Nodes: No acute cervical or supraclavicular adenopathy  Psychiatric:On vent. unrseponsive to verbal stimuli    DEVICES:  - DENTURES   +IV R / L     +6.5 ETUBE   -TRACH   -CTUBE  R / L      LABS:                          8.5    9.0   )-----------( 360      ( 21 Aug 2018 05:19 )             26.5     08-21    143  |  103  |  33<H>  ----------------------------<  116<H>  3.6   |  22  |  1.30    Ca    8.9      21 Aug 2018 05:19  Phos  3.3     08-21  Mg     2.2     08-21        < from: CT Head No Cont (08.21.18 @ 01:50) >    EXAM:  CT BRAIN                          PROCEDURE DATE:  08/21/2018          INTERPRETATION:  PROCEDURE: CT brain without intravenous contrast    INDICATION: Anoxic brain injury    TECHNIQUE: Multiple axial images were obtained at 5 mm intervals from the   skull base to the vertex. Sagittal and coronal reformatted images were   obtained from the axial data set. The images were reviewed in brain and   bone windows.    COMPARISON: Head CT from 7/15/2018    FINDINGS: Exam is limited by motion anoxic and patient rotation.     The CT examination there is degraded secondary to extensive motion   artifact. There appears to be diffuse loss of gray-white matter   differentiation which would go with the provided diagnosis of anoxic   brain injury. There is also partial effacement of the surrounding   cortical sulci which could be secondary to motion artifact versus   parenchymal edema. Redemonstrated is prominence of the ventricular   system. The basal cisterns are patent. No midline shift. The bony windows   demonstrates no fractures. The visualized paranasal sinuses are within   normal limits. The mastoid air cells are well aerated.    Partial visualization of nasoenteric tube and endotracheal tube.    IMPRESSION: Diffuse loss of gray-white differentiation concerning for   anoxic brain injury, however this may also be secondary to poor imaging   quality given the amount of artifact.    < end of copied text >  RADIOLOGY & ADDITIONAL STUDIES (The following images were personally reviewed):

## 2018-08-21 NOTE — PROGRESS NOTE ADULT - ASSESSMENT
89F w/PMHx dementia (baseline AOX1), recurrent UTI, Celiac dz, R femoral DVT s/p IVC filter (1/2017), chronic anemia, w/mult recent hosp for septic shock and hypoxic/hypercapnic resp failure in setting of proteus and aerococcus UTI and asp PNA, brought in after cardiac arrest, likely PEA in setting of asp w/hypoxic and hypercapnic resp failure. 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 CE not sig elev, unlikely ACS  - Resp failure primarily from asp, however possible some component of vol overload based on CXR findings. Pro-BNP elev, but markedly dec compared to prior admission.  - Net -2.5L for hosp course. No noted JVD or LE edema. Lung exam w/rales and rhonchi. MM dry. Would avoid diuretics in setting of septic shock on levo, monitor strict I/O, daily wts (would also be judicious w/further IV)  - C/w amio 200 qd, keep K>4, Mg>2. 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 Abx as per primary team/pulm, titrate off levo as able. Midodrine started by primary team  - Pt w/poor neurologic function off sedation since Saturday. Neuro following, still w/some pupillary and gag reflex, breathing over vent occ. CTH shows diffuse loss of gray-white differentiation consistent w/HIE  - Poor overall prognosis, ICU team having active GOC discussion w/pt's son, in conjunction w/palliative and neuro  - Rest of care as per ICU team

## 2018-08-21 NOTE — PROGRESS NOTE ADULT - SUBJECTIVE AND OBJECTIVE BOX
OVERNIGHT EVENTS: ALYCIA    INTERVAL HPI: . No change in neurological status. Pt is intubated. She is not sedated. Pt is not alert/oriented.     ICU Vital Signs Last 24 Hrs  T(C): 37.5 (21 Aug 2018 04:48), Max: 37.6 (20 Aug 2018 07:00)  T(F): 99.5 (21 Aug 2018 04:48), Max: 99.6 (20 Aug 2018 07:00)  HR: 85 (21 Aug 2018 06:01) (72 - 86)  BP: 155/74 (21 Aug 2018 02:00) (106/53 - 155/74)  BP(mean): 108 (21 Aug 2018 02:00) (68 - 108)  ABP: 102/54 (21 Aug 2018 06:00) (98/46 - 148/74)  ABP(mean): 72 (21 Aug 2018 06:00) (66 - 108)  RR: 14 (21 Aug 2018 06:01) (7 - 21)  SpO2: 97% (21 Aug 2018 06:01) (95% - 98%)    I&O's Summary    19 Aug 2018 07:01  -  20 Aug 2018 07:00  --------------------------------------------------------  IN: 474 mL / OUT: 2055 mL / NET: -1581 mL    20 Aug 2018 07:01  -  21 Aug 2018 06:15  --------------------------------------------------------  IN: 671 mL / OUT: 1125 mL / NET: -454 mL      Mode: AC/ CMV (Assist Control/ Continuous Mandatory Ventilation)  RR (machine): 14  TV (machine): 360  FiO2: 40  PEEP: 5  ITime: 1  MAP: 9  PIP: 27      LABS:                        8.5    9.0   )-----------( 360      ( 21 Aug 2018 05:19 )             26.5     08-21    143  |  103  |  33<H>  ----------------------------<  116<H>  3.6   |  22  |  1.30    Ca    8.9      21 Aug 2018 05:19  Phos  3.3     08-21  Mg     2.2     08-21    TPro  6.7  /  Alb  2.5<L>  /  TBili  1.1  /  DBili  0.5<H>  /  AST  54<H>  /  ALT  28  /  AlkPhos  104  08-19        CAPILLARY BLOOD GLUCOSE      POCT Blood Glucose.: 95 mg/dL (20 Aug 2018 16:30)  POCT Blood Glucose.: 79 mg/dL (20 Aug 2018 11:12)        RADIOLOGY & ADDITIONAL TESTS:    Consultant(s) Notes Reviewed:  [x ] YES  [ ] NO    MEDICATIONS  (STANDING):  amiodarone    Tablet 200 milliGRAM(s) Oral daily  artificial  tears Solution 1 Drop(s) Both EYES daily  ceFAZolin   IVPB 1000 milliGRAM(s) IV Intermittent every 12 hours  chlorhexidine 0.12% Liquid 15 milliLiter(s) Swish and Spit two times a day  chlorhexidine 2% Cloths 1 Application(s) Topical daily  heparin  Injectable 5000 Unit(s) SubCutaneous every 12 hours  midodrine 10 milliGRAM(s) Oral every 8 hours  norepinephrine Infusion 0.05 MICROgram(s)/kG/Min (4.884 mL/Hr) IV Continuous <Continuous>  pantoprazole   Suspension 40 milliGRAM(s) Enteral Tube daily  petrolatum Ophthalmic Ointment 1 Application(s) Both EYES daily  potassium chloride  10 mEq/100 mL IVPB 10 milliEquivalent(s) IV Intermittent once    MEDICATIONS  (PRN):    PHYSICAL EXAM:  GENERAL: Intubated  HEAD:  Atraumatic, Normocephalic  EYES: Pupils dilated, mildly responsive to light, conjunctiva and sclera clear  NECK: Supple, No JVD, Normal thyroid, no enlarged nodes  NERVOUS SYSTEM: Eyes open but not awake, not alert, unresponsive to commands/verbal/painful stimili. +corneal reflex, pupils reactive to light. Minimal gag reflex.    CHEST/LUNG: B/L good air entry; No rales, rhonchi, or wheezing  HEART: S1S2 normal, no S3, Regular rate and rhythm; No murmurs, rubs, or gallops  ABDOMEN: Soft, Nontender, Nondistended; Bowel sounds present  EXTREMITIEs: B/L pedal 2+ pitting edema; UE withdrawal to noxious stimuli  LYMPH: No lymphadenopathy noted  SKIN: No rashes or lesions    Care Discussed with Consultants/Other Providers [ x] YES  [ ] NO

## 2018-08-21 NOTE — PROGRESS NOTE ADULT - SUBJECTIVE AND OBJECTIVE BOX
INTERVAL EVENTS:    Pt seen and examined at bedside, no major events o/n. Unresponsive off sedation    MEDICATIONS  (STANDING):  amiodarone    Tablet 200 milliGRAM(s) Oral daily  artificial  tears Solution 1 Drop(s) Both EYES daily  ceFAZolin   IVPB 1000 milliGRAM(s) IV Intermittent every 12 hours  chlorhexidine 0.12% Liquid 15 milliLiter(s) Swish and Spit two times a day  chlorhexidine 2% Cloths 1 Application(s) Topical daily  heparin  Injectable 5000 Unit(s) SubCutaneous every 12 hours  midodrine 10 milliGRAM(s) Oral every 8 hours  norepinephrine Infusion 0.05 MICROgram(s)/kG/Min (4.884 mL/Hr) IV Continuous <Continuous>  pantoprazole   Suspension 40 milliGRAM(s) Enteral Tube daily  petrolatum Ophthalmic Ointment 1 Application(s) Both EYES daily    MEDICATIONS  (PRN):      Vital Signs Last 24 Hrs  T(C): 36.4 (21 Aug 2018 14:14), Max: 37.6 (20 Aug 2018 21:00)  T(F): 97.6 (21 Aug 2018 14:14), Max: 99.6 (20 Aug 2018 21:00)  HR: 60 (21 Aug 2018 17:00) (58 - 86)  BP: 155/74 (21 Aug 2018 02:00) (106/53 - 155/74)  BP(mean): 108 (21 Aug 2018 02:00) (68 - 108)  RR: 11 (21 Aug 2018 17:00) (7 - 21)  SpO2: 97% (21 Aug 2018 17:00) (95% - 98%)     PHYSICAL EXAM:  GENERAL: intubated, off sed, OES but not interactive  HEAD:  NCAT  HEENT: Conjunctiva and sclera clear; moist mucosa; Neck supple, No JVD  CARDIOVASCULAR: RRR, normal S1 S2, no M/R/G, no JVD, neg HJR, nondisplaced PMI, no LE edema  RESPIRATORY: Limited exam, dec BS b/l, mild b/l insp rales, exp rhonchi  GASTROINTESTINAL: +BS, soft, non-distended, non-tender, no HSM  VASCULAR: Peripheral pulses palpable 2+ bilaterally  EXTREMITIES: Warm. No clubbing, cyanosis or edema  SKIN: No rashes, lesions, ecchymoses, or cyanosis  NEURO: OES, not FC, blinks to threat, does not track w/eyes    LABS:                        8.5    9.0   )-----------( 360      ( 21 Aug 2018 05:19 )             26.5     08-21    143  |  103  |  33<H>  ----------------------------<  116<H>  3.6   |  22  |  1.30    Ca    8.9      21 Aug 2018 05:19  Phos  3.3     08-21  Mg     2.2     08-21              I&O's Summary    20 Aug 2018 07:01  -  21 Aug 2018 07:00  --------------------------------------------------------  IN: 820 mL / OUT: 1150 mL / NET: -330 mL    21 Aug 2018 07:01  -  21 Aug 2018 17:44  --------------------------------------------------------  IN: 807 mL / OUT: 200 mL / NET: 607 mL      BNP  RADIOLOGY & ADDITIONAL STUDIES:    TELEMETRY:    EKG:

## 2018-08-21 NOTE — PROGRESS NOTE ADULT - ASSESSMENT
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 ROSC achieved after apx 28 minutes.   - Pt currently intubated. No sedation. Assessing neuro status daily  - Scx growing proteus (pan sensitive) and staph aureus (sensitivities pending)  -start Ancef. Meropenem and Vanco d/c'ed  - Ng sump continued with suction   - BCx NGTD, f/u final  - 250 cc bolus when urine output <25cc/hr  - weaned off levophed today. c/w midodrine.     ID  #Aspiration PNA (Gram negative Proteus and MSSA)  - CXR: RLL opacity with bilateral pleural effusions.  - Sputum cultures +Pansensitive Proteus and MSSA.   -start Ancef. Meropenem and Vanco d/c'ed  -f/u blood cx     NEURO  #AMS: Likely due to anoxic brain injury  - CTH: Poor gray white differentiation signifying possible anoxic brain injury. Poor study due to artifact.   - Poor prognosis for recovery per neuro.   - Palliative on board: In active discussion with son Dr. Goff    CV  #Cardiac Arrest: likely after pulmonary arrest from above  - EKG without ischemic changes   - trops 0.03. will not trend   - A line in place-right radial  -central line passed, cxr confirmed position   - levophed weaned off today, c/w midodrine.     #Atrial flutter s/p cardioversion last admission  - c/w amiodarone 200 daily   -no A/C in setting of PMH of gastric AVM    HEMATOLOGY  #Anemia; now stable hgb 8.1  - Pt has a history of AVMS  - currently on PEG tube placed by Dr. Hale  - GI Dr. Hale consulted; monitor h/h. Pt likely not candidate for invasive procedures. Will reasses monday.  - -Monitor H/H    F NO FLUIDS FOR NOW  E REPLETE AS NEEDED  N Restart peg feeds  - PPX- GI ppx Protonix  - Dispo: MICU 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 ROSC achieved after apx 28 minutes.   - Pt currently intubated. No sedation. Assessing neuro status daily  - Scx growing proteus (pan sensitive) and staph aureus (sensitivities pending)  -start Ancef. Meropenem and Vanco d/c'ed  - Ng sump continued with suction   - BCx NGTD, f/u final  - 250 cc bolus when urine output <25cc/hr  - weaned off levophed today. c/w midodrine.     ID  #Aspiration PNA (Gram negative Proteus and MSSA)  - CXR: RLL opacity with bilateral pleural effusions.  - Sputum cultures +Pansensitive Proteus and MSSA.   -start Ancef. Meropenem and Vanco d/c'ed  -f/u blood cx     NEURO  #AMS: Likely due to anoxic brain injury  - CTH: Poor gray white differentiation signifying possible anoxic brain injury. Poor study due to artifact.   - Poor prognosis for recovery per neuro.   - Palliative on board: In active discussion with son Dr. Goff    CV  #Cardiac Arrest: likely after pulmonary arrest from above  - EKG without ischemic changes   - trops 0.03. will not trend   - A line in place-right radial  -central line passed, cxr confirmed position   - levophed weaned off today, c/w midodrine.     #Atrial flutter s/p cardioversion last admission  - c/w amiodarone 200 daily   - no A/C in setting of PMH of gastric AVM    HEMATOLOGY  #Anemia; now stable hgb 8.1  - Pt has a history of AVMS  - GI Dr. Hale consulted; monitor h/h. Pt likely not candidate for invasive procedures. Monitor H/H    F NO FLUIDS FOR NOW  E REPLETE AS NEEDED  N Restart peg feeds  - PPX- GI ppx Protonix  - Dispo: MICU

## 2018-08-22 LAB
ANION GAP SERPL CALC-SCNC: 14 MMOL/L — SIGNIFICANT CHANGE UP (ref 5–17)
BUN SERPL-MCNC: 37 MG/DL — HIGH (ref 7–23)
CALCIUM SERPL-MCNC: 8.9 MG/DL — SIGNIFICANT CHANGE UP (ref 8.4–10.5)
CHLORIDE SERPL-SCNC: 105 MMOL/L — SIGNIFICANT CHANGE UP (ref 96–108)
CO2 SERPL-SCNC: 24 MMOL/L — SIGNIFICANT CHANGE UP (ref 22–31)
CREAT SERPL-MCNC: 1.19 MG/DL — SIGNIFICANT CHANGE UP (ref 0.5–1.3)
CULTURE RESULTS: SIGNIFICANT CHANGE UP
GLUCOSE BLDC GLUCOMTR-MCNC: 141 MG/DL — HIGH (ref 70–99)
GLUCOSE SERPL-MCNC: 132 MG/DL — HIGH (ref 70–99)
HCT VFR BLD CALC: 26.6 % — LOW (ref 34.5–45)
HGB BLD-MCNC: 8.5 G/DL — LOW (ref 11.5–15.5)
MAGNESIUM SERPL-MCNC: 2.2 MG/DL — SIGNIFICANT CHANGE UP (ref 1.6–2.6)
MCHC RBC-ENTMCNC: 28.4 PG — SIGNIFICANT CHANGE UP (ref 27–34)
MCHC RBC-ENTMCNC: 32 G/DL — SIGNIFICANT CHANGE UP (ref 32–36)
MCV RBC AUTO: 89 FL — SIGNIFICANT CHANGE UP (ref 80–100)
PHOSPHATE SERPL-MCNC: 2.8 MG/DL — SIGNIFICANT CHANGE UP (ref 2.5–4.5)
PLATELET # BLD AUTO: 354 K/UL — SIGNIFICANT CHANGE UP (ref 150–400)
POTASSIUM SERPL-MCNC: 3.9 MMOL/L — SIGNIFICANT CHANGE UP (ref 3.5–5.3)
POTASSIUM SERPL-SCNC: 3.9 MMOL/L — SIGNIFICANT CHANGE UP (ref 3.5–5.3)
RBC # BLD: 2.99 M/UL — LOW (ref 3.8–5.2)
RBC # FLD: 18 % — HIGH (ref 10.3–16.9)
SODIUM SERPL-SCNC: 143 MMOL/L — SIGNIFICANT CHANGE UP (ref 135–145)
SPECIMEN SOURCE: SIGNIFICANT CHANGE UP
WBC # BLD: 10.2 K/UL — SIGNIFICANT CHANGE UP (ref 3.8–10.5)
WBC # FLD AUTO: 10.2 K/UL — SIGNIFICANT CHANGE UP (ref 3.8–10.5)

## 2018-08-22 PROCEDURE — 99291 CRITICAL CARE FIRST HOUR: CPT

## 2018-08-22 PROCEDURE — 71045 X-RAY EXAM CHEST 1 VIEW: CPT | Mod: 26

## 2018-08-22 PROCEDURE — 99233 SBSQ HOSP IP/OBS HIGH 50: CPT

## 2018-08-22 RX ORDER — MIDAZOLAM HYDROCHLORIDE 1 MG/ML
1 INJECTION, SOLUTION INTRAMUSCULAR; INTRAVENOUS ONCE
Qty: 0 | Refills: 0 | Status: DISCONTINUED | OUTPATIENT
Start: 2018-08-22 | End: 2018-08-22

## 2018-08-22 RX ORDER — FENTANYL CITRATE 50 UG/ML
25 INJECTION INTRAVENOUS ONCE
Qty: 0 | Refills: 0 | Status: DISCONTINUED | OUTPATIENT
Start: 2018-08-22 | End: 2018-08-22

## 2018-08-22 RX ADMIN — CHLORHEXIDINE GLUCONATE 15 MILLILITER(S): 213 SOLUTION TOPICAL at 11:23

## 2018-08-22 RX ADMIN — CHLORHEXIDINE GLUCONATE 1 APPLICATION(S): 213 SOLUTION TOPICAL at 06:33

## 2018-08-22 RX ADMIN — Medication 100 MILLIGRAM(S): at 22:32

## 2018-08-22 RX ADMIN — Medication 1 APPLICATION(S): at 11:24

## 2018-08-22 RX ADMIN — MIDAZOLAM HYDROCHLORIDE 1 MILLIGRAM(S): 1 INJECTION, SOLUTION INTRAMUSCULAR; INTRAVENOUS at 02:20

## 2018-08-22 RX ADMIN — HEPARIN SODIUM 5000 UNIT(S): 5000 INJECTION INTRAVENOUS; SUBCUTANEOUS at 17:29

## 2018-08-22 RX ADMIN — CHLORHEXIDINE GLUCONATE 15 MILLILITER(S): 213 SOLUTION TOPICAL at 00:26

## 2018-08-22 RX ADMIN — MIDODRINE HYDROCHLORIDE 10 MILLIGRAM(S): 2.5 TABLET ORAL at 22:31

## 2018-08-22 RX ADMIN — Medication 100 MILLIGRAM(S): at 11:23

## 2018-08-22 RX ADMIN — AMIODARONE HYDROCHLORIDE 200 MILLIGRAM(S): 400 TABLET ORAL at 06:33

## 2018-08-22 RX ADMIN — HEPARIN SODIUM 5000 UNIT(S): 5000 INJECTION INTRAVENOUS; SUBCUTANEOUS at 06:32

## 2018-08-22 RX ADMIN — MIDODRINE HYDROCHLORIDE 10 MILLIGRAM(S): 2.5 TABLET ORAL at 06:32

## 2018-08-22 RX ADMIN — PANTOPRAZOLE SODIUM 40 MILLIGRAM(S): 20 TABLET, DELAYED RELEASE ORAL at 11:23

## 2018-08-22 RX ADMIN — MIDODRINE HYDROCHLORIDE 10 MILLIGRAM(S): 2.5 TABLET ORAL at 15:46

## 2018-08-22 RX ADMIN — CHLORHEXIDINE GLUCONATE 15 MILLILITER(S): 213 SOLUTION TOPICAL at 23:09

## 2018-08-22 RX ADMIN — Medication 1 DROP(S): at 11:24

## 2018-08-22 NOTE — PROGRESS NOTE ADULT - ASSESSMENT
89F w/PMHx dementia (baseline AOX1), recurrent UTI, Celiac dz, R femoral DVT s/p IVC filter (1/2017), chronic anemia, w/mult recent hosp for septic shock and hypoxic/hypercapnic resp failure in setting of proteus and aerococcus UTI and asp PNA, brought in after cardiac arrest, likely PEA in setting of asp w/hypoxic and hypercapnic resp failure. 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 CE not sig elev, unlikely ACS  - Resp failure primarily from asp, however possible some component of vol overload based on CXR findings. Pro-BNP elev, but markedly dec compared to prior admission.  - C/w amio 200 qd, keep K>4, Mg>2. 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 Abx as per primary team/pulm. Midodrine started by primary team  - Pt w/poor neurologic function off sedation since Saturday. Neuro following, still w/some pupillary and gag reflex, breathing over vent occ. CTH shows diffuse loss of gray-white differentiation consistent w/HIE  - Poor overall prognosis, CTH showing HIE, ICU team having active GOC discussion w/pt's son, in conjunction w/palliative and neuro. Now DNR/DNI  - Rest of care as per ICU team

## 2018-08-22 NOTE — PROGRESS NOTE ADULT - SUBJECTIVE AND OBJECTIVE BOX
Interventional, Pulmonary, Critical, Chest Special Procedures.    Pt was seen and fully examined by myself.     Time spent with patient in minutes:67    Patient is a 89y old  Female who presents with a chief complaint of cardio-pulmonary arrest (17 Aug 2018 03:20)The patient unresponsive to verbal and shoulder rub stimuli, in synchrony c CMV mode    HPI:  89 F w/ pmhx of dementia, R. femoral thrombus s/p IVC filter 1/2017, chronic anemia, presents to ED from [facility] after cardio-pulmonary arrest. Patient recently admitted to MICU for hypoxic respiratory failure 2/2 aspiration pneumonia. Patient eventually extubated, received PEG tube, and discharged back to [facility] on 08/14. Hospital course complicated by unstable atrial fibrillation s/p cardioversion, placed on amiodarone with stabilization of HR. Per ER, patient unable to tolerate secretions, found to be hypoxic at NH and eventually lost a pulse. Patient intubated in the [facility], ACLS protocol begun and ROSC achieved after 3 rounds of epinephrine. (17 Aug 2018 03:20)    REVIEW OF SYSTEMS:  Constitutional: No fever, +weight loss, chills + fatigue  Eyes: No eye pain, visual disturbances, or discharge  ENMT:  + difficulty hearing, tinnitus, vertigo; No sinus or throat pain. No epistaxis, +dysphagia, dysphonia, hoarseness   Neck: No pain, stiffness or neck swelling.  No masses or deformities  Respiratory: +cough, wheezing, chills or hemoptysis  - COPD  - ILD   - PE   - ASTHMA     +PNEUMONIA  Cardiovascular: No chest pain, +dysrhythmia, palpitations, dizziness or edema   - COPD     - CAD   - CHF   - HTN  Gastrointestinal: No abdominal or epigastric pain. No nausea, vomiting or hematemesis; No diarrhea or constipation. No melena or hematochezia. No dysphagia or Icterus.          Genitourinary: No dysuria, frequency, hematuria + incontinence   - CKD/CAMRYN      - ESRD  Neurological: No headaches, memory loss, loss of strength, numbness or tremors      +DEMENTIA     PAST MEDICAL & SURGICAL HISTORY:  Dementia  DVT (deep venous thrombosis)  Dysphagia  Anemia  DJD (degenerative joint disease)  Edema  IBS (irritable bowel syndrome)  Celiac disease  S/P IVC filter  H/O inguinal hernia repair  History of total left hip replacement    FAMILY HISTORY:  No pertinent family history in first degree relatives    SOCIAL HISTORY:      - Tobacco     - ETOH    Allergies    amoxicillin (Rash)  Cipro (Rash)  clindamycin (Unknown)  lactose (Unknown)  penicillin (Rash)  Wheat (Unknown)    Intolerances      Vital Signs Last 24 Hrs  T(C): 36.8 (22 Aug 2018 09:35), Max: 37 (22 Aug 2018 04:28)  T(F): 98.3 (22 Aug 2018 09:35), Max: 98.6 (22 Aug 2018 04:28)  HR: 64 (22 Aug 2018 12:00) (58 - 86)  BP: 97/51 (22 Aug 2018 12:00) (86/47 - 141/80)  BP(mean): 68 (22 Aug 2018 12:00) (65 - 104)  RR: 16 (22 Aug 2018 12:00) (10 - 31)  SpO2: 98% (22 Aug 2018 12:00) (93% - 99%)    08-21 @ 07:01  -  08-22 @ 07:00  --------------------------------------------------------  IN: 1442 mL / OUT: 435 mL / NET: 1007 mL    08-22 @ 07:01  -  08-22 @ 12:22  --------------------------------------------------------  IN: 225 mL / OUT: 70 mL / NET: 155 mL      Mode: AC/ CMV (Assist Control/ Continuous Mandatory Ventilation)  RR (machine): 10  TV (machine): 360  FiO2: 40  PEEP: 5  ITime: 1  MAP: 11  PIP: 26    PHYSICAL EXAM:  unresponsive , no immediate distress  Eyes: PERRL, EOM intact; conjunctiva and sclera clear  Head: Normocephalic;  No Trauma  ENMT: No nasal discharge, hoarseness, +cough   Neck: Supple; non tender; no masses or deformities.    No JVD  Respiratory:   - WHEEZING   +RHONCHI  - RALES  + CRACKLES.  Diminished breath sounds  BILATERAL  RIGHT > LEFT base  Cardiovascular: Regular rate and rhythm. S1 and S2 Normal; No murmurs, gallops or rubs     - PPM/AICD  Gastrointestinal: Soft non-tender, non-distended; Normal bowel sounds; No hepatosplenomegaly.     +_ site clean PEG    -+NGT GT   + SWENSON  Genitourinary: No costovertebral angle tenderness. No dysuria  Extremities: NO AROM, No clubbing, cyanosis or edema    Vascular: Peripheral pulses palpable 2+ bilaterally  Neurological: Intubated sedated   Skin: Warm and dry. No obvious rash  Lymph Nodes: No acute cervical or supraclavicular adenopathy  Psychiatric: On vent. unreseponsive to verbal stimuli    DEVICES:  - DENTURES   +IV R / L     - ETUBE   -TRACH   -CTUBE  R / L      LABS:                          8.5    10.2  )-----------( 354      ( 22 Aug 2018 06:25 )             26.6     08-22    143  |  105  |  37<H>  ----------------------------<  132<H>  3.9   |  24  |  1.19    Ca    8.9      22 Aug 2018 06:24  Phos  2.8     08-22  Mg     2.2     08-22      < from: Xray Chest 1 View- PORTABLE-Routine (08.21.18 @ 07:09) >  EXAM:  XR CHEST PORTABLE ROUTINE 1V                          PROCEDURE DATE:  08/21/2018          INTERPRETATION:  AP CXR dated 8/21/2018 7:09 AM    CLINICAL INFORMATION: intubated    PRIOR STUDIES: 8/20/2018    FINDINGS:   No tracheal tube is seenwith its tip approximately 3 cm above the   angel. Enteric tube and right-sided central venous catheter again noted.   The cardiomediastinal silhouette is stable in appearance. Small bilateral   pleural effusions, right greater the left. Left small subsegmental   atelectatic changes noted. No pneumothorax.. Mild pulmonary venous   congestion.    IMPRESSION:  The endotracheal tube in satisfactory position.  Progression in the small right pleural effusion.    < end of copied text >    RADIOLOGY & ADDITIONAL STUDIES (The following images were personally reviewed):

## 2018-08-22 NOTE — PROGRESS NOTE ADULT - SUBJECTIVE AND OBJECTIVE BOX
INTERVAL EVENTS:    Pt seen and examined at bedside, no change in status. Pt now DNR.    MEDICATIONS  (STANDING):  amiodarone    Tablet 200 milliGRAM(s) Oral daily  artificial  tears Solution 1 Drop(s) Both EYES daily  ceFAZolin   IVPB 1000 milliGRAM(s) IV Intermittent every 12 hours  chlorhexidine 0.12% Liquid 15 milliLiter(s) Swish and Spit two times a day  chlorhexidine 2% Cloths 1 Application(s) Topical daily  heparin  Injectable 5000 Unit(s) SubCutaneous every 12 hours  midodrine 10 milliGRAM(s) Oral every 8 hours  pantoprazole   Suspension 40 milliGRAM(s) Enteral Tube daily  petrolatum Ophthalmic Ointment 1 Application(s) Both EYES daily    MEDICATIONS  (PRN):      Vital Signs Last 24 Hrs  T(C): 37.3 (22 Aug 2018 17:30), Max: 37.3 (22 Aug 2018 17:30)  T(F): 99.2 (22 Aug 2018 17:30), Max: 99.2 (22 Aug 2018 17:30)  HR: 68 (22 Aug 2018 18:00) (58 - 86)  BP: 86/47 (22 Aug 2018 18:00) (86/47 - 141/80)  BP(mean): 67 (22 Aug 2018 18:00) (65 - 104)  RR: 12 (22 Aug 2018 18:00) (11 - 31)  SpO2: 96% (22 Aug 2018 18:00) (93% - 99%)     PHYSICAL EXAM:  GENERAL: intubated, off sed, OES but not interactive  HEAD:  NCAT  HEENT: Conjunctiva and sclera clear; moist mucosa; Neck supple, No JVD  CARDIOVASCULAR: RRR, normal S1 S2, no M/R/G, no JVD, neg HJR, nondisplaced PMI, no LE edema  RESPIRATORY: Limited exam, dec BS b/l, mild b/l insp rales, exp rhonchi  GASTROINTESTINAL: +BS, soft, non-distended, non-tender, no HSM  VASCULAR: Peripheral pulses palpable 2+ bilaterally  EXTREMITIES: Warm. No clubbing, cyanosis or edema  SKIN: No rashes, lesions, ecchymoses, or cyanosis  NEURO: OES, not FC, blinks to threat, does not track w/eyes    LABS:                        8.5    10.2  )-----------( 354      ( 22 Aug 2018 06:25 )             26.6     08-22    143  |  105  |  37<H>  ----------------------------<  132<H>  3.9   |  24  |  1.19    Ca    8.9      22 Aug 2018 06:24  Phos  2.8     08-22  Mg     2.2     08-22              I&O's Summary    21 Aug 2018 07:01  -  22 Aug 2018 07:00  --------------------------------------------------------  IN: 1442 mL / OUT: 435 mL / NET: 1007 mL    22 Aug 2018 07:01  -  22 Aug 2018 19:10  --------------------------------------------------------  IN: 635 mL / OUT: 173 mL / NET: 462 mL      BNP  RADIOLOGY & ADDITIONAL STUDIES:    TELEMETRY: No events on tele    EKG: No EKG for review

## 2018-08-22 NOTE — PROGRESS NOTE ADULT - ASSESSMENT
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 ROSC achieved after apx 28 minutes.   - Pt currently intubated. No sedation. Assessing neuro status daily  - Scx growing proteus (pan sensitive) and staph aureus   - c/w Ancef. Meropenem and Vanco d/c'ed  - Ng sump continued with suction   - BCx NGTD  - 250 cc bolus when urine output <25cc/hr  - d/c levophed c/w midodrine.     ID  #Aspiration PNA (Gram negative Proteus and MSSA)  - CXR: RLL opacity with bilateral pleural effusions.  - Sputum cultures +Pansensitive Proteus and MSSA.   - c.w Ancef. Meropenem and Vanco d/c'ed  - f/up Bcultures    NEURO  #AMS: Likely due to anoxic brain injury  - CTH: Poor gray white differentiation signifying possible anoxic brain injury. Poor study due to artifact.   - awaiting appendum from radiologist.   - Poor prognosis for recovery per neuro.   - Palliative on board: In active discussion with son Dr. Goff  - Pt is now DNR, MOLST form in chart    CV  #Cardiac Arrest: likely after pulmonary arrest from above  - EKG without ischemic changes   - trops 0.03. will not trend   - A line in place-right radial  -central line passed, cxr confirmed position   - d/c levophed.  c/w midodrine.     #Atrial flutter s/p cardioversion last admission  - c/w amiodarone 200 daily   - no A/C in setting of PMH of gastric AVM    HEMATOLOGY  #Anemia; now stable hgb 8.1  - Pt has a history of AVMS  - GI Dr. Hale consulted; monitor h/h. Pt likely not candidate for invasive procedures. Monitor H/H    F NO FLUIDS FOR NOW  E REPLETE AS NEEDED  N PEG feed rate changed to 40cc/hr  - PPX- GI ppx Protonix  - Dispo: MICU

## 2018-08-22 NOTE — PROGRESS NOTE ADULT - ATTENDING COMMENTS
Pt remains vegetative. I spoke at length with Dr. Goff and reviewed his mothers living will. Based on her poor prognosis and wishes outlined in the past he agrees to DNR order, no cardioversion or addition of antiarrhythmics, no reinstitution of vasopressors and no new cultures and antibiotics. She will continue feeds and Midodrine for now and he is awaiting final CT Head addendum that the anoxic injury is new since the scan 7/15/18 and then he will discuss withdrawal of mech vent. He is clear she would not want to live with a tracheostomy on prolonged mech vent.

## 2018-08-22 NOTE — PROGRESS NOTE ADULT - SUBJECTIVE AND OBJECTIVE BOX
Neurology Follow Up     Interval History:  Patient seen and examined.  CT Head performed last night.    Still on pressors for her blood pressure.    MEDICATIONS  (STANDING):  amiodarone    Tablet 200 milliGRAM(s) Oral daily  artificial  tears Solution 1 Drop(s) Both EYES daily  ceFAZolin   IVPB 1000 milliGRAM(s) IV Intermittent every 12 hours  chlorhexidine 0.12% Liquid 15 milliLiter(s) Swish and Spit two times a day  chlorhexidine 2% Cloths 1 Application(s) Topical daily  heparin  Injectable 5000 Unit(s) SubCutaneous every 12 hours  midodrine 10 milliGRAM(s) Oral every 8 hours  pantoprazole   Suspension 40 milliGRAM(s) Enteral Tube daily  petrolatum Ophthalmic Ointment 1 Application(s) Both EYES daily    Allergies    amoxicillin (Rash)  Cipro (Rash)  clindamycin (Unknown)  lactose (Unknown)  penicillin (Rash)  Wheat (Unknown)    Exam:  ICU Vital Signs Last 24 Hrs  T(C): 37.2 (22 Aug 2018 14:11), Max: 37.2 (22 Aug 2018 14:11)  T(F): 98.9 (22 Aug 2018 14:11), Max: 98.9 (22 Aug 2018 14:11)  HR: 68 (22 Aug 2018 14:00) (58 - 86)  BP: 98/56 (22 Aug 2018 14:00) (86/47 - 141/80)  BP(mean): 79 (22 Aug 2018 14:00) (65 - 104)  ABP: 118/60 (22 Aug 2018 11:00) (94/44 - 128/78)  ABP(mean): 80 (22 Aug 2018 11:00) (62 - 88)  RR: 15 (22 Aug 2018 14:00) (10 - 31)  SpO2: 98% (22 Aug 2018 14:00) (93% - 99%)    Gen: Lying in bed, NAD, intubated - not breathing over vent at first (14/14) but then increased to upper teen's in response to pain  Neuro:  Mental status: eyes open when I walked in but no response to stimulation, not following commands  Cranial nerves: eyes midline, no oculocephalic reflex bilaterally, pupils reactive to light bilaterally, variable assessment of corneal reflex since hard to keep her eyelids open, unable assess facial secondary to ETT, minimal gag reflex at beginning of process   Motor: No spontaneous movements   Sensation: variable, mainly reflexive   Coordination: unable to assess   Reflexes: absent Babinski bilaterally  Gait: deferred    Labs:                        8.5    10.2  )-----------( 354      ( 22 Aug 2018 06:25 )             26.6   08-22    143  |  105  |  37<H>  ----------------------------<  132<H>  3.9   |  24  |  1.19    Ca    8.9      22 Aug 2018 06:24  Phos  2.8     08-22  Mg     2.2     08-22    Radiology:  CT Head without contrast (8/21/2018): Diffuse loss of gray-white differentiation concerning for anoxic brain injury.    Assessment and plan: 89 year-old female with multiple medical problems presents from [facility] post cardiac arrest s/p intubation.  Her neurological status is slightly slower than yesterday's.  She still has pupillary reflex, gag reflex and breathing over vent but all decreased compared to previous responses.  Reviewed CT Head that shows diffuse brain injury.  Overall prognosis remains poor.      Discussed above with patient's son. Neurology Follow Up     Interval History:  Patient seen and examined.  Afebrile overnight.    MEDICATIONS  (STANDING):  amiodarone    Tablet 200 milliGRAM(s) Oral daily  artificial  tears Solution 1 Drop(s) Both EYES daily  ceFAZolin   IVPB 1000 milliGRAM(s) IV Intermittent every 12 hours  chlorhexidine 0.12% Liquid 15 milliLiter(s) Swish and Spit two times a day  chlorhexidine 2% Cloths 1 Application(s) Topical daily  heparin  Injectable 5000 Unit(s) SubCutaneous every 12 hours  midodrine 10 milliGRAM(s) Oral every 8 hours  pantoprazole   Suspension 40 milliGRAM(s) Enteral Tube daily  petrolatum Ophthalmic Ointment 1 Application(s) Both EYES daily    Allergies    amoxicillin (Rash)  Cipro (Rash)  clindamycin (Unknown)  lactose (Unknown)  penicillin (Rash)  Wheat (Unknown)    Exam:  ICU Vital Signs Last 24 Hrs  T(C): 37.2 (22 Aug 2018 14:11), Max: 37.2 (22 Aug 2018 14:11)  T(F): 98.9 (22 Aug 2018 14:11), Max: 98.9 (22 Aug 2018 14:11)  HR: 68 (22 Aug 2018 14:00) (58 - 86)  BP: 98/56 (22 Aug 2018 14:00) (86/47 - 141/80)  BP(mean): 79 (22 Aug 2018 14:00) (65 - 104)  ABP: 118/60 (22 Aug 2018 11:00) (94/44 - 128/78)  ABP(mean): 80 (22 Aug 2018 11:00) (62 - 88)  RR: 15 (22 Aug 2018 14:00) (10 - 31)  SpO2: 98% (22 Aug 2018 14:00) (93% - 99%)    Gen: Lying in bed, NAD, intubated - not breathing over vent at first (14/14) but then increased to upper teen's in response to pain  Neuro:  Mental status: eyes open when I walked in but no response to stimulation, not following commands  Cranial nerves: eyes midline, no oculocephalic reflex bilaterally, pupils reactive to light bilaterally, variable assessment of corneal reflex since hard to keep her eyelids open, unable assess facial secondary to ETT, minimal gag reflex at beginning of process   Motor: No spontaneous movements   Sensation: variable, mainly reflexive   Coordination: unable to assess   Reflexes: absent Babinski bilaterally  Gait: deferred    Labs:                        8.5    10.2  )-----------( 354      ( 22 Aug 2018 06:25 )             26.6   08-22    143  |  105  |  37<H>  ----------------------------<  132<H>  3.9   |  24  |  1.19    Ca    8.9      22 Aug 2018 06:24  Phos  2.8     08-22  Mg     2.2     08-22    Radiology:  CT Head without contrast (8/21/2018): Diffuse loss of gray-white differentiation concerning for anoxic brain injury.    Assessment and plan: 89 year-old female with multiple medical problems presents from [facility] post cardiac arrest s/p intubation.  Her neurological status is slightly slower than yesterday's.  She still has pupillary reflex, gag reflex and breathing over vent but all decreased compared to previous responses.     Reviewed CT Head with her son with comparison to previous CT Head performed on 7/15.  There is diffuse brain injury on recent study.  I believe that the study is reasonable with clear signs of brain injury.  Answered all of his questions and discussed overall poor prognosis.  In this setting, he has concerns about compressions so I deferred these questions to Dr. Lopez.    Will follow.

## 2018-08-22 NOTE — CHART NOTE - NSCHARTNOTEFT_GEN_A_CORE
Admitting Diagnosis:   Patient is a 89y old  Female who presents with a chief complaint of cardio-pulmonary arrest (17 Aug 2018 03:20)      PAST MEDICAL & SURGICAL HISTORY:  Dementia  DVT (deep venous thrombosis)  Dysphagia  Anemia  DJD (degenerative joint disease)  Edema  IBS (irritable bowel syndrome)  Celiac disease  S/P IVC filter  H/O inguinal hernia repair  History of total left hip replacement      Current Nutrition Order:  Osmolite 1.2 Farhan @ 45mL/hr x 24hrs via PEG to provide (1080 mL TV, 1296 kcal, 60g protein, 886mL free H2O, 108% RDI, 1.43g/kg IBW protein).     PO Intake: Good (%) [   ]  Fair (50-75%) [   ] Poor (<25%) [   ]- N/a NPO w/EN    GI Issues: Unable to assess at this time 2/2 vent; good tolerance per RN    Pain: Unable to assess at this time 2/2 vent     Skin Integrity: Spine III pressure injury     Labs:   08-22    143  |  105  |  37<H>  ----------------------------<  132<H>  3.9   |  24  |  1.19    Ca    8.9      22 Aug 2018 06:24  Phos  2.8     08-22  Mg     2.2     08-22      CAPILLARY BLOOD GLUCOSE      POCT Blood Glucose.: 132 mg/dL (21 Aug 2018 11:32)      Medications:  MEDICATIONS  (STANDING):  amiodarone    Tablet 200 milliGRAM(s) Oral daily  artificial  tears Solution 1 Drop(s) Both EYES daily  ceFAZolin   IVPB 1000 milliGRAM(s) IV Intermittent every 12 hours  chlorhexidine 0.12% Liquid 15 milliLiter(s) Swish and Spit two times a day  chlorhexidine 2% Cloths 1 Application(s) Topical daily  heparin  Injectable 5000 Unit(s) SubCutaneous every 12 hours  midodrine 10 milliGRAM(s) Oral every 8 hours  pantoprazole   Suspension 40 milliGRAM(s) Enteral Tube daily  petrolatum Ophthalmic Ointment 1 Application(s) Both EYES daily    MEDICATIONS  (PRN):      Weight: 52.1kg  Daily     Daily     Weight Change: No new weights recorded since admit     Estimated energy needs: Ideal body weight used for calculations as pt >120% of IBW. Needs adjusted for vent  Calories: 25-30 kcal/kg = 1047-1257kcal/day  Protein: 1.4-1.6 g/kg = 59-67g pro/day  Fluids: 30-35 mL/kg =1705-9631 mL/day    Subjective: 89 yo/female with PMHx dementia, DVT s/p IVC filter, anemia, recent admission w/intubation and PEG placement, presents from NH s/p cardio pulmonary arrest w/eventual ROSC, followed by intubation. Pt seen in room and discussed during rounds. Pt remains intubated on VC/AC mode. Off of sedation. MAP 78. Levophed discontinued at this time. Per neurology team, pt likely w/anoxic brain injury and very poor prognosis. Per MD notes, little progress made with regards to establishing goals of care. Active discussions w/son at this time regarding care. Will continue to keep nutrition in line with care at all times.     Previous Nutrition Diagnosis:  Inadequate energy intake RT current NPO status AEB 0% of EER being met    Active [   ]  Resolved [ X  ]    If resolved, new PES: Increased protein-calorie needs RT increased demand for protein-calorie intake AEB vent     Goal: Nutrition will be kept aligned with goals of care at all times    Recommendations:  1. Keep nutrition aligned with GOC at all times  2. Optimize nutrition and hydration status as aligned with care  3. Ensure pt is comfortable     Education: N/A    Risk Level: High [   ] Moderate [ X  ] Low [   ]

## 2018-08-22 NOTE — PROGRESS NOTE ADULT - ATTENDING COMMENTS
Assessment: Patient personally seen and examined myself during rounds with the House Staff/Fellow  ON DATE 8/22/18  House Staff/Fellow note read, including vitals, physical findings, laboratory data, and radiological reports.   Revisions included below.   Direct personal management at bed side and extensive interpretation of the data.    Plan was outlined and discussed in details with the House Staff/Fellow.    Decision making of high complexity   Risk high of complications, morbidity, and/or mortality  Assessment and Action taken for acute disease activity to reflect the level of care provided:  -Hemodynamic evaluation and support  -ACS assessment and treatment as applicable  -Heart failure assessment and treatment as applicable  -Cardiac Telemetry reviewed  -Medication reconciliation  -Review laboratory data  -EKG reviewed   -Echo reviewed  -Interdisciplinary discussion with IC / EP / HF / CTS teams as needed  My plan includes :  close hemodynamic monitoring and management   Monitor for arrhythmias and monitor parameters for organ perfusion  monitor neurologic status  Head of the bed should remain elevated to 45 deg .   chest PT and IS will be encouraged  monitor adequacy of oxygenation and ventilation and attempt to wean oxygen  Nutritional goals will be met using po eventually , ensure adequate caloric intake and montior the same  Stress ulcer and VTE prophylaxis will be achieved    Glycemic control is satisfactory  Electrolytes have been replete as necessary and wound care has been carried out. Pain control has been achieved.   aggressive physical therapy and early mobility and ambulation goals will be met   The family was updated about the course and plan  CRITICAL CARE TIME SPENT in evaluation and management, reassessments, review and interpretation of labs and x-rays, ventilator and hemodynamic management, formulating a plan and coordinating care: ___90____ MIN.  Time does not include procedural time.    Sana Anguiano MD    Mobile: [phone]

## 2018-08-22 NOTE — PROGRESS NOTE ADULT - ASSESSMENT
ASSESSMENT/PLAN    1) Respiratory failure  2) Status post cardiac arrest  3) Pneumonia recurrent aspiration  4) Pulmonary hypertension  5) Anoxic brain injury  6) Anemia; rule out GIB    Satisfactory SpO2, Continue CMV mode, no weaning attemptsd at this time  Bronchodilators:  Atrovent/ albuterol q 4 – 6 hours as needed  ID/Antibiotics: ancef  Cardiac/HTN: amiodarone, taper pressors as tolerated  GI: Rx c PPI/H2B  Heme: Rx/VT prophylaxis, blood products as necessary  Aspiration precautions  Discussed with medical team, poor prognosis. Palliative feedback to clarify GOC

## 2018-08-23 DIAGNOSIS — M19.90 UNSPECIFIED OSTEOARTHRITIS, UNSPECIFIED SITE: ICD-10-CM

## 2018-08-23 LAB
GLUCOSE BLDC GLUCOMTR-MCNC: 109 MG/DL — HIGH (ref 70–99)
GLUCOSE BLDC GLUCOMTR-MCNC: 155 MG/DL — HIGH (ref 70–99)

## 2018-08-23 PROCEDURE — 99497 ADVNCD CARE PLAN 30 MIN: CPT

## 2018-08-23 PROCEDURE — 99232 SBSQ HOSP IP/OBS MODERATE 35: CPT

## 2018-08-23 PROCEDURE — 99233 SBSQ HOSP IP/OBS HIGH 50: CPT | Mod: GC

## 2018-08-23 PROCEDURE — 71045 X-RAY EXAM CHEST 1 VIEW: CPT | Mod: 26

## 2018-08-23 RX ADMIN — AMIODARONE HYDROCHLORIDE 200 MILLIGRAM(S): 400 TABLET ORAL at 06:40

## 2018-08-23 RX ADMIN — CHLORHEXIDINE GLUCONATE 1 APPLICATION(S): 213 SOLUTION TOPICAL at 06:41

## 2018-08-23 RX ADMIN — MIDODRINE HYDROCHLORIDE 10 MILLIGRAM(S): 2.5 TABLET ORAL at 06:40

## 2018-08-23 RX ADMIN — HEPARIN SODIUM 5000 UNIT(S): 5000 INJECTION INTRAVENOUS; SUBCUTANEOUS at 06:40

## 2018-08-23 RX ADMIN — Medication 1 APPLICATION(S): at 11:24

## 2018-08-23 RX ADMIN — CHLORHEXIDINE GLUCONATE 15 MILLILITER(S): 213 SOLUTION TOPICAL at 11:24

## 2018-08-23 RX ADMIN — Medication 1 DROP(S): at 11:24

## 2018-08-23 NOTE — PROGRESS NOTE ADULT - ATTENDING COMMENTS
s/p CRA  Multiorganfailure  Prog is extr poor--son aware  Palliative care/neuro input reviewed  Supp rx s/p CRA  Multiorganfailure  Prog is extr poor--son aware  Palliative care/neuro input reviewed  Supp rx  COMFORT  CARE -as per son's wishes

## 2018-08-23 NOTE — PROGRESS NOTE ADULT - SUBJECTIVE AND OBJECTIVE BOX
Advance care planning meeting  Start time: 11:30  End time: 12:00  Total time: 30 minutes    A face to face meeting to discuss advance care planning was held today regarding: JOSE MARTIN GOFF  Primary decision maker:  Dr. Esvin Goff  Alternate/surrogate:  Discussed advance directives including, but not limited to, healthcare proxy and code status.  Decision regarding code status: Patient presently DNR, no pressors, no escalation of care.  She continues to be ventilator dependent and extensive conversation regarding patient's living will and incongruity with present situation.  Son hoping that she dies peacefully on her own, rather than after withdrawal of ventilator.  Issue of need for tracheostomy for long term ventilation discussed.  Support provided.  Discussed with Dr. Guillen  Documentation completed today: none

## 2018-08-23 NOTE — PROGRESS NOTE ADULT - ASSESSMENT
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 ROSC achieved after apx 28 minutes.   - Pt currently intubated. No sedation. Assessing neuro status daily  - Dr. Goff who is accepting of the final read on Head CT which is c/w anoxic encephalopathy. He agrees to withdraw Midodrine today and we will only continue Amio, enteral feeds and mech vent.  - Scx growing proteus (pan sensitive) and staph aureus   - will d/c all ABs  - Ng sump removed    ID  #Aspiration PNA (Gram negative Proteus and MSSA)  - CXR: RLL opacity with bilateral pleural effusions.  - Sputum cultures +Pansensitive Proteus and MSSA.   - All ABs d/alissa per son  - comfort measures only.     NEURO  #AMS: Likely due to anoxic brain injury  - CTH: Poor gray white differentiation signifying possible anoxic brain injury. Poor study due to artifact.   - awaiting appendum from radiologist.   - Poor prognosis for recovery per neuro.   - Palliative on board: In active discussion with son Dr. Goff  - comfort measures only. Withdrawing Midodrine today and we will only continue Amio, enteral feeds and mech vent.    CV  #Cardiac Arrest: likely after pulmonary arrest from above  - EKG without ischemic changes   - trops 0.03. will not trend   - A line in place-right radial  -central line passed, cxr confirmed position     #Atrial flutter s/p cardioversion last admission  - c/w amiodarone 200 daily   - no A/C in setting of PMH of gastric AVM    HEMATOLOGY  #Anemia; now stable hgb 8.1  - Pt has a history of AVMS  - GI Dr. Hale consulted; monitor h/h. Pt likely not candidate for invasive procedures. Monitor H/H    F NO FLUIDS FOR NOW  E REPLETE AS NEEDED  N PEG feed rate changed to 40cc/hr  - Dispo: MICU

## 2018-08-23 NOTE — PROGRESS NOTE ADULT - SUBJECTIVE AND OBJECTIVE BOX
Interventional, Pulmonary, Critical, Chest Special Procedures.    Pt was seen and fully examined by myself.     Time spent with patient in minutes:67    Patient is a 89y old  Female who presents with a chief complaint of cardio-pulmonary arrest (17 Aug 2018 03:20)The patient c no rsponse to verbal/shoulder rub stimuli. In synchrony c CMV mode. Vent circuits integrity verified and preserved.    HPI:  89 F w/ pmhx of dementia, R. femoral thrombus s/p IVC filter 1/2017, chronic anemia, presents to ED from [facility] after cardio-pulmonary arrest. Patient recently admitted to MICU for hypoxic respiratory failure 2/2 aspiration pneumonia. Patient eventually extubated, received PEG tube, and discharged back to [facility] on 08/14. Hospital course complicated by unstable atrial fibrillation s/p cardioversion, placed on amiodarone with stabilization of HR. Per ER, patient unable to tolerate secretions, found to be hypoxic at NH and eventually lost a pulse. Patient intubated in the fei, ACLS protocol begun and ROSC achieved after 3 rounds of epinephrine. (17 Aug 2018 03:20)    REVIEW OF SYSTEMS:  Constitutional: No fever, +weight loss, chills + fatigue  Eyes: No eye pain, visual disturbances, or discharge  ENMT:  + difficulty hearing, tinnitus, vertigo; No sinus or throat pain. No epistaxis, +dysphagia, dysphonia, hoarseness   Neck: No pain, stiffness or neck swelling.  No masses or deformities  Respiratory: +cough, wheezing, chills or hemoptysis  - COPD  - ILD   - PE   - ASTHMA     +PNEUMONIA  Cardiovascular: No chest pain, +dysrhythmia, palpitations, dizziness or edema   - COPD     - CAD   - CHF   - HTN  Gastrointestinal: No abdominal or epigastric pain. No nausea, vomiting or hematemesis; No diarrhea or constipation. No melena or hematochezia. No dysphagia or Icterus.          Genitourinary: No dysuria, frequency, hematuria + incontinence   - CKD/CAMRYN      - ESRD  Neurological: No headaches, memory loss, loss of strength, numbness or tremors      +DEMENTIA   PAST MEDICAL & SURGICAL HISTORY:  Dementia  DVT (deep venous thrombosis)  Dysphagia  Anemia  DJD (degenerative joint disease)  Edema  IBS (irritable bowel syndrome)  Celiac disease  S/P IVC filter  H/O inguinal hernia repair  History of total left hip replacement    FAMILY HISTORY:  No pertinent family history in first degree relatives    SOCIAL HISTORY:      - Tobacco     - ETOH    Allergies    amoxicillin (Rash)  Cipro (Rash)  clindamycin (Unknown)  lactose (Unknown)  penicillin (Rash)  Wheat (Unknown)    Intolerances      Vital Signs Last 24 Hrs  T(C): 36.8 (23 Aug 2018 09:33), Max: 37.3 (22 Aug 2018 17:30)  T(F): 98.3 (23 Aug 2018 09:33), Max: 99.2 (22 Aug 2018 17:30)  HR: 60 (23 Aug 2018 10:00) (60 - 78)  BP: 82/47 (23 Aug 2018 10:00) (82/47 - 113/63)  BP(mean): 56 (23 Aug 2018 10:00) (56 - 79)  RR: 12 (23 Aug 2018 10:00) (11 - 31)  SpO2: 99% (23 Aug 2018 10:00) (96% - 100%)    08-22 @ 07:01  -  08-23 @ 07:00  --------------------------------------------------------  IN: 1040 mL / OUT: 408 mL / NET: 632 mL    08-23 @ 07:01  - 08-23 @ 11:07  --------------------------------------------------------  IN: 120 mL / OUT: 50 mL / NET: 70 mL      Mode: AC/ CMV (Assist Control/ Continuous Mandatory Ventilation)  RR (machine): 10  TV (machine): 360  FiO2: 40  PEEP: 5  ITime: 1  MAP: 11  PIP: 25    PHYSICAL EXAM:  unresponsive , no immediate distress  Eyes: PERRL, EOM intact; conjunctiva and sclera clear  Head: Normocephalic;  No Trauma  ENMT: No nasal discharge, hoarseness, +cough   Neck: Supple; non tender; no masses or deformities.    No JVD  Respiratory:   - WHEEZING   +RHONCHI  - RALES  + CRACKLES.  Diminished breath sounds  BILATERAL  RIGHT > LEFT base  Cardiovascular: Regular rate and rhythm. S1 and S2 Normal; No murmurs, gallops or rubs     - PPM/AICD  Gastrointestinal: Soft non-tender, non-distended; Normal bowel sounds; No hepatosplenomegaly.     +_ site clean PEG    -+NGT GT   + SWENSON  Genitourinary: No costovertebral angle tenderness. No dysuria  Extremities: NO AROM, No clubbing, cyanosis or edema    Vascular: Peripheral pulses palpable 2+ bilaterally  Neurological: Intubated sedated   Skin: Warm and dry. No obvious rash  Lymph Nodes: No acute cervical or supraclavicular adenopathy  Psychiatric: On vent. unreseponsive to verbal stimuli  DEVICES:  - DENTURES   +IV R / L     - ETUBE   -TRACH   -CTUBE  R / L      LABS:                          8.5    10.2  )-----------( 354      ( 22 Aug 2018 06:25 )             26.6     08-22    143  |  105  |  37<H>  ----------------------------<  132<H>  3.9   |  24  |  1.19    Ca    8.9      22 Aug 2018 06:24  Phos  2.8     08-22  Mg     2.2     08-22      < from: Xray Chest 1 View- PORTABLE-Routine (08.23.18 @ 03:32) >    EXAM:  XR CHEST PORTABLE ROUTINE 1V                          PROCEDURE DATE:  08/23/2018          INTERPRETATION:  Portable Chest X-Ray dated 8/23/2018 3:32 AM    Indication: Eval ET tube    Prior studies: 8/22/2018    An AP portable view of the chest reveals an endotracheal tube, but the   angel is not clearly visualized on this radiograph. The pulmonary apices   are obscured by the patient's jaw on the left and was not imaged on the   right. There is mild pulmonary venous congestion. Small right pleural   effusion. Atelectatic changes in the lower lobes left greater than right.   NG tube tip in the stomach. Right central venous catheter with the tip in   the right atrium. Stable appearance of the bones.    IMPRESSION:  Probable no significant interval change accounting for differences in   positioning and technique. Angel is not clearly seen on this film.    < end of copied text >    RADIOLOGY & ADDITIONAL STUDIES (The following images were personally reviewed):

## 2018-08-23 NOTE — PROGRESS NOTE ADULT - SUBJECTIVE AND OBJECTIVE BOX
Neurology Follow Up   3PM  Interval History:  Patient seen and examined.  Addendum added to Radiology report.  Patient now DNR without increase in pressors, etc, as per wishes    MEDICATIONS  (STANDING):  amiodarone    Tablet 200 milliGRAM(s) Oral daily  artificial  tears Solution 1 Drop(s) Both EYES daily  chlorhexidine 0.12% Liquid 15 milliLiter(s) Swish and Spit two times a day  chlorhexidine 2% Cloths 1 Application(s) Topical daily  petrolatum Ophthalmic Ointment 1 Application(s) Both EYES daily    Allergies    amoxicillin (Rash)  Cipro (Rash)  clindamycin (Unknown)  lactose (Unknown)  penicillin (Rash)  Wheat (Unknown)    Exam:  ICU Vital Signs Last 24 Hrs  T(C): 36.7 (23 Aug 2018 14:27), Max: 37.3 (22 Aug 2018 22:11)  T(F): 98.1 (23 Aug 2018 14:27), Max: 99.2 (22 Aug 2018 22:11)  HR: 64 (23 Aug 2018 17:00) (58 - 74)  BP: 92/56 (23 Aug 2018 17:00) (80/48 - 113/63)  BP(mean): 69 (23 Aug 2018 17:00) (56 - 78)  RR: 12 (23 Aug 2018 17:00) (11 - 31)  SpO2: 100% (23 Aug 2018 17:00) (96% - 100%)      Gen: Lying in bed, NAD, intubated - breathing over vent at first (12/10)  Neuro:  Mental status: minimal response to deep pain but not purposeful, not following commands  Cranial nerves: eyes midline, no oculocephalic reflex bilaterally, pupils reactive to light bilaterally, no corneal reflexes bilaterally, unable assess facial secondary to ETT, minimal gag reflex    Motor: No spontaneous movements   Sensation: variable, mainly reflexive   Coordination: unable to assess   Reflexes: absent Babinski bilaterally  Gait: deferred    Labs:  no new labs    Radiology:  CT Head without contrast (8/21/2018): Diffuse loss of gray-white differentiation concerning for anoxic brain injury as compared to previous CT Head on 7/15/2018.    Assessment and plan: 89 year-old female with multiple medical problems presents from [facility] post cardiac arrest s/p intubation.  Her neurological status is slightly slower than yesterday's.  She still has pupillary reflex, gag reflex and breathing over vent but all decreased compared to previous responses.     Discussed patient with her son.  He acknowledges poor prognosis.  Addressed his concerns, though deferred several questions to MICU team.

## 2018-08-23 NOTE — PROGRESS NOTE ADULT - SUBJECTIVE AND OBJECTIVE BOX
Patient is a 89y old  Female who presents with a chief complaint of cardio-pulmonary arrest (17 Aug 2018 03:20)      INTERVAL HPI/OVERNIGHT EVENTS:  ICU Vital Signs Last 24 Hrs  T(C): 37.2 (23 Aug 2018 02:02), Max: 37.3 (22 Aug 2018 17:30)  T(F): 99 (23 Aug 2018 02:02), Max: 99.2 (22 Aug 2018 17:30)  HR: 68 (23 Aug 2018 04:00) (64 - 78)  BP: 89/51 (23 Aug 2018 04:00) (83/46 - 141/80)  BP(mean): 63 (23 Aug 2018 04:00) (60 - 104)  ABP: 118/60 (22 Aug 2018 11:00) (94/46 - 118/60)  ABP(mean): 80 (22 Aug 2018 11:00) (64 - 80)  RR: 19 (23 Aug 2018 05:00) (11 - 26)  SpO2: 96% (23 Aug 2018 05:00) (96% - 100%)    I&O's Summary    21 Aug 2018 07:01  -  22 Aug 2018 07:00  --------------------------------------------------------  IN: 1442 mL / OUT: 435 mL / NET: 1007 mL    22 Aug 2018 07:01  -  23 Aug 2018 06:31  --------------------------------------------------------  IN: 960 mL / OUT: 338 mL / NET: 622 mL      Mode: AC/ CMV (Assist Control/ Continuous Mandatory Ventilation)  RR (machine): 10  TV (machine): 360  FiO2: 40  PEEP: 5  ITime: 1  MAP: 13  PIP: 29      LABS:                        8.5    10.2  )-----------( 354      ( 22 Aug 2018 06:25 )             26.6     08-22    143  |  105  |  37<H>  ----------------------------<  132<H>  3.9   |  24  |  1.19    Ca    8.9      22 Aug 2018 06:24  Phos  2.8     08-22  Mg     2.2     08-22          CAPILLARY BLOOD GLUCOSE      POCT Blood Glucose.: 155 mg/dL (23 Aug 2018 01:03)  POCT Blood Glucose.: 141 mg/dL (22 Aug 2018 11:45)        RADIOLOGY & ADDITIONAL TESTS:    Consultant(s) Notes Reviewed:  [x ] YES  [ ] NO    MEDICATIONS  (STANDING):  amiodarone    Tablet 200 milliGRAM(s) Oral daily  artificial  tears Solution 1 Drop(s) Both EYES daily  ceFAZolin   IVPB 1000 milliGRAM(s) IV Intermittent every 12 hours  chlorhexidine 0.12% Liquid 15 milliLiter(s) Swish and Spit two times a day  chlorhexidine 2% Cloths 1 Application(s) Topical daily  heparin  Injectable 5000 Unit(s) SubCutaneous every 12 hours  midodrine 10 milliGRAM(s) Oral every 8 hours  pantoprazole   Suspension 40 milliGRAM(s) Enteral Tube daily  petrolatum Ophthalmic Ointment 1 Application(s) Both EYES daily    MEDICATIONS  (PRN):      PHYSICAL EXAM:  GENERAL: well built, well nourished  HEAD:  Atraumatic, Normocephalic  EYES: EOMI, PERRLA, conjunctiva and sclera clear  ENT: No tonsillar erythema, exudates, or enlargement; Moist mucous membranes, Good dentition, No lesions  NECK: Supple, No JVD, Normal thyroid, no enlarged nodes  NERVOUS SYSTEM:  Alert & Oriented X3, Good concentration; Motor Strength 5/5 B/L upper and lower extremities; DTRs 2+ intact and symmetric, sensory intact  CHEST/LUNG: B/L good air entry; No rales, rhonchi, or wheezing  HEART: S1S2 normal, no S3, Regular rate and rhythm; No murmurs, rubs, or gallops  ABDOMEN: Soft, Nontender, Nondistended; Bowel sounds present  EXTREMITIES:  2+ Peripheral Pulses, No clubbing, cyanosis, or edema  LYMPH: No lymphadenopathy noted  SKIN: No rashes or lesions    Care Discussed with Consultants/Other Providers [ x] YES  [ ] NO OVERNIGHT EVENTS: ALYCIA. Radiology appended report.     INTERVAL HPI: Pt is examined at bedside. Pt is intubated and not sedated. Unresponsive to verbal commands. Neurologically unchanged.     ICU Vital Signs Last 24 Hrs  T(C): 37.2 (23 Aug 2018 02:02), Max: 37.3 (22 Aug 2018 17:30)  T(F): 99 (23 Aug 2018 02:02), Max: 99.2 (22 Aug 2018 17:30)  HR: 68 (23 Aug 2018 04:00) (64 - 78)  BP: 89/51 (23 Aug 2018 04:00) (83/46 - 141/80)  BP(mean): 63 (23 Aug 2018 04:00) (60 - 104)  ABP: 118/60 (22 Aug 2018 11:00) (94/46 - 118/60)  ABP(mean): 80 (22 Aug 2018 11:00) (64 - 80)  RR: 19 (23 Aug 2018 05:00) (11 - 26)  SpO2: 96% (23 Aug 2018 05:00) (96% - 100%)    I&O's Summary    21 Aug 2018 07:01  -  22 Aug 2018 07:00  --------------------------------------------------------  IN: 1442 mL / OUT: 435 mL / NET: 1007 mL    22 Aug 2018 07:01  -  23 Aug 2018 06:31  --------------------------------------------------------  IN: 960 mL / OUT: 338 mL / NET: 622 mL      Mode: AC/ CMV (Assist Control/ Continuous Mandatory Ventilation)  RR (machine): 10  TV (machine): 360  FiO2: 40  PEEP: 5  ITime: 1  MAP: 13  PIP: 29      LABS:                        8.5    10.2  )-----------( 354      ( 22 Aug 2018 06:25 )             26.6     08-22    143  |  105  |  37<H>  ----------------------------<  132<H>  3.9   |  24  |  1.19    Ca    8.9      22 Aug 2018 06:24  Phos  2.8     08-22  Mg     2.2     08-22          CAPILLARY BLOOD GLUCOSE      POCT Blood Glucose.: 155 mg/dL (23 Aug 2018 01:03)  POCT Blood Glucose.: 141 mg/dL (22 Aug 2018 11:45)        RADIOLOGY & ADDITIONAL TESTS:    Consultant(s) Notes Reviewed:  [x ] YES  [ ] NO    MEDICATIONS  (STANDING):  amiodarone    Tablet 200 milliGRAM(s) Oral daily  artificial  tears Solution 1 Drop(s) Both EYES daily  ceFAZolin   IVPB 1000 milliGRAM(s) IV Intermittent every 12 hours  chlorhexidine 0.12% Liquid 15 milliLiter(s) Swish and Spit two times a day  chlorhexidine 2% Cloths 1 Application(s) Topical daily  heparin  Injectable 5000 Unit(s) SubCutaneous every 12 hours  midodrine 10 milliGRAM(s) Oral every 8 hours  pantoprazole   Suspension 40 milliGRAM(s) Enteral Tube daily  petrolatum Ophthalmic Ointment 1 Application(s) Both EYES daily    MEDICATIONS  (PRN):    PHYSICAL EXAM:  GENERAL: Intubated  HEAD:  Atraumatic, Normocephalic  EYES: Pupils dilated, mildly responsive to light, conjunctiva and sclera clear  NECK: Supple, No JVD, Normal thyroid, no enlarged nodes  NERVOUS SYSTEM: Eyes open but not awake, not alert, unresponsive to commands/verbal/painful stimili. +corneal reflex, pupils reactive to light. Minimal gag reflex.    CHEST/LUNG: B/L good air entry; No rales, rhonchi, or wheezing  HEART: S1S2 normal, no S3, Regular rate and rhythm; No murmurs, rubs, or gallops  ABDOMEN: Soft, Nontender, Nondistended; Bowel sounds present  EXTREMITIEs: B/L pedal 2+ pitting edema; UE withdrawal to noxious stimuli  LYMPH: No lymphadenopathy noted  SKIN: No rashes or lesions    Care Discussed with Consultants/Other Providers [ x] YES  [ ] NO

## 2018-08-23 NOTE — PROGRESS NOTE ADULT - SUBJECTIVE AND OBJECTIVE BOX
Patient is a 89y old  Female who presents with a chief complaint of cardio-pulmonary arrest (17 Aug 2018 03:20)      INTERVAL HPI/OVERNIGHT EVENTS:  ICU Vital Signs Last 24 Hrs  T(C): 37.2 (23 Aug 2018 02:02), Max: 37.3 (22 Aug 2018 17:30)  T(F): 99 (23 Aug 2018 02:02), Max: 99.2 (22 Aug 2018 17:30)  HR: 68 (23 Aug 2018 04:00) (64 - 78)  BP: 89/51 (23 Aug 2018 04:00) (83/46 - 141/80)  BP(mean): 63 (23 Aug 2018 04:00) (60 - 104)  ABP: 118/60 (22 Aug 2018 11:00) (94/46 - 118/60)  ABP(mean): 80 (22 Aug 2018 11:00) (64 - 80)  RR: 19 (23 Aug 2018 05:00) (11 - 26)  SpO2: 96% (23 Aug 2018 05:00) (96% - 100%)    I&O's Summary    21 Aug 2018 07:01  -  22 Aug 2018 07:00  --------------------------------------------------------  IN: 1442 mL / OUT: 435 mL / NET: 1007 mL    22 Aug 2018 07:01  -  23 Aug 2018 06:31  --------------------------------------------------------  IN: 960 mL / OUT: 338 mL / NET: 622 mL      Mode: AC/ CMV (Assist Control/ Continuous Mandatory Ventilation)  RR (machine): 10  TV (machine): 360  FiO2: 40  PEEP: 5  ITime: 1  MAP: 13  PIP: 29      LABS:                        8.5    10.2  )-----------( 354      ( 22 Aug 2018 06:25 )             26.6     08-22    143  |  105  |  37<H>  ----------------------------<  132<H>  3.9   |  24  |  1.19    Ca    8.9      22 Aug 2018 06:24  Phos  2.8     08-22  Mg     2.2     08-22          CAPILLARY BLOOD GLUCOSE      POCT Blood Glucose.: 155 mg/dL (23 Aug 2018 01:03)  POCT Blood Glucose.: 141 mg/dL (22 Aug 2018 11:45)        RADIOLOGY & ADDITIONAL TESTS:    Consultant(s) Notes Reviewed:  [x ] YES  [ ] NO    MEDICATIONS  (STANDING):  amiodarone    Tablet 200 milliGRAM(s) Oral daily  artificial  tears Solution 1 Drop(s) Both EYES daily  ceFAZolin   IVPB 1000 milliGRAM(s) IV Intermittent every 12 hours  chlorhexidine 0.12% Liquid 15 milliLiter(s) Swish and Spit two times a day  chlorhexidine 2% Cloths 1 Application(s) Topical daily  heparin  Injectable 5000 Unit(s) SubCutaneous every 12 hours  midodrine 10 milliGRAM(s) Oral every 8 hours  pantoprazole   Suspension 40 milliGRAM(s) Enteral Tube daily  petrolatum Ophthalmic Ointment 1 Application(s) Both EYES daily    MEDICATIONS  (PRN):      PHYSICAL EXAM:  GENERAL: well built, well nourished  HEAD:  Atraumatic, Normocephalic  EYES: EOMI, PERRLA, conjunctiva and sclera clear  ENT: No tonsillar erythema, exudates, or enlargement; Moist mucous membranes, Good dentition, No lesions  NECK: Supple, No JVD, Normal thyroid, no enlarged nodes  NERVOUS SYSTEM:  Alert & Oriented X3, Good concentration; Motor Strength 5/5 B/L upper and lower extremities; DTRs 2+ intact and symmetric, sensory intact  CHEST/LUNG: B/L good air entry; No rales, rhonchi, or wheezing  HEART: S1S2 normal, no S3, Regular rate and rhythm; No murmurs, rubs, or gallops  ABDOMEN: Soft, Nontender, Nondistended; Bowel sounds present  EXTREMITIES:  2+ Peripheral Pulses, No clubbing, cyanosis, or edema  LYMPH: No lymphadenopathy noted  SKIN: No rashes or lesions    Care Discussed with Consultants/Other Providers [ x] YES  [ ] NO

## 2018-08-24 PROCEDURE — 99233 SBSQ HOSP IP/OBS HIGH 50: CPT | Mod: GC

## 2018-08-24 PROCEDURE — 99232 SBSQ HOSP IP/OBS MODERATE 35: CPT

## 2018-08-24 RX ADMIN — AMIODARONE HYDROCHLORIDE 200 MILLIGRAM(S): 400 TABLET ORAL at 06:54

## 2018-08-24 RX ADMIN — Medication 1 DROP(S): at 11:36

## 2018-08-24 RX ADMIN — CHLORHEXIDINE GLUCONATE 15 MILLILITER(S): 213 SOLUTION TOPICAL at 11:37

## 2018-08-24 RX ADMIN — CHLORHEXIDINE GLUCONATE 1 APPLICATION(S): 213 SOLUTION TOPICAL at 06:54

## 2018-08-24 RX ADMIN — Medication 1 APPLICATION(S): at 11:36

## 2018-08-24 NOTE — PROGRESS NOTE ADULT - SUBJECTIVE AND OBJECTIVE BOX
Neurology Follow Up   3PM  Interval History:  Patient seen and examined.  Addendum added to Radiology report.  Patient now DNR without increase in pressors, etc, as per wishes    MEDICATIONS  (STANDING):  amiodarone    Tablet 200 milliGRAM(s) Oral daily  artificial  tears Solution 1 Drop(s) Both EYES daily  chlorhexidine 0.12% Liquid 15 milliLiter(s) Swish and Spit two times a day  chlorhexidine 2% Cloths 1 Application(s) Topical daily  petrolatum Ophthalmic Ointment 1 Application(s) Both EYES daily    Allergies    amoxicillin (Rash)  Cipro (Rash)  clindamycin (Unknown)  lactose (Unknown)  penicillin (Rash)  Wheat (Unknown)    Exam:  ICU Vital Signs Last 24 Hrs  T(C): 36.7 (24 Aug 2018 09:27), Max: 37.6 (23 Aug 2018 22:38)  T(F): 98.1 (24 Aug 2018 09:27), Max: 99.7 (23 Aug 2018 22:38)  HR: 76 (24 Aug 2018 13:00) (60 - 82)  BP: 96/53 (24 Aug 2018 13:00) (79/47 - 105/60)  BP(mean): 71 (24 Aug 2018 13:00) (58 - 76)  RR: 15 (24 Aug 2018 13:00) (11 - 24)  SpO2: 98% (24 Aug 2018 13:00) (98% - 100%)    General: Lying in bed, NAD, intubated - breathing over vent (12/10)  Neuro:  Mental status: opened eyes to pressure to her shoulder but not interactive or responsive, not following commands  Cranial nerves: eyes first deviated to left then midline, no oculocephalic reflex, pupils reactive to light bilaterally, no corneal reflexes bilaterally, unable assess facial secondary to ETT, minimal gag reflex at beginning of tube placement but no response at end  Motor: No spontaneous movements   Sensation: No response to pain besides reflex  Coordination: unable to assess   Reflexes: absent Babinski bilaterally  Gait: deferred    Labs:  no new labs    Radiology:  CT Head without contrast (8/21/2018): Diffuse loss of gray-white differentiation concerning for anoxic brain injury as compared to previous CT Head on 7/15/2018.    Assessment and plan: 89 year-old female with multiple medical problems presents from [facility] post cardiac arrest s/p intubation.  Her neurological status is stable compared to yesterday's.  She still has pupillary reflex, minimal gag reflex and breathing over vent.     Discussed patient with her son.  He acknowledges poor prognosis.  Addressed his concerns, though deferred questions to MICU team.

## 2018-08-24 NOTE — PROGRESS NOTE ADULT - SUBJECTIVE AND OBJECTIVE BOX
Patient is a 89y old  Female who presents with a chief complaint of cardio-pulmonary arrest (17 Aug 2018 03:20)      INTERVAL HPI/OVERNIGHT EVENTS:  ICU Vital Signs Last 24 Hrs  T(C): 36.4 (24 Aug 2018 01:04), Max: 37.6 (23 Aug 2018 22:38)  T(F): 97.5 (24 Aug 2018 01:04), Max: 99.7 (23 Aug 2018 22:38)  HR: 82 (24 Aug 2018 05:50) (58 - 82)  BP: 100/55 (24 Aug 2018 05:00) (79/47 - 107/60)  BP(mean): 76 (24 Aug 2018 05:00) (56 - 76)  ABP: --  ABP(mean): --  RR: 24 (24 Aug 2018 05:50) (11 - 24)  SpO2: 99% (24 Aug 2018 05:50) (98% - 100%)    I&O's Summary    22 Aug 2018 07:01  -  23 Aug 2018 07:00  --------------------------------------------------------  IN: 1040 mL / OUT: 408 mL / NET: 632 mL    23 Aug 2018 07:01  -  24 Aug 2018 06:21  --------------------------------------------------------  IN: 920 mL / OUT: 446 mL / NET: 474 mL      Mode: CPAP with PS  FiO2: 40  PEEP: 5  PS: 12  MAP: 9  PIP: 17      LABS:                        8.5    10.2  )-----------( 354      ( 22 Aug 2018 06:25 )             26.6     08-22    143  |  105  |  37<H>  ----------------------------<  132<H>  3.9   |  24  |  1.19    Ca    8.9      22 Aug 2018 06:24  Phos  2.8     08-22  Mg     2.2     08-22          CAPILLARY BLOOD GLUCOSE      POCT Blood Glucose.: 109 mg/dL (23 Aug 2018 06:55)        RADIOLOGY & ADDITIONAL TESTS:    Consultant(s) Notes Reviewed:  [x ] YES  [ ] NO    MEDICATIONS  (STANDING):  amiodarone    Tablet 200 milliGRAM(s) Oral daily  artificial  tears Solution 1 Drop(s) Both EYES daily  chlorhexidine 0.12% Liquid 15 milliLiter(s) Swish and Spit two times a day  chlorhexidine 2% Cloths 1 Application(s) Topical daily  petrolatum Ophthalmic Ointment 1 Application(s) Both EYES daily    MEDICATIONS  (PRN):      PHYSICAL EXAM:  GENERAL: well built, well nourished  HEAD:  Atraumatic, Normocephalic  EYES: EOMI, PERRLA, conjunctiva and sclera clear  ENT: No tonsillar erythema, exudates, or enlargement; Moist mucous membranes, Good dentition, No lesions  NECK: Supple, No JVD, Normal thyroid, no enlarged nodes  NERVOUS SYSTEM:  Alert & Oriented X3, Good concentration; Motor Strength 5/5 B/L upper and lower extremities; DTRs 2+ intact and symmetric, sensory intact  CHEST/LUNG: B/L good air entry; No rales, rhonchi, or wheezing  HEART: S1S2 normal, no S3, Regular rate and rhythm; No murmurs, rubs, or gallops  ABDOMEN: Soft, Nontender, Nondistended; Bowel sounds present  EXTREMITIES:  2+ Peripheral Pulses, No clubbing, cyanosis, or edema  LYMPH: No lymphadenopathy noted  SKIN: No rashes or lesions    Care Discussed with Consultants/Other Providers [ x] YES  [ ] NO OVERNIGHT EVENTS: ALYCIA    INTERVAL HPI: Pt is examined at bedside. Unresponsive to verbal commands. Unchanged neurologically.     ICU Vital Signs Last 24 Hrs  T(C): 36.4 (24 Aug 2018 01:04), Max: 37.6 (23 Aug 2018 22:38)  T(F): 97.5 (24 Aug 2018 01:04), Max: 99.7 (23 Aug 2018 22:38)  HR: 82 (24 Aug 2018 05:50) (58 - 82)  BP: 100/55 (24 Aug 2018 05:00) (79/47 - 107/60)  BP(mean): 76 (24 Aug 2018 05:00) (56 - 76)  ABP: --  ABP(mean): --  RR: 24 (24 Aug 2018 05:50) (11 - 24)  SpO2: 99% (24 Aug 2018 05:50) (98% - 100%)    I&O's Summary    22 Aug 2018 07:01  -  23 Aug 2018 07:00  --------------------------------------------------------  IN: 1040 mL / OUT: 408 mL / NET: 632 mL    23 Aug 2018 07:01  -  24 Aug 2018 06:21  --------------------------------------------------------  IN: 920 mL / OUT: 446 mL / NET: 474 mL      Mode: CPAP with PS  FiO2: 40  PEEP: 5  PS: 12  MAP: 9  PIP: 17      LABS:                        8.5    10.2  )-----------( 354      ( 22 Aug 2018 06:25 )             26.6     08-22    143  |  105  |  37<H>  ----------------------------<  132<H>  3.9   |  24  |  1.19    Ca    8.9      22 Aug 2018 06:24  Phos  2.8     08-22  Mg     2.2     08-22          CAPILLARY BLOOD GLUCOSE      POCT Blood Glucose.: 109 mg/dL (23 Aug 2018 06:55)        RADIOLOGY & ADDITIONAL TESTS:    Consultant(s) Notes Reviewed:  [x ] YES  [ ] NO    MEDICATIONS  (STANDING):  amiodarone    Tablet 200 milliGRAM(s) Oral daily  artificial  tears Solution 1 Drop(s) Both EYES daily  chlorhexidine 0.12% Liquid 15 milliLiter(s) Swish and Spit two times a day  chlorhexidine 2% Cloths 1 Application(s) Topical daily  petrolatum Ophthalmic Ointment 1 Application(s) Both EYES daily    MEDICATIONS  (PRN):    PHYSICAL EXAM:  GENERAL: Intubated  HEAD:  Atraumatic, Normocephalic  EYES: Pupils dilated, mildly responsive to light, conjunctiva and sclera clear  NECK: Supple, No JVD, Normal thyroid, no enlarged nodes  NERVOUS SYSTEM: Eyes open but not awake, not alert, unresponsive to commands/verbal/painful stimili. +corneal reflex, pupils reactive to light. Minimal gag reflex.    CHEST/LUNG: B/L good air entry; No rales, rhonchi, or wheezing  HEART: S1S2 normal, no S3, Regular rate and rhythm; No murmurs, rubs, or gallops  ABDOMEN: Soft, Nontender, Nondistended; Bowel sounds present  EXTREMITIEs: B/L pedal 2+ pitting edema; UE withdrawal to noxious stimuli  LYMPH: No lymphadenopathy noted  SKIN: No rashes or lesions    Care Discussed with Consultants/Other Providers [ x] YES  [ ] NO

## 2018-08-24 NOTE — PROGRESS NOTE ADULT - ATTENDING COMMENTS
s/p CRA  Multiorganfailure  Prog is extr poor--son aware  Palliative care/neuro input reviewed  Supp rx  COMFORT  CARE -as per son's wishes

## 2018-08-24 NOTE — PROGRESS NOTE ADULT - ATTENDING COMMENTS
Dr. Goff continues to struggle with withdrawal of mech vent despite the guidance her living will offers. He agrees to continue comfort care and no escalation of care is to occur and no labs or new cultures. I told him she is unstable clinically for a trach and it is not an option. In addition, ethically I would be uncomfortable based on her wishes not to have prolonged mech vent as outlined in her living will. He hopes she passes naturally on the vent.

## 2018-08-24 NOTE — PROGRESS NOTE ADULT - SUBJECTIVE AND OBJECTIVE BOX
Interventional, Pulmonary, Critical, Chest Special Procedures.    Pt was seen and fully examined by myself.     Time spent with patient in minutes:37    Patient is a 89y old  Female who presents with a chief complaint of cardio-pulmonary arrest (17 Aug 2018 03:20)The patient in synchrony c CMV mode, No response whatsoever.    HPI:  89 F w/ pmhx of dementia, R. femoral thrombus s/p IVC filter 1/2017, chronic anemia, presents to ED from [facility] after cardio-pulmonary arrest. Patient recently admitted to MICU for hypoxic respiratory failure 2/2 aspiration pneumonia. Patient eventually extubated, received PEG tube, and discharged back to [facility] on 08/14. Hospital course complicated by unstable atrial fibrillation s/p cardioversion, placed on amiodarone with stabilization of HR. Per ER, patient unable to tolerate secretions, found to be hypoxic at NH and eventually lost a pulse. Patient intubated in the [facility], ACLS protocol begun and ROSC achieved after 3 rounds of epinephrine. (17 Aug 2018 03:20)    REVIEW OF SYSTEMS:  Constitutional: No fever, +weight loss, chills + fatigue  Eyes: No eye pain, visual disturbances, or discharge  ENMT:  + difficulty hearing, tinnitus, vertigo; No sinus or throat pain. No epistaxis, +dysphagia, dysphonia, hoarseness   Neck: No pain, stiffness or neck swelling.  No masses or deformities  Respiratory: +cough, wheezing, chills or hemoptysis  - COPD  - ILD   - PE   - ASTHMA     +PNEUMONIA  Cardiovascular: No chest pain, +dysrhythmia, palpitations, dizziness or edema   - COPD     - CAD   - CHF   - HTN  PAST MEDICAL & SURGICAL HISTORY:  Dementia  DVT (deep venous thrombosis)  Dysphagia  Anemia  DJD (degenerative joint disease)  Edema  IBS (irritable bowel syndrome)  Celiac disease  S/P IVC filter  H/O inguinal hernia repair  History of total left hip replacement    FAMILY HISTORY:  No pertinent family history in first degree relatives    SOCIAL HISTORY:      - Tobacco     - ETOH    Allergies    amoxicillin (Rash)  Cipro (Rash)  clindamycin (Unknown)  lactose (Unknown)  penicillin (Rash)  Wheat (Unknown)    Intolerances      Vital Signs Last 24 Hrs  T(C): 36.7 (24 Aug 2018 09:27), Max: 37.6 (23 Aug 2018 22:38)  T(F): 98.1 (24 Aug 2018 09:27), Max: 99.7 (23 Aug 2018 22:38)  HR: 70 (24 Aug 2018 10:00) (58 - 82)  BP: 83/50 (24 Aug 2018 10:00) (79/47 - 105/60)  BP(mean): 60 (24 Aug 2018 10:00) (58 - 76)  RR: 21 (24 Aug 2018 10:00) (11 - 24)  SpO2: 99% (24 Aug 2018 10:00) (98% - 100%)    08-23 @ 07:01 - 08-24 @ 07:00  --------------------------------------------------------  IN: 960 mL / OUT: 492 mL / NET: 468 mL    08-24 @ 07:01  -  08-24 @ 11:44  --------------------------------------------------------  IN: 40 mL / OUT: 22 mL / NET: 18 mL      Mode: CPAP with PS  FiO2: 40  PEEP: 5  PS: 12  MAP: 8  PIP: 18    PHYSICAL EXAM:  unresponsive , no immediate distress  Eyes: PERRL, EOM intact; conjunctiva and sclera clear  Head: Normocephalic;  No Trauma  ENMT: No nasal discharge, hoarseness, +cough   Neck: Supple; non tender; no masses or deformities.    No JVD  Respiratory:   - WHEEZING   +RHONCHI  - RALES  + CRACKLES.  Diminished breath sounds  BILATERAL  RIGHT > LEFT base  Cardiovascular: Regular rate and rhythm. S1 and S2 Normal; No murmurs, gallops or rubs     - PPM/AICD  Gastrointestinal: Soft non-tender, non-distended; Normal bowel sounds; No hepatosplenomegaly.     +_ site clean PEG    -+NGT GT   + SWENSON  Genitourinary: No costovertebral angle tenderness. No dysuria  Extremities: NO AROM, No clubbing, cyanosis or edema    Vascular: Peripheral pulses palpable 2+ bilaterally  Neurological: Intubated sedated   Skin: Warm and dry. No obvious rash  Lymph Nodes: No acute cervical or supraclavicular adenopathy  Psychiatric: On vent. unreseponsive to verbal stimuli    DEVICES:  - DENTURES   +IV R / L     - ETUBE   -TRACH   -CTUBE  R / L      LABS:              RADIOLOGY & ADDITIONAL STUDIES (The following images were personally reviewed):

## 2018-08-24 NOTE — PROGRESS NOTE ADULT - ASSESSMENT
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 ROSC achieved after apx 28 minutes.   - Pt currently intubated. No sedation. Assessing neuro status daily  - (Son) Dr. Goff understands the final read on Head CT which is c/w anoxic encephalopathy. He agrees to withdraw Midodrine and will only continue Amio, enteral feeds and mech vent.  - Scx growing proteus (pan sensitive) and staph aureus   - will d/c all ABs  - Ng sump removed    ID  #Aspiration PNA (Gram negative Proteus and MSSA)  - CXR: RLL opacity with bilateral pleural effusions.  - Sputum cultures +Pansensitive Proteus and MSSA.   - All ABs d/alissa per son  - comfort measures only.     NEURO  #AMS: Likely due to anoxic brain injury  - CTH: Poor gray white differentiation signifying possible anoxic brain injury. Poor study due to artifact.   - Poor prognosis for recovery per neuro.   - Palliative on board: In active discussion with son Dr. Goff  - comfort measures only. Withdrawing Midodrine and we will only continue Amio, enteral feeds and mech vent.    CV  #Cardiac Arrest: likely after pulmonary arrest from above  - EKG without ischemic changes   - trops 0.03. will not trend   - A line in place-right radial  -central line passed, cxr confirmed position     #Atrial flutter s/p cardioversion last admission  - c/w amiodarone 200 daily   - no A/C in setting of PMH of gastric AVM    HEMATOLOGY  #Anemia; now stable hgb 8.1  - Pt has a history of AVMS  - GI Dr. Hale consulted; monitor h/h. Pt likely not candidate for invasive procedures. Monitor H/H  -comfort measures only    F NO FLUIDS FOR NOW  E REPLETE AS NEEDED  N PEG feed rate changed to 40cc/hr  - Dispo: MICU

## 2018-08-24 NOTE — PROGRESS NOTE ADULT - SUBJECTIVE AND OBJECTIVE BOX
Patient is a 89y old  Female who presents with a chief complaint of cardio-pulmonary arrest (17 Aug 2018 03:20)      INTERVAL HPI/OVERNIGHT EVENTS:  ICU Vital Signs Last 24 Hrs  T(C): 36.4 (24 Aug 2018 01:04), Max: 37.6 (23 Aug 2018 22:38)  T(F): 97.5 (24 Aug 2018 01:04), Max: 99.7 (23 Aug 2018 22:38)  HR: 82 (24 Aug 2018 05:50) (58 - 82)  BP: 100/55 (24 Aug 2018 05:00) (79/47 - 107/60)  BP(mean): 76 (24 Aug 2018 05:00) (56 - 76)  ABP: --  ABP(mean): --  RR: 24 (24 Aug 2018 05:50) (11 - 24)  SpO2: 99% (24 Aug 2018 05:50) (98% - 100%)    I&O's Summary    22 Aug 2018 07:01  -  23 Aug 2018 07:00  --------------------------------------------------------  IN: 1040 mL / OUT: 408 mL / NET: 632 mL    23 Aug 2018 07:01  -  24 Aug 2018 06:21  --------------------------------------------------------  IN: 920 mL / OUT: 446 mL / NET: 474 mL      Mode: CPAP with PS  FiO2: 40  PEEP: 5  PS: 12  MAP: 9  PIP: 17      LABS:                        8.5    10.2  )-----------( 354      ( 22 Aug 2018 06:25 )             26.6     08-22    143  |  105  |  37<H>  ----------------------------<  132<H>  3.9   |  24  |  1.19    Ca    8.9      22 Aug 2018 06:24  Phos  2.8     08-22  Mg     2.2     08-22          CAPILLARY BLOOD GLUCOSE      POCT Blood Glucose.: 109 mg/dL (23 Aug 2018 06:55)        RADIOLOGY & ADDITIONAL TESTS:    Consultant(s) Notes Reviewed:  [x ] YES  [ ] NO    MEDICATIONS  (STANDING):  amiodarone    Tablet 200 milliGRAM(s) Oral daily  artificial  tears Solution 1 Drop(s) Both EYES daily  chlorhexidine 0.12% Liquid 15 milliLiter(s) Swish and Spit two times a day  chlorhexidine 2% Cloths 1 Application(s) Topical daily  petrolatum Ophthalmic Ointment 1 Application(s) Both EYES daily    MEDICATIONS  (PRN):      PHYSICAL EXAM:  GENERAL: well built, well nourished  HEAD:  Atraumatic, Normocephalic  EYES: EOMI, PERRLA, conjunctiva and sclera clear  ENT: No tonsillar erythema, exudates, or enlargement; Moist mucous membranes, Good dentition, No lesions  NECK: Supple, No JVD, Normal thyroid, no enlarged nodes  NERVOUS SYSTEM:  Alert & Oriented X3, Good concentration; Motor Strength 5/5 B/L upper and lower extremities; DTRs 2+ intact and symmetric, sensory intact  CHEST/LUNG: B/L good air entry; No rales, rhonchi, or wheezing  HEART: S1S2 normal, no S3, Regular rate and rhythm; No murmurs, rubs, or gallops  ABDOMEN: Soft, Nontender, Nondistended; Bowel sounds present  EXTREMITIES:  2+ Peripheral Pulses, No clubbing, cyanosis, or edema  LYMPH: No lymphadenopathy noted  SKIN: No rashes or lesions    Care Discussed with Consultants/Other Providers [ x] YES  [ ] NO

## 2018-08-25 PROCEDURE — 99232 SBSQ HOSP IP/OBS MODERATE 35: CPT

## 2018-08-25 PROCEDURE — 99232 SBSQ HOSP IP/OBS MODERATE 35: CPT | Mod: GC

## 2018-08-25 RX ADMIN — Medication 1 DROP(S): at 11:32

## 2018-08-25 RX ADMIN — CHLORHEXIDINE GLUCONATE 1 APPLICATION(S): 213 SOLUTION TOPICAL at 07:20

## 2018-08-25 RX ADMIN — CHLORHEXIDINE GLUCONATE 15 MILLILITER(S): 213 SOLUTION TOPICAL at 07:19

## 2018-08-25 RX ADMIN — CHLORHEXIDINE GLUCONATE 15 MILLILITER(S): 213 SOLUTION TOPICAL at 11:32

## 2018-08-25 RX ADMIN — Medication 1 APPLICATION(S): at 11:32

## 2018-08-25 RX ADMIN — AMIODARONE HYDROCHLORIDE 200 MILLIGRAM(S): 400 TABLET ORAL at 07:20

## 2018-08-25 NOTE — PROGRESS NOTE ADULT - ASSESSMENT
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 ROSC achieved after apx 28 minutes.   - Pt currently intubated. No sedation. Assessing neuro status daily  - (Son) Dr. Goff understands the final read on Head CT which is c/w anoxic encephalopathy. He agrees to withdraw Midodrine and will only continue Amio, enteral feeds and mech vent.  - Scx growing proteus (pan sensitive) and staph aureus   - will d/c all ABs  - Ng sump removed    ID  #Aspiration PNA (Gram negative Proteus and MSSA)  - CXR: RLL opacity with bilateral pleural effusions.  - Sputum cultures +Pansensitive Proteus and MSSA.   - All ABs d/alissa per son  - comfort measures only.     NEURO  #AMS: Likely due to anoxic brain injury  - CTH: Poor gray white differentiation signifying possible anoxic brain injury. Poor study due to artifact.   - Poor prognosis for recovery per neuro.   - Palliative on board: In active discussion with son Dr. Goff  - comfort measures only. Withdrawing Midodrine and we will only continue Amio, enteral feeds and mech vent.    CV  #Cardiac Arrest: likely after pulmonary arrest from above  - EKG without ischemic changes   - trops 0.03. will not trend   - A line in place-right radial  -central line passed, cxr confirmed position     #Atrial flutter s/p cardioversion last admission  - c/w amiodarone 200 daily   - no A/C in setting of PMH of gastric AVM    HEMATOLOGY  #Anemia; now stable hgb 8.1  - Pt has a history of AVMS  - GI Dr. Hale consulted; monitor h/h. Pt likely not candidate for invasive procedures. Monitor H/H  -comfort measures only    F NO FLUIDS FOR NOW  E REPLETE AS NEEDED  N PEG feed rate changed to 40cc/hr  - Dispo: MICU 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 ROSC achieved after apx 28 minutes.   - Pt currently intubated. No sedation. Assessing neuro status daily  - (Son) Dr. Goff understands the final read on Head CT which is c/w anoxic encephalopathy. He agrees to withdraw Midodrine and will only continue Amio, enteral feeds and mech vent.  - Scx growing proteus (pan sensitive) and staph aureus   - will d/c all ABs  - Ng sump removed  - 8/24: will continue discussion with son. No changes to goals of care yet.     ID  #Aspiration PNA (Gram negative Proteus and MSSA)  - CXR: RLL opacity with bilateral pleural effusions.  - Sputum cultures +Pansensitive Proteus and MSSA.   - All ABs d/alissa per son  - comfort measures only.     NEURO  #AMS: Likely due to anoxic brain injury  - CTH: Poor gray white differentiation signifying possible anoxic brain injury. Poor study due to artifact.   - Poor prognosis for recovery per neuro.   - Palliative on board: In active discussion with son Dr. Goff  - comfort measures only. Withdrawing Midodrine and we will only continue Amio, enteral feeds and mech vent.    CV  #Cardiac Arrest: likely after pulmonary arrest from above  - EKG without ischemic changes   - trops 0.03. will not trend   - A line in place-right radial  -central line passed, cxr confirmed position     #Atrial flutter s/p cardioversion last admission  - c/w amiodarone 200 daily   - no A/C in setting of PMH of gastric AVM    HEMATOLOGY  #Anemia; now stable hgb 8.1  - Pt has a history of AVMS  - GI Dr. Hale consulted; monitor h/h. Pt likely not candidate for invasive procedures. Monitor H/H  -comfort measures only    F NO FLUIDS FOR NOW  E REPLETE AS NEEDED  N PEG feed rate changed to 40cc/hr  - Dispo: MICU

## 2018-08-25 NOTE — PROGRESS NOTE ADULT - SUBJECTIVE AND OBJECTIVE BOX
Interventional, Pulmonary, Critical, Chest Special Procedures.    Pt was seen and fully examined by myself.     Time spent with patient in minutes:37    Patient is a 89y old  Female who presents with a chief complaint of cardio-pulmonary arrest (17 Aug 2018 03:20) No new event, Vent circuit integrity preserved.    HPI:  89 F w/ pmhx of dementia, R. femoral thrombus s/p IVC filter 1/2017, chronic anemia, presents to ED from [facility] after cardio-pulmonary arrest. Patient recently admitted to MICU for hypoxic respiratory failure 2/2 aspiration pneumonia. Patient eventually extubated, received PEG tube, and discharged back to [facility] on 08/14. Hospital course complicated by unstable atrial fibrillation s/p cardioversion, placed on amiodarone with stabilization of HR. Per ER, patient unable to tolerate secretions, found to be hypoxic at NH and eventually lost a pulse. Patient intubated in the [facility], ACLS protocol begun and ROSC achieved after 3 rounds of epinephrine. (17 Aug 2018 03:20)    REVIEW OF SYSTEMS:  Constitutional: No fever, +weight loss, chills + fatigue  Eyes: No eye pain, visual disturbances, or discharge  ENMT:  + difficulty hearing, tinnitus, vertigo; No sinus or throat pain. No epistaxis, +dysphagia, dysphonia, hoarseness   Neck: No pain, stiffness or neck swelling.  No masses or deformities  Respiratory: +cough, wheezing, chills or hemoptysis  - COPD  - ILD   - PE   - ASTHMA     +PNEUMONIA  Cardiovascular: No chest pain, +dysrhythmia, palpitations, dizziness or edema   - COPD     - CAD   - CHF   - HTN    PAST MEDICAL & SURGICAL HISTORY:  Dementia  DVT (deep venous thrombosis)  Dysphagia  Anemia  DJD (degenerative joint disease)  Edema  IBS (irritable bowel syndrome)  Celiac disease  S/P IVC filter  H/O inguinal hernia repair  History of total left hip replacement    FAMILY HISTORY:  No pertinent family history in first degree relatives    SOCIAL HISTORY:      - Tobacco     - ETOH    Allergies    amoxicillin (Rash)  Cipro (Rash)  clindamycin (Unknown)  lactose (Unknown)  penicillin (Rash)  Wheat (Unknown)    Intolerances      Vital Signs Last 24 Hrs  T(C): 36.7 (25 Aug 2018 14:00), Max: 37.5 (24 Aug 2018 17:37)  T(F): 98 (25 Aug 2018 14:00), Max: 99.5 (24 Aug 2018 17:37)  HR: 80 (25 Aug 2018 15:00) (74 - 86)  BP: 95/56 (25 Aug 2018 15:00) (79/50 - 106/56)  BP(mean): 70 (25 Aug 2018 15:00) (58 - 81)  RR: 9 (25 Aug 2018 15:00) (9 - 18)  SpO2: 98% (25 Aug 2018 15:00) (97% - 100%)    08-24 @ 07:01 - 08-25 @ 07:00  --------------------------------------------------------  IN: 960 mL / OUT: 666 mL / NET: 294 mL    08-25 @ 07:01 - 08-25 @ 16:18  --------------------------------------------------------  IN: 320 mL / OUT: 295 mL / NET: 25 mL      Mode: AC/ CMV (Assist Control/ Continuous Mandatory Ventilation)  RR (machine): 10  TV (machine): 360  FiO2: 40  PEEP: 5  ITime: 1  MAP: 10  PIP: 28    PHYSICAL EXAM:  unresponsive , no immediate distress  Eyes: PERRL, EOM intact; conjunctiva and sclera clear  Head: Normocephalic;  No Trauma  ENMT: No nasal discharge, hoarseness, +cough   Neck: Supple; non tender; no masses or deformities.    No JVD  Respiratory:   - WHEEZING   +RHONCHI  - RALES  + CRACKLES.  Diminished breath sounds  BILATERAL  RIGHT > LEFT base  Cardiovascular: Regular rate and rhythm. S1 and S2 Normal; No murmurs, gallops or rubs     - PPM/AICD  Gastrointestinal: Soft non-tender, non-distended; Normal bowel sounds; No hepatosplenomegaly.     +_ site clean PEG    -+NGT GT   + SWENSON  Genitourinary: No costovertebral angle tenderness. No dysuria  Extremities: NO AROM, No clubbing, cyanosis or edema    Vascular: Peripheral pulses palpable 2+ bilaterally  Neurological: Intubated sedated   Skin: Warm and dry. No obvious rash  Lymph Nodes: No acute cervical or supraclavicular adenopathy  Psychiatric: On vent. unreseponsive to verbal stimuli  DEVICES:  - DENTURES   +IV R / L     - ETUBE   -TRACH   -CTUBE  R / L      LABS:          < from: Xray Chest 1 View- PORTABLE-Routine (08.23.18 @ 03:32) >    EXAM:  XR CHEST PORTABLE ROUTINE 1V                          PROCEDURE DATE:  08/23/2018          INTERPRETATION:  Portable Chest X-Ray dated 8/23/2018 3:32 AM    Indication: Eval ET tube    Prior studies: 8/22/2018    An AP portable view of the chest reveals an endotracheal tube, but the   angel is not clearly visualized on this radiograph. The pulmonary apices   are obscured by the patient's jaw on the left and was not imaged on the   right. There is mild pulmonary venous congestion. Small right pleural   effusion. Atelectatic changes in the lower lobes left greater than right.   NG tube tip in the stomach. Right central venous catheter with the tip in   the right atrium. Stable appearance of the bones.    IMPRESSION:  Probable no significant interval change accounting for differences in   positioning and technique. Angel is not clearly seen on this film.    < end of copied text >      RADIOLOGY & ADDITIONAL STUDIES (The following images were personally reviewed):

## 2018-08-25 NOTE — PROGRESS NOTE ADULT - SUBJECTIVE AND OBJECTIVE BOX
Neurology Follow Up   3PM  Interval History:  Patient seen and examined.  no acute events overnight.    MEDICATIONS  (STANDING):  amiodarone    Tablet 200 milliGRAM(s) Oral daily  artificial  tears Solution 1 Drop(s) Both EYES daily  chlorhexidine 0.12% Liquid 15 milliLiter(s) Swish and Spit two times a day  chlorhexidine 2% Cloths 1 Application(s) Topical daily  petrolatum Ophthalmic Ointment 1 Application(s) Both EYES daily    Allergies    amoxicillin (Rash)  Cipro (Rash)  clindamycin (Unknown)  lactose (Unknown)  penicillin (Rash)  Wheat (Unknown)    Exam:  ICU Vital Signs Last 24 Hrs  T(C): 36.7 (25 Aug 2018 04:41), Max: 37.5 (24 Aug 2018 17:37)  T(F): 98.1 (25 Aug 2018 04:41), Max: 99.5 (24 Aug 2018 17:37)  HR: 84 (25 Aug 2018 07:55) (70 - 86)  BP: 99/61 (25 Aug 2018 07:55) (79/49 - 102/58)  BP(mean): 79 (25 Aug 2018 07:55) (58 - 79)  RR: 12 (25 Aug 2018 07:55) (10 - 21)  SpO2: 98% (25 Aug 2018 07:55) (98% - 100%)      General: Lying in bed, NAD, intubated - breathing over vent  Neuro:  Mental status: opened eyes to pressure to her shoulder but not directly interactive or responsive, not following commands  Cranial nerves: eyes first midline then moved to left but no oculocephalic reflex at either position, pupils reactive to light bilaterally, no corneal reflexes bilaterally, unable assess facial secondary to ETT, minimal gag reflex at beginning of tube placement but no response at end  Motor: No spontaneous movements   Sensation: No response to pain besides reflex  Coordination: unable to assess   Reflexes: absent Babinski bilaterally  Gait: deferred    Labs:  no new labs    Assessment and plan: 89 year-old female with multiple medical problems presents from [facility] post cardiac arrest s/p intubation with anoxic brain injury on CT Head 8/21.  Her neurological status is stable.   Overall poor prognosis

## 2018-08-26 PROCEDURE — 99232 SBSQ HOSP IP/OBS MODERATE 35: CPT | Mod: GC

## 2018-08-26 RX ORDER — POLYETHYLENE GLYCOL 3350 17 G/17G
17 POWDER, FOR SOLUTION ORAL DAILY
Qty: 0 | Refills: 0 | Status: DISCONTINUED | OUTPATIENT
Start: 2018-08-26 | End: 2018-08-28

## 2018-08-26 RX ADMIN — CHLORHEXIDINE GLUCONATE 1 APPLICATION(S): 213 SOLUTION TOPICAL at 06:14

## 2018-08-26 RX ADMIN — AMIODARONE HYDROCHLORIDE 200 MILLIGRAM(S): 400 TABLET ORAL at 06:14

## 2018-08-26 RX ADMIN — POLYETHYLENE GLYCOL 3350 17 GRAM(S): 17 POWDER, FOR SOLUTION ORAL at 17:32

## 2018-08-26 RX ADMIN — CHLORHEXIDINE GLUCONATE 15 MILLILITER(S): 213 SOLUTION TOPICAL at 23:14

## 2018-08-26 RX ADMIN — Medication 1 APPLICATION(S): at 12:49

## 2018-08-26 RX ADMIN — Medication 1 DROP(S): at 12:49

## 2018-08-26 RX ADMIN — CHLORHEXIDINE GLUCONATE 15 MILLILITER(S): 213 SOLUTION TOPICAL at 12:49

## 2018-08-26 NOTE — PROGRESS NOTE ADULT - SUBJECTIVE AND OBJECTIVE BOX
Patient is a 89y old  Female who presents with a chief complaint of cardio-pulmonary arrest (17 Aug 2018 03:20)      INTERVAL HPI/OVERNIGHT EVENTS:  ICU Vital Signs Last 24 Hrs  T(C): 35.8 (26 Aug 2018 04:24), Max: 36.7 (25 Aug 2018 14:00)  T(F): 96.5 (26 Aug 2018 04:24), Max: 98 (25 Aug 2018 14:00)  HR: 82 (26 Aug 2018 06:53) (76 - 86)  BP: 107/61 (26 Aug 2018 06:00) (79/50 - 111/57)  BP(mean): 76 (26 Aug 2018 06:00) (58 - 94)  ABP: --  ABP(mean): --  RR: 12 (26 Aug 2018 06:53) (9 - 22)  SpO2: 96% (26 Aug 2018 06:53) (96% - 99%)    I&O's Summary    24 Aug 2018 07:01  -  25 Aug 2018 07:00  --------------------------------------------------------  IN: 960 mL / OUT: 666 mL / NET: 294 mL    25 Aug 2018 07:01  -  26 Aug 2018 06:56  --------------------------------------------------------  IN: 840 mL / OUT: 560 mL / NET: 280 mL      Mode: AC/ CMV (Assist Control/ Continuous Mandatory Ventilation)  RR (machine): 10  TV (machine): 380  FiO2: 40  PEEP: 5  ITime: 1  MAP: 8.8  PIP: 25      LABS:              CAPILLARY BLOOD GLUCOSE            RADIOLOGY & ADDITIONAL TESTS:    Consultant(s) Notes Reviewed:  [x ] YES  [ ] NO    MEDICATIONS  (STANDING):  amiodarone    Tablet 200 milliGRAM(s) Oral daily  artificial  tears Solution 1 Drop(s) Both EYES daily  chlorhexidine 0.12% Liquid 15 milliLiter(s) Swish and Spit two times a day  chlorhexidine 2% Cloths 1 Application(s) Topical daily  petrolatum Ophthalmic Ointment 1 Application(s) Both EYES daily    MEDICATIONS  (PRN):      PHYSICAL EXAM:  GENERAL: well built, well nourished  HEAD:  Atraumatic, Normocephalic  EYES: EOMI, PERRLA, conjunctiva and sclera clear  ENT: No tonsillar erythema, exudates, or enlargement; Moist mucous membranes, Good dentition, No lesions  NECK: Supple, No JVD, Normal thyroid, no enlarged nodes  NERVOUS SYSTEM:  Alert & Oriented X3, Good concentration; Motor Strength 5/5 B/L upper and lower extremities; DTRs 2+ intact and symmetric, sensory intact  CHEST/LUNG: B/L good air entry; No rales, rhonchi, or wheezing  HEART: S1S2 normal, no S3, Regular rate and rhythm; No murmurs, rubs, or gallops  ABDOMEN: Soft, Nontender, Nondistended; Bowel sounds present  EXTREMITIES:  2+ Peripheral Pulses, No clubbing, cyanosis, or edema  LYMPH: No lymphadenopathy noted  SKIN: No rashes or lesions    Care Discussed with Consultants/Other Providers [ x] YES  [ ] NO OVERNIGHT EVENTS: ALYCIA    INTERVAL HPI: Pt is examined at bedside. Pt is intubated, not sedated. Neurologically unchanged. Not alert/oriented, unresponsive to commands.     ICU Vital Signs Last 24 Hrs  T(C): 35.8 (26 Aug 2018 04:24), Max: 36.7 (25 Aug 2018 14:00)  T(F): 96.5 (26 Aug 2018 04:24), Max: 98 (25 Aug 2018 14:00)  HR: 82 (26 Aug 2018 06:53) (76 - 86)  BP: 107/61 (26 Aug 2018 06:00) (79/50 - 111/57)  BP(mean): 76 (26 Aug 2018 06:00) (58 - 94)  ABP: --  ABP(mean): --  RR: 12 (26 Aug 2018 06:53) (9 - 22)  SpO2: 96% (26 Aug 2018 06:53) (96% - 99%)    I&O's Summary    24 Aug 2018 07:01  -  25 Aug 2018 07:00  --------------------------------------------------------  IN: 960 mL / OUT: 666 mL / NET: 294 mL    25 Aug 2018 07:01  -  26 Aug 2018 06:56  --------------------------------------------------------  IN: 840 mL / OUT: 560 mL / NET: 280 mL      Mode: AC/ CMV (Assist Control/ Continuous Mandatory Ventilation)  RR (machine): 10  TV (machine): 380  FiO2: 40  PEEP: 5  ITime: 1  MAP: 8.8  PIP: 25      LABS:              CAPILLARY BLOOD GLUCOSE            RADIOLOGY & ADDITIONAL TESTS:    Consultant(s) Notes Reviewed:  [x ] YES  [ ] NO    MEDICATIONS  (STANDING):  amiodarone    Tablet 200 milliGRAM(s) Oral daily  artificial  tears Solution 1 Drop(s) Both EYES daily  chlorhexidine 0.12% Liquid 15 milliLiter(s) Swish and Spit two times a day  chlorhexidine 2% Cloths 1 Application(s) Topical daily  petrolatum Ophthalmic Ointment 1 Application(s) Both EYES daily    MEDICATIONS  (PRN):    PHYSICAL EXAM:  GENERAL: Intubated  HEAD:  Atraumatic, Normocephalic  EYES: Pupils dilated, mildly responsive to light, conjunctiva and sclera clear  NECK: Supple, No JVD, Normal thyroid, no enlarged nodes  NERVOUS SYSTEM: Eyes open but not awake, not alert, unresponsive to commands/verbal/painful stimili. +corneal reflex, pupils reactive to light. Minimal gag reflex.    CHEST/LUNG: B/L good air entry; No rales, rhonchi, or wheezing  HEART: S1S2 normal, no S3, Regular rate and rhythm; No murmurs, rubs, or gallops  ABDOMEN: Soft, Nontender, Nondistended; Bowel sounds present  EXTREMITIEs: B/L pedal 2+ pitting edema; UE withdrawal to noxious stimuli  LYMPH: No lymphadenopathy noted  SKIN: No rashes or lesions    Care Discussed with Consultants/Other Providers [ x] YES  [ ] NO OVERNIGHT EVENTS: ALYCIA    INTERVAL HPI: Pt is examined at bedside. Pt is intubated, not sedated. Neurologically unchanged. Not alert/oriented, unresponsive to commands. ROS not obtainable    ICU Vital Signs Last 24 Hrs  T(C): 35.8 (26 Aug 2018 04:24), Max: 36.7 (25 Aug 2018 14:00)  T(F): 96.5 (26 Aug 2018 04:24), Max: 98 (25 Aug 2018 14:00)  HR: 82 (26 Aug 2018 06:53) (76 - 86)  BP: 107/61 (26 Aug 2018 06:00) (79/50 - 111/57)  BP(mean): 76 (26 Aug 2018 06:00) (58 - 94)  ABP: --  ABP(mean): --  RR: 12 (26 Aug 2018 06:53) (9 - 22)  SpO2: 96% (26 Aug 2018 06:53) (96% - 99%)    I&O's Summary    24 Aug 2018 07:01  -  25 Aug 2018 07:00  --------------------------------------------------------  IN: 960 mL / OUT: 666 mL / NET: 294 mL    25 Aug 2018 07:01  -  26 Aug 2018 06:56  --------------------------------------------------------  IN: 840 mL / OUT: 560 mL / NET: 280 mL      Mode: AC/ CMV (Assist Control/ Continuous Mandatory Ventilation)  RR (machine): 10  TV (machine): 380  FiO2: 40  PEEP: 5  ITime: 1  MAP: 8.8  PIP: 25      LABS: none              CAPILLARY BLOOD GLUCOSE            RADIOLOGY & ADDITIONAL TESTS:    Consultant(s) Notes Reviewed:  [x ] YES  [ ] NO    MEDICATIONS  (STANDING):  amiodarone    Tablet 200 milliGRAM(s) Oral daily  artificial  tears Solution 1 Drop(s) Both EYES daily  chlorhexidine 0.12% Liquid 15 milliLiter(s) Swish and Spit two times a day  chlorhexidine 2% Cloths 1 Application(s) Topical daily  petrolatum Ophthalmic Ointment 1 Application(s) Both EYES daily    MEDICATIONS  (PRN):    PHYSICAL EXAM:  GENERAL: Intubated  HEAD:  Atraumatic, Normocephalic  EYES: Pupils dilated, mildly responsive to light, conjunctiva and sclera clear  NECK: Supple, No JVD, Normal thyroid, no enlarged nodes  NERVOUS SYSTEM: Eyes open but not awake, not alert, unresponsive to commands/verbal/painful stimili. +corneal reflex, pupils reactive to light. Minimal gag reflex.    CHEST/LUNG: B/L good air entry; No rales, rhonchi, or wheezing  HEART: S1S2 normal, no S3, Regular rate and rhythm; No murmurs, rubs, or gallops  ABDOMEN: Soft, Nontender, Nondistended; Bowel sounds present  EXTREMITIEs: B/L pedal 2+ pitting edema; UE withdrawal to noxious stimuli  LYMPH: No lymphadenopathy noted  SKIN: No rashes or lesions    Care Discussed with Consultants/Other Providers [ x] YES  [ ] NO

## 2018-08-26 NOTE — PROGRESS NOTE ADULT - ASSESSMENT
ASSESSMENT/PLAN    1) Respiratory failure  2) Status post cardiac arrest  3) Pneumonia recurrent aspiration  4) Pulmonary hypertension  5) Anoxic brain injury  6) Anemia; rule out GIB    Satisfactory SpO2, Continue CMV mode, no weaning attempts at this time  Bronchodilators:  Atrovent/ albuterol q 4 – 6 hours as needed  ID/Antibiotics: ancef  Cardiac/HTN: amiodarone, taper pressors as tolerated  GI: Rx c PPI/H2B  Heme: Rx/VT prophylaxis, blood products as necessary  Aspiration precautions  Discussed with medical team, poor prognosis. Palliative feedback to clarify GOC

## 2018-08-26 NOTE — PROGRESS NOTE ADULT - ASSESSMENT
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 ROSC achieved after apx 28 minutes.   - Pt currently intubated. No sedation. Assessing neuro status daily  - (Son) Dr. Goff understands the final read on Head CT which is c/w anoxic encephalopathy. He agrees to withdraw Midodrine and will only continue Amio, enteral feeds and mech vent. Now considering possible tracheostomy.   - will continue discussion with son. No changes to goals of care yet.     ID  #Aspiration PNA (Gram negative Proteus and MSSA); CXR: RLL opacity with bilateral pleural effusions; Sputum cultures +Pansensitive Proteus and MSSA.   - All ABs d/alissa per son  - comfort measures as of now. No change in GOC as of yet.     NEURO  #AMS: Likely due to anoxic brain injury  - CTH: Poor gray white differentiation signifying possible anoxic brain injury. Poor study due to artifact.   - Poor prognosis for recovery per neuro.   - Palliative on board: In active discussion with son Dr. Goff    CV  #Cardiac Arrest: likely after pulmonary arrest from above  - EKG without ischemic changes   - trops 0.03. will not trend   - A line in place-right radial  - central line passed, cxr confirmed position     #Atrial flutter s/p cardioversion last admission  - c/w amiodarone 200 daily   - no A/C in setting of PMH of gastric AVM    HEMATOLOGY  #Anemia; now stable hgb 8.5  - Pt has a history of AVMS  - GI Dr. Hale consulted; monitor h/h. Pt likely not candidate for invasive procedures. Monitor H/H    F NO FLUIDS FOR NOW  E REPLETE AS NEEDED  N PEG in place: Continue feeds @ 40cc/hr  - Dispo: MICU

## 2018-08-26 NOTE — PROGRESS NOTE ADULT - SUBJECTIVE AND OBJECTIVE BOX
Interventional, Pulmonary, Critical, Chest Special Procedures.    Pt was seen and fully examined by myself.     Time spent with patient in minutes:37    Patient is a 89y old  Female who presents with a chief complaint of cardio-pulmonary arrest (17 Aug 2018 03:20) The patient c no response to verbal stimuli, appears comfortable and in synchrony c CMV mode,  Vent integrity verified and preseved    HPI:  89 F w/ pmhx of dementia, R. femoral thrombus s/p IVC filter 1/2017, chronic anemia, presents to ED from [facility] after cardio-pulmonary arrest. Patient recently admitted to MICU for hypoxic respiratory failure 2/2 aspiration pneumonia. Patient eventually extubated, received PEG tube, and discharged back to [facility] on 08/14. Hospital course complicated by unstable atrial fibrillation s/p cardioversion, placed on amiodarone with stabilization of HR. Per ER, patient unable to tolerate secretions, found to be hypoxic at NH and eventually lost a pulse. Patient intubated in the fei, ACLS protocol begun and ROSC achieved after 3 rounds of epinephrine. (17 Aug 2018 03:20)    REVIEW OF SYSTEMS:  Constitutional: No fever, +weight loss, chills + fatigue  Eyes: No eye pain, visual disturbances, or discharge  ENMT:  + difficulty hearing, tinnitus, vertigo; No sinus or throat pain. No epistaxis, +dysphagia, dysphonia, hoarseness   Neck: No pain, stiffness or neck swelling.  No masses or deformities  Respiratory: +cough, wheezing, chills or hemoptysis  - COPD  - ILD   - PE   - ASTHMA     +PNEUMONIA  Cardiovascular: No chest pain, +dysrhythmia, palpitations, dizziness or edema   - COPD     - CAD   - CHF   - HTN  PAST MEDICAL & SURGICAL HISTORY:  Dementia  DVT (deep venous thrombosis)  Dysphagia  Anemia  DJD (degenerative joint disease)  Edema  IBS (irritable bowel syndrome)  Celiac disease  S/P IVC filter  H/O inguinal hernia repair  History of total left hip replacement    FAMILY HISTORY:  No pertinent family history in first degree relatives    SOCIAL HISTORY:      - Tobacco     - ETOH    Allergies    amoxicillin (Rash)  Cipro (Rash)  clindamycin (Unknown)  lactose (Unknown)  penicillin (Rash)  Wheat (Unknown)    Intolerances      Vital Signs Last 24 Hrs  T(C): 35.8 (26 Aug 2018 04:24), Max: 36.7 (25 Aug 2018 14:00)  T(F): 96.5 (26 Aug 2018 04:24), Max: 98 (25 Aug 2018 14:00)  HR: 86 (26 Aug 2018 11:00) (76 - 86)  BP: 100/61 (26 Aug 2018 11:00) (79/50 - 111/57)  BP(mean): 76 (26 Aug 2018 11:00) (58 - 94)  RR: 14 (26 Aug 2018 11:00) (9 - 22)  SpO2: 97% (26 Aug 2018 11:00) (95% - 99%)    08-25 @ 07:01 - 08-26 @ 07:00  --------------------------------------------------------  IN: 840 mL / OUT: 598 mL / NET: 242 mL    08-26 @ 07:01 - 08-26 @ 11:45  --------------------------------------------------------  IN: 40 mL / OUT: 18 mL / NET: 22 mL      Mode: AC/ CMV (Assist Control/ Continuous Mandatory Ventilation)  RR (machine): 10  TV (machine): 380  FiO2: 40  PEEP: 5  ITime: 1  MAP: 9.6  PIP: 28    PHYSICAL EXAM:  unresponsive , no immediate distress  Eyes: PERRL, EOM intact; conjunctiva and sclera clear  Head: Normocephalic;  No Trauma  ENMT: No nasal discharge, hoarseness, +cough   Neck: Supple; non tender; no masses or deformities.    No JVD  Respiratory:   - WHEEZING   +RHONCHI  - RALES  + CRACKLES.  Diminished breath sounds  BILATERAL  RIGHT > LEFT base  Cardiovascular: Regular rate and rhythm. S1 and S2 Normal; No murmurs, gallops or rubs     - PPM/AICD  Gastrointestinal: Soft non-tender, non-distended; Normal bowel sounds; No hepatosplenomegaly.     +_ site clean PEG    -+NGT GT   + SWENSON  Genitourinary: No costovertebral angle tenderness. No dysuria  Extremities: NO AROM, No clubbing, cyanosis or edema    Vascular: Peripheral pulses palpable 2+ bilaterally  Neurological: Intubated sedated   Skin: Warm and dry. No obvious rash  Lymph Nodes: No acute cervical or supraclavicular adenopathy  Psychiatric: On vent. unreseponsive to verbal stimuli  DEVICES:  - DENTURES   +IV R / L     +6.5 ETUBE   -TRACH   -CTUBE  R / L        LABS:              RADIOLOGY & ADDITIONAL STUDIES (The following images were personally reviewed):

## 2018-08-27 PROCEDURE — 99232 SBSQ HOSP IP/OBS MODERATE 35: CPT | Mod: GC

## 2018-08-27 PROCEDURE — 99232 SBSQ HOSP IP/OBS MODERATE 35: CPT

## 2018-08-27 RX ADMIN — Medication 1 DROP(S): at 12:53

## 2018-08-27 RX ADMIN — POLYETHYLENE GLYCOL 3350 17 GRAM(S): 17 POWDER, FOR SOLUTION ORAL at 12:53

## 2018-08-27 RX ADMIN — CHLORHEXIDINE GLUCONATE 15 MILLILITER(S): 213 SOLUTION TOPICAL at 12:53

## 2018-08-27 RX ADMIN — Medication 1 APPLICATION(S): at 12:53

## 2018-08-27 RX ADMIN — CHLORHEXIDINE GLUCONATE 1 APPLICATION(S): 213 SOLUTION TOPICAL at 06:33

## 2018-08-27 RX ADMIN — AMIODARONE HYDROCHLORIDE 200 MILLIGRAM(S): 400 TABLET ORAL at 06:34

## 2018-08-27 NOTE — PROGRESS NOTE ADULT - SUBJECTIVE AND OBJECTIVE BOX
Patient is a 89y old  Female who presents with a chief complaint of cardio-pulmonary arrest (17 Aug 2018 03:20)      INTERVAL HPI/OVERNIGHT EVENTS:  ICU Vital Signs Last 24 Hrs  T(C): 36.6 (27 Aug 2018 04:00), Max: 37.1 (27 Aug 2018 01:02)  T(F): 97.8 (27 Aug 2018 04:00), Max: 98.7 (27 Aug 2018 01:02)  HR: 90 (27 Aug 2018 08:00) (82 - 96)  BP: 111/61 (27 Aug 2018 08:00) (76/49 - 118/68)  BP(mean): 83 (27 Aug 2018 08:00) (57 - 88)  ABP: --  ABP(mean): --  RR: 37 (27 Aug 2018 08:00) (0 - 41)  SpO2: 93% (27 Aug 2018 08:00) (92% - 100%)    I&O's Summary    26 Aug 2018 07:01  -  27 Aug 2018 07:00  --------------------------------------------------------  IN: 1115 mL / OUT: 503 mL / NET: 612 mL    27 Aug 2018 07:01  -  27 Aug 2018 08:12  --------------------------------------------------------  IN: 45 mL / OUT: 0 mL / NET: 45 mL      Mode: CPAP with PS  FiO2: 40  PEEP: 5  MAP: 8.9  PIP: 17      LABS:              CAPILLARY BLOOD GLUCOSE            RADIOLOGY & ADDITIONAL TESTS:    Consultant(s) Notes Reviewed:  [x ] YES  [ ] NO    MEDICATIONS  (STANDING):  amiodarone    Tablet 200 milliGRAM(s) Oral daily  artificial  tears Solution 1 Drop(s) Both EYES daily  chlorhexidine 0.12% Liquid 15 milliLiter(s) Swish and Spit two times a day  chlorhexidine 2% Cloths 1 Application(s) Topical daily  petrolatum Ophthalmic Ointment 1 Application(s) Both EYES daily  polyethylene glycol 3350 17 Gram(s) Oral daily    MEDICATIONS  (PRN):      PHYSICAL EXAM:  GENERAL: well built, well nourished  HEAD:  Atraumatic, Normocephalic  EYES: EOMI, PERRLA, conjunctiva and sclera clear  ENT: No tonsillar erythema, exudates, or enlargement; Moist mucous membranes, Good dentition, No lesions  NECK: Supple, No JVD, Normal thyroid, no enlarged nodes  NERVOUS SYSTEM:  Alert & Oriented X3, Good concentration; Motor Strength 5/5 B/L upper and lower extremities; DTRs 2+ intact and symmetric, sensory intact  CHEST/LUNG: B/L good air entry; No rales, rhonchi, or wheezing  HEART: S1S2 normal, no S3, Regular rate and rhythm; No murmurs, rubs, or gallops  ABDOMEN: Soft, Nontender, Nondistended; Bowel sounds present  EXTREMITIES:  2+ Peripheral Pulses, No clubbing, cyanosis, or edema  LYMPH: No lymphadenopathy noted  SKIN: No rashes or lesions    Care Discussed with Consultants/Other Providers [ x] YES  [ ] NO OVERNIGHT EVENTS: NPO overnight for possible trach.     INTERVAL HPI: Pt is examined at bedside. She is unresponsive to commands.     ICU Vital Signs Last 24 Hrs  T(C): 36.6 (27 Aug 2018 04:00), Max: 37.1 (27 Aug 2018 01:02)  T(F): 97.8 (27 Aug 2018 04:00), Max: 98.7 (27 Aug 2018 01:02)  HR: 90 (27 Aug 2018 08:00) (82 - 96)  BP: 111/61 (27 Aug 2018 08:00) (76/49 - 118/68)  BP(mean): 83 (27 Aug 2018 08:00) (57 - 88)  ABP: --  ABP(mean): --  RR: 37 (27 Aug 2018 08:00) (0 - 41)  SpO2: 93% (27 Aug 2018 08:00) (92% - 100%)    I&O's Summary    26 Aug 2018 07:01  -  27 Aug 2018 07:00  --------------------------------------------------------  IN: 1115 mL / OUT: 503 mL / NET: 612 mL    27 Aug 2018 07:01  -  27 Aug 2018 08:12  --------------------------------------------------------  IN: 45 mL / OUT: 0 mL / NET: 45 mL      Mode: CPAP with PS  FiO2: 40  PEEP: 5  MAP: 8.9  PIP: 17      LABS:              CAPILLARY BLOOD GLUCOSE            RADIOLOGY & ADDITIONAL TESTS:    Consultant(s) Notes Reviewed:  [x ] YES  [ ] NO    MEDICATIONS  (STANDING):  amiodarone    Tablet 200 milliGRAM(s) Oral daily  artificial  tears Solution 1 Drop(s) Both EYES daily  chlorhexidine 0.12% Liquid 15 milliLiter(s) Swish and Spit two times a day  chlorhexidine 2% Cloths 1 Application(s) Topical daily  petrolatum Ophthalmic Ointment 1 Application(s) Both EYES daily  polyethylene glycol 3350 17 Gram(s) Oral daily    MEDICATIONS  (PRN):      PHYSICAL EXAM:  GENERAL: well built, well nourished  HEAD:  Atraumatic, Normocephalic  EYES: EOMI, PERRLA, conjunctiva and sclera clear  ENT: No tonsillar erythema, exudates, or enlargement; Moist mucous membranes, Good dentition, No lesions  NECK: Supple, No JVD, Normal thyroid, no enlarged nodes  NERVOUS SYSTEM:  Alert & Oriented X3, Good concentration; Motor Strength 5/5 B/L upper and lower extremities; DTRs 2+ intact and symmetric, sensory intact  CHEST/LUNG: B/L good air entry; No rales, rhonchi, or wheezing  HEART: S1S2 normal, no S3, Regular rate and rhythm; No murmurs, rubs, or gallops  ABDOMEN: Soft, Nontender, Nondistended; Bowel sounds present  EXTREMITIES:  2+ Peripheral Pulses, No clubbing, cyanosis, or edema  LYMPH: No lymphadenopathy noted  SKIN: No rashes or lesions    Care Discussed with Consultants/Other Providers [ x] YES  [ ] NO OVERNIGHT EVENTS: NPO overnight for possible trach.     INTERVAL HPI: Pt is examined at bedside. She is unresponsive to commands. unchanged neurologically.    ICU Vital Signs Last 24 Hrs  T(C): 36.6 (27 Aug 2018 04:00), Max: 37.1 (27 Aug 2018 01:02)  T(F): 97.8 (27 Aug 2018 04:00), Max: 98.7 (27 Aug 2018 01:02)  HR: 90 (27 Aug 2018 08:00) (82 - 96)  BP: 111/61 (27 Aug 2018 08:00) (76/49 - 118/68)  BP(mean): 83 (27 Aug 2018 08:00) (57 - 88)  ABP: --  ABP(mean): --  RR: 37 (27 Aug 2018 08:00) (0 - 41)  SpO2: 93% (27 Aug 2018 08:00) (92% - 100%)    I&O's Summary    26 Aug 2018 07:01  -  27 Aug 2018 07:00  --------------------------------------------------------  IN: 1115 mL / OUT: 503 mL / NET: 612 mL    27 Aug 2018 07:01  -  27 Aug 2018 08:12  --------------------------------------------------------  IN: 45 mL / OUT: 0 mL / NET: 45 mL      Mode: CPAP with PS  FiO2: 40  PEEP: 5  MAP: 8.9  PIP: 17      LABS:              CAPILLARY BLOOD GLUCOSE            RADIOLOGY & ADDITIONAL TESTS:    Consultant(s) Notes Reviewed:  [x ] YES  [ ] NO    MEDICATIONS  (STANDING):  amiodarone    Tablet 200 milliGRAM(s) Oral daily  artificial  tears Solution 1 Drop(s) Both EYES daily  chlorhexidine 0.12% Liquid 15 milliLiter(s) Swish and Spit two times a day  chlorhexidine 2% Cloths 1 Application(s) Topical daily  petrolatum Ophthalmic Ointment 1 Application(s) Both EYES daily  polyethylene glycol 3350 17 Gram(s) Oral daily    MEDICATIONS  (PRN):    PHYSICAL EXAM:  GENERAL: Intubated  HEAD:  Atraumatic, Normocephalic  EYES: Pupils dilated, mildly responsive to light, conjunctiva and sclera clear  NECK: Supple, No JVD, Normal thyroid, no enlarged nodes  NERVOUS SYSTEM: Eyes open but not awake, not alert, unresponsive to commands/verbal/painful stimili. +corneal reflex, pupils reactive to light. Minimal gag reflex.    CHEST/LUNG: B/L good air entry; No rales, rhonchi, or wheezing  HEART: S1S2 normal, no S3, Regular rate and rhythm; No murmurs, rubs, or gallops  ABDOMEN: Soft, Nontender, Nondistended; Bowel sounds present  Extremities B/L pedal 2+ pitting edema; UE withdrawal to noxious stimuli  LYMPH: No lymphadenopathy noted  SKIN: No rashes or lesions    Care Discussed with Consultants/Other Providers [ x] YES  [ ] NO

## 2018-08-27 NOTE — PROGRESS NOTE ADULT - SUBJECTIVE AND OBJECTIVE BOX
Chief Complaint/Reason for Consult: cardiac arrest  INTERVAL HPI: weekend events noted, cardiac optimized for trach  	  MEDICATIONS:  amiodarone    Tablet 200 milliGRAM(s) Oral daily          polyethylene glycol 3350 17 Gram(s) Oral daily      artificial  tears Solution 1 Drop(s) Both EYES daily  chlorhexidine 0.12% Liquid 15 milliLiter(s) Swish and Spit two times a day  chlorhexidine 2% Cloths 1 Application(s) Topical daily  petrolatum Ophthalmic Ointment 1 Application(s) Both EYES daily      REVIEW OF SYSTEMS:  [x] As per HPI  CONSTITUTIONAL: No fever, weight loss, or fatigue  RESPIRATORY: No cough, wheezing, chills or hemoptysis; No Shortness of Breath  CARDIOVASCULAR: No chest pain, palpitations, dizziness, or leg swelling  GASTROINTESTINAL: No abdominal or epigastric pain. No nausea, vomiting, or hematemesis; No diarrhea or constipation. No melena or hematochezia.  MUSCULOSKELETAL: No joint pain or swelling; No muscle, back, or extremity pain  [x] All others negative	  [ ] Unable to obtain    PHYSICAL EXAM:  T(C): 36.8 (08-27-18 @ 17:20), Max: 37.1 (08-27-18 @ 01:02)  HR: 84 (08-27-18 @ 17:18) (72 - 96)  BP: 117/73 (08-27-18 @ 17:00) (79/51 - 165/122)  RR: 18 (08-27-18 @ 17:00) (0 - 41)  SpO2: 96% (08-27-18 @ 17:18) (92% - 100%)  Wt(kg): --  I&O's Summary    26 Aug 2018 07:01  -  27 Aug 2018 07:00  --------------------------------------------------------  IN: 1115 mL / OUT: 503 mL / NET: 612 mL    27 Aug 2018 07:01  -  27 Aug 2018 18:28  --------------------------------------------------------  IN: 370 mL / OUT: 310 mL / NET: 60 mL          Appearance: Normal	  HEENT:   Normal oral mucosa  Cardiovascular: Normal S1 S2, No JVD, No murmurs, No edema  Respiratory: Lungs clear to auscultation	  Gastrointestinal:  Soft, Non-tender, + BS	  Extremities: Normal range of motion, No clubbing, cyanosis or edema  Vascular: Peripheral pulses palpable 2+ bilaterally    TELEMETRY: 	    ECG:   	  RADIOLOGY:   CXR:  CT:  US:    CARDIAC TESTING:  Echocardiogram:  Catheterization:  Stress Test:      LABS:	 	    CARDIAC MARKERS:                    proBNP:   Lipid Profile:   HgA1c:   TSH:     ASSESSMENT/PLAN: 	    89F w/PMHx dementia (baseline AOX1), recurrent UTI, Celiac dz, R femoral DVT s/p IVC filter (1/2017), chronic anemia, w/mult recent hosp for septic shock and hypoxic/hypercapnic resp failure in setting of proteus and aerococcus UTI and asp PNA, brought in after cardiac arrest, likely PEA in setting of asp w/hypoxic and hypercapnic resp failure. 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 CE not sig elev, unlikely ACS  - Resp failure primarily from asp, however possible some component of vol overload based on CXR findings. Pro-BNP elev, but markedly dec compared to prior admission.  - C/w amio 200 qd, keep K>4, Mg>2. 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   - Pt w/poor neurologic function off sedation since Saturday. Neuro following, still w/some pupillary and gag reflex, breathing over vent occ. CTH shows diffuse loss of gray-white differentiation consistent w/HIE  - Poor overall prognosis, CTH showing HIE, ICU team having active GOC discussion w/pt's son, in conjunction w/palliative and neuro. Now DNR/DNI  - Rest of care as per ICU team

## 2018-08-27 NOTE — PROGRESS NOTE ADULT - SUBJECTIVE AND OBJECTIVE BOX
Interventional, Pulmonary, Critical, Chest Special Procedures.    Pt was seen and fully examined by myself.     Time spent with patient in minutes:37    Patient is a 89y old  Female who presents with a chief complaint of cardio-pulmonary arrest (17 Aug 2018 03:20). The patient c no new level of response, in synchrony c CMV mode.    HPI:  89 F w/ pmhx of dementia, R. femoral thrombus s/p IVC filter 1/2017, chronic anemia, [facility]ulmonary [facility]. Patient recently admitted to MICU for hypoxic respiratory failure 2/2 aspiration pneumonia. Patient eventually extubated, received PEG tube, and [facility]. Hospital course complicated by unstable atrial fibrillation s/p cardioversion, placed on amiodarone with stabilization of HR. Per ER, patient unable to tolerate secretions, found to be hypoxic at NH and eventually lost a pulse. Patient intubated in the [facility], ACLS protocol begun and ROSC achieved after 3 rounds of epinephrine. (17 Aug 2018 03:20)    REVIEW OF SYSTEMS:  Constitutional: No fever, +weight loss, chills + fatigue  Eyes: No eye pain, visual disturbances, or discharge  ENMT:  + difficulty hearing, tinnitus, vertigo; No sinus or throat pain. No epistaxis, +dysphagia, dysphonia, hoarseness   Neck: No pain, stiffness or neck swelling.  No masses or deformities  Respiratory: +cough, wheezing, chills or hemoptysis  - COPD  - ILD   - PE   - ASTHMA     +PNEUMONIA  Cardiovascular: No chest pain, +dysrhythmia, palpitations, dizziness or edema   - COPD     - CAD   - CHF   - HTN    PAST MEDICAL & SURGICAL HISTORY:  Dementia  DVT (deep venous thrombosis)  Dysphagia  Anemia  DJD (degenerative joint disease)  Edema  IBS (irritable bowel syndrome)  Celiac disease  S/P IVC filter  H/O inguinal hernia repair  History of total left hip replacement    FAMILY HISTORY:  No pertinent family history in first degree relatives    SOCIAL HISTORY:      - Tobacco     - ETOH    Allergies    amoxicillin (Rash)  Cipro (Rash)  clindamycin (Unknown)  lactose (Unknown)  penicillin (Rash)  Wheat (Unknown)    Intolerances      Vital Signs Last 24 Hrs  T(C): 36.9 (27 Aug 2018 13:00), Max: 37.1 (27 Aug 2018 01:02)  T(F): 98.4 (27 Aug 2018 13:00), Max: 98.7 (27 Aug 2018 01:02)  HR: 82 (27 Aug 2018 16:00) (72 - 96)  BP: 98/57 (27 Aug 2018 16:00) (79/51 - 165/122)  BP(mean): 71 (27 Aug 2018 16:00) (58 - 148)  RR: 16 (27 Aug 2018 16:00) (0 - 41)  SpO2: 96% (27 Aug 2018 16:00) (92% - 100%)    08-26 @ 07:01 - 08-27 @ 07:00  --------------------------------------------------------  IN: 1115 mL / OUT: 503 mL / NET: 612 mL    08-27 @ 07:01 - 08-27 @ 16:47  --------------------------------------------------------  IN: 370 mL / OUT: 310 mL / NET: 60 mL      Mode: AC/ CMV (Assist Control/ Continuous Mandatory Ventilation)  RR (machine): 10  TV (machine): 360  FiO2: 40  PEEP: 5  ITime: 1  MAP: 8.7  PIP: 25    PHYSICAL EXAM:  unresponsive , no immediate distress  Eyes: PERRL, EOM intact; conjunctiva and sclera clear  Head: Normocephalic;  No Trauma  ENMT: No nasal discharge, hoarseness, +cough   Neck: Supple; non tender; no masses or deformities.    No JVD  Respiratory:   - WHEEZING   +RHONCHI  - RALES  + CRACKLES.  Diminished breath sounds  BILATERAL  RIGHT > LEFT base  Cardiovascular: Regular rate and rhythm. S1 and S2 Normal; No murmurs, gallops or rubs     - PPM/AICD  Gastrointestinal: Soft non-tender, non-distended; Normal bowel sounds; No hepatosplenomegaly.     +_ site clean PEG    -+NGT GT   + SWENSON  Genitourinary: No costovertebral angle tenderness. No dysuria  Extremities: NO AROM, No clubbing, cyanosis or edema    Vascular: Peripheral pulses palpable 2+ bilaterally  Neurological: Intubated sedated   Skin: Warm and dry. No obvious rash  Lymph Nodes: No acute cervical or supraclavicular adenopathy  Psychiatric: On vent. unreseponsive to verbal stimuli  DEVICES:  - DENTURES   +IV R / L     +6.5 ETUBE   -TRACH   -CTUBE  R / L    DEVICES:  - DENTURES   +IV R / L     - ETUBE   -TRACH   -CTUBE  R / L      LABS:              RADIOLOGY & ADDITIONAL STUDIES (The following images were personally reviewed):

## 2018-08-27 NOTE — PROGRESS NOTE ADULT - ASSESSMENT
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 ROSC achieved after apx 28 minutes.   - Pt currently intubated. No sedation. Assessing neuro status daily  - (Son) Dr. Goff understands the final read on Head CT which is c/w anoxic encephalopathy. He agrees to withdraw Midodrine and will only continue Amio, enteral feeds and mech vent. Now considering possible tracheostomy.   - will continue discussion with son. No changes to goals of care yet.     ID  #Aspiration PNA (Gram negative Proteus and MSSA); CXR: RLL opacity with bilateral pleural effusions; Sputum cultures +Pansensitive Proteus and MSSA.   - All ABs d/alissa per son  - comfort measures as of now. No change in GOC as of yet. Will discuss today.    NEURO  #AMS: Likely due to anoxic brain injury  - CTH: Poor gray white differentiation signifying possible anoxic brain injury. Poor study due to artifact.   - Poor prognosis for recovery per neuro.   - Palliative on board: In active discussion with son Dr. Goff    CV  #Cardiac Arrest: likely after pulmonary arrest from above  - EKG without ischemic changes   - trops 0.03. will not trend   - A line in place-right radial  - central line passed, cxr confirmed position     #Atrial flutter s/p cardioversion last admission  - c/w amiodarone 200 daily   - no A/C in setting of PMH of gastric AVM    HEMATOLOGY  #Anemia; now stable hgb 8.5  - Pt has a history of AVMS  - GI Dr. Hale consulted; monitor h/h. Pt likely not candidate for invasive procedures. Monitor H/H    F NO FLUIDS FOR NOW  E REPLETE AS NEEDED  N PEG in place: Continue feeds @ 40cc/hr  - Dispo: MICU 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 ROSC achieved after apx 28 minutes.   - Pt currently intubated. No sedation. Assessing neuro status daily  - (Son) Dr. Goff understands the final read on Head CT which is c/w anoxic encephalopathy. He agrees to withdraw Midodrine and will only continue Amio, enteral feeds and mech vent. Now considering possible tracheostomy.   - will continue discussion with son. No changes to goals of care yet.     ID  #Aspiration PNA (Gram negative Proteus and MSSA); CXR: RLL opacity with bilateral pleural effusions; Sputum cultures +Pansensitive Proteus and MSSA.   - All ABs d/alissa per son  - comfort measures as of now. No change in GOC as of yet. Will discuss today.    NEURO  #AMS: Likely due to anoxic brain injury  - CTH: Poor gray white differentiation signifying possible anoxic brain injury. Poor study due to artifact.   - Poor prognosis for recovery per neuro.   - Palliative on board: In active discussion with son Dr. Goff    CV  #Cardiac Arrest: likely after pulmonary arrest from above  - EKG without ischemic changes   - trops 0.03. will not trend   - A line in place-right radial  - central line passed, cxr confirmed position     #Atrial flutter s/p cardioversion last admission  - c/w amiodarone 200 daily   - no A/C in setting of PMH of gastric AVM    HEMATOLOGY  #Anemia; now stable hgb 8.5  - Pt has a history of AVMS  - GI Dr. Hale consulted; monitor h/h. Pt likely not candidate for invasive procedures. Monitor H/H    F NO FLUIDS FOR NOW  E REPLETE AS NEEDED  N PEG in place: NPO for now. Will discuss GOC for possible trach  - Dispo: MICU 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 ROSC achieved after apx 28 minutes.   - Pt currently intubated. No sedation. Assessing neuro status daily  - (Son) Dr. Goff understands the final read on Head CT which is c/w anoxic encephalopathy. He agrees to withdraw Midodrine and will only continue Amio, enteral feeds and mech vent. Now considering possible tracheostomy.   - will continue discussion with son. No changes to goals of care yet.     ID  #Aspiration PNA (Gram negative Proteus and MSSA); CXR: RLL opacity with bilateral pleural effusions; Sputum cultures +Pansensitive Proteus and MSSA.   - All ABs d/alissa per son  - comfort measures as of now. No change in GOC as of yet.     NEURO  #AMS: Likely due to anoxic brain injury  - CTH: Poor gray white differentiation signifying possible anoxic brain injury. Poor study due to artifact.   - Poor prognosis for recovery per neuro.   - Palliative on board: In active discussion with son Dr. Goff    CV  #Cardiac Arrest: likely after pulmonary arrest from above  - EKG without ischemic changes   - trops 0.03. will not trend   - A line in place-right radial  - central line passed, cxr confirmed position     #Atrial flutter s/p cardioversion last admission  - c/w amiodarone 200 daily   - no A/C in setting of PMH of gastric AVM    HEMATOLOGY  #Anemia; now stable hgb 8.5  - Pt has a history of AVMS  - GI Dr. Hale consulted; monitor h/h. Pt likely not candidate for invasive procedures. Monitor H/H    F NO FLUIDS FOR NOW  E REPLETE AS NEEDED  N PEG in place: Continue feeds. Will discuss GOC for possible trach  - Dispo: MICU

## 2018-08-28 PROCEDURE — 99232 SBSQ HOSP IP/OBS MODERATE 35: CPT | Mod: GC

## 2018-08-28 RX ORDER — MORPHINE SULFATE 50 MG/1
1 CAPSULE, EXTENDED RELEASE ORAL ONCE
Qty: 0 | Refills: 0 | Status: DISCONTINUED | OUTPATIENT
Start: 2018-08-28 | End: 2018-08-28

## 2018-08-28 RX ORDER — MORPHINE SULFATE 50 MG/1
1 CAPSULE, EXTENDED RELEASE ORAL
Qty: 0 | Refills: 0 | Status: DISCONTINUED | OUTPATIENT
Start: 2018-08-28 | End: 2018-08-30

## 2018-08-28 RX ADMIN — CHLORHEXIDINE GLUCONATE 1 APPLICATION(S): 213 SOLUTION TOPICAL at 06:35

## 2018-08-28 RX ADMIN — AMIODARONE HYDROCHLORIDE 200 MILLIGRAM(S): 400 TABLET ORAL at 06:35

## 2018-08-28 RX ADMIN — CHLORHEXIDINE GLUCONATE 15 MILLILITER(S): 213 SOLUTION TOPICAL at 00:59

## 2018-08-28 RX ADMIN — MORPHINE SULFATE 1 MILLIGRAM(S): 50 CAPSULE, EXTENDED RELEASE ORAL at 17:49

## 2018-08-28 RX ADMIN — CHLORHEXIDINE GLUCONATE 15 MILLILITER(S): 213 SOLUTION TOPICAL at 23:30

## 2018-08-28 RX ADMIN — MORPHINE SULFATE 1 MILLIGRAM(S): 50 CAPSULE, EXTENDED RELEASE ORAL at 21:20

## 2018-08-28 RX ADMIN — MORPHINE SULFATE 1 MILLIGRAM(S): 50 CAPSULE, EXTENDED RELEASE ORAL at 15:32

## 2018-08-28 RX ADMIN — MORPHINE SULFATE 1 MILLIGRAM(S): 50 CAPSULE, EXTENDED RELEASE ORAL at 14:54

## 2018-08-28 RX ADMIN — MORPHINE SULFATE 1 MILLIGRAM(S): 50 CAPSULE, EXTENDED RELEASE ORAL at 14:24

## 2018-08-28 RX ADMIN — MORPHINE SULFATE 1 MILLIGRAM(S): 50 CAPSULE, EXTENDED RELEASE ORAL at 21:04

## 2018-08-28 RX ADMIN — Medication 1 DROP(S): at 12:44

## 2018-08-28 RX ADMIN — CHLORHEXIDINE GLUCONATE 15 MILLILITER(S): 213 SOLUTION TOPICAL at 12:44

## 2018-08-28 RX ADMIN — Medication 1 APPLICATION(S): at 12:44

## 2018-08-28 RX ADMIN — MORPHINE SULFATE 1 MILLIGRAM(S): 50 CAPSULE, EXTENDED RELEASE ORAL at 14:33

## 2018-08-28 NOTE — PROGRESS NOTE ADULT - ATTENDING COMMENTS
ASSESSMENT/PLAN    1) Respiratory failure  2) Status post cardiac arrest  3) Pneumonia recurrent aspiration  4) Pulmonary hypertension  5) Anoxic brain injury  6) Anemia; rule out GIB    Satisfactory SpO2, Continue CMV mode, no weaning attempts at this time  Bronchodilators:  Atrovent/ albuterol q 4 – 6 hours as needed  ID/Antibiotics: ancef  Cardiac/HTN: amiodarone, taper pressors as tolerated  GI: Rx c PPI/H2B  Heme: Rx/VT prophylaxis, blood products as necessary  Aspiration precautions  Discussed with medical team, poor prognosis. Palliative feedback to clarify GOC  , Dr. Doe taking over PM/[facility] mngmnt of this pt on 08/29 ASSESSMENT/PLAN    1) Respiratory failure  2) Status post cardiac arrest  3) Pneumonia recurrent aspiration  4) Pulmonary hypertension  5) Anoxic brain injury  6) Anemia; rule out GIB    Satisfactory SpO2, Continue CMV mode, no weaning attempts at this time  Bronchodilators:  Atrovent/ albuterol q 4 – 6 hours as needed  ID/Antibiotics: ancef  Cardiac/HTN: amiodarone, taper pressors as tolerated  GI: Rx c PPI/H2B  Heme: Rx/VT prophylaxis, blood products as necessary  Aspiration precautions  Discussed with medical team, poor prognosis. Palliative feedback to clarify C  , Dr. Doe taking over PM/[facility] mngmnt of this pt on 08/29  Transfer to regional floor for continued comfort care. ASSESSMENT/PLAN    1) Respiratory failure  2) Status post cardiac arrest  3) Pneumonia recurrent aspiration  4) Pulmonary hypertension  5) Anoxic brain injury  6) Anemia; rule out GIB    Satisfactory SpO2, Continue CMV mode, no weaning attempts at this time  Bronchodilators:  Atrovent/ albuterol q 4 – 6 hours as needed  ID/Antibiotics: ancef  Cardiac/HTN: amiodarone, taper pressors as tolerated  GI: Rx c PPI/H2B  Heme: Rx/VT prophylaxis, blood products as necessary  Aspiration precautions  Discussed with medical team, poor prognosis. Palliative feedback to clarify C  , Dr. Doe taking over PM/[facility] mngm[facility] of this pt on 08/29  Transfer to regional floor for continued comfort care in AM.

## 2018-08-28 NOTE — PROGRESS NOTE ADULT - SUBJECTIVE AND OBJECTIVE BOX
Interventional, Pulmonary, Critical, Chest Special Procedures.    Pt was seen and fully examined by myself.     Time spent with patient in minutes:37    Patient is a 89y old  Female who presents with a chief complaint of cardio-pulmonary arrest (17 Aug 2018 03:20)The patient unresponsive, vent circuit integrity preserved.    HPI:  89 F w/ pmhx of dementia, R. femoral thrombus s/p IVC filter 1/2017, chronic anemia, presents to ED from [facility] after cardio-pulmonary arrest. Patient recently admitted to MICU for hypoxic respiratory failure 2/2 aspiration pneumonia. Patient eventually extubated, received PEG tube, and discharged back to [facility] on 08/14. Hospital course complicated by unstable atrial fibrillation s/p cardioversion, placed on amiodarone with stabilization of HR. Per ER, patient unable to tolerate secretions, found to be hypoxic at NH and eventually lost a pulse. Patient intubated in the [facility], ACLS protocol begun and ROSC achieved after 3 rounds of epinephrine. (17 Aug 2018 03:20)    REVIEW OF SYSTEMS:  Constitutional: No fever, +weight loss, chills + fatigue  Eyes: No eye pain, visual disturbances, or discharge  ENMT:  + difficulty hearing, tinnitus, vertigo; No sinus or throat pain. No epistaxis, +dysphagia, dysphonia, hoarseness   Neck: No pain, stiffness or neck swelling.  No masses or deformities  Respiratory: +cough, wheezing, chills or hemoptysis  - COPD  - ILD   - PE   - ASTHMA     +PNEUMONIA  Cardiovascular: No chest pain, +dysrhythmia, palpitations, dizziness or edema   - COPD     - CAD   - CHF   - HTN  PAST MEDICAL & SURGICAL HISTORY:  Dementia  DVT (deep venous thrombosis)  Dysphagia  Anemia  DJD (degenerative joint disease)  Edema  IBS (irritable bowel syndrome)  Celiac disease  S/P IVC filter  H/O inguinal hernia repair  History of total left hip replacement    FAMILY HISTORY:  No pertinent family history in first degree relatives    SOCIAL HISTORY:      - Tobacco     - ETOH    Allergies    amoxicillin (Rash)  Cipro (Rash)  clindamycin (Unknown)  lactose (Unknown)  penicillin (Rash)  Wheat (Unknown)    Intolerances      Vital Signs Last 24 Hrs  T(C): 36.8 (28 Aug 2018 09:39), Max: 36.8 (27 Aug 2018 17:20)  T(F): 98.3 (28 Aug 2018 09:39), Max: 98.3 (27 Aug 2018 17:20)  HR: 72 (28 Aug 2018 12:00) (66 - 84)  BP: 96/51 (28 Aug 2018 12:00) (82/49 - 117/73)  BP(mean): 66 (28 Aug 2018 12:00) (61 - 96)  RR: 15 (28 Aug 2018 12:00) (11 - 19)  SpO2: 96% (28 Aug 2018 13:46) (94% - 99%)    08-27 @ 07:01 - 08-28 @ 07:00  --------------------------------------------------------  IN: 1090 mL / OUT: 695 mL / NET: 395 mL    08-28 @ 07:01 - 08-28 @ 14:07  --------------------------------------------------------  IN: 225 mL / OUT: 160 mL / NET: 65 mL      Mode: AC/ CMV (Assist Control/ Continuous Mandatory Ventilation)  RR (machine): 10  TV (machine): 360  FiO2: 40  PEEP: 5  ITime: 1  MAP: 10  PIP: 23    PHYSICAL EXAM:  unresponsive , no immediate distress  Eyes: PERRL, EOM intact; conjunctiva and sclera clear  Head: Normocephalic;  No Trauma  ENMT: No nasal discharge, hoarseness, +cough   Neck: Supple; non tender; no masses or deformities.    No JVD  Respiratory:   - WHEEZING   +RHONCHI  - RALES  + CRACKLES.  Diminished breath sounds  BILATERAL  RIGHT > LEFT base  Cardiovascular: Regular rate and rhythm. S1 and S2 Normal; No murmurs, gallops or rubs     - PPM/AICD  Gastrointestinal: Soft non-tender, non-distended; Normal bowel sounds; No hepatosplenomegaly.     +_ site clean PEG    -+NGT GT   + SWENSON  Genitourinary: No costovertebral angle tenderness. No dysuria  Extremities: NO AROM, No clubbing, cyanosis or edema    Vascular: Peripheral pulses palpable 2+ bilaterally  Neurological: Intubated sedated   Skin: Warm and dry. No obvious rash  Lymph Nodes: No acute cervical or supraclavicular adenopathy  Psychiatric: On vent. unreseponsive to verbal stimuli  DEVICES:  - DENTURES   +IV R / L     +6.5 ETUBE   -TRACH   -CTUBE  R / L        LABS:              RADIOLOGY & ADDITIONAL STUDIES (The following images were personally reviewed):

## 2018-08-28 NOTE — PROGRESS NOTE ADULT - ASSESSMENT
ASSESSMENT/PLAN    1) Respiratory failure  2) Status post cardiac arrest  3) Pneumonia recurrent aspiration  4) Pulmonary hypertension  5) Anoxic brain injury  6) Anemia; rule out GIB    Satisfactory SpO2, Continue CMV mode, no weaning attempts at this time  Bronchodilators:  Atrovent/ albuterol q 4 – 6 hours as needed  ID/Antibiotics: ancef  Cardiac/HTN: amiodarone, taper pressors as tolerated  GI: Rx c PPI/H2B  Heme: Rx/VT prophylaxis, blood products as necessary  Aspiration precautions  Discussed with medical team, poor prognosis. Palliative feedback to clarify again GOC

## 2018-08-28 NOTE — PROGRESS NOTE ADULT - ASSESSMENT
ASSESSMENT/PLAN    1) Respiratory failure  2) Status post cardiac arrest  3) Pneumonia recurrent aspiration  4) Pulmonary hypertension  5) Anoxic brain injury  6) Anemia; rule out GIB    Satisfactory SpO2, Continue CMV mode, no weaning attempts at this time  Bronchodilators:  Atrovent/ albuterol q 4 – 6 hours as needed  ID/Antibiotics: ancef  Cardiac/HTN: amiodarone, taper pressors as tolerated  GI: Rx c PPI/H2B  Heme: Rx/VT prophylaxis, blood products as necessary  Aspiration precautions  Discussed with medical team, poor prognosis. Palliative feedback to clarify GOC  , Dr. Doe taking over [facility] mngm[facility] of this pt on 08/29 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 ROSC achieved after apx 28 minutes.   - Pt currently intubated. No sedation.  - Dr. Goff (Son/HCP) at bedside, after discussion of GOC, would like to withdraw all care  - continue with morphine prn for respiratory distress/air hunger.       ID  #Aspiration PNA (Gram negative Proteus and MSSA); CXR: RLL opacity with bilateral pleural effusions; Sputum cultures +Pansensitive Proteus and MSSA.   - All ABs d/alissa per son  - comfort measures as of now.     NEURO  #AMS: Likely due to anoxic brain injury  - CTH: Poor gray white differentiation signifying possible anoxic brain injury. Poor study due to artifact.   - Poor prognosis for recovery per neuro.   - Palliative on board: In active discussion with son Dr. Goff  - Dr. Goff (Son/HCP) at bedside, after discussion of GOC, would like to withdraw all care    CV  #Cardiac Arrest: likely after pulmonary arrest from above  - EKG without ischemic changes   - trops 0.03. will not trend   - A line in place-right radial  - central line passed, cxr confirmed position     #Atrial flutter s/p cardioversion last admission  - c/w amiodarone 200 daily   - no A/C in setting of PMH of gastric AVM    HEMATOLOGY  #Anemia; now stable hgb 8.5  - Pt has a history of AVMS  - GI Dr. Hale consulted; monitor h/h. Pt likely not candidate for invasive procedures. Monitor H/H    F NO FLUIDS FOR NOW  E REPLETE AS NEEDED  N PEG in place: d/c feeds  - Dispo: MICU 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 ROSC achieved after apx 28 minutes.   - Pt currently intubated. No sedation.  - Dr. Goff (Son/HCP) at bedside, Discussed GOC. Based on his mothers living will and her poor neurological prognosis he has agreed to withdraw mech ventilation.   - continue comfort care and use Morphine for resp distress    ID  #Aspiration PNA (Gram negative Proteus and MSSA); CXR: RLL opacity with bilateral pleural effusions; Sputum cultures +Pansensitive Proteus and MSSA.   - All ABs d/alissa per son  - comfort measures as of now.     NEURO  #AMS: Likely due to anoxic brain injury  - CTH: Poor gray white differentiation signifying possible anoxic brain injury. Poor study due to artifact.   - Poor prognosis for recovery per neuro.   - Palliative on board: In active discussion with son Dr. Goff  - Dr. Goff (Son/HCP) at bedside, after discussion of GOC, would like to withdraw all care    CV  #Cardiac Arrest: likely after pulmonary arrest from above  - EKG without ischemic changes   - trops 0.03. will not trend   - A line in place-right radial  - central line passed, cxr confirmed position     #Atrial flutter s/p cardioversion last admission  - c/w amiodarone 200 daily   - no A/C in setting of PMH of gastric AVM    HEMATOLOGY  #Anemia; now stable hgb 8.5  - Pt has a history of AVMS  - GI Dr. Hale consulted; monitor h/h. Pt likely not candidate for invasive procedures. Monitor H/H    F NO FLUIDS FOR NOW  E REPLETE AS NEEDED  N PEG in place: d/c feeds  - Dispo: MICU

## 2018-08-28 NOTE — PROGRESS NOTE ADULT - SUBJECTIVE AND OBJECTIVE BOX
HOSPITAL COURSE  A 89 F w/ pmhx of dementia, R. femoral thrombus s/p IVC filter 1/2017, chronic anemia, presents to ED from [facility] after cardio-pulmonary arrest. Patient recently admitted to MICU for hypoxic respiratory failure 2/2 aspiration pneumonia. Patient eventually extubated, received PEG tube, and discharged back to [facility] on 08/14. Hospital course complicated by unstable atrial fibrillation s/p cardioversion, placed on amiodarone with stabilization of HR. Per ER, patient unable to tolerate secretions, found to be hypoxic at NH and eventually lost a pulse. Patient intubated in the field ACLS protocol begun and ROSC achieved after 3 rounds of epinephrine. Upon admission to [facility] MICU, right IJ central line placed and right radial A line. On initial neurologic exam, Pt is Not alert, not oriented, unresponsive to commands. episodes of "startle" like response. Mild corneal reflex and gag reflex. Upward babinski test on left foot. Pupils slightly reactive to light. Suspected Anoxic Brain injury. Pt started on levophed to maintain BP. And propofol for sedation. Pt was started on Vancomycin and meropenem for aspiration PNA. SCx grew for Proteus and MSSA. Vancomycin and meropenem d/alissa, started on ancef. During course of admission patient had a drop in hgb to 6.2, no signs of active bleeding. Pt given 1 Unit PRBCs. Neuro was consulted for possible MRI to asses anoxic brain injury. Opted for CT scan which showed poor differentiation between grey/white matter, later appended to include comparison from last CT scan. Dr. Goff (son) at this time, Based on her poor prognosis and wishes outlined in her living will, he agrees to DNR order, no cardioversion or addition of antiarrhythmics, no reinstitution of vasopressors and no new cultures and antibiotics. She will continue feeds and amiodarone for now. Pt remained intubated. Dr. Goff considering tracheostomy. ENT consulted for possible trach. After discussion with ENT, Dr. Goff agrees tracheostomy would be against his mothers living will and would not like to pursue.   He would like to withdraw mech ventilation. We will continue comfort care and use Morphine for resp distress. He understands she will not sustain life for a prolonged period of time.    OVERNIGHT EVENTS: ALYCIA    INTERVAL HPI  son  agrees to COMFORT CAR, no trach    ICU Vital Signs Last 24 Hrs  T(C): 36.2 (28 Aug 2018 01:37), Max: 36.9 (27 Aug 2018 10:00)  T(F): 97.2 (28 Aug 2018 01:37), Max: 98.5 (27 Aug 2018 10:00)  HR: 69 (28 Aug 2018 05:21) (66 - 90)  BP: 114/75 (28 Aug 2018 04:00) (81/49 - 165/122)  BP(mean): 92 (28 Aug 2018 04:00) (58 - 148)  ABP: --  ABP(mean): --  RR: 11 (28 Aug 2018 05:21) (11 - 37)  SpO2: 97% (28 Aug 2018 05:21) (92% - 100%)    I&O's Summary    26 Aug 2018 07:01  -  27 Aug 2018 07:00  --------------------------------------------------------  IN: 1115 mL / OUT: 503 mL / NET: 612 mL    27 Aug 2018 07:01  -  28 Aug 2018 06:30  --------------------------------------------------------  IN: 865 mL / OUT: 620 mL / NET: 245 mL      Mode: AC/ CMV (Assist Control/ Continuous Mandatory Ventilation)  RR (machine): 10  TV (machine): 360  FiO2: 40  PEEP: 5  ITime: 1  MAP: 9.3  PIP: 24      LABS:              CAPILLARY BLOOD GLUCOSE            RADIOLOGY & ADDITIONAL TESTS:    Consultant(s) Notes Reviewed:  [x ] YES  [ ] NO    MEDICATIONS  (STANDING):  amiodarone    Tablet 200 milliGRAM(s) Oral daily  artificial  tears Solution 1 Drop(s) Both EYES daily  chlorhexidine 0.12% Liquid 15 milliLiter(s) Swish and Spit two times a day  chlorhexidine 2% Cloths 1 Application(s) Topical daily  petrolatum Ophthalmic Ointment 1 Application(s) Both EYES daily  polyethylene glycol 3350 17 Gram(s) Oral daily    MEDICATIONS  (PRN):      PHYSICAL EXAM:  GENERAL: well built, well nourished  HEAD:  Atraumatic, Normocephalic  EYES: EOMI, PERRLA, conjunctiva and sclera clear  ENT: No tonsillar erythema, exudates, or enlargement; Moist mucous membranes, Good dentition, No lesions  NECK: Supple, No JVD, Normal thyroid, no enlarged nodes  NERVOUS SYSTEM:  Alert & Oriented X3, Good concentration; Motor Strength 5/5 B/L upper and lower extremities; DTRs 2+ intact and symmetric, sensory intact  CHEST/LUNG: B/L good air entry; No rales, rhonchi, or wheezing  HEART: S1S2 normal, no S3, Regular rate and rhythm; No murmurs, rubs, or gallops  ABDOMEN: Soft, Nontender, Nondistended; Bowel sounds present  EXTREMITIES:  2+ Peripheral Pulses, No clubbing, cyanosis, or edema  LYMPH: No lymphadenopathy noted  SKIN: No rashes or lesions

## 2018-08-28 NOTE — PROGRESS NOTE ADULT - SUBJECTIVE AND OBJECTIVE BOX
Patient is a 89y old  Female who presents with a chief complaint of cardio-pulmonary arrest (17 Aug 2018 03:20)      INTERVAL HPI/OVERNIGHT EVENTS:  ICU Vital Signs Last 24 Hrs  T(C): 36.2 (28 Aug 2018 01:37), Max: 36.9 (27 Aug 2018 10:00)  T(F): 97.2 (28 Aug 2018 01:37), Max: 98.5 (27 Aug 2018 10:00)  HR: 69 (28 Aug 2018 05:21) (66 - 90)  BP: 114/75 (28 Aug 2018 04:00) (81/49 - 165/122)  BP(mean): 92 (28 Aug 2018 04:00) (58 - 148)  ABP: --  ABP(mean): --  RR: 11 (28 Aug 2018 05:21) (11 - 37)  SpO2: 97% (28 Aug 2018 05:21) (92% - 100%)    I&O's Summary    26 Aug 2018 07:01  -  27 Aug 2018 07:00  --------------------------------------------------------  IN: 1115 mL / OUT: 503 mL / NET: 612 mL    27 Aug 2018 07:01  -  28 Aug 2018 06:30  --------------------------------------------------------  IN: 865 mL / OUT: 620 mL / NET: 245 mL      Mode: AC/ CMV (Assist Control/ Continuous Mandatory Ventilation)  RR (machine): 10  TV (machine): 360  FiO2: 40  PEEP: 5  ITime: 1  MAP: 9.3  PIP: 24      LABS:              CAPILLARY BLOOD GLUCOSE            RADIOLOGY & ADDITIONAL TESTS:    Consultant(s) Notes Reviewed:  [x ] YES  [ ] NO    MEDICATIONS  (STANDING):  amiodarone    Tablet 200 milliGRAM(s) Oral daily  artificial  tears Solution 1 Drop(s) Both EYES daily  chlorhexidine 0.12% Liquid 15 milliLiter(s) Swish and Spit two times a day  chlorhexidine 2% Cloths 1 Application(s) Topical daily  petrolatum Ophthalmic Ointment 1 Application(s) Both EYES daily  polyethylene glycol 3350 17 Gram(s) Oral daily    MEDICATIONS  (PRN):      PHYSICAL EXAM:  GENERAL: well built, well nourished  HEAD:  Atraumatic, Normocephalic  EYES: EOMI, PERRLA, conjunctiva and sclera clear  ENT: No tonsillar erythema, exudates, or enlargement; Moist mucous membranes, Good dentition, No lesions  NECK: Supple, No JVD, Normal thyroid, no enlarged nodes  NERVOUS SYSTEM:  Alert & Oriented X3, Good concentration; Motor Strength 5/5 B/L upper and lower extremities; DTRs 2+ intact and symmetric, sensory intact  CHEST/LUNG: B/L good air entry; No rales, rhonchi, or wheezing  HEART: S1S2 normal, no S3, Regular rate and rhythm; No murmurs, rubs, or gallops  ABDOMEN: Soft, Nontender, Nondistended; Bowel sounds present  EXTREMITIES:  2+ Peripheral Pulses, No clubbing, cyanosis, or edema  LYMPH: No lymphadenopathy noted  SKIN: No rashes or lesions    Care Discussed with Consultants/Other Providers [ x] YES  [ ] NO OVERNIGHT EVENTS: ALYCIA    INTERVAL HPI: Pt examined at bedside. Pt is intubated, not sedated. She is unresponsive to verbal commands. Unchanged neurologically. Dr. Goff (Son/HCP) at bedside, after discussion of GOC, would like to withdraw all care and continue with morphine prn for respiratory distress/air hunger.     ICU Vital Signs Last 24 Hrs  T(C): 36.2 (28 Aug 2018 01:37), Max: 36.9 (27 Aug 2018 10:00)  T(F): 97.2 (28 Aug 2018 01:37), Max: 98.5 (27 Aug 2018 10:00)  HR: 69 (28 Aug 2018 05:21) (66 - 90)  BP: 114/75 (28 Aug 2018 04:00) (81/49 - 165/122)  BP(mean): 92 (28 Aug 2018 04:00) (58 - 148)  ABP: --  ABP(mean): --  RR: 11 (28 Aug 2018 05:21) (11 - 37)  SpO2: 97% (28 Aug 2018 05:21) (92% - 100%)    I&O's Summary    26 Aug 2018 07:01  -  27 Aug 2018 07:00  --------------------------------------------------------  IN: 1115 mL / OUT: 503 mL / NET: 612 mL    27 Aug 2018 07:01  -  28 Aug 2018 06:30  --------------------------------------------------------  IN: 865 mL / OUT: 620 mL / NET: 245 mL      Mode: AC/ CMV (Assist Control/ Continuous Mandatory Ventilation)  RR (machine): 10  TV (machine): 360  FiO2: 40  PEEP: 5  ITime: 1  MAP: 9.3  PIP: 24      LABS:              CAPILLARY BLOOD GLUCOSE            RADIOLOGY & ADDITIONAL TESTS:    Consultant(s) Notes Reviewed:  [x ] YES  [ ] NO    MEDICATIONS  (STANDING):  amiodarone    Tablet 200 milliGRAM(s) Oral daily  artificial  tears Solution 1 Drop(s) Both EYES daily  chlorhexidine 0.12% Liquid 15 milliLiter(s) Swish and Spit two times a day  chlorhexidine 2% Cloths 1 Application(s) Topical daily  petrolatum Ophthalmic Ointment 1 Application(s) Both EYES daily  polyethylene glycol 3350 17 Gram(s) Oral daily    MEDICATIONS  (PRN):      PHYSICAL EXAM:  GENERAL: well built, well nourished  HEAD:  Atraumatic, Normocephalic  EYES: EOMI, PERRLA, conjunctiva and sclera clear  ENT: No tonsillar erythema, exudates, or enlargement; Moist mucous membranes, Good dentition, No lesions  NECK: Supple, No JVD, Normal thyroid, no enlarged nodes  NERVOUS SYSTEM:  Alert & Oriented X3, Good concentration; Motor Strength 5/5 B/L upper and lower extremities; DTRs 2+ intact and symmetric, sensory intact  CHEST/LUNG: B/L good air entry; No rales, rhonchi, or wheezing  HEART: S1S2 normal, no S3, Regular rate and rhythm; No murmurs, rubs, or gallops  ABDOMEN: Soft, Nontender, Nondistended; Bowel sounds present  EXTREMITIES:  2+ Peripheral Pulses, No clubbing, cyanosis, or edema  LYMPH: No lymphadenopathy noted  SKIN: No rashes or lesions    Care Discussed with Consultants/Other Providers [ x] YES  [ ] NO HOSPITAL COURSE  A 89 F w/ pmhx of dementia, R. femoral thrombus s/p IVC filter 1/2017, chronic anemia, presents to ED from [facility] after cardio-pulmonary arrest. Patient recently admitted to MICU for hypoxic respiratory failure 2/2 aspiration pneumonia. Patient eventually extubated, received PEG tube, and discharged back to [facility] on 08/14. Hospital course complicated by unstable atrial fibrillation s/p cardioversion, placed on amiodarone with stabilization of HR. Per ER, patient unable to tolerate secretions, found to be hypoxic at NH and eventually lost a pulse. Patient intubated in the field ACLS protocol begun and ROSC achieved after 3 rounds of epinephrine. Upon admission to [facility] MICU, right IJ central line placed and right radial A line. On initial neurologic exam, Pt is Not alert, not oriented, unresponsive to commands. episodes of "startle" like response. Mild corneal reflex and gag reflex. Upward babinski test on left foot.  Pupils slightly reactive to light.       OVERNIGHT EVENTS: ALYCIA    INTERVAL HPI: Pt examined at bedside. Pt is intubated, not sedated. She is unresponsive to verbal commands. Unchanged neurologically. Dr. Goff (Son/HCP) at bedside, after discussion of GOC, would like to withdraw all care and continue with morphine prn for respiratory distress/air hunger.     ICU Vital Signs Last 24 Hrs  T(C): 36.2 (28 Aug 2018 01:37), Max: 36.9 (27 Aug 2018 10:00)  T(F): 97.2 (28 Aug 2018 01:37), Max: 98.5 (27 Aug 2018 10:00)  HR: 69 (28 Aug 2018 05:21) (66 - 90)  BP: 114/75 (28 Aug 2018 04:00) (81/49 - 165/122)  BP(mean): 92 (28 Aug 2018 04:00) (58 - 148)  ABP: --  ABP(mean): --  RR: 11 (28 Aug 2018 05:21) (11 - 37)  SpO2: 97% (28 Aug 2018 05:21) (92% - 100%)    I&O's Summary    26 Aug 2018 07:01  -  27 Aug 2018 07:00  --------------------------------------------------------  IN: 1115 mL / OUT: 503 mL / NET: 612 mL    27 Aug 2018 07:01  -  28 Aug 2018 06:30  --------------------------------------------------------  IN: 865 mL / OUT: 620 mL / NET: 245 mL      Mode: AC/ CMV (Assist Control/ Continuous Mandatory Ventilation)  RR (machine): 10  TV (machine): 360  FiO2: 40  PEEP: 5  ITime: 1  MAP: 9.3  PIP: 24      LABS:              CAPILLARY BLOOD GLUCOSE            RADIOLOGY & ADDITIONAL TESTS:    Consultant(s) Notes Reviewed:  [x ] YES  [ ] NO    MEDICATIONS  (STANDING):  amiodarone    Tablet 200 milliGRAM(s) Oral daily  artificial  tears Solution 1 Drop(s) Both EYES daily  chlorhexidine 0.12% Liquid 15 milliLiter(s) Swish and Spit two times a day  chlorhexidine 2% Cloths 1 Application(s) Topical daily  petrolatum Ophthalmic Ointment 1 Application(s) Both EYES daily  polyethylene glycol 3350 17 Gram(s) Oral daily    MEDICATIONS  (PRN):      PHYSICAL EXAM:  GENERAL: well built, well nourished  HEAD:  Atraumatic, Normocephalic  EYES: EOMI, PERRLA, conjunctiva and sclera clear  ENT: No tonsillar erythema, exudates, or enlargement; Moist mucous membranes, Good dentition, No lesions  NECK: Supple, No JVD, Normal thyroid, no enlarged nodes  NERVOUS SYSTEM:  Alert & Oriented X3, Good concentration; Motor Strength 5/5 B/L upper and lower extremities; DTRs 2+ intact and symmetric, sensory intact  CHEST/LUNG: B/L good air entry; No rales, rhonchi, or wheezing  HEART: S1S2 normal, no S3, Regular rate and rhythm; No murmurs, rubs, or gallops  ABDOMEN: Soft, Nontender, Nondistended; Bowel sounds present  EXTREMITIES:  2+ Peripheral Pulses, No clubbing, cyanosis, or edema  LYMPH: No lymphadenopathy noted  SKIN: No rashes or lesions    Care Discussed with Consultants/Other Providers [ x] YES  [ ] NO HOSPITAL COURSE  A 89 F w/ pmhx of dementia, R. femoral thrombus s/p IVC filter 1/2017, chronic anemia, presents to ED from [facility] after cardio-pulmonary arrest. Patient recently admitted to MICU for hypoxic respiratory failure 2/2 aspiration pneumonia. Patient eventually extubated, received PEG tube, and discharged back to [facility] on 08/14. Hospital course complicated by unstable atrial fibrillation s/p cardioversion, placed on amiodarone with stabilization of HR. Per ER, patient unable to tolerate secretions, found to be hypoxic at NH and eventually lost a pulse. Patient intubated in the field ACLS protocol begun and ROSC achieved after 3 rounds of epinephrine. Upon admission to [facility] MICU, right IJ central line placed and right radial A line. On initial neurologic exam, Pt is Not alert, not oriented, unresponsive to commands. episodes of "startle" like response. Mild corneal reflex and gag reflex. Upward babinski test on left foot. Pupils slightly reactive to light. Suspected Anoxic Brain injury. Pt started on levophed to maintain BP. And propofol for sedation.       OVERNIGHT EVENTS: ALYCIA    INTERVAL HPI: Pt examined at bedside. Pt is intubated, not sedated. She is unresponsive to verbal commands. Unchanged neurologically. Dr. Goff (Son/HCP) at bedside, after discussion of GOC, would like to withdraw all care and continue with morphine prn for respiratory distress/air hunger.     ICU Vital Signs Last 24 Hrs  T(C): 36.2 (28 Aug 2018 01:37), Max: 36.9 (27 Aug 2018 10:00)  T(F): 97.2 (28 Aug 2018 01:37), Max: 98.5 (27 Aug 2018 10:00)  HR: 69 (28 Aug 2018 05:21) (66 - 90)  BP: 114/75 (28 Aug 2018 04:00) (81/49 - 165/122)  BP(mean): 92 (28 Aug 2018 04:00) (58 - 148)  ABP: --  ABP(mean): --  RR: 11 (28 Aug 2018 05:21) (11 - 37)  SpO2: 97% (28 Aug 2018 05:21) (92% - 100%)    I&O's Summary    26 Aug 2018 07:01  -  27 Aug 2018 07:00  --------------------------------------------------------  IN: 1115 mL / OUT: 503 mL / NET: 612 mL    27 Aug 2018 07:01  -  28 Aug 2018 06:30  --------------------------------------------------------  IN: 865 mL / OUT: 620 mL / NET: 245 mL      Mode: AC/ CMV (Assist Control/ Continuous Mandatory Ventilation)  RR (machine): 10  TV (machine): 360  FiO2: 40  PEEP: 5  ITime: 1  MAP: 9.3  PIP: 24      LABS:              CAPILLARY BLOOD GLUCOSE            RADIOLOGY & ADDITIONAL TESTS:    Consultant(s) Notes Reviewed:  [x ] YES  [ ] NO    MEDICATIONS  (STANDING):  amiodarone    Tablet 200 milliGRAM(s) Oral daily  artificial  tears Solution 1 Drop(s) Both EYES daily  chlorhexidine 0.12% Liquid 15 milliLiter(s) Swish and Spit two times a day  chlorhexidine 2% Cloths 1 Application(s) Topical daily  petrolatum Ophthalmic Ointment 1 Application(s) Both EYES daily  polyethylene glycol 3350 17 Gram(s) Oral daily    MEDICATIONS  (PRN):      PHYSICAL EXAM:  GENERAL: well built, well nourished  HEAD:  Atraumatic, Normocephalic  EYES: EOMI, PERRLA, conjunctiva and sclera clear  ENT: No tonsillar erythema, exudates, or enlargement; Moist mucous membranes, Good dentition, No lesions  NECK: Supple, No JVD, Normal thyroid, no enlarged nodes  NERVOUS SYSTEM:  Alert & Oriented X3, Good concentration; Motor Strength 5/5 B/L upper and lower extremities; DTRs 2+ intact and symmetric, sensory intact  CHEST/LUNG: B/L good air entry; No rales, rhonchi, or wheezing  HEART: S1S2 normal, no S3, Regular rate and rhythm; No murmurs, rubs, or gallops  ABDOMEN: Soft, Nontender, Nondistended; Bowel sounds present  EXTREMITIES:  2+ Peripheral Pulses, No clubbing, cyanosis, or edema  LYMPH: No lymphadenopathy noted  SKIN: No rashes or lesions    Care Discussed with Consultants/Other Providers [ x] YES  [ ] NO HOSPITAL COURSE  A 89 F w/ pmhx of dementia, R. femoral thrombus s/p IVC filter 1/2017, chronic anemia, presents to ED from [facility] after cardio-pulmonary arrest. Patient recently admitted to MICU for hypoxic respiratory failure 2/2 aspiration pneumonia. Patient eventually extubated, received PEG tube, and discharged back to [facility] on 08/14. Hospital course complicated by unstable atrial fibrillation s/p cardioversion, placed on amiodarone with stabilization of HR. Per ER, patient unable to tolerate secretions, found to be hypoxic at NH and eventually lost a pulse. Patient intubated in the field ACLS protocol begun and ROSC achieved after 3 rounds of epinephrine. Upon admission to [facility] MICU, right IJ central line placed and right radial A line. On initial neurologic exam, Pt is Not alert, not oriented, unresponsive to commands. episodes of "startle" like response. Mild corneal reflex and gag reflex. Upward babinski test on left foot. Pupils slightly reactive to light. Suspected Anoxic Brain injury. Pt started on levophed to maintain BP. And propofol for sedation. Pt was started on Vancomycin and meropenem for aspiration PNA. SCx grew for Proteus and MSSA. Vancomycin and meropenem d/alissa, started on ancef. During course of admission patient had a drop in hgb to 6.2, no signs of active bleeding. Pt given 1 Unit PRBCs. Neuro was consulted for possible MRI to asses anoxic brain injury. Opted for CT scan which showed poor differentiation between grey/white matter, later appended to include comparison from last CT scan. Dr. Goff (son) at this time, Based on her poor prognosis and wishes outlined in her living will, he agrees to DNR order, no cardioversion or addition of antiarrhythmics, no reinstitution of vasopressors and no new cultures and antibiotics. She will continue feeds and amiodarone for now. Pt remained intubated. Dr. Goff considering tracheostomy. ENT consulted for possible trach. After discussion with ENT, Dr. Goff agrees tracheostomy would be against his mothers living will and would not like to pursue.   He would like to withdraw mech ventilation. We will continue comfort care and use Morphine for resp distress. He understands she will not sustain life for a prolonged period of time.    OVERNIGHT EVENTS: ALYCIA    INTERVAL HPI: Pt examined at bedside. Pt is intubated, not sedated. She is unresponsive to verbal commands. Unchanged neurologically. Dr. Goff (Son/HCP) at bedside, after discussion of GOC, would like to withdraw all care and continue with morphine prn for respiratory distress/air hunger.     ICU Vital Signs Last 24 Hrs  T(C): 36.2 (28 Aug 2018 01:37), Max: 36.9 (27 Aug 2018 10:00)  T(F): 97.2 (28 Aug 2018 01:37), Max: 98.5 (27 Aug 2018 10:00)  HR: 69 (28 Aug 2018 05:21) (66 - 90)  BP: 114/75 (28 Aug 2018 04:00) (81/49 - 165/122)  BP(mean): 92 (28 Aug 2018 04:00) (58 - 148)  ABP: --  ABP(mean): --  RR: 11 (28 Aug 2018 05:21) (11 - 37)  SpO2: 97% (28 Aug 2018 05:21) (92% - 100%)    I&O's Summary    26 Aug 2018 07:01  -  27 Aug 2018 07:00  --------------------------------------------------------  IN: 1115 mL / OUT: 503 mL / NET: 612 mL    27 Aug 2018 07:01  -  28 Aug 2018 06:30  --------------------------------------------------------  IN: 865 mL / OUT: 620 mL / NET: 245 mL      Mode: AC/ CMV (Assist Control/ Continuous Mandatory Ventilation)  RR (machine): 10  TV (machine): 360  FiO2: 40  PEEP: 5  ITime: 1  MAP: 9.3  PIP: 24      LABS:              CAPILLARY BLOOD GLUCOSE            RADIOLOGY & ADDITIONAL TESTS:    Consultant(s) Notes Reviewed:  [x ] YES  [ ] NO    MEDICATIONS  (STANDING):  amiodarone    Tablet 200 milliGRAM(s) Oral daily  artificial  tears Solution 1 Drop(s) Both EYES daily  chlorhexidine 0.12% Liquid 15 milliLiter(s) Swish and Spit two times a day  chlorhexidine 2% Cloths 1 Application(s) Topical daily  petrolatum Ophthalmic Ointment 1 Application(s) Both EYES daily  polyethylene glycol 3350 17 Gram(s) Oral daily    MEDICATIONS  (PRN):      PHYSICAL EXAM:  GENERAL: well built, well nourished  HEAD:  Atraumatic, Normocephalic  EYES: EOMI, PERRLA, conjunctiva and sclera clear  ENT: No tonsillar erythema, exudates, or enlargement; Moist mucous membranes, Good dentition, No lesions  NECK: Supple, No JVD, Normal thyroid, no enlarged nodes  NERVOUS SYSTEM:  Alert & Oriented X3, Good concentration; Motor Strength 5/5 B/L upper and lower extremities; DTRs 2+ intact and symmetric, sensory intact  CHEST/LUNG: B/L good air entry; No rales, rhonchi, or wheezing  HEART: S1S2 normal, no S3, Regular rate and rhythm; No murmurs, rubs, or gallops  ABDOMEN: Soft, Nontender, Nondistended; Bowel sounds present  EXTREMITIES:  2+ Peripheral Pulses, No clubbing, cyanosis, or edema  LYMPH: No lymphadenopathy noted  SKIN: No rashes or lesions    Care Discussed with Consultants/Other Providers [ x] YES  [ ] NO

## 2018-08-28 NOTE — CHART NOTE - NSCHARTNOTEFT_GEN_A_CORE
I spoke with Dr. Goff at length and based on his mothers living will and her poor neurological prognosis he has agreed to withdraw mech ventilation. We will continue comfort care and use Morphine for resp distress. He understands she will not sustain life for a prolonged period of time.

## 2018-08-29 DIAGNOSIS — G93.1 ANOXIC BRAIN DAMAGE, NOT ELSEWHERE CLASSIFIED: ICD-10-CM

## 2018-08-29 DIAGNOSIS — I48.92 UNSPECIFIED ATRIAL FLUTTER: ICD-10-CM

## 2018-08-29 DIAGNOSIS — I46.9 CARDIAC ARREST, CAUSE UNSPECIFIED: ICD-10-CM

## 2018-08-29 DIAGNOSIS — R63.8 OTHER SYMPTOMS AND SIGNS CONCERNING FOOD AND FLUID INTAKE: ICD-10-CM

## 2018-08-29 DIAGNOSIS — J96.01 ACUTE RESPIRATORY FAILURE WITH HYPOXIA: ICD-10-CM

## 2018-08-29 DIAGNOSIS — Z91.89 OTHER SPECIFIED PERSONAL RISK FACTORS, NOT ELSEWHERE CLASSIFIED: ICD-10-CM

## 2018-08-29 RX ORDER — ATROPINE SULFATE 1 %
2 DROPS OPHTHALMIC (EYE) EVERY 6 HOURS
Qty: 0 | Refills: 0 | Status: DISCONTINUED | OUTPATIENT
Start: 2018-08-29 | End: 2018-08-29

## 2018-08-29 RX ORDER — ATROPINE SULFATE 1 %
2 DROPS OPHTHALMIC (EYE) EVERY 4 HOURS
Qty: 0 | Refills: 0 | Status: DISCONTINUED | OUTPATIENT
Start: 2018-08-29 | End: 2018-08-30

## 2018-08-29 RX ORDER — ROBINUL 0.2 MG/ML
0.1 INJECTION INTRAMUSCULAR; INTRAVENOUS EVERY 6 HOURS
Qty: 0 | Refills: 0 | Status: DISCONTINUED | OUTPATIENT
Start: 2018-08-29 | End: 2018-08-30

## 2018-08-29 RX ADMIN — MORPHINE SULFATE 1 MILLIGRAM(S): 50 CAPSULE, EXTENDED RELEASE ORAL at 07:55

## 2018-08-29 RX ADMIN — MORPHINE SULFATE 1 MILLIGRAM(S): 50 CAPSULE, EXTENDED RELEASE ORAL at 09:39

## 2018-08-29 RX ADMIN — ROBINUL 0.1 MILLIGRAM(S): 0.2 INJECTION INTRAMUSCULAR; INTRAVENOUS at 23:59

## 2018-08-29 RX ADMIN — MORPHINE SULFATE 1 MILLIGRAM(S): 50 CAPSULE, EXTENDED RELEASE ORAL at 07:48

## 2018-08-29 RX ADMIN — MORPHINE SULFATE 1 MILLIGRAM(S): 50 CAPSULE, EXTENDED RELEASE ORAL at 12:26

## 2018-08-29 RX ADMIN — MORPHINE SULFATE 1 MILLIGRAM(S): 50 CAPSULE, EXTENDED RELEASE ORAL at 16:37

## 2018-08-29 RX ADMIN — MORPHINE SULFATE 1 MILLIGRAM(S): 50 CAPSULE, EXTENDED RELEASE ORAL at 23:00

## 2018-08-29 RX ADMIN — MORPHINE SULFATE 1 MILLIGRAM(S): 50 CAPSULE, EXTENDED RELEASE ORAL at 00:05

## 2018-08-29 RX ADMIN — MORPHINE SULFATE 1 MILLIGRAM(S): 50 CAPSULE, EXTENDED RELEASE ORAL at 00:20

## 2018-08-29 RX ADMIN — MORPHINE SULFATE 1 MILLIGRAM(S): 50 CAPSULE, EXTENDED RELEASE ORAL at 22:37

## 2018-08-29 RX ADMIN — ROBINUL 0.1 MILLIGRAM(S): 0.2 INJECTION INTRAMUSCULAR; INTRAVENOUS at 16:37

## 2018-08-29 RX ADMIN — CHLORHEXIDINE GLUCONATE 1 APPLICATION(S): 213 SOLUTION TOPICAL at 06:14

## 2018-08-29 NOTE — PROGRESS NOTE ADULT - ASSESSMENT
PULMONARY   #Hypoxic Respiratory Failure 2/2 Aspiration PNA resulting in cardio-pulmonary arrest. ROSC achieved after apx 28 minutes.   - Pt currently intubated. No sedation.  - Dr. Goff (Son/HCP) at bedside, Discussed GOC. Based on his mothers living will and her poor neurological prognosis he has agreed to withdraw mech ventilation.   - continue comfort care and use Morphine for resp distress    ID  #Aspiration PNA (Gram negative Proteus and MSSA); CXR: RLL opacity with bilateral pleural effusions; Sputum cultures +Pansensitive Proteus and MSSA.   - All ABs d/alissa per son  - comfort measures as of now.     NEURO  #AMS: Likely due to anoxic brain injury  - CTH: Poor gray white differentiation signifying possible anoxic brain injury. Poor study due to artifact.   - Poor prognosis for recovery per neuro.   - Palliative on board: In active discussion with son Dr. Goff  - Dr. Goff (Son/HCP) at bedside, after discussion of GOC, would like to withdraw all care    CV  #Cardiac Arrest: likely after pulmonary arrest from above  - EKG without ischemic changes   - trops 0.03. will not trend   - A line in place-right radial  - central line passed, cxr confirmed position     #Atrial flutter s/p cardioversion last admission  - c/w amiodarone 200 daily   - no A/C in setting of PMH of gastric AVM    HEMATOLOGY  #Anemia; now stable hgb 8.5  - Pt has a history of AVMS  - GI Dr. Hale consulted; monitor h/h. Pt likely not candidate for invasive procedures. Monitor H/H    F NO FLUIDS FOR NOW  E REPLETE AS NEEDED  N PEG in place: d/c feeds  - Dispo: MICU

## 2018-08-29 NOTE — PROGRESS NOTE ADULT - PROBLEM SELECTOR PLAN 9
1) PCP Contacted on Admission: (Y) -->Dr. Motta. Admitted from [facility].  2) Date of Contact with PCP:  3) PCP Contacted at Discharge: (Y/N)  4) Summary of Handoff Given to PCP:   5) Post-Discharge Appointment Date and Location:

## 2018-08-29 NOTE — PROGRESS NOTE ADULT - ASSESSMENT
89 F w/ pmhx of dementia, R. femoral thrombus s/p IVC filter 1/2017, chronic anemia, admitted to MICU after cardio-pulmonary arrest

## 2018-08-29 NOTE — PROGRESS NOTE ADULT - SUBJECTIVE AND OBJECTIVE BOX
CC/ HPI Ms. Goff is a 89 year old lady with dementia, history bilateral DVT s/p IVC filter 2017, who is was admitted from the nursing home following cardiopulmonary arrest, decision made by health care proxy to withdraw mechanical ventilation and focus on comfort care, seen this morning resting comfortably.    PAST MEDICAL HISTORY:  Dementia  DVT (deep venous thrombosis)  Dysphagia  Anemia  DJD (degenerative joint disease)  Edema  IBS (irritable bowel syndrome)  Celiac disease  H/O inguinal hernia repair  History of total left hip replacement    SOCHX:  -  alcohol    FMHX: FA/MO  - contributory     ROS reviewed below with positive findings marked (+) :  GEN:  fever, chills ENT: tracheostomy,   epistaxis,  sinusitis COR: CAD, CHF,  HTN, dysrhythmia PUL: COPD, ILD, asthma, pneumonia GI: PEG, dysphagia, hemorrhage, other LUDWIN: kidney disease, electrolyte disorder HEM:  anemia, +thrombus, coagulopathy, cancer ENDO:  thyroid disease, diabetes mellitus CNS:  +dementia, stroke, seizure, PSY:  depression, anxiety, other        MEDICATIONS  (STANDING):  glycopyrrolate Injectable 0.1 milliGRAM(s) IV Push every 6 hours    MEDICATIONS  (PRN):  atropine 1% Ophthalmic Solution for SubLingual Use 2 Drop(s) SubLingual every 4 hours PRN secretions, resp gurgling  morphine  - Injectable 1 milliGRAM(s) IV Push every 2 hours PRN Respiratory Rate > 20      Vital Signs Last 24 Hrs  T(C): 36.5 (29 Aug 2018 14:20), Max: 36.7 (28 Aug 2018 22:30)  T(F): 97.7 (29 Aug 2018 14:20), Max: 98.1 (28 Aug 2018 22:30)  HR: 88 (29 Aug 2018 14:00) (84 - 90)  BP: 117/65 (28 Aug 2018 21:00) (117/65 - 117/65)  BP(mean): 88 (28 Aug 2018 21:00) (88 - 88)  RR: 26 (29 Aug 2018 14:00) (19 - 44)  SpO2: 83% (29 Aug 2018 14:00) (82% - 86%)    GENERAL:         comfortable,  - distress.  HEENT:            - trauma,  - icterus,  - injection,  - nasal discharge.  NECK:              - jugular venous distention, - thyromegaly.  LYMPH:           - lymphadenopathy, - masses.  RESP:               + rhonchi,   - rale,s   - wheezes.   COR:                S1S2   - gallops,  - rubs.  ABD:                bowel sounds,   soft, - tender, - distended.  EXT/MSC:         - cyanosis,  - clubbing,  + edema.    NEURO:            - alert, - response to noxious stimuli      Xray Chest (08.23.18) An AP portable view of the chest reveals an endotracheal tube, but the angel is not clearly visualized on this radiograph. The pulmonary apices are obscured by the patient's jaw on the left and was not imaged on the right. There is mild pulmonary venous congestion. Small right pleural effusion. Atelectatic changes in the lower lobes left greater than right.     ASSESSMENT/PLAN    1) Respiratory failure  2) Post cardiac arrest  3) Pneumonia  4) Anoxic brain injury    Receiving morphine as needed for comfort  Oxygen as needed  Glycopyrrolate as needed  Atrovent/ albuterol q 4 – 6 hours as needed  Goals of care ensure comfort.  Discussed with Dr. Lainez

## 2018-08-29 NOTE — PROGRESS NOTE ADULT - SUBJECTIVE AND OBJECTIVE BOX
PGY-1 TRANSFER ACCEPTANCE NOTE    Hospital Course:  89 F w PMH of dementia, R. femoral thrombus s/p IVC filter 1/2017, chronic anemia, presents to ED from [facility] after cardio-pulmonary arrest. Patient recently admitted to MICU for hypoxic respiratory failure 2/2 aspiration pneumonia. Patient eventually extubated, received PEG tube, and discharged back to [facility] on 08/14. Hospital course complicated by unstable atrial fibrillation s/p cardioversion, placed on amiodarone with stabilization of HR. Per ER, patient unable to tolerate secretions, found to be hypoxic at NH and eventually lost a pulse. Patient intubated in the field ACLS protocol begun and ROSC achieved after 3 rounds of epinephrine. Upon admission to [facility] MICU, right IJ central line placed and right radial A line. On initial neurologic exam, Pt is Not alert, not oriented, unresponsive to commands. episodes of "startle" like response. Mild corneal reflex and gag reflex. Upward babinski test on left foot. Pupils slightly reactive to light. Suspected Anoxic Brain injury. Pt started on levophed to maintain BP. And propofol for sedation. Pt was started on Vancomycin and meropenem for aspiration PNA. SCx grew for Proteus and MSSA. Vancomycin and meropenem d/alissa, started on ancef. During course of admission patient had a drop in hgb to 6.2, no signs of active bleeding. Pt given 1 Unit PRBCs. Neuro was consulted for possible MRI to asses anoxic brain injury. Opted for CT scan which showed poor differentiation between grey/white matter, later appended to include comparison from last CT scan. Dr. Goff (son) at this time, Based on her poor prognosis and wishes outlined in her living will, he agrees to DNR order, no cardioversion or addition of antiarrhythmics, no reinstitution of vasopressors and no new cultures and antibiotics. She will continue feeds and amiodarone for now. Pt remained intubated. Dr. Goff considering tracheostomy. ENT consulted for possible trach. After discussion with ENT, Dr. Goff agrees tracheostomy would be against his mothers living will and would not like to pursue.     He would like to withdraw mech ventilation. We will continue comfort care and use Morphine for resp distress. He understands she will not sustain life for a prolonged period of time. She is now on atropine and glycopyrrolate to manage her secretions    OVERNIGHT EVENTS: ALYCIA    SUBJECTIVE / INTERVAL HPI: Patient seen and examined at bedside. Nonresponsive to verbal or physical stimuli.     VITAL SIGNS:  Vital Signs Last 24 Hrs  T(C): 36.5 (29 Aug 2018 14:20), Max: 36.7 (28 Aug 2018 22:30)  T(F): 97.7 (29 Aug 2018 14:20), Max: 98.1 (28 Aug 2018 22:30)  HR: 88 (29 Aug 2018 14:00) (72 - 90)  BP: 117/65 (28 Aug 2018 21:00) (86/50 - 117/65)  BP(mean): 88 (28 Aug 2018 21:00) (63 - 88)  RR: 26 (29 Aug 2018 14:00) (19 - 44)  SpO2: 83% (29 Aug 2018 14:00) (82% - 90%)    PHYSICAL EXAM:    GENERAL: Not in acute distress  HEAD:  Atraumatic, Normocephalic  EYES: Pupils dilated, minimally responsive to light, conjunctiva and sclera clear  NECK: Supple, No JVD, Normal thyroid, no enlarged nodes  NERVOUS SYSTEM: Eyes closed, not alert, unresponsive to commands/verbal/painful stimili. +corneal reflex, pupils reactive to light. Minimal gag reflex.    CHEST/LUNG: loud upper airway noises. abdominal breathing.  HEART: S1S2 normal, no S3, Regular rate and rhythm; No murmurs, rubs, or gallops  ABDOMEN: Soft, Nontender, Nondistended; Bowel sounds present. Peg in place.  Extremities B/L pedal 2+ pitting edema; UE withdrawal to noxious stimuli  LYMPH: No lymphadenopathy noted  SKIN: No rashes or lesions    MEDICATIONS:  MEDICATIONS  (STANDING):  glycopyrrolate Injectable 0.1 milliGRAM(s) IV Push every 6 hours    MEDICATIONS  (PRN):  atropine 1% Ophthalmic Solution for SubLingual Use 2 Drop(s) SubLingual every 6 hours PRN Secretions  morphine  - Injectable 1 milliGRAM(s) IV Push every 2 hours PRN Respiratory Rate > 20      ALLERGIES:  Allergies    amoxicillin (Rash)  Cipro (Rash)  clindamycin (Unknown)  lactose (Unknown)  penicillin (Rash)  Wheat (Unknown)    Intolerances        LABS:              CAPILLARY BLOOD GLUCOSE          RADIOLOGY & ADDITIONAL TESTS: Reviewed.

## 2018-08-29 NOTE — PROGRESS NOTE ADULT - SUBJECTIVE AND OBJECTIVE BOX
Patient resting in bed.   HOSPITAL COURSE  A 89 F w/ pmhx of dementia, R. femoral thrombus s/p IVC filter 1/2017, chronic anemia, presents to ED from [facility] after cardio-pulmonary arrest. Patient recently admitted to MICU for hypoxic respiratory failure 2/2 aspiration pneumonia. Patient eventually extubated, received PEG tube, and discharged back to [facility] on 08/14. Hospital course complicated by unstable atrial fibrillation s/p cardioversion, placed on amiodarone with stabilization of HR. Per ER, patient unable to tolerate secretions, found to be hypoxic at NH and eventually lost a pulse. Patient intubated in the field ACLS protocol begun and ROSC achieved after 3 rounds of epinephrine. Upon admission to [facility] MICU, right IJ central line placed and right radial A line. On initial neurologic exam, Pt is Not alert, not oriented, unresponsive to commands. episodes of "startle" like response. Mild corneal reflex and gag reflex. Upward babinski test on left foot. Pupils slightly reactive to light. Suspected Anoxic Brain injury. Pt started on levophed to maintain BP. And propofol for sedation. Pt was started on Vancomycin and meropenem for aspiration PNA. SCx grew for Proteus and MSSA. Vancomycin and meropenem d/alissa, started on ancef. During course of admission patient had a drop in hgb to 6.2, no signs of active bleeding. Pt given 1 Unit PRBCs. Neuro was consulted for possible MRI to asses anoxic brain injury. Opted for CT scan which showed poor differentiation between grey/white matter, later appended to include comparison from last CT scan. Dr. Goff (son) at this time, Based on her poor prognosis and wishes outlined in her living will, he agrees to DNR order, no cardioversion or addition of antiarrhythmics, no reinstitution of vasopressors and no new cultures and antibiotics. She will continue feeds and amiodarone for now. Pt remained intubated. Dr. Goff considering tracheostomy. ENT consulted for possible trach. After discussion with ENT, Dr. Goff agrees tracheostomy would be against his mothers living will and would not like to pursue.   He would like to withdraw mech ventilation. We will continue comfort care and use Morphine for resp distress. He understands she will not sustain life for a prolonged period of time.    OVERNIGHT EVENTS: ALYCIA    INTERVAL HPI: Pt examined at bedside. Pt is intubated, not sedated. She is unresponsive to verbal commands.     ICU Vital Signs Last 24 Hrs  T(C): 36.5 (29 Aug 2018 14:20), Max: 36.7 (28 Aug 2018 22:30)  T(F): 97.7 (29 Aug 2018 14:20), Max: 98.1 (28 Aug 2018 22:30)  HR: 88 (29 Aug 2018 14:00) (78 - 90)  BP: 117/65 (28 Aug 2018 21:00) (87/52 - 117/65)  BP(mean): 88 (28 Aug 2018 21:00) (63 - 88)  ABP: --  ABP(mean): --  RR: 26 (29 Aug 2018 14:00) (19 - 44)  SpO2: 83% (29 Aug 2018 14:00) (82% - 88%)    I&O's Summary    28 Aug 2018 07:01  -  29 Aug 2018 07:00  --------------------------------------------------------  IN: 315 mL / OUT: 540 mL / NET: -225 mL    29 Aug 2018 07:01  -  29 Aug 2018 17:10  --------------------------------------------------------  IN: 0 mL / OUT: 150 mL / NET: -150 mL          LABS:              CAPILLARY BLOOD GLUCOSE            RADIOLOGY & ADDITIONAL TESTS:    Consultant(s) Notes Reviewed:  [x ] YES  [ ] NO    MEDICATIONS  (STANDING):  glycopyrrolate Injectable 0.1 milliGRAM(s) IV Push every 6 hours    MEDICATIONS  (PRN):  atropine 1% Ophthalmic Solution for SubLingual Use 2 Drop(s) SubLingual every 4 hours PRN secretions, resp gurgling  morphine  - Injectable 1 milliGRAM(s) IV Push every 2 hours PRN Respiratory Rate > 20    PHYSICAL EXAM:  GENERAL: well built, well nourished  HEAD:  Atraumatic, Normocephalic  EYES: EOMI, PERRLA, conjunctiva and sclera clear  ENT: No tonsillar erythema, exudates, or enlargement; Moist mucous membranes, Good dentition, No lesions  NECK: Supple, No JVD, Normal thyroid, no enlarged nodes  NERVOUS SYSTEM:  Alert & Oriented X3, Good concentration; Motor Strength 5/5 B/L upper and lower extremities; DTRs 2+ intact and symmetric, sensory intact  CHEST/LUNG: B/L good air entry; No rales, rhonchi, or wheezing  HEART: S1S2 normal, no S3, Regular rate and rhythm; No murmurs, rubs, or gallops  ABDOMEN: Soft, Nontender, Nondistended; Bowel sounds present  EXTREMITIES:  2+ Peripheral Pulses, No clubbing, cyanosis, or edema  LYMPH: No lymphadenopathy noted  SKIN: No rashes or lesions      Care Discussed with Consultants/Other Providers [ x] YES  [ ] NO TRANSFER NOTE FROM MICU TO [facility]    HOSPITAL COURSE  A 89 F w/ pmhx of dementia, R. femoral thrombus s/p IVC filter 1/2017, chronic anemia, presents to ED from [facility] after cardio-pulmonary arrest. Patient recently admitted to MICU for hypoxic respiratory failure 2/2 aspiration pneumonia. Patient eventually extubated, received PEG tube, and discharged back to [facility] on 08/14. Hospital course complicated by unstable atrial fibrillation s/p cardioversion, placed on amiodarone with stabilization of HR. Per ER, patient unable to tolerate secretions, found to be hypoxic at NH and eventually lost a pulse. Patient intubated in the field ACLS protocol begun and ROSC achieved after 3 rounds of epinephrine. Upon admission to [facility] MICU, right IJ central line placed and right radial A line. On initial neurologic exam, Pt is Not alert, not oriented, unresponsive to commands. episodes of "startle" like response. Mild corneal reflex and gag reflex. Upward babinski test on left foot. Pupils slightly reactive to light. Suspected Anoxic Brain injury. Pt started on levophed to maintain BP. And propofol for sedation. Pt was started on Vancomycin and meropenem for aspiration PNA. SCx grew for Proteus and MSSA. Vancomycin and meropenem d/alissa, started on ancef. During course of admission patient had a drop in hgb to 6.2, no signs of active bleeding. Pt given 1 Unit PRBCs. Neuro was consulted for possible MRI to asses anoxic brain injury. Opted for CT scan which showed poor differentiation between grey/white matter, later appended to include comparison from last CT scan. Dr. Goff (son) at this time, Based on her poor prognosis and wishes outlined in her living will, he agrees to DNR order, no cardioversion or addition of antiarrhythmics, no reinstitution of vasopressors and no new cultures and antibiotics. She will continue feeds and amiodarone for now. Pt remained intubated. Dr. Goff considering tracheostomy. ENT consulted for possible trach. After discussion with ENT, Dr. Goff agrees tracheostomy would be against his mothers living will and would not like to pursue.   He would like to withdraw mech ventilation. We will continue comfort care and use Morphine for resp distress. He understands she will not sustain life for a prolonged period of time.    OVERNIGHT EVENTS: ALYCIA    INTERVAL HPI: Pt examined at bedside. Pt is intubated, not sedated. She is unresponsive to verbal commands.     ICU Vital Signs Last 24 Hrs  T(C): 36.5 (29 Aug 2018 14:20), Max: 36.7 (28 Aug 2018 22:30)  T(F): 97.7 (29 Aug 2018 14:20), Max: 98.1 (28 Aug 2018 22:30)  HR: 88 (29 Aug 2018 14:00) (78 - 90)  BP: 117/65 (28 Aug 2018 21:00) (87/52 - 117/65)  BP(mean): 88 (28 Aug 2018 21:00) (63 - 88)  ABP: --  ABP(mean): --  RR: 26 (29 Aug 2018 14:00) (19 - 44)  SpO2: 83% (29 Aug 2018 14:00) (82% - 88%)    I&O's Summary    28 Aug 2018 07:01  -  29 Aug 2018 07:00  --------------------------------------------------------  IN: 315 mL / OUT: 540 mL / NET: -225 mL    29 Aug 2018 07:01  -  29 Aug 2018 17:10  --------------------------------------------------------  IN: 0 mL / OUT: 150 mL / NET: -150 mL          LABS:              CAPILLARY BLOOD GLUCOSE            RADIOLOGY & ADDITIONAL TESTS:    Consultant(s) Notes Reviewed:  [x ] YES  [ ] NO    MEDICATIONS  (STANDING):  glycopyrrolate Injectable 0.1 milliGRAM(s) IV Push every 6 hours    MEDICATIONS  (PRN):  atropine 1% Ophthalmic Solution for SubLingual Use 2 Drop(s) SubLingual every 4 hours PRN secretions, resp gurgling  morphine  - Injectable 1 milliGRAM(s) IV Push every 2 hours PRN Respiratory Rate > 20    PHYSICAL EXAM:  GENERAL: well built, well nourished  HEAD:  Atraumatic, Normocephalic  EYES: EOMI, PERRLA, conjunctiva and sclera clear  ENT: No tonsillar erythema, exudates, or enlargement; Moist mucous membranes, Good dentition, No lesions  NECK: Supple, No JVD, Normal thyroid, no enlarged nodes  NERVOUS SYSTEM:  Alert & Oriented X3, Good concentration; Motor Strength 5/5 B/L upper and lower extremities; DTRs 2+ intact and symmetric, sensory intact  CHEST/LUNG: B/L good air entry; No rales, rhonchi, or wheezing  HEART: S1S2 normal, no S3, Regular rate and rhythm; No murmurs, rubs, or gallops  ABDOMEN: Soft, Nontender, Nondistended; Bowel sounds present  EXTREMITIES:  2+ Peripheral Pulses, No clubbing, cyanosis, or edema  LYMPH: No lymphadenopathy noted  SKIN: No rashes or lesions      Care Discussed with Consultants/Other Providers [ x] YES  [ ] NO

## 2018-08-29 NOTE — PROGRESS NOTE ADULT - PROBLEM SELECTOR PLAN 3
likely after pulmonary arrest from above  - EKG without ischemic changes   - trops 0.03. will not trend   - A line in place-right radial, s/p removal for GOC  - central line passed, cxr confirmed position

## 2018-08-29 NOTE — PROGRESS NOTE ADULT - PROBLEM SELECTOR PLAN 4
Gram negative Proteus and MSSA; CXR showing RLL opacity with bilateral pleural effusions; Sputum cultures +Pansensitive Proteus and MSSA.   - All ABs d/alissa per son  - comfort measures as of now.

## 2018-08-29 NOTE — PROGRESS NOTE ADULT - PROBLEM SELECTOR PLAN 7
GOC discussed between palliative and Dr. Goff (pt's son)  - terminally extubated  - morphine IV 1g q2 PRN  - glycopyrrolate .1 IV q6 for secretions  - atropine 2 drops q6 PRN secretions

## 2018-08-29 NOTE — CHART NOTE - NSCHARTNOTEFT_GEN_A_CORE
Admitting Diagnosis:   Patient is a 89y old  Female who presents with a chief complaint of cardio-pulmonary arrest (17 Aug 2018 03:20)      PAST MEDICAL & SURGICAL HISTORY:  Dementia  DVT (deep venous thrombosis)  Dysphagia  Anemia  DJD (degenerative joint disease)  Edema  IBS (irritable bowel syndrome)  Celiac disease  S/P IVC filter  H/O inguinal hernia repair  History of total left hip replacement      Current Nutrition Order: NPO       PO Intake: Good (%) [   ]  Fair (50-75%) [   ] Poor (<25%) [   ]- N/A NPO    GI Issues: Unable to assess at this time    Pain: Unable to assess at this time- pt on morphine drip s/p palliative extubation    Skin Integrity: Spine stage III    Labs:         CAPILLARY BLOOD GLUCOSE          Medications:  MEDICATIONS  (STANDING):  glycopyrrolate Injectable 0.1 milliGRAM(s) IV Push every 6 hours    MEDICATIONS  (PRN):  atropine 1% Ophthalmic Solution for SubLingual Use 2 Drop(s) SubLingual every 6 hours PRN Secretions  morphine  - Injectable 1 milliGRAM(s) IV Push every 2 hours PRN Respiratory Rate > 20      Weight: 52.1kg  Daily     Daily     Weight Change: No new weights recorded since admit     Estimated energy needs: Ideal body weight used for calculations as pt >120% of IBW. Needs adjusted for vent  Calories: 25-30 kcal/kg = 1047-1257kcal/day  Protein: 1.4-1.6 g/kg = 59-67g pro/day  Fluids: 30-35 mL/kg =0879-1678 mL/day    Subjective: 89 yo/female with PMHx dementia, DVT s/p IVC filter, anemia, recent admission w/intubation and PEG placement, presents from NH s/p cardio pulmonary arrest w/eventual ROSC, followed by intubation. S/p palliative extubation on 8/28; pt for comfort/end of life care, on morphine drip. Will continue to keep nutrition aligned with goals of care at all times.    Previous Nutrition Diagnosis:  Increased protein-calorie needs RT increased demand for protein-calorie intake AEB vent     Active [   ]  Resolved [ X - not longer pertinent ]    If resolved, new PES:     Goal: Nutrition will be kept aligned with goals of care at all times    Recommendations:  1. Optimize nutrition and hydration status as aligned with goals of care    Education: N/A    Risk Level: High [   ] Moderate [ X  ] Low [   ]

## 2018-08-29 NOTE — PROGRESS NOTE ADULT - PROBLEM SELECTOR PLAN 2
Hypoxic Respiratory Failure 2/2 Aspiration PNA resulting in cardio-pulmonary arrest. ROSC achieved after apx 28 minutes.   - Pt currently intubated. No sedation.  - Dr. Goff (Son/HCP) at bedside, Discussed GOC. Based on his mothers living will and her poor neurological prognosis he has agreed to withdraw mech ventilation.   - continue comfort care and use Morphine for resp distress

## 2018-08-29 NOTE — PROGRESS NOTE ADULT - PROBLEM SELECTOR PLAN 1
AMS likely due to anoxic brain injury  - CTH: Poor gray white differentiation signifying possible anoxic brain injury. Poor study due to artifact.   - Poor prognosis for recovery per neuro.   - Palliative on board: In active discussion with son Dr. Goff  - Dr. Goff (Son/HCP) at bedside, after discussion of GOC, would like to withdraw all care

## 2018-08-29 NOTE — PROGRESS NOTE ADULT - PROBLEM SELECTOR PLAN 6
now stable hgb 8.5. No longer montoring 2/2 GOC.  - Pt has a history of AVMS  - GI Dr. Hale consulted. Pt likely not candidate for invasive procedures.

## 2018-08-30 VITALS
TEMPERATURE: 98 F | RESPIRATION RATE: 24 BRPM | OXYGEN SATURATION: 85 % | DIASTOLIC BLOOD PRESSURE: 66 MMHG | HEART RATE: 101 BPM | SYSTOLIC BLOOD PRESSURE: 105 MMHG

## 2018-08-30 PROCEDURE — 99233 SBSQ HOSP IP/OBS HIGH 50: CPT | Mod: GC

## 2018-08-30 RX ORDER — MORPHINE SULFATE 50 MG/1
2 CAPSULE, EXTENDED RELEASE ORAL
Qty: 0 | Refills: 0 | Status: DISCONTINUED | OUTPATIENT
Start: 2018-08-30 | End: 2018-08-30

## 2018-08-30 RX ADMIN — MORPHINE SULFATE 2 MILLIGRAM(S): 50 CAPSULE, EXTENDED RELEASE ORAL at 15:23

## 2018-08-30 RX ADMIN — MORPHINE SULFATE 2 MILLIGRAM(S): 50 CAPSULE, EXTENDED RELEASE ORAL at 18:20

## 2018-08-30 RX ADMIN — ROBINUL 0.1 MILLIGRAM(S): 0.2 INJECTION INTRAMUSCULAR; INTRAVENOUS at 18:20

## 2018-08-30 RX ADMIN — MORPHINE SULFATE 1 MILLIGRAM(S): 50 CAPSULE, EXTENDED RELEASE ORAL at 06:52

## 2018-08-30 RX ADMIN — ROBINUL 0.1 MILLIGRAM(S): 0.2 INJECTION INTRAMUSCULAR; INTRAVENOUS at 06:50

## 2018-08-30 RX ADMIN — MORPHINE SULFATE 1 MILLIGRAM(S): 50 CAPSULE, EXTENDED RELEASE ORAL at 06:51

## 2018-08-30 RX ADMIN — MORPHINE SULFATE 2 MILLIGRAM(S): 50 CAPSULE, EXTENDED RELEASE ORAL at 09:05

## 2018-08-30 RX ADMIN — MORPHINE SULFATE 2 MILLIGRAM(S): 50 CAPSULE, EXTENDED RELEASE ORAL at 11:44

## 2018-08-30 RX ADMIN — ROBINUL 0.1 MILLIGRAM(S): 0.2 INJECTION INTRAMUSCULAR; INTRAVENOUS at 11:44

## 2018-08-30 NOTE — PROGRESS NOTE ADULT - ATTENDING COMMENTS
DX  ASPIRATON PNEUMONIA/ PNEUMONIA DUE TO GRAM NEGATIVE ORGANISMS  HYPOXEMIC RESPIRATORY FAILURE  SEVERE SEPSIS  CARDIOPULMONARY ARREST  SUSPECTED ANOXIC BRAIN INJURY    COMFORT MEASURES ONLY

## 2018-08-30 NOTE — DISCHARGE NOTE FOR THE EXPIRED PATIENT - SECONDARY DIAGNOSIS.
Anoxic brain injury Cardiac arrest Septic shock Aspiration pneumonia, unspecified aspiration pneumonia type, unspecified laterality, unspecified part of lung

## 2018-08-30 NOTE — PROGRESS NOTE ADULT - PROBLEM SELECTOR PROBLEM 4
Aspiration pneumonia, unspecified aspiration pneumonia type, unspecified laterality, unspecified part of lung
Aspiration pneumonia, unspecified aspiration pneumonia type, unspecified laterality, unspecified part of lung
Anemia, unspecified type
Osteoarthritis, unspecified osteoarthritis type, unspecified site
Osteoarthritis, unspecified osteoarthritis type, unspecified site

## 2018-08-30 NOTE — PROGRESS NOTE ADULT - PROBLEM SELECTOR PROBLEM 2
Aspiration pneumonia, unspecified aspiration pneumonia type, unspecified laterality, unspecified part of lung
Dementia without behavioral disturbance, unspecified dementia type
Dementia without behavioral disturbance, unspecified dementia type
Acute respiratory failure with hypoxia
Acute respiratory failure with hypoxia

## 2018-08-30 NOTE — PROGRESS NOTE ADULT - PROBLEM SELECTOR PROBLEM 1
Anemia, unspecified type
Dementia without behavioral disturbance, unspecified dementia type
Anemia, unspecified type
Anoxic brain injury
Anoxic brain injury

## 2018-08-30 NOTE — PROGRESS NOTE ADULT - PROBLEM SELECTOR PROBLEM 3
Cardiac arrest
Chronic deep vein thrombosis (DVT) of non-extremity vein
Cardiac arrest
Cardiopulmonary arrest with successful resuscitation
Cardiopulmonary arrest with successful resuscitation

## 2018-08-30 NOTE — PROGRESS NOTE ADULT - PROBLEM SELECTOR PLAN 3
likely after pulmonary arrest from above  - EKG without ischemic changes   - trops 0.03. will not trend   - A line in place-right radial, s/p removal for GOC  - central line passed, cxr confirmed position likely after pulmonary arrest from above  - EKG without ischemic changes   - trops 0.03. will not trend

## 2018-08-30 NOTE — PROGRESS NOTE ADULT - PROVIDER SPECIALTY LIST ADULT
Cardiology
Critical Care
Critical Care
Gastroenterology
Internal Medicine
MICU
Neurology
Palliative Care
Palliative Care
Pulmonology
MICU
Pulmonology
Internal Medicine

## 2018-08-30 NOTE — DISCHARGE NOTE FOR THE EXPIRED PATIENT - NS PATIENT DEATH CRITERIA
Patient is dead based on Cardiopulmonary criteria including absent breath sounds, pulselessness and/or asystole/Patient is dead based upon Brain Death Criteria

## 2018-08-30 NOTE — PROGRESS NOTE ADULT - PROBLEM SELECTOR PLAN 8
F NO FLUIDS FOR NOW  E REPLETE AS NEEDED  N PEG in place: d/c feeds  - Dispo: MICU F NO FLUIDS FOR NOW  E REPLETE AS NEEDED  N PEG in place: d/c feeds  - Dispo: 7W

## 2018-08-30 NOTE — PROGRESS NOTE ADULT - SUBJECTIVE AND OBJECTIVE BOX
CC/ HPI Ms. Goff is a 89 year old lady with dementia, history bilateral DVT s/p IVC filter 2017, who is was admitted from the nursing home following cardiopulmonary arrest, decision made by health care proxy to withdraw mechanical ventilation and focus on comfort care, seen this morning resting comfortably.    PAST MEDICAL HISTORY:  Dementia  DVT (deep venous thrombosis)  Dysphagia  Anemia  DJD (degenerative joint disease)  Edema  IBS (irritable bowel syndrome)  Celiac disease  H/O inguinal hernia repair  History of total left hip replacement    SOCHX:  -  alcohol    FMHX: FA/MO  - contributory     ROS reviewed below with positive findings marked (+) :  GEN:  fever, chills ENT: tracheostomy,   epistaxis,  sinusitis COR: CAD, CHF,  HTN, dysrhythmia PUL: COPD, ILD, asthma, pneumonia GI: PEG, dysphagia, hemorrhage, other LUDWIN: kidney disease, electrolyte disorder HEM:  anemia, +thrombus, coagulopathy, cancer ENDO:  thyroid disease, diabetes mellitus CNS:  +dementia, stroke, seizure, PSY:  depression, anxiety, other      MEDICATIONS  (STANDING):  glycopyrrolate Injectable 0.1 milliGRAM(s) IV Push every 6 hours    MEDICATIONS  (PRN):  atropine 1% Ophthalmic Solution for SubLingual Use 2 Drop(s) SubLingual every 4 hours PRN secretions, resp gurgling  morphine  - Injectable 2 milliGRAM(s) IV Push every 3 hours PRN air hunger / tachypnea      Vital Signs Last 24 Hrs  T(C): 36.8 (30 Aug 2018 05:00), Max: 36.8 (30 Aug 2018 05:00)  T(F): 98.3 (30 Aug 2018 05:00), Max: 98.3 (30 Aug 2018 05:00)  HR: 101 (30 Aug 2018 05:00) (88 - 101)  BP: 105/66 (30 Aug 2018 05:00) (105/66 - 105/66)  RR: 24 (30 Aug 2018 05:00) (24 - 26)  SpO2: 85% (30 Aug 2018 05:00) (83% - 85%)    GENERAL:        + comfortable,  - distress.  HEENT:            - trauma,  - icterus,  - injection,  - nasal discharge.  NECK:              - jugular venous distention, - thyromegaly.  LYMPH:           - lymphadenopathy, - masses.  RESP:               + clear,   - rales,   - rhonchi,   - wheezes.   COR:                S1S2   - gallops,  - rubs.  ABD:                bowel sounds,   soft, - tender, - distended.  EXT/MSC:         - cyanosis,  - clubbing,  + edema.    NEURO:            - alert, -  responds to stimuli.        Xray Chest (08.23.18) An AP portable view of the chest reveals an endotracheal tube, but the angel is not clearly visualized on this radiograph. The pulmonary apices are obscured by the patient's jaw on the left and was not imaged on the right. There is mild pulmonary venous congestion. Small right pleural effusion. Atelectatic changes in the lower lobes left greater than right.     ASSESSMENT/PLAN    1) Respiratory failure  2) Post cardiac arrest  3) Pneumonia  4) Anoxic brain injury    Receiving morphine as needed for comfort  Glycopyrrolate to manage respiratory secretions  Atrovent/ albuterol q 4 – 6 hours if needed  Goals of care ensure comfort.  Discussed with Dr. Motta.

## 2018-08-30 NOTE — PROGRESS NOTE ADULT - SUBJECTIVE AND OBJECTIVE BOX
OVERNIGHT EVENTS: ALYCIA    SUBJECTIVE / INTERVAL HPI: Patient nonresponsive to verbal or physical stimuli.     VITAL SIGNS:  Vital Signs Last 24 Hrs  T(C): 36.8 (30 Aug 2018 05:00), Max: 36.8 (30 Aug 2018 05:00)  T(F): 98.3 (30 Aug 2018 05:00), Max: 98.3 (30 Aug 2018 05:00)  HR: 101 (30 Aug 2018 05:00) (101 - 101)  BP: 105/66 (30 Aug 2018 05:00) (105/66 - 105/66)  BP(mean): --  RR: 24 (30 Aug 2018 05:00) (24 - 24)  SpO2: 85% (30 Aug 2018 05:00) (85% - 85%)    PHYSICAL EXAM:    General: WDWN  HEENT: NC/AT; PERRL, anicteric sclera; MMM  Neck: supple  Cardiovascular: +S1/S2, RRR  Respiratory: CTA B/L; no W/R/R  Gastrointestinal: soft, NT/ND; +BSx4  Extremities: WWP; no edema, clubbing or cyanosis  Vascular: 2+ radial, DP/PT pulses B/L  Neurological: AAOx3; no focal deficits    MEDICATIONS:  MEDICATIONS  (STANDING):  glycopyrrolate Injectable 0.1 milliGRAM(s) IV Push every 6 hours    MEDICATIONS  (PRN):  atropine 1% Ophthalmic Solution for SubLingual Use 2 Drop(s) SubLingual every 4 hours PRN secretions, resp gurgling  morphine  - Injectable 2 milliGRAM(s) IV Push every 3 hours PRN air hunger / tachypnea      ALLERGIES:  Allergies    amoxicillin (Rash)  Cipro (Rash)  clindamycin (Unknown)  lactose (Unknown)  penicillin (Rash)  Wheat (Unknown)    Intolerances        LABS:              CAPILLARY BLOOD GLUCOSE          RADIOLOGY & ADDITIONAL TESTS: Reviewed. OVERNIGHT EVENTS: ALYCIA    SUBJECTIVE / INTERVAL HPI: Patient nonresponsive to verbal or physical stimuli.     VITAL SIGNS:  Vital Signs Last 24 Hrs  T(C): 36.8 (30 Aug 2018 05:00), Max: 36.8 (30 Aug 2018 05:00)  T(F): 98.3 (30 Aug 2018 05:00), Max: 98.3 (30 Aug 2018 05:00)  HR: 101 (30 Aug 2018 05:00) (101 - 101)  BP: 105/66 (30 Aug 2018 05:00) (105/66 - 105/66)  BP(mean): --  RR: 24 (30 Aug 2018 05:00) (24 - 24)  SpO2: 85% (30 Aug 2018 05:00) (85% - 85%)    PHYSICAL EXAM:    GENERAL: Not in acute distress  HEAD:  Atraumatic, Normocephalic  EYES: Pupils dilated, minimally responsive to light, conjunctiva and sclera clear  NECK: Supple, No JVD, Normal thyroid, no enlarged nodes  NERVOUS SYSTEM: Eyes closed, not alert, unresponsive to commands/verbal/painful stimili. +corneal reflex. Minimal gag reflex.    CHEST/LUNG: loud upper airway noises. abdominal breathing.  HEART: S1S2 normal, no S3, Regular rate and rhythm; No murmurs, rubs, or gallops  ABDOMEN: Soft, Nontender, Nondistended; Bowel sounds present. Peg in place.  Extremities B/L pedal 2+ pitting edema; UE withdrawal to noxious stimuli  LYMPH: No lymphadenopathy noted  SKIN: No rashes or lesions    MEDICATIONS:  MEDICATIONS  (STANDING):  glycopyrrolate Injectable 0.1 milliGRAM(s) IV Push every 6 hours    MEDICATIONS  (PRN):  atropine 1% Ophthalmic Solution for SubLingual Use 2 Drop(s) SubLingual every 4 hours PRN secretions, resp gurgling  morphine  - Injectable 2 milliGRAM(s) IV Push every 3 hours PRN air hunger / tachypnea      ALLERGIES:  Allergies    amoxicillin (Rash)  Cipro (Rash)  clindamycin (Unknown)  lactose (Unknown)  penicillin (Rash)  Wheat (Unknown)    Intolerances        LABS:              CAPILLARY BLOOD GLUCOSE          RADIOLOGY & ADDITIONAL TESTS: Reviewed.

## 2018-08-30 NOTE — PROGRESS NOTE ADULT - NSHPATTENDINGPLANDISCUSS_GEN_ALL_CORE
[facility]
note
MICU team
MICU team
MICU team and son
DR Goff
DR Goff
house staff

## 2018-08-30 NOTE — DISCHARGE NOTE FOR THE EXPIRED PATIENT - HOSPITAL COURSE
A 89 F w/ pmhx of dementia, R. femoral thrombus s/p IVC filter 2017, chronic anemia, presents to ED from [facility] after cardio-pulmonary arrest. Patient recently admitted to MICU for hypoxic respiratory failure 2/2 aspiration pneumonia. Patient eventually extubated, received PEG tube, and discharged back to [facility] on . Hospital course complicated by unstable atrial fibrillation s/p cardioversion, placed on amiodarone with stabilization of HR. Per ER, patient unable to tolerate secretions, found to be hypoxic at NH and eventually lost a pulse. Patient intubated in the field ACLS protocol begun and ROSC achieved after 3 rounds of epinephrine. Upon admission to [facility] MICU, right IJ central line placed and right radial A line. On initial neurologic exam, Pt is Not alert, not oriented, unresponsive to commands. episodes of "startle" like response. Mild corneal reflex and gag reflex. Upward babinski test on left foot. Pupils slightly reactive to light. Suspected Anoxic Brain injury. Pt started on levophed to maintain BP. And propofol for sedation. Pt was started on Vancomycin and meropenem for aspiration PNA. SCx grew for Proteus and MSSA. Vancomycin and meropenem d/alissa, started on ancef. During course of admission patient had a drop in hgb to 6.2, no signs of active bleeding. Pt given 1 Unit PRBCs. Neuro was consulted for possible MRI to asses anoxic brain injury. Opted for CT scan which showed poor differentiation between grey/white matter, later appended to include comparison from last CT scan. Dr. Goff (son) at this time, Based on her poor prognosis and wishes outlined in her living will, he agrees to DNR order, no cardioversion or addition of antiarrhythmics, no reinstitution of vasopressors and no new cultures and antibiotics. She will continue feeds and amiodarone for now. Pt remained intubated. Dr. Goff considering tracheostomy. ENT consulted for possible trach. After discussion with ENT, Dr. Goff agreed tracheostomy would be against his mothers living will and would not like to pursue. [facility] and comfort care was continued w/ use of Morphine for resp distress. He understood she would not sustain life for a prolonged period of time.    At 9:12 am, nurse checked on pt and found pt to be . PGY1 and PGY2 residents examined pt at bedside. Pt was unresponsive to verbal or tactile stimuli, with no carotid, or radial pulses. No breath or lung sounds present. No pupillary or corneal reflex present. Time of Death 9:26pm. Cause of Death was respiratory arrest 2/2 anoxic brain injury due to cardiac arrest 2/2 to septic shock 2/2 former asp pna. Next of kin made aware. Nocturnist on call notified.

## 2018-08-30 NOTE — PROGRESS NOTE ADULT - PROBLEM SELECTOR PLAN 2
Hypoxic Respiratory Failure 2/2 Aspiration PNA resulting in cardio-pulmonary arrest. ROSC achieved after apx 28 minutes.   - Pt currently intubated. No sedation.  - Dr. Goff (Son/HCP) at bedside, Discussed GOC. Based on his mothers living will and her poor neurological prognosis he has agreed to withdraw mech ventilation.   - continue comfort care and use Morphine for resp distress Hypoxic Respiratory Failure 2/2 Aspiration PNA resulting in cardio-pulmonary arrest. ROSC achieved after apx 28 minutes.   - Dr. Goff (Son/HCP) at bedside, Discussed GOC. Based on his mothers living will and her poor neurological prognosis he   agreed to withdraw mech ventilation.   - continue comfort care and use Morphine for resp distress Hypoxic Respiratory Failure 2/2 Aspiration PNA resulting in cardio-pulmonary arrest. ROSC achieved after apx 28 minutes.   - Dr. Goff (Son/HCP) at bedside, Discussed GOC. Based on his mothers living will and her poor neurological prognosis he agreed to withdraw mech ventilation.   - continue comfort care and use Morphine for resp distress

## 2018-08-30 NOTE — PROGRESS NOTE ADULT - PROBLEM SELECTOR PLAN 9
1) PCP Contacted on Admission: (Y) -->Dr. Motta. Admitted from [facility].  2) Date of Contact with PCP:  3) PCP Contacted at Discharge: (Y/N)  4) Summary of Handoff Given to PCP:   5) Post-Discharge Appointment Date and Location:

## 2018-08-30 NOTE — PROGRESS NOTE ADULT - PROBLEM SELECTOR PROBLEM 5
Atrial flutter, unspecified type
Atrial flutter, unspecified type
Palliative care by specialist
Primary osteoarthritis, unspecified site
Palliative care by specialist

## 2018-08-30 NOTE — PROGRESS NOTE ADULT - SUBJECTIVE AND OBJECTIVE BOX
PGY-1 TRANSFER ACCEPTANCE NOTE    Hospital Course:  89 F w PMH of dementia, R. femoral thrombus s/p IVC filter 1/2017, chronic anemia, presents to ED from [facility] after cardio-pulmonary arrest. Patient recently admitted to MICU for hypoxic respiratory failure 2/2 aspiration pneumonia. Patient eventually extubated, received PEG tube, and discharged back to [facility] on 08/14. Hospital course complicated by unstable atrial fibrillation s/p cardioversion, placed on amiodarone with stabilization of HR. Per ER, patient unable to tolerate secretions, found to be hypoxic at NH and eventually lost a pulse. Patient intubated in the field ACLS protocol begun and ROSC achieved after 3 rounds of epinephrine. Upon admission to [facility] MICU, right IJ central line placed and right radial A line. On initial neurologic exam, Pt is Not alert, not oriented, unresponsive to commands. episodes of "startle" like response. Mild corneal reflex and gag reflex. Upward babinski test on left foot. Pupils slightly reactive to light. Suspected Anoxic Brain injury. Pt started on levophed to maintain BP. And propofol for sedation. Pt was started on Vancomycin and meropenem for aspiration PNA. SCx grew for Proteus and MSSA. Vancomycin and meropenem d/alissa, started on ancef. During course of admission patient had a drop in hgb to 6.2, no signs of active bleeding. Pt given 1 Unit PRBCs. Neuro was consulted for possible MRI to asses anoxic brain injury. Opted for CT scan which showed poor differentiation between grey/white matter, later appended to include comparison from last CT scan. Dr. Goff (son) at this time, Based on her poor prognosis and wishes outlined in her living will, he agrees to DNR order, no cardioversion or addition of antiarrhythmics, no reinstitution of vasopressors and no new cultures and antibiotics. She will continue feeds and amiodarone for now. Pt remained intubated. Dr. Goff considering tracheostomy. ENT consulted for possible trach. After discussion with ENT, Dr. Goff agrees tracheostomy would be against his mothers living will and would not like to pursue.     He would like to withdraw mech ventilation. We will continue comfort care and use Morphine for resp distress. He understands she will not sustain life for a prolonged period of time. She is now on atropine and glycopyrrolate to manage her secretions    OVERNIGHT EVENTS: ALYCIA    SUBJECTIVE / INTERVAL HPI: comfortable/unresp.    VITAL SIGNS:  Vital Signs Last 24 Hrs  T(C): 36.5 (29 Aug 2018 14:20), Max: 36.7 (28 Aug 2018 22:30)  T(F): 97.7 (29 Aug 2018 14:20), Max: 98.1 (28 Aug 2018 22:30)  HR: 88 (29 Aug 2018 14:00) (72 - 90)  BP: 117/65 (28 Aug 2018 21:00) (86/50 - 117/65)  BP(mean): 88 (28 Aug 2018 21:00) (63 - 88)  RR: 26 (29 Aug 2018 14:00) (19 - 44)  SpO2: 83% (29 Aug 2018 14:00) (82% - 90%)    PHYSICAL EXAM:    GENERAL: Not in acute distress  HEAD:  Atraumatic, Normocephalic  EYES: Pupils dilated, minimally responsive to light, conjunctiva and sclera clear  NECK: Supple, No JVD, Normal thyroid, no enlarged nodes  NERVOUS SYSTEM: Eyes closed, not alert, unresponsive to commands/verbal/painful stimili. +corneal reflex, pupils reactive to light. Minimal gag reflex.    CHEST/LUNG: loud upper airway noises. abdominal breathing.  HEART: S1S2 normal, no S3, Regular rate and rhythm; No murmurs, rubs, or gallops  ABDOMEN: Soft, Nontender, Nondistended; Bowel sounds present. Peg in place.  Extremities B/L pedal 2+ pitting edema; UE withdrawal to noxious stimuli  LYMPH: No lymphadenopathy noted  SKIN: No rashes or lesions    MEDICATIONS:  MEDICATIONS  (STANDING):  glycopyrrolate Injectable 0.1 milliGRAM(s) IV Push every 6 hours    MEDICATIONS  (PRN):  atropine 1% Ophthalmic Solution for SubLingual Use 2 Drop(s) SubLingual every 6 hours PRN Secretions  morphine  - Injectable 1 milliGRAM(s) IV Push every 2 hours PRN Respiratory Rate > 20      ALLERGIES:  Allergies    amoxicillin (Rash)  Cipro (Rash)  clindamycin (Unknown)  lactose (Unknown)  penicillin (Rash)  Wheat (Unknown)    Intolerances        LABS:              CAPILLARY BLOOD GLUCOSE          RADIOLOGY & ADDITIONAL TESTS: Reviewed. Hospital Course:  89 F w PMH of dementia, R. femoral thrombus s/p IVC filter 1/2017, chronic anemia, presents to ED from [facility] after cardio-pulmonary arrest. Patient recently admitted to MICU for hypoxic respiratory failure 2/2 aspiration pneumonia. Patient eventually extubated, received PEG tube, and discharged back to [facility] on 08/14. Hospital course complicated by unstable atrial fibrillation s/p cardioversion, placed on amiodarone with stabilization of HR. Per ER, patient unable to tolerate secretions, found to be hypoxic at NH and eventually lost a pulse. Patient intubated in the field ACLS protocol begun and ROSC achieved after 3 rounds of epinephrine. Upon admission to [facility] MICU, right IJ central line placed and right radial A line. On initial neurologic exam, Pt is Not alert, not oriented, unresponsive to commands. episodes of "startle" like response. Mild corneal reflex and gag reflex. Upward babinski test on left foot. Pupils slightly reactive to light. Suspected Anoxic Brain injury. Pt started on levophed to maintain BP. And propofol for sedation. Pt was started on Vancomycin and meropenem for aspiration PNA. SCx grew for Proteus and MSSA. Vancomycin and meropenem d/alissa, started on ancef. During course of admission patient had a drop in hgb to 6.2, no signs of active bleeding. Pt given 1 Unit PRBCs. Neuro was consulted for possible MRI to asses anoxic brain injury. Opted for CT scan which showed poor differentiation between grey/white matter, later appended to include comparison from last CT scan. Dr. Goff (son) at this time, Based on her poor prognosis and wishes outlined in her living will, he agrees to DNR order, no cardioversion or addition of antiarrhythmics, no reinstitution of vasopressors and no new cultures and antibiotics. She will continue feeds and amiodarone for now. Pt remained intubated. Dr. Goff considering tracheostomy. ENT consulted for possible trach. After discussion with ENT, Dr. Goff agrees tracheostomy would be against his mothers living will and would not like to pursue.     He would like to withdraw mech ventilation. We will continue comfort care and use Morphine for resp distress. He understands she will not sustain life for a prolonged period of time. She is now on atropine and glycopyrrolate to manage her secretions    OVERNIGHT EVENTS: ALYCIA    SUBJECTIVE / INTERVAL HPI: comfortable/unresp.    VITAL SIGNS:  Vital Signs Last 24 Hrs  T(C): 36.5 (29 Aug 2018 14:20), Max: 36.7 (28 Aug 2018 22:30)  T(F): 97.7 (29 Aug 2018 14:20), Max: 98.1 (28 Aug 2018 22:30)  HR: 88 (29 Aug 2018 14:00) (72 - 90)  BP: 117/65 (28 Aug 2018 21:00) (86/50 - 117/65)  BP(mean): 88 (28 Aug 2018 21:00) (63 - 88)  RR: 26 (29 Aug 2018 14:00) (19 - 44)  SpO2: 83% (29 Aug 2018 14:00) (82% - 90%)    PHYSICAL EXAM:    GENERAL: Not in acute distress  HEAD:  Atraumatic, Normocephalic  EYES: Pupils dilated, minimally responsive to light, conjunctiva and sclera clear  NECK: Supple, No JVD, Normal thyroid, no enlarged nodes  NERVOUS SYSTEM: Eyes closed, not alert, unresponsive to commands/verbal/painful stimili. +corneal reflex, pupils reactive to light. Minimal gag reflex.    CHEST/LUNG: loud upper airway noises. abdominal breathing.  HEART: S1S2 normal, no S3, Regular rate and rhythm; No murmurs, rubs, or gallops  ABDOMEN: Soft, Nontender, Nondistended; Bowel sounds present. Peg in place.  Extremities B/L pedal 2+ pitting edema; UE withdrawal to noxious stimuli  LYMPH: No lymphadenopathy noted  SKIN: No rashes or lesions    MEDICATIONS:  MEDICATIONS  (STANDING):  glycopyrrolate Injectable 0.1 milliGRAM(s) IV Push every 6 hours    MEDICATIONS  (PRN):  atropine 1% Ophthalmic Solution for SubLingual Use 2 Drop(s) SubLingual every 6 hours PRN Secretions  morphine  - Injectable 1 milliGRAM(s) IV Push every 2 hours PRN Respiratory Rate > 20      ALLERGIES:  Allergies    amoxicillin (Rash)  Cipro (Rash)  clindamycin (Unknown)  lactose (Unknown)  penicillin (Rash)  Wheat (Unknown)    Intolerances        LABS:              CAPILLARY BLOOD GLUCOSE          RADIOLOGY & ADDITIONAL TESTS: Reviewed.

## 2018-09-11 DIAGNOSIS — Z88.1 ALLERGY STATUS TO OTHER ANTIBIOTIC AGENTS STATUS: ICD-10-CM

## 2018-09-11 DIAGNOSIS — Z91.018 ALLERGY TO OTHER FOODS: ICD-10-CM

## 2018-09-11 DIAGNOSIS — I46.9 CARDIAC ARREST, CAUSE UNSPECIFIED: ICD-10-CM

## 2018-09-11 DIAGNOSIS — Z91.81 HISTORY OF FALLING: ICD-10-CM

## 2018-09-11 DIAGNOSIS — Z91.011 ALLERGY TO MILK PRODUCTS: ICD-10-CM

## 2018-09-11 DIAGNOSIS — J98.11 ATELECTASIS: ICD-10-CM

## 2018-09-11 DIAGNOSIS — A41.9 SEPSIS, UNSPECIFIED ORGANISM: ICD-10-CM

## 2018-09-11 DIAGNOSIS — D64.9 ANEMIA, UNSPECIFIED: ICD-10-CM

## 2018-09-11 DIAGNOSIS — J96.00 ACUTE RESPIRATORY FAILURE, UNSPECIFIED WHETHER WITH HYPOXIA OR HYPERCAPNIA: ICD-10-CM

## 2018-09-11 DIAGNOSIS — B95.61 METHICILLIN SUSCEPTIBLE STAPHYLOCOCCUS AUREUS INFECTION AS THE CAUSE OF DISEASES CLASSIFIED ELSEWHERE: ICD-10-CM

## 2018-09-11 DIAGNOSIS — Z93.1 GASTROSTOMY STATUS: ICD-10-CM

## 2018-09-11 DIAGNOSIS — N17.0 ACUTE KIDNEY FAILURE WITH TUBULAR NECROSIS: ICD-10-CM

## 2018-09-11 DIAGNOSIS — F41.9 ANXIETY DISORDER, UNSPECIFIED: ICD-10-CM

## 2018-09-11 DIAGNOSIS — L89.152 PRESSURE ULCER OF SACRAL REGION, STAGE 2: ICD-10-CM

## 2018-09-11 DIAGNOSIS — I82.511 CHRONIC EMBOLISM AND THROMBOSIS OF RIGHT FEMORAL VEIN: ICD-10-CM

## 2018-09-11 DIAGNOSIS — K90.0 CELIAC DISEASE: ICD-10-CM

## 2018-09-11 DIAGNOSIS — Z51.5 ENCOUNTER FOR PALLIATIVE CARE: ICD-10-CM

## 2018-09-11 DIAGNOSIS — R65.21 SEVERE SEPSIS WITH SEPTIC SHOCK: ICD-10-CM

## 2018-09-11 DIAGNOSIS — G93.1 ANOXIC BRAIN DAMAGE, NOT ELSEWHERE CLASSIFIED: ICD-10-CM

## 2018-09-11 DIAGNOSIS — I48.92 UNSPECIFIED ATRIAL FLUTTER: ICD-10-CM

## 2018-09-11 DIAGNOSIS — Z66 DO NOT RESUSCITATE: ICD-10-CM

## 2018-09-11 DIAGNOSIS — Z88.0 ALLERGY STATUS TO PENICILLIN: ICD-10-CM

## 2018-09-11 DIAGNOSIS — N39.0 URINARY TRACT INFECTION, SITE NOT SPECIFIED: ICD-10-CM

## 2018-09-11 DIAGNOSIS — K58.9 IRRITABLE BOWEL SYNDROME WITHOUT DIARRHEA: ICD-10-CM

## 2018-09-11 DIAGNOSIS — J69.0 PNEUMONITIS DUE TO INHALATION OF FOOD AND VOMIT: ICD-10-CM

## 2018-09-11 DIAGNOSIS — I27.20 PULMONARY HYPERTENSION, UNSPECIFIED: ICD-10-CM

## 2018-09-11 DIAGNOSIS — Z79.01 LONG TERM (CURRENT) USE OF ANTICOAGULANTS: ICD-10-CM

## 2018-09-11 DIAGNOSIS — Z96.642 PRESENCE OF LEFT ARTIFICIAL HIP JOINT: ICD-10-CM

## 2018-09-11 DIAGNOSIS — F03.90 UNSPECIFIED DEMENTIA WITHOUT BEHAVIORAL DISTURBANCE: ICD-10-CM

## 2018-09-11 DIAGNOSIS — R13.10 DYSPHAGIA, UNSPECIFIED: ICD-10-CM

## 2018-10-24 PROCEDURE — 84540 ASSAY OF URINE/UREA-N: CPT

## 2018-10-24 PROCEDURE — 85027 COMPLETE CBC AUTOMATED: CPT

## 2018-10-24 PROCEDURE — 87040 BLOOD CULTURE FOR BACTERIA: CPT

## 2018-10-24 PROCEDURE — 87186 SC STD MICRODIL/AGAR DIL: CPT

## 2018-10-24 PROCEDURE — 80076 HEPATIC FUNCTION PANEL: CPT

## 2018-10-24 PROCEDURE — 84132 ASSAY OF SERUM POTASSIUM: CPT

## 2018-10-24 PROCEDURE — 83605 ASSAY OF LACTIC ACID: CPT

## 2018-10-24 PROCEDURE — 96374 THER/PROPH/DIAG INJ IV PUSH: CPT | Mod: XU

## 2018-10-24 PROCEDURE — 82570 ASSAY OF URINE CREATININE: CPT

## 2018-10-24 PROCEDURE — 84100 ASSAY OF PHOSPHORUS: CPT

## 2018-10-24 PROCEDURE — 81001 URINALYSIS AUTO W/SCOPE: CPT

## 2018-10-24 PROCEDURE — 70450 CT HEAD/BRAIN W/O DYE: CPT

## 2018-10-24 PROCEDURE — 43753 TX GASTRO INTUB W/ASP: CPT

## 2018-10-24 PROCEDURE — 99292 CRITICAL CARE ADDL 30 MIN: CPT | Mod: 25

## 2018-10-24 PROCEDURE — 80202 ASSAY OF VANCOMYCIN: CPT

## 2018-10-24 PROCEDURE — 86923 COMPATIBILITY TEST ELECTRIC: CPT

## 2018-10-24 PROCEDURE — 36430 TRANSFUSION BLD/BLD COMPNT: CPT

## 2018-10-24 PROCEDURE — 85610 PROTHROMBIN TIME: CPT

## 2018-10-24 PROCEDURE — 87086 URINE CULTURE/COLONY COUNT: CPT

## 2018-10-24 PROCEDURE — 80053 COMPREHEN METABOLIC PANEL: CPT

## 2018-10-24 PROCEDURE — 83880 ASSAY OF NATRIURETIC PEPTIDE: CPT

## 2018-10-24 PROCEDURE — 51702 INSERT TEMP BLADDER CATH: CPT | Mod: XU

## 2018-10-24 PROCEDURE — 94003 VENT MGMT INPAT SUBQ DAY: CPT

## 2018-10-24 PROCEDURE — 99291 CRITICAL CARE FIRST HOUR: CPT | Mod: 25

## 2018-10-24 PROCEDURE — 82803 BLOOD GASES ANY COMBINATION: CPT

## 2018-10-24 PROCEDURE — 82962 GLUCOSE BLOOD TEST: CPT

## 2018-10-24 PROCEDURE — 86900 BLOOD TYPING SEROLOGIC ABO: CPT

## 2018-10-24 PROCEDURE — 86901 BLOOD TYPING SEROLOGIC RH(D): CPT

## 2018-10-24 PROCEDURE — 83935 ASSAY OF URINE OSMOLALITY: CPT

## 2018-10-24 PROCEDURE — 85025 COMPLETE CBC W/AUTO DIFF WBC: CPT

## 2018-10-24 PROCEDURE — 82330 ASSAY OF CALCIUM: CPT

## 2018-10-24 PROCEDURE — P9016: CPT

## 2018-10-24 PROCEDURE — 83735 ASSAY OF MAGNESIUM: CPT

## 2018-10-24 PROCEDURE — 94002 VENT MGMT INPAT INIT DAY: CPT

## 2018-10-24 PROCEDURE — 96375 TX/PRO/DX INJ NEW DRUG ADDON: CPT | Mod: XU

## 2018-10-24 PROCEDURE — 36415 COLL VENOUS BLD VENIPUNCTURE: CPT

## 2018-10-24 PROCEDURE — 92950 HEART/LUNG RESUSCITATION CPR: CPT

## 2018-10-24 PROCEDURE — 84295 ASSAY OF SERUM SODIUM: CPT

## 2018-10-24 PROCEDURE — 71045 X-RAY EXAM CHEST 1 VIEW: CPT

## 2018-10-24 PROCEDURE — 84300 ASSAY OF URINE SODIUM: CPT

## 2018-10-24 PROCEDURE — 85730 THROMBOPLASTIN TIME PARTIAL: CPT

## 2018-10-24 PROCEDURE — 80048 BASIC METABOLIC PNL TOTAL CA: CPT

## 2018-10-24 PROCEDURE — 84484 ASSAY OF TROPONIN QUANT: CPT

## 2018-10-24 PROCEDURE — 87070 CULTURE OTHR SPECIMN AEROBIC: CPT

## 2018-10-24 PROCEDURE — 86850 RBC ANTIBODY SCREEN: CPT

## 2018-11-17 NOTE — PATIENT PROFILE ADULT. - FUNCTIONAL SCREEN CURRENT LEVEL: SWALLOWING (IF SCORE 2 OR MORE FOR ANY ITEM, CONSULT REHAB SERVICES), MLM)
Pt is alert and oriented. Denies SOB and chest pain. Able to make needs known. Administered PRN baclofen, Tums and Tylenol. Brace on left leg is CDI with foot boot on. Pt uses bedpan. Loose BM this shift. Continue with POC.     Temp: 97.3  F (36.3  C) Temp src: Oral BP: 123/58 Pulse: 70   Resp: 17 SpO2: 100 % O2 Device: None (Room air)       npo

## 2019-02-05 NOTE — ED PROVIDER NOTE - NS ED ATTENDING STATEMENT MOD
Assessment/Plan:    No problem-specific Assessment & Plan notes found for this encounter  Diagnoses and all orders for this visit:    Essential hypertension  Comments:  keep a home log  Orders:  -     CBC and differential; Future  -     Comprehensive metabolic panel; Future  -     TSH, 3rd generation with Free T4 reflex; Future  -     cloNIDine (CATAPRES) 0 2 mg tablet; Take 1 tablet (0 2 mg total) by mouth 2 (two) times a day    Hypercholesterolemia  -     CBC and differential; Future  -     Comprehensive metabolic panel; Future  -     Lipid panel; Future  -     TSH, 3rd generation with Free T4 reflex; Future    Smoking  Comments:  pt counseled on quitting smoking    Screening for colon cancer  -     Ambulatory referral to Gastroenterology; Future  -     Occult Blood, Fecal Immunochemical; Future    Screening for prostate cancer  -     PSA, Total Screen; Future    Chronic low back pain without sciatica, unspecified back pain laterality  -     XR spine lumbar 2 or 3 views injury; Future  -     Ambulatory referral to Pain Management; Future    Tremor    Acute non-recurrent maxillary sinusitis  -     azithromycin (ZITHROMAX) 250 mg tablet; Take 2 tablets day 1 and 1 tablet daily the next 4 days    Class 1 obesity due to excess calories with serious comorbidity and body mass index (BMI) of 30 0 to 30 9 in adult    Other orders  -     VIAGRA 100 MG tablet;   -     tobramycin (TOBREX) 0 3 % SOLN; instill 1 drop twice a day into right eye  -     hydrochlorothiazide (HYDRODIURIL) 25 mg tablet; Take 25 mg by mouth daily        BMI Counseling: Body mass index is 30 62 kg/m²  Discussed the patient's BMI with him  The BMI is above average  BMI counseling and education was provided to the patient   Nutrition recommendations include reducing portion sizes, decreasing overall calorie intake, 3-5 servings of fruits/vegetables daily, reducing fast food intake, consuming healthier snacks, decreasing soda and/or juice intake, moderation in carbohydrate intake, increasing intake of lean protein, reducing intake of saturated fat and trans fat and reducing intake of cholesterol  Exercise recommendations include moderate aerobic physical activity for 150 minutes/week and exercising 3-5 times per week  Subjective:      Patient ID: Aamir Parr is a 59 y o  male  New pt with long term HTN, pt is on multiple medications pt recently taken off catpress patch at 0 3, and was switched to catapress pills at 0 1mg bid and his BP is very high today, pt states this happens all the time, pt has chronic back pain from a trauma as a teenager, pt also has tremor in both arms and hands, pt smoking 2ppd, no EtoH, no illegal drugs, 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      Hypertension   This is a chronic problem  The current episode started more than 1 year ago  Associated symptoms include shortness of breath  Pertinent negatives include no chest pain or palpitations  Past treatments include beta blockers, ACE inhibitors and calcium channel blockers  The current treatment provides moderate improvement  Compliance problems include diet and exercise  The following portions of the patient's history were reviewed and updated as appropriate: allergies, current medications, past family history, past medical history, past social history, past surgical history and problem list     Review of Systems   Constitutional: Positive for fever  Negative for chills and fatigue  HENT: Negative  Eyes: Negative  Respiratory: Positive for cough, shortness of breath and wheezing  Cardiovascular: Negative for chest pain and palpitations  Gastrointestinal: Negative for abdominal pain, blood in stool, constipation, diarrhea, nausea and vomiting  Endocrine: Negative  Genitourinary: Negative for difficulty urinating and dysuria  Musculoskeletal: Positive for arthralgias and back pain  Negative for myalgias  Skin: Negative  Allergic/Immunologic: Negative  Neurological: Negative for seizures and syncope  Hematological: Negative for adenopathy  Psychiatric/Behavioral: Negative  Objective:      BP (!) 178/105   Pulse (!) 41   Temp 99 9 °F (37 7 °C) (Tympanic)   Ht 5' 8" (1 727 m)   Wt 91 4 kg (201 lb 6 4 oz)   SpO2 93%   BMI 30 62 kg/m²          Physical Exam   Constitutional: He is oriented to person, place, and time  He appears well-developed and well-nourished  No distress  HENT:   Head: Normocephalic and atraumatic  Eyes: Pupils are equal, round, and reactive to light  Conjunctivae and EOM are normal  No scleral icterus  Neck: Normal range of motion  Neck supple  Cardiovascular: Normal rate, regular rhythm and normal heart sounds  No murmur heard  Pulmonary/Chest: Effort normal and breath sounds normal  No respiratory distress  He has no wheezes  He has no rales  Abdominal: Soft  Bowel sounds are normal  He exhibits no distension and no mass  There is no tenderness  There is no rebound and no guarding  Musculoskeletal: He exhibits no edema  Lymphadenopathy:     He has no cervical adenopathy  Neurological: He is alert and oriented to person, place, and time  He exhibits normal muscle tone  Skin: Skin is warm and dry  No rash noted  He is not diaphoretic  No erythema  No pallor  Psychiatric: He has a normal mood and affect  His behavior is normal  Judgment and thought content normal    Nursing note and vitals reviewed  Attending Only

## 2019-04-16 NOTE — DIETITIAN INITIAL EVALUATION ADULT. - NS FNS WEIGHT USED FOR CALC
Samples Given: Retin-a 0.06% apply pea size to face in the evening after washing Detail Level: Detailed Continue Regimen: Tri-jhonatan cream apply twice a day to dark spots on face x 3 months only\\nRetin a micro gel pump .08% apply to face at bedtime after washing Plan: Will schedule laser treatment in the fall ideal

## 2019-08-01 NOTE — PROGRESS NOTE ADULT - PROBLEM/PLAN-7
DISPLAY PLAN FREE TEXT
I personally performed the service described in the documentation recorded by the scribe in my presence, and it accurately and completely records my words and actions.

## 2020-04-24 NOTE — DIETITIAN INITIAL EVALUATION ADULT. - NS AS NUTRI INTERV ENTERAL NUTRITION
BP 95/45 (BP Location: Right arm)   Pulse 79   Temp 96.8  F (36  C) (Oral)   Resp 16   Wt 60.2 kg (132 lb 12.8 oz)   LMP 05/16/2012   SpO2 97%   BMI 25.09 kg/m      Reason for admission: Abdominal pain, nausea/ vomiting.  Hepatic encephalopathy.  Hx of EtOH cirrhosis with ascites and elevated AST, hyperbilirubinemia, thrombocytopenia, ALFREDO, nephrectomy, and cholecystectomy.  Vitals: Slightly soft BP, otherwise VSS.   Activity: Up independently.  Pain: Pt reports low to mid back pain.  MD abarca, one time dose of Tylenol ordered as well as a lidocaine patch.  Pt reports improvement.  Neuro: AO x 4.  Cardiac: WDL.    Respiratory: WDL.  GI/: Voiding spontaneously.  Diet: Regular.    Lines: L PIV SL.  Skin/Wounds: Jaundice, otherwise WDL.   Plan: Likely discharge today.      Continue to monitor and follow POC    Adán Arriaga RN on 4/24/2020 at 6:54 AM        Concentration/Rate/Volume/Route/As medically feasible, and if aligned with GOC, recommend starting Osmolite 1.2 Farhan @ 45mL/hr x 24hrs via PEG to provide (1080 mL TV, 1296 kcal, 60g protein, 886mL free H2O, 108% RDI, 1.43g/kg IBW protein). Recommend starting at 20mL/hr and advancing by [id number] to goal as tolerated. Additional free H2O per team. Monitor for s/s intolerance; maintain aspiration precautions at all times./Schedule

## 2020-05-24 NOTE — PROGRESS NOTE ADULT - SUBJECTIVE AND OBJECTIVE BOX
Pt seen and examined   Patients progress noted    REVIEW OF SYSTEMS:  unable to (dementia)      MEDICATIONS:  MEDICATIONS  (STANDING):  acetylcysteine 20% Inhalation 4 milliLiter(s) Inhalation every 6 hours  ALBUTerol/ipratropium for Nebulization 3 milliLiter(s) Nebulizer every 6 hours  dextrose 5% + sodium chloride 0.45%. 1000 milliLiter(s) (75 mL/Hr) IV Continuous <Continuous>  heparin  Injectable 5000 Unit(s) SubCutaneous every 8 hours  megestrol Suspension 400 milliGRAM(s) Oral daily  meropenem  IVPB 1000 milliGRAM(s) IV Intermittent every 12 hours    MEDICATIONS  (PRN):  acetaminophen  Suppository. 650 milliGRAM(s) Rectal every 6 hours PRN Moderate Pain (4 - 6)      Allergies    amoxicillin (Unknown)  Cipro (Unknown)  clindamycin (Unknown)  lactose (Unknown)  penicillin (Unknown)  Wheat (Unknown)    Intolerances        Vital Signs Last 24 Hrs  T(C): 36.4 (20 Jul 2018 05:44), Max: 36.7 (19 Jul 2018 16:16)  T(F): 97.6 (20 Jul 2018 05:44), Max: 98 (19 Jul 2018 16:16)  HR: 90 (20 Jul 2018 05:44) (84 - 91)  BP: 115/75 (20 Jul 2018 05:44) (106/70 - 127/82)  BP(mean): --  RR: 16 (20 Jul 2018 05:44) (16 - 17)  SpO2: 98% (20 Jul 2018 05:44) (93% - 98%)    07-19 @ 07:01  -  07-20 @ 07:00  --------------------------------------------------------  IN: 1612 mL / OUT: 0 mL / NET: 1612 mL        PHYSICAL EXAM:    General:  in no acute distress answers questions  HEENT: MMM, conjunctiva and sclera clear  Gastrointestinal: Soft non-tender non-distended; Normal bowel sounds; No hepatosplenomegaly  Skin: Warm and dry. No obvious rash    LABS:      CBC Full  -  ( 20 Jul 2018 06:37 )  WBC Count : 5.2 K/uL  Hemoglobin : 9.1 g/dL  Hematocrit : 30.5 %  Platelet Count - Automated : 364 K/uL  Mean Cell Volume : 91.9 fL  Mean Cell Hemoglobin : 27.4 pg  Mean Cell Hemoglobin Concentration : 29.8 g/dL  Auto Neutrophil # : x  Auto Lymphocyte # : x  Auto Monocyte # : x  Auto Eosinophil # : x  Auto Basophil # : x  Auto Neutrophil % : 57.8 %  Auto Lymphocyte % : 25.0 %  Auto Monocyte % : 10.6 %  Auto Eosinophil % : 6.0 %  Auto Basophil % : 0.6 %    07-20    141  |  96  |  7   ----------------------------<  110<H>  3.7   |  35<H>  |  0.61    Ca    8.8      20 Jul 2018 06:37  Phos  2.6     07-20  Mg     1.9     07-20                        RADIOLOGY & ADDITIONAL STUDIES (The following images were personally reviewed): oral

## 2020-06-15 NOTE — PROGRESS NOTE ADULT - SUBJECTIVE AND OBJECTIVE BOX
6/15/2020      RE: Tommy Lerner  [address]  [address]  [place] [address]       Tommy Lerner is a 60 year old male who is being evaluated via a billable video visit.      Video-Visit Details    Type of service:  Video Visit    Video Start Time:   Video End Time:     Originating Location (pt. Location): Home    Distant Location (provider location):  [facility] NEUROLOGY     Platform used for Video Visit: MENDY Wise          Service Date: 06/15/2020    TELEPHONE X 25 MIN  Shae Brumfield MD   [facility]   [address], [place]  [address]      RE: Tommy Lerner   MRN: [id number]   : 1959      Dear Dr. Brumfield:      Once again, we saw Mr. Lerner, a 60-year-old male with a number of complaints and for which neurological evaluation has been in process.  He reports today in a telephone visit that lasted approximately 25 minutes that he is still having numbness in his feet and it actually has climbed up his leg and some in the thigh.  He says he had some weakness of the quads, thighs and muscles and they hurt as well when he goes too fast.  He has given up running and is doing mostly bicycling, although he may do 100 miles a week, and the hands also have some tingling and pain in the legs and, as I say, cannot run anymore.      He reports that his marriage has been dissolved and he still has some legal hurdles to concur, and hopefully, in a couple of weeks, this will be settled.  He has a history of depression a number of years ago and actually found out he had been on Lexapro from  until he says probably  or .  He was started on Wellbutrin with no success.  He can see depression now with his life issues and is significant.      He has not had COVID or other problems of the recent epidemic.  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  It seems  INTERVAL HPI/OVERNIGHT EVENTS:    OVERNIGHT: No overnight events.  SUBJECTIVE: Patient seen and examined at bedside.     ROS:  CV: Denies chest pain  Resp: Denies SOB  GI: Denies abdominal pain, constipation, diarrhea, nausea, vomiting  : Denies dysuria  ID: Denies fevers, chills  MSK: Denies joint pain     OBJECTIVE:    VITAL SIGNS:  ICU Vital Signs Last 24 Hrs  T(C): 36.9, Max: 36.9 ( @ 05:27)  T(F): 98.5, Max: 98.5 ( @ 05:27)  HR: 76 (72 - 84)  BP: 108/61 (81/51 - 139/68)  BP(mean): 83 (63 - 95)  ABP: --  ABP(mean): --  RR: 23 (18 - 31)  SpO2: 96% (94% - 98%)      I & Os for 24h ending  @ 07:00  =============================================  IN: 1571 ml / OUT: 1085 ml / NET: 486 ml    I & Os for current day (as of  @ 05:59)  =============================================  IN: 1445 ml / OUT: 955 ml / NET: 490 ml    CAPILLARY BLOOD GLUCOSE  114 (2017 06:00)      PHYSICAL EXAM:    General: NAD, comfortable  HEENT: NCAT, PERRL, clear conjunctiva, no scleral icterus  Neck: supple, no JVD  Respiratory: CTA b/l, no wheezing, rhonchi, rales  Cardiovascular: RRR, normal S1S2, no M/R/G  Abdomen: soft, NT/ND, bowel sounds in all four quadrants, no palpable masses  Extremities: WWP, no clubbing, cyanosis, or edema  Neuro:     MEDICATIONS:  MEDICATIONS  (STANDING):  ceFAZolin   IVPB 500milliGRAM(s) IV Intermittent every 12 hours  heparin  Infusion 500Unit(s)/Hr IV Continuous <Continuous>  midodrine 2.5milliGRAM(s) Oral every 8 hours  polyethylene glycol 3350 17Gram(s) Oral daily  senna 1Tablet(s) Oral at bedtime    MEDICATIONS  (PRN):  acetaminophen    Suspension. 650milliGRAM(s) Oral every 6 hours PRN Severe Pain (7 - 10)      ALLERGIES:  Allergies    amoxicillin (Unknown)  Cipro (Unknown)  clindamycin (Unknown)  lactose (Unknown)  Wheat (Unknown)    Intolerances        LABS:                        8.8    4.6   )-----------( 263      ( 2017 04:11 )             28.7     2017 04:11    147    |  111    |  39     ----------------------------<  101    4.5     |  29     |  1.93     Ca    8.4        2017 04:11  Phos  3.0       2017 04:11  Mg     2.1       2017 04:11      PTT - ( 2017 04:11 )  PTT:20.0 sec  Urinalysis Basic - ( 2017 10:27 )    Color: Yellow / Appearance: SL CLOUDY / S.020 / pH: x  Gluc: x / Ketone: Trace mg/dL  / Bili: NEGATIVE / Urobili: 0.2 E.U./dL   Blood: x / Protein: NEGATIVE mg/dL / Nitrite: NEGATIVE   Leuk Esterase: NEGATIVE / RBC: < 5 /HPF / WBC < 5 /HPF   Sq Epi: x / Non Sq Epi: Rare /HPF / Bacteria: Present /HPF        RADIOLOGY & ADDITIONAL TESTS: Reviewed. not to be clinically relevant to his symptoms.  We have examined him in the past.  He has had an EMG that confirms the neuropathy is present and this was done by Dr. Sharp.  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  We have done a full workup of neuropathy including B12, MMA, Lyme disease, antinuclear antibody, comprehensive panel, hemoglobin A1c, sed rate, CPK acetylcholine receptor antibodies and this had not shown a particular etiology.      We will have to reexamine him and that can be done with the epidemic is over.  In the meantime, we will schedule him for epidermal nerve fiber density study, and we will okay as per his request the trazodone.  Antidepressant Lexapro is an option, but we will wait to see if the resolution of his marital issues will bring about some comfort from these symptoms.  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  We will touch base with him after the COVID is over and we will see him and reexamine him at that time.      Sincerely,      Rahul Ramirez MD                    D: 06/15/2020   T: 06/15/2020   MT: duncan      Name:     ANETA MELGAR   MRN:      [id number]        Account:      [id number]   :      1959           Service Date: 06/15/2020      Document: [id number]     PGY1 transfer note:     OVERNIGHT: became tachypneic overnight was placed on BIPAP and subsequently improved   SUBJECTIVE: Patient seen and examined at bedside. very somnolent not answering any questions when prompted.     89 year old Female with PMH of IBS, celiac disease, chronic LE edema (on Lasix), bilateral rotator cuff tear presented to [facility] with complaints of cough/dyspnea. These symptoms were sudden in onset, persistent, progressive following which patient presented to [facility] ED. In the ED, patient met sepsis criteria given tachycardia/tachypnea/febrile 2/2 PNA. She was subsequently admitted to [facility] for management of CAP. On the [facility], patient found to be increasingly lethargic, tachypneic, as well as hypotensive with BP 70/40. Pt received 1.5L NS without adequate response, after which patient was started on peripheral levophed and transferred to the MICU for further management. Patient was subsequently emergently intubated in the MICU for hypoxemic respiratory failure. Patient was Started on Vanc/aztreonam/azithromycin (given cefepime + doxy in ER) that was eventually deescalated to Cefazolin. Hospital course also c/b  dopplers, showing bl DVTs with a free floating clot in Rt femoral. Heparin gtt was started and vascular was called however they recommended no intervention (no IVC filter). BCx on  growing coag negative staph that was sensitive to cefazolin which the patient was started on. On 17, patient was extubated to [facility] and has tolerated it well. Patient at times becomes tachypneic overnight at which she improves after being placed on BIPAP. Patient is currently stable enough to be transferred to [facility] for continued care.           OBJECTIVE:    VITAL SIGNS:  ICU Vital Signs Last 24 Hrs  T(C): 36.9, Max: 36.9 ( @ 05:27)  T(F): 98.5, Max: 98.5 ( @ 05:27)  HR: 76 (72 - 84)  BP: 108/61 (81/51 - 139/68)  BP(mean): 83 (63 - 95)  ABP: --  ABP(mean): --  RR: 23 (18 - 31)  SpO2: 96% (94% - 98%)      I & Os for 24h ending  @ 07:00  =============================================  IN: 1571 ml / OUT: 1085 ml / NET: 486 ml    I & Os for current day (as of  @ 05:59)  =============================================  IN: 1445 ml / OUT: 955 ml / NET: 490 ml    CAPILLARY BLOOD GLUCOSE  114 (2017 06:00)      PHYSICAL EXAM:    General: NAD, comfortable, somnolent not opening eyes or following commands when prompted  HEENT: NCAT, RAMONA, clear conjunctiva, no scleral icterus. Throat clear, MMM  Neck: supple, no JVD  Respiratory: , bibasilar crackles appreciated   Cardiovascular: RRR, grade 3/6 systolic murmur over LLSB  Abdomen: soft, NT/ND, bowel sounds in all four quadrants  Extremities: WWP, LLE 2+ pitting edema to knee, trace RLE edema, ulcer on R heel with compress  Skin: sacral ulcer  Psych: A and O X 1 yesterday ; could not assess as patient somnolent     MEDICATIONS:  MEDICATIONS  (STANDING):  ceFAZolin   IVPB 500milliGRAM(s) IV Intermittent every 12 hours  heparin  Infusion 500Unit(s)/Hr IV Continuous <Continuous>  midodrine 2.5milliGRAM(s) Oral every 8 hours  polyethylene glycol 3350 17Gram(s) Oral daily  senna 1Tablet(s) Oral at bedtime    MEDICATIONS  (PRN):  acetaminophen    Suspension. 650milliGRAM(s) Oral every 6 hours PRN Severe Pain (7 - 10)      ALLERGIES:  Allergies    amoxicillin (Unknown)  Cipro (Unknown)  clindamycin (Unknown)  lactose (Unknown)  Wheat (Unknown)    Intolerances        LABS:                        8.8    4.6   )-----------( 263      ( 2017 04:11 )             28.7     2017 04:11    147    |  111    |  39     ----------------------------<  101    4.5     |  29     |  1.93     Ca    8.4        2017 04:11  Phos  3.0       2017 04:11  Mg     2.1       2017 04:11      PTT - ( 2017 04:11 )  PTT:20.0 sec  Urinalysis Basic - ( 2017 10:27 )    Color: Yellow / Appearance: SL CLOUDY / S.020 / pH: x  Gluc: x / Ketone: Trace mg/dL  / Bili: NEGATIVE / Urobili: 0.2 E.U./dL   Blood: x / Protein: NEGATIVE mg/dL / Nitrite: NEGATIVE   Leuk Esterase: NEGATIVE / RBC: < 5 /HPF / WBC < 5 /HPF   Sq Epi: x / Non Sq Epi: Rare /HPF / Bacteria: Present /HPF        Assessment/Plan:		  89 yo F with PMH IBS, celiac disease, chronic LE edema (L > R, on Lasix once weekly), bilateral rotator cuff tear admitted for septic shock 2/2 pneumonia with acute respiratory failure requiring intubation. hospital course complicated by ATN and bilateral DVT currently on heparin therapeutically.     ID   Septic shock. - Patient presented with sepsis, on RMF BP dropped, on Levophed with decreasing requirments. Likely source is PNA, however CXR not showing clear infiltrate however of note poor penetration and severely rotated films due to patient's kyphosis. UA negative for UTI, BCx growing G+cocci in clusters, Coag.negative Staph - since 2 bottles positive may not be contaminant though has no risk factors for SE bacteremia, repeat cultures are pending. Sputum cultures negative.  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, new onset as per pt's son had Doppler 2 yrs ago which was negative, bedbound for only an few weeks  - on heparin gtt  -vascular consulted, no IVC filter for now - will reconsult as needed  -f/u PTT goal is 60-80  - monitor for signs of respiratory distress      #Hypotension on admission, likely 2/2 septic shock, now off levophed  c/w midodrine 2.5mg Q8hrs po    RENAL  CAMRYN, with worsening renal function - CrCl 24.7, pt was on maintenance IVF, FeNa 1.5% therefore intrinsic renal etiology, with granular cast etiology ATN, could be 2/2 vancomycin however most likely secondary to septic shock; improving today   -avoid nephrotoxic agents  -monitor UOP    Hypernatremia -, unclear cause for now, but most likely renal water loss; improving   -free water 250cc q6hrs  -monitor UOP      PSYCH  Disoritentation - could be hospitalization syndrome vs caused by hyperNa  -correct HyperNa  -reorientation    SKIN  Sacral ulcer - sacral ulcer on exam, as well as skin breakdown on right heal  - wound care following  -will consider wound swab as possible source of infection if fever recurrs      PPX  On heparin gtt      FEN   NGT in place, on tube feeds; Jevity 1.2 latisha   S/S eval  monitor and replete serum electrolytes as needed  bowel regimen  PT/OT      Dispo: MICU level of care  Access: Rt IJ and peripherals  Tubbs: Yes

## 2020-08-11 NOTE — PROGRESS NOTE ADULT - SUBJECTIVE AND OBJECTIVE BOX
Filter placed uneventfully  Is awake and alert this am with confusion at baseline  Afeb VS stable In RR Clear chest Yes/2200

## 2021-03-31 NOTE — ED ADULT NURSE NOTE - PERIPHERAL VASCULAR
Pt presented to ED for drug abuse. Observed in ED. Currently asymptomatic. Pt denying SI/HI. FEels better. Wants to go home. Results reviewed and discussed with pt and printed for patient. Anticipatory guidance given including close outpatient followup. Strict return precautions given. Pt verbalizes understanding of and agrees with plan. WDL

## 2021-06-10 NOTE — ED PROVIDER NOTE - ATTESTATION, MLM
RN cannot approve Refill Request    RN can NOT refill this medication med is not covered by policy/route to provider     . Last office visit: 1/30/2018 Neva Whitlock MD Last Physical: Visit date not found Last MTM visit: Visit date not found Last visit same specialty: 6/26/2020 Griffin Dunbar MD.  Next visit within 3 mo: Visit date not found  Next physical within 3 mo: Visit date not found      Doreen Fuentes, [facility] Triage/Med Refill 7/28/2020    Requested Prescriptions   Pending Prescriptions Disp Refills     magnesium oxide (MAG-OX) 400 mg (241.3 mg magnesium) tablet [Pharmacy Med Name: Magnesium Oxide Oral Tablet 400 MG] 100 tablet 0     Sig: TAKE 1 TABLET (400 MG TOTAL) BY MOUTH DAILY.       There is no refill protocol information for this order            I have reviewed and confirmed nurses' notes for patient's medications, allergies, medical history, and surgical history.

## 2021-06-28 NOTE — PROGRESS NOTE ADULT - SUBJECTIVE AND OBJECTIVE BOX
Patient is a 89y old  Female who presents with a chief complaint of cardio-pulmonary arrest (17 Aug 2018 03:20)      INTERVAL HPI/OVERNIGHT EVENTS:  ICU Vital Signs Last 24 Hrs  T(C): 36.9 (22 Aug 2018 07:00), Max: 37 (22 Aug 2018 04:28)  T(F): 98.5 (22 Aug 2018 07:00), Max: 98.6 (22 Aug 2018 04:28)  HR: 68 (22 Aug 2018 07:00) (58 - 86)  BP: 86/47 (21 Aug 2018 21:00) (86/47 - 86/47)  BP(mean): 65 (21 Aug 2018 21:00) (65 - 65)  ABP: 98/46 (22 Aug 2018 07:00) (92/44 - 128/78)  ABP(mean): 66 (22 Aug 2018 07:00) (62 - 88)  RR: 17 (22 Aug 2018 07:00) (7 - 31)  SpO2: 97% (22 Aug 2018 07:00) (93% - 99%)    I&O's Summary    21 Aug 2018 07:01  -  22 Aug 2018 07:00  --------------------------------------------------------  IN: 1442 mL / OUT: 435 mL / NET: 1007 mL    22 Aug 2018 07:01  -  22 Aug 2018 07:51  --------------------------------------------------------  IN: 45 mL / OUT: 0 mL / NET: 45 mL      Mode: CPAP with PS  FiO2: 40  PEEP: 5  PS: 12  ITime: 1  MAP: 11  PIP: 22      LABS:                        8.5    10.2  )-----------( 354      ( 22 Aug 2018 06:25 )             26.6     08-22    143  |  105  |  37<H>  ----------------------------<  132<H>  3.9   |  24  |  1.19    Ca    8.9      22 Aug 2018 06:24  Phos  2.8     08-22  Mg     2.2     08-22          CAPILLARY BLOOD GLUCOSE      POCT Blood Glucose.: 132 mg/dL (21 Aug 2018 11:32)        RADIOLOGY & ADDITIONAL TESTS:    Consultant(s) Notes Reviewed:  [x ] YES  [ ] NO    MEDICATIONS  (STANDING):  amiodarone    Tablet 200 milliGRAM(s) Oral daily  artificial  tears Solution 1 Drop(s) Both EYES daily  ceFAZolin   IVPB 1000 milliGRAM(s) IV Intermittent every 12 hours  chlorhexidine 0.12% Liquid 15 milliLiter(s) Swish and Spit two times a day  chlorhexidine 2% Cloths 1 Application(s) Topical daily  heparin  Injectable 5000 Unit(s) SubCutaneous every 12 hours  midodrine 10 milliGRAM(s) Oral every 8 hours  norepinephrine Infusion 0.05 MICROgram(s)/kG/Min (4.884 mL/Hr) IV Continuous <Continuous>  pantoprazole   Suspension 40 milliGRAM(s) Enteral Tube daily  petrolatum Ophthalmic Ointment 1 Application(s) Both EYES daily    MEDICATIONS  (PRN):      PHYSICAL EXAM:  GENERAL: well built, well nourished  HEAD:  Atraumatic, Normocephalic  EYES: EOMI, PERRLA, conjunctiva and sclera clear  ENT: No tonsillar erythema, exudates, or enlargement; Moist mucous membranes, Good dentition, No lesions  NECK: Supple, No JVD, Normal thyroid, no enlarged nodes  NERVOUS SYSTEM:  Alert & Oriented X3, Good concentration; Motor Strength 5/5 B/L upper and lower extremities; DTRs 2+ intact and symmetric, sensory intact  CHEST/LUNG: B/L good air entry; No rales, rhonchi, or wheezing  HEART: S1S2 normal, no S3, Regular rate and rhythm; No murmurs, rubs, or gallops  ABDOMEN: Soft, Nontender, Nondistended; Bowel sounds present  EXTREMITIES:  2+ Peripheral Pulses, No clubbing, cyanosis, or edema  LYMPH: No lymphadenopathy noted  SKIN: No rashes or lesions    Care Discussed with Consultants/Other Providers [ x] YES  [ ] NO OVERNIGHT EVENTS: Pt was agitated overnight. 1mg IV push Versed given.     INTERVAL HPI: Pt is examined at bedside. She is intubated and not on sedation. Pt is unresponsive to verbal stimuli.     ICU Vital Signs Last 24 Hrs  T(C): 36.9 (22 Aug 2018 07:00), Max: 37 (22 Aug 2018 04:28)  T(F): 98.5 (22 Aug 2018 07:00), Max: 98.6 (22 Aug 2018 04:28)  HR: 68 (22 Aug 2018 07:00) (58 - 86)  BP: 86/47 (21 Aug 2018 21:00) (86/47 - 86/47)  BP(mean): 65 (21 Aug 2018 21:00) (65 - 65)  ABP: 98/46 (22 Aug 2018 07:00) (92/44 - 128/78)  ABP(mean): 66 (22 Aug 2018 07:00) (62 - 88)  RR: 17 (22 Aug 2018 07:00) (7 - 31)  SpO2: 97% (22 Aug 2018 07:00) (93% - 99%)    I&O's Summary    21 Aug 2018 07:01  -  22 Aug 2018 07:00  --------------------------------------------------------  IN: 1442 mL / OUT: 435 mL / NET: 1007 mL    22 Aug 2018 07:01  -  22 Aug 2018 07:51  --------------------------------------------------------  IN: 45 mL / OUT: 0 mL / NET: 45 mL      Mode: CPAP with PS  FiO2: 40  PEEP: 5  PS: 12  ITime: 1  MAP: 11  PIP: 22      LABS:                        8.5    10.2  )-----------( 354      ( 22 Aug 2018 06:25 )             26.6     08-22    143  |  105  |  37<H>  ----------------------------<  132<H>  3.9   |  24  |  1.19    Ca    8.9      22 Aug 2018 06:24  Phos  2.8     08-22  Mg     2.2     08-22          CAPILLARY BLOOD GLUCOSE      POCT Blood Glucose.: 132 mg/dL (21 Aug 2018 11:32)        RADIOLOGY & ADDITIONAL TESTS:    Consultant(s) Notes Reviewed:  [x ] YES  [ ] NO    MEDICATIONS  (STANDING):  amiodarone    Tablet 200 milliGRAM(s) Oral daily  artificial  tears Solution 1 Drop(s) Both EYES daily  ceFAZolin   IVPB 1000 milliGRAM(s) IV Intermittent every 12 hours  chlorhexidine 0.12% Liquid 15 milliLiter(s) Swish and Spit two times a day  chlorhexidine 2% Cloths 1 Application(s) Topical daily  heparin  Injectable 5000 Unit(s) SubCutaneous every 12 hours  midodrine 10 milliGRAM(s) Oral every 8 hours  norepinephrine Infusion 0.05 MICROgram(s)/kG/Min (4.884 mL/Hr) IV Continuous <Continuous>  pantoprazole   Suspension 40 milliGRAM(s) Enteral Tube daily  petrolatum Ophthalmic Ointment 1 Application(s) Both EYES daily    MEDICATIONS  (PRN):    PHYSICAL EXAM:  GENERAL: Intubated  HEAD:  Atraumatic, Normocephalic  EYES: Pupils dilated, mildly responsive to light, conjunctiva and sclera clear  NECK: Supple, No JVD, Normal thyroid, no enlarged nodes  NERVOUS SYSTEM: Eyes open but not awake, not alert, unresponsive to commands/verbal/painful stimili. +corneal reflex, pupils reactive to light. Minimal gag reflex.    CHEST/LUNG: B/L good air entry; No rales, rhonchi, or wheezing  HEART: S1S2 normal, no S3, Regular rate and rhythm; No murmurs, rubs, or gallops  ABDOMEN: Soft, Nontender, Nondistended; Bowel sounds present  EXTREMITIEs: B/L pedal 2+ pitting edema; UE withdrawal to noxious stimuli  LYMPH: No lymphadenopathy noted  SKIN: No rashes or lesions    Care Discussed with Consultants/Other Providers [ x] YES  [ ] NO Yes

## 2021-11-30 NOTE — H&P ADULT - NSHPPOADEEPVENOUSTHROMB_GEN_A_CORE
Folliculitis  Acute folliculitis on left posterior thigh without infection. Recommend warm compresses several times a day 15 minutes on/15 minutes off. If not improving or worsening would recommend scheduling with dermatology.   - Adult Dermatology Referral; Future    Dermatitis  Acute, dermatitis on left lateral knee. Recommend steroid ointment two times daily followed by an emollient such as Aquaphor or Cetaphil - make sure to buy the lotion in the tub. If not improving or worsening follow up with primary care provider in 2-3 weeks.   - triamcinolone (KENALOG) 0.1 % external ointment; Apply topically 2 times daily  
no

## 2022-04-14 NOTE — PHYSICAL THERAPY INITIAL EVALUATION ADULT - LEVEL OF INDEPENDENCE, REHAB EVAL
maximum assist (25% patients effort) Cellcept Counseling:  I discussed with the patient the risks of mycophenolate mofetil including but not limited to infection/immunosuppression, GI upset, hypokalemia, hypercholesterolemia, bone marrow suppression, lymphoproliferative disorders, malignancy, GI ulceration/bleed/perforation, colitis, interstitial lung disease, kidney failure, progressive multifocal leukoencephalopathy, and birth defects.  The patient understands that monitoring is required including a baseline creatinine and regular CBC testing. In addition, patient must alert us immediately if symptoms of infection or other concerning signs are noted.

## 2022-05-20 NOTE — CONSULT NOTE ADULT - SUBJECTIVE AND OBJECTIVE BOX
HPI:  89 yo F with PMH IBS, celiac disease, chronic LE edema (on lasix once weekly), bilateral rotator cuff tear p/w cough and SOB that began suddenly this evening.  Patient was at [facility] ED on  for similar symptoms and was given zithromax for CAP which she completed.  History is obtained for HHA at bedside as patient is agitated and not answering questions.  She states she first noticed patient having difficulty breathing and coughing at 5:00 PM this evening as well as AMS.  States she's been having difficulty swallowing recently.  Denies chills, diaphoresis, CP, N/V/D.    VS In ED T 102.9  /54 RR 32 O2 sat 90% on RA.  HCP is patient's son Esvin Goff [phone].    Addendum: When patient arrived on floor she was becoming increasingly somnolent and hypotensive with SBP was 70s.  She was given 1.5 L of NS but BP did not respond to fluids so she was started on levophed and transferred to MICU.  ABG revealed respiratory acidosis.  Son was informed and wants her to remain FULL CODE with all interventions. (31 Dec 2016 23:08)      PAST MEDICAL & SURGICAL HISTORY:  DJD (degenerative joint disease)  Edema  IBS (irritable bowel syndrome)  Celiac disease  H/O inguinal hernia repair  History of total left hip replacement      REVIEW OF SYSTEMS - negative except as per HPI      MEDICATIONS  (STANDING):  midodrine 2.5milliGRAM(s) Oral every 8 hours  polyethylene glycol 3350 17Gram(s) Oral daily  senna 1Tablet(s) Oral at bedtime  sodium chloride 0.9%. 1000milliLiter(s) IV Continuous <Continuous>  heparin  Infusion. 850Unit(s)/Hr IV Continuous <Continuous>  warfarin 3milliGRAM(s) Oral once    MEDICATIONS  (PRN):  acetaminophen    Suspension. 650milliGRAM(s) Oral every 6 hours PRN Severe Pain (7 - 10)      Allergies    amoxicillin (Unknown)  Cipro (Unknown)  clindamycin (Unknown)  lactose (Unknown)  Wheat (Unknown)    Intolerances        SOCIAL HISTORY:    FAMILY HISTORY:  No pertinent family history in first degree relatives      Vital Signs Last 24 Hrs  T(C): 36.7, Max: 37.1 ( @ 20:33)  T(F): 98.1, Max: 98.8 (01-17 @ 20:33)  HR: 97 (82 - 97)  BP: 94/56 (94/56 - 123/57)  BP(mean): --  RR: 18 (16 - 19)  SpO2: 94% (94% - 98%)    PHYSICAL EXAM:      Constitutional: frail, AO x 1    Respiratory: no respiratory distress    Cardiovascular: RRR    Gastrointestinal: soft, NTND    Genitourinary:    Extremities: no edema, warm, well perfused1    Vascular: palpable pulses        LABS:                        8.2    8.0   )-----------( 376      ( 2017 06:59 )             26.9     2017 07:02    139    |  101    |  21     ----------------------------<  90     4.7     |  30     |  1.49     Ca    8.6        2017 07:02  Phos  3.4       2017 07:02  Mg     1.8       2017 07:02    TPro  6.5    /  Alb  1.8    /  TBili  0.6    /  DBili  x      /  AST  23     /  ALT  8      /  AlkPhos  108    2017 07:02    PT/INR - ( 2017 06:59 )   PT: 20.5 sec;   INR: 1.84          PTT - ( 2017 06:59 )  PTT:101.8 sec  Urinalysis Basic - ( 2017 07:30 )    Color: Yellow / Appearance: Clear / S.015 / pH: x  Gluc: x / Ketone: NEGATIVE  / Bili: NEGATIVE / Urobili: 0.2 E.U./dL   Blood: x / Protein: Trace mg/dL / Nitrite: NEGATIVE   Leuk Esterase: NEGATIVE / RBC: < 5 /HPF / WBC < 5 /HPF   Sq Epi: x / Non Sq Epi: Rare /HPF / Bacteria: Present /HPF        RADIOLOGY & ADDITIONAL STUDIES: BROWN (dyspnea on exertion)

## 2022-06-16 NOTE — PATIENT PROFILE ADULT. - AS SC BRADEN FRICTION
June 16, 2022         Patient: Valeriy Orlando   YOB: 1946   Date of Visit: 6/16/2022           To Whom it May Concern:    Valeriy Orlando was seen in my clinic on 6/16/2022 for pre-op evaluation.   The goal of the preoperative exam is to optimize conditions that increase perioperative morbidity and mortality. I discussed with the patient that no amount of testing or intervention prior to surgery eliminates all surgical risk.       Patient remains low to moderate cardiovascular risk for his  intended orthopedic surgery     If you have any questions or concerns, please don't hesitate to call.        Sincerely,           Karen Shetty P.A.-C.  Electronically Signed     
(1) problem

## 2022-12-02 NOTE — PHYSICAL THERAPY INITIAL EVALUATION ADULT - GENERAL OBSERVATIONS, REHAB EVAL
Patient received supine in NAD, O2 NC 2L off upon initial contact +IV heplock. Patient left as found +RN Nellie aware + call wilcox +Bed alarm
Ambulatory

## 2022-12-29 NOTE — PROCEDURE NOTE - NSPOSTCAREGUIDE_GEN_A_CORE
Daily Note     Today's date: 2022  Patient name: Galina Moore  : 1975  MRN: [id number]  Referring provider: Will Bonds  Dx:   Encounter Diagnosis     ICD-10-CM    1  Strain of rhomboid muscle, subsequent encounter  S29 012D                  Subjective: Patient reports on again/off again discomfort with ADLs  Reports good performance of HEP at this time  1 on 1 with PT for 20 minutes  Remaining time independent fitness program     Objective: See treatment diary below    Assessment: Tolerated treatment well  Patient reported feeling better with MWM to left with assistance of ribs on right  Able to progress to repeated extension in seated with towel with good carryover  Plan: Continue per plan of care  Insurance:  AMA/CMS Eval/ Re-eval POC expires Moni Sanchez #/ Referral # Total    Start date  Expiration date Extension  Visit limitation? PT only or  PT+OT? Co-Insurance   CMS 12 22 22 2 16 23  Self Pay                          Precautions: standard, migraines  Patient provided verbal consent to treatment plan, grade 5 joint mobilization, and recommended interventions  Manuals 12 22 12 29       visit 1 2       T-spine gr  5 FB Gr  2-3       MWM  To left with ribs 2*10                Neuro Re-Ed                                             Ther Ex         Rep   T-spine extn wit towel OP 3*10 3*10       Pt edu FB        Lumbar roll FB                                                              Modalities
Care for catheter as per unit/ICU protocols
Verbal/written post procedure instructions were given to patient/caregiver

## 2023-05-17 NOTE — DISCHARGE NOTE ADULT - LAUNCH MEDICATION RECONCILIATION
Clofazimine Pregnancy And Lactation Text: This medication is Pregnancy Category C and isn't considered safe during pregnancy. It is excreted in breast milk. <<-----Click here for Discharge Medication Review

## 2023-07-27 NOTE — PROGRESS NOTE ADULT - SUBJECTIVE AND OBJECTIVE BOX
INTERVAL HPI/OVERNIGHT EVENTS:    SUBJECTIVE: Patient seen and examined at bedside.     CONSTITUTIONAL: No weakness, fevers or chills  EYES/ENT: No visual changes;  No vertigo or throat pain   NECK: No pain or stiffness  RESPIRATORY: No cough, wheezing, hemoptysis; No shortness of breath  CARDIOVASCULAR: No chest pain or palpitations  GASTROINTESTINAL: No abdominal or epigastric pain. No nausea, vomiting, or hematemesis; No diarrhea or constipation. No melena or hematochezia.  GENITOURINARY: No dysuria, frequency or hematuria  NEUROLOGICAL: No numbness or weakness  SKIN: No itching, rashes    OBJECTIVE:    VITAL SIGNS:  ICU Vital Signs Last 24 Hrs  T(C): 36.6 (25 Aug 2018 10:00), Max: 37.5 (24 Aug 2018 17:37)  T(F): 97.8 (25 Aug 2018 10:00), Max: 99.5 (24 Aug 2018 17:37)  HR: 80 (25 Aug 2018 12:00) (72 - 86)  BP: 79/50 (25 Aug 2018 12:00) (79/49 - 102/58)  BP(mean): 58 (25 Aug 2018 12:00) (58 - 79)  ABP: --  ABP(mean): --  RR: 12 (25 Aug 2018 12:00) (10 - 18)  SpO2: 99% (25 Aug 2018 12:00) (98% - 100%)    Mode: AC/ CMV (Assist Control/ Continuous Mandatory Ventilation), RR (machine): 10, TV (machine): 360, FiO2: 40, PEEP: 5, ITime: 1, MAP: 9, PIP: 28    08-24 @ 07:01 - 08-25 @ 07:00  --------------------------------------------------------  IN: 960 mL / OUT: 666 mL / NET: 294 mL    08-25 @ 07:01 - 08-25 @ 12:45  --------------------------------------------------------  IN: 200 mL / OUT: 170 mL / NET: 30 mL      CAPILLARY BLOOD GLUCOSE          PHYSICAL EXAM:    General: NAD  HEENT: NC/AT; PERRL, clear conjunctiva  Neck: supple  Respiratory: CTA b/l  Cardiovascular: +S1/S2; RRR  Abdomen: soft, NT/ND; +BS x4  Extremities: WWP, 2+ peripheral pulses b/l; no LE edema  Skin: normal color and turgor; no rash  Neurological:    MEDICATIONS:  MEDICATIONS  (STANDING):  amiodarone    Tablet 200 milliGRAM(s) Oral daily  artificial  tears Solution 1 Drop(s) Both EYES daily  chlorhexidine 0.12% Liquid 15 milliLiter(s) Swish and Spit two times a day  chlorhexidine 2% Cloths 1 Application(s) Topical daily  petrolatum Ophthalmic Ointment 1 Application(s) Both EYES daily    MEDICATIONS  (PRN):      ALLERGIES:  Allergies    amoxicillin (Rash)  Cipro (Rash)  clindamycin (Unknown)  lactose (Unknown)  penicillin (Rash)  Wheat (Unknown)    Intolerances        LABS:                RADIOLOGY & ADDITIONAL TESTS: Reviewed. INTERVAL HPI/OVERNIGHT EVENTS: no acute events overnight.     SUBJECTIVE: Patient seen and examined at bedside. Pt not responsive to commands, same as yesterday.     OBJECTIVE:    VITAL SIGNS:  ICU Vital Signs Last 24 Hrs  T(C): 36.6 (25 Aug 2018 10:00), Max: 37.5 (24 Aug 2018 17:37)  T(F): 97.8 (25 Aug 2018 10:00), Max: 99.5 (24 Aug 2018 17:37)  HR: 80 (25 Aug 2018 12:00) (72 - 86)  BP: 79/50 (25 Aug 2018 12:00) (79/49 - 102/58)  BP(mean): 58 (25 Aug 2018 12:00) (58 - 79)  ABP: --  ABP(mean): --  RR: 12 (25 Aug 2018 12:00) (10 - 18)  SpO2: 99% (25 Aug 2018 12:00) (98% - 100%)    Mode: AC/ CMV (Assist Control/ Continuous Mandatory Ventilation), RR (machine): 10, TV (machine): 360, FiO2: 40, PEEP: 5, ITime: 1, MAP: 9, PIP: 28    08-24 @ 07:01  -  08-25 @ 07:00  --------------------------------------------------------  IN: 960 mL / OUT: 666 mL / NET: 294 mL    08-25 @ 07:01 - 08-25 @ 12:45  --------------------------------------------------------  IN: 200 mL / OUT: 170 mL / NET: 30 mL      CAPILLARY BLOOD GLUCOSE          PHYSICAL EXAM:    General: NAD  HEENT: NC/AT; PERRL, clear conjunctiva  Neck: supple  Respiratory: CTA b/l  Cardiovascular: +S1/S2; RRR  Abdomen: soft, NT/ND; +BS x4  Extremities: WWP, 2+ peripheral pulses b/l; no LE edema  Skin: normal color and turgor; no rash  Neurological: nonresponsive.     MEDICATIONS:  MEDICATIONS  (STANDING):  amiodarone    Tablet 200 milliGRAM(s) Oral daily  artificial  tears Solution 1 Drop(s) Both EYES daily  chlorhexidine 0.12% Liquid 15 milliLiter(s) Swish and Spit two times a day  chlorhexidine 2% Cloths 1 Application(s) Topical daily  petrolatum Ophthalmic Ointment 1 Application(s) Both EYES daily    MEDICATIONS  (PRN):      ALLERGIES:  Allergies    amoxicillin (Rash)  Cipro (Rash)  clindamycin (Unknown)  lactose (Unknown)  penicillin (Rash)  Wheat (Unknown)    Intolerances        LABS:                RADIOLOGY & ADDITIONAL TESTS: Reviewed. INTERVAL HPI/OVERNIGHT EVENTS: no acute events overnight.     SUBJECTIVE: Patient seen and examined at bedside. Pt not responsive to commands, same as yesterday. ROS not obtainable    OBJECTIVE:    VITAL SIGNS:  ICU Vital Signs Last 24 Hrs  T(C): 36.6 (25 Aug 2018 10:00), Max: 37.5 (24 Aug 2018 17:37)  T(F): 97.8 (25 Aug 2018 10:00), Max: 99.5 (24 Aug 2018 17:37)  HR: 80 (25 Aug 2018 12:00) (72 - 86)  BP: 79/50 (25 Aug 2018 12:00) (79/49 - 102/58)  BP(mean): 58 (25 Aug 2018 12:00) (58 - 79)  ABP: --  ABP(mean): --  RR: 12 (25 Aug 2018 12:00) (10 - 18)  SpO2: 99% (25 Aug 2018 12:00) (98% - 100%)    Mode: AC/ CMV (Assist Control/ Continuous Mandatory Ventilation), RR (machine): 10, TV (machine): 360, FiO2: 40, PEEP: 5, ITime: 1, MAP: 9, PIP: 28    08-24 @ 07:01  -  08-25 @ 07:00  --------------------------------------------------------  IN: 960 mL / OUT: 666 mL / NET: 294 mL    08-25 @ 07:01  -  08-25 @ 12:45  --------------------------------------------------------  IN: 200 mL / OUT: 170 mL / NET: 30 mL      CAPILLARY BLOOD GLUCOSE          PHYSICAL EXAM:    General: NAD  HEENT: NC/AT; PERRL, clear conjunctiva  Neck: supple  Respiratory: CTA b/l  Cardiovascular: +S1/S2; RRR  Abdomen: soft, NT/ND; +BS x4  Extremities: WWP, 2+ peripheral pulses b/l; no LE edema  Skin: normal color and turgor; no rash  Neurological: nonresponsive.     MEDICATIONS:  MEDICATIONS  (STANDING):  amiodarone    Tablet 200 milliGRAM(s) Oral daily  artificial  tears Solution 1 Drop(s) Both EYES daily  chlorhexidine 0.12% Liquid 15 milliLiter(s) Swish and Spit two times a day  chlorhexidine 2% Cloths 1 Application(s) Topical daily  petrolatum Ophthalmic Ointment 1 Application(s) Both EYES daily    MEDICATIONS  (PRN):      ALLERGIES:  Allergies    amoxicillin (Rash)  Cipro (Rash)  clindamycin (Unknown)  lactose (Unknown)  penicillin (Rash)  Wheat (Unknown)    Intolerances                        RADIOLOGY & ADDITIONAL TESTS: Reviewed. 29.5

## 2023-09-07 NOTE — PATIENT PROFILE ADULT. - HEALTHCARE INFORMATION NEEDED, PROFILE
arleen Wartpeel Counseling:  I discussed with the patient the risks of Wartpeel including but not limited to erythema, scaling, itching, weeping, crusting, and pain.

## 2023-11-16 NOTE — PROGRESS NOTE ADULT - PROBLEM/PLAN-8
side of body/Left:
DISPLAY PLAN FREE TEXT

## 2023-12-20 NOTE — DISCHARGE NOTE ADULT - FUNCTIONAL SCREEN CURRENT LEVEL: BATHING, MLM
Subjective:      Patient ID: Taty Kenney is a 64 y.o. female.    Chief Complaint: Pain of the Left Knee      63yo female follow up left knee osteoarthritis, chronic ACL tear. Recent gelsyn injections provided no relief. States she has been dealing with the death of her mother. Taking tylenol only. Was told by another provider she cannot take nsaids. Her pain is mostly lateral knee. Notes instability. Referred her to PT multiple times but she refuses to go. She is in a wheelchair. Uses one at home too.         Review of Systems   Constitutional: Negative for chills and fever.   Cardiovascular:  Negative for chest pain.   Respiratory:  Negative for cough.    Hematologic/Lymphatic: Does not bruise/bleed easily.   Skin:  Negative for poor wound healing and rash.   Musculoskeletal:  Positive for arthritis, joint pain, myalgias and stiffness.   Gastrointestinal:  Negative for abdominal pain.   Genitourinary:  Negative for bladder incontinence.   Neurological:  Negative for dizziness, loss of balance and weakness.   Psychiatric/Behavioral:  Negative for altered mental status.        Review of patient's allergies indicates:   Allergen Reactions    Augmentin [amoxicillin-pot clavulanate] Other (See Comments)     Stomach cramps        Current Outpatient Medications   Medication Sig Dispense Refill    ALPRAZolam (XANAX) 1 MG tablet Take 1 mg by mouth daily as needed.      aspirin (ECOTRIN) 81 MG EC tablet Take 1 tablet (81 mg total) by mouth once daily. 30 tablet 0    FLUoxetine 40 MG capsule Take 40 mg by mouth.      hydroCHLOROthiazide (HYDRODIURIL) 25 MG tablet Take 25 mg by mouth once daily.      omeprazole (PRILOSEC) 40 MG capsule Take 40 mg by mouth.      ondansetron (ZOFRAN-ODT) 4 MG TbDL Take 2 tablets (8 mg total) by mouth every 8 (eight) hours as needed (nausea). 15 tablet 0    propranoloL (INDERAL) 40 MG tablet Take 20 mg by mouth 2 (two) times daily.       QUEtiapine (SEROQUEL) 400 MG tablet        "tiZANidine (ZANAFLEX) 2 MG tablet Take 2 tablets (4 mg total) by mouth nightly as needed (prn spasm). 30 tablet 0     No current facility-administered medications for this visit.     Facility-Administered Medications Ordered in Other Visits   Medication Dose Route Frequency Provider Last Rate Last Admin    0.9%  NaCl infusion   Intravenous Continuous Triston Naidu IV, MD        lidocaine HCL 10 mg/ml (1%) injection 1 mL  1 mL Intradermal Once PRN Triston Naidu IV, MD            The patient's relevant past medical, surgical, and social history was reviewed in Epic.       Objective:      VITAL SIGNS: /84   Pulse 80   Ht 5' 7" (1.702 m)   Wt 103.9 kg (229 lb 0.9 oz)   BMI 35.88 kg/m²     General    Nursing note and vitals reviewed.  Constitutional: She is oriented to person, place, and time. She appears well-developed and well-nourished.   Neurological: She is alert and oriented to person, place, and time.     General Musculoskeletal Exam   Gait: antalgic         Left Knee Exam     Inspection   Swelling: present    Tenderness   The patient tender to palpation of the lateral joint line.    Range of Motion   Extension:  abnormal   Flexion:  abnormal     Tests   Stability   Lachman: abnormal  - grade I    Other   Sensation: normal    Muscle Strength   Left Lower Extremity   Quadriceps:  3/5            Assessment:       1. Primary osteoarthritis of left knee    2. Rupture of anterior cruciate ligament of left knee, sequela          Plan:         Taty was seen today for pain.    Diagnoses and all orders for this visit:    Primary osteoarthritis of left knee  -     Prior authorization Order  -     Large Joint Aspiration/Injection: L knee  -     tiZANidine (ZANAFLEX) 2 MG tablet; Take 2 tablets (4 mg total) by mouth nightly as needed (prn spasm).    Rupture of anterior cruciate ligament of left knee, sequela    Left knee osteoarthritis. Will try zilretta next.Toradol given today for acute pain. Return next week " for zilretta. Explained to her the only way to help with her stability is through PT.     Diagnoses and plan discussed with the patient, as well as the expected course and duration of his symptoms.  All questions and concerns were addressed prior to the end of the visit.   Instructed patient to call office if they have any future questions/concerns or to schedule apt. Patient will return to see me if symptoms worsen or fail to improve    Note dictated with voice recognition software, please excuse any grammatical errors.        Nikki Dukes PA-C   12/20/2023   (2) assistive person

## 2023-12-22 NOTE — PATIENT PROFILE ADULT. - PURPOSEFUL PROACTIVE ROUNDING
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.) - MS    Cardiovascular Review:   Exercise tolerance: good (>4 METS)    GI/HEPATIC/RENAL Review:     Positive for renal disease - chronic renal insufficiency    End/Other Review:    Positive for obesity class I - 30.00 - 34.99  Additional Results:      ALLERGIES:  No Known Allergies      Physical Exam     Airway   Mallampati: II  TM Distance: <3 FB  Neck ROM: Full  TMJ Mobility: Good    Cardiovascular  Cardiovascular exam normal    General Assessment  General Assessment: Alert and oriented and No acute distress    Dental Exam  Dental exam normal    Pulmonary Exam  Pulmonary exam normal    Abdominal Exam    Patient Demonstrates:  Obese      Anesthesia Plan:    ASA Status: 2  Anesthesia Type: MAC    Induction: Intravenous  Checklist  Reviewed: Patient Summary, Allergies, Past Med History, Medications, NPO Status and Problem list  Consent/Risks Discussed Statement:  The proposed anesthetic plan, including its risks and benefits, have been discussed with the Patient along with the risks and benefits of alternatives. Questions were encouraged and answered and the patient and/or representative understands and agrees to proceed.        I discussed with the patient (and/or patient's legal representative) the risks and benefits of the proposed anesthesia plan, MAC, which may include services performed by other anesthesia providers.    Alternative anesthesia plans, if available, were reviewed with the patient (and/or patient's legal representative). Discussion has been held with the patient (and/or patient's legal representative) regarding risks of anesthesia, which include  emergent situations that may require  change in anesthesia plan.    The patient (and/or patient's legal representative) has indicated understanding, his/her questions have been answered, and he/she wishes to proceed with the planned anesthetic.    Blood Products: Not Anticipated     Patient Representative

## 2024-01-16 NOTE — ED ADULT TRIAGE NOTE - NS ED NURSE DIRECT TO ROOM YN
-- DO NOT REPLY / DO NOT REPLY ALL --  -- Message is from [facility] [employer] ([facility]) --    General Patient Message: Patient would like her lab work printed and mailed out to her home address      Caller Information         Type Contact Phone/Fax    01/10/2024 10:38 AM CST Phone (Incoming) Meredith Zavala (Self) [phone] (H)    01/16/2024 11:16 AM CST Phone (Incoming) Meredith Zavala (Self) [phone] (H)          Alternative phone number: No     Can a detailed message be left? Yes    Message Turnaround:     Is it Working Hours? Yes - Working Hours                     
-- DO NOT REPLY / DO NOT REPLY ALL --  -- Message is from [facility] [employer] ([facility]) --    Request Result  Is the patient currently having Emergent symptoms?: No    Which result are you requesting?: labs 1/8    What is the full name of the provider that ordered the lab or test?: POLI HUYNH, DO    Caller Information         Type Contact Phone/Fax    01/10/2024 10:38 AM CST Phone (Incoming) Meredith Zavala (Self) [phone] (H)            Alternative phone number: NA    Clinic site name / Account # for ordering provider: [facility]    Can a detailed message be left?: Yes    Message Turnaround:           
No

## 2024-04-19 NOTE — H&P ADULT - PROBLEM SELECTOR PLAN 2
Physician Progress Note      PATIENT:               KORIN WOODALL  CSN #:                  [id number]  :                       1974  ADMIT DATE:       4/10/2024 8:28 AM  DISCH DATE:        4/15/2024 2:46 PM  RESPONDING  PROVIDER #:        Mati Best MD          QUERY TEXT:    Pt admitted with bilateral LE cellulitis. Pt noted to have DM 2. If possible,   please document in progress notes and discharge summary the relationship, if   any, between cellulitis and DM.    The medical record reflects the following:  Risk Factors: cellulitis, DM2, med noncompliance  Clinical Indicators: glucose range   Hgb A1C 5.5  Treatment: Zosyn IV- changed to Omnicef po & Vibramycin po  Thanks, Chitra Betancourt, BSN  [phone]  Options provided:  -- Bilateral LE cellulitis associated with Diabetes  -- Bilateral LE cellulitis unrelated to Diabetes  -- Other - I will add my own diagnosis  -- Disagree - Not applicable / Not valid  -- Disagree - Clinically unable to determine / Unknown  -- Refer to Clinical Documentation Reviewer    PROVIDER RESPONSE TEXT:    Bilateral LE cellulitis unrelated to Diabetes.    Query created by: Chitra Betancourt on 2024 4:23 PM      Electronically signed by:  Mati Best MD 2024 10:52 PM           Hx of DVT. Was not started on AC 2/2 to fall risk and instead had IVC filter placed instead.

## 2024-10-21 NOTE — ED PROVIDER NOTE - CADM POA CENTRAL LINE
Head,  normocephalic,  atraumatic,  Face,  Face within normal limits,  Ears,  External ears within normal limits,  Nose/Nasopharynx,  External nose  normal appearance,  nares patent,
No

## 2024-12-20 NOTE — H&P ADULT. - DOES THIS PATIENT HAVE A HISTORY OF OR HAS BEEN DX WITH HEART FAILURE?
[High Risk Surgery - Intraperitoneal, Intrathoracic or Supringuinal Vascular Procedures] : High Risk Surgery - Intraperitoneal, Intrathoracic or Supringuinal Vascular Procedures - No (0) [Ischemic Heart Disease] : Ischemic Heart Disease - No (0) [Congestive Heart Failure] : Congestive Heart Failure - No (0) [Prior Cerebrovascular Accident or TIA] : Prior Cerebrovascular Accident or TIA - No (0) [Creatinine >= 2mg/dL (1 Point)] : Creatinine >= 2mg/dL - No (0) [Insulin-dependent Diabetic (1 Point)] : Insulin-dependent Diabetic - No (0) [0] : 0 , RCRI Class: I, Risk of Post-Op Cardiac Complications: 3.9%, 95% CI for Risk Estimate: 2.8% - 5.4% [Patient Requires Further Testing] : Patient requires further testing [Modify medications prior to procedure] : Modify medications prior to procedure [As per surgery] : as per surgery [Patient Optimized for Surgery] : Patient optimized for surgery no [No Further Testing Recommended] : no further testing recommended [FreeTextEntry7] : enbrel held

## 2025-01-21 NOTE — PROGRESS NOTE ADULT - SUBJECTIVE AND OBJECTIVE BOX
Called to inform parent of child due to inclement weather conditions over next several days, pt's appointment on Wednesday, 1/22 with Dr. Torres will need to be rescheduled, pt is not set up with [facility] international portal at this time. FREDI   Is awake and more responsive today  Afeb VS stable  In RR good breath sounds ant Abd is soft

## 2025-02-04 NOTE — PROGRESS NOTE ADULT - PROBLEM SELECTOR PROBLEM 4
Changed to 90 days supply   
DVT (deep venous thrombosis)